# Patient Record
Sex: MALE | Race: WHITE | NOT HISPANIC OR LATINO | Employment: OTHER | URBAN - METROPOLITAN AREA
[De-identification: names, ages, dates, MRNs, and addresses within clinical notes are randomized per-mention and may not be internally consistent; named-entity substitution may affect disease eponyms.]

---

## 2017-09-13 ENCOUNTER — TELEPHONE (OUTPATIENT)
Dept: MEDICAL GROUP | Age: 67
End: 2017-09-13

## 2017-09-13 RX ORDER — CELECOXIB 200 MG/1
200 CAPSULE ORAL DAILY
COMMUNITY
End: 2018-01-04 | Stop reason: SDUPTHER

## 2017-09-13 RX ORDER — BUPROPION HYDROCHLORIDE 200 MG/1
200 TABLET, EXTENDED RELEASE ORAL DAILY
COMMUNITY
End: 2018-09-07

## 2017-09-13 RX ORDER — LORAZEPAM 1 MG/1
1 TABLET ORAL EVERY 4 HOURS PRN
COMMUNITY
End: 2017-09-21

## 2017-09-13 RX ORDER — ATORVASTATIN CALCIUM 20 MG/1
20 TABLET, FILM COATED ORAL DAILY
COMMUNITY
End: 2018-02-21 | Stop reason: SDUPTHER

## 2017-09-13 RX ORDER — LISINOPRIL 20 MG/1
20 TABLET ORAL EVERY EVENING
COMMUNITY
End: 2018-02-21 | Stop reason: SDUPTHER

## 2017-09-13 RX ORDER — OMEPRAZOLE 20 MG/1
20 CAPSULE, DELAYED RELEASE ORAL DAILY
COMMUNITY
End: 2018-09-17 | Stop reason: SDUPTHER

## 2017-09-13 RX ORDER — TADALAFIL 5 MG/1
5 TABLET ORAL PRN
COMMUNITY
End: 2017-12-29

## 2017-09-13 RX ORDER — ERGOCALCIFEROL 1.25 MG/1
50000 CAPSULE ORAL
COMMUNITY
End: 2019-03-01

## 2017-09-13 RX ORDER — CHOLECALCIFEROL (VITAMIN D3) 25 MCG
1 TABLET,CHEWABLE ORAL
Status: ON HOLD | COMMUNITY
End: 2021-07-06

## 2017-09-13 NOTE — TELEPHONE ENCOUNTER
NEW PATIENT VISIT PRE-VISIT PLANNING    1.  Epic[employer] Patient is checked in Patient Demographics? YES    2.  Immunizations were updated in Epic using WebIZ?: No WebIZ record       •  Web Iz Recommendations:     3.  Is this appointment scheduled as a Hospital Follow-Up? No    4.  Patient is due for the following Health Maintenance Topics:   Health Maintenance Due   Topic Date Due   • IMM DTaP/Tdap/Td Vaccine (1 - Tdap) 05/11/1970   • COLONOSCOPY  05/11/2001   • IMM ZOSTER VACCINE  05/11/2011   • IMM PNEUMOCOCCAL 65+ (ADULT) LOW/MEDIUM RISK SERIES (1 of 2 - PCV13) 05/11/2016   • IMM INFLUENZA (1) 09/01/2017     Records requested from [facility]      5.  Reviewed/Updated the following with patient:       •   Preferred Pharmacy? YES       •   Preferred Lab? YES       •   Medications? YES. Was Abstract Encounter opened and chart updated? YES       •   Social History? YES. Was Abstract Encounter opened and chart updated? YES       •   Family History? YES. Was Abstract Encounter opened and chart updated? YES    6.  Updated Care Team?       •   DME Company (gait device, O2, CPAP, etc.) N\A       •   Other Specialists (eye doctor, derm, GYN, cardiology, endo, etc): N\A    7.  Patient was informed to arrive 15 min prior to their scheduled appointment and bring in their medication bottles.

## 2017-09-21 ENCOUNTER — HOSPITAL ENCOUNTER (OUTPATIENT)
Dept: LAB | Facility: MEDICAL CENTER | Age: 67
End: 2017-09-21
Attending: FAMILY MEDICINE
Payer: COMMERCIAL

## 2017-09-21 ENCOUNTER — OFFICE VISIT (OUTPATIENT)
Dept: MEDICAL GROUP | Age: 67
End: 2017-09-21
Payer: COMMERCIAL

## 2017-09-21 VITALS
OXYGEN SATURATION: 97 % | BODY MASS INDEX: 27.47 KG/M2 | DIASTOLIC BLOOD PRESSURE: 70 MMHG | SYSTOLIC BLOOD PRESSURE: 124 MMHG | TEMPERATURE: 97.5 F | HEIGHT: 67 IN | WEIGHT: 175 LBS | HEART RATE: 76 BPM

## 2017-09-21 DIAGNOSIS — F51.01 PRIMARY INSOMNIA: ICD-10-CM

## 2017-09-21 DIAGNOSIS — Z00.00 PREVENTATIVE HEALTH CARE: ICD-10-CM

## 2017-09-21 DIAGNOSIS — K21.9 GASTROESOPHAGEAL REFLUX DISEASE WITHOUT ESOPHAGITIS: ICD-10-CM

## 2017-09-21 DIAGNOSIS — E87.6 HYPOKALEMIA: ICD-10-CM

## 2017-09-21 DIAGNOSIS — I10 ESSENTIAL HYPERTENSION: ICD-10-CM

## 2017-09-21 DIAGNOSIS — N40.0 BENIGN NODULAR PROSTATIC HYPERPLASIA WITHOUT LOWER URINARY TRACT SYMPTOMS: ICD-10-CM

## 2017-09-21 DIAGNOSIS — E55.9 VITAMIN D DEFICIENCY: ICD-10-CM

## 2017-09-21 DIAGNOSIS — C67.3 MALIGNANT NEOPLASM OF ANTERIOR WALL OF URINARY BLADDER (HCC): ICD-10-CM

## 2017-09-21 DIAGNOSIS — E78.00 PURE HYPERCHOLESTEROLEMIA: ICD-10-CM

## 2017-09-21 DIAGNOSIS — Z23 NEED FOR VACCINATION: ICD-10-CM

## 2017-09-21 DIAGNOSIS — F32.0 MILD SINGLE CURRENT EPISODE OF MAJOR DEPRESSIVE DISORDER (HCC): ICD-10-CM

## 2017-09-21 DIAGNOSIS — M72.2 PLANTAR FASCIITIS: ICD-10-CM

## 2017-09-21 PROBLEM — F32.9 MAJOR DEPRESSIVE DISORDER: Status: ACTIVE | Noted: 2017-09-21

## 2017-09-21 LAB
ALBUMIN SERPL BCP-MCNC: 4.4 G/DL (ref 3.2–4.9)
ALBUMIN/GLOB SERPL: 1.3 G/DL
ALP SERPL-CCNC: 56 U/L (ref 30–99)
ALT SERPL-CCNC: 29 U/L (ref 2–50)
ANION GAP SERPL CALC-SCNC: 8 MMOL/L (ref 0–11.9)
AST SERPL-CCNC: 28 U/L (ref 12–45)
BASOPHILS # BLD AUTO: 1 % (ref 0–1.8)
BASOPHILS # BLD: 0.05 K/UL (ref 0–0.12)
BILIRUB SERPL-MCNC: 0.6 MG/DL (ref 0.1–1.5)
BUN SERPL-MCNC: 20 MG/DL (ref 8–22)
CALCIUM SERPL-MCNC: 10 MG/DL (ref 8.5–10.5)
CHLORIDE SERPL-SCNC: 105 MMOL/L (ref 96–112)
CHOLEST SERPL-MCNC: 199 MG/DL (ref 100–199)
CO2 SERPL-SCNC: 27 MMOL/L (ref 20–33)
CREAT SERPL-MCNC: 1.06 MG/DL (ref 0.5–1.4)
EOSINOPHIL # BLD AUTO: 0.09 K/UL (ref 0–0.51)
EOSINOPHIL NFR BLD: 1.8 % (ref 0–6.9)
ERYTHROCYTE [DISTWIDTH] IN BLOOD BY AUTOMATED COUNT: 42.2 FL (ref 35.9–50)
GFR SERPL CREATININE-BSD FRML MDRD: >60 ML/MIN/1.73 M 2
GLOBULIN SER CALC-MCNC: 3.3 G/DL (ref 1.9–3.5)
GLUCOSE SERPL-MCNC: 117 MG/DL (ref 65–99)
HCT VFR BLD AUTO: 45.4 % (ref 42–52)
HDLC SERPL-MCNC: 75 MG/DL
HGB BLD-MCNC: 15.1 G/DL (ref 14–18)
IMM GRANULOCYTES # BLD AUTO: 0.01 K/UL (ref 0–0.11)
IMM GRANULOCYTES NFR BLD AUTO: 0.2 % (ref 0–0.9)
LDLC SERPL CALC-MCNC: 100 MG/DL
LYMPHOCYTES # BLD AUTO: 2 K/UL (ref 1–4.8)
LYMPHOCYTES NFR BLD: 39.7 % (ref 22–41)
MCH RBC QN AUTO: 31.7 PG (ref 27–33)
MCHC RBC AUTO-ENTMCNC: 33.3 G/DL (ref 33.7–35.3)
MCV RBC AUTO: 95.4 FL (ref 81.4–97.8)
MONOCYTES # BLD AUTO: 0.56 K/UL (ref 0–0.85)
MONOCYTES NFR BLD AUTO: 11.1 % (ref 0–13.4)
NEUTROPHILS # BLD AUTO: 2.33 K/UL (ref 1.82–7.42)
NEUTROPHILS NFR BLD: 46.2 % (ref 44–72)
NRBC # BLD AUTO: 0 K/UL
NRBC BLD AUTO-RTO: 0 /100 WBC
PLATELET # BLD AUTO: 157 K/UL (ref 164–446)
PMV BLD AUTO: 11.9 FL (ref 9–12.9)
POTASSIUM SERPL-SCNC: 4.2 MMOL/L (ref 3.6–5.5)
PROT SERPL-MCNC: 7.7 G/DL (ref 6–8.2)
PSA SERPL-MCNC: 1.49 NG/ML (ref 0–4)
RBC # BLD AUTO: 4.76 M/UL (ref 4.7–6.1)
SODIUM SERPL-SCNC: 140 MMOL/L (ref 135–145)
TRIGL SERPL-MCNC: 118 MG/DL (ref 0–149)
WBC # BLD AUTO: 5 K/UL (ref 4.8–10.8)

## 2017-09-21 PROCEDURE — 90715 TDAP VACCINE 7 YRS/> IM: CPT | Performed by: FAMILY MEDICINE

## 2017-09-21 PROCEDURE — 90471 IMMUNIZATION ADMIN: CPT | Performed by: FAMILY MEDICINE

## 2017-09-21 PROCEDURE — 36415 COLL VENOUS BLD VENIPUNCTURE: CPT

## 2017-09-21 PROCEDURE — 85025 COMPLETE CBC W/AUTO DIFF WBC: CPT

## 2017-09-21 PROCEDURE — 80053 COMPREHEN METABOLIC PANEL: CPT

## 2017-09-21 PROCEDURE — 84153 ASSAY OF PSA TOTAL: CPT

## 2017-09-21 PROCEDURE — 99204 OFFICE O/P NEW MOD 45 MIN: CPT | Mod: 25 | Performed by: FAMILY MEDICINE

## 2017-09-21 PROCEDURE — 90670 PCV13 VACCINE IM: CPT | Performed by: FAMILY MEDICINE

## 2017-09-21 PROCEDURE — 90662 IIV NO PRSV INCREASED AG IM: CPT | Performed by: FAMILY MEDICINE

## 2017-09-21 PROCEDURE — 82652 VIT D 1 25-DIHYDROXY: CPT

## 2017-09-21 PROCEDURE — 90472 IMMUNIZATION ADMIN EACH ADD: CPT | Performed by: FAMILY MEDICINE

## 2017-09-21 PROCEDURE — 80061 LIPID PANEL: CPT

## 2017-09-21 RX ORDER — LORAZEPAM 2 MG/1
TABLET ORAL
Qty: 60 TAB | Refills: 0 | Status: SHIPPED | OUTPATIENT
Start: 2017-09-21 | End: 2017-10-26 | Stop reason: SDUPTHER

## 2017-09-21 ASSESSMENT — PATIENT HEALTH QUESTIONNAIRE - PHQ9
SUM OF ALL RESPONSES TO PHQ QUESTIONS 1-9: 2
CLINICAL INTERPRETATION OF PHQ2 SCORE: 1
5. POOR APPETITE OR OVEREATING: 0 - NOT AT ALL

## 2017-09-21 NOTE — ASSESSMENT & PLAN NOTE
Lisinopril 20mg po q24h  He is on a statin, atorvastatin 20 mg,  Patient has been diagnosed with essential hypertension for years. Has been compliant with medications and tolerating them with no mention of any adverse events.  The patient is  on a Baby ASPIRIN and a  STATIN.  The patient has been tollerating the BP meds without any issues. No tunnel vision, no cough, no changes in vision, no lightheadedness, no fatigue, no syncopal or presyncopal episodes, no edema, no new rashes. Patient also  denied chest pain, headache, change in vision, dyspnea on exertion, shortness of breath, PND, back pain, numbness or tingling anywhere. He is tolerating his medication well, he denies any lightheadedness, no tunnel vision, no syncopal episodes, no fatigue, no leg weakness no falls.

## 2017-09-21 NOTE — ASSESSMENT & PLAN NOTE
Patient states that he was severely depressed when he was , since his divorce he only takes Wellbutrin episodically. He was taking Prozac and Wellbutrin was also followed by psychiatry. He states that he no longer needs to be followed by psychiatry. Feels much better now. Denies any suicidal or homicidal ideations.

## 2017-09-21 NOTE — ASSESSMENT & PLAN NOTE
Patient states that he's been diagnosed with plantar fasciitis bilateral feet. He has been seen by an orthopedic physician who has arranged for him to have custom fitted inserts. He tends to use inserts daily and has not been consistent with his home stretching exercises.

## 2017-09-21 NOTE — ASSESSMENT & PLAN NOTE
The patient is a 67-year-old male presents to clinic to establish care. He is a brand-new patient, recently moved from [facility]. He has significant past medical history of malignant cyst of the urinary bladder, depression, hypokalemia, history of gastric bypass, hypokalemia, hypertension, hyperlipidemia.   The patient denied any chest pain, no sob, no enrique, no  pnd, no orthopnea, no headache, no changes in vision, no numbness or tingling, no nausea, no diarrhea, no abdominal pain, no fevers, no chills, no bright red blood per rectum, no  difficulty urinating, no burning during micturition, no depressed mood, no other concerns.      Had bariatric surgery about 7 years ago---lost 70 pounds and kept it off    Brother with MI at age 45--he was a heavy smoker and morbidly obese    Father had lung cancer he was a heavy smoker    MGF with prostate cancer, age?    He believes he had a normal colonoscopy at age 61 in [place] he belives

## 2017-09-21 NOTE — ASSESSMENT & PLAN NOTE
The patient states that ever since his bariatric surgery he has been on potassium supplements. However his previous primary care physician wanted repeat the labs before continuing. He denies any palpitations no presyncopal episodes or chest pain.

## 2017-09-21 NOTE — PROGRESS NOTES
This medical record contains text that has been entered with the assistance of computer voice recognition and dictation software.  Therefore, it may contain unintended errors in text, spelling, punctuation, or grammar    Chief Complaint   Patient presents with   • Other     see reason for visit       Buster Medina is a 67 y.o. male here evaluation and management of: New patient, hypertension, hyperlipidemia, malignant cyst of the urinary bladder, history of gastric bypass, hypokalemia, plantar fasciitis, insomnia, depression      HPI:     Preventative health care  The patient is a 67-year-old male presents to clinic to establish care. He is a brand-new patient, recently moved from [facility]. He has significant past medical history of malignant cyst of the urinary bladder, depression, hypokalemia, history of gastric bypass, hypokalemia, hypertension, hyperlipidemia.   The patient denied any chest pain, no sob, no enrique, no  pnd, no orthopnea, no headache, no changes in vision, no numbness or tingling, no nausea, no diarrhea, no abdominal pain, no fevers, no chills, no bright red blood per rectum, no  difficulty urinating, no burning during micturition, no depressed mood, no other concerns.      Had bariatric surgery about 7 years ago---lost 70 pounds and kept it off    Brother with MI at age 45--he was a heavy smoker and morbidly obese    Father had lung cancer he was a heavy smoker    MGF with prostate cancer, age?    He believes he had a normal colonoscopy at age 61 in [place] he belives      Major depressive disorder (CMS-[facility])  Patient states that he was severely depressed when he was , since his divorce he only takes Wellbutrin episodically. He was taking Prozac and Wellbutrin was also followed by psychiatry. He states that he no longer needs to be followed by psychiatry. Feels much better now. Denies any suicidal or homicidal ideations.          Hypokalemia  The patient states that ever since  his bariatric surgery he has been on potassium supplements. However his previous primary care physician wanted repeat the labs before continuing. He denies any palpitations no presyncopal episodes or chest pain.    Plantar fasciitis  Patient states that he's been diagnosed with plantar fasciitis bilateral feet. He has been seen by an orthopedic physician who has arranged for him to have custom fitted inserts. He tends to use inserts daily and has not been consistent with his home stretching exercises.    Primary insomnia  Patient states that he will take Ativan 1-2 mg at night as needed for insomnia. Without it he cannot sleep. He has been on this regimen for 15 years.    Essential hypertension  Lisinopril 20mg po q24h  He is on a statin, atorvastatin 20 mg,  Patient has been diagnosed with essential hypertension for years. Has been compliant with medications and tolerating them with no mention of any adverse events.  The patient is  on a Baby ASPIRIN and a  STATIN.  The patient has been tollerating the BP meds without any issues. No tunnel vision, no cough, no changes in vision, no lightheadedness, no fatigue, no syncopal or presyncopal episodes, no edema, no new rashes. Patient also  denied chest pain, headache, change in vision, dyspnea on exertion, shortness of breath, PND, back pain, numbness or tingling anywhere. He is tolerating his medication well, he denies any lightheadedness, no tunnel vision, no syncopal episodes, no fatigue, no leg weakness no falls.      Current medicines (including changes today)  Current Outpatient Prescriptions   Medication Sig Dispense Refill   • lorazepam (ATIVAN) 2 MG tablet Take 1-2 tabs po qhs prn 60 Tab 0   • Potassium Chloride June CR (K-DUR PO) Take  by mouth.     • Adalimumab (HUMIRA PEN-CROHNS STARTER) 40 MG/0.8ML Pen-injector Kit Inject  as instructed 1 time daily as needed.     • atorvastatin (LIPITOR) 20 MG Tab Take 20 mg by mouth every evening.     • celecoxib  (CELEBREX) 200 MG Cap Take 200 mg by mouth 2 times a day.     • lisinopril (PRINIVIL) 20 MG Tab Take 20 mg by mouth every day.     • omeprazole (PRILOSEC) 20 MG delayed-release capsule Take 20 mg by mouth every day.     • tadalafil (CIALIS) 5 MG tablet Take 5 mg by mouth as needed for Erectile Dysfunction.     • buPROPion (WELLBUTRIN SR) 200 MG SR tablet Take 200 mg by mouth 2 times a day.     • Multiple Vitamins-Minerals (MULTI COMPLETE PO) Take  by mouth.     • vitamin D, Ergocalciferol, (DRISDOL) 52929 units Cap capsule Take  by mouth every 7 days.     • Multiple Vitamins-Minerals (MELODY-DAY 1000 PO) Take  by mouth.     • Cyanocobalamin (B-12) 2500 MCG Tab Take  by mouth.       No current facility-administered medications for this visit.      He  has a past medical history of Anxiety; Arthritis; Cancer (CMS-[facility]); Depression; Hyperlipidemia; Hypertension; and Sleep apnea.  He  has a past surgical history that includes bladder biopsy with cystoscopy; abdominal exploration; cholecystectomy; gastric bypass laparoscopic; eye surgery; open reduction; hernia repair; and athroplasty.  Social History   Substance Use Topics   • Smoking status: Never Smoker   • Smokeless tobacco: Never Used   • Alcohol use 9.0 oz/week     15 Glasses of wine per week     Social History     Social History Narrative   • No narrative on file     Family History   Problem Relation Age of Onset   • Cancer Mother    • Arthritis Mother    • Diabetes Mother    • Lung Disease Father    • Cancer Father    • Diabetes Father    • Hypertension Father    • Hyperlipidemia Father    • Hyperlipidemia Brother    • Arthritis Brother    • No Known Problems Maternal Grandmother    • Cancer Maternal Grandfather    • No Known Problems Paternal Grandmother    • No Known Problems Paternal Grandfather    • Heart Disease Brother    • Hypertension Brother      Family Status   Relation Status   • Mother    • Father    • Brother Alive   • Maternal  "Grandmother    • Maternal Grandfather    • Paternal Grandmother    • Paternal Grandfather    • Brother Alive         ROS    Please see hpi     All other systems reviewed and are negative     Objective:     Blood pressure 124/70, pulse 76, temperature 36.4 °C (97.5 °F), height 1.702 m (5' 7\"), weight 79.4 kg (175 lb), SpO2 97 %. Body mass index is 27.41 kg/m².  Physical Exam:    Constitutional: Alert, no distress.  Skin: Warm, dry, good turgor, no rashes in visible areas.  Eye: Equal, round and reactive, conjunctiva clear, lids normal.  ENMT: Lips without lesions, good dentition, oropharynx clear.  Neck: Trachea midline, no masses, no thyromegaly. No cervical or supraclavicular lymphadenopathy.  Respiratory: Unlabored respiratory effort, lungs clear to auscultation, no wheezes, no ronchi.  Cardiovascular: Normal S1, S2, no murmur, no edema.  Abdomen: Soft, non-tender, no masses, no hepatosplenomegaly.  Psych: Alert and oriented x3, normal affect and mood.          Assessment and Plan:   The following treatment plan was discussed      1. Need for vaccination  Given today    - INFLUENZA VACCINE, HIGH DOSE (65+ ONLY)  - Pneumococcal Conjugate Vaccine 13-Valent <6yo IM  - PNEUMOCOCCAL POLYSACCHARIDE VACCINE 23-VALENT =>3YO SQ/IM  - INFLUENZA VACCINE, HIGH DOSE (65+ ONLY)    2. Hypokalemia  Need repeat labs      3. Preventative health care  Care has been established  We need baseline labs to establish a clinical profile  We will then consider daily prophylactic aspirin   In order to weigh  the benefits vs risk of GI bleed    We reviewed USPSTF guidelines    The decision to initiate low-dose aspirin use for the primary prevention of CVD and CRC in adults aged 60 to 69 years who have a 10% or greater 10-year CVD risk should be an individual one  Up to date on colonoscopy   Requested Medical records to be sent to us  Denies intimate partner viloence        4. Malignant neoplasm of anterior " wall of urinary bladder (CMS-HCC)  He needs establish care in urology    - REFERRAL TO UROLOGY    5. Essential hypertension  I explained to the patient that the most common cause of death in [place] in both Male and Females was Acute MI and the most common risk factor for MI was hypertension.  The Patient was counseled on aggressive life style modifications such as running at least 20minutes per day 3 times per wk, and decreasing Sodium intake,      In addition to pharmacotherapy we discussed  lifestyle modification…#1. Maintain normal weight (BMI 18.5 to 24.kg/m2), #2 DASH diet, #3 Decrease Sodium intake to less than 100meq/day (2.4g Na or 6g NaCL), #4 increase physical activity, #5 Limit Etoh consumption to 2 drinks/day in men and 1 drink per day in women.     - COMP METABOLIC PANEL; Future  - CBC WITH DIFFERENTIAL; Future  - LIPID PROFILE; Future    6. Mild single current episode of major depressive disorder (CMS-HCC)  Patient has been stable with current management  We will make no changes for now      7. Primary insomnia    We talked about the addictive nature of this medication and all side effects including but not limited to respiratory depression and death. We also reviewed of the association of chronic benzodiazepine use and Alzheimer's disease  Would like to continue his medication    .  - lorazepam (ATIVAN) 2 MG tablet; Take 1-2 tabs po qhs prn  Dispense: 60 Tab; Refill: 0    8. Plantar fasciitis  We reviewed conservative management such as ice NSAIDs, taping (dye technique) wearing supportive shoes as well as shoe inserts. Also reviewed exercises such as toe curls, the towel drill as well as calf stretching. Handouts provided       9. Gastroesophageal reflux disease without esophagitis     Gerd precautions given (avoid the supine position after eating, avoid tight fitting clothing, head of bed elevation by raisin legs of bed,  avoid eating prior to sleeping, avoid reflux-inducing foods (foods high in  fat, chocolate, peppermint, and excessive alcohol, which can decrease LES pressure), promote salivation by chewing gum or lozenges,      10. Benign nodular prostatic hyperplasia without lower urinary tract symptoms    - PROSTATE SPECIFIC AG DIAGNOSTIC; Future    11. Vitamin D deficiency    - VITAMIN 1,25 DIHYDROXY; Future    12. Pure hypercholesterolemia  Need new labs        HEALTH MAINTENANCE: previous medical record    Instructed to Follow up in clinic or ER for worsening symptoms, difficulty breathing, lack of expected recovery, or should new symptoms or problems arise.    Followup: Return in about 4 weeks (around 10/19/2017) for punch biopsy.       Once again this medical record contains text that has been entered with the assistance of computer voice recognition and dictation software.  Therefore, it may contain unintended errors in text, spelling, punctuation, or grammar

## 2017-09-23 LAB — 1,25(OH)2D3 SERPL-MCNC: 85.5 PG/ML (ref 19.9–79.3)

## 2017-09-27 DIAGNOSIS — E83.52 HYPERCALCEMIA: ICD-10-CM

## 2017-09-27 RX ORDER — POTASSIUM CHLORIDE 750 MG/1
10 TABLET, FILM COATED, EXTENDED RELEASE ORAL DAILY
Qty: 90 TAB | Refills: 0 | Status: SHIPPED | OUTPATIENT
Start: 2017-09-27 | End: 2017-11-14 | Stop reason: SDUPTHER

## 2017-11-01 ENCOUNTER — HOSPITAL ENCOUNTER (OUTPATIENT)
Dept: LAB | Facility: MEDICAL CENTER | Age: 67
End: 2017-11-01
Attending: PHYSICIAN ASSISTANT
Payer: COMMERCIAL

## 2017-11-01 LAB
25(OH)D3 SERPL-MCNC: 99 NG/ML (ref 30–100)
CHOLEST SERPL-MCNC: 210 MG/DL (ref 100–199)
FOLATE SERPL-MCNC: >23.2 NG/ML
HDLC SERPL-MCNC: 79 MG/DL
IRON SATN MFR SERPL: 34 % (ref 15–55)
IRON SERPL-MCNC: 124 UG/DL (ref 50–180)
LDLC SERPL CALC-MCNC: 106 MG/DL
PREALB SERPL-MCNC: 32 MG/DL (ref 18–38)
TIBC SERPL-MCNC: 364 UG/DL (ref 250–450)
TRANSFERRIN SERPL-MCNC: 258 MG/DL (ref 200–370)
TRIGL SERPL-MCNC: 127 MG/DL (ref 0–149)
VIT B12 SERPL-MCNC: 617 PG/ML (ref 211–911)

## 2017-11-01 PROCEDURE — 84134 ASSAY OF PREALBUMIN: CPT

## 2017-11-01 PROCEDURE — 84466 ASSAY OF TRANSFERRIN: CPT

## 2017-11-01 PROCEDURE — 36415 COLL VENOUS BLD VENIPUNCTURE: CPT

## 2017-11-01 PROCEDURE — 82746 ASSAY OF FOLIC ACID SERUM: CPT

## 2017-11-01 PROCEDURE — 82306 VITAMIN D 25 HYDROXY: CPT

## 2017-11-01 PROCEDURE — 80061 LIPID PANEL: CPT

## 2017-11-01 PROCEDURE — 84425 ASSAY OF VITAMIN B-1: CPT

## 2017-11-01 PROCEDURE — 83540 ASSAY OF IRON: CPT

## 2017-11-01 PROCEDURE — 82607 VITAMIN B-12: CPT

## 2017-11-01 PROCEDURE — 83550 IRON BINDING TEST: CPT

## 2017-11-03 ENCOUNTER — OFFICE VISIT (OUTPATIENT)
Dept: MEDICAL GROUP | Age: 67
End: 2017-11-03
Payer: COMMERCIAL

## 2017-11-03 ENCOUNTER — HOSPITAL ENCOUNTER (OUTPATIENT)
Facility: MEDICAL CENTER | Age: 67
End: 2017-11-03
Attending: FAMILY MEDICINE
Payer: COMMERCIAL

## 2017-11-03 VITALS
WEIGHT: 176 LBS | TEMPERATURE: 97.5 F | BODY MASS INDEX: 27.62 KG/M2 | HEIGHT: 67 IN | DIASTOLIC BLOOD PRESSURE: 78 MMHG | OXYGEN SATURATION: 100 % | SYSTOLIC BLOOD PRESSURE: 130 MMHG | HEART RATE: 73 BPM

## 2017-11-03 DIAGNOSIS — Z00.00 PREVENTATIVE HEALTH CARE: ICD-10-CM

## 2017-11-03 DIAGNOSIS — D48.9 NEOPLASM OF UNCERTAIN BEHAVIOR: ICD-10-CM

## 2017-11-03 PROCEDURE — 11441 EXC FACE-MM B9+MARG 0.6-1 CM: CPT | Performed by: FAMILY MEDICINE

## 2017-11-03 PROCEDURE — 88304 TISSUE EXAM BY PATHOLOGIST: CPT

## 2017-11-03 PROCEDURE — 99999 PR NO CHARGE: CPT | Performed by: FAMILY MEDICINE

## 2017-11-03 NOTE — PROGRESS NOTES
This medical record contains text that has been entered with the assistance of computer voice recognition and dictation software.  Therefore, it may contain unintended errors in text, spelling, punctuation, or grammar    Chief Complaint   Patient presents with   • Other     see reason for visit       Buster Medina is a 67 y.o. male here evaluation and management of: excision      HPI:     Neoplasm of uncertain behavior  Patient states that he has a growing lesion near the corner of his left eye, he believes this is a same site where he had a previous skin cancer. He is not sure which type of skin cancer. However the lesion has been growing.    Preventative health care  The 10-year CVD risk score (Patricio, et al., 2008) is: 13.2%    Values used to calculate the score:      Age: 67 years      Sex: Male      Diabetic: No      Tobacco smoker: No      Systolic Blood Pressure: 130 mmHg      Is BP treated: No      HDL Cholesterol: 79 mg/dL      Total Cholesterol: 210 mg/dL    However this does not include positive family h/o of MI at age 45 (brother)      Current medicines (including changes today)  Current Outpatient Prescriptions   Medication Sig Dispense Refill   • lorazepam (ATIVAN) 2 MG tablet TAKE 1 TO 2 TABLETS BY MOUTH AT BEDTIME AS NEEDED 60 Tab 0   • potassium chloride ER (KLOR-CON 10) 10 MEQ tablet Take 1 Tab by mouth every day. 90 Tab 0   • Potassium Chloride June CR (K-DUR PO) Take  by mouth.     • Adalimumab (HUMIRA PEN-CROHNS STARTER) 40 MG/0.8ML Pen-injector Kit Inject  as instructed 1 time daily as needed.     • atorvastatin (LIPITOR) 20 MG Tab Take 20 mg by mouth every evening.     • celecoxib (CELEBREX) 200 MG Cap Take 200 mg by mouth 2 times a day.     • lisinopril (PRINIVIL) 20 MG Tab Take 20 mg by mouth every day.     • omeprazole (PRILOSEC) 20 MG delayed-release capsule Take 20 mg by mouth every day.     • tadalafil (CIALIS) 5 MG tablet Take 5 mg by mouth as needed for Erectile Dysfunction.     •  buPROPion (WELLBUTRIN SR) 200 MG SR tablet Take 200 mg by mouth 2 times a day.     • Multiple Vitamins-Minerals (MULTI COMPLETE PO) Take  by mouth.     • vitamin D, Ergocalciferol, (DRISDOL) 15922 units Cap capsule Take  by mouth every 7 days.     • Multiple Vitamins-Minerals (MELODY-DAY 1000 PO) Take  by mouth.     • Cyanocobalamin (B-12) 2500 MCG Tab Take  by mouth.       No current facility-administered medications for this visit.      He  has a past medical history of Anxiety; Arthritis; Cancer (CMS-HCC); Depression; Hyperlipidemia; Hypertension; and Sleep apnea.  He  has a past surgical history that includes bladder biopsy with cystoscopy; abdominal exploration; cholecystectomy; gastric bypass laparoscopic; eye surgery; open reduction; hernia repair; and athroplasty.  Social History   Substance Use Topics   • Smoking status: Never Smoker   • Smokeless tobacco: Never Used   • Alcohol use 9.0 oz/week     15 Glasses of wine per week     Social History     Social History Narrative   • No narrative on file     Family History   Problem Relation Age of Onset   • Cancer Mother    • Arthritis Mother    • Diabetes Mother    • Lung Disease Father    • Cancer Father    • Diabetes Father    • Hypertension Father    • Hyperlipidemia Father    • Hyperlipidemia Brother    • Arthritis Brother    • No Known Problems Maternal Grandmother    • Cancer Maternal Grandfather    • No Known Problems Paternal Grandmother    • No Known Problems Paternal Grandfather    • Heart Disease Brother    • Hypertension Brother      Family Status   Relation Status   • Mother    • Father    • Brother Alive   • Maternal Grandmother    • Maternal Grandfather    • Paternal Grandmother    • Paternal Grandfather    • Brother Alive         ROS    Please see hpi     All other systems reviewed and are negative     Objective:     Blood pressure 130/78, pulse 73, temperature 36.4 °C (97.5 °F), height 1.702 m (5'  "7.01\"), weight 79.8 kg (176 lb), SpO2 100 %. Body mass index is 27.56 kg/m².  Physical Exam:    Constitutional: Alert, no distress.  Skin: Warm, dry, good turgor, no rashes in visible areas.  Eye: Equal, round and reactive, conjunctiva clear, lids normal.  ENMT: Lips without lesions, good dentition, oropharynx clear.  Neck: Trachea midline, no masses, no thyromegaly. No cervical or supraclavicular lymphadenopathy.  Respiratory: Unlabored respiratory effort, lungs clear to auscultation, no wheezes, no ronchi.  Cardiovascular: Normal S1, S2, no murmur, no edema.  Abdomen: Soft, non-tender, no masses, no hepatosplenomegaly.  Psych: Alert and oriented x3, normal affect and mood.    Excision--- 8 mm, irregular, assymmetric, hyperpigmented raised silver plaque located near corner of left eye      After informed written consent was obtained, using Betadine for cleansing and 1% Lidocaine w- epinephrine for anesthetic, with sterile technique a 8 mm punch tool was used to obtain and excise  the lesion through dermis (full thickness) . Intent was to remove entire lesion with margins . Hemostasis was obtained by pressure and wound was  Sutured  With 4.0 Ethilon x2 . Antibiotic dressing is applied, and wound care instructions provided. Be alert for any signs of cutaneous infection. The specimen is labeled and sent to pathology for evaluation. The procedure was well tolerated without complications.      Assessment and Plan:   The following treatment plan was discussed      1. Neoplasm of uncertain behavior  Patient tollerrated procedure well  There were no adverse events  Patient was given post procedure precautions     2. Preventative medicine  According  to [place] 2008 Risk Score the patient has a 10 Global CVD risk score of 12 % = moderate  and the benefits of ASPIRN DO outweigh the risk of GI bleed.     Also given his positive family h/o of MI at age 45 (brother)  He will benefit from ASA 81mg, if his bariatric surgeon " allows him to        HEALTH MAINTENANCE:    Instructed to Follow up in clinic or ER for worsening symptoms, difficulty breathing, lack of expected recovery, or should new symptoms or problems arise.    Followup: Return in about 2 weeks (around 11/17/2017) for punch biopsy, Suture Removal.       Once again this medical record contains text that has been entered with the assistance of computer voice recognition and dictation software.  Therefore, it may contain unintended errors in text, spelling, punctuation, or grammar

## 2017-11-03 NOTE — ASSESSMENT & PLAN NOTE
The 10-year CVD risk score (Patricio et al., 2008) is: 13.2%    Values used to calculate the score:      Age: 67 years      Sex: Male      Diabetic: No      Tobacco smoker: No      Systolic Blood Pressure: 130 mmHg      Is BP treated: No      HDL Cholesterol: 79 mg/dL      Total Cholesterol: 210 mg/dL    However this does not include positive family h/o of MI at age 45 (brother)

## 2017-11-03 NOTE — ASSESSMENT & PLAN NOTE
Patient states that he has a growing lesion near the corner of his left eye, he believes this is a same site where he had a previous skin cancer. He is not sure which type of skin cancer. However the lesion has been growing.

## 2017-11-04 LAB — VIT B1 BLD-MCNC: 150 NMOL/L (ref 70–180)

## 2017-11-14 RX ORDER — POTASSIUM CHLORIDE 750 MG/1
10 TABLET, FILM COATED, EXTENDED RELEASE ORAL DAILY
Qty: 90 TAB | Refills: 0 | Status: SHIPPED | OUTPATIENT
Start: 2017-11-14 | End: 2018-03-02 | Stop reason: SDUPTHER

## 2017-11-21 ENCOUNTER — HOSPITAL ENCOUNTER (OUTPATIENT)
Facility: MEDICAL CENTER | Age: 67
End: 2017-11-21
Attending: FAMILY MEDICINE
Payer: COMMERCIAL

## 2017-11-21 ENCOUNTER — OFFICE VISIT (OUTPATIENT)
Dept: MEDICAL GROUP | Age: 67
End: 2017-11-21
Payer: COMMERCIAL

## 2017-11-21 VITALS
TEMPERATURE: 98.6 F | WEIGHT: 177.2 LBS | HEIGHT: 67 IN | SYSTOLIC BLOOD PRESSURE: 118 MMHG | OXYGEN SATURATION: 98 % | DIASTOLIC BLOOD PRESSURE: 76 MMHG | BODY MASS INDEX: 27.81 KG/M2 | HEART RATE: 81 BPM

## 2017-11-21 DIAGNOSIS — Z48.02 VISIT FOR SUTURE REMOVAL: ICD-10-CM

## 2017-11-21 DIAGNOSIS — D48.9 NEOPLASM OF UNCERTAIN BEHAVIOR: ICD-10-CM

## 2017-11-21 PROCEDURE — 11401 EXC TR-EXT B9+MARG 0.6-1 CM: CPT | Performed by: FAMILY MEDICINE

## 2017-11-21 PROCEDURE — 99212 OFFICE O/P EST SF 10 MIN: CPT | Mod: 25 | Performed by: FAMILY MEDICINE

## 2017-11-21 PROCEDURE — 88305 TISSUE EXAM BY PATHOLOGIST: CPT

## 2017-11-21 NOTE — PROGRESS NOTES
This medical record contains text that has been entered with the assistance of computer voice recognition and dictation software.  Therefore, it may contain unintended errors in text, spelling, punctuation, or grammar    No chief complaint on file.      Buster Medina is a 67 y.o. male here evaluation and management of: suture removal discuss pathology of excision, and excision      HPI:     Neoplasm of uncertain behavior  Patient is a 67-year-old male 10 with ample history of sun exposure. He has several irregular macules, there is one concerning on his left chest that made him go home, he is not sure how long it's been there. Causing concern he would like to have removed.    Visit for suture removal    The patient underwent excision and returns for suture removal.      Current medicines (including changes today)  Current Outpatient Prescriptions   Medication Sig Dispense Refill   • potassium chloride ER (KLOR-CON 10) 10 MEQ tablet Take 1 Tab by mouth every day. 90 Tab 0   • lorazepam (ATIVAN) 2 MG tablet TAKE 1 TO 2 TABLETS BY MOUTH AT BEDTIME AS NEEDED 60 Tab 0   • Adalimumab (HUMIRA PEN-CROHNS STARTER) 40 MG/0.8ML Pen-injector Kit Inject  as instructed 1 time daily as needed.     • atorvastatin (LIPITOR) 20 MG Tab Take 20 mg by mouth every evening.     • celecoxib (CELEBREX) 200 MG Cap Take 200 mg by mouth 2 times a day.     • lisinopril (PRINIVIL) 20 MG Tab Take 20 mg by mouth every day.     • omeprazole (PRILOSEC) 20 MG delayed-release capsule Take 20 mg by mouth every day.     • tadalafil (CIALIS) 5 MG tablet Take 5 mg by mouth as needed for Erectile Dysfunction.     • buPROPion (WELLBUTRIN SR) 200 MG SR tablet Take 200 mg by mouth 2 times a day.     • vitamin D, Ergocalciferol, (DRISDOL) 01104 units Cap capsule Take  by mouth every 7 days.     • Multiple Vitamins-Minerals (MELODY-DAY 1000 PO) Take  by mouth.     • Cyanocobalamin (B-12) 2500 MCG Tab Take  by mouth.     • Potassium Chloride June CR (K-DUR PO) Take  " by mouth.     • Multiple Vitamins-Minerals (MULTI COMPLETE PO) Take  by mouth.       No current facility-administered medications for this visit.      He  has a past medical history of Anxiety; Arthritis; Cancer (CMS-HCC); Depression; Hyperlipidemia; Hypertension; and Sleep apnea.  He  has a past surgical history that includes bladder biopsy with cystoscopy; abdominal exploration; cholecystectomy; gastric bypass laparoscopic; eye surgery; open reduction; hernia repair; and athroplasty.  Social History   Substance Use Topics   • Smoking status: Never Smoker   • Smokeless tobacco: Never Used   • Alcohol use 9.0 oz/week     15 Glasses of wine per week     Social History     Social History Narrative   • No narrative on file     Family History   Problem Relation Age of Onset   • Cancer Mother    • Arthritis Mother    • Diabetes Mother    • Lung Disease Father    • Cancer Father    • Diabetes Father    • Hypertension Father    • Hyperlipidemia Father    • Hyperlipidemia Brother    • Arthritis Brother    • No Known Problems Maternal Grandmother    • Cancer Maternal Grandfather    • No Known Problems Paternal Grandmother    • No Known Problems Paternal Grandfather    • Heart Disease Brother    • Hypertension Brother      Family Status   Relation Status   • Mother    • Father    • Brother Alive   • Maternal Grandmother    • Maternal Grandfather    • Paternal Grandmother    • Paternal Grandfather    • Brother Alive         ROS    Please see hpi     All other systems reviewed and are negative     Objective:     Blood pressure 118/76, pulse 81, temperature 37 °C (98.6 °F), height 1.702 m (5' 7\"), weight 80.4 kg (177 lb 3.2 oz), SpO2 98 %. Body mass index is 27.75 kg/m².  Physical Exam:    Constitutional: Alert, no distress.  Skin: Warm, dry, good turgor, no rashes in visible areas.  Eye: Equal, round and reactive, conjunctiva clear, lids normal.  ENMT: Lips without lesions, good " dentition, oropharynx clear.  Neck: Trachea midline, no masses, no thyromegaly. No cervical or supraclavicular lymphadenopathy.  Respiratory: Unlabored respiratory effort, lungs clear to auscultation, no wheezes, no ronchi.  Cardiovascular: Normal S1, S2, no murmur, no edema.  Abdomen: Soft, non-tender, no masses, no hepatosplenomegaly.  Psych: Alert and oriented x3, normal affect and mood.        Excision---6mm, irregular, assymmetric, hyperpigmented macule located on     Final Length of Excision-- 8mm        After informed written consent was obtained, using Betadine for cleansing and 1% Lidocaine w- epinephrine for anesthetic, with sterile technique a 6 mm punch tool was used to obtain and excise  the lesion through dermis (full thickness) . Intent was to remove entire lesion with margins . Hemostasis was obtained by pressure and wound was Sutured  With 4.0 Ethilon x2 Antibiotic dressing is applied, and wound care instructions provided. Be alert for any signs of cutaneous infection. The specimen is labeled and sent to pathology for evaluation. The procedure was well tolerated without complications.      Assessment and Plan:   The following treatment plan was discussed      1. Neoplasm of uncertain behavior    Patient tollerrated procedure well  There were no adverse events  Patient was given post procedure precautions     - PATHOLOGY SPECIMEN; Future  - PATHOLOGY SPECIMEN    2. Visit for suture removal      Sutures removed in normal clean fashion.  There were no adverse events.          HEALTH MAINTENANCE:    Instructed to Follow up in clinic or ER for worsening symptoms, difficulty breathing, lack of expected recovery, or should new symptoms or problems arise.    Followup: Return in about 2 weeks (around 12/5/2017) for Suture Removal.       Once again this medical record contains text that has been entered with the assistance of computer voice recognition and dictation software.  Therefore, it may contain  unintended errors in text, spelling, punctuation, or grammar

## 2017-11-21 NOTE — ASSESSMENT & PLAN NOTE
Patient is a 67-year-old male 10 with ample history of sun exposure. He has several irregular macules, there is one concerning on his left chest that made him go home, he is not sure how long it's been there. Causing concern he would like to have removed.

## 2017-11-25 DIAGNOSIS — F51.01 PRIMARY INSOMNIA: ICD-10-CM

## 2017-11-30 RX ORDER — LORAZEPAM 2 MG/1
TABLET ORAL
Qty: 60 TAB | Refills: 0 | Status: SHIPPED
Start: 2017-11-30 | End: 2017-12-25 | Stop reason: SDUPTHER

## 2017-12-05 ENCOUNTER — OFFICE VISIT (OUTPATIENT)
Dept: MEDICAL GROUP | Age: 67
End: 2017-12-05
Payer: COMMERCIAL

## 2017-12-05 VITALS
HEIGHT: 67 IN | SYSTOLIC BLOOD PRESSURE: 117 MMHG | OXYGEN SATURATION: 98 % | BODY MASS INDEX: 27.97 KG/M2 | HEART RATE: 75 BPM | DIASTOLIC BLOOD PRESSURE: 70 MMHG | TEMPERATURE: 96.4 F | WEIGHT: 178.2 LBS

## 2017-12-05 DIAGNOSIS — Z48.02 VISIT FOR SUTURE REMOVAL: Primary | ICD-10-CM

## 2017-12-05 DIAGNOSIS — Z51.89 VISIT FOR WOUND CHECK: ICD-10-CM

## 2017-12-05 PROCEDURE — 99212 OFFICE O/P EST SF 10 MIN: CPT | Performed by: FAMILY MEDICINE

## 2017-12-05 NOTE — PROGRESS NOTES
Pt presents for wound check and suture removal.   Date of procedure: 11/21/20117  Location: left anterior chest   Number of suture: 2  Pathology report: benign solar lentigo.   Pt denies fever, chills, spreading erythema, worsening pain, lesional bleeding or purulent discharge.     Exam:   1-cm incision on left anterior chest, mild surrounding erythema, no pain to palpation, negative lesional bleeding or discharge.     A/P:   Wound appears to heal well. Pathology report discussed with patient. 2 sutures removed successfully. Pt tolerates procedure well, no immediate complications noted. Wound care instructions provided. F/u PRN.     Nuvia Cohen M.D.

## 2017-12-28 ENCOUNTER — OFFICE VISIT (OUTPATIENT)
Dept: URGENT CARE | Facility: CLINIC | Age: 67
End: 2017-12-28
Payer: COMMERCIAL

## 2017-12-28 VITALS
RESPIRATION RATE: 16 BRPM | SYSTOLIC BLOOD PRESSURE: 128 MMHG | HEIGHT: 67 IN | OXYGEN SATURATION: 97 % | WEIGHT: 178.6 LBS | DIASTOLIC BLOOD PRESSURE: 76 MMHG | HEART RATE: 84 BPM | BODY MASS INDEX: 28.03 KG/M2 | TEMPERATURE: 97.7 F

## 2017-12-28 DIAGNOSIS — R21 RASH: ICD-10-CM

## 2017-12-28 DIAGNOSIS — M62.838 NECK MUSCLE SPASM: ICD-10-CM

## 2017-12-28 PROCEDURE — 99214 OFFICE O/P EST MOD 30 MIN: CPT | Performed by: NURSE PRACTITIONER

## 2017-12-28 ASSESSMENT — ENCOUNTER SYMPTOMS
SHORTNESS OF BREATH: 0
DIZZINESS: 0
CHILLS: 0
VOMITING: 0
NAUSEA: 0
SORE THROAT: 0
WEAKNESS: 0
EYE PAIN: 0
NUMBNESS: 0
FEVER: 0
MYALGIAS: 0
COUGH: 0

## 2017-12-29 ENCOUNTER — HOSPITAL ENCOUNTER (OUTPATIENT)
Dept: RADIOLOGY | Facility: MEDICAL CENTER | Age: 67
End: 2017-12-29
Attending: UROLOGY
Payer: COMMERCIAL

## 2017-12-29 DIAGNOSIS — Z01.810 PRE-OPERATIVE CARDIOVASCULAR EXAMINATION: ICD-10-CM

## 2017-12-29 DIAGNOSIS — Z01.812 PRE-OPERATIVE LABORATORY EXAMINATION: ICD-10-CM

## 2017-12-29 DIAGNOSIS — Z01.811 PRE-OPERATIVE RESPIRATORY EXAMINATION: ICD-10-CM

## 2017-12-29 LAB
ALBUMIN SERPL BCP-MCNC: 4.3 G/DL (ref 3.2–4.9)
ALBUMIN/GLOB SERPL: 1.4 G/DL
ALP SERPL-CCNC: 53 U/L (ref 30–99)
ALT SERPL-CCNC: 31 U/L (ref 2–50)
ANION GAP SERPL CALC-SCNC: 5 MMOL/L (ref 0–11.9)
APPEARANCE UR: CLEAR
APTT PPP: 24.2 SEC (ref 24.7–36)
AST SERPL-CCNC: 28 U/L (ref 12–45)
BACTERIA #/AREA URNS HPF: NEGATIVE /HPF
BASOPHILS # BLD AUTO: 0.5 % (ref 0–1.8)
BASOPHILS # BLD: 0.03 K/UL (ref 0–0.12)
BILIRUB SERPL-MCNC: 0.5 MG/DL (ref 0.1–1.5)
BILIRUB UR QL STRIP.AUTO: NEGATIVE
BUN SERPL-MCNC: 20 MG/DL (ref 8–22)
CALCIUM SERPL-MCNC: 10.1 MG/DL (ref 8.5–10.5)
CHLORIDE SERPL-SCNC: 107 MMOL/L (ref 96–112)
CO2 SERPL-SCNC: 27 MMOL/L (ref 20–33)
COLOR UR: ABNORMAL
CREAT SERPL-MCNC: 1.2 MG/DL (ref 0.5–1.4)
EKG IMPRESSION: NORMAL
EOSINOPHIL # BLD AUTO: 0.08 K/UL (ref 0–0.51)
EOSINOPHIL NFR BLD: 1.3 % (ref 0–6.9)
EPI CELLS #/AREA URNS HPF: NEGATIVE /HPF
ERYTHROCYTE [DISTWIDTH] IN BLOOD BY AUTOMATED COUNT: 42.6 FL (ref 35.9–50)
GFR SERPL CREATININE-BSD FRML MDRD: >60 ML/MIN/1.73 M 2
GLOBULIN SER CALC-MCNC: 3 G/DL (ref 1.9–3.5)
GLUCOSE SERPL-MCNC: 114 MG/DL (ref 65–99)
GLUCOSE UR STRIP.AUTO-MCNC: NEGATIVE MG/DL
HCT VFR BLD AUTO: 43.6 % (ref 42–52)
HGB BLD-MCNC: 14.8 G/DL (ref 14–18)
HYALINE CASTS #/AREA URNS LPF: ABNORMAL /LPF
IMM GRANULOCYTES # BLD AUTO: 0.02 K/UL (ref 0–0.11)
IMM GRANULOCYTES NFR BLD AUTO: 0.3 % (ref 0–0.9)
INR PPP: 1.06 (ref 0.87–1.13)
KETONES UR STRIP.AUTO-MCNC: NEGATIVE MG/DL
LEUKOCYTE ESTERASE UR QL STRIP.AUTO: ABNORMAL
LYMPHOCYTES # BLD AUTO: 1.42 K/UL (ref 1–4.8)
LYMPHOCYTES NFR BLD: 23.3 % (ref 22–41)
MCH RBC QN AUTO: 32.2 PG (ref 27–33)
MCHC RBC AUTO-ENTMCNC: 33.9 G/DL (ref 33.7–35.3)
MCV RBC AUTO: 94.8 FL (ref 81.4–97.8)
MICRO URNS: ABNORMAL
MONOCYTES # BLD AUTO: 0.93 K/UL (ref 0–0.85)
MONOCYTES NFR BLD AUTO: 15.2 % (ref 0–13.4)
NEUTROPHILS # BLD AUTO: 3.62 K/UL (ref 1.82–7.42)
NEUTROPHILS NFR BLD: 59.4 % (ref 44–72)
NITRITE UR QL STRIP.AUTO: NEGATIVE
NRBC # BLD AUTO: 0.02 K/UL
NRBC BLD-RTO: 0.3 /100 WBC
PH UR STRIP.AUTO: 5.5 [PH]
PLATELET # BLD AUTO: 142 K/UL (ref 164–446)
PMV BLD AUTO: 11.6 FL (ref 9–12.9)
POTASSIUM SERPL-SCNC: 4.5 MMOL/L (ref 3.6–5.5)
PROT SERPL-MCNC: 7.3 G/DL (ref 6–8.2)
PROT UR QL STRIP: NEGATIVE MG/DL
PROTHROMBIN TIME: 13.5 SEC (ref 12–14.6)
RBC # BLD AUTO: 4.6 M/UL (ref 4.7–6.1)
RBC # URNS HPF: ABNORMAL /HPF
RBC UR QL AUTO: NEGATIVE
SODIUM SERPL-SCNC: 139 MMOL/L (ref 135–145)
SP GR UR STRIP.AUTO: 1.02
UROBILINOGEN UR STRIP.AUTO-MCNC: 0.2 MG/DL
WBC # BLD AUTO: 6.1 K/UL (ref 4.8–10.8)
WBC #/AREA URNS HPF: ABNORMAL /HPF

## 2017-12-29 PROCEDURE — 71020 DX-CHEST-2 VIEWS: CPT

## 2017-12-29 PROCEDURE — 87186 SC STD MICRODIL/AGAR DIL: CPT

## 2017-12-29 PROCEDURE — 85730 THROMBOPLASTIN TIME PARTIAL: CPT

## 2017-12-29 PROCEDURE — 36415 COLL VENOUS BLD VENIPUNCTURE: CPT

## 2017-12-29 PROCEDURE — 87086 URINE CULTURE/COLONY COUNT: CPT

## 2017-12-29 PROCEDURE — 87077 CULTURE AEROBIC IDENTIFY: CPT

## 2017-12-29 PROCEDURE — 85025 COMPLETE CBC W/AUTO DIFF WBC: CPT

## 2017-12-29 PROCEDURE — 80053 COMPREHEN METABOLIC PANEL: CPT

## 2017-12-29 PROCEDURE — 93010 ELECTROCARDIOGRAM REPORT: CPT | Performed by: INTERNAL MEDICINE

## 2017-12-29 PROCEDURE — 85610 PROTHROMBIN TIME: CPT

## 2017-12-29 PROCEDURE — 93005 ELECTROCARDIOGRAM TRACING: CPT

## 2017-12-29 PROCEDURE — 81001 URINALYSIS AUTO W/SCOPE: CPT

## 2017-12-29 NOTE — PROGRESS NOTES
"  Subjective:     Buster Medina is a 67 y.o. male who presents for Rash (on r leg and chest, tingling on shoulder, nausea, just got back from [place] )  Patient in clinic today with multiple vague complaints. Patient was just recently important marital on vacation where he states they were exposed to mosquitoes. Patient states that one other person on the trip has been exact same symptoms that happened and at the same time. His friend also had a rash that looked like \"whip marks\" When he was there one week ago he had nausea and following that he had right shoulder tingling. He states the tingling feels like electrodes pulsating in his upper right neck to upper back. Patient does have a shoulder replacement. Yesterday patient noticed a rash on his chest that looks like marks. Patient denies any itching, swelling, fevers, joint pain. Patient states current symptoms have now completely resolved. Patient concerned that all of these vague complaints are a cascade of something that he contracted in [place].     Rash   This is a new problem. The current episode started yesterday. The problem is unchanged. The affected locations include the chest and left lower leg. He was exposed to nothing. Pertinent negatives include no cough, eye pain, fever, joint pain, shortness of breath, sore throat or vomiting. Past treatments include nothing. There is no history of eczema.   Shoulder Pain   This is a new problem. The current episode started in the past 7 days. The problem occurs intermittently. The problem has been unchanged. Associated symptoms include a rash. Pertinent negatives include no chest pain, chills, coughing, fever, myalgias, nausea, numbness, sore throat, vomiting or weakness. Nothing aggravates the symptoms. He has tried nothing for the symptoms. The treatment provided no relief.     Past Medical History:   Diagnosis Date   • Anxiety    • Arthritis    • Cancer (CMS-HCC)    • Depression    • Hyperlipidemia    • Hypertension " "   • Sleep apnea      Past Surgical History:   Procedure Laterality Date   • ABDOMINAL EXPLORATION     • ATHROPLASTY      right shoulder   • BLADDER BIOPSY WITH CYSTOSCOPY     • CHOLECYSTECTOMY     • EYE SURGERY      las   • GASTRIC BYPASS LAPAROSCOPIC     • HERNIA REPAIR     • OPEN REDUCTION       Social History     Social History   • Marital status: Single     Spouse name: N/A   • Number of children: N/A   • Years of education: N/A     Occupational History   • Not on file.     Social History Main Topics   • Smoking status: Never Smoker   • Smokeless tobacco: Never Used   • Alcohol use 9.0 oz/week     15 Glasses of wine per week   • Drug use:      Types: Marijuana      Comment: occ   • Sexual activity: Not Currently     Other Topics Concern   • Not on file     Social History Narrative   • No narrative on file      Family History   Problem Relation Age of Onset   • Cancer Mother    • Arthritis Mother    • Diabetes Mother    • Lung Disease Father    • Cancer Father    • Diabetes Father    • Hypertension Father    • Hyperlipidemia Father    • Hyperlipidemia Brother    • Arthritis Brother    • No Known Problems Maternal Grandmother    • Cancer Maternal Grandfather    • No Known Problems Paternal Grandmother    • No Known Problems Paternal Grandfather    • Heart Disease Brother    • Hypertension Brother     Review of Systems   Constitutional: Negative for chills and fever.   HENT: Negative for sore throat.    Eyes: Negative for pain.   Respiratory: Negative for cough and shortness of breath.    Cardiovascular: Negative for chest pain.   Gastrointestinal: Negative for nausea and vomiting.   Genitourinary: Negative for hematuria.   Musculoskeletal: Negative for joint pain and myalgias.   Skin: Positive for rash.   Neurological: Negative for dizziness, weakness and numbness.   No Known Allergies   Objective:   /76   Pulse 84   Temp 36.5 °C (97.7 °F)   Resp 16   Ht 1.702 m (5' 7\")   Wt 81 kg (178 lb 9.6 oz)   " SpO2 97%   BMI 27.97 kg/m²   Physical Exam   Constitutional: He is oriented to person, place, and time. He appears well-developed and well-nourished. No distress.   HENT:   Head: Normocephalic and atraumatic.   Eyes: Conjunctivae and EOM are normal. Pupils are equal, round, and reactive to light.   Cardiovascular: Normal rate and regular rhythm.    No murmur heard.  Pulmonary/Chest: Effort normal and breath sounds normal. No respiratory distress.   Abdominal: Soft. He exhibits no distension. There is no tenderness.   Musculoskeletal:        Right shoulder: He exhibits spasm. He exhibits normal range of motion, no tenderness, no swelling, no pain and normal strength.   Neurological: He is alert and oriented to person, place, and time. He has normal reflexes. No sensory deficit.   Skin: Skin is warm and dry.        Purple rash on chest and lower left shin. No erythema, no blister, no dryness   Psychiatric: He has a normal mood and affect.         Assessment/Plan:   Assessment    1. Neck muscle spasm  2. Rash  At this time I do not feel the patient is toxic, sick, the rash appears to be benign in nature. There is no erythema, no signs of infection, signs of cellulitis.  Neck pain was able to elicit spasm on palpation of the upper trapezius on the right side. Advised patient to gentle stretching, heat, activity as tolerated.     At this time patient denies any nausea and declined a prescription for Zofran.  Is my recommendation that we watch for worsening and/or resolving symptoms. Patient in full understanding of verbalizing understanding of the need to follow-up if indicated.    Patient given precautionary s/sx that mandate immediate follow up and evaluation in the ED. Advised of risks of not doing so.    DDX, Supportive care, and indications for immediate follow-up discussed with patient.    Instructed to return to clinic or nearest emergency department if we are not available for any change in condition, further  concerns, or worsening of symptoms.    The patient demonstrated a good understanding and agreed with the treatment plan.

## 2018-01-01 LAB
BACTERIA UR CULT: ABNORMAL
BACTERIA UR CULT: ABNORMAL
SIGNIFICANT IND 70042: ABNORMAL
SITE SITE: ABNORMAL
SOURCE SOURCE: ABNORMAL

## 2018-01-03 ENCOUNTER — APPOINTMENT (OUTPATIENT)
Dept: RADIOLOGY | Facility: MEDICAL CENTER | Age: 68
End: 2018-01-03
Attending: UROLOGY
Payer: COMMERCIAL

## 2018-01-03 ENCOUNTER — HOSPITAL ENCOUNTER (OUTPATIENT)
Facility: MEDICAL CENTER | Age: 68
End: 2018-01-03
Attending: UROLOGY | Admitting: UROLOGY
Payer: COMMERCIAL

## 2018-01-03 VITALS
TEMPERATURE: 97.2 F | OXYGEN SATURATION: 97 % | BODY MASS INDEX: 28.06 KG/M2 | HEIGHT: 67 IN | WEIGHT: 178.79 LBS | RESPIRATION RATE: 14 BRPM

## 2018-01-03 DIAGNOSIS — D48.9 NEOPLASM OF UNCERTAIN BEHAVIOR: ICD-10-CM

## 2018-01-03 DIAGNOSIS — C67.3 MALIGNANT NEOPLASM OF ANTERIOR WALL OF URINARY BLADDER (HCC): Primary | ICD-10-CM

## 2018-01-03 PROCEDURE — C1758 CATHETER, URETERAL: HCPCS | Performed by: UROLOGY

## 2018-01-03 PROCEDURE — 700102 HCHG RX REV CODE 250 W/ 637 OVERRIDE(OP)

## 2018-01-03 PROCEDURE — 700111 HCHG RX REV CODE 636 W/ 250 OVERRIDE (IP)

## 2018-01-03 PROCEDURE — 160046 HCHG PACU - 1ST 60 MINS PHASE II: Performed by: UROLOGY

## 2018-01-03 PROCEDURE — 160048 HCHG OR STATISTICAL LEVEL 1-5: Performed by: UROLOGY

## 2018-01-03 PROCEDURE — 160009 HCHG ANES TIME/MIN: Performed by: UROLOGY

## 2018-01-03 PROCEDURE — 502240 HCHG MISC OR SUPPLY RC 0272: Performed by: UROLOGY

## 2018-01-03 PROCEDURE — 160025 RECOVERY II MINUTES (STATS): Performed by: UROLOGY

## 2018-01-03 PROCEDURE — 500880 HCHG PACK, CYSTO W/SEP LEGGINGS: Performed by: UROLOGY

## 2018-01-03 PROCEDURE — 160028 HCHG SURGERY MINUTES - 1ST 30 MINS LEVEL 3: Performed by: UROLOGY

## 2018-01-03 PROCEDURE — 88112 CYTOPATH CELL ENHANCE TECH: CPT | Mod: 91

## 2018-01-03 PROCEDURE — 74420 UROGRAPHY RTRGR +-KUB: CPT

## 2018-01-03 PROCEDURE — 160036 HCHG PACU - EA ADDL 30 MINS PHASE I: Performed by: UROLOGY

## 2018-01-03 PROCEDURE — 501329 HCHG SET, CYSTO IRRIG Y TUR: Performed by: UROLOGY

## 2018-01-03 PROCEDURE — 160002 HCHG RECOVERY MINUTES (STAT): Performed by: UROLOGY

## 2018-01-03 PROCEDURE — 160039 HCHG SURGERY MINUTES - EA ADDL 1 MIN LEVEL 3: Performed by: UROLOGY

## 2018-01-03 PROCEDURE — A9270 NON-COVERED ITEM OR SERVICE: HCPCS

## 2018-01-03 PROCEDURE — 88305 TISSUE EXAM BY PATHOLOGIST: CPT

## 2018-01-03 PROCEDURE — 700101 HCHG RX REV CODE 250

## 2018-01-03 PROCEDURE — 160035 HCHG PACU - 1ST 60 MINS PHASE I: Performed by: UROLOGY

## 2018-01-03 RX ORDER — LIDOCAINE HYDROCHLORIDE 10 MG/ML
INJECTION, SOLUTION INFILTRATION; PERINEURAL
Status: COMPLETED
Start: 2018-01-03 | End: 2018-01-03

## 2018-01-03 RX ORDER — SODIUM CHLORIDE, SODIUM LACTATE, POTASSIUM CHLORIDE, CALCIUM CHLORIDE 600; 310; 30; 20 MG/100ML; MG/100ML; MG/100ML; MG/100ML
INJECTION, SOLUTION INTRAVENOUS CONTINUOUS
Status: DISCONTINUED | OUTPATIENT
Start: 2018-01-03 | End: 2018-01-03 | Stop reason: HOSPADM

## 2018-01-03 RX ORDER — HYDROMORPHONE HYDROCHLORIDE 2 MG/ML
INJECTION, SOLUTION INTRAMUSCULAR; INTRAVENOUS; SUBCUTANEOUS
Status: COMPLETED
Start: 2018-01-03 | End: 2018-01-03

## 2018-01-03 RX ORDER — OXYCODONE HCL 5 MG/5 ML
SOLUTION, ORAL ORAL
Status: COMPLETED
Start: 2018-01-03 | End: 2018-01-03

## 2018-01-03 RX ORDER — LIDOCAINE HYDROCHLORIDE 10 MG/ML
0.5 INJECTION, SOLUTION INFILTRATION; PERINEURAL
Status: DISCONTINUED | OUTPATIENT
Start: 2018-01-03 | End: 2018-01-03 | Stop reason: HOSPADM

## 2018-01-03 RX ORDER — LIDOCAINE AND PRILOCAINE 25; 25 MG/G; MG/G
1 CREAM TOPICAL
Status: DISCONTINUED | OUTPATIENT
Start: 2018-01-03 | End: 2018-01-03 | Stop reason: HOSPADM

## 2018-01-03 RX ORDER — TRAMADOL HYDROCHLORIDE 50 MG/1
50-100 TABLET ORAL EVERY 4 HOURS PRN
Qty: 20 TAB | Refills: 1 | Status: SHIPPED | OUTPATIENT
Start: 2018-01-03 | End: 2018-01-08

## 2018-01-03 RX ORDER — CIPROFLOXACIN 500 MG/1
500 TABLET, FILM COATED ORAL 2 TIMES DAILY
Qty: 10 TAB | Refills: 0 | Status: SHIPPED | OUTPATIENT
Start: 2018-01-03 | End: 2018-01-12

## 2018-01-03 RX ORDER — ACETAMINOPHEN 500 MG
1500 TABLET ORAL ONCE
Status: ON HOLD | COMMUNITY
End: 2018-01-03

## 2018-01-03 RX ADMIN — OXYCODONE HYDROCHLORIDE 10 MG: 5 SOLUTION ORAL at 08:35

## 2018-01-03 RX ADMIN — FENTANYL CITRATE 50 MCG: 50 INJECTION, SOLUTION INTRAMUSCULAR; INTRAVENOUS at 09:36

## 2018-01-03 RX ADMIN — HYDROMORPHONE HYDROCHLORIDE 0.2 MG: 2 INJECTION INTRAMUSCULAR; INTRAVENOUS; SUBCUTANEOUS at 11:11

## 2018-01-03 RX ADMIN — LIDOCAINE HYDROCHLORIDE 0.5 ML: 10 INJECTION, SOLUTION INFILTRATION; PERINEURAL at 06:15

## 2018-01-03 RX ADMIN — FENTANYL CITRATE 50 MCG: 50 INJECTION, SOLUTION INTRAMUSCULAR; INTRAVENOUS at 09:22

## 2018-01-03 RX ADMIN — FENTANYL CITRATE 50 MCG: 50 INJECTION, SOLUTION INTRAMUSCULAR; INTRAVENOUS at 09:13

## 2018-01-03 RX ADMIN — SODIUM CHLORIDE, SODIUM LACTATE, POTASSIUM CHLORIDE, CALCIUM CHLORIDE: 600; 310; 30; 20 INJECTION, SOLUTION INTRAVENOUS at 06:30

## 2018-01-03 RX ADMIN — FENTANYL CITRATE 50 MCG: 50 INJECTION, SOLUTION INTRAMUSCULAR; INTRAVENOUS at 09:42

## 2018-01-03 RX ADMIN — HYDROMORPHONE HYDROCHLORIDE 0.2 MG: 2 INJECTION INTRAMUSCULAR; INTRAVENOUS; SUBCUTANEOUS at 10:59

## 2018-01-03 ASSESSMENT — PAIN SCALES - GENERAL
PAINLEVEL_OUTOF10: 6
PAINLEVEL_OUTOF10: 6
PAINLEVEL_OUTOF10: 1
PAINLEVEL_OUTOF10: 4
PAINLEVEL_OUTOF10: 6
PAINLEVEL_OUTOF10: 4
PAINLEVEL_OUTOF10: 6
PAINLEVEL_OUTOF10: 1
PAINLEVEL_OUTOF10: 4
PAINLEVEL_OUTOF10: 5
PAINLEVEL_OUTOF10: 5
PAINLEVEL_OUTOF10: 0

## 2018-01-03 NOTE — OP REPORT
Preoperative diagnosis- History of high-grade bladder cancer.  Postoperative diagnosis- Same as preoperative diagnosis.  Surgeon-El Manuel M.D.  Anesthesiologist-Nick Mcadams  Anesthesia-type general anesthetic  Procedure performed-  1. Cystoscopy  2. Bilateral upper tract washings for cytology  3. Bilateral retrograde pyelograms  4. Bladder washing for cytology  5. Bladder biopsy  Estimated blood loss-less than 5 mL  Specimen-  1. Upper tract washing from the right kidney  2. Upper tract washings from the left kidney  3. Bladder washing  4. Bladder biopsy  Complications-none    Procedure in detail:  Buster is brought into the operating room. He is transferred from [facility] to the OR table. Using an excellent general anesthetic. He is then placed into the dorsal lithotomy position. He is prepped and draped in usual fashion. A timeout was then done. The timeout confirms patient identification, procedure to be performed, reviewed patient allergies and review of preoperative antibiotics. Once timeout was completed and agreed upon the case was started.    A 21 Bengali cystoscope is lubricated and white balance. It is then passed through the anterior urethra. Anterior urethra is without stricture or lesion. External sphincter appears competent. The prostatic urethra shows previous evidence of TURP. There is significant regrowth mostly on the left lateral side. Prostatic length is approximately 4.5-5 cm.    The bladder was then entered. It was carefully examined. There are no tumors stones nor diverticula. There is mild bladder trabeculation. There are several benign-appearing cyst.    An open end ureteral catheters and passed into the right ureteral orifice up to the level of the right kidney. Normal saline is irrigated in and out of this catheter to collect washings from the upper urinary tract on the right side. These are sent for cytology. I then injected 50% dilute Conray through the catheter producing a retrograde  pyelogram. The catheters withdrawn allowing the distal ureter to fill with contrast. Images were taken of all the drainage system of the right kidney. This was sent for reading from the radiologist.    An identical procedure was then carried out on the left side. Again retrograde pyelogram appears to be normal.    Bladder washings were then done with irrigating normal saline and out through the cystoscope. These are sent for cytology. The benign-appearing cysts were treated with Bovie electrocautery. Using a Wappler biopsy forceps a bladder biopsy was taken at the 6 clock position on the posterior bladder wall. Bleeding is controlled with electrocautery.    The bladder was then reexamined again no tumors are noted. The bladder is then drained. It is filled about one third of its volume with irrigation and the cystoscope was then removed.    The prep is cleaned off the patient. He is taken out of the lithotomy position. His and transferred back to the [facility] and to the recovery room in stable condition.

## 2018-01-03 NOTE — OR NURSING
Pt ambulated to bathroom; voided without difficulty. Pt verbalizes readiness for discharge. Instructions reviewed with pt. Waiting for pt's ride. No further needs.

## 2018-01-03 NOTE — OR SURGEON
Immediate Post OP Note    PreOp Diagnosis: History of high-grade bladder cancer.    PostOp Diagnosis: Same as preoperative diagnosis.    Procedure(s):  BLADDER BIOPSY WITH CYSTOSCOPY/WITH UPPER TRACT WASHING - Wound Class: Clean Contaminated  RETROGRADES - Wound Class: Clean Contaminated  PYELOGRAM - Wound Class: Clean Contaminated    Surgeon(s):  El Manuel M.D.    Anesthesiologist/Type of Anesthesia:  Anesthesiologist: Nick Mcadams M.D./General    Surgical Staff:  Circulator: Nettie Gregory R.N.  Scrub Person: Nirmal Jefferson    Specimens:  * No specimens in log *    Estimated Blood Loss: Less than 5 mL    Findings: No recurrent bladder tumors. Normal retrograde pyelograms.    Complications: None        1/3/2018 8:18 AM El Manuel

## 2018-01-03 NOTE — OR NURSING
Pt A&O. VSS on RA. Pt reports pain now tolerable and denies nausea. Dr. Manuel notified of pain level in PACU and tramadol Rx received.   Report called to PACU II and pt transferred.

## 2018-01-03 NOTE — DISCHARGE INSTRUCTIONS
ACTIVITY: Rest and take it easy for the first 24 hours.  A responsible adult is recommended to remain with you during that time.  It is normal to feel sleepy.  We encourage you to not do anything that requires balance, judgment or coordination.    MILD FLU-LIKE SYMPTOMS ARE NORMAL. YOU MAY EXPERIENCE GENERALIZED MUSCLE ACHES, THROAT IRRITATION, HEADACHE AND/OR SOME NAUSEA.    FOR 24 HOURS DO NOT:  Drive, operate machinery or run household appliances.  Drink beer or alcoholic beverages.   Make important decisions or sign legal documents.    SPECIAL INSTRUCTIONS:     1. Instructed patient to drink plenty of water until the urine is completely clear.  2. Report any fevers or chills to Dr. Manuel immediately.  3. Report any excess bleeding to Dr. Manuel immediately.    Cystoscopy, Care After  Refer to this sheet in the next few weeks. These instructions provide you with information on caring for yourself after your procedure. Your caregiver may also give you more specific instructions. Your treatment has been planned according to current medical practices, but problems sometimes occur. Call your caregiver if you have any problems or questions after your procedure.  HOME CARE INSTRUCTIONS   Things you can do to ease any discomfort after your procedure include:  · Drinking enough water and fluids to keep your urine clear or pale yellow.  · Taking a warm bath to relieve any burning feelings.  SEEK IMMEDIATE MEDICAL CARE IF:   · You have an increase in blood in your urine.  · You notice blood clots in your urine.  · You have difficulty passing urine.  · You have the chills.  · You have abdominal pain.  · You have a fever or persistent symptoms for more than 2-3 days.  · You have a fever and your symptoms suddenly get worse.  MAKE SURE YOU:   · Understand these instructions.  · Will watch your condition.  · Will get help right away if you are not doing well or get worse.     This information is not intended to replace advice  given to you by your health care provider. Make sure you discuss any questions you have with your health care provider.     DIET: To avoid nausea, slowly advance diet as tolerated, avoiding spicy or greasy foods for the first day.  Add more substantial food to your diet according to your physician's instructions.  Babies can be fed formula or breast milk as soon as they are hungry.  INCREASE FLUIDS AND FIBER TO AVOID CONSTIPATION.    SURGICAL DRESSING/BATHING: Ok to shower    FOLLOW-UP APPOINTMENT:  A follow-up appointment should be arranged with your doctor in 1-2 weeks; call to schedule.    You should CALL YOUR PHYSICIAN if you develop:  Fever greater than 101 degrees F.  Pain not relieved by medication, or persistent nausea or vomiting.  Excessive bleeding (blood soaking through dressing) or unexpected drainage from the wound.  Extreme redness or swelling around the incision site, drainage of pus or foul smelling drainage.  Inability to urinate or empty your bladder within 8 hours.  Problems with breathing or chest pain.    You should call 911 if you develop problems with breathing or chest pain.  If you are unable to contact your doctor or surgical center, you should go to the nearest emergency room or urgent care center.  Physician's telephone #: [phone]    If any questions arise, call your doctor.  If your doctor is not available, please feel free to call the Surgical Center at [phone].  The Center is open Monday through Friday from 7AM to 7PM.  You can also call the HEALTH HOTLINE open 24 hours/day, 7 days/week and speak to a nurse at [phone], or toll free at [phone].    A registered nurse may call you a few days after your surgery to see how you are doing after your procedure.    MEDICATIONS: Resume taking daily medication.  Take prescribed pain medication with food.  If no medication is prescribed, you may take non-aspirin pain medication if needed.  PAIN MEDICATION CAN BE VERY  CONSTIPATING.  Take a stool softener or laxative such as senokot, pericolace, or milk of magnesia if needed.    Prescription given for Ciprofloxacin and Tramadol.  Last pain medication given at *8:35am*.    If your physician has prescribed pain medication that includes Acetaminophen (Tylenol), do not take additional Acetaminophen (Tylenol) while taking the prescribed medication.    Depression / Suicide Risk    As you are discharged from this [facility] facility, it is important to learn how to keep safe from harming yourself.    Recognize the warning signs:  · Abrupt changes in personality, positive or negative- including increase in energy   · Giving away possessions  · Change in eating patterns- significant weight changes-  positive or negative  · Change in sleeping patterns- unable to sleep or sleeping all the time   · Unwillingness or inability to communicate  · Depression  · Unusual sadness, discouragement and loneliness  · Talk of wanting to die  · Neglect of personal appearance   · Rebelliousness- reckless behavior  · Withdrawal from people/activities they love  · Confusion- inability to concentrate     If you or a loved one observes any of these behaviors or has concerns about self-harm, here's what you can do:  · Talk about it- your feelings and reasons for harming yourself  · Remove any means that you might use to hurt yourself (examples: pills, rope, extension cords, firearm)  · Get professional help from the community (Mental Health, Substance Abuse, psychological counseling)  · Do not be alone:Call your Safe Contact- someone whom you trust who will be there for you.  · Call your local CRISIS HOTLINE 668-0364 or 898-608-4848  · Call your local Children's Mobile Crisis Response Team [place] (283) 686-7706 or www.[employer]  · Call the toll free National Suicide Prevention Hotlines   · National Suicide Prevention Lifeline 945-817-GSIB (3340)  · National Hope Line Network 800-SUICIDE  (890-6101)

## 2018-01-05 RX ORDER — CELECOXIB 200 MG/1
200 CAPSULE ORAL DAILY
Qty: 60 CAP | Refills: 0 | Status: SHIPPED | OUTPATIENT
Start: 2018-01-05 | End: 2018-01-17 | Stop reason: SDUPTHER

## 2018-01-12 ENCOUNTER — OFFICE VISIT (OUTPATIENT)
Dept: MEDICAL GROUP | Age: 68
End: 2018-01-12
Payer: MEDICARE

## 2018-01-12 VITALS
HEART RATE: 87 BPM | WEIGHT: 176.8 LBS | HEIGHT: 67 IN | BODY MASS INDEX: 27.75 KG/M2 | SYSTOLIC BLOOD PRESSURE: 134 MMHG | OXYGEN SATURATION: 96 % | DIASTOLIC BLOOD PRESSURE: 72 MMHG | TEMPERATURE: 99.2 F

## 2018-01-12 DIAGNOSIS — M54.12 CERVICAL RADICULOPATHY: Primary | ICD-10-CM

## 2018-01-12 PROBLEM — D48.9 NEOPLASM OF UNCERTAIN BEHAVIOR: Status: RESOLVED | Noted: 2017-11-03 | Resolved: 2018-01-12

## 2018-01-12 PROBLEM — Z00.00 PREVENTATIVE HEALTH CARE: Status: RESOLVED | Noted: 2017-09-21 | Resolved: 2018-01-12

## 2018-01-12 PROCEDURE — 99214 OFFICE O/P EST MOD 30 MIN: CPT | Performed by: FAMILY MEDICINE

## 2018-01-13 NOTE — PROGRESS NOTES
"Subjective:   CC: Abnormal sensation on right shoulder    HPI:     Buster Medina is a 67 y.o. male with history of diffuse osteoarthritis, currently taking Celebrex 200 mg per day chronically. Patient was vacationing in [place] 4 weeks ago. He develops acute right anterior and posterior shoulder numbness/tinging and mild neck pain. Symptoms are described as \"electrical waves\" that start from the neck and radiate to the right anterior and posterior shoulder. Pt experiences 2-3 of such \"electrical waves\" per hour. He denies shoulder pain, but endorses discomfort. He has hx of right shoulder surgery in the past. He denies hx of trauma or sport-related injuries to right shoulder and neck while vacationing in [place].     Denies fever, chills, skin rash, UE and LE weakness, bowel/bladder symptoms. Pt has hx of bladder cancer. He is working with urology for evaluation and treatment of this condition. He was admitted to the hospital on 1/3/2018 for cystoscopy, washing for cytology, retrograde pyelograms and biopsy.     Current medicines (including changes today)  Current Outpatient Prescriptions   Medication Sig Dispense Refill   • celecoxib (CELEBREX) 200 MG Cap Take 1 Cap by mouth every day. 60 Cap 0   • lorazepam (ATIVAN) 2 MG tablet Take 1-2 tabs po qhs prn 60 Tab 0   • potassium chloride ER (KLOR-CON 10) 10 MEQ tablet Take 1 Tab by mouth every day. 90 Tab 0   • Adalimumab (HUMIRA PEN-CROHNS STARTER) 40 MG/0.8ML Pen-injector Kit Inject 0.8 mL as instructed 1 time daily as needed.     • atorvastatin (LIPITOR) 20 MG Tab Take 20 mg by mouth every day.     • lisinopril (PRINIVIL) 20 MG Tab Take 20 mg by mouth every evening.     • omeprazole (PRILOSEC) 20 MG delayed-release capsule Take 20 mg by mouth every day.     • buPROPion (WELLBUTRIN SR) 200 MG SR tablet Take 200 mg by mouth every day.     • Multiple Vitamins-Minerals (MULTI COMPLETE PO) Take 1 Tab by mouth every day.     • vitamin D, Ergocalciferol, (DRISDOL) " "87119 units Cap capsule Take 50,000 Units by mouth every 7 days.     • Multiple Vitamins-Minerals (MELODY-DAY 1000 PO) Take 1 Tab by mouth every day.     • Cyanocobalamin (B-12) 2500 MCG Tab Take 1 Tab by mouth every day.       No current facility-administered medications for this visit.      He  has a past medical history of Anxiety; Arthritis; Cancer (CMS-[facility]) (2007); Depression; High cholesterol; Hyperlipidemia; Hypertension; and Sleep apnea.    I personally reviewed patient's problem list, allergies, medications, family hx, social hx with patient and update EPIC.     REVIEW OF SYSTEMS:  CONSTITUTIONAL:  Denies night sweats, fatigue, malaise, lethargy, fever or chills.  RESPIRATORY:  Denies cough, wheeze, hemoptysis, or shortness of breath.  CARDIOVASCULAR:  Denies chest pains, palpitations, pedal edema     Objective:     Blood pressure 134/72, pulse 87, temperature 37.3 °C (99.2 °F), height 1.692 m (5' 6.6\"), weight 80.2 kg (176 lb 12.8 oz), SpO2 96 %. Body mass index is 28.02 kg/m².    Physical Exam:  Constitutional: awake, alert, in no distress.  Skin: Warm, dry, good turgor, no rashes, bruises, ulcers in visible areas.  Eye: conjunctiva clear, lids neg for edema or lesions.  Neck: Trachea midline, no masses, no thyromegaly. No cervical or supraclavicular lymphadenopathy  Respiratory: Unlabored respiratory effort, lungs clear to auscultation, no wheezes, no rhonchi, no rales.  Cardiovascular: Normal S1, S2, no murmur, no pedal edema.  Psych: Oriented x3, affect and mood wnl, intact judgement and insight.   MSK: no visible deformities of the neck or shoulder. Long right anterior scar noted. Shoulder joint exam within normal limites. Intact sensory perception to light touch and pin prick at the anterior and posterior shoulder. UE strength 5/5, reflexes 2/4. Cervical spine and right paraspinal muscle are mildly tender to palpation. Neck ROM within normal limits.    Assessment and Plan:   The following treatment plan " was discussed    1. Cervical radiculopathy  Hx and exam concerning for acute cervical radiculopathy likely involving C5 to C7 on the right. Plan:   - continue Celebrex 200 mg BID  - REFERRAL TO PHYSICAL THERAPY Reason for Therapy: Eval/Treat/Report  - offered Gabapentin, but pt declines.   - activity modification   - f/u with PCP in     Nuvia Cohen M.D.      Followup: Return in about 3 months (around 4/12/2018) for As needed.    Please note that this dictation was created using voice recognition software. I have made every reasonable attempt to correct obvious errors, but I expect that there are errors of grammar and possibly content that I did not discover before finalizing the note.

## 2018-01-17 RX ORDER — CELECOXIB 200 MG/1
200 CAPSULE ORAL DAILY
Qty: 60 CAP | Refills: 0 | Status: SHIPPED | OUTPATIENT
Start: 2018-01-17 | End: 2018-03-10 | Stop reason: SDUPTHER

## 2018-01-17 NOTE — TELEPHONE ENCOUNTER
Was the patient seen in the last year in this department? Yes     Does patient have an active prescription for medications requested? Yes  Patient stated he needs this rx to go to [facility] instead of mail order, pharmacy updated.    Received Request Via: Patient

## 2018-01-27 DIAGNOSIS — F51.01 PRIMARY INSOMNIA: ICD-10-CM

## 2018-01-30 RX ORDER — LORAZEPAM 2 MG/1
TABLET ORAL
Qty: 60 TAB | Refills: 0 | Status: SHIPPED
Start: 2018-01-30 | End: 2018-02-02 | Stop reason: SDUPTHER

## 2018-01-31 ENCOUNTER — TELEPHONE (OUTPATIENT)
Dept: MEDICAL GROUP | Age: 68
End: 2018-01-31

## 2018-01-31 NOTE — TELEPHONE ENCOUNTER
1. Caller Name: Buster Medina                                         Call Back Number: [phone] (home)         Patient approves a detailed voicemail message: N\A    Pharmacy needs to know day supply for patient's lorazepam because it is not specified on rx directions. How many days should this medication last the patient?

## 2018-02-02 NOTE — TELEPHONE ENCOUNTER
Stu Bright M.D.  [address]   Caller: Unspecified (2 days ago, 11:24 AM)             45 days      Pharmacy notified

## 2018-02-21 ENCOUNTER — PATIENT MESSAGE (OUTPATIENT)
Dept: MEDICAL GROUP | Age: 68
End: 2018-02-21

## 2018-02-22 RX ORDER — ATORVASTATIN CALCIUM 20 MG/1
20 TABLET, FILM COATED ORAL DAILY
Qty: 90 TAB | Refills: 0 | Status: SHIPPED | OUTPATIENT
Start: 2018-02-22 | End: 2018-07-11 | Stop reason: SDUPTHER

## 2018-02-22 RX ORDER — LISINOPRIL 20 MG/1
20 TABLET ORAL EVERY EVENING
Qty: 90 TAB | Refills: 0 | Status: SHIPPED | OUTPATIENT
Start: 2018-02-22 | End: 2018-05-23 | Stop reason: SDUPTHER

## 2018-02-27 DIAGNOSIS — L40.50 PSORIATIC ARTHRITIS (HCC): ICD-10-CM

## 2018-03-05 ENCOUNTER — OFFICE VISIT (OUTPATIENT)
Dept: BEHAVIORAL HEALTH | Facility: PHYSICIAN GROUP | Age: 68
End: 2018-03-05
Payer: MEDICARE

## 2018-03-05 DIAGNOSIS — F34.1 DYSTHYMIC DISORDER: ICD-10-CM

## 2018-03-05 DIAGNOSIS — F52.21 ERECTILE DYSFUNCTION OF NONORGANIC ORIGIN: ICD-10-CM

## 2018-03-05 PROCEDURE — 90791 PSYCH DIAGNOSTIC EVALUATION: CPT | Performed by: PSYCHOLOGIST

## 2018-03-05 NOTE — BH THERAPY
[facility]  INITIAL ASSESSMENT    Name: Buster Medina  MRN: [id number]  : 1950  Age: 67 y.o.  Date of assessment: 3/5/2018  PCP: Stu Bright M.D.  Persons in attendance: Patient  Total session time: 55 minutes      CHIEF COMPLAINT AND HISTORY OF PRESENTING PROBLEM:  (as stated by Patient):  Buster Medina is a 67 y.o., White male referred for assessment by No ref. provider found.  Primary presenting issue includes   Chief Complaint   Patient presents with   • Depressed   • Erectile Dysfunction   . The patient ID/Chief Complaint:  The patient is a 67 year old male, , .  The patient self-referred and voluntarily presented for an individual intake to address concerns related to relationship problems and erectile dysfunction. Reviewed limits of confidentiality and [facility] policies; patient expressed understanding and agreed to voluntarily proceed with the evaluation and treatment.     Review of Symptoms:  Depression and other mood disorders Past and Present more than 10 months mild symptoms  Sleep disorder Past and Present more than 12 months   Increase in sleep No  Decrease in sleep Less than 6 hours  Interest (anhedonia) Yes  Guilt feeling No  Worthless No   Hopelessness No  Regret No  Energy deficit No  Concentration deficit No  Appetite deficit No  Psychomotor   Retardation No   Agitation No  Suicidality No    Distractibility No  Indiscretions No  Grandiosity No  Flight of ideas No  Activity level Increase No  Sleep deficit (decreased need for 3 days) No  Talkativeness No    Anxiety Symptoms   Panic disorder Symptoms No   Agoraphobia Symptoms No  Specific Phobia Symptoms No  Generalized Anxiety Symptoms No  Substance abuse Denies but reports using Cannabis  Obsessive Compulsive Symptoms No  Body Dysmorphic Symptoms No  Somatic Symptoms No  Illness Anxiety No  Neurocognitive Disorder  Disturbance in attention No  Disturbance  developed Not Reported/ Observed  Additional Disturbance None    Psychotic disorders Not Reported/ Observed   Delusions No  Hallucination No  Disorganized Speech No  Grossly Disorganized Behavior No  Negative Symptoms No    Relevant History:    The patient reports a long chronic history of dysthymia with symptoms that include insomnia, low self-esteem, poor concentration, and at times feelings of hopelessness. Patient appears to have a number of significant psychological stressors including the care of his 22-year-old adult son has had significant difficulties with self-regulation.     The patient reports dysthymia symptoms has occurred over the past 20 to 25 years there does appear to be time in which he may have met the diagnostic criteria for major depressive illness and this has occurred two or three different times. Patient is currently prescribed Wellbutrin for his depressive symptoms but has a long history of being prescribed antidepressant medications that go back 20 to 25 years. The patient denies past psychiatric hospitalization. Patient reports current psychotropic medication prescribed by Psychiatrist for management of mood and sleep. The patient reports past history of suicidal ideation without specific plan or intent.       Patient was born and raised in [place] by an intact family. The patient has been  twice the first marriage in his 30s last approximately three years and his last marriage ended five years ago after 25 years of marriage. Patient has two adult children a daughter who is a PhD student at the university [employer] and a son who is living [place]. Over the course of the patient dating history indicates that he’s dated over 200 different women he primarily meets them through dating services. He currently working with urologist to address erectile disorder symptoms include difficulty in obtaining an erection and marked decrease in erectile rigidity. Even with erectile medication he  reported 50/50 chance with medication. His history does include some evidence for performance anxiety.       Formulation:      DSM-5 Diagnoses:  F34.1 Persistent Depressive Disorder, Moderate, early onset   F52.21 Erectile Disorder acquired, generalized, moderate    PLAN/DISPOSITION:    1) The patient will return to the clinic 3-4 weeks.  2) Referrals/Consults:  N/A  3) Barriers to Learning:  No  4) Readiness to Learn:  Yes  5) Cultural Concerns:  No  6) Patient voiced understanding of, and agreement with, plan and goals as annotated above.  7) Declare these services are medically necessary and appropriate to the patient’s diagnosis and needs  8) The point of contact at the [facility] regarding this evaluation is Dr. San, Psychologist.    FAMILY/SOCIAL HISTORY  Current living situation/household members: single   Relevant family history/structure/dynamics: adult children with a good relationship with the ex-wife  Current family/social stressors: 21 yo son     Family History   Problem Relation Age of Onset   • Cancer Mother    • Arthritis Mother    • Diabetes Mother    • Lung Disease Father    • Cancer Father    • Diabetes Father    • Hypertension Father    • Hyperlipidemia Father    • Hyperlipidemia Brother    • Arthritis Brother    • No Known Problems Maternal Grandmother    • Cancer Maternal Grandfather    • No Known Problems Paternal Grandmother    • No Known Problems Paternal Grandfather    • Heart Disease Brother    • Hypertension Brother         BEHAVIORAL HEALTH TREATMENT HISTORY    The patient was been seen for family therapy with son and ex-wife. In addition reported ongoing relationship with psychiatrist and some brief individual psychotherapy.    Primary care behavioral health screenings: Patient Health Questionaire  If depressive symptoms identified deferred to follow up visit unless specifically addressed in assesment and plan.    Interpretation of PHQ-9 Total Score 4  Score Severity   1-4  No Depression        Past medical/surgical history:   Past Medical History:   Diagnosis Date   • Anxiety    • Arthritis     osteo   • Cancer (CMS-HCC) 2007    bladder   • Depression     depression   • High cholesterol    • Hyperlipidemia    • Hypertension     pt states  well controlled on meds   • Sleep apnea     uses cpap      Past Surgical History:   Procedure Laterality Date   • BLADDER BIOPSY WITH CYSTOSCOPY  1/3/2018    Procedure: BLADDER BIOPSY WITH CYSTOSCOPY/WITH UPPER TRACT WASHING;  Surgeon: El Manuel M.D.;  Location: SURGERY [facility];  Service: Urology   • RETROGRADES Bilateral 1/3/2018    Procedure: RETROGRADES;  Surgeon: El Manuel M.D.;  Location: SURGERY [facility];  Service: Urology   • PYELOGRAM Bilateral 1/3/2018    Procedure: PYELOGRAM;  Surgeon: El Manuel M.D.;  Location: SURGERY [facility];  Service: Urology   • OTHER ORTHOPEDIC SURGERY  2015    shoulder replacement   • ABDOMINAL EXPLORATION     • ATHROPLASTY      right shoulder   • BLADDER BIOPSY WITH CYSTOSCOPY     • CHOLECYSTECTOMY     • EYE SURGERY      las   • GASTRIC BYPASS LAPAROSCOPIC     • HERNIA REPAIR     • OPEN REDUCTION          Medication Allergies:  Patient has no known allergies.     Does patient/parent report nutritional concerns? No  Does patient/parent report change in appetite or weight loss/gain? No  Does patient/parent report history of eating disorder symptoms? No  Does patient/parent report dental problem? No  Does patient/parent report physical pain? Yes   Indicate if pain is acute or chronic, and location: knee   Pain scale rating: 3       Does patient/parent report functional impact of medical, developmental, or pain issues?   no    EDUCATIONAL/LEARNING HISTORY  Master degree in organization development     EMPLOYMENT/RESOURCES  Is the patient currently employed? No  Does the patient/parent report adequate financial resources? Yes  Patient is retired     HISTORY:  Does patient  report current or past enlistment? No       SPIRITUAL/CULTURAL/IDENTITY:    Does the patient identify any spiritual/cultural/identity factors as relevant to the presenting issue? No    LEGAL HISTORY  Has the patient ever been involved with juvenile, adult, or family legal systems? No   [If yes, trigger section below:]    ABUSE/NEGLECT/TRAUMA SCREENING  Does patient report feeling “unsafe” in his/her home, or afraid of anyone? No  Does patient report any history of physical, sexual, or emotional abuse? No  Is there evidence of neglect by self? No    SAFETY ASSESSMENT   Risk Level: Not Currently at Clinically Significant Risk  Hospitalization is not deemed necessary at this time as the patient does not present a clear or imminent danger to self or others. No indication for pursuing higher level of care. Outpatient management is currently most appropriate and least restrictive level of care.      SUBSTANCE USE/ADDICTION HISTORY  [] Not applicable - patient 10 years of age or younger    Is there a family history of substance use/addiction? Yes  Does patient acknowledge or parent/significant other report use of/dependence on substances? No  Last time patient used alcohol: last night    Last time patient used marijuana: last night for sleep    Any other street drugs ever tried even once? Yes  Any use of prescription medications/pills without a prescription, or for reasons others than originally prescribed?  No  Any other addictive behavior reported (gambling, shopping, sex)? No     Drug History:  Amphetamine:  Amphetamine frequency: Never used      Cannibis:  Cannabis frequency: 3 or more times/week      Cocaine:  Cocaine frequency:   Comments: in the past      Ecstasy:  Ecstasy frequency: Never used      Hallucinogen:  Hallucinogen frequency:   Comments: in the past LSD and PCP [facility]      Inhalant:       Opiate:  Cannabis frequency: 3 or more times/week      Other:      Sedative:       [x] Patient denies use of  any substance/addictive behaviors    STRENGTHS/ASSETS  Strengths Identified by interviewer: Evidence of good judgement  Strengths Identified by patient: Problem solving skills    MENTAL STATUS/OBSERVATIONS  Patient did not present in acute distress. Patient was appropriately groomed. Patient was alert and oriented x4. Eye contact was appropriate. No abnormalities in attention or concentration were noted. No abnormalities of movement present; psychomotor activity was normal. Speech was fluent and regular in rhythm, rate, volume, and tone. Thought processes were linear, logical, and goal-directed. There was no evidence of thought disorder. No auditory or visual hallucinations and denies hypnagogic (i.e., the drowsy state preceding sleep) and hypnopompic (i.e., the semiconscious state preceding awakening).Long and short term memory appeared to be intact. Insight, judgment, and impulse control were deemed to be within normal limits.  Reported mood was “depressed.” Affect was full-ranging and appropriate to thought content and conversation.  Patient denied current suicidal and homicidal ideation in plan, intent, and preparatory behavior.    RESULTS OF SCREENING MEASURES:  Psychometric Test Results:     BHM-20  Global Mental Health Within Normal Limits  Well Being Scale  Mild Distress  Symptoms Scale   Within Normal Limits  Anxiety Subscale  Within Normal Limits  Depression Subscale  Within Normal Limits  Alcohol/Drug Subscale Mild Distress  Bipolar Subscale  Within Normal Limits  Eating Disorder Subscale Within Normal Limits  Harm to other Subscale Within Normal Limits  Suicide Monitoring Scale No indication of Risk  Life Functioning Scale Within Normal Limits    CLINICAL FORMULATION:       DIAGNOSTIC IMPRESSION(S):  1. Dysthymic disorder    2. Erectile dysfunction of nonorganic origin          IDENTIFIED NEEDS/PLAN:  [If any of these marked, trigger DISPOSITION list]  Other: mood and sexual perforamce       Does patient  express agreement with the above plan? Yes     Referral appointment(s) scheduled? Yes       Geraldo San III, Ed.D.

## 2018-03-08 DIAGNOSIS — F51.01 PRIMARY INSOMNIA: ICD-10-CM

## 2018-03-08 RX ORDER — MIRTAZAPINE 15 MG/1
15 TABLET, FILM COATED ORAL
Qty: 60 TAB | Refills: 0 | Status: SHIPPED | OUTPATIENT
Start: 2018-03-08 | End: 2018-04-25 | Stop reason: SDUPTHER

## 2018-03-12 NOTE — TELEPHONE ENCOUNTER
From: Buster Medina  Sent: 3/10/2018 5:07 PM PST  Subject: Medication Renewal Request    Buster Medina would like a refill of the following medications:     celecoxib (CELEBREX) 200 MG Cap [Stu Bright M.D.]   Patient Comment: please prescribe 90 day supply with refills    Preferred pharmacy: [id number] - [place], [place] - [address]

## 2018-03-13 DIAGNOSIS — M05.711 RHEUMATOID ARTHRITIS INVOLVING RIGHT SHOULDER WITH POSITIVE RHEUMATOID FACTOR (HCC): ICD-10-CM

## 2018-03-13 RX ORDER — CELECOXIB 200 MG/1
200 CAPSULE ORAL DAILY
Qty: 60 CAP | Refills: 0 | Status: SHIPPED | OUTPATIENT
Start: 2018-03-13 | End: 2018-04-18 | Stop reason: SDUPTHER

## 2018-03-20 ENCOUNTER — HOSPITAL ENCOUNTER (OUTPATIENT)
Dept: RADIOLOGY | Facility: MEDICAL CENTER | Age: 68
End: 2018-03-20
Attending: FAMILY MEDICINE
Payer: MEDICARE

## 2018-03-20 DIAGNOSIS — M05.711 RHEUMATOID ARTHRITIS INVOLVING RIGHT SHOULDER WITH POSITIVE RHEUMATOID FACTOR (HCC): ICD-10-CM

## 2018-03-20 PROCEDURE — 73630 X-RAY EXAM OF FOOT: CPT | Mod: LT

## 2018-03-20 PROCEDURE — 73130 X-RAY EXAM OF HAND: CPT | Mod: LT

## 2018-03-20 PROCEDURE — 73130 X-RAY EXAM OF HAND: CPT | Mod: RT

## 2018-03-21 ENCOUNTER — HOSPITAL ENCOUNTER (OUTPATIENT)
Dept: LAB | Facility: MEDICAL CENTER | Age: 68
End: 2018-03-21
Attending: FAMILY MEDICINE
Payer: MEDICARE

## 2018-03-21 DIAGNOSIS — M05.711 RHEUMATOID ARTHRITIS INVOLVING RIGHT SHOULDER WITH POSITIVE RHEUMATOID FACTOR (HCC): ICD-10-CM

## 2018-03-21 LAB — RHEUMATOID FACT SER IA-ACNC: <10 IU/ML (ref 0–14)

## 2018-03-21 PROCEDURE — 86431 RHEUMATOID FACTOR QUANT: CPT

## 2018-03-21 PROCEDURE — 86235 NUCLEAR ANTIGEN ANTIBODY: CPT | Mod: 91

## 2018-03-21 PROCEDURE — 86200 CCP ANTIBODY: CPT

## 2018-03-21 PROCEDURE — 86225 DNA ANTIBODY NATIVE: CPT

## 2018-03-21 PROCEDURE — 36415 COLL VENOUS BLD VENIPUNCTURE: CPT

## 2018-03-21 PROCEDURE — 86038 ANTINUCLEAR ANTIBODIES: CPT

## 2018-03-23 LAB
CCP IGG SERPL-ACNC: 4 UNITS (ref 0–19)
NUCLEAR IGG SER QL IA: NORMAL

## 2018-04-02 ENCOUNTER — PATIENT MESSAGE (OUTPATIENT)
Dept: MEDICAL GROUP | Age: 68
End: 2018-04-02

## 2018-04-03 ENCOUNTER — OFFICE VISIT (OUTPATIENT)
Dept: BEHAVIORAL HEALTH | Facility: PHYSICIAN GROUP | Age: 68
End: 2018-04-03
Payer: MEDICARE

## 2018-04-03 DIAGNOSIS — F52.21 ERECTILE DYSFUNCTION OF NONORGANIC ORIGIN: ICD-10-CM

## 2018-04-03 DIAGNOSIS — F34.1 DYSTHYMIC DISORDER: Chronic | ICD-10-CM

## 2018-04-03 PROCEDURE — 90834 PSYTX W PT 45 MINUTES: CPT | Performed by: PSYCHOLOGIST

## 2018-04-03 NOTE — BH THERAPY
[facility] Progress Note    Patient Name: Buster Medina  Patient MRN: [id number]  Today's Date: 4/3/2018     Type of session:Individual psychotherapy  Length of session: 50 minutes  Persons in attendance:Patient    Subjective/New Info:The patient ID/Chief Complaint:  The patient is a 67 year old male, , .  The patient self-referred and voluntarily presented for an individual intake to address concerns related to relationship problems, sleep problems, and erectile dysfunction. Reviewed limits of confidentiality and [facility] policies; patient expressed understanding and agreed to voluntarily proceed with the evaluation and treatment.    Interval History:   Session Focus: The patient reports continued difficulties with insomnia occurring to three times per month reinforce the importance of good sleep hygiene. Patient was also able to identify difficulties that he’s experiencing and choosing between two different types of  romantic relationships.  He was able to a identify  how feeling conflicted about both relationships may be negatively impacting his sleep. Patient is interested in seeing the psychiatric nurse practitioner in our clinic to address depressive symptoms and sleep concerns.      Therapeutic Intervention: Cognitive Behavioral Therapy      Planned Intervention: To continue to reinforce appropriate sleep hygiene and challenging of automatic thoughts about what is essential in relationships based on old schemas.        Objective/Observations:Patient did not present in acute distress. Patient was appropriately groomed. Patient was alert and oriented x4. Eye contact was appropriate. No abnormalities in attention or concentration were noted. No abnormalities of movement present; psychomotor activity was normal. Speech was fluent and regular in rhythm, rate, volume, and tone. Thought processes were linear, logical, and goal-directed. There was no  evidence of thought disorder. No auditory or visual hallucinations. Long and short term memory appeared to be intact. Insight, judgment, and impulse control were deemed to be within normal limits.  Reported mood was “concerned.” Affect was full-ranging and appropriate to thought content and conversation.  Patient denied past and current suicidal and homicidal ideation in plan, intent, and preparatory behavior.      Diagnoses:   1. Dysthymic disorder    2. Erectile dysfunction of nonorganic origin    RISK ASSESSMENT:   The patient denied any suicide-related ideation and/or behaviors and intent/plan, denied thoughts about death and dying both in session and during the period since last appointment, or past 2 weeks.      Risk Level: Not Currently at Clinically Significant Risk  Hospitalization is not deemed necessary at this time as the patient does not present a clear or imminent danger to self or others. No indication for pursuing higher level of care. Outpatient management is currently most appropriate and least restrictive level of care.      Current risk:   SUICIDE: Low   Homicide: Not applicable   Self-harm: Not applicable   Relapse: Not applicable   Other:    Safety Plan reviewed? No   If evidence of imminent risk is present, intervention/plan:         Treatment Goal(s)/Objective(s) addressed:      Progress toward Treatment Goals: Mild improvement  Goal: Improve overall mood      Get 7-8 hours of restful sleep every night using sleep hygiene handout   Develop strategies for thought distraction when ruminating on the past    Plan:    1) Referrals/Consults:  Psychiatry for medical management of depressive disorder and chronic sleep problems   2) Next appointment scheduled:  4/10/2018  3) Barriers to Learning:  No  4) Readiness to Learn:  Yes  5) Cultural Concerns:  No  6) Patient voiced understanding of, and agreement with, plan and goals as annotated above.  7) Declare these services are medically necessary and  appropriate to the patient’s diagnosis and needs  8) The point of contact at the [facility] Clinic regarding this evaluation is Dr. San, Psychologist.    Geraldo San III, Ed.D.  4/3/2018

## 2018-04-04 RX ORDER — POTASSIUM CHLORIDE 750 MG/1
10 TABLET, FILM COATED, EXTENDED RELEASE ORAL DAILY
Qty: 30 TAB | Refills: 0 | Status: SHIPPED | OUTPATIENT
Start: 2018-04-04 | End: 2018-04-18 | Stop reason: SDUPTHER

## 2018-04-10 ENCOUNTER — OFFICE VISIT (OUTPATIENT)
Dept: BEHAVIORAL HEALTH | Facility: PHYSICIAN GROUP | Age: 68
End: 2018-04-10
Payer: MEDICARE

## 2018-04-10 DIAGNOSIS — F52.21 ERECTILE DYSFUNCTION OF NONORGANIC ORIGIN: ICD-10-CM

## 2018-04-10 DIAGNOSIS — F34.1 DYSTHYMIC DISORDER: Chronic | ICD-10-CM

## 2018-04-10 PROCEDURE — 90834 PSYTX W PT 45 MINUTES: CPT | Performed by: PSYCHOLOGIST

## 2018-04-10 NOTE — BH THERAPY
[facility] Progress Note    Patient Name: Buster Medina  Patient MRN: [id number]  Today's Date: 4/10/2018     Type of session:Individual psychotherapy  Length of session: 50 minutes  Persons in attendance:Patient    Subjective/New Info:The patient ID/Chief Complaint:  The patient is a 67 year old male, , .  The patient self-referred and voluntarily presented for an individual intake to address concerns related to relationship problems, sleep problems, and erectile dysfunction. Reviewed limits of confidentiality and [facility] policies; patient expressed understanding and agreed to voluntarily proceed with the evaluation and treatment.    Interval History:   Session Focus: The patient reported that he ended his relationship that was short term with the hopes of facilitating a long-term relationship. He reports increasing stress with his son and his recent unemployment. Use problem-solving to assist the patient identifying other ways of helping his adult son be responsible for his own behavior.      Therapeutic Intervention: Cognitive Behavioral Therapy      Planned Intervention: Continue to focus on identifying automatic thoughts associated with behaviors and emotions.       Objective/Observations:Patient did not present in acute distress. Patient was appropriately groomed. Patient was alert and oriented x4. Eye contact was appropriate. No abnormalities in attention or concentration were noted. No abnormalities of movement present; psychomotor activity was normal. Speech was fluent and regular in rhythm, rate, volume, and tone. Thought processes were linear, logical, and goal-directed. There was no evidence of thought disorder. No auditory or visual hallucinations. Long and short term memory appeared to be intact. Insight, judgment, and impulse control were deemed to be within normal limits.  Reported mood was “angry.” Affect was full-ranging and  appropriate to thought content and conversation.  Patient denied past and current suicidal and homicidal ideation in plan, intent, and preparatory behavior.      Diagnoses:   No diagnosis found.RISK ASSESSMENT:   The patient denied any suicide-related ideation and/or behaviors and intent/plan, denied thoughts about death and dying both in session and during the period since last appointment, or past 2 weeks.      Risk Level: Not Currently at Clinically Significant Risk  Hospitalization is not deemed necessary at this time as the patient does not present a clear or imminent danger to self or others. No indication for pursuing higher level of care. Outpatient management is currently most appropriate and least restrictive level of care.      Current risk:   SUICIDE: Low   Homicide: Not applicable   Self-harm: Not applicable   Relapse: Not applicable   Other:    Safety Plan reviewed? No   If evidence of imminent risk is present, intervention/plan:         Treatment Goal(s)/Objective(s) addressed:      Progress toward Treatment Goals: Mild improvement  Goal: Improve overall mood      Get 7-8 hours of restful sleep every night using sleep hygiene handout   Develop strategies for thought distraction when ruminating on the past    Plan:    1) Referrals/Consults:  Psychiatry for medical management of depressive disorder and chronic sleep problems   2) Follow-up 1 week  3) Barriers to Learning:  No  4) Readiness to Learn:  Yes  5) Cultural Concerns:  No  6) Patient voiced understanding of, and agreement with, plan and goals as annotated above.  7) Declare these services are medically necessary and appropriate to the patient’s diagnosis and needs  8) [facility] regarding this evaluation is Dr. San, Psychologist.    Geraldo San III, Dario.  4/10/2018

## 2018-04-18 ENCOUNTER — OFFICE VISIT (OUTPATIENT)
Dept: BEHAVIORAL HEALTH | Facility: PHYSICIAN GROUP | Age: 68
End: 2018-04-18
Payer: MEDICARE

## 2018-04-18 ENCOUNTER — PATIENT MESSAGE (OUTPATIENT)
Dept: MEDICAL GROUP | Age: 68
End: 2018-04-18

## 2018-04-18 DIAGNOSIS — F32.0 MILD SINGLE CURRENT EPISODE OF MAJOR DEPRESSIVE DISORDER (HCC): ICD-10-CM

## 2018-04-18 DIAGNOSIS — F34.1 DYSTHYMIC DISORDER: Chronic | ICD-10-CM

## 2018-04-18 DIAGNOSIS — F52.21 ERECTILE DYSFUNCTION OF NONORGANIC ORIGIN: ICD-10-CM

## 2018-04-18 DIAGNOSIS — R52 PAIN: ICD-10-CM

## 2018-04-18 DIAGNOSIS — E87.6 HYPOKALEMIA: ICD-10-CM

## 2018-04-18 PROCEDURE — 90834 PSYTX W PT 45 MINUTES: CPT | Performed by: PSYCHOLOGIST

## 2018-04-18 RX ORDER — CELECOXIB 200 MG/1
200 CAPSULE ORAL DAILY
Qty: 90 CAP | Refills: 0 | Status: SHIPPED
Start: 2018-04-18 | End: 2018-04-23 | Stop reason: SDUPTHER

## 2018-04-18 RX ORDER — POTASSIUM CHLORIDE 750 MG/1
10 TABLET, FILM COATED, EXTENDED RELEASE ORAL DAILY
Qty: 90 TAB | Refills: 0 | Status: SHIPPED | OUTPATIENT
Start: 2018-04-18 | End: 2018-04-23 | Stop reason: SDUPTHER

## 2018-04-18 NOTE — BH THERAPY
[facility] Progress Note    Patient Name: Buster Median  Patient MRN: [id number]  Today's Date: 4/18/2018     Type of session:Individual psychotherapy  Length of session: 50 minutes  Persons in attendance:Patient    Subjective/New Info:The patient ID/Chief Complaint:  The patient is a 67 year old male, , .  The patient self-referred and voluntarily presented for an individual intake to address concerns related to relationship problems, sleep problems, and erectile dysfunction. Reviewed limits of confidentiality and [facility] policies; patient expressed understanding and agreed to voluntarily proceed with the evaluation and treatment.    Interval History:   Session Focus: Reviewed with the patient and his interactions with his adult son reports some mild improvement. The patient is moving forward doing a long weekend with potential long-term relationship. He reported some anticipatory anxiety specifically focusing on his sexual performance; he was educated about the benefits of not focusing on sex. The patient is moving forward doing the long weekend potential long-term relationship. He reported some anticipatory anxiety specifically focusing on his sexual performance is educated about the benefits of not focusing on sex and focusing more on his desires for establishing an intimate relationship.     Therapeutic Intervention: Cognitive Behavioral Therapy    Planned Intervention: Utilizing problem-solving assisted patient in identifying ways in that she can enhance the quality of his interactions as it relates to establishing satisfactory personal/intimate relationship during upcoming trip. Patient was also encouraged to continue focusing on trying to find opportunities to interact with others to facilitate greater behavioral activation        Objective/Observations:Patient did not present in acute distress. Patient was appropriately groomed. Patient was  alert and oriented x4. Eye contact was appropriate. No abnormalities in attention or concentration were noted. No abnormalities of movement present; psychomotor activity was normal. Speech was fluent and regular in rhythm, rate, volume, and tone. Thought processes were linear, logical, and goal-directed. There was no evidence of thought disorder. No auditory or visual hallucinations. Long and short term memory appeared to be intact. Insight, judgment, and impulse control were deemed to be within normal limits.  Reported mood was “angry.” Affect was full-ranging and appropriate to thought content and conversation.  Patient denied past and current suicidal and homicidal ideation in plan, intent, and preparatory behavior.    Psychometric Test Results:     BHM-20  Global Mental Health Within Normal Limits  Well Being Scale  Within Normal Limits  Symptoms Scale   Within Normal Limits  Anxiety Subscale  Within Normal Limits  Depression Subscale  Within Normal Limits  Alcohol/Drug Subscale Within Normal Limits  Bipolar Subscale  Within Normal Limits  Eating Disorder Subscale Within Normal Limits  Harm to other Subscale Within Normal Limits  Suicide Monitoring Scale No Indication of Risk  Life Functioning Scale   Within Normal Limits    Diagnoses:   1. Dysthymic disorder    2. Mild single current episode of major depressive disorder (CMS-HCC)    3. Erectile dysfunction of nonorganic origin    RISK ASSESSMENT:   The patient denied any suicide-related ideation and/or behaviors and intent/plan, denied thoughts about death and dying both in session and during the period since last appointment, or past 2 weeks.    Risk Level: Not Currently at Clinically Significant Risk  Hospitalization is not deemed necessary at this time as the patient does not present a clear or imminent danger to self or others. No indication for pursuing higher level of care. Outpatient management is currently most appropriate and least restrictive level of  care.      Current risk:   SUICIDE: Low   Homicide: Not applicable   Self-harm: Not applicable   Relapse: Not applicable   Other:    Safety Plan reviewed? No   If evidence of imminent risk is present, intervention/plan:         Treatment Goal(s)/Objective(s) addressed:      Progress toward Treatment Goals: Mild improvement  Goal: Improve overall mood      Get 7-8 hours of restful sleep every night using sleep hygiene handout   Develop strategies for thought distraction when ruminating on the past    Plan:    1) Follow-up 1 week  2) Barriers to Learning:  No  3) Readiness to Learn:  Yes  4) Cultural Concerns:  No  5) Patient voiced understanding of, and agreement with, plan and goals as annotated above.  6) Declare these services are medically necessary and appropriate to the patient’s diagnosis and needs  7) The point of contact [facility] the [facility] regarding this evaluation is Dr. San, Psychologist.    Geraldo San III, Ed.D.  4/18/2018

## 2018-04-23 NOTE — TELEPHONE ENCOUNTER
Was the patient seen in the last year in this department? Yes     Does patient have an active prescription for medications requested? Yes     Received Request Via: Patient     Pt updated pharmacy preference- Rx should be sent to [facility] mail order.

## 2018-04-24 ENCOUNTER — OFFICE VISIT (OUTPATIENT)
Dept: BEHAVIORAL HEALTH | Facility: PHYSICIAN GROUP | Age: 68
End: 2018-04-24
Payer: MEDICARE

## 2018-04-24 DIAGNOSIS — F34.1 DYSTHYMIC DISORDER: Chronic | ICD-10-CM

## 2018-04-24 DIAGNOSIS — F52.21 ERECTILE DYSFUNCTION OF NONORGANIC ORIGIN: ICD-10-CM

## 2018-04-24 PROCEDURE — 90834 PSYTX W PT 45 MINUTES: CPT | Performed by: PSYCHOLOGIST

## 2018-04-24 RX ORDER — POTASSIUM CHLORIDE 750 MG/1
10 TABLET, FILM COATED, EXTENDED RELEASE ORAL DAILY
Qty: 90 TAB | Refills: 0 | Status: SHIPPED | OUTPATIENT
Start: 2018-04-24 | End: 2018-09-07

## 2018-04-24 RX ORDER — CELECOXIB 200 MG/1
200 CAPSULE ORAL DAILY
Qty: 90 CAP | Refills: 0 | Status: SHIPPED | OUTPATIENT
Start: 2018-04-24 | End: 2018-07-11 | Stop reason: SDUPTHER

## 2018-04-24 NOTE — BH THERAPY
[facility]  Therapy Progress Note    Patient Name: Buster Medina  Patient MRN: [id number]  Today's Date: 4/24/2018     Type of session:Individual psychotherapy  Length of session: 50 minutes  Persons in attendance:Patient    Subjective/New Info:The patient ID/Chief Complaint:  The patient is a 67 year old male, , .  The patient self-referred and voluntarily presented for an individual intake to address concerns related to relationship problems, sleep problems, and erectile dysfunction. Reviewed limits of confidentiality and [facility] policies; patient expressed understanding and agreed to voluntarily proceed with the evaluation and treatment.    Interval History:   Session Focus: Patient reported that he is moving forward with the long weekend reported some anxieties that have generated automatic about previous relationships.  Encourage the patient to examine of assumptions in his current relationship.    Therapeutic Intervention: Cognitive Behavioral Therapy    Planned Intervention:   Continue to explore dysfunctional schemas from previous relationships and how those schemas could negatively impact his current relationship.      Objective/Observations:Patient did not present in acute distress. Patient was appropriately groomed. Patient was alert and oriented x4. Eye contact was appropriate. No abnormalities in attention or concentration were noted. No abnormalities of movement present; psychomotor activity was normal. Speech was fluent and regular in rhythm, rate, volume, and tone. Thought processes were linear, logical, and goal-directed. There was no evidence of thought disorder. No auditory or visual hallucinations. Long and short term memory appeared to be intact. Insight, judgment, and impulse control were deemed to be within normal limits.  Reported mood was “angry.” Affect was full-ranging and appropriate to thought content and conversation.  Patient  denied past and current suicidal and homicidal ideation in plan, intent, and preparatory behavior.    Psychometric Test Results:     BHM-20  Global Mental Health Within Normal Limits  Well Being Scale  Within Normal Limits  Symptoms Scale   Within Normal Limits  Anxiety Subscale  Within Normal Limits  Depression Subscale  Mild Distress  Alcohol/Drug Subscale Within Normal Limits  Bipolar Subscale  Within Normal Limits  Eating Disorder Subscale Within Normal Limits  Harm to other Subscale Within Normal Limits  Suicide Monitoring Scale No Indication of Risk  Life Functioning Scale   Within Normal Limits    Diagnoses:   1. Dysthymic disorder    2. Erectile dysfunction of nonorganic origin    RISK ASSESSMENT:   The patient denied any suicide-related ideation and/or behaviors and intent/plan, denied thoughts about death and dying both in session and during the period since last appointment, or past 2 weeks.    Risk Level: Not Currently at Clinically Significant Risk  Hospitalization is not deemed necessary at this time as the patient does not present a clear or imminent danger to self or others. No indication for pursuing higher level of care. Outpatient management is currently most appropriate and least restrictive level of care.      Current risk:   SUICIDE: Low   Homicide: Not applicable   Self-harm: Not applicable   Relapse: Not applicable   Other:    Safety Plan reviewed? No   If evidence of imminent risk is present, intervention/plan:         Treatment Goal(s)/Objective(s) addressed:      Progress toward Treatment Goals: Moderate improvement  Goal: Improve overall mood      Get 7-8 hours of restful sleep every night using sleep hygiene handout   Develop strategies for thought distraction when ruminating on the past    Plan:    1) Follow-up 1 week  2) Barriers to Learning:  No  3) Readiness to Learn:  Yes  4) Cultural Concerns:  No  5) Patient voiced understanding of, and agreement with, plan and goals as annotated  above.  6) Declare these services are medically necessary and appropriate to the patient’s diagnosis and needs  7) The point of contact [facility] the [facility] regarding this evaluation is Dr. San, Psychologist.    Geraldo San III, Ed.D.  4/24/2018

## 2018-04-27 ENCOUNTER — PATIENT MESSAGE (OUTPATIENT)
Dept: MEDICAL GROUP | Age: 68
End: 2018-04-27

## 2018-04-27 RX ORDER — MIRTAZAPINE 15 MG/1
15 TABLET, FILM COATED ORAL
Qty: 60 TAB | Refills: 0 | Status: CANCELLED | OUTPATIENT
Start: 2018-04-27

## 2018-04-27 RX ORDER — MIRTAZAPINE 15 MG/1
15 TABLET, FILM COATED ORAL
Qty: 90 TAB | Refills: 0 | Status: SHIPPED | OUTPATIENT
Start: 2018-04-27 | End: 2018-07-11 | Stop reason: SDUPTHER

## 2018-04-30 RX ORDER — MIRTAZAPINE 15 MG/1
15 TABLET, FILM COATED ORAL
Qty: 90 TAB | Refills: 0 | Status: CANCELLED | OUTPATIENT
Start: 2018-04-30

## 2018-05-01 ENCOUNTER — OFFICE VISIT (OUTPATIENT)
Dept: BEHAVIORAL HEALTH | Facility: PHYSICIAN GROUP | Age: 68
End: 2018-05-01
Payer: MEDICARE

## 2018-05-01 DIAGNOSIS — F32.0 CURRENT MILD EPISODE OF MAJOR DEPRESSIVE DISORDER WITHOUT PRIOR EPISODE (HCC): ICD-10-CM

## 2018-05-01 DIAGNOSIS — F52.21 ERECTILE DYSFUNCTION OF NONORGANIC ORIGIN: ICD-10-CM

## 2018-05-01 DIAGNOSIS — F34.1 DYSTHYMIC DISORDER: Chronic | ICD-10-CM

## 2018-05-01 PROCEDURE — 90834 PSYTX W PT 45 MINUTES: CPT | Performed by: PSYCHOLOGIST

## 2018-05-02 NOTE — BH THERAPY
" [facility]  Therapy Progress Note    Patient Name: Buster Medina  Patient MRN: [id number]  Today's Date: 5/1/2018     Type of session:Individual psychotherapy  Length of session: 50 minutes  Persons in attendance:Patient    Subjective/New Info:The patient ID/Chief Complaint:  The patient is a 67 year old male, , .  The patient self-referred and voluntarily presented for an individual intake to address concerns related to relationship problems, sleep problems, and erectile dysfunction. Reviewed limits of confidentiality and [facility] policies; patient expressed understanding and agreed to voluntarily proceed with the evaluation and treatment.    Interval History:   Session Focus: Patient reports that he recently been feeling \"blue\" reports some increased uncertainty in his dating relationship but with no specific concerns. Assisted the patient in identifying  automatic thoughts in scheme is associated with pessimistic negative outcomes    Therapeutic Intervention: Cognitive Behavioral Therapy    Planned Intervention: Provided the patient information about urge management and how his attempts to alter the urge maybe negatively impacting his relationship which may result in a sense of him being in control he was able to identify that this is a historical patterns that he would like to change.      Objective/Observations:Patient did not present in acute distress. Patient was appropriately groomed. Patient was alert and oriented x4. Eye contact was appropriate. No abnormalities in attention or concentration were noted. No abnormalities of movement present; psychomotor activity was normal. Speech was fluent and regular in rhythm, rate, volume, and tone. Thought processes were linear, logical, and goal-directed. There was no evidence of thought disorder. No auditory or visual hallucinations. Long and short term memory appeared to be intact. Insight, judgment, and " impulse control were deemed to be within normal limits.  Reported mood was “angry.” Affect was full-ranging and appropriate to thought content and conversation.  Patient denied past and current suicidal and homicidal ideation in plan, intent, and preparatory behavior.    Psychometric Test Results:     BHM-20  Global Mental Health Within Normal Limits  Well Being Scale  Within Normal Limits  Symptoms Scale   Within Normal Limits  Anxiety Subscale  Within Normal Limits  Depression Subscale  Mild Distress  Alcohol/Drug Subscale Within Normal Limits  Bipolar Subscale  Within Normal Limits  Eating Disorder Subscale Within Normal Limits  Harm to other Subscale Within Normal Limits  Suicide Monitoring Scale No Indication of Risk  Life Functioning Scale   Within Normal Limits    Diagnoses:   1. Dysthymic disorder    2. Current mild episode of major depressive disorder without prior episode (HCC)    3. Erectile dysfunction of nonorganic origin    RISK ASSESSMENT:   The patient denied any suicide-related ideation and/or behaviors and intent/plan, denied thoughts about death and dying both in session and during the period since last appointment, or past 2 weeks.    Risk Level: Not Currently at Clinically Significant Risk  Hospitalization is not deemed necessary at this time as the patient does not present a clear or imminent danger to self or others. No indication for pursuing higher level of care. Outpatient management is currently most appropriate and least restrictive level of care.      Current risk:   SUICIDE: Low   Homicide: Not applicable   Self-harm: Not applicable   Relapse: Not applicable   Other:    Safety Plan reviewed? No   If evidence of imminent risk is present, intervention/plan:         Treatment Goal(s)/Objective(s) addressed:      Progress toward Treatment Goals: Moderate improvement  Goal: Improve overall mood      Get 7-8 hours of restful sleep every night using sleep hygiene handout   Develop strategies  for thought distraction when ruminating on the past    Plan:    1) Follow-up 1 week  2) Barriers to Learning:  No  3) Readiness to Learn:  Yes  4) Cultural Concerns:  No  5) Patient voiced understanding of, and agreement with, plan and goals as annotated above.  6) Declare these services are medically necessary and appropriate to the patient’s diagnosis and needs  7) The point of contact at the [facility] regarding this evaluation is Dr. San, Psychologist.    Geraldo San III, Ed.D.  5/1/2018

## 2018-05-09 ENCOUNTER — OFFICE VISIT (OUTPATIENT)
Dept: BEHAVIORAL HEALTH | Facility: PHYSICIAN GROUP | Age: 68
End: 2018-05-09
Payer: MEDICARE

## 2018-05-09 DIAGNOSIS — F34.1 DYSTHYMIC DISORDER: Chronic | ICD-10-CM

## 2018-05-09 DIAGNOSIS — F52.21 ERECTILE DYSFUNCTION OF NONORGANIC ORIGIN: ICD-10-CM

## 2018-05-09 PROCEDURE — 90834 PSYTX W PT 45 MINUTES: CPT | Performed by: PSYCHOLOGIST

## 2018-05-09 NOTE — BH THERAPY
[facility] Progress Note    Patient Name: Buster Medina  Patient MRN: [id number]  Today's Date: 5/9/2018     Type of session:Individual psychotherapy  Length of session: 50 minutes  Persons in attendance:Patient    Subjective/New Info:The patient ID/Chief Complaint:  The patient is a 67 year old male, , .  The patient self-referred and voluntarily presented for an individual intake to address concerns related to relationship problems, sleep problems, and erectile dysfunction. Reviewed limits of confidentiality and [facility] policies; patient expressed understanding and agreed to voluntarily proceed with the evaluation and treatment.    Interval History:   Session Focus: Patient reports he is preparing for his long weekend with his girlfriend indicated that had a number of discussions setting expectations. Patient is increasingly concerned about his son. Discussed with the patient how his attempt to set appropriate boundaries and the positive impact.     Therapeutic Intervention: Cognitive Behavioral Therapy    Planned Intervention: Continue to encourage the patient to use pros and cons in his interactions with his son and his ex-wife.       Objective/Observations:Patient did not present in acute distress. Patient was appropriately groomed. Patient was alert and oriented x4. Eye contact was appropriate. No abnormalities in attention or concentration were noted. No abnormalities of movement present; psychomotor activity was normal. Speech was fluent and regular in rhythm, rate, volume, and tone. Thought processes were linear, logical, and goal-directed. There was no evidence of thought disorder. No auditory or visual hallucinations. Long and short term memory appeared to be intact. Insight, judgment, and impulse control were deemed to be within normal limits.  Reported mood was “angry.” Affect was full-ranging and appropriate to thought content and  conversation.  Patient denied past and current suicidal and homicidal ideation in plan, intent, and preparatory behavior.    Psychometric Test Results:     BHM-20  Global Mental Health Within Normal Limits  Well Being Scale  Within Normal Limits  Symptoms Scale   Within Normal Limits  Anxiety Subscale  Within Normal Limits  Depression Subscale  Mild Distress  Alcohol/Drug Subscale Within Normal Limits  Bipolar Subscale  Within Normal Limits  Eating Disorder Subscale Within Normal Limits  Harm to other Subscale Within Normal Limits  Suicide Monitoring Scale No Indication of Risk  Life Functioning Scale   Within Normal Limits    Diagnoses:   1. Dysthymic disorder    2. Erectile dysfunction of nonorganic origin    RISK ASSESSMENT:   The patient denied any suicide-related ideation and/or behaviors and intent/plan, denied thoughts about death and dying both in session and during the period since last appointment, or past 2 weeks.    Risk Level: Not Currently at Clinically Significant Risk  Hospitalization is not deemed necessary at this time as the patient does not present a clear or imminent danger to self or others. No indication for pursuing higher level of care. Outpatient management is currently most appropriate and least restrictive level of care.      Current risk:   SUICIDE: Low   Homicide: Not applicable   Self-harm: Not applicable   Relapse: Not applicable   Other:    Safety Plan reviewed? No   If evidence of imminent risk is present, intervention/plan:         Treatment Goal(s)/Objective(s) addressed:      Progress toward Treatment Goals: Moderate improvement  Goal: Improve overall mood      Get 7-8 hours of restful sleep every night using sleep hygiene handout   Develop strategies for thought distraction when ruminating on the past    Plan:    1) Follow-up 1 week  2) Barriers to Learning:  No  3) Readiness to Learn:  Yes  4) Cultural Concerns:  No  5) Patient voiced understanding of, and agreement with, plan  and goals as annotated above.  6) Declare these services are medically necessary and appropriate to the patient’s diagnosis and needs  7) The point of contact at the [facility] regarding this evaluation is Dr. San, Psychologist.    Geraldo San III, Ed.D.  5/9/2018

## 2018-05-23 ENCOUNTER — OFFICE VISIT (OUTPATIENT)
Dept: BEHAVIORAL HEALTH | Facility: PHYSICIAN GROUP | Age: 68
End: 2018-05-23
Payer: MEDICARE

## 2018-05-23 DIAGNOSIS — F32.0 CURRENT MILD EPISODE OF MAJOR DEPRESSIVE DISORDER WITHOUT PRIOR EPISODE (HCC): ICD-10-CM

## 2018-05-23 DIAGNOSIS — F51.01 PRIMARY INSOMNIA: ICD-10-CM

## 2018-05-23 DIAGNOSIS — F34.1 DYSTHYMIC DISORDER: Chronic | ICD-10-CM

## 2018-05-23 DIAGNOSIS — F52.21 ERECTILE DYSFUNCTION OF NONORGANIC ORIGIN: ICD-10-CM

## 2018-05-23 PROCEDURE — 90834 PSYTX W PT 45 MINUTES: CPT | Performed by: PSYCHOLOGIST

## 2018-05-24 NOTE — BH THERAPY
[facility] Therapy Progress Note    Patient Name: Buster Medina  Patient MRN: [id number]  Today's Date: 5/23/2018     Type of session:Individual psychotherapy  Length of session: 50 minutes  Persons in attendance:Patient    Subjective/New Info:The patient ID/Chief Complaint:  The patient is a 67 year old male, , .  The patient self-referred and voluntarily presented for an individual intake to address concerns related to relationship problems, sleep problems, and erectile dysfunction. Reviewed limits of confidentiality and [facility] policies; patient expressed understanding and agreed to voluntarily proceed with the evaluation and treatment.    Interval History: Patient is experiencing increased anxiety about decisions related to his adult son. He is maintaining the boundaries that he sat and working affectively with his son‘s . Patient is concerned that ultimately if his son ends up homeless and he has failed as a father.     Therapeutic Intervention: Cognitive Behavioral Therapy    Planned Intervention: Continue to use challenging techniques related to the patient automatic thoughts and addressing dysfunctional schemas about not being a good parent.      Objective/Observations:Patient did not present in acute distress. Patient was appropriately groomed. Patient was alert and oriented x4. Eye contact was appropriate. No abnormalities in attention or concentration were noted. No abnormalities of movement present; psychomotor activity was normal. Speech was fluent and regular in rhythm, rate, volume, and tone. Thought processes were linear, logical, and goal-directed. There was no evidence of thought disorder. No auditory or visual hallucinations. Long and short term memory appeared to be intact. Insight, judgment, and impulse control were deemed to be within normal limits.  Reported mood was “angry.” Affect was full-ranging and appropriate to thought  content and conversation.  Patient denied past and current suicidal and homicidal ideation in plan, intent, and preparatory behavior.    Psychometric Test Results:     BHM-20  Global Mental Health Within Normal Limits  Well Being Scale  Within Normal Limits  Symptoms Scale   Within Normal Limits  Anxiety Subscale  Within Normal Limits  Depression Subscale  Mild Distress  Alcohol/Drug Subscale Within Normal Limits  Bipolar Subscale  Within Normal Limits  Eating Disorder Subscale Within Normal Limits  Harm to other Subscale Within Normal Limits  Suicide Monitoring Scale No Indication of Risk  Life Functioning Scale   Within Normal Limits    Diagnoses:   1. Dysthymic disorder    2. Current mild episode of major depressive disorder without prior episode (HCC)    3. Erectile dysfunction of nonorganic origin    RISK ASSESSMENT:   The patient denied any suicide-related ideation and/or behaviors and intent/plan, denied thoughts about death and dying both in session and during the period since last appointment, or past 2 weeks.    Risk Level: Not Currently at Clinically Significant Risk  Hospitalization is not deemed necessary at this time as the patient does not present a clear or imminent danger to self or others. No indication for pursuing higher level of care. Outpatient management is currently most appropriate and least restrictive level of care.      Current risk:   SUICIDE: Low   Homicide: Not applicable   Self-harm: Not applicable   Relapse: Not applicable   Other:    Safety Plan reviewed? No   If evidence of imminent risk is present, intervention/plan:         Treatment Goal(s)/Objective(s) addressed:      Progress toward Treatment Goals: Mild improvement  Goal: Improve overall mood      Get 7-8 hours of restful sleep every night using sleep hygiene handout   Develop strategies for thought distraction when ruminating on the past    Plan:    1) Follow-up 1 week  2) Barriers to Learning:  No  3) Readiness to Learn:   Yes  4) Cultural Concerns:  No  5) Patient voiced understanding of, and agreement with, plan and goals as annotated above.  6) Declare these services are medically necessary and appropriate to the patient’s diagnosis and needs  7) [facility] regarding this evaluation is Dr. San, Psychologist.    Geraldo San III, Ed.D.  5/23/2018

## 2018-05-25 RX ORDER — LISINOPRIL 20 MG/1
20 TABLET ORAL EVERY EVENING
Qty: 90 TAB | Refills: 0 | Status: SHIPPED | OUTPATIENT
Start: 2018-05-25 | End: 2018-08-20 | Stop reason: SDUPTHER

## 2018-05-25 RX ORDER — LORAZEPAM 2 MG/1
2 TABLET ORAL
Qty: 90 TAB | Refills: 0 | Status: SHIPPED
Start: 2018-05-25 | End: 2018-08-13 | Stop reason: SDUPTHER

## 2018-05-30 ENCOUNTER — OFFICE VISIT (OUTPATIENT)
Dept: BEHAVIORAL HEALTH | Facility: PHYSICIAN GROUP | Age: 68
End: 2018-05-30
Payer: MEDICARE

## 2018-05-30 DIAGNOSIS — F34.1 DYSTHYMIC DISORDER: Chronic | ICD-10-CM

## 2018-05-30 DIAGNOSIS — F52.21 ERECTILE DYSFUNCTION OF NONORGANIC ORIGIN: ICD-10-CM

## 2018-05-30 PROCEDURE — 90834 PSYTX W PT 45 MINUTES: CPT | Performed by: PSYCHOLOGIST

## 2018-05-30 NOTE — BH THERAPY
[facility] Therapy Progress Note    Patient Name: Buster Medina  Patient MRN: [id number]  Today's Date: 5/230/2018     Type of session:Individual psychotherapy  Length of session: 50 minutes  Persons in attendance:Patient    Subjective/New Info:The patient ID/Chief Complaint:  The patient is a 67 year old male, , .  The patient self-referred and voluntarily presented for an individual intake to address concerns related to relationship problems, sleep problems, and erectile dysfunction. Reviewed limits of confidentiality and [facility] policies; patient expressed understanding and agreed to voluntarily proceed with the evaluation and treatment.    Interval History: The patient continues to be concerned about his sexual Performance do use the backchain analysis  to identify concerns. Patient recently has consulted with the neurologist is going to be started on injectable medication. Made recommendations for behavioral techniques related to masturbation and penetration.  Patient believes that his sexual performance is a psychological factor which may have been as a result of his previous relationship with his ex-wife.       Therapeutic Intervention: Cognitive Behavioral Therapy    Planned Intervention:  To explore dysfunctional emails specifically and interpersonal relationships in intimate relationships.      Objective/Observations:Patient did not present in acute distress. Patient was appropriately groomed. Patient was alert and oriented x4. Eye contact was appropriate. No abnormalities in attention or concentration were noted. No abnormalities of movement present; psychomotor activity was normal. Speech was fluent and regular in rhythm, rate, volume, and tone. Thought processes were linear, logical, and goal-directed. There was no evidence of thought disorder. No auditory or visual hallucinations. Long and short term memory appeared to be intact. Insight,  judgment, and impulse control were deemed to be within normal limits.  Reported mood was “concerned.” Affect was full-ranging and appropriate to thought content and conversation.  Patient denied past and current suicidal and homicidal ideation in plan, intent, and preparatory behavior.    Psychometric Test Results:     BHM-20  Global Mental Health Within Normal Limits  Well Being Scale  Within Normal Limits  Symptoms Scale   Within Normal Limits  Anxiety Subscale  Within Normal Limits  Depression Subscale  Within Normal Limits  Alcohol/Drug Subscale Within Normal Limits  Bipolar Subscale  Within Normal Limits  Eating Disorder Subscale Within Normal Limits  Harm to other Subscale Within Normal Limits  Suicide Monitoring Scale No Indication of Risk  Life Functioning Scale   Within Normal Limits    Diagnoses:   1. Dysthymic disorder    2. Erectile dysfunction of nonorganic origin    RISK ASSESSMENT:   The patient denied any suicide-related ideation and/or behaviors and intent/plan, denied thoughts about death and dying both in session and during the period since last appointment, or past 2 weeks.    Risk Level: Not Currently at Clinically Significant Risk  Hospitalization is not deemed necessary at this time as the patient does not present a clear or imminent danger to self or others. No indication for pursuing higher level of care. Outpatient management is currently most appropriate and least restrictive level of care.      Current risk:   SUICIDE: Low   Homicide: Not applicable   Self-harm: Not applicable   Relapse: Not applicable   Other:    Safety Plan reviewed? No   If evidence of imminent risk is present, intervention/plan:         Treatment Goal(s)/Objective(s) addressed:      Progress toward Treatment Goals: Mild improvement  Goal: Improve overall mood      Get 7-8 hours of restful sleep every night using sleep hygiene handout   Develop strategies for thought distraction when ruminating on the  past    Plan:    1) Follow-up 1 week  2) Barriers to Learning:  No  3) Readiness to Learn:  Yes  4) Cultural Concerns:  No  5) Patient voiced understanding of, and agreement with, plan and goals as annotated above.  6) Declare these services are medically necessary and appropriate to the patient’s diagnosis and needs  7) The point of contact [facility] the [facility] regarding this evaluation is Dr. San, Psychologist.    Geraldo San III, Ed.D.  5/30/2018

## 2018-06-06 ENCOUNTER — OFFICE VISIT (OUTPATIENT)
Dept: BEHAVIORAL HEALTH | Facility: PHYSICIAN GROUP | Age: 68
End: 2018-06-06
Payer: MEDICARE

## 2018-06-06 DIAGNOSIS — N40.0 BENIGN PROSTATIC HYPERPLASIA WITHOUT LOWER URINARY TRACT SYMPTOMS: ICD-10-CM

## 2018-06-06 DIAGNOSIS — F52.21 ERECTILE DYSFUNCTION OF NONORGANIC ORIGIN: ICD-10-CM

## 2018-06-06 DIAGNOSIS — F34.1 DYSTHYMIC DISORDER: Chronic | ICD-10-CM

## 2018-06-06 DIAGNOSIS — I10 ESSENTIAL HYPERTENSION: ICD-10-CM

## 2018-06-06 PROCEDURE — 90834 PSYTX W PT 45 MINUTES: CPT | Performed by: PSYCHOLOGIST

## 2018-06-06 NOTE — BH THERAPY
[facility]  Therapy Progress Note    Patient Name: Buster Medina  Patient MRN: [id number]  Today's Date: 6/6/2018     Type of session:Individual psychotherapy  Length of session: 50 minutes  Persons in attendance:Patient    Subjective/New Info:The patient ID/Chief Complaint:  The patient is a 67 year old male, , .  The patient self-referred and voluntarily presented for an individual intake to address concerns related to relationship problems, sleep problems, and erectile dysfunction. Reviewed limits of confidentiality and [facility] policies; patient expressed understanding and agreed to voluntarily proceed with the evaluation and treatment.    Interval History: Patient reports that his self injection to maintain an erection was unsuccessful during the recent sexual encounter with his girlfriend. The patient is recognizing that his sexual performance is more than likely a psychological difficulty do you somewhat reluctant to talk about sexual behavior but recognizes he needs to understand the sexual behavior and dysfunction. The patient was redirected to examine what are the qualities in relationships he’s looking for  i’m trying to stay in the moment versus looking at the past and the future.     Therapeutic Intervention: Cognitive Behavioral Therapy    Planned Intervention:  Continue to explore how the patients performance anxiety and negative self talk is it impact of the sexual for formants and encourage the patient to remain in the present.      Objective/Observations:Patient did not present in acute distress. Patient was appropriately groomed. Patient was alert and oriented x4. Eye contact was appropriate. No abnormalities in attention or concentration were noted. No abnormalities of movement present; psychomotor activity was normal. Speech was fluent and regular in rhythm, rate, volume, and tone. Thought processes were linear, logical, and goal-directed.  There was no evidence of thought disorder. No auditory or visual hallucinations. Long and short term memory appeared to be intact. Insight, judgment, and impulse control were deemed to be within normal limits.  Reported mood was “concerned.” Affect was full-ranging and appropriate to thought content and conversation.  Patient denied past and current suicidal and homicidal ideation in plan, intent, and preparatory behavior.    Psychometric Test Results:     BHM-20  Global Mental Health Within Normal Limits  Well Being Scale  Within Normal Limits  Symptoms Scale   Within Normal Limits  Anxiety Subscale  Within Normal Limits  Depression Subscale  Within Normal Limits  Alcohol/Drug Subscale Within Normal Limits  Bipolar Subscale  Within Normal Limits  Eating Disorder Subscale Within Normal Limits  Harm to other Subscale Within Normal Limits  Suicide Monitoring Scale No Indication of Risk  Life Functioning Scale   Within Normal Limits    Diagnoses:   1. Dysthymic disorder    2. Erectile dysfunction of nonorganic origin    RISK ASSESSMENT:   The patient denied any suicide-related ideation and/or behaviors and intent/plan, denied thoughts about death and dying both in session and during the period since last appointment, or past 2 weeks.    Risk Level: Not Currently at Clinically Significant Risk  Hospitalization is not deemed necessary at this time as the patient does not present a clear or imminent danger to self or others. No indication for pursuing higher level of care. Outpatient management is currently most appropriate and least restrictive level of care.      Current risk:   SUICIDE: Low   Homicide: Not applicable   Self-harm: Not applicable   Relapse: Not applicable   Other:    Safety Plan reviewed? No   If evidence of imminent risk is present, intervention/plan:         Treatment Goal(s)/Objective(s) addressed:      Progress toward Treatment Goals: Moderate improvement  Goal: Improve overall mood      Get 7-8  hours of restful sleep every night using sleep hygiene handout   Develop strategies for thought distraction when ruminating on the past    Plan:    1) Follow-up 1 week  2) Barriers to Learning:  No  3) Readiness to Learn:  Yes  4) Cultural Concerns:  No  5) Patient voiced understanding of, and agreement with, plan and goals as annotated above.  6) Declare these services are medically necessary and appropriate to the patient’s diagnosis and needs  7) The [facility] of contact [facility] regarding this evaluation is Dr. San, Psychologist.    Geraldo San III, Ed.D.  6/6/2018

## 2018-06-08 ENCOUNTER — HOSPITAL ENCOUNTER (OUTPATIENT)
Dept: LAB | Facility: MEDICAL CENTER | Age: 68
End: 2018-06-08
Attending: FAMILY MEDICINE
Payer: MEDICARE

## 2018-06-08 DIAGNOSIS — N40.0 BENIGN PROSTATIC HYPERPLASIA WITHOUT LOWER URINARY TRACT SYMPTOMS: ICD-10-CM

## 2018-06-08 DIAGNOSIS — E83.52 HYPERCALCEMIA: ICD-10-CM

## 2018-06-08 DIAGNOSIS — I10 ESSENTIAL HYPERTENSION: ICD-10-CM

## 2018-06-08 LAB
ALBUMIN SERPL BCP-MCNC: 4.2 G/DL (ref 3.2–4.9)
ALBUMIN/GLOB SERPL: 1.4 G/DL
ALP SERPL-CCNC: 56 U/L (ref 30–99)
ALT SERPL-CCNC: 40 U/L (ref 2–50)
ANION GAP SERPL CALC-SCNC: 7 MMOL/L (ref 0–11.9)
AST SERPL-CCNC: 31 U/L (ref 12–45)
BASOPHILS # BLD AUTO: 0.8 % (ref 0–1.8)
BASOPHILS # BLD: 0.05 K/UL (ref 0–0.12)
BILIRUB SERPL-MCNC: 0.7 MG/DL (ref 0.1–1.5)
BUN SERPL-MCNC: 17 MG/DL (ref 8–22)
CALCIUM SERPL-MCNC: 9.7 MG/DL (ref 8.5–10.5)
CHLORIDE SERPL-SCNC: 106 MMOL/L (ref 96–112)
CHOLEST SERPL-MCNC: 211 MG/DL (ref 100–199)
CO2 SERPL-SCNC: 27 MMOL/L (ref 20–33)
CREAT SERPL-MCNC: 1 MG/DL (ref 0.5–1.4)
EOSINOPHIL # BLD AUTO: 0.09 K/UL (ref 0–0.51)
EOSINOPHIL NFR BLD: 1.5 % (ref 0–6.9)
ERYTHROCYTE [DISTWIDTH] IN BLOOD BY AUTOMATED COUNT: 42.6 FL (ref 35.9–50)
GLOBULIN SER CALC-MCNC: 2.9 G/DL (ref 1.9–3.5)
GLUCOSE SERPL-MCNC: 95 MG/DL (ref 65–99)
HCT VFR BLD AUTO: 43 % (ref 42–52)
HDLC SERPL-MCNC: 73 MG/DL
HGB BLD-MCNC: 14.7 G/DL (ref 14–18)
IMM GRANULOCYTES # BLD AUTO: 0.02 K/UL (ref 0–0.11)
IMM GRANULOCYTES NFR BLD AUTO: 0.3 % (ref 0–0.9)
LDLC SERPL CALC-MCNC: 113 MG/DL
LYMPHOCYTES # BLD AUTO: 2.42 K/UL (ref 1–4.8)
LYMPHOCYTES NFR BLD: 40.4 % (ref 22–41)
MCH RBC QN AUTO: 32.7 PG (ref 27–33)
MCHC RBC AUTO-ENTMCNC: 34.2 G/DL (ref 33.7–35.3)
MCV RBC AUTO: 95.6 FL (ref 81.4–97.8)
MONOCYTES # BLD AUTO: 0.64 K/UL (ref 0–0.85)
MONOCYTES NFR BLD AUTO: 10.7 % (ref 0–13.4)
NEUTROPHILS # BLD AUTO: 2.77 K/UL (ref 1.82–7.42)
NEUTROPHILS NFR BLD: 46.3 % (ref 44–72)
NRBC # BLD AUTO: 0 K/UL
NRBC BLD-RTO: 0 /100 WBC
PLATELET # BLD AUTO: 142 K/UL (ref 164–446)
PMV BLD AUTO: 12.5 FL (ref 9–12.9)
POTASSIUM SERPL-SCNC: 4.1 MMOL/L (ref 3.6–5.5)
PROT SERPL-MCNC: 7.1 G/DL (ref 6–8.2)
PSA SERPL-MCNC: 1.82 NG/ML (ref 0–4)
RBC # BLD AUTO: 4.5 M/UL (ref 4.7–6.1)
SODIUM SERPL-SCNC: 140 MMOL/L (ref 135–145)
TRIGL SERPL-MCNC: 125 MG/DL (ref 0–149)
WBC # BLD AUTO: 6 K/UL (ref 4.8–10.8)

## 2018-06-08 PROCEDURE — 36415 COLL VENOUS BLD VENIPUNCTURE: CPT

## 2018-06-08 PROCEDURE — 80053 COMPREHEN METABOLIC PANEL: CPT

## 2018-06-08 PROCEDURE — 82652 VIT D 1 25-DIHYDROXY: CPT

## 2018-06-08 PROCEDURE — 84153 ASSAY OF PSA TOTAL: CPT

## 2018-06-08 PROCEDURE — 85025 COMPLETE CBC W/AUTO DIFF WBC: CPT

## 2018-06-08 PROCEDURE — 80061 LIPID PANEL: CPT

## 2018-06-11 LAB — 1,25(OH)2D3 SERPL-MCNC: 78.9 PG/ML (ref 19.9–79.3)

## 2018-06-18 ENCOUNTER — OFFICE VISIT (OUTPATIENT)
Dept: BEHAVIORAL HEALTH | Facility: PHYSICIAN GROUP | Age: 68
End: 2018-06-18
Payer: MEDICARE

## 2018-06-18 DIAGNOSIS — F52.21 ERECTILE DYSFUNCTION OF NONORGANIC ORIGIN: ICD-10-CM

## 2018-06-18 DIAGNOSIS — F34.1 DYSTHYMIC DISORDER: Chronic | ICD-10-CM

## 2018-06-18 PROCEDURE — 90834 PSYTX W PT 45 MINUTES: CPT | Performed by: PSYCHOLOGIST

## 2018-06-18 NOTE — BH THERAPY
[facility] Therapy Progress Note    Patient Name: Buster Medina  Patient MRN: [id number]  Today's Date: 6/18/2018     Type of session:Individual psychotherapy  Length of session: 50 minutes  Persons in attendance:Patient    Subjective/New Info:The patient ID/Chief Complaint:  The patient is a 67 year old male, , .  The patient self-referred and voluntarily presented for an individual intake to address concerns related to relationship problems, sleep problems, and erectile dysfunction. Reviewed limits of confidentiality and [facility] policies; patient expressed understanding and agreed to voluntarily proceed with the evaluation and treatment.    Interval History: Patient reports some increased anxiety associated with his new relationship being that this relationship is very different in past relationships. Talk with the patient about some narcissistic characteristics that has historical negatively impacted relationships.     Therapeutic Intervention: Cognitive Behavioral Therapy    Planned Intervention:  Continue cognitive behavioral therapy exam in dysfunctional schemas especially in relationships and how personality characteristics may negatively impact his relationship specifically surrounding the need for drama      Objective/Observations:Patient did not present in acute distress. Patient was appropriately groomed. Patient was alert and oriented x4. Eye contact was appropriate. No abnormalities in attention or concentration were noted. No abnormalities of movement present; psychomotor activity was normal. Speech was fluent and regular in rhythm, rate, volume, and tone. Thought processes were linear, logical, and goal-directed. There was no evidence of thought disorder. No auditory or visual hallucinations. Long and short term memory appeared to be intact. Insight, judgment, and impulse control were deemed to be within normal limits.  Reported mood was  “happy.” Affect was full-ranging and appropriate to thought content and conversation.  Patient denied past and current suicidal and homicidal ideation in plan, intent, and preparatory behavior.    Psychometric Test Results:     BHM-20  Global Mental Health Within Normal Limits  Well Being Scale  Within Normal Limits  Symptoms Scale   Within Normal Limits  Anxiety Subscale  Within Normal Limits  Depression Subscale  Within Normal Limits  Alcohol/Drug Subscale Within Normal Limits  Bipolar Subscale  Within Normal Limits  Eating Disorder Subscale Within Normal Limits  Harm to other Subscale Within Normal Limits  Suicide Monitoring Scale No Indication of Risk  Life Functioning Scale   Within Normal Limits    Diagnoses:   1. Dysthymic disorder    2. Erectile dysfunction of nonorganic origin    RISK ASSESSMENT:   The patient denied any suicide-related ideation and/or behaviors and intent/plan, denied thoughts about death and dying both in session and during the period since last appointment, or past 2 weeks.    Risk Level: Not Currently at Clinically Significant Risk  Hospitalization is not deemed necessary at this time as the patient does not present a clear or imminent danger to self or others. No indication for pursuing higher level of care. Outpatient management is currently most appropriate and least restrictive level of care.      Current risk:   SUICIDE: Low   Homicide: Not applicable   Self-harm: Not applicable   Relapse: Not applicable   Other:    Safety Plan reviewed? No   If evidence of imminent risk is present, intervention/plan:         Treatment Goal(s)/Objective(s) addressed:      Progress toward Treatment Goals: Moderate improvement  Goal: Improve overall mood      Get 7-8 hours of restful sleep every night using sleep hygiene handout   Develop strategies for thought distraction when ruminating on the past    Plan:    1) Follow-up 2 weeks  2) Barriers to Learning:  No  3) Readiness to Learn:   Yes  4) Cultural Concerns:  No  5) Patient voiced understanding of, and agreement with, plan and goals as annotated above.  6) Declare these services are medically necessary and appropriate to the patient’s diagnosis and needs  7) The point of contact [facility] regarding this evaluation is Dr. San, Psychologist.    Geraldo San III, Ed.D.  6/18/2018

## 2018-07-09 ENCOUNTER — OFFICE VISIT (OUTPATIENT)
Dept: BEHAVIORAL HEALTH | Facility: PHYSICIAN GROUP | Age: 68
End: 2018-07-09
Payer: MEDICARE

## 2018-07-09 DIAGNOSIS — F52.21 ERECTILE DYSFUNCTION OF NONORGANIC ORIGIN: ICD-10-CM

## 2018-07-09 DIAGNOSIS — F34.1 DYSTHYMIC DISORDER: Chronic | ICD-10-CM

## 2018-07-09 PROCEDURE — 90834 PSYTX W PT 45 MINUTES: CPT | Performed by: PSYCHOLOGIST

## 2018-07-09 NOTE — BH THERAPY
[facility] Therapy Progress Note    Patient Name: Buster Medina  Patient MRN: [id number]  Today's Date: 7/9/2018     Type of session:Individual psychotherapy  Length of session: 50 minutes  Persons in attendance:Patient    Subjective/New Info:The patient ID/Chief Complaint:  The patient is a 67 year old male, , .  The patient self-referred and voluntarily presented for an individual intake to address concerns related to relationship problems, sleep problems, and erectile dysfunction. Reviewed limits of confidentiality and [facility] policies; patient expressed understanding and agreed to voluntarily proceed with the evaluation and treatment.    Interval History:   Patient continues to recognize historical  drama has played a big part in his relationships with others his current intimate relationship has little drama which he is finding challenging at times. He does recognize his desires to have an intimate relationship that is stable is more beneficial than the drama. Discussed with the patient ways in which you could obtain from another ways.  Patient reported that he met with his urologist who want to try a different injectable medication at the present time the patient wants to delay to he moves into his new home.     Therapeutic Intervention: Cognitive Behavioral Therapy    Planned Intervention:  Continue to focus on relationship issues and the patients schemas that interfere with his desires for stable interpersonal relationships with others.       Objective/Observations:Patient did not present in acute distress. Patient was appropriately groomed. Patient was alert and oriented x4. Eye contact was appropriate. No abnormalities in attention or concentration were noted. No abnormalities of movement present; psychomotor activity was normal. Speech was fluent and regular in rhythm, rate, volume, and tone. Thought processes were linear, logical, and  goal-directed. There was no evidence of thought disorder. No auditory or visual hallucinations. Long and short term memory appeared to be intact. Insight, judgment, and impulse control were deemed to be within normal limits.  Reported mood was “happy.” Affect was full-ranging and appropriate to thought content and conversation.  Patient denied past and current suicidal and homicidal ideation in plan, intent, and preparatory behavior.    Psychometric Test Results:       BHM-20  Global Mental Health  Within Normal Limits  Well Being Scale  Within Normal Limits  Symptoms Scale  Within Normal Limits  Anxiety Subscale  Within Normal Limits  Depression Subscale  Within Normal Limits  Alcohol/Drug Subscale Within Normal Limits  Bipolar Subscale  Within Normal Limits  Eating Disorder Subscale Within Normal Limits  Harm to other Subscale Within Normal Limits  Suicide Monitoring Scale No Indication of Risk  Life Functioning Scale   Within Normal Limits    Diagnoses:   1. Dysthymic disorder    2. Erectile dysfunction of nonorganic origin    RISK ASSESSMENT:   The patient denied any suicide-related ideation and/or behaviors and intent/plan, denied thoughts about death and dying both in session and during the period since last appointment, or past 2 weeks.    Risk Level: Not Currently at Clinically Significant Risk  Hospitalization is not deemed necessary at this time as the patient does not present a clear or imminent danger to self or others. No indication for pursuing higher level of care. Outpatient management is currently most appropriate and least restrictive level of care.      Current risk:   SUICIDE: Low   Homicide: Not applicable   Self-harm: Not applicable   Relapse: Not applicable   Other:    Safety Plan reviewed? No   If evidence of imminent risk is present, intervention/plan:         Treatment Goal(s)/Objective(s) addressed:      Progress toward Treatment Goals: Moderate improvement  Goal: Improve overall mood       Get 7-8 hours of restful sleep every night using sleep hygiene handout   Develop strategies for thought distraction when ruminating on the past    Plan:    1) Follow-up 2 weeks  2) Barriers to Learning:  No  3) Readiness to Learn:  Yes  4) Cultural Concerns:  No  5) Patient voiced understanding of, and agreement with, plan and goals as annotated above.  6) Declare these services are medically necessary and appropriate to the patient’s diagnosis and needs  7) The [facility] of contact [facility] the [facility] regarding this evaluation is Dr. San, Psychologist.    Geraldo San III, Ed.D.  7/9/2018

## 2018-07-11 DIAGNOSIS — R52 PAIN: ICD-10-CM

## 2018-07-12 RX ORDER — ATORVASTATIN CALCIUM 20 MG/1
20 TABLET, FILM COATED ORAL DAILY
Qty: 90 TAB | Refills: 0 | Status: SHIPPED | OUTPATIENT
Start: 2018-07-12 | End: 2018-07-22 | Stop reason: SDUPTHER

## 2018-07-12 RX ORDER — MIRTAZAPINE 15 MG/1
15 TABLET, FILM COATED ORAL
Qty: 90 TAB | Refills: 0 | Status: SHIPPED | OUTPATIENT
Start: 2018-07-12 | End: 2018-07-17

## 2018-07-12 RX ORDER — CELECOXIB 200 MG/1
200 CAPSULE ORAL DAILY
Qty: 90 CAP | Refills: 0 | Status: SHIPPED | OUTPATIENT
Start: 2018-07-12 | End: 2019-03-01

## 2018-07-13 ENCOUNTER — OFFICE VISIT (OUTPATIENT)
Dept: BEHAVIORAL HEALTH | Facility: PHYSICIAN GROUP | Age: 68
End: 2018-07-13
Payer: MEDICARE

## 2018-07-13 VITALS
WEIGHT: 190 LBS | OXYGEN SATURATION: 95 % | DIASTOLIC BLOOD PRESSURE: 72 MMHG | SYSTOLIC BLOOD PRESSURE: 138 MMHG | BODY MASS INDEX: 29.82 KG/M2 | HEART RATE: 85 BPM | HEIGHT: 67 IN

## 2018-07-13 DIAGNOSIS — F32.A DEPRESSION, UNSPECIFIED DEPRESSION TYPE: ICD-10-CM

## 2018-07-13 DIAGNOSIS — G47.00 INSOMNIA, UNSPECIFIED TYPE: ICD-10-CM

## 2018-07-13 PROCEDURE — 90792 PSYCH DIAG EVAL W/MED SRVCS: CPT | Performed by: NURSE PRACTITIONER

## 2018-07-13 RX ORDER — OXYCODONE HYDROCHLORIDE 5 MG/1
TABLET ORAL
Refills: 0 | COMMUNITY
Start: 2018-06-18 | End: 2018-09-07

## 2018-07-13 RX ORDER — POTASSIUM CHLORIDE 750 MG/1
10 TABLET, EXTENDED RELEASE ORAL
Refills: 0 | COMMUNITY
Start: 2018-06-25 | End: 2019-03-19 | Stop reason: SDUPTHER

## 2018-07-13 ASSESSMENT — PATIENT HEALTH QUESTIONNAIRE - PHQ9
8. MOVING OR SPEAKING SO SLOWLY THAT OTHER PEOPLE COULD HAVE NOTICED. OR THE OPPOSITE, BEING SO FIGETY OR RESTLESS THAT YOU HAVE BEEN MOVING AROUND A LOT MORE THAN USUAL: 0
6. FEELING BAD ABOUT YOURSELF - OR THAT YOU ARE A FAILURE OR HAVE LET YOURSELF OR YOUR FAMILY DOWN: 0
3. TROUBLE FALLING OR STAYING ASLEEP OR SLEEPING TOO MUCH: 1
SUM OF ALL RESPONSES TO PHQ9 QUESTIONS 1 AND 2: 0
5. POOR APPETITE OR OVEREATING: 2
9. THOUGHTS THAT YOU WOULD BE BETTER OFF DEAD, OR OF HURTING YOURSELF: 0
SUM OF ALL RESPONSES TO PHQ QUESTIONS 1-9: 4
2. FEELING DOWN, DEPRESSED, IRRITABLE, OR HOPELESS: 0
1. LITTLE INTEREST OR PLEASURE IN DOING THINGS: 0
4. FEELING TIRED OR HAVING LITTLE ENERGY: 1
7. TROUBLE CONCENTRATING ON THINGS, SUCH AS READING THE NEWSPAPER OR WATCHING TELEVISION: 0

## 2018-07-13 NOTE — PROGRESS NOTES
BEHAVIORAL HEALTH  INITIAL PSYCHIATRIC EVALUATION    Name: Buster Medina  MRN: [id number]  : 1950  Age: 68 y.o.  Date of assessment: 2018  PCP: Stu Bright M.D.  Persons in attendance:Patient  Total face-to-face time: 45 minutes    CHIEF COMPLAINT AND HISTORY OF PRESENTING PROBLEM:   (As stated by Patient)  Buster Medina is a 68 y.o., White male referred for assessment by No ref. provider found. Primary presenting issue includes   Chief Complaint   Patient presents with   • Anxiety       HISTORY OF PRESENT ILLNESS:    Patient complains of longstanding problems with sleep, falling asleep and staying asleep historically, right now is having more problems staying asleep. He currently is taking mirtazapine 15 mg hs, lorazepam 2mg hs and then throughout the night will take an additional Xanax 0.25 mg 2-3 times for times he wakes at night and can't fall back to sleep easily. He is unable to recall past medications he has tried to address his sleep, although he recalls that trazodone did not work (dose unknown). He has struggled in the past with depression that comes and goes, describes that he would feel down for a period of hours in the past, would isolate himself for awhile, then the feeling would pass. He has seen psychiatry in [place] for many years and is establishing treatment now that he lives in [place]. Today he is reporting he is not feeling down or sad, says things overall in his life are going fairly well although he does have some stress ongoing with his adult son's behavior/psych issues. Patient was unaware he is taking an antidepressant, says he was unaware he was still taking an antidepressant. He has in the past taken Wellbutrin for his depression, still has some on hand and he does periodically take it for a period of days when he feels he needs it. Denies suicide attempts or past psych hospitalizations. He endorses passive suicidal ideation at times in the past, no  thoughts of self harm or suicide now. Enjoys his activities, motivation is good right now, appetite is unaffected currently as well. He has no symptoms consistent with bipolar hypomania or mayco, no psychosis. Some anxiety, does not offer specific symptoms he has experienced and does not have history consistent with panic attacks. Denies past treatment for drug and alcohol use, is drinking wine on a daily basis and estimates 3 glasses daily. Uses THC on occasion, not daily use.     CURRENT PSYCHIATRIC MEDS:  Mirtazapine 15 mg hs  Lorazepam 2 mg hs  Xanax-old rx for 0.25 mg, often takes throughout the night to supplement his meds to stay asleep.     PAST PSYCHIATRIC MEDICATIONS TRIED:   Xanax  Trazodone-did not work  Believes he has tried taking Celexa, Prozac, effect unknown.  Cymbalta, Effexor-says they did not work  FAMILY/SOCIAL HISTORY:  Does patient/parent report a family history of behavioral health issues, diagnoses, or treatment? son has psychiatric problems  Family History   Problem Relation Age of Onset   • Cancer Mother    • Arthritis Mother    • Diabetes Mother    • Anxiety disorder Mother    • Lung Disease Father    • Cancer Father    • Diabetes Father    • Hypertension Father    • Hyperlipidemia Father    • Hyperlipidemia Brother    • Arthritis Brother    • No Known Problems Maternal Grandmother    • Cancer Maternal Grandfather    • No Known Problems Paternal Grandmother    • No Known Problems Paternal Grandfather    • Heart Disease Brother    • Hypertension Brother    • Depression Other    • Suicide Attempts Neg Hx    • Bipolar disorder Neg Hx    • Alcohol abuse Neg Hx    • Drug abuse Neg Hx      Marital status: , currently seeing a woman  Relevant family history/structure/dynamics: moved to [place] when he was a young adult, left his family who lived out [facility]. Mom was overbearing and particular. 2 adult children, daughter in [place] and son lives in [place] with his ex wife.    Quality/quantity of current family and/or social support: good  Social History     Social History   • Marital status: Single     Spouse name: N/A   • Number of children: N/A   • Years of education: N/A     Occupational History   • Not on file.     Social History Main Topics   • Smoking status: Never Smoker   • Smokeless tobacco: Never Used   • Alcohol use 9.0 oz/week     15 Glasses of wine per week      Comment: 2 per day   • Drug use: Yes     Types: Marijuana      Comment: marijuana occ   • Sexual activity: Not Currently     Other Topics Concern   • Not on file     Social History Narrative   • No narrative on file         EMPLOYMENT/RESOURCES  Is the patient currently employed? retired  History of employment problems?no  Does the patient/parent report adequate financial resources? Yes  Does patient identify impact of presenting issue on work functioning?n/a   Work or income-related stressors:  no     HISTORY:  Does patient report current or past enlistment? no   If yes, describe experiences of duty: n/a    SPIRITUAL/CULTURAL/IDENTITY:  What are the patient’s/family’s spiritual beliefs or practices? Catholic  What is the patient’s cultural or ethnic background/identity? Zoroastrian  How does the patient identify their gender? male  Does the patient identify any spiritual/cultural/identity factors as relevant to the presenting issue? none      PSYCHIATRIC TREATMENT HISTORY:  Does patient/parent report a history of outpatient psychiatric treatment for patient? Saw Dr. Garrick Del Valle in [place]       Does patient/parent report a history of psychiatric hospitalizations for patient? denies      Does patient/parent report a history of psychotherapy or other behavioral health treatment for patient?   yes        REVIEW OF SYSTEMS:      General appearance: casually dressed male, appears stated age of 68. No gross deformities noted.   Constitutional No fever/chills   Eyes Needs readers, no glaucoma   Ears/Nose/Mouth/Throat  Hearing intact, denies dental problems, no dentures   Cardiovascular History of hypertension, no arrhythmia or problems    Respiratory Sleep apnea, uses cpap 4-5 days/week.    Gastrointestinal No diarrhea or constipation. Had gastric bypass years ago   Genitourinary No difficult urinating or known kidney disease. TURP 2 years ago   Muscular/skeletal Arthritis, shoulders, hands, fingers. Takes leg cramps to relieve leg cramps   Integumentary Psoriasis, is taking Humira   Neurological No seizures or head injuries   Endocrine No diabetes or known thyroid disease   Hematologic/Lymphatic         Past Medical History:   Diagnosis Date   • Anxiety    • Arthritis     osteo   • Cancer (HCC) 2007    bladder   • Depression     depression   • High cholesterol    • Hyperlipidemia    • Hypertension     pt states  well controlled on meds   • Sleep apnea     uses cpap       LABS REVIEWED:     Medication Allergies  Patient has no known allergies.    Medications (non psychiatric)  Current Outpatient Prescriptions   Medication Sig Dispense Refill   • oxyCODONE immediate-release (ROXICODONE) 5 MG Tab TAKE 1 TAB BY MOUTH EVERY 4-6 HOURS AS NEEDED FOR PAIN  0   • potassium chloride SA (K-DUR) 10 MEQ Tab CR Take 10 mEq by mouth every day. TAKE 1 TAB BY MOUTH EVERY DAY.  0   • atorvastatin (LIPITOR) 20 MG Tab Take 1 Tab by mouth every day. 90 Tab 0   • celecoxib (CELEBREX) 200 MG Cap Take 1 Cap by mouth every day. 90 Cap 0   • mirtazapine (REMERON) 15 MG Tab Take 1 Tab by mouth every bedtime. 90 Tab 0   • lisinopril (PRINIVIL) 20 MG Tab TAKE 1 TAB BY MOUTH EVERY EVENING. 90 Tab 0   • lorazepam (ATIVAN) 2 MG tablet Take 1 Tab by mouth every bedtime for 90 days. 90 Tab 0   • potassium chloride ER (KLOR-CON 10) 10 MEQ tablet Take 1 Tab by mouth every day. 90 Tab 0   • Adalimumab (HUMIRA PEN-CROHNS STARTER) 40 MG/0.8ML Pen-injector Kit Inject 0.8 mL as instructed 1 time daily as needed.     • omeprazole (PRILOSEC) 20 MG delayed-release capsule  Take 20 mg by mouth every day.     • buPROPion (WELLBUTRIN SR) 200 MG SR tablet Take 200 mg by mouth every day.     • Multiple Vitamins-Minerals (MULTI COMPLETE PO) Take 1 Tab by mouth every day.     • vitamin D, Ergocalciferol, (DRISDOL) 04863 units Cap capsule Take 50,000 Units by mouth every 7 days.     • Multiple Vitamins-Minerals (MELODY-DAY 1000 PO) Take 1 Tab by mouth every day.     • Cyanocobalamin (B-12) 2500 MCG Tab Take 1 Tab by mouth every day.       No current facility-administered medications for this visit.        DEVELOPMENTAL HISTORY:  Delivery:Full term, breech presentation vaginal  Birth weight: unknown  Prenatal complications:  none   complications:  none   complications:  none  In utero substance exposure:  Mom likely drank and smoke cigarettes    Reached developmental milestones within normal limits?   Gross motor:  yes  Fine motor:  yes   Speech:  yes   Social interaction:  yes    EDUCATIONAL:  Highest level of education completed? Master's degree, attended [employer]  Typical grades received: reports he tested well in school  History of problems at school as child/adolescent: no  Is patient currently enrolled in a school/educational program? no      Psychiatric Review of Systems:   Mood: Other: depression comes and goes  Anxiety: Situational anxiety  Sleep: Other: see HPI  Psychomotor/Energy: Other: within normal limits  Eating/Appetite: Other: within normal limits  Cognitive: Other: within normal limits  Behavior: Other: denies risky behaviors related to gambling, sex   Psychosis: Other: none  Social: Other: gets along well with others  Sensory: Other: within normal limits         LEGAL HISTORY:  Has the patient ever been involved with juvenile, adult, or family legal systems? no      ABUSE/NEGLECT SCREENING:  Does patient report feeling “unsafe” in his/her home, or afraid of anyone? denies  Does patient report any history of physical, sexual, or emotional abuse? no      SAFETY  "ASSESSMENT - SELF:  Does patient acknowledge current or past symptoms of dangerousness to self? Past suicidal ideation, no current ideation or past attempts on his life. No self harming behaviors     History of suicide by family member: no  History of suicide by friend/significant other: no    Recent change in amount/specificity/intensity of suicidal thoughts or self-harm behavior?no  Current access to firearms, medications, or other identified means of suicide/self-harm? no  If yes, willing to restrict access to means of suicide/self-harm? n/a  Protective factors present: Reasons for living identified by patient: family    Current Suicide Risk: Low  Safety Plan completed, documented in chart, and copy given to patient: No     Crisis Safety Plan completed and copy given to patient: No     SAFETY ASSESSMENT - OTHERS:  Does patient acknowledge current or past symptoms of aggressive behavior or risk to others? none    Current Homicide Risk: no  Crisis safety Plan completed, documented in chart, and copy given to patient? no    Based on information provided during the current assessment, is a mandated \"duty to warn\" being exercised? no    SUBSTANCE USE/ADDICTION HISTORY:  [] Not applicable - patient 10 years of age or younger    Is there a family history of substance use/addiction? denies  Does patient acknowledge or parent/significant other report use of/dependence on substances? Drinking 3 glasses of wine daily  Last time patient used alcohol: yesterday  Within the past week? yes  Last time patient used marijuana: unknown  Within the past month? yes  Any other street drugs ever tried even once? no  Any use of prescription medications/pills without a prescription, or for reasons others than originally prescribed?  Currently using Xanax not as it was prescribed  Any other addictive behavior reported (gambling, shopping, sex)? no    Drug History:  Amphetamine:  Amphetamine frequency: Never used      Cannibis:  Cannabis " "frequency: 3 or more times/week      Cocaine:  Cocaine frequency:   Comments: in the past      Ecstasy:  Ecstasy frequency: Never used      Hallucinogen:  Hallucinogen frequency:   Comments: in the past LSD and PCP [facility]      Inhalant:       Opiate:  Cannabis frequency: 3 or more times/week      Other:      Sedative:         What consequences does the patient associate with any of the above substance use and or addictive behaviors?   None    Patient’s motivation/readiness for change: limited, says he likes wine    [] Patient denies use of any substance/addictive behaviors    STRENGTHS/ASSETS:  Strengths Identified by interviewer: Evidence of good judgement, Self-awareness and Sense of humor  Strengths identified by patient: social, able to talk to anyone no matter who they are    MENTAL STATUS EXAM/OBSERVATIONS  /72   Pulse 85   Ht 1.692 m (5' 6.61\")   Wt 86.2 kg (190 lb)   SpO2 95%   BMI 30.10 kg/m²   Participation: Active verbal participation  Grooming:  Casual and Neat  Orientation: Fully Oriented   Eye contact:  Good  Behavior: Calm   Mood:  Euthymic  Affect: Full range and Congruent with content  Thought process: Logical and Goal-directed  Thought content: Within normal limits  Speech: Rate within normal limits and Volume within normal limits  Perception: Within normal limits  Memory:  No gross evidence of memory deficits  Insight:  Adequate  Judgment: Good  Other:   Family/couple interaction observations: n/a    RESULTS OF SCREENING MEASURES:   Depression Screen (PHQ-2/PHQ-9) 9/21/2017 7/13/2018   PHQ-2 Total Score - 0   PHQ-2 Total Score 1 -   PHQ-9 Total Score - 4   PHQ-9 Total Score 2 -       Interpretation of PHQ-9 Total Score   Score Severity   1-4 No Depression   5-9 Mild Depression   10-14 Moderate Depression   15-19 Moderately Severe Depression   20-27 Severe Depression        CLINICAL FORMULATION: This patient does not present as clinically depressed today, somewhat poor historian " to his past symptoms of depression and past treatments for depression and cannot diagnose with MDD today based on what he has provided tome. Long term insomnia problems, is dependent on sedatives to help with sleep and provider has concerns of dependence and self medicating, drinking alcohol daily and does not always wear his CPAP.   Rule outs: sedative/hypnotic use disorder, alcohol use disorder, dysthymia.       DIAGNOSTIC IMPRESSION(S):  1. Insomnia, unspecified type    2. Depression, unspecified depression type         IDENTIFIED NEEDS/PLAN:   -reviewed with patient he is not presenting as depressed today, only with problems with sleep  -reviewed he needs to be using CPAP night for his sleep apnea, is not safe to be taking sedatives, drinking and not using CPAP or he is at risk of respiratory depression  -educated patient drinking 3 glasses of wine daily is not part of good sleep hygiene, needs to stop drinking alcohol as it can interfere with sleep and is not safe with his medications. Patient verbalizes he has heard this before from his previous psychiatrist.   -instructed to stop taking Xanax, no provider is going to prescribe him Xanax on top of 2 mg lorazepam at night for breakthrough waking. Reviewed need to get out of bed, relax, return to bed when sleepy again and basic sleep hygiene techniques.  -trial of reducing mirtazapine to 7.5 mg hs, may be more sedating for him at lower dose. Remind patient he is taking an antidepressant, needs to monitor for worsening mood or problems with the dose decrease.  -patient reminded that lorazepam and Xanax are controlled substances with potential of dependence/tolerance/abuse, and have been shown to contribute to memory problems in patients. Patient has supply on hand, no rx given today. Will need to be agreeable to stopping THC if he wants to get rx's from this department as we have controlled substance agreements.   -reviewed dangers of taking benzodiazepines and  opiates together, at risk of overdose or death.     Current Outpatient Prescriptions:   •  oxyCODONE immediate-release (ROXICODONE) 5 MG Tab, TAKE 1 TAB BY MOUTH EVERY 4-6 HOURS AS NEEDED FOR PAIN, Disp: , Rfl: 0  •  potassium chloride SA (K-DUR) 10 MEQ Tab CR, Take 10 mEq by mouth every day. TAKE 1 TAB BY MOUTH EVERY DAY., Disp: , Rfl: 0  •  atorvastatin (LIPITOR) 20 MG Tab, Take 1 Tab by mouth every day., Disp: 90 Tab, Rfl: 0  •  celecoxib (CELEBREX) 200 MG Cap, Take 1 Cap by mouth every day., Disp: 90 Cap, Rfl: 0  •  mirtazapine (REMERON) 15 MG Tab, Take 1 Tab by mouth every bedtime., Disp: 90 Tab, Rfl: 0  •  lisinopril (PRINIVIL) 20 MG Tab, TAKE 1 TAB BY MOUTH EVERY EVENING., Disp: 90 Tab, Rfl: 0  •  lorazepam (ATIVAN) 2 MG tablet, Take 1 Tab by mouth every bedtime for 90 days., Disp: 90 Tab, Rfl: 0  •  potassium chloride ER (KLOR-CON 10) 10 MEQ tablet, Take 1 Tab by mouth every day., Disp: 90 Tab, Rfl: 0  •  Adalimumab (HUMIRA PEN-CROHNS STARTER) 40 MG/0.8ML Pen-injector Kit, Inject 0.8 mL as instructed 1 time daily as needed., Disp: , Rfl:   •  omeprazole (PRILOSEC) 20 MG delayed-release capsule, Take 20 mg by mouth every day., Disp: , Rfl:   •  buPROPion (WELLBUTRIN SR) 200 MG SR tablet, Take 200 mg by mouth every day., Disp: , Rfl:   •  Multiple Vitamins-Minerals (MULTI COMPLETE PO), Take 1 Tab by mouth every day., Disp: , Rfl:   •  vitamin D, Ergocalciferol, (DRISDOL) 89206 units Cap capsule, Take 50,000 Units by mouth every 7 days., Disp: , Rfl:   •  Multiple Vitamins-Minerals (MELODY-DAY 1000 PO), Take 1 Tab by mouth every day., Disp: , Rfl:   •  Cyanocobalamin (B-12) 2500 MCG Tab, Take 1 Tab by mouth every day., Disp: , Rfl:       Referral appointment(s) scheduled? no    Will recheck in one month    DANIEL Gunderson.

## 2018-07-17 ENCOUNTER — DOCUMENTATION (OUTPATIENT)
Dept: BEHAVIORAL HEALTH | Facility: PHYSICIAN GROUP | Age: 68
End: 2018-07-17

## 2018-07-17 RX ORDER — MIRTAZAPINE 15 MG/1
7.5 TABLET, FILM COATED ORAL
Qty: 90 TAB | Refills: 0
Start: 2018-07-17 | End: 2019-01-09 | Stop reason: SDUPTHER

## 2018-07-24 RX ORDER — ATORVASTATIN CALCIUM 20 MG/1
20 TABLET, FILM COATED ORAL DAILY
Qty: 90 TAB | Refills: 0 | Status: SHIPPED | OUTPATIENT
Start: 2018-07-24 | End: 2018-09-21

## 2018-08-13 ENCOUNTER — APPOINTMENT (OUTPATIENT)
Dept: BEHAVIORAL HEALTH | Facility: PHYSICIAN GROUP | Age: 68
End: 2018-08-13
Payer: MEDICARE

## 2018-08-13 ENCOUNTER — PATIENT MESSAGE (OUTPATIENT)
Dept: BEHAVIORAL HEALTH | Facility: PHYSICIAN GROUP | Age: 68
End: 2018-08-13

## 2018-08-13 RX ORDER — MIRTAZAPINE 15 MG/1
7.5-15 TABLET, FILM COATED ORAL
Qty: 90 TAB | Refills: 0 | Status: SHIPPED | OUTPATIENT
Start: 2018-08-13 | End: 2018-09-07

## 2018-08-13 NOTE — TELEPHONE ENCOUNTER
Was the patient seen in the last year in this department? Yes    Does patient have an active prescription for medications requested? Yes    Received Request Via: Patient       FYI: [facility] pharmacy

## 2018-08-16 ENCOUNTER — HOSPITAL ENCOUNTER (OUTPATIENT)
Dept: LAB | Facility: MEDICAL CENTER | Age: 68
End: 2018-08-16
Attending: INTERNAL MEDICINE
Payer: MEDICARE

## 2018-08-16 ENCOUNTER — OFFICE VISIT (OUTPATIENT)
Dept: RHEUMATOLOGY | Facility: PHYSICIAN GROUP | Age: 68
End: 2018-08-16
Payer: MEDICARE

## 2018-08-16 VITALS
TEMPERATURE: 97.3 F | HEART RATE: 62 BPM | WEIGHT: 178 LBS | HEIGHT: 66 IN | SYSTOLIC BLOOD PRESSURE: 130 MMHG | BODY MASS INDEX: 28.61 KG/M2 | RESPIRATION RATE: 12 BRPM | OXYGEN SATURATION: 97 % | DIASTOLIC BLOOD PRESSURE: 68 MMHG

## 2018-08-16 DIAGNOSIS — I10 ESSENTIAL HYPERTENSION: ICD-10-CM

## 2018-08-16 DIAGNOSIS — Z79.1 NSAID LONG-TERM USE: ICD-10-CM

## 2018-08-16 DIAGNOSIS — Z79.620 ADALIMUMAB (HUMIRA) LONG-TERM USE: ICD-10-CM

## 2018-08-16 DIAGNOSIS — L40.50 PSORIATIC ARTHRITIS (HCC): ICD-10-CM

## 2018-08-16 DIAGNOSIS — Z85.51 PERSONAL HISTORY OF BLADDER CANCER: ICD-10-CM

## 2018-08-16 DIAGNOSIS — M62.838 MUSCLE SPASMS OF BOTH LOWER EXTREMITIES: ICD-10-CM

## 2018-08-16 DIAGNOSIS — Z98.84 H/O GASTRIC BYPASS: ICD-10-CM

## 2018-08-16 LAB
ALBUMIN SERPL BCP-MCNC: 4.2 G/DL (ref 3.2–4.9)
ALBUMIN/GLOB SERPL: 1.4 G/DL
ALP SERPL-CCNC: 63 U/L (ref 30–99)
ALT SERPL-CCNC: 35 U/L (ref 2–50)
ANION GAP SERPL CALC-SCNC: 9 MMOL/L (ref 0–11.9)
AST SERPL-CCNC: 39 U/L (ref 12–45)
BASOPHILS # BLD AUTO: 0.8 % (ref 0–1.8)
BASOPHILS # BLD: 0.04 K/UL (ref 0–0.12)
BILIRUB SERPL-MCNC: 0.4 MG/DL (ref 0.1–1.5)
BUN SERPL-MCNC: 24 MG/DL (ref 8–22)
CALCIUM SERPL-MCNC: 9.5 MG/DL (ref 8.5–10.5)
CHLORIDE SERPL-SCNC: 110 MMOL/L (ref 96–112)
CO2 SERPL-SCNC: 22 MMOL/L (ref 20–33)
CREAT SERPL-MCNC: 1.28 MG/DL (ref 0.5–1.4)
EOSINOPHIL # BLD AUTO: 0.07 K/UL (ref 0–0.51)
EOSINOPHIL NFR BLD: 1.5 % (ref 0–6.9)
ERYTHROCYTE [DISTWIDTH] IN BLOOD BY AUTOMATED COUNT: 42.5 FL (ref 35.9–50)
ERYTHROCYTE [SEDIMENTATION RATE] IN BLOOD BY WESTERGREN METHOD: 9 MM/HOUR (ref 0–20)
GLOBULIN SER CALC-MCNC: 3 G/DL (ref 1.9–3.5)
GLUCOSE SERPL-MCNC: 168 MG/DL (ref 65–99)
HBV CORE IGM SER QL: NEGATIVE
HBV SURFACE AG SER QL: NEGATIVE
HCT VFR BLD AUTO: 42.3 % (ref 42–52)
HCV AB SER QL: NEGATIVE
HGB BLD-MCNC: 14.5 G/DL (ref 14–18)
IMM GRANULOCYTES # BLD AUTO: 0.01 K/UL (ref 0–0.11)
IMM GRANULOCYTES NFR BLD AUTO: 0.2 % (ref 0–0.9)
LYMPHOCYTES # BLD AUTO: 1.27 K/UL (ref 1–4.8)
LYMPHOCYTES NFR BLD: 26.7 % (ref 22–41)
MCH RBC QN AUTO: 32.6 PG (ref 27–33)
MCHC RBC AUTO-ENTMCNC: 34.3 G/DL (ref 33.7–35.3)
MCV RBC AUTO: 95.1 FL (ref 81.4–97.8)
MONOCYTES # BLD AUTO: 0.33 K/UL (ref 0–0.85)
MONOCYTES NFR BLD AUTO: 6.9 % (ref 0–13.4)
NEUTROPHILS # BLD AUTO: 3.03 K/UL (ref 1.82–7.42)
NEUTROPHILS NFR BLD: 63.9 % (ref 44–72)
NRBC # BLD AUTO: 0 K/UL
NRBC BLD-RTO: 0 /100 WBC
PLATELET # BLD AUTO: 149 K/UL (ref 164–446)
PMV BLD AUTO: 12.5 FL (ref 9–12.9)
POTASSIUM SERPL-SCNC: 4.6 MMOL/L (ref 3.6–5.5)
PROT SERPL-MCNC: 7.2 G/DL (ref 6–8.2)
RBC # BLD AUTO: 4.45 M/UL (ref 4.7–6.1)
SODIUM SERPL-SCNC: 141 MMOL/L (ref 135–145)
URATE SERPL-MCNC: 8.5 MG/DL (ref 2.5–8.3)
WBC # BLD AUTO: 4.8 K/UL (ref 4.8–10.8)

## 2018-08-16 PROCEDURE — 86705 HEP B CORE ANTIBODY IGM: CPT

## 2018-08-16 PROCEDURE — 85652 RBC SED RATE AUTOMATED: CPT

## 2018-08-16 PROCEDURE — 36415 COLL VENOUS BLD VENIPUNCTURE: CPT

## 2018-08-16 PROCEDURE — 86480 TB TEST CELL IMMUN MEASURE: CPT

## 2018-08-16 PROCEDURE — 84550 ASSAY OF BLOOD/URIC ACID: CPT

## 2018-08-16 PROCEDURE — 86803 HEPATITIS C AB TEST: CPT

## 2018-08-16 PROCEDURE — 87340 HEPATITIS B SURFACE AG IA: CPT

## 2018-08-16 PROCEDURE — 80053 COMPREHEN METABOLIC PANEL: CPT

## 2018-08-16 PROCEDURE — 85025 COMPLETE CBC W/AUTO DIFF WBC: CPT

## 2018-08-16 PROCEDURE — 99204 OFFICE O/P NEW MOD 45 MIN: CPT | Performed by: INTERNAL MEDICINE

## 2018-08-16 RX ORDER — DIPHENOXYLATE HYDROCHLORIDE AND ATROPINE SULFATE 2.5; .025 MG/1; MG/1
1 TABLET ORAL 4 TIMES DAILY PRN
COMMUNITY
End: 2018-09-21

## 2018-08-16 RX ORDER — TIZANIDINE HYDROCHLORIDE 2 MG/1
CAPSULE, GELATIN COATED ORAL
Qty: 180 CAP | Refills: 0 | Status: SHIPPED | OUTPATIENT
Start: 2018-08-16 | End: 2018-09-07

## 2018-08-16 RX ORDER — ACYCLOVIR 200 MG/1
800 CAPSULE ORAL
COMMUNITY
End: 2019-03-01

## 2018-08-16 RX ORDER — FLUTICASONE PROPIONATE 50 MCG
1 SPRAY, SUSPENSION (ML) NASAL DAILY
COMMUNITY
End: 2018-09-21

## 2018-08-16 RX ORDER — TADALAFIL 5 MG/1
5 TABLET ORAL PRN
COMMUNITY
End: 2019-03-01

## 2018-08-16 NOTE — ASSESSMENT & PLAN NOTE
Patient referred for psa, patient previously dx about 2008 previously followed by Rheumatology in [place], currently on Humira 40 mg q2-3 weeks, patient on Humira for the last 10 years,  Patient denies any side effects from the medication, denies any unexplained weight loss, denies any fevers of unknown etiology, denies any GI upset, denies any rashes, denies any new joint swelling, denies recurrent infections.     Co morbidities include HTN, high cholesterol, hx left ahilles tendon rupture and repair, hx left knee arthroscopic surgery for meniscal tear, s/p left rotator cuff tear, s/p bladder cancer about 2012 no recurrence s/p surgical intervention only, hx of gastric bypass 2005 [place] [facility] [place], pt does f/u with Dr Gamez q year    Right TSA    S/p topical treatments    CCP neg 3/2018  RF neg 3/2018  SEAN neg 3/2018

## 2018-08-16 NOTE — PROGRESS NOTES
Chief Complaint-psoriatic arthritis    Chief Complaint   Patient presents with   • New Patient     uBster Medina is a 68 y.o. male here as a new Rheumatology Consult referred by Stu Bright M.D.  HPI:     Patient referred for psoriatic arthritis, patient previously dx about 2008 previously followed by Rheumatology in [place], currently on Humira 40 mg q2-3 weeks, patient on Humira for the last since about 2008,  Patient denies any side effects from the medication, denies any unexplained weight loss, denies any fevers of unknown etiology, denies any GI upset, denies any rashes, denies any new joint swelling, denies recurrent infections, denies any injection site reactions    Co morbidities include HTN, high cholesterol, hx left ahilles tendon rupture and repair, hx left knee arthroscopic surgery for meniscal tear, s/p left rotator cuff tear, s/p bladder cancer about 2012 no recurrence s/p surgical intervention only, hx of gastric bypass 2005 [facility], pt does f/u with Dr Gamez q year    Right TSA    S/p topical treatments    CCP neg 3/2018  RF neg 3/2018  SEAN neg 3/2018  Hand x-rays 3/2018-indicates possible erosion of the fourth metacarpal on the left hand, 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     • tizanidine (ZANAFLEX) 2 MG capsule 1-2 tabs po qhs for muscle spasms 180 Cap 0   • lorazepam (ATIVAN) 2 MG tablet Take 1 Tab by mouth every bedtime for 90 days. 90 Tab 0   • mirtazapine (REMERON) 15 MG Tab Take 0.5-1 Tabs by mouth every bedtime. 90 Tab 0   • atorvastatin (LIPITOR) 20 MG Tab Take 1 Tab by mouth every day. 90 Tab 0   • potassium chloride SA (K-DUR) 10 MEQ Tab CR TAKE 1 TAB BY MOUTH EVERY DAY. 90 Tab 0   • mirtazapine (REMERON) 15 MG Tab Take 0.5 Tabs by mouth  every bedtime. 90 Tab 0   • potassium chloride SA (K-DUR) 10 MEQ Tab CR Take 10 mEq by mouth every day. TAKE 1 TAB BY MOUTH EVERY DAY.  0   • celecoxib (CELEBREX) 200 MG Cap Take 1 Cap by mouth every day. 90 Cap 0   • lisinopril (PRINIVIL) 20 MG Tab TAKE 1 TAB BY MOUTH EVERY EVENING. 90 Tab 0   • potassium chloride ER (KLOR-CON 10) 10 MEQ tablet Take 1 Tab by mouth every day. 90 Tab 0   • Adalimumab (HUMIRA PEN-CROHNS STARTER) 40 MG/0.8ML Pen-injector Kit Inject 0.8 mL as instructed 1 time daily as needed.     • omeprazole (PRILOSEC) 20 MG delayed-release capsule Take 20 mg by mouth every day.     • Multiple Vitamins-Minerals (MULTI COMPLETE PO) Take 1 Tab by mouth every day.     • vitamin D, Ergocalciferol, (DRISDOL) 65002 units Cap capsule Take 50,000 Units by mouth every 7 days.     • Multiple Vitamins-Minerals (MELODY-DAY 1000 PO) Take 1 Tab by mouth every day.     • Cyanocobalamin (B-12) 2500 MCG Tab Take 1 Tab by mouth every day.     • diphenoxylate-atropine (LOMOTIL) 2.5-0.025 MG Tab Take 1 Tab by mouth 4 times a day as needed for Diarrhea.     • fluticasone (FLONASE) 50 MCG/ACT nasal spray Spray 1 Spray in nose every day.     • acyclovir (ZOVIRAX) 200 MG Cap Take 800 mg by mouth 5 Times a Day.     • oxyCODONE immediate-release (ROXICODONE) 5 MG Tab TAKE 1 TAB BY MOUTH EVERY 4-6 HOURS AS NEEDED FOR PAIN  0   • buPROPion (WELLBUTRIN SR) 200 MG SR tablet Take 200 mg by mouth every day.       No current facility-administered medications for this visit.      He  has a past medical history of Anxiety; Arthritis; Cancer (HCC) (2007); Depression; High cholesterol; Hyperlipidemia; Hypertension; and Sleep apnea.  He  has a past surgical history that includes bladder biopsy with cystoscopy; abdominal exploration; cholecystectomy; gastric bypass laparoscopic; eye surgery; hernia repair; athroplasty; open reduction; other orthopedic surgery (2015); bladder biopsy with cystoscopy (1/3/2018); retrogrades (Bilateral,  "1/3/2018); and pyelogram (Bilateral, 1/3/2018).  Family History   Problem Relation Age of Onset   • Cancer Mother    • Arthritis Mother    • Diabetes Mother    • Anxiety disorder Mother    • Lung Disease Father    • Cancer Father    • Diabetes Father    • Hypertension Father    • Hyperlipidemia Father    • Hyperlipidemia Brother    • Arthritis Brother    • No Known Problems Maternal Grandmother    • Cancer Maternal Grandfather    • No Known Problems Paternal Grandmother    • No Known Problems Paternal Grandfather    • Heart Disease Brother    • Hypertension Brother    • Depression Other    • Suicide Attempts Neg Hx    • Bipolar disorder Neg Hx    • Alcohol abuse Neg Hx    • Drug abuse Neg Hx      Family Status   Relation Status   • Mo    • Fa    • Bro Alive   • MGMo    • MGFa    • PGMo    • PGFa    • Bro Alive   • OTHER (Not Specified)   • Neg Hx (Not Specified)     Social History   Substance Use Topics   • Smoking status: Never Smoker   • Smokeless tobacco: Never Used   • Alcohol use 9.0 oz/week     15 Glasses of wine per week      Comment: 2 per day     Social History     Social History Narrative   • No narrative on file       ROS   Other than what is mentioned in HPI or physical exam, there is no history of headaches, double vision or blurred vision. No temporal tenderness or jaw claudication. No history of cataracts or glaucoma. No trouble swallowing difficulties or sore throats.  No chest complaints including chest pain, cough or sputum production. No GI complaints including nausea, vomiting, change in bowel habits, or past peptic ulcer disease. No history of blood in the stools. No urinary complaints including dysuria or frequency. No history of alopecia, photosensitivity, oral ulcerations, Raynaud's phenomena.       Objective:     Blood pressure 130/68, pulse 62, temperature 36.3 °C (97.3 °F), resp. rate 12, height 1.676 m (5' 6\"), weight 80.7 kg (178 lb), SpO2 " 97 %. Body mass index is 28.73 kg/m².  Physical Exam:    Constitutional: Alert and oriented X3, no distress.Skin: Warm, dry, good turgor, some mild psoriatic plaque on the extensor surface of the left knee.Eye: Equal, round and reactive, conjunctiva clear, lids normal EOM intactENMT: Lips without lesions, good dentition, no oropharyngeal ulcers, moist buccal mucosa, pinna without deformityNeck: Trachea midline, no masses, no thyromegaly.Lymph:  No cervical lymphadenopathy, no axillary lymphadenopathy, no supraclavicular lymphadenopathyRespiratory: Unlabored respiratory effort, lungs clear to auscultation, no wheezes, no ronchi.Cardiovascular: Normal S1, S2, no murmur, no edema.Abdomen: Soft, non-tender, no masses, no hepatosplenomegaly.Psych: Alert and oriented x3, normal affect and mood.Neuro Cranial nerves 2-12 are grossly intact, no loss of sensation LEExt:no joint laxity noted in bilateral arms, no joint laxity noted in bilateral legs, Heberden's nodes on most DIP joints of both hands, no dactylitis, no sausage digits, no swan-neck or boutonniere deformities, on the toes no evidence of splay toes or crossover toes, beginning a mild bunion on right great toe MTP joint, shoulders full range of motion without restriction, full internal and external rotation as well as abduction, elbows without flexion contractures, no nodules no tophi, mild thickening of the right Achilles tendon status post surgical intervention for tear, gait without antalgia and without foot drop, occiput to wall 0 cm Schoebers 10-14 cm.    I have personally and independently reviewed hand x-rays done March 20, 2018 and feet x-rays done March 20, 2018 with Buster Medina today at appointment      Lab Results   Component Value Date/Time    SODIUM 140 06/08/2018 09:04 AM    POTASSIUM 4.1 06/08/2018 09:04 AM    CHLORIDE 106 06/08/2018 09:04 AM    CO2 27 06/08/2018 09:04 AM    GLUCOSE 95 06/08/2018 09:04 AM    BUN 17 06/08/2018 09:04 AM     CREATININE 1.00 06/08/2018 09:04 AM      Lab Results   Component Value Date/Time    WBC 6.0 06/08/2018 09:04 AM    RBC 4.50 (L) 06/08/2018 09:04 AM    HEMOGLOBIN 14.7 06/08/2018 09:04 AM    HEMATOCRIT 43.0 06/08/2018 09:04 AM    MCV 95.6 06/08/2018 09:04 AM    MCH 32.7 06/08/2018 09:04 AM    MCHC 34.2 06/08/2018 09:04 AM    MPV 12.5 06/08/2018 09:04 AM    NEUTSPOLYS 46.30 06/08/2018 09:04 AM    LYMPHOCYTES 40.40 06/08/2018 09:04 AM    MONOCYTES 10.70 06/08/2018 09:04 AM    EOSINOPHILS 1.50 06/08/2018 09:04 AM    BASOPHILS 0.80 06/08/2018 09:04 AM      Lab Results   Component Value Date/Time    CALCIUM 9.7 06/08/2018 09:04 AM    ASTSGOT 31 06/08/2018 09:04 AM    ALTSGPT 40 06/08/2018 09:04 AM    ALKPHOSPHAT 56 06/08/2018 09:04 AM    TBILIRUBIN 0.7 06/08/2018 09:04 AM    ALBUMIN 4.2 06/08/2018 09:04 AM    TOTPROTEIN 7.1 06/08/2018 09:04 AM     Lab Results   Component Value Date/Time    RHEUMFACTN <10 03/21/2018 02:19 PM    CCPANTIBODY 4 03/21/2018 02:20 PM    ANTINUCAB None Detected 03/21/2018 02:19 PM     Lab Results   Component Value Date/Time    COLORURINE DK Yellow 12/29/2017 11:45 AM    SPECGRAVITY 1.021 12/29/2017 11:45 AM    PHURINE 5.5 12/29/2017 11:45 AM    GLUCOSEUR Negative 12/29/2017 11:45 AM    KETONES Negative 12/29/2017 11:45 AM    PROTEINURIN Negative 12/29/2017 11:45 AM     Lab Results   Component Value Date/Time    25HYDROXY 99 11/01/2017 08:13 AM     Results for orders placed during the hospital encounter of 03/20/18   DX-FOOT-COMPLETE 3+ LEFT    Impression 1.  There is no radiographic evidence of inflammatory arthropathy.  2.  There is mild degenerative change at the 1st tarsometatarsal joint and tibiotalar joint.  3.  Question of subchondral cyst versus early erosive change aat the base of the 1st proximal phalanx.     Results for orders placed during the hospital encounter of 03/20/18   DX-HAND 3+ LEFT    Impression 1.  There is a questionable erosion versus subchondral cyst on the ulnar aspect  of the base of the left 4th metacarpal.  2.  There is postoperative change and degenerative change involving the 1st and 2nd carpometacarpal junctions. There is a question of previous trapezium resection.      Assessment and Plan:  1. Psoriatic arthritis (HCC)  Clinically doing quite well on Humira at 40 mg every 2-3 weeks, we will continue with such  - HEP B CORE AB IGM  - HEP B SURFACE ANTIGEN; Future  - HEP C VIRUS ANTIBODY; Future  - URIC ACID; Future  - WESTERGREN SED RATE; Future  - TB TEST CELL IMM MEASURE AG (QUANTIFERON); Future  - tizanidine (ZANAFLEX) 2 MG capsule; 1-2 tabs po qhs for muscle spasms  Dispense: 180 Cap; Refill: 0  - CBC WITH DIFFERENTIAL; Future  - COMP METABOLIC PANEL; Future  - WESTERGREN SED RATE; Future    2. Adalimumab (Humira) long-term use  We will need to check hepatitis B, hepatitis C and QuantiFERON gold  Last monitoring labs done June 2018 next monitoring labs due December 2018, we have ordered those for patient  - HEP B CORE AB IGM  - HEP B SURFACE ANTIGEN; Future  - HEP C VIRUS ANTIBODY; Future  - URIC ACID; Future  - WESTERGREN SED RATE; Future  - TB TEST CELL IMM MEASURE AG (QUANTIFERON); Future  - tizanidine (ZANAFLEX) 2 MG capsule; 1-2 tabs po qhs for muscle spasms  Dispense: 180 Cap; Refill: 0  - CBC WITH DIFFERENTIAL; Future  - COMP METABOLIC PANEL; Future  - WESTERGREN SED RATE; Future    3. NSAID long-term use  Currently on Celebrex, will need labs every 6 months to monitor next labs due December 2018  - CBC WITH DIFFERENTIAL; Future  - COMP METABOLIC PANEL; Future  - WESTERGREN SED RATE; Future    4. Muscle spasms of both lower extremities  Possibly secondary to low back issues, do trial tizanidine 2-4 mg p.o. nightly as needed  - HEP B CORE AB IGM  - HEP B SURFACE ANTIGEN; Future  - HEP C VIRUS ANTIBODY; Future  - URIC ACID; Future  - WESTERGREN SED RATE; Future  - TB TEST CELL IMM MEASURE AG (QUANTIFERON); Future  - tizanidine (ZANAFLEX) 2 MG capsule; 1-2 tabs po qhs  for muscle spasms  Dispense: 180 Cap; Refill: 0  - CBC WITH DIFFERENTIAL; Future  - COMP METABOLIC PANEL; Future  - WESTERGREN SED RATE; Future    5. Essential hypertension  May impact the type of medications we can use for this patient's arthritis. We will have to keep this under advisement.  - HEP B CORE AB IGM  - HEP B SURFACE ANTIGEN; Future  - HEP C VIRUS ANTIBODY; Future  - URIC ACID; Future  - WESTERGREN SED RATE; Future  - TB TEST CELL IMM MEASURE AG (QUANTIFERON); Future  - tizanidine (ZANAFLEX) 2 MG capsule; 1-2 tabs po qhs for muscle spasms  Dispense: 180 Cap; Refill: 0  - CBC WITH DIFFERENTIAL; Future  - COMP METABOLIC PANEL; Future  - WESTERGREN SED RATE; Future    6. Hx of gastric bypass  May impact the type of medications we can use for this patient's arthritis. We will have to keep this under advisement.  Patient may have issues of malabsorption follows with Dr. cline q. Year    7. Hx of bladder cancer  This was in the year 2012, over 5 years ago okay to continue Humira, patient is continued monitored by his urologist    Records requested.  Followup: Return in about 6 months (around 2/16/2019). or sooner prn  Patient was seen 75 minutes face-to-face (excluding time for procedures)  of which more than 50% the time was spent counseling the patient regarding  rheumatological conditions and care. Therapy was discussed in detail.  Thank you for this referral.    Please note that this dictation was created using voice recognition software. I have made every reasonable attempt to correct obvious errors, but I expect that there are errors of grammar and possibly content that I did not discover before finalizing the note.

## 2018-08-17 ENCOUNTER — OFFICE VISIT (OUTPATIENT)
Dept: BEHAVIORAL HEALTH | Facility: PHYSICIAN GROUP | Age: 68
End: 2018-08-17
Payer: MEDICARE

## 2018-08-17 DIAGNOSIS — F52.21 ERECTILE DYSFUNCTION OF NONORGANIC ORIGIN: ICD-10-CM

## 2018-08-17 DIAGNOSIS — F34.1 DYSTHYMIC DISORDER: Chronic | ICD-10-CM

## 2018-08-17 PROCEDURE — 90834 PSYTX W PT 45 MINUTES: CPT | Performed by: PSYCHOLOGIST

## 2018-08-18 LAB
M TB TUBERC IFN-G BLD QL: NEGATIVE
M TB TUBERC IFN-G/MITOGEN IGNF BLD: 0.01
M TB TUBERC IGNF/MITOGEN IGNF CONTROL: 38.85 [IU]/ML
MITOGEN IGNF BCKGRD COR BLD-ACNC: 0.03 [IU]/ML

## 2018-08-20 NOTE — BH THERAPY
[facility] Progress Note    Patient Name: Buster Medina  Patient MRN: [id number]  Today's Date: 7/9/2018     Type of session:Individual psychotherapy  Length of session: 50 minutes  Persons in attendance:Patient    Subjective/New Info:The patient ID/Chief Complaint:  The patient is a 67 year old male, , .  The patient self-referred and voluntarily presented for an individual intake to address concerns related to relationship problems, sleep problems, and erectile dysfunction. Reviewed limits of confidentiality and [facility] policies; patient expressed understanding and agreed to voluntarily proceed with the evaluation and treatment.    Interval History: The patient's estimated global assessment of functioning indicates a mild level of difficulty with some problems in relationships, work, or school functioning. The patient is nonetheless functioning well and has some significant relationships. Patient was upset about an interaction with his son about taking money from him. He reports this relationship is contained to develop but they’re starting to be some difficulties surrounding him feeling like he doesn’t have his own personal space. He indicates that he has not discuss these concerns with his girlfriend and was encouraged to do so. The main therapeutic interventions consisted of cognitive-behavioral techniques; an emphasis on self-monitoring of thoughts, emotions and behavior and Socratic questioning.    Therapeutic Intervention: Cognitive Behavioral Therapy    Planned Intervention:  Continue to focus on relationship issues and the patients schemas that interfere with his desires for stable interpersonal relationships with others.       Objective/Observations:Patient did not present in acute distress. Patient was appropriately groomed. Patient was alert and oriented x4. Eye contact was appropriate. No abnormalities in attention or concentration were  noted. No abnormalities of movement present; psychomotor activity was normal. Speech was fluent and regular in rhythm, rate, volume, and tone. Thought processes were linear, logical, and goal-directed. There was no evidence of thought disorder. No auditory or visual hallucinations. Long and short term memory appeared to be intact. Insight, judgment, and impulse control were deemed to be within normal limits.  Reported mood was “neutral.” Affect was full-ranging and appropriate to thought content and conversation.  Patient denied past and current suicidal and homicidal ideation in plan, intent, and preparatory behavior.    Psychometric Test Results:       BHM-20  Global Mental Health  Within Normal Limits  Well Being Scale  Within Normal Limits  Symptoms Scale  Within Normal Limits  Anxiety Subscale  Within Normal Limits  Depression Subscale  Within Normal Limits  Alcohol/Drug Subscale Within Normal Limits  Bipolar Subscale  Within Normal Limits  Eating Disorder Subscale Within Normal Limits  Harm to other Subscale Within Normal Limits  Suicide Monitoring Scale No Indication of Risk  Life Functioning Scale   Within Normal Limits    Diagnoses:   1. Dysthymic disorder    2. Erectile dysfunction of nonorganic origin    RISK ASSESSMENT:   The patient denied any suicide-related ideation and/or behaviors and intent/plan, denied thoughts about death and dying both in session and during the period since last appointment, or past 2 weeks.    Risk Level: Not Currently at Clinically Significant Risk  Hospitalization is not deemed necessary at this time as the patient does not present a clear or imminent danger to self or others. No indication for pursuing higher level of care. Outpatient management is currently most appropriate and least restrictive level of care.      Current risk:   SUICIDE: Low   Homicide: Not applicable   Self-harm: Not applicable   Relapse: Not applicable   Other:    Safety Plan reviewed? No   If evidence  of imminent risk is present, intervention/plan:         Treatment Goal(s)/Objective(s) addressed:      Progress toward Treatment Goals: Moderate improvement  Goal: Improve overall mood      Get 7-8 hours of restful sleep every night using sleep hygiene handout   Develop strategies for thought distraction when ruminating on the past    Plan:    1) Follow-up 2 weeks  2) Barriers to Learning:  No  3) Readiness to Learn:  Yes  4) Cultural Concerns:  No  5) Patient voiced understanding of, and agreement with, plan and goals as annotated above.  6) Declare these services are medically necessary and appropriate to the patient’s diagnosis and needs  7) The point of contact at the [facility] regarding this evaluation is Dr. Sna, Psychologist.    Geraldo San III, EdRobertaD.  7/9/2018

## 2018-08-21 RX ORDER — LISINOPRIL 20 MG/1
TABLET ORAL
Qty: 90 TAB | Refills: 0 | Status: SHIPPED | OUTPATIENT
Start: 2018-08-21 | End: 2018-11-08 | Stop reason: SDUPTHER

## 2018-08-22 ENCOUNTER — OFFICE VISIT (OUTPATIENT)
Dept: BEHAVIORAL HEALTH | Facility: PHYSICIAN GROUP | Age: 68
End: 2018-08-22
Payer: MEDICARE

## 2018-08-22 DIAGNOSIS — F34.1 DYSTHYMIC DISORDER: Chronic | ICD-10-CM

## 2018-08-22 DIAGNOSIS — F52.21 ERECTILE DYSFUNCTION OF NONORGANIC ORIGIN: ICD-10-CM

## 2018-08-22 PROCEDURE — 90834 PSYTX W PT 45 MINUTES: CPT | Performed by: PSYCHOLOGIST

## 2018-08-22 NOTE — BH THERAPY
[facility] Progress Note    Patient Name: Buster Medina  Patient MRN: [id number]  Today's Date: 8/22/2018     Type of session:Individual psychotherapy  Length of session: 50 minutes  Persons in attendance:Patient    Subjective/New Info:The patient ID/Chief Complaint:  The patient is a 68 year old male, , .  The patient self-referred and voluntarily presented for an individual intake to address concerns related to relationship problems, sleep problems, and erectile dysfunction. Reviewed limits of confidentiality and [facility] policies; patient expressed understanding and agreed to voluntarily proceed with the evaluation and treatment.    Interval History: The patient's estimated global assessment of functioning indicates a mild level of difficulty with some problems in relationships, work, or school functioning. The patient is nonetheless functioning well and has some significant relationships. The patient reports increased number of stressors associated with the move resulting in greater irritability. Encourage the patient to examine the situation in order to develop alternative thoughts associated with the irritability.   The main therapeutic interventions consisted of cognitive-behavioral techniques; an emphasis on self-monitoring of thoughts, emotions and behavior and Socratic questioning.    Therapeutic Intervention: Cognitive Behavioral Therapy    Planned Intervention:  Continue to focus on relationship issues and the patients schemas that interfere with his desires for stable interpersonal relationships with others.       Objective/Observations:Patient did not present in acute distress. Patient was appropriately groomed. Patient was alert and oriented x4. Eye contact was appropriate. No abnormalities in attention or concentration were noted. No abnormalities of movement present; psychomotor activity was normal. Speech was fluent and regular in  rhythm, rate, volume, and tone. Thought processes were linear, logical, and goal-directed. There was no evidence of thought disorder. No auditory or visual hallucinations. Long and short term memory appeared to be intact. Insight, judgment, and impulse control were deemed to be within normal limits.  Reported mood was “neutral.” Affect was full-ranging and appropriate to thought content and conversation.  Patient denied past and current suicidal and homicidal ideation in plan, intent, and preparatory behavior.    Psychometric Test Results:       BHM-20  Global Mental Health  Within Normal Limits  Well Being Scale  Within Normal Limits  Symptoms Scale  Within Normal Limits  Anxiety Subscale  Within Normal Limits  Depression Subscale  Within Normal Limits  Alcohol/Drug Subscale Within Normal Limits  Bipolar Subscale  Within Normal Limits  Eating Disorder Subscale Within Normal Limits  Harm to other Subscale Within Normal Limits  Suicide Monitoring Scale No Indication of Risk  Life Functioning Scale   Within Normal Limits    Diagnoses:   1. Dysthymic disorder    2. Erectile dysfunction of nonorganic origin    RISK ASSESSMENT:   The patient denied any suicide-related ideation and/or behaviors and intent/plan, denied thoughts about death and dying both in session and during the period since last appointment, or past 2 weeks.    Risk Level: Not Currently at Clinically Significant Risk  Hospitalization is not deemed necessary at this time as the patient does not present a clear or imminent danger to self or others. No indication for pursuing higher level of care. Outpatient management is currently most appropriate and least restrictive level of care.      Current risk:   SUICIDE: Low   Homicide: Not applicable   Self-harm: Not applicable   Relapse: Not applicable   Other:    Safety Plan reviewed? No   If evidence of imminent risk is present, intervention/plan:         Treatment Goal(s)/Objective(s) addressed:      Progress  toward Treatment Goals: Moderate improvement  Goal: Improve overall mood      Get 7-8 hours of restful sleep every night using sleep hygiene handout   Develop strategies for thought distraction when ruminating on the past    Plan:    1) Follow-up 2 weeks  2) Barriers to Learning:  No  3) Readiness to Learn:  Yes  4) Cultural Concerns:  No  5) Patient voiced understanding of, and agreement with, plan and goals as annotated above.  6) Declare these services are medically necessary and appropriate to the patient’s diagnosis and needs  7) The point of contact at the [facility] regarding this evaluation is Dr. San, Psychologist.    Geraldo San III, Ed.D.  8/22/2018

## 2018-08-30 DIAGNOSIS — Z82.49 FAMILY HISTORY OF EARLY CAD: ICD-10-CM

## 2018-09-07 ENCOUNTER — OFFICE VISIT (OUTPATIENT)
Dept: BEHAVIORAL HEALTH | Facility: PHYSICIAN GROUP | Age: 68
End: 2018-09-07
Payer: MEDICARE

## 2018-09-07 DIAGNOSIS — F51.01 PRIMARY INSOMNIA: ICD-10-CM

## 2018-09-07 DIAGNOSIS — F34.1 DYSTHYMIC DISORDER: Chronic | ICD-10-CM

## 2018-09-07 PROCEDURE — 99213 OFFICE O/P EST LOW 20 MIN: CPT | Performed by: NURSE PRACTITIONER

## 2018-09-07 NOTE — PROGRESS NOTES
[facility]  PSYCHIATRIC FOLLOW-UP NOTE    Name: Buster Medina  MRN: [id number]  : 1950  Age: 68 y.o.  Date of assessment: 2018  PCP: Stu Bright M.D.  Persons in attendance: Patient  Total face-to-face time: 10 minutes    REASON FOR VISIT/CHIEF COMPLAINT (as stated by Patient):  Buster Medina is a 68 y.o., White male, attending follow-up appointment for recheck of problems with sleep and mood.      HISTORY OF PRESENT ILLNESS:    Patient denies feeling down or depressed, did notice some improvement in sleep with the dose decrease of mirtazapine. He is continuing to take lorazepam 2 mg hs, does have an occasional night of not sleeping well. He has not stopped drinking as was previously discussed, likes wine and does not commit to reducing his alcohol despite being told by more than one provider it is not only not safe with his medication, but interferes with sleep. Denies thoughts of suicide.     PSYCHOSOCIAL CHANGES SINCE PREVIOUS CONTACT:  Drinking 2 glasses of wine in the evenings, uses THC occasionally, less than weekly.       MEDICATION SIDE EFFECTS: none    REVIEW OF SYSTEMS:   General appearance: casually dressed male, good grooming/hygiene.      Constitutional negative - no fever/chills   Eyes not tested   Ears/Nose/Mouth/Throat not tested   Cardiovascular not tested   Respiratory Reports using his CPAP 80% of the time   Gastrointestinal negative   Genitourinary not tested   Muscular not tested   Integumentary not tested   Neurological negative   Endocrine not tested   Hematologic/Lymphatic not tested       PSYCHIATRIC EXAMINATION/MENTAL STATUS  There were no vitals taken for this visit.  Participation: Active verbal participation and Attentive  Grooming:Casual and Neat  Orientation: Fully Oriented  Eye contact: Good  Behavior:Calm   Mood: Euthymic  Affect: Congruent with content  Thought process: Logical and Goal-directed  Thought content:  Within normal  limits  Speech: Rate within normal limits and Volume within normal limits  Perception:  Within normal limits  Memory:  No gross evidence of memory deficits  Insight: Adequate  Judgment: Adequate  Family/couple interaction observations:   Other:    Current risk:    Suicide: Low   Homicide: Not applicable   Self-harm: Not applicable  Relapse: Not applicable  Other:   Crisis Safety Plan reviewed?No  If evidence of imminent risk is present, intervention/plan:    Medical Records/Labs/Diagnostic Tests Reviewed: none    Medical Records/Labs/Diagnostic Tests Ordered: none    DIAGNOSTIC IMPRESSION(S):  1. Dysthymic disorder    2. Primary insomnia           ASSESSMENT AND PLAN:  Mood is euthymic, continues to depend on sedatives for sleep and has not cut back on drinking as advised. He will continue seeing PCP for medication, have discussed controlled substance agreement and using lorazepam with THC and drinking is not advised and he has not been given an rx from this provider. Recommend he continue with mirtazapine 7.5 mg hs.       Current Outpatient Prescriptions:   •  lisinopril (PRINIVIL) 20 MG Tab, TAKE 1 TABLET BY MOUTH EVERY DAY IN THE EVENING, Disp: 90 Tab, Rfl: 0  •  tadalafil (CIALIS) 5 MG tablet, Take 5 mg by mouth as needed for Erectile Dysfunction., Disp: , Rfl:   •  atorvastatin (LIPITOR) 20 MG Tab, Take 1 Tab by mouth every day., Disp: 90 Tab, Rfl: 0  •  mirtazapine (REMERON) 15 MG Tab, Take 0.5 Tabs by mouth every bedtime., Disp: 90 Tab, Rfl: 0  •  potassium chloride SA (K-DUR) 10 MEQ Tab CR, Take 10 mEq by mouth every day. TAKE 1 TAB BY MOUTH EVERY DAY., Disp: , Rfl: 0  •  celecoxib (CELEBREX) 200 MG Cap, Take 1 Cap by mouth every day., Disp: 90 Cap, Rfl: 0  •  Adalimumab (HUMIRA PEN-CROHNS STARTER) 40 MG/0.8ML Pen-injector Kit, Inject 0.8 mL as instructed 1 time daily as needed., Disp: , Rfl:   •  omeprazole (PRILOSEC) 20 MG delayed-release capsule, Take 20 mg by mouth every day., Disp: , Rfl:   •  Multiple  Vitamins-Minerals (MULTI COMPLETE PO), Take 1 Tab by mouth every day., Disp: , Rfl:   •  Cyanocobalamin (B-12) 2500 MCG Tab, Take 1 Tab by mouth every day., Disp: , Rfl:   •  diphenoxylate-atropine (LOMOTIL) 2.5-0.025 MG Tab, Take 1 Tab by mouth 4 times a day as needed for Diarrhea., Disp: , Rfl:   •  fluticasone (FLONASE) 50 MCG/ACT nasal spray, Spray 1 Spray in nose every day., Disp: , Rfl:   •  acyclovir (ZOVIRAX) 200 MG Cap, Take 800 mg by mouth 5 Times a Day., Disp: , Rfl:   •  lorazepam (ATIVAN) 2 MG tablet, Take 1 Tab by mouth every bedtime for 90 days., Disp: 90 Tab, Rfl: 0  •  vitamin D, Ergocalciferol, (DRISDOL) 06916 units Cap capsule, Take 50,000 Units by mouth every 7 days., Disp: , Rfl:   •  Multiple Vitamins-Minerals (MELODY-DAY 1000 PO), Take 1 Tab by mouth every day., Disp: , Rfl:     Recheck as needed    JOSEPH Gunderson

## 2018-09-12 ENCOUNTER — TELEPHONE (OUTPATIENT)
Dept: MEDICAL GROUP | Age: 68
End: 2018-09-12

## 2018-09-12 DIAGNOSIS — Z23 NEED FOR VACCINATION: ICD-10-CM

## 2018-09-12 NOTE — TELEPHONE ENCOUNTER
Patient is on the MA Schedule tomorrow for Flu HD vaccine/injection.    SPECIFIC Action To Be Taken: Orders pending, please sign.

## 2018-09-14 ENCOUNTER — APPOINTMENT (OUTPATIENT)
Dept: MEDICAL GROUP | Age: 68
End: 2018-09-14
Payer: MEDICARE

## 2018-09-18 ENCOUNTER — NON-PROVIDER VISIT (OUTPATIENT)
Dept: MEDICAL GROUP | Age: 68
End: 2018-09-18
Payer: MEDICARE

## 2018-09-18 ENCOUNTER — OFFICE VISIT (OUTPATIENT)
Dept: BEHAVIORAL HEALTH | Facility: PHYSICIAN GROUP | Age: 68
End: 2018-09-18
Payer: MEDICARE

## 2018-09-18 DIAGNOSIS — F32.0 CURRENT MILD EPISODE OF MAJOR DEPRESSIVE DISORDER WITHOUT PRIOR EPISODE (HCC): ICD-10-CM

## 2018-09-18 DIAGNOSIS — F52.21 ERECTILE DYSFUNCTION OF NONORGANIC ORIGIN: ICD-10-CM

## 2018-09-18 DIAGNOSIS — Z23 NEED FOR VACCINATION: ICD-10-CM

## 2018-09-18 DIAGNOSIS — F34.1 DYSTHYMIC DISORDER: Chronic | ICD-10-CM

## 2018-09-18 PROCEDURE — 90834 PSYTX W PT 45 MINUTES: CPT | Performed by: PSYCHOLOGIST

## 2018-09-18 RX ORDER — OMEPRAZOLE 20 MG/1
20 CAPSULE, DELAYED RELEASE ORAL DAILY
Qty: 90 CAP | Refills: 4 | Status: SHIPPED | OUTPATIENT
Start: 2018-09-18 | End: 2019-03-01

## 2018-09-18 NOTE — PROGRESS NOTES
"Buster Medina is a 68 y.o. male here for a non-provider visit for:   FLU    Reason for immunization: Annual Flu Vaccine  Immunization records indicate need for vaccine:Yes confirmed with Epic  Minimum interval has been met for this vaccine: Yes  ABN completed: Not Indicated    Order and dose verified by: LENARD SNYDER Dated  2015 was given to patient: Yes  All IAC Questionnaire questions were answered \"No.\"    Patient tolerated injection and no adverse effects were observed or reported: Yes    Pt scheduled for next dose in series: Not Indicated    "

## 2018-09-18 NOTE — BH THERAPY
[facility] Therapy Progress Note    Patient Name: Buster Medina  Patient MRN: [id number]  Today's Date: 9/18/2018     Type of session:Individual psychotherapy  Length of session: 50 minutes  Persons in attendance:Patient    Subjective/New Info:The patient ID/Chief Complaint:  The patient is a 68 year old male, , .  The patient self-referred and voluntarily presented for an individual intake to address concerns related to relationship problems, sleep problems, and erectile dysfunction. Reviewed limits of confidentiality and [facility] policies; patient expressed understanding and agreed to voluntarily proceed with the evaluation and treatment.    Interval History: The patient's estimated global assessment of functioning indicates a mild level of difficulty with some problems in relationships, work, or school functioning. The patient is nonetheless functioning well and has some significant relationships. Patient reports continued improvement in the functioning of his son and his ability to identify and reinforce appropriate prosocial behaviors with better results. Patient also indicated that his relationship is going well but feels that the “spark” may be missing discussed with the patient the role of a “spark” and how one of the difficulties had in the past related to “bishop” is the drama associated with the physical “spark.” Discussed with the patient ways to identify pre-spark behavior and to discuss these issues with his partner. The main therapeutic interventions consisted of cognitive-behavioral techniques; an emphasis on self-monitoring of thoughts, emotions and behavior and Socratic questioning.    Therapeutic Intervention: Cognitive Behavioral Therapy    Planned Intervention:  Continue to focus on relationship issues and the patients schemas that interfere with his desires for stable interpersonal relationships with others.        Objective/Observations:    Patient did not present in acute distress. Patient was appropriately groomed. Patient was alert and oriented x4. Eye contact was appropriate. No abnormalities in attention or concentration were noted. No abnormalities of movement present; psychomotor activity was normal. Speech was fluent and regular in rhythm, rate, volume, and tone. Thought processes Linear, Logical and Goal Directed. There was no evidence of thought disorder. No auditory or visual hallucinations. Long and short term memory appeared to be intact. Insight, judgment, and impulse control were deemed to be good.  Reported mood was “happy.” Affect was full-ranging and appropriate to thought content and conversation.  Patient denied past and current suicidal and homicidal ideation in plan, intent, and preparatory behavior.    Diagnoses:   1. Dysthymic disorder    2. Current mild episode of major depressive disorder without prior episode (HCC)    3. Erectile dysfunction of nonorganic origin    RISK ASSESSMENT:   The patient denied any suicide-related ideation and/or behaviors and intent/plan, denied thoughts about death and dying both in session and during the period since last appointment, or past 2 weeks.    Risk Level: Not Currently at Clinically Significant Risk  Hospitalization is not deemed necessary at this time as the patient does not present a clear or imminent danger to self or others. No indication for pursuing higher level of care. Outpatient management is currently most appropriate and least restrictive level of care.      Current risk:   SUICIDE: Low   Homicide: Not applicable   Self-harm: Not applicable   Relapse: Not applicable   Other:    Safety Plan reviewed? No   If evidence of imminent risk is present, intervention/plan:         Treatment Goal(s)/Objective(s) addressed:    Goal: Improve overall mood      Get 7-8 hours of restful sleep every night using sleep hygiene handout   Develop strategies for  thought distraction when ruminating on the past        Progress toward Treatment Goals: Moderate improvement    Plan:    1) Follow-up 2-3 weeks  2) Barriers to Learning:  No  3) Readiness to Learn:  Yes  4) Cultural Concerns:  No  5) Patient voiced understanding of, and agreement with, plan and goals as annotated above.  6) Declare these services are medically necessary and appropriate to the patient’s diagnosis and needs  7) The point of contact at the [facility] regarding this evaluation is Dr. aSn, Psychologist.    Geraldo San III, Ed.D.  9/18/2018

## 2018-09-21 ENCOUNTER — OFFICE VISIT (OUTPATIENT)
Dept: CARDIOLOGY | Facility: MEDICAL CENTER | Age: 68
End: 2018-09-21
Payer: MEDICARE

## 2018-09-21 VITALS
DIASTOLIC BLOOD PRESSURE: 82 MMHG | WEIGHT: 183 LBS | SYSTOLIC BLOOD PRESSURE: 132 MMHG | HEIGHT: 67 IN | HEART RATE: 76 BPM | OXYGEN SATURATION: 96 % | BODY MASS INDEX: 28.72 KG/M2

## 2018-09-21 DIAGNOSIS — F32.0 CURRENT MILD EPISODE OF MAJOR DEPRESSIVE DISORDER WITHOUT PRIOR EPISODE (HCC): ICD-10-CM

## 2018-09-21 DIAGNOSIS — E87.6 HYPOKALEMIA: ICD-10-CM

## 2018-09-21 DIAGNOSIS — C67.3 MALIGNANT NEOPLASM OF ANTERIOR WALL OF URINARY BLADDER (HCC): ICD-10-CM

## 2018-09-21 DIAGNOSIS — I35.8 AORTIC VALVE SCLEROSIS: ICD-10-CM

## 2018-09-21 DIAGNOSIS — Z82.49 FAMILY HISTORY OF CORONARY ARTERY DISEASE IN BROTHER: ICD-10-CM

## 2018-09-21 DIAGNOSIS — E78.49 FAMILIAL HYPERLIPIDEMIA: ICD-10-CM

## 2018-09-21 DIAGNOSIS — I10 ESSENTIAL HYPERTENSION: ICD-10-CM

## 2018-09-21 DIAGNOSIS — R09.89 BRUIT OF RIGHT CAROTID ARTERY: ICD-10-CM

## 2018-09-21 DIAGNOSIS — L40.50 PSORIATIC ARTHRITIS (HCC): ICD-10-CM

## 2018-09-21 DIAGNOSIS — F52.21 ERECTILE DYSFUNCTION OF NONORGANIC ORIGIN: ICD-10-CM

## 2018-09-21 PROBLEM — E78.5 DYSLIPIDEMIA: Status: ACTIVE | Noted: 2018-09-21

## 2018-09-21 PROCEDURE — 99204 OFFICE O/P NEW MOD 45 MIN: CPT | Performed by: INTERNAL MEDICINE

## 2018-09-21 RX ORDER — ATORVASTATIN CALCIUM 40 MG/1
40 TABLET, FILM COATED ORAL DAILY
Qty: 90 TAB | Refills: 3 | Status: SHIPPED | OUTPATIENT
Start: 2018-09-21 | End: 2019-01-07 | Stop reason: SDUPTHER

## 2018-09-21 ASSESSMENT — ENCOUNTER SYMPTOMS
BACK PAIN: 1
DEPRESSION: 1

## 2018-09-21 NOTE — PROGRESS NOTES
Subjective:   Chief Complaint:   Chief Complaint   Patient presents with   • Hypertension       Buster Medina is a 68 y.o. male who is referred by Stu Bright for risk stratification of CAD.  He has hypertension, hyperlipidemia and psoriatic arthritis for which he takes Biologics.  Family history includes his brother having a heart attack at the age of 49. He has been on cholesterol medication and meds for HTN since his 30s, partially familial.  Had gastric bypass and HTN improved.  His LDL is 113 on lipitor 20 mg.      He is not limited by chest pain, pressure or tightness. No significant dyspnea on exertion, orthopnea or lower extremity swelling. No significant palpitations, dizziness, or presyncope/syncope. No symptoms of leg claudication. Walks his dog for about 4 miles, slowly, takes 1 hour.    Brother had MI at 49.      New murmur on exam today.    DATA REVIEWED by me:  ECG 12-29-17  Sinus, 70, early R wave progression, normal variant    Most recent labs:     August 16, 2018 hemoglobin 14.5, platelets 149, sodium 141, potassium 4.6, BUN 24, creatinine 1.28 with a GFR 56, LFTs normal    June 8, 2018 total cholesterol 211, triglycerides 125, HDL 73,     Past Medical History:   Diagnosis Date   • Anxiety    • Arthritis     osteo   • Cancer (HCC) 2007    bladder   • Depression     depression   • High cholesterol    • Hyperlipidemia    • Hypertension     pt states  well controlled on meds   • Sleep apnea     uses cpap     Past Surgical History:   Procedure Laterality Date   • BLADDER BIOPSY WITH CYSTOSCOPY  1/3/2018    Procedure: BLADDER BIOPSY WITH CYSTOSCOPY/WITH UPPER TRACT WASHING;  Surgeon: El Manuel M.D.;  Location: [facility];  Service: Urology   • RETROGRADES Bilateral 1/3/2018    Procedure: RETROGRADES;  Surgeon: El Manuel M.D.;  Location: [facility];  Service: Urology   • PYELOGRAM Bilateral 1/3/2018    Procedure: PYELOGRAM;  Surgeon: El Manuel  M.D.;  Location: SUR[facility]ERY [facility];  Service: Urology   • OTHER ORTHOPEDIC SURGERY  2015    shoulder replacement   • ABDOMINAL EXPLORATION     • ATHROPLASTY      right shoulder   • BLADDER BIOPSY WITH CYSTOSCOPY     • CHOLECYSTECTOMY     • EYE SURGERY      las   • GASTRIC BYPASS LAPAROSCOPIC     • HERNIA REPAIR     • OPEN REDUCTION       Family History   Problem Relation Age of Onset   • Cancer Mother 80        lung cancer   • Arthritis Mother    • Diabetes Mother    • Anxiety disorder Mother    • Lung Disease Father 65   • Cancer Father    • Diabetes Father    • Hypertension Father    • Hyperlipidemia Father    • Hyperlipidemia Brother    • Arthritis Brother    • Other Brother         Thyroid tumor   • No Known Problems Maternal Grandmother    • Cancer Maternal Grandfather    • No Known Problems Paternal Grandmother    • No Known Problems Paternal Grandfather    • Heart Disease Brother 49        smoker, overweight   • Hypertension Brother    • Depression Other    • Suicide Attempts Neg Hx    • Bipolar disorder Neg Hx    • Alcohol abuse Neg Hx    • Drug abuse Neg Hx      Social History     Social History   • Marital status: Single     Spouse name: N/A   • Number of children: N/A   • Years of education: N/A     Occupational History   • Not on file.     Social History Main Topics   • Smoking status: Never Smoker   • Smokeless tobacco: Never Used   • Alcohol use 9.0 oz/week     15 Glasses of wine per week      Comment: 2 per day   • Drug use: Yes     Types: Marijuana      Comment: marijuana occ   • Sexual activity: Not Currently     Other Topics Concern   • Not on file     Social History Narrative   • No narrative on file     No Known Allergies    Current Outpatient Prescriptions   Medication Sig Dispense Refill   • atorvastatin (LIPITOR) 40 MG Tab Take 1 Tab by mouth every day. 90 Tab 3   • aspirin EC (ECOTRIN) 81 MG Tablet Delayed Response Take 1 Tab by mouth every day. 30 Tab 0   • omeprazole (PRILOSEC) 20 MG  "delayed-release capsule Take 1 Cap by mouth every day. 90 Cap 4   • lisinopril (PRINIVIL) 20 MG Tab TAKE 1 TABLET BY MOUTH EVERY DAY IN THE EVENING 90 Tab 0   • tadalafil (CIALIS) 5 MG tablet Take 5 mg by mouth as needed for Erectile Dysfunction.     • acyclovir (ZOVIRAX) 200 MG Cap Take 800 mg by mouth 5 Times a Day.     • lorazepam (ATIVAN) 2 MG tablet Take 1 Tab by mouth every bedtime for 90 days. 90 Tab 0   • mirtazapine (REMERON) 15 MG Tab Take 0.5 Tabs by mouth every bedtime. 90 Tab 0   • potassium chloride SA (K-DUR) 10 MEQ Tab CR Take 10 mEq by mouth every day. TAKE 1 TAB BY MOUTH EVERY DAY.  0   • celecoxib (CELEBREX) 200 MG Cap Take 1 Cap by mouth every day. 90 Cap 0   • Adalimumab (HUMIRA PEN-CROHNS STARTER) 40 MG/0.8ML Pen-injector Kit Inject 0.8 mL as instructed 1 time daily as needed.     • Multiple Vitamins-Minerals (MULTI COMPLETE PO) Take 1 Tab by mouth every day.     • vitamin D, Ergocalciferol, (DRISDOL) 89522 units Cap capsule Take 50,000 Units by mouth every 7 days.     • Multiple Vitamins-Minerals (MELODY-DAY 1000 PO) Take 1 Tab by mouth every day.     • Cyanocobalamin (B-12) 2500 MCG Tab Take 1 Tab by mouth every day.       No current facility-administered medications for this visit.        Review of Systems   Musculoskeletal: Positive for back pain and joint pain.   Psychiatric/Behavioral: Positive for depression.     All others systems reviewed and negative.     Objective:     Blood pressure 132/82, pulse 76, height 1.702 m (5' 7\"), weight 83 kg (183 lb), SpO2 96 %. Body mass index is 28.66 kg/m².    Physical Exam   General: No acute distress. Well nourished.  HEENT: EOM grossly intact, no scleral icterus, no pharyngeal erythema.   Neck:  No JVD, no bruits, trachea midline  CVS: RRR. Normal S1, S2. No M/R/G. No LE edema.  2+ radial pulses, 2+ DP pulses  Resp: CTAB. No wheezing or crackles/rhonchi. Normal respiratory effort.  Abdomen: Soft, NT, no bonita hepatomegaly.  MSK/Ext: No clubbing or " cyanosis.  Skin: Warm and dry, no rashes.  Neurological: CN III-XII grossly intact. No focal deficits.   Psych: A&O x 3, appropriate affect, good judgement      Assessment:     1. Essential hypertension  EKG    EC-ECHOCARDIOGRAM COMPLETE W/O CONT    ZZZ ABDOMINAL AORTA ULTRASOUND    US-AORTA/ILIACS DUPLEX COMPLETE   2. Current mild episode of major depressive disorder without prior episode (HCC)     3. Malignant neoplasm of anterior wall of urinary bladder (HCC)     4. Hypokalemia     5. Erectile dysfunction of nonorganic origin     6. Psoriatic arthritis (HCC)  ZZZ ABDOMINAL AORTA ULTRASOUND    Biologic therapy   7. Family history of coronary artery disease in brother     8. Familial hyperlipidemia  ZZZ ABDOMINAL AORTA ULTRASOUND    US-AORTA/ILIACS DUPLEX COMPLETE   9. Aortic valve sclerosis  EC-ECHOCARDIOGRAM COMPLETE W/O CONT    US-AORTA/ILIACS DUPLEX COMPLETE   10. Bruit of right carotid artery  US-CAROTID DOPPLER BILAT       Medical Decision Making:  Today's Assessment / Status / Plan:     -10 year risk of MI/CVA is over 15%  -Increase atorvastatin to 40 mg  -ASA 81 mg  -Possible bruit, carotid US  -HTN,Familial HLP, pot smoker -> AAA US  -AV murmur- Echo      Return in about 2 months (around 11/21/2018) for APN 2 months, 6 months MD.    It is my pleasure to participate in the care of Mr. Medina.  Please do not hesitate to contact me with questions or concerns.    Fatou Hamilton MD, [facility]  Cardiologist [facility]    Please note that this dictation was created using voice recognition software. I have made every reasonable attempt to correct obvious errors, but it is possible there are errors of grammar and possibly content that I did not discover before finalizing the note.

## 2018-09-21 NOTE — LETTER
[facility]   [address] [place] [address]  [place] [address]  Phone: [phone]  Fax: [phone]              Buster Medina  1950    Encounter Date: 9/21/2018    Fatou Hamilton M.D.          PROGRESS NOTE:  Subjective:   Chief Complaint:   Chief Complaint   Patient presents with   • Hypertension       Buster Medina is a 68 y.o. male who is referred by Stu Bright for risk stratification of CAD.  He has hypertension, hyperlipidemia and psoriatic arthritis for which he takes Biologics.  Family history includes his brother having a heart attack at the age of 49. He has been on cholesterol medication and meds for HTN since his 30s, partially familial.  Had gastric bypass and HTN improved.  His LDL is 113 on lipitor 20 mg.      He is not limited by chest pain, pressure or tightness. No significant dyspnea on exertion, orthopnea or lower extremity swelling. No significant palpitations, dizziness, or presyncope/syncope. No symptoms of leg claudication. Walks his dog for about 4 miles, slowly, takes 1 hour.    Brother had MI at 49.      New murmur on exam today.    DATA REVIEWED by me:  ECG 12-29-17  Sinus, 70, early R wave progression, normal variant    Most recent labs:     August 16, 2018 hemoglobin 14.5, platelets 149, sodium 141, potassium 4.6, BUN 24, creatinine 1.28 with a GFR 56, LFTs normal    June 8, 2018 total cholesterol 211, triglycerides 125, HDL 73,     Past Medical History:   Diagnosis Date   • Anxiety    • Arthritis     osteo   • Cancer (HCC) 2007    bladder   • Depression     depression   • High cholesterol    • Hyperlipidemia    • Hypertension     pt states  well controlled on meds   • Sleep apnea     uses cpap     Past Surgical History:   Procedure Laterality Date   • BLADDER BIOPSY WITH CYSTOSCOPY  1/3/2018    Procedure: BLADDER BIOPSY WITH CYSTOSCOPY/WITH UPPER TRACT WASHING;  Surgeon: El Manuel M.D.;  Location: SURGERY  [facility];  Service: Urology   • RETROGRADES Bilateral 1/3/2018    Procedure: RETROGRADES;  Surgeon: El Manuel M.D.;  Location: SURGERY [facility];  Service: Urology   • PYELOGRAM Bilateral 1/3/2018    Procedure: PYELOGRAM;  Surgeon: El Manuel M.D.;  Location: SURGERY [facility];  Service: Urology   • OTHER ORTHOPEDIC SURGERY  2015    shoulder replacement   • ABDOMINAL EXPLORATION     • ATHROPLASTY      right shoulder   • BLADDER BIOPSY WITH CYSTOSCOPY     • CHOLECYSTECTOMY     • EYE SURGERY      las   • GASTRIC BYPASS LAPAROSCOPIC     • HERNIA REPAIR     • OPEN REDUCTION       Family History   Problem Relation Age of Onset   • Cancer Mother 80        lung cancer   • Arthritis Mother    • Diabetes Mother    • Anxiety disorder Mother    • Lung Disease Father 65   • Cancer Father    • Diabetes Father    • Hypertension Father    • Hyperlipidemia Father    • Hyperlipidemia Brother    • Arthritis Brother    • Other Brother         Thyroid tumor   • No Known Problems Maternal Grandmother    • Cancer Maternal Grandfather    • No Known Problems Paternal Grandmother    • No Known Problems Paternal Grandfather    • Heart Disease Brother 49        smoker, overweight   • Hypertension Brother    • Depression Other    • Suicide Attempts Neg Hx    • Bipolar disorder Neg Hx    • Alcohol abuse Neg Hx    • Drug abuse Neg Hx      Social History     Social History   • Marital status: Single     Spouse name: N/A   • Number of children: N/A   • Years of education: N/A     Occupational History   • Not on file.     Social History Main Topics   • Smoking status: Never Smoker   • Smokeless tobacco: Never Used   • Alcohol use 9.0 oz/week     15 Glasses of wine per week      Comment: 2 per day   • Drug use: Yes     Types: Marijuana      Comment: marijuana occ   • Sexual activity: Not Currently     Other Topics Concern   • Not on file     Social History Narrative   • No narrative on file     No Known  "Allergies    Current Outpatient Prescriptions   Medication Sig Dispense Refill   • atorvastatin (LIPITOR) 40 MG Tab Take 1 Tab by mouth every day. 90 Tab 3   • aspirin EC (ECOTRIN) 81 MG Tablet Delayed Response Take 1 Tab by mouth every day. 30 Tab 0   • omeprazole (PRILOSEC) 20 MG delayed-release capsule Take 1 Cap by mouth every day. 90 Cap 4   • lisinopril (PRINIVIL) 20 MG Tab TAKE 1 TABLET BY MOUTH EVERY DAY IN THE EVENING 90 Tab 0   • tadalafil (CIALIS) 5 MG tablet Take 5 mg by mouth as needed for Erectile Dysfunction.     • acyclovir (ZOVIRAX) 200 MG Cap Take 800 mg by mouth 5 Times a Day.     • lorazepam (ATIVAN) 2 MG tablet Take 1 Tab by mouth every bedtime for 90 days. 90 Tab 0   • mirtazapine (REMERON) 15 MG Tab Take 0.5 Tabs by mouth every bedtime. 90 Tab 0   • potassium chloride SA (K-DUR) 10 MEQ Tab CR Take 10 mEq by mouth every day. TAKE 1 TAB BY MOUTH EVERY DAY.  0   • celecoxib (CELEBREX) 200 MG Cap Take 1 Cap by mouth every day. 90 Cap 0   • Adalimumab (HUMIRA PEN-CROHNS STARTER) 40 MG/0.8ML Pen-injector Kit Inject 0.8 mL as instructed 1 time daily as needed.     • Multiple Vitamins-Minerals (MULTI COMPLETE PO) Take 1 Tab by mouth every day.     • vitamin D, Ergocalciferol, (DRISDOL) 94553 units Cap capsule Take 50,000 Units by mouth every 7 days.     • Multiple Vitamins-Minerals (MELODY-DAY 1000 PO) Take 1 Tab by mouth every day.     • Cyanocobalamin (B-12) 2500 MCG Tab Take 1 Tab by mouth every day.       No current facility-administered medications for this visit.        Review of Systems   Musculoskeletal: Positive for back pain and joint pain.   Psychiatric/Behavioral: Positive for depression.     All others systems reviewed and negative.     Objective:     Blood pressure 132/82, pulse 76, height 1.702 m (5' 7\"), weight 83 kg (183 lb), SpO2 96 %. Body mass index is 28.66 kg/m².    Physical Exam   General: No acute distress. Well nourished.  HEENT: EOM grossly intact, no scleral icterus, no " pharyngeal erythema.   Neck:  No JVD, no bruits, trachea midline  CVS: RRR. Normal S1, S2. No M/R/G. No LE edema.  2+ radial pulses, 2+ DP pulses  Resp: CTAB. No wheezing or crackles/rhonchi. Normal respiratory effort.  Abdomen: Soft, NT, no bonita hepatomegaly.  MSK/Ext: No clubbing or cyanosis.  Skin: Warm and dry, no rashes.  Neurological: CN III-XII grossly intact. No focal deficits.   Psych: A&O x 3, appropriate affect, good judgement      Assessment:     1. Essential hypertension  EKG    EC-ECHOCARDIOGRAM COMPLETE W/O CONT    ZZZ ABDOMINAL AORTA ULTRASOUND    US-AORTA/ILIACS DUPLEX COMPLETE   2. Current mild episode of major depressive disorder without prior episode (HCC)     3. Malignant neoplasm of anterior wall of urinary bladder (HCC)     4. Hypokalemia     5. Erectile dysfunction of nonorganic origin     6. Psoriatic arthritis (HCC)  ZZZ ABDOMINAL AORTA ULTRASOUND    Biologic therapy   7. Family history of coronary artery disease in brother     8. Familial hyperlipidemia  ZZZ ABDOMINAL AORTA ULTRASOUND    US-AORTA/ILIACS DUPLEX COMPLETE   9. Aortic valve sclerosis  EC-ECHOCARDIOGRAM COMPLETE W/O CONT    US-AORTA/ILIACS DUPLEX COMPLETE   10. Bruit of right carotid artery  US-CAROTID DOPPLER BILAT       Medical Decision Making:  Today's Assessment / Status / Plan:     -10 year risk of MI/CVA is over 15%  -Increase atorvastatin to 40 mg  -ASA 81 mg  -Possible bruit, carotid US  -HTN,Familial HLP, pot smoker -> AAA US  -AV murmur- Echo      Return in about 2 months (around 11/21/2018) for APN 2 months, 6 months MD.    It is my pleasure to participate in the care of Mr. Medina.  Please do not hesitate to contact me with questions or concerns.    Fatou Hamilton MD, [facility]  Cardiologist [facility]    Please note that this dictation was created using voice recognition software. I have made every reasonable attempt to correct obvious errors, but it is possible there are errors of  grammar and possibly content that I did not discover before finalizing the note.      Stu Bright M.D.  [address] [place] [address] [place] [address]  VIA In Basket

## 2018-10-08 ENCOUNTER — HOSPITAL ENCOUNTER (OUTPATIENT)
Dept: RADIOLOGY | Facility: MEDICAL CENTER | Age: 68
End: 2018-10-08
Attending: INTERNAL MEDICINE
Payer: MEDICARE

## 2018-10-08 ENCOUNTER — HOSPITAL ENCOUNTER (OUTPATIENT)
Dept: CARDIOLOGY | Facility: MEDICAL CENTER | Age: 68
End: 2018-10-08
Attending: INTERNAL MEDICINE
Payer: MEDICARE

## 2018-10-08 ENCOUNTER — OFFICE VISIT (OUTPATIENT)
Dept: BEHAVIORAL HEALTH | Facility: CLINIC | Age: 68
End: 2018-10-08
Payer: MEDICARE

## 2018-10-08 DIAGNOSIS — R09.89 BRUIT OF RIGHT CAROTID ARTERY: ICD-10-CM

## 2018-10-08 DIAGNOSIS — F52.21 ERECTILE DYSFUNCTION OF NONORGANIC ORIGIN: ICD-10-CM

## 2018-10-08 DIAGNOSIS — I10 ESSENTIAL HYPERTENSION: ICD-10-CM

## 2018-10-08 DIAGNOSIS — F34.1 DYSTHYMIC DISORDER: Chronic | ICD-10-CM

## 2018-10-08 DIAGNOSIS — I35.8 AORTIC VALVE SCLEROSIS: ICD-10-CM

## 2018-10-08 LAB
LV EJECT FRACT  99904: 70
LV EJECT FRACT MOD 2C 99903: 74.88
LV EJECT FRACT MOD 4C 99902: 60.77
LV EJECT FRACT MOD BP 99901: 70

## 2018-10-08 PROCEDURE — 93306 TTE W/DOPPLER COMPLETE: CPT | Mod: 26 | Performed by: INTERNAL MEDICINE

## 2018-10-08 PROCEDURE — 93880 EXTRACRANIAL BILAT STUDY: CPT

## 2018-10-08 PROCEDURE — 93306 TTE W/DOPPLER COMPLETE: CPT

## 2018-10-08 PROCEDURE — 90834 PSYTX W PT 45 MINUTES: CPT | Performed by: PSYCHOLOGIST

## 2018-10-08 PROCEDURE — 93880 EXTRACRANIAL BILAT STUDY: CPT | Mod: 26 | Performed by: INTERNAL MEDICINE

## 2018-10-08 NOTE — BH THERAPY
[facility] Progress Note    Patient Name: Buster Medina  Patient MRN: [id number]  Today's Date: 10/8/2018     Type of session:Individual psychotherapy  Length of session: 45 minutes  Persons in attendance:Patient    Subjective/New Info:The patient ID/Chief Complaint:  The patient is a 68 year old male, , .  The patient self-referred and voluntarily presented for an individual intake to address concerns related to relationship problems, sleep problems, and erectile dysfunction. Reviewed limits of confidentiality and [facility] policies; patient expressed understanding and agreed to voluntarily proceed with the evaluation and treatment.    Interval History: The patient's estimated global assessment of functioning indicates a mild level of difficulty with some problems in relationships. The patient is nonetheless functioning well and has some significant relationships. Patient had difficulty finding the new office. Patient reports he continues to struggle dealing with his son who lives in [facility]. Patient is also concerned about a friend and his dysfunctional relationship. Talk with the patient about the difference between sympathy and empathy and hell a sympathetic response can sometimes be counterproductive because it puts in our own feelings into the situation. Use problem-solving to assist the patient recognizing alternative ways to be supportive of his friends. Patient is excited about going to [place] For his daughter’s dissertation defense   The main therapeutic interventions consisted of cognitive-behavioral techniques; an emphasis on self-monitoring of thoughts, emotions and behavior and Socratic questioning.    Therapeutic Intervention: Cognitive Behavioral Therapy    Planned Intervention:  Continue to focus on relationship issues and the patients schemas that interfere with his desires for stable interpersonal relationships with others.        Objective/Observations:    Patient did not present in acute distress. Patient was appropriately groomed. Patient was alert and oriented x4. Eye contact was appropriate. No abnormalities in attention or concentration were noted. No abnormalities of movement present; psychomotor activity was normal. Speech was fluent and regular in rhythm, rate, volume, and tone. Thought processes Linear, Logical and Goal Directed. There was no evidence of thought disorder. No auditory or visual hallucinations. Long and short term memory appeared to be intact. Insight, judgment, and impulse control were deemed to be good.  Reported mood was “happy.” Affect was full-ranging and appropriate to thought content and conversation.  Patient denied past and current suicidal and homicidal ideation in plan, intent, and preparatory behavior.    Diagnoses:   1. Dysthymic disorder    2. Erectile dysfunction of nonorganic origin    RISK ASSESSMENT:   The patient denied any suicide-related ideation and/or behaviors and intent/plan, denied thoughts about death and dying both in session and during the period since last appointment, or past 2 weeks.    Risk Level: Not Currently at Clinically Significant Risk  Hospitalization is not deemed necessary at this time as the patient does not present a clear or imminent danger to self or others. No indication for pursuing higher level of care. Outpatient management is currently most appropriate and least restrictive level of care.      Current risk:   SUICIDE: Low   Homicide: Not applicable   Self-harm: Not applicable   Relapse: Not applicable   Other:    Safety Plan reviewed? No   If evidence of imminent risk is present, intervention/plan:         Treatment Goal(s)/Objective(s) addressed:    Goal: Improve overall mood      Get 7-8 hours of restful sleep every night using sleep hygiene handout   Develop strategies for thought distraction when ruminating on the past        Progress toward Treatment Goals:  Moderate improvement    Plan:    1) Follow-up 2-3 weeks  2) Barriers to Learning:  No  3) Readiness to Learn:  Yes  4) Cultural Concerns:  No  5) Patient voiced understanding of, and agreement with, plan and goals as annotated above.  6) Declare these services are medically necessary and appropriate to the patient’s diagnosis and needs  7) The point of contact at the [facility] regarding this evaluation is Dr. San, Psychologist.    Geraldo San III, Ed.D.  10/8/2018

## 2018-10-10 ENCOUNTER — PATIENT MESSAGE (OUTPATIENT)
Dept: CARDIOLOGY | Facility: MEDICAL CENTER | Age: 68
End: 2018-10-10

## 2018-10-16 DIAGNOSIS — G47.33 OSA (OBSTRUCTIVE SLEEP APNEA): ICD-10-CM

## 2018-10-16 RX ORDER — ATORVASTATIN CALCIUM 20 MG/1
TABLET, FILM COATED ORAL
Qty: 90 TAB | Refills: 0 | Status: SHIPPED | OUTPATIENT
Start: 2018-10-16 | End: 2019-01-07

## 2018-10-16 NOTE — PATIENT COMMUNICATION
Referral to sleep study placed. Pt notified.     Fatou Hamilton M.D.  Valeri Cagle, RISSAC.   Phone Number: [phone]      Sleep medicine, but I thought they were the same. :- )

## 2018-10-17 ENCOUNTER — HOSPITAL ENCOUNTER (OUTPATIENT)
Dept: RADIOLOGY | Facility: MEDICAL CENTER | Age: 68
End: 2018-10-17
Attending: INTERNAL MEDICINE
Payer: MEDICARE

## 2018-10-17 PROCEDURE — 93978 VASCULAR STUDY: CPT | Mod: 26 | Performed by: INTERNAL MEDICINE

## 2018-10-17 PROCEDURE — 93978 VASCULAR STUDY: CPT

## 2018-10-31 RX ORDER — MIRTAZAPINE 15 MG/1
TABLET, FILM COATED ORAL
Qty: 90 TAB | Refills: 0 | Status: SHIPPED | OUTPATIENT
Start: 2018-10-31 | End: 2019-03-01

## 2018-11-05 ENCOUNTER — OFFICE VISIT (OUTPATIENT)
Dept: BEHAVIORAL HEALTH | Facility: CLINIC | Age: 68
End: 2018-11-05
Payer: MEDICARE

## 2018-11-05 DIAGNOSIS — F52.21 ERECTILE DYSFUNCTION OF NONORGANIC ORIGIN: ICD-10-CM

## 2018-11-05 DIAGNOSIS — F34.1 DYSTHYMIC DISORDER: Chronic | ICD-10-CM

## 2018-11-05 PROCEDURE — 90834 PSYTX W PT 45 MINUTES: CPT | Performed by: PSYCHOLOGIST

## 2018-11-06 ENCOUNTER — HOSPITAL ENCOUNTER (OUTPATIENT)
Dept: LAB | Facility: MEDICAL CENTER | Age: 68
End: 2018-11-06
Attending: ORTHOPAEDIC SURGERY
Payer: MEDICARE

## 2018-11-06 LAB
BASOPHILS # BLD AUTO: 0.6 % (ref 0–1.8)
BASOPHILS # BLD: 0.03 K/UL (ref 0–0.12)
CRP SERPL HS-MCNC: 0.04 MG/DL (ref 0–0.75)
EOSINOPHIL # BLD AUTO: 0.14 K/UL (ref 0–0.51)
EOSINOPHIL NFR BLD: 2.7 % (ref 0–6.9)
ERYTHROCYTE [DISTWIDTH] IN BLOOD BY AUTOMATED COUNT: 44.1 FL (ref 35.9–50)
ERYTHROCYTE [SEDIMENTATION RATE] IN BLOOD BY WESTERGREN METHOD: 10 MM/HOUR (ref 0–20)
HCT VFR BLD AUTO: 42.5 % (ref 42–52)
HGB BLD-MCNC: 14.4 G/DL (ref 14–18)
IMM GRANULOCYTES # BLD AUTO: 0.02 K/UL (ref 0–0.11)
IMM GRANULOCYTES NFR BLD AUTO: 0.4 % (ref 0–0.9)
LYMPHOCYTES # BLD AUTO: 1.59 K/UL (ref 1–4.8)
LYMPHOCYTES NFR BLD: 30.9 % (ref 22–41)
MCH RBC QN AUTO: 32.8 PG (ref 27–33)
MCHC RBC AUTO-ENTMCNC: 33.9 G/DL (ref 33.7–35.3)
MCV RBC AUTO: 96.8 FL (ref 81.4–97.8)
MONOCYTES # BLD AUTO: 0.72 K/UL (ref 0–0.85)
MONOCYTES NFR BLD AUTO: 14 % (ref 0–13.4)
NEUTROPHILS # BLD AUTO: 2.65 K/UL (ref 1.82–7.42)
NEUTROPHILS NFR BLD: 51.4 % (ref 44–72)
NRBC # BLD AUTO: 0 K/UL
NRBC BLD-RTO: 0 /100 WBC
PLATELET # BLD AUTO: 136 K/UL (ref 164–446)
PMV BLD AUTO: 11.6 FL (ref 9–12.9)
RBC # BLD AUTO: 4.39 M/UL (ref 4.7–6.1)
WBC # BLD AUTO: 5.2 K/UL (ref 4.8–10.8)

## 2018-11-06 PROCEDURE — 85652 RBC SED RATE AUTOMATED: CPT

## 2018-11-06 PROCEDURE — 85025 COMPLETE CBC W/AUTO DIFF WBC: CPT

## 2018-11-06 PROCEDURE — 86140 C-REACTIVE PROTEIN: CPT

## 2018-11-06 PROCEDURE — 36415 COLL VENOUS BLD VENIPUNCTURE: CPT

## 2018-11-06 NOTE — BH THERAPY
[facility] Progress Note    Patient Name: Buster Medina  Patient MRN: [id number]  Today's Date: 11/5/2018     Type of session:Individual psychotherapy  Length of session: 50 minutes  Persons in attendance:Patient    Subjective/New Info:The patient ID/Chief Complaint:  The patient is a 68 year old male, , .  The patient self-referred and voluntarily presented for an individual intake to address concerns related to relationship problems, sleep problems, and erectile dysfunction. Reviewed limits of confidentiality and [facility] policies; patient expressed understanding and agreed to voluntarily proceed with the evaluation and treatment.    Interval History: The patient's estimated global assessment of functioning indicates a mild level of difficulty with some problems in relationships. The patient is nonetheless functioning well and has some significant relationships.   The patient continues to struggle with his relationship with his son trying to meet his developmental needs versus setting appropriate boundaries discussed with the patient motivation strategies for future success.  Current relationship continues to go well patient is concerned about plateaus in the development of the relationship but is beginning to appreciate that these plateaus may be a healthy indicator of the quality of the relationship.The main therapeutic interventions consisted of cognitive-behavioral techniques; an emphasis on self-monitoring of thoughts, emotions and behavior and Socratic questioning.    Therapeutic Intervention: Cognitive Behavioral Therapy    Planned Intervention:  Continue to focus on relationship issues and the patients schemas that interfere with his desires for stable interpersonal relationships with others.       Objective/Observations:    Patient did not present in acute distress. Patient was appropriately groomed. Patient was alert and oriented x4. Eye  contact was appropriate. No abnormalities in attention or concentration were noted. No abnormalities of movement present; psychomotor activity was normal. Speech was fluent and regular in rhythm, rate, volume, and tone. Thought processes Linear, Logical and Goal Directed. There was no evidence of thought disorder. No auditory or visual hallucinations. Long and short term memory appeared to be intact. Insight, judgment, and impulse control were deemed to be good.  Reported mood was “happy.” Affect was full-ranging and appropriate to thought content and conversation.  Patient denied past and current suicidal and homicidal ideation in plan, intent, and preparatory behavior.    Psychometric Test Results:       BHM-20  Global Mental Health  Within Normal Limits  Well Being Scale  Within Normal Limits  Symptoms Scale  Within Normal Limits  Anxiety Subscale  Within Normal Limits  Depression Subscale  Within Normal Limits  Alcohol/Drug Subscale Within Normal Limits  Bipolar Subscale  Within Normal Limits  Eating Disorder Subscale Within Normal Limits  Harm to other Subscale Within Normal Limits  Suicide Monitoring Scale No Indication of Risk  Life Functioning Scale   Mild Distress      Diagnoses:   1. Dysthymic disorder    2. Erectile dysfunction of nonorganic origin    RISK ASSESSMENT:   The patient denied any suicide-related ideation and/or behaviors and intent/plan, denied thoughts about death and dying both in session and during the period since last appointment, or past 2 weeks.    Risk Level: Not Currently at Clinically Significant Risk  Hospitalization is not deemed necessary at this time as the patient does not present a clear or imminent danger to self or others. No indication for pursuing higher level of care. Outpatient management is currently most appropriate and least restrictive level of care.      Current risk:   SUICIDE: Low   Homicide: Not applicable   Self-harm: Not applicable   Relapse: Not  applicable   Other:    Safety Plan reviewed? No   If evidence of imminent risk is present, intervention/plan:         Treatment Goal(s)/Objective(s) addressed:    Goal: Improve overall mood      Get 7-8 hours of restful sleep every night using sleep hygiene handout   Develop strategies for thought distraction when ruminating on the past        Progress toward Treatment Goals: Moderate improvement    Plan:    1) Follow-up 3-4 weeks  2) Barriers to Learning:  No  3) Readiness to Learn:  Yes  4) Cultural Concerns:  No  5) Patient voiced understanding of, and agreement with, plan and goals as annotated above.  6) Declare these services are medically necessary and appropriate to the patient’s diagnosis and needs  7) The point of contact at the [facility] regarding this evaluation is Dr. San, Psychologist.    Geraldo San III, EdRobertaD.  11/5/2018

## 2018-11-08 DIAGNOSIS — F51.01 PRIMARY INSOMNIA: ICD-10-CM

## 2018-11-08 RX ORDER — LISINOPRIL 20 MG/1
TABLET ORAL
Qty: 90 TAB | Refills: 0 | Status: SHIPPED | OUTPATIENT
Start: 2018-11-08 | End: 2019-02-04 | Stop reason: SDUPTHER

## 2018-11-08 RX ORDER — LORAZEPAM 2 MG/1
2 TABLET ORAL
Qty: 90 TAB | Refills: 0 | Status: SHIPPED
Start: 2018-11-08 | End: 2018-11-27 | Stop reason: SDUPTHER

## 2018-11-08 NOTE — TELEPHONE ENCOUNTER
From: Buster Medina  Sent: 11/8/2018 8:17 AM PST  Subject: Medication Renewal Request    Buster Medina would like a refill of the following medications:     lorazepam (ATIVAN) 2 MG tablet [Stu Bright M.D.]    Preferred pharmacy: [facility] - [place], [place] - [address] AT [address] TO [facility]

## 2018-11-25 DIAGNOSIS — R52 PAIN: ICD-10-CM

## 2018-11-27 ENCOUNTER — HOSPITAL ENCOUNTER (OUTPATIENT)
Dept: HOSPITAL 8 - RAD | Age: 68
Discharge: HOME | End: 2018-11-27
Attending: ORTHOPAEDIC SURGERY
Payer: MEDICARE

## 2018-11-27 ENCOUNTER — OFFICE VISIT (OUTPATIENT)
Dept: CARDIOLOGY | Facility: MEDICAL CENTER | Age: 68
End: 2018-11-27
Payer: MEDICARE

## 2018-11-27 VITALS
OXYGEN SATURATION: 95 % | HEART RATE: 82 BPM | HEIGHT: 67 IN | SYSTOLIC BLOOD PRESSURE: 128 MMHG | WEIGHT: 189 LBS | DIASTOLIC BLOOD PRESSURE: 82 MMHG | BODY MASS INDEX: 29.66 KG/M2

## 2018-11-27 DIAGNOSIS — Z47.1: Primary | ICD-10-CM

## 2018-11-27 DIAGNOSIS — Z82.49 FAMILY HISTORY OF CORONARY ARTERY DISEASE IN BROTHER: ICD-10-CM

## 2018-11-27 DIAGNOSIS — E78.5 DYSLIPIDEMIA: ICD-10-CM

## 2018-11-27 DIAGNOSIS — M25.511: ICD-10-CM

## 2018-11-27 DIAGNOSIS — I10 ESSENTIAL HYPERTENSION: ICD-10-CM

## 2018-11-27 DIAGNOSIS — Z96.611: ICD-10-CM

## 2018-11-27 PROCEDURE — 99213 OFFICE O/P EST LOW 20 MIN: CPT | Performed by: NURSE PRACTITIONER

## 2018-11-27 RX ORDER — CELECOXIB 200 MG/1
200 CAPSULE ORAL DAILY
Qty: 90 CAP | Refills: 0 | Status: SHIPPED | OUTPATIENT
Start: 2018-11-27 | End: 2019-02-25 | Stop reason: SDUPTHER

## 2018-11-27 ASSESSMENT — ENCOUNTER SYMPTOMS
DIZZINESS: 0
VOMITING: 0
SHORTNESS OF BREATH: 0
NAUSEA: 0
ORTHOPNEA: 0
PALPITATIONS: 0
WHEEZING: 0
CHILLS: 0
PND: 0
SPUTUM PRODUCTION: 0
HEMOPTYSIS: 0
COUGH: 0
CLAUDICATION: 0
HEADACHES: 0
FEVER: 0

## 2018-11-27 NOTE — PROGRESS NOTES
Chief Complaint   Patient presents with   • Hypertension       Subjective:   Buster Medina is a 68 y.o. male who presents today for numerous test results.  The patient was last seen by Dr. Ortiz 9/21/2018 for risk stratification of CAD.  He has hypertension, hyperlipidemia, psoriatic arthritis for which she takes Biologics, and familial history with brother having myocardial infarction at age 49.    He is compliant with ASA 81 mg daily, atorvastatin 40 mg daily, and lisinopril 20 mg daily.  He has no problems tolerating these medications or significant side effects.    Ultrasound of carotid artery shows no significant CAD.  Echocardiogram from 10/8/2018 the same day, shows EF 70%, mild concentric LVH, mildly dilated LA, moderate MR.  He knows we will continue to monitor his MR but at this time there is nothing to do other than treat his hypertension.    He does not take his blood pressure on a regular basis.  He is going to start.    He denies chest pain, shortness of breath, wheezing, palpitations, syncope/presyncope, and orthopnea.    Past Medical History:   Diagnosis Date   • Anxiety    • Arthritis     osteo   • Cancer (HCC) 2007    bladder   • Depression     depression   • High cholesterol    • Hyperlipidemia    • Hypertension     pt states  well controlled on meds   • Sleep apnea     uses cpap     Past Surgical History:   Procedure Laterality Date   • BLADDER BIOPSY WITH CYSTOSCOPY  1/3/2018    Procedure: BLADDER BIOPSY WITH CYSTOSCOPY/WITH UPPER TRACT WASHING;  Surgeon: El Manuel M.D.;  Location: [facility];  Service: Urology   • RETROGRADES Bilateral 1/3/2018    Procedure: RETROGRADES;  Surgeon: El Manuel M.D.;  Location: [facility];  Service: Urology   • PYELOGRAM Bilateral 1/3/2018    Procedure: PYELOGRAM;  Surgeon: El Manuel M.D.;  Location: [facility];  Service: Urology   • OTHER ORTHOPEDIC SURGERY  2015    shoulder replacement   • ABDOMINAL  EXPLORATION     • ATHROPLASTY      right shoulder   • BLADDER BIOPSY WITH CYSTOSCOPY     • CHOLECYSTECTOMY     • EYE SURGERY      las   • GASTRIC BYPASS LAPAROSCOPIC     • HERNIA REPAIR     • OPEN REDUCTION       Family History   Problem Relation Age of Onset   • Cancer Mother 80        lung cancer   • Arthritis Mother    • Diabetes Mother    • Anxiety disorder Mother    • Lung Disease Father 65   • Cancer Father    • Diabetes Father    • Hypertension Father    • Hyperlipidemia Father    • Hyperlipidemia Brother    • Arthritis Brother    • Other Brother         Thyroid tumor   • No Known Problems Maternal Grandmother    • Cancer Maternal Grandfather    • No Known Problems Paternal Grandmother    • No Known Problems Paternal Grandfather    • Heart Disease Brother 49        smoker, overweight   • Hypertension Brother    • Depression Other    • Suicide Attempts Neg Hx    • Bipolar disorder Neg Hx    • Alcohol abuse Neg Hx    • Drug abuse Neg Hx      Social History     Social History   • Marital status: Single     Spouse name: N/A   • Number of children: N/A   • Years of education: N/A     Occupational History   • Not on file.     Social History Main Topics   • Smoking status: Never Smoker   • Smokeless tobacco: Never Used   • Alcohol use 9.0 oz/week     15 Glasses of wine per week      Comment: 2 per day   • Drug use: Yes     Types: Marijuana      Comment: marijuana occ   • Sexual activity: Not Currently     Other Topics Concern   • Not on file     Social History Narrative   • No narrative on file     No Known Allergies  Outpatient Encounter Prescriptions as of 11/27/2018   Medication Sig Dispense Refill   • lisinopril (PRINIVIL) 20 MG Tab TAKE 1 TABLET BY MOUTH EVERY DAY IN THE EVENING 90 Tab 0   • lorazepam (ATIVAN) 2 MG tablet Take 1 Tab by mouth every bedtime for 90 days. 90 Tab 0   • atorvastatin (LIPITOR) 40 MG Tab Take 1 Tab by mouth every day. 90 Tab 3   • aspirin EC (ECOTRIN) 81 MG Tablet Delayed Response Take  "1 Tab by mouth every day. 30 Tab 0   • omeprazole (PRILOSEC) 20 MG delayed-release capsule Take 1 Cap by mouth every day. 90 Cap 4   • acyclovir (ZOVIRAX) 200 MG Cap Take 800 mg by mouth 5 Times a Day.     • mirtazapine (REMERON) 15 MG Tab Take 0.5 Tabs by mouth every bedtime. 90 Tab 0   • potassium chloride SA (K-DUR) 10 MEQ Tab CR Take 10 mEq by mouth every day. TAKE 1 TAB BY MOUTH EVERY DAY.  0   • Adalimumab (HUMIRA PEN-CROHNS STARTER) 40 MG/0.8ML Pen-injector Kit Inject 0.8 mL as instructed 1 time daily as needed.     • Multiple Vitamins-Minerals (MULTI COMPLETE PO) Take 1 Tab by mouth every day.     • vitamin D, Ergocalciferol, (DRISDOL) 62380 units Cap capsule Take 50,000 Units by mouth every 7 days.     • Multiple Vitamins-Minerals (MELODY-DAY 1000 PO) Take 1 Tab by mouth every day.     • Cyanocobalamin (B-12) 2500 MCG Tab Take 1 Tab by mouth every day.     • celecoxib (CELEBREX) 200 MG Cap TAKE 1 CAP BY MOUTH EVERY DAY. 90 Cap 0   • mirtazapine (REMERON) 15 MG Tab TAKE 1/2 TO 1 TABLET AT    BEDTIME 90 Tab 0   • atorvastatin (LIPITOR) 20 MG Tab TAKE 1 TABLET DAILY 90 Tab 0   • tadalafil (CIALIS) 5 MG tablet Take 5 mg by mouth as needed for Erectile Dysfunction.     • celecoxib (CELEBREX) 200 MG Cap Take 1 Cap by mouth every day. 90 Cap 0     No facility-administered encounter medications on file as of 11/27/2018.      Review of Systems   Constitutional: Negative for chills and fever.   Respiratory: Negative for cough, hemoptysis, sputum production, shortness of breath and wheezing.    Cardiovascular: Negative for chest pain, palpitations, orthopnea, claudication, leg swelling and PND.   Gastrointestinal: Negative for nausea and vomiting.   Neurological: Negative for dizziness and headaches.   All other systems reviewed and are negative.       Objective:   /82 (BP Location: Right arm, Patient Position: Sitting, BP Cuff Size: Adult)   Pulse 82   Ht 1.702 m (5' 7\")   Wt 85.7 kg (189 lb)   SpO2 95%   BMI " 29.60 kg/m²     Physical Exam   Constitutional: He is oriented to person, place, and time. He appears well-developed and well-nourished.   HENT:   Head: Normocephalic and atraumatic.   Eyes: EOM are normal.   Neck: Neck supple.   Cardiovascular: Normal rate and regular rhythm.    Murmur heard.  Pulmonary/Chest: Effort normal and breath sounds normal.   Abdominal: Soft.   Musculoskeletal: He exhibits no edema.   Neurological: He is alert and oriented to person, place, and time.   Skin: Skin is warm and dry.   Psychiatric: He has a normal mood and affect. His behavior is normal. Judgment and thought content normal.   Nursing note and vitals reviewed.    ECHOCARDIOGRAM 10/8/18  CONCLUSIONS  No prior study is available for comparison.   Left ventricular ejection fraction is visually estimated to be 70%.  Mild concentric left ventricular hypertrophy.  Mildly dilated left atrium.  Moderate mitral regurgitation, 2 jets.  Unable to estimate pulmonary artery pressure due to an inadequate   tricuspid regurgitant jet.    US CAROTIDS 10/8/18  Vascular Laboratory   CONCLUSIONS   Very mild plaque no significant stenosis.    US AORTA/ILIACES 9/21/18  Vascular Laboratory  CONCLUSIONS  No evidence of abdominal aortic aneurysm.   Normal Doppler flow velocities noted through the aorta and common iliac arteries.     Assessment:     1. Essential hypertension     2. Dyslipidemia     3. Family history of coronary artery disease in brother         Medical Decision Making:  Today's Assessment / Status / Plan:   1. HTN  -Continue lisinopril 20 mg daily  -Check blood pressure approximately 2 times per week and keep a log and bring to next office visit    2.  Dyslipidemia  -Continue atorvastatin 40 mg every evening    3.  Family history of CAD  -Continue atorvastatin and ASA    Collaborating MD is Dr. Segura.  Follow-up visit in 4 months with ADRIANNA, already scheduled.

## 2018-11-29 ENCOUNTER — HOSPITAL ENCOUNTER (OUTPATIENT)
Dept: LAB | Facility: MEDICAL CENTER | Age: 68
End: 2018-11-29
Attending: INTERNAL MEDICINE
Payer: MEDICARE

## 2018-11-29 ENCOUNTER — TELEPHONE (OUTPATIENT)
Dept: RHEUMATOLOGY | Facility: MEDICAL CENTER | Age: 68
End: 2018-11-29

## 2018-11-29 DIAGNOSIS — M06.9 RHEUMATOID ARTHRITIS INVOLVING MULTIPLE SITES, UNSPECIFIED RHEUMATOID FACTOR PRESENCE: ICD-10-CM

## 2018-11-29 LAB
ALBUMIN SERPL BCP-MCNC: 4 G/DL (ref 3.2–4.9)
ALBUMIN/GLOB SERPL: 1.4 G/DL
ALP SERPL-CCNC: 62 U/L (ref 30–99)
ALT SERPL-CCNC: 63 U/L (ref 2–50)
ANION GAP SERPL CALC-SCNC: 8 MMOL/L (ref 0–11.9)
AST SERPL-CCNC: 37 U/L (ref 12–45)
BASOPHILS # BLD AUTO: 0.7 % (ref 0–1.8)
BASOPHILS # BLD: 0.04 K/UL (ref 0–0.12)
BILIRUB SERPL-MCNC: 0.5 MG/DL (ref 0.1–1.5)
BUN SERPL-MCNC: 18 MG/DL (ref 8–22)
CALCIUM SERPL-MCNC: 9.3 MG/DL (ref 8.5–10.5)
CHLORIDE SERPL-SCNC: 108 MMOL/L (ref 96–112)
CO2 SERPL-SCNC: 27 MMOL/L (ref 20–33)
CREAT SERPL-MCNC: 1.15 MG/DL (ref 0.5–1.4)
EOSINOPHIL # BLD AUTO: 0.19 K/UL (ref 0–0.51)
EOSINOPHIL NFR BLD: 3.5 % (ref 0–6.9)
ERYTHROCYTE [DISTWIDTH] IN BLOOD BY AUTOMATED COUNT: 44.5 FL (ref 35.9–50)
ERYTHROCYTE [SEDIMENTATION RATE] IN BLOOD BY WESTERGREN METHOD: 14 MM/HOUR (ref 0–20)
FASTING STATUS PATIENT QL REPORTED: NORMAL
GLOBULIN SER CALC-MCNC: 2.9 G/DL (ref 1.9–3.5)
GLUCOSE SERPL-MCNC: 99 MG/DL (ref 65–99)
HCT VFR BLD AUTO: 42.3 % (ref 42–52)
HGB BLD-MCNC: 14.1 G/DL (ref 14–18)
IMM GRANULOCYTES # BLD AUTO: 0.04 K/UL (ref 0–0.11)
IMM GRANULOCYTES NFR BLD AUTO: 0.7 % (ref 0–0.9)
LYMPHOCYTES # BLD AUTO: 1.97 K/UL (ref 1–4.8)
LYMPHOCYTES NFR BLD: 36.8 % (ref 22–41)
MCH RBC QN AUTO: 32.6 PG (ref 27–33)
MCHC RBC AUTO-ENTMCNC: 33.3 G/DL (ref 33.7–35.3)
MCV RBC AUTO: 97.9 FL (ref 81.4–97.8)
MONOCYTES # BLD AUTO: 0.64 K/UL (ref 0–0.85)
MONOCYTES NFR BLD AUTO: 11.9 % (ref 0–13.4)
NEUTROPHILS # BLD AUTO: 2.48 K/UL (ref 1.82–7.42)
NEUTROPHILS NFR BLD: 46.4 % (ref 44–72)
NRBC # BLD AUTO: 0 K/UL
NRBC BLD-RTO: 0 /100 WBC
PLATELET # BLD AUTO: 134 K/UL (ref 164–446)
PMV BLD AUTO: 11.5 FL (ref 9–12.9)
POTASSIUM SERPL-SCNC: 4.3 MMOL/L (ref 3.6–5.5)
PROT SERPL-MCNC: 6.9 G/DL (ref 6–8.2)
RBC # BLD AUTO: 4.32 M/UL (ref 4.7–6.1)
SODIUM SERPL-SCNC: 143 MMOL/L (ref 135–145)
WBC # BLD AUTO: 5.4 K/UL (ref 4.8–10.8)

## 2018-11-29 PROCEDURE — 80053 COMPREHEN METABOLIC PANEL: CPT

## 2018-11-29 PROCEDURE — 85652 RBC SED RATE AUTOMATED: CPT

## 2018-11-29 PROCEDURE — 36415 COLL VENOUS BLD VENIPUNCTURE: CPT

## 2018-11-29 PROCEDURE — 85025 COMPLETE CBC W/AUTO DIFF WBC: CPT

## 2018-11-29 NOTE — TELEPHONE ENCOUNTER
Pts standing orders are .  Please order cbc, cmp and ESR.  The lab already trevin them this am...    Thank you

## 2018-12-03 ENCOUNTER — HOSPITAL ENCOUNTER (OUTPATIENT)
Dept: HOSPITAL 8 - RAD | Age: 68
Discharge: HOME | End: 2018-12-03
Attending: ORTHOPAEDIC SURGERY
Payer: MEDICARE

## 2018-12-03 ENCOUNTER — TELEPHONE (OUTPATIENT)
Dept: RHEUMATOLOGY | Facility: MEDICAL CENTER | Age: 68
End: 2018-12-03

## 2018-12-03 DIAGNOSIS — Z96.611: ICD-10-CM

## 2018-12-03 DIAGNOSIS — M25.511: Primary | ICD-10-CM

## 2018-12-03 DIAGNOSIS — R79.89 LFTS ABNORMAL: ICD-10-CM

## 2018-12-03 LAB — SYN CELLS COUNTED: 8

## 2018-12-03 PROCEDURE — 77002 NEEDLE LOCALIZATION BY XRAY: CPT

## 2018-12-03 PROCEDURE — 87075 CULTR BACTERIA EXCEPT BLOOD: CPT

## 2018-12-03 PROCEDURE — 20610 DRAIN/INJ JOINT/BURSA W/O US: CPT

## 2018-12-03 PROCEDURE — 89050 BODY FLUID CELL COUNT: CPT

## 2018-12-03 PROCEDURE — 87205 SMEAR GRAM STAIN: CPT

## 2018-12-03 PROCEDURE — 87102 FUNGUS ISOLATION CULTURE: CPT

## 2018-12-03 PROCEDURE — 85810 BLOOD VISCOSITY EXAMINATION: CPT

## 2018-12-03 PROCEDURE — 89060 EXAM SYNOVIAL FLUID CRYSTALS: CPT

## 2018-12-03 PROCEDURE — 87070 CULTURE OTHR SPECIMN AEROBIC: CPT

## 2018-12-04 NOTE — TELEPHONE ENCOUNTER
Please notify patient that we received the results of his blood tests and his liver test is elevated    Please do not drink any alcohol or take any Tylenol within 3 days of doing the blood tests as this can cause a false elevation of the liver test    I have reordered the liver tests, please do those before the next visit and again no alcohol including beer and no Tylenol within 3 days of doing the blood test.

## 2018-12-06 ENCOUNTER — SLEEP CENTER VISIT (OUTPATIENT)
Dept: SLEEP MEDICINE | Facility: MEDICAL CENTER | Age: 68
End: 2018-12-06
Payer: MEDICARE

## 2018-12-06 VITALS
WEIGHT: 189 LBS | RESPIRATION RATE: 15 BRPM | OXYGEN SATURATION: 96 % | DIASTOLIC BLOOD PRESSURE: 80 MMHG | HEART RATE: 79 BPM | BODY MASS INDEX: 29.66 KG/M2 | HEIGHT: 67 IN | SYSTOLIC BLOOD PRESSURE: 124 MMHG

## 2018-12-06 DIAGNOSIS — F51.04 CHRONIC INSOMNIA: ICD-10-CM

## 2018-12-06 DIAGNOSIS — G47.33 OSA (OBSTRUCTIVE SLEEP APNEA): ICD-10-CM

## 2018-12-06 PROCEDURE — 99203 OFFICE O/P NEW LOW 30 MIN: CPT | Performed by: FAMILY MEDICINE

## 2018-12-06 NOTE — PROGRESS NOTES
"     [facility]  Consult Note     Date: 12/9/2018 / Time: 1:06 PM    Patient ID:   Name:             Buster Medina   YOB: 1950  Age:                 68 y.o.  male   MRN:               [id number]      Thank you for requesting a sleep medicine consultation on Buster Medina at the sleep center. He presents today with the chief complaints of ARNULFO and to establish as a new pt. Pt was diagnosed with ARNULFO about 10 years and has been on CPAPsince then.The initial presenting symptoms were snoring and excessive daytime sleepiness (EDS). He is referred by Dr. Hamilton for evaluation and treatment of sleep disorder breathing.     HISTORY OF PRESENT ILLNESS:       At night,  Buster Medina goes to bed around 10 pm on weekdays and on the weekends. He gets out of bed at 7:30-8:30 am on weekdays and at on the weekends.He averages about 7.5-8 hrs of sleep on a good night. Pt has bad nights rare. He falls asleep within 30 minutes. He takes 2 mg of lorazepam and remeron 15 mg ever single night. He has anxiety disorder and currently not on any other medication.He awakens multiple times during the night due to no obvious reas. It takes him few min to hours to fall back asleep when he doesn't take his sleep aides.During that time He lies in bed and sometimes gets out of the bed and watches TV.      He is not aware of snoring/witnessed apneas/gasping or choking in sleep since he has been on CPAP.  He  denies any symptoms of restless legs syndrome such as an \"urge to move\"  He  legs in the evening or bedtime. He  denies any symptoms of narcolepsy such as sleep paralysis or cataplexy, or any symptoms to suggest parasomnias such as sleep walking or acting out of dreams. He  has not used any medications for the sleep problem.    Overall,  He does finds his sleep refreshing. When He  wakes up in the morning, He does not feel tired. In terms of  excessive daytime sleepiness,  He complains of sleepiness while  " watching TV and occasionally while driving. [place] sleepiness scale score is normal at  4/21.He does not take regular naps.       REVIEW OF SYSTEMS:       Constitutional: Denies fevers, Denies weight changes  Eyes: Denies changes in vision, no eye pain  Ears/Nose/Throat/Mouth: Denies nasal congestion or sore throat   Cardiovascular: Denies chest pain or palpitations   Respiratory: Denies shortness of breath , Denies cough  Gastrointestinal/Hepatic: Denies abdominal pain, nausea, vomiting, diarrhea, constipation or GI bleeding   Genitourinary: Denies bladder dysfunction, dysuria or frequency  Musculoskeletal/Rheum: Denies  joint pain and swelling   Skin/Breast: Denies rash  Neurological: Denies headache, confusion, memory loss or focal weakness/parasthesias  Psychiatric: denies mood disorder     Comprehensive review of systems form is reviewed with the patient and is attached in the EMR.     PMH:  has a past medical history of Anxiety; Arthritis; Cancer (HCC) (2007); Chickenpox; Depression; High cholesterol; Hyperlipidemia; Hypertension; Kidney stone; Mumps; Obesity; Pneumothorax; Restless leg syndrome; Sleep apnea; and Tonsillitis.  MEDS:   Current Outpatient Prescriptions:   •  lorazepam (ATIVAN) 2 MG tablet, Take 1 Tab by mouth every bedtime for 90 days., Disp: 15 Tab, Rfl: 0  •  celecoxib (CELEBREX) 200 MG Cap, TAKE 1 CAP BY MOUTH EVERY DAY., Disp: 90 Cap, Rfl: 0  •  lisinopril (PRINIVIL) 20 MG Tab, TAKE 1 TABLET BY MOUTH EVERY DAY IN THE EVENING, Disp: 90 Tab, Rfl: 0  •  mirtazapine (REMERON) 15 MG Tab, TAKE 1/2 TO 1 TABLET AT    BEDTIME, Disp: 90 Tab, Rfl: 0  •  atorvastatin (LIPITOR) 20 MG Tab, TAKE 1 TABLET DAILY, Disp: 90 Tab, Rfl: 0  •  atorvastatin (LIPITOR) 40 MG Tab, Take 1 Tab by mouth every day., Disp: 90 Tab, Rfl: 3  •  aspirin EC (ECOTRIN) 81 MG Tablet Delayed Response, Take 1 Tab by mouth every day., Disp: 30 Tab, Rfl: 0  •  omeprazole (PRILOSEC) 20 MG delayed-release capsule, Take 1 Cap by mouth  every day., Disp: 90 Cap, Rfl: 4  •  tadalafil (CIALIS) 5 MG tablet, Take 5 mg by mouth as needed for Erectile Dysfunction., Disp: , Rfl:   •  acyclovir (ZOVIRAX) 200 MG Cap, Take 800 mg by mouth 5 Times a Day., Disp: , Rfl:   •  mirtazapine (REMERON) 15 MG Tab, Take 0.5 Tabs by mouth every bedtime., Disp: 90 Tab, Rfl: 0  •  potassium chloride SA (K-DUR) 10 MEQ Tab CR, Take 10 mEq by mouth every day. TAKE 1 TAB BY MOUTH EVERY DAY., Disp: , Rfl: 0  •  celecoxib (CELEBREX) 200 MG Cap, Take 1 Cap by mouth every day., Disp: 90 Cap, Rfl: 0  •  Adalimumab (HUMIRA PEN-CROHNS STARTER) 40 MG/0.8ML Pen-injector Kit, Inject 0.8 mL as instructed 1 time daily as needed., Disp: , Rfl:   •  Multiple Vitamins-Minerals (MULTI COMPLETE PO), Take 1 Tab by mouth every day., Disp: , Rfl:   •  vitamin D, Ergocalciferol, (DRISDOL) 73262 units Cap capsule, Take 50,000 Units by mouth every 7 days., Disp: , Rfl:   •  Multiple Vitamins-Minerals (MELODY-DAY 1000 PO), Take 1 Tab by mouth every day., Disp: , Rfl:   •  Cyanocobalamin (B-12) 2500 MCG Tab, Take 1 Tab by mouth every day., Disp: , Rfl:   ALLERGIES: No Known Allergies  SURGHX:   Past Surgical History:   Procedure Laterality Date   • BLADDER BIOPSY WITH CYSTOSCOPY  1/3/2018    Procedure: BLADDER BIOPSY WITH CYSTOSCOPY/WITH UPPER TRACT WASHING;  Surgeon: El Manuel M.D.;  Location: [facility];  Service: Urology   • RETROGRADES Bilateral 1/3/2018    Procedure: RETROGRADES;  Surgeon: El Manuel M.D.;  Location: [facility];  Service: Urology   • PYELOGRAM Bilateral 1/3/2018    Procedure: PYELOGRAM;  Surgeon: El Manuel M.D.;  Location: [facility];  Service: Urology   • OTHER ORTHOPEDIC SURGERY  2015    shoulder replacement   • ABDOMINAL EXPLORATION     • ATHROPLASTY      right shoulder   • BLADDER BIOPSY WITH CYSTOSCOPY     • CHOLECYSTECTOMY     • EYE SURGERY      las   • GASTRIC BYPASS LAPAROSCOPIC     • HERNIA REPAIR     • OPEN REDUCTION    "    SOCHX:  reports that he has never smoked. He has never used smokeless tobacco. He reports that he drinks about 9.0 oz of alcohol per week . He reports that he uses drugs, including Marijuana..   FH:   Family History   Problem Relation Age of Onset   • Cancer Mother 80        lung cancer   • Arthritis Mother    • Diabetes Mother    • Anxiety disorder Mother    • Lung Disease Father 65   • Cancer Father    • Diabetes Father    • Hypertension Father    • Hyperlipidemia Father    • Hyperlipidemia Brother    • Arthritis Brother    • Other Brother         Thyroid tumor   • Sleep Apnea Brother    • No Known Problems Maternal Grandmother    • Cancer Maternal Grandfather    • No Known Problems Paternal Grandmother    • No Known Problems Paternal Grandfather    • Heart Disease Brother 49        smoker, overweight   • Hypertension Brother    • Depression Other    • Suicide Attempts Neg Hx    • Bipolar disorder Neg Hx    • Alcohol abuse Neg Hx    • Drug abuse Neg Hx            Physical Exam:  Vitals/ General Appearance:   Weight/BMI: Body mass index is 29.6 kg/m².  Blood pressure 124/80, pulse 79, resp. rate 15, height 1.702 m (5' 7\"), weight 85.7 kg (189 lb), SpO2 96 %.  Vitals:    12/06/18 1309   BP: 124/80   BP Location: Right arm   Patient Position: Sitting   BP Cuff Size: Adult   Pulse: 79   Resp: 15   SpO2: 96%   Weight: 85.7 kg (189 lb)   Height: 1.702 m (5' 7\")       Pt. is alert and oriented to time, place and person. Cooperative and in no apparent distress.       1. Head: Atraumatic, normocephalic.   2. Ears: Normal tympanic membrane and no discharge  3. Nose: No inferior turbinate hypertophy  4. Throat: Oropharynx appears crowded in that the palate is overhanging, has intact dentition and oral hygiene is fair.  5. Neck: Supple. No thyromegaly  6. Chest: Trachea central  7. Lungs auscultation: B/L good air entry, vesicular breath sounds, no wheezing/ronchi sounds  8. Heart auscultation: 1st and 2nd heart sounds " normal, regular rhythm. No murmur.  9.Extremities:  no clubbing, no pedal edema.   Peripheral pulses felt.  10. Skin: No rash  11. NEUROLOGICAL EXAMINATION: On neurological exam, the patient was alert and oriented x3. speech was clear and fluent without dysarthria.    INVESTIGATIONS:       ASSESSMENT AND PLAN     1. He  has symptoms of Obstructive Sleep Apnea (ARNULFO). He  has excessive daytime sleepiness that interferes with activites of daily living. The pathophysiology of ARNULFO and the increased risk of cardiovascular morbidity from untreated ARNULFO is discussed in detail with the patient     We have discussed diagnostic options including in-laboratory, attended polysomnography and home sleep testing. We have also discussed treatment options including airway pressurization, reconstructive otolaryngologic surgery, dental appliances and weight management.       Subsequently,treatment options will be discussed based on the diagnostic study. Meanwhile, He is urged to avoid supine sleep, weight gain and alcoholic beverages since all of these can worsen ARNULFO. He is cautioned against drowsy driving. If He feels sleepy while driving, He must pull over for a break/nap, rather than persist on the road, in the interest of He own safety and that of others on the road.    Plan  -  Changed CPAP pressure to ACPAP 9-15 cm due to elevated residual AHI on the current pressures  - due to elevated residual AHI and mostly central apneas, split night study is ordered today     3. Regarding treatment of other past medical problems and general health maintenance,  He is urged to follow up with PCP.

## 2018-12-12 ENCOUNTER — OFFICE VISIT (OUTPATIENT)
Dept: BEHAVIORAL HEALTH | Facility: CLINIC | Age: 68
End: 2018-12-12
Payer: MEDICARE

## 2018-12-12 DIAGNOSIS — F52.21 ERECTILE DYSFUNCTION OF NONORGANIC ORIGIN: ICD-10-CM

## 2018-12-12 DIAGNOSIS — F34.1 DYSTHYMIC DISORDER: Chronic | ICD-10-CM

## 2018-12-12 PROCEDURE — 90834 PSYTX W PT 45 MINUTES: CPT | Performed by: PSYCHOLOGIST

## 2018-12-13 ENCOUNTER — HOSPITAL ENCOUNTER (OUTPATIENT)
Dept: LAB | Facility: MEDICAL CENTER | Age: 68
End: 2018-12-13
Attending: INTERNAL MEDICINE
Payer: MEDICARE

## 2018-12-13 DIAGNOSIS — R79.89 LFTS ABNORMAL: ICD-10-CM

## 2018-12-13 LAB
ALT SERPL-CCNC: 44 U/L (ref 2–50)
AST SERPL-CCNC: 28 U/L (ref 12–45)

## 2018-12-13 PROCEDURE — 84460 ALANINE AMINO (ALT) (SGPT): CPT

## 2018-12-13 PROCEDURE — 36415 COLL VENOUS BLD VENIPUNCTURE: CPT

## 2018-12-13 PROCEDURE — 84450 TRANSFERASE (AST) (SGOT): CPT

## 2018-12-13 NOTE — BH THERAPY
[facility]  Therapy Progress Note    Patient Name: Buster Medina  Patient MRN: [id number]  Today's Date: 12/12/2018     Type of session:Individual psychotherapy  Length of session: 50 minutes  Persons in attendance:Patient    Subjective/New Info:The patient ID/Chief Complaint:  The patient is a 68 year old male, , .  The patient self-referred and voluntarily presented for an individual intake to address concerns related to relationship problems, sleep problems, and erectile dysfunction. Reviewed limits of confidentiality and [facility] policies; patient expressed understanding and agreed to voluntarily proceed with the evaluation and treatment.    Interval History: The patient's estimated global assessment of functioning indicates a mild level of difficulty with some problems in relationships. The patient is nonetheless functioning well and has some significant relationships.  The patient reports he continues to have struggles with his adult son in [facility] specifically related to spending.  Patient reports that his relationship is going well however he continues to report concerns about there not being enough passion in the relationship but recently his girlfriend has asked how can she improve the sexual relationship.  The patient also continues to be able to recognize that may be he is overvalued passion and relationships.  The main therapeutic interventions consisted of cognitive-behavioral techniques; an emphasis on self-monitoring of thoughts, emotions and behavior and Socratic questioning.    Therapeutic Intervention: Cognitive Behavioral Therapy    Planned Intervention:  Continue to focus on relationship issues and the patients schemas that interfere with his desires for stable interpersonal relationships with others.       Objective/Observations:    Patient did not present in acute distress. Patient was appropriately groomed. Patient was alert and  oriented x4. Eye contact was appropriate. No abnormalities in attention or concentration were noted. No abnormalities of movement present; psychomotor activity was normal. Speech was fluent and regular in rhythm, rate, volume, and tone. Thought processes Linear, Logical and Goal Directed. There was no evidence of thought disorder. No auditory or visual hallucinations. Long and short term memory appeared to be intact. Insight, judgment, and impulse control were deemed to be good.  Reported mood was “happy.” Affect was full-ranging and appropriate to thought content and conversation.  Patient denied past and current suicidal and homicidal ideation in plan, intent, and preparatory behavior.        Diagnoses:   1. Dysthymic disorder    2. Erectile dysfunction of nonorganic origin    RISK ASSESSMENT:   The patient denied any suicide-related ideation and/or behaviors and intent/plan, denied thoughts about death and dying both in session and during the period since last appointment, or past 2 weeks.    Risk Level: Not Currently at Clinically Significant Risk  Hospitalization is not deemed necessary at this time as the patient does not present a clear or imminent danger to self or others. No indication for pursuing higher level of care. Outpatient management is currently most appropriate and least restrictive level of care.      Current risk:   SUICIDE: Low   Homicide: Not applicable   Self-harm: Not applicable   Relapse: Not applicable   Other:    Safety Plan reviewed? No   If evidence of imminent risk is present, intervention/plan:         Treatment Goal(s)/Objective(s) addressed:    Goal: Improve overall mood      Get 7-8 hours of restful sleep every night using sleep hygiene handout   Develop strategies for thought distraction when ruminating on the past        Progress toward Treatment Goals: Moderate improvement    Plan:    1) Follow-up 3-4 weeks  2) Barriers to Learning:  No  3) Readiness to Learn:  Yes  4) Cultural  Concerns:  No  5) Patient voiced understanding of, and agreement with, plan and goals as annotated above.  6) Declare these services are medically necessary and appropriate to the patient’s diagnosis and needs  7) The [facility] of contact [facility] the [facility] regarding this evaluation is Dr. San, Psychologist.    Geraldo San III, Ed.D.  12/12/2018

## 2018-12-18 ENCOUNTER — SLEEP STUDY (OUTPATIENT)
Dept: SLEEP MEDICINE | Facility: MEDICAL CENTER | Age: 68
End: 2018-12-18
Attending: FAMILY MEDICINE
Payer: MEDICARE

## 2018-12-18 DIAGNOSIS — G47.33 OSA (OBSTRUCTIVE SLEEP APNEA): ICD-10-CM

## 2018-12-18 PROCEDURE — 95811 POLYSOM 6/>YRS CPAP 4/> PARM: CPT | Performed by: FAMILY MEDICINE

## 2018-12-19 NOTE — PROCEDURES
Technical summary: The patient underwent a split-night polysomnogram. This was a 16 channel montage study to include a 6 channel EEG, a 2 channel EOG, and chin EMG, left and right leg EMG, a snore channel, a nasal pressure transducer, and a nasal oral airflow semester and a CFLOW pressure transducer.   Respiratory effort was assessed with the use of a thoracic and abdominal monitor and overnight oximetry was obtained. Audio and video recordings were reviewed. This was a fully attended study and sleep stage scoring was performed. The test was technically adequate.    Scoring Criteria: A modification of the the AASM Manual for the Scoring of Sleep and Associated Events, 2012, was used.   Obstructive apnea was scored by cessation of airflow for at least 10 seconds with continuing respiratory effort.  Central apnea was scored by cessation of airflow for at least 10 seconds with no effort.  Hypopnea was scored by a 30% or more reduction in airflow for at least 10 seconds accompanied by an arterial oxygen desaturation of 3% or more.  (For Medicare patients, hypopneas were scored by a 30% or more reduction in airflow for at least 10 seconds accompanied by an arterial oxygen saturation of 4% or more, as required by their insurance, CMS.      General sleep summary:      Diagnostic:  During the overnight study, the sleep latency was 13 min which is normal. The REM latency was 131 which is increased. The total sleep time was 137 min and sleep efficiency was 83 % which is normal.  Sleep stage proportions showed no N3 and decreased REM.  In regards to sleep quality there was a sever degree of sleep fragmentation as shown by the arousal index of 80 an hour which is increased. The arousals were due to spontaneous arousal, respiratory events and PLMS.     Treatment: During the treatment portion of the study, REM was observed.The total sleep time was 307 min and sleep efficiency was 90 %.  Sleep stage proportions showed no N3 and  decreased REM.  In regards to sleep quality there was a moderate degree of sleep fragmentation as shown by the arousal index of 20/hr. The arousals were due to spontaneous arousal and respiratory events.       Respiratory summary:   Diagnostic: During the diagnostic portion of the the study, the patient demonstrated sever obstructive sleep apnea as shown by the apnea hypopnea index of 38.5/hr. There are a total of 24 apneas and 64 hypopneas. In the supine position the respiratory disturbance index was 0 an hour and in the non-supine position the respiratory disturbance index was 46.4 per hour. The O2 halle was 87% and the average SpO2 was 93 %. He spent 0.5 min of sleep time below 89% O2 saturation. There was snoring. The patient demonstrated a NSR with an average heartbeat of 55 bpm.     CPAP Titration:  Due to the significant number of obstructive respiratory events observed during the diagnostic portion of the study a CPAP titration trial was performed during the second half of the night. The CPAP pressure was initiated at 7 cm of water and the pressure was increased in an attempt to eliminate all sleep disordered breathing and snoring. CPAP was increased to 11  Cm before switching to BiPAP. The BiPAP was titrated between 13/9 cm to 16/12 cm. 73 % of the apneas were central apneas therefore AS was tried as well at EPAP 4/15 cm PS 2/20 cm. At this final pressure the patient was observed in the supine position and in the REM sleep stage.The apnea hypopnea index improved to 19.25/hr with improved O2 halle of  88%.He spent 0.2 min of sleep time below 89% O2 saturation Snoring was resolved.  The patient continued to demonstrate *sinus bradycardia  and the average HR was 49. The patient utilized P10 mask with heated humidification. The CPAP was well-tolerated and there was minimal air leaks. No supplemental oxygen was required.    Periodic limb movement summary:   Diagnostic:The patient demonstrated an severe degree of  periodic limb movements as shown by the PLM index of 52 per hour and moderate PLM arousal index of 27/hr.    Treatment:The patient demonstrated an moderate degree of periodic limb movements as shown by the PLM index of 36 per hour and mild PLM arousal index of 6/hr.  Impression:  1.  Sever obstructive sleep apnea with AHI of 38.5/hr and O2 halle 87 %. Due to severity of the disease he met the split study protocol. The titration started with CPAP 7 cm and the best tolerated was ASV EPAP 4/15 cm PS 2/20 cm cm. The AHI improved to 19/hr with improved O2 halle of 88% and average O2 saturation of 94 %.     2.  Complex sleep apnea   3.  PLMS   4.  Sinus bradycardia     Recommendations:  No definitive pressure can be extrapolated from the titration, I recommend dedicated ASv titration starting at ASV EPAP 5/15 cm PS 2/20 cm In some cases alternative treatment options may prove effective in resolving sleep apnea and these options include upper airway surgery, the use of a dental orthotic or weight loss and positional therapy. Clinical correlation is required. In general patients with sleep apnea are advised to avoid alcohol and sedatives and to not operate a motor vehicle while drowsy and are at a greater risk for cardiovascular disease.

## 2018-12-21 ENCOUNTER — PATIENT MESSAGE (OUTPATIENT)
Dept: SLEEP MEDICINE | Facility: MEDICAL CENTER | Age: 68
End: 2018-12-21

## 2018-12-21 DIAGNOSIS — G47.31 CENTRAL SLEEP APNEA: ICD-10-CM

## 2018-12-22 NOTE — TELEPHONE ENCOUNTER
----- Message from [facility] sent at 12/21/2018  2:23 PM PST -----  Regarding: FW: Test Result Question  Contact: [phone]      ----- Message -----  From: Buster Medina  Sent: 12/21/2018   2:16 PM  To: [facility]  Message Pool  Subject: Test Result Question                             Thank you very much for the rapid interpretation of my sleep study.    From what I can understand my symptoms are rather severe and will require moving to a ASV EPAP.  I will need a machine that is both easy to travel with as well as maximizes comfort. I wish to optimize these features and will cover any costs my insurances do not reimburse.    I do not see you again until early January.  In the interim would it be possible for your office to begin the approval and ordering process so that I can begin the improved therapy as soon as possible?    Best regards and happy holidays    Aaron

## 2018-12-24 NOTE — PATIENT COMMUNICATION
Cyndy Mancuso M.D.   You; Faustina Chandler 1 minute ago (1:23 PM)      Please notify pt that the ASV titration has been ordered. Also please schedule the appoint for the titration. Thank you  (Routing comment)

## 2018-12-27 ENCOUNTER — SLEEP STUDY (OUTPATIENT)
Dept: SLEEP MEDICINE | Facility: MEDICAL CENTER | Age: 68
End: 2018-12-27
Attending: FAMILY MEDICINE
Payer: MEDICARE

## 2018-12-27 DIAGNOSIS — G47.31 CENTRAL SLEEP APNEA: ICD-10-CM

## 2018-12-27 PROCEDURE — 95811 POLYSOM 6/>YRS CPAP 4/> PARM: CPT | Performed by: FAMILY MEDICINE

## 2018-12-29 NOTE — PROCEDURES
Technical summary: The patient underwent a CPAP titration.  This was a 16 channel montage study to include a 6 channel EEG, a 2 channel EOG, and chin EMG, left and right leg EMG, a snore channel, and a CFLOW pressure transducer.   Respiratory effort was assessed with the use of a thoracic and abdominal monitor and overnight oximetry was obtained. Audio and video recordings were reviewed. This was a fully attended study and sleep stage scoring was performed. The test was technically adequate.    General sleep summary:  During the overnight study, the sleep latency was 61 min which is prolonged. The REM latency was 46 min which is decreased. The total sleep time was 394 min and sleep efficiency was 79 % which is decreased.  Sleep stage proportions showed no N3 and decreased REM.  In regards to sleep quality there was a moderate degree of sleep fragmentation as shown by the arousal index of 19 an hour. The arousals were due to spontaneous arousal.    CPAP Titration:  The PAP titration was initiated with ASV EPAP 15/4 cm PS 20/2 cm of water and the pressure which was slowly titrated up in an attempt to eliminate sleep disordered breathing and snoring. The final pressure tested during the study was ASV EPAP 15/6 cm PS 15/2 cm water. The best tolerated pressure was ASV EPAP 15/5 cm PS 15/2 cm  and at this pressure the patient was observed in the non supine REM sleep stage. The apnea hypopnea index improved to 3.8 per hour and O2 halle 90%. The average O2 stauration was 94%. He spent 0 % of sleep time below 89% O2 saturation. Snoring was resolved. There were no significant periodic limb movements.  The patient demonstrated NSR with PVC and an average heart rate of 50 beats per minute.  There was no ventricular ectopy or tachyarrhythmias. The patient utilized small P10 mask with heated humidification. The CPAP was well-tolerated and there were minimal air leaks. No supplemental oxygen was required.    Impression:  1.   CSA  2sucessful ASV titration    Recommendations:  I recommend  ASV EPAP 15/5 cm PS 15/2 cm small P10 mask. Recommended 30 day compliance download to assess the efficacy of the recommended pressure and compliance for further outpatient monitoring and management of CPAP therapy. In some cases alternative treatment options may prove effective in resolving sleep apnea and these options include upper airway surgery, the use of a dental orthotic or weight loss and positional therapy. Clinical correlation is required. In general patients with sleep apnea are advised to avoid alcohol and sedatives and to not operate a motor vehicle while drowsy and are at a greater risk for cardiovascular disease.

## 2019-01-03 ENCOUNTER — APPOINTMENT (OUTPATIENT)
Dept: BEHAVIORAL HEALTH | Facility: CLINIC | Age: 69
End: 2019-01-03
Payer: MEDICARE

## 2019-01-04 ENCOUNTER — SLEEP CENTER VISIT (OUTPATIENT)
Dept: SLEEP MEDICINE | Facility: MEDICAL CENTER | Age: 69
End: 2019-01-04
Payer: MEDICARE

## 2019-01-04 VITALS
WEIGHT: 188 LBS | DIASTOLIC BLOOD PRESSURE: 78 MMHG | BODY MASS INDEX: 29.51 KG/M2 | HEIGHT: 67 IN | SYSTOLIC BLOOD PRESSURE: 128 MMHG | RESPIRATION RATE: 15 BRPM | HEART RATE: 77 BPM | OXYGEN SATURATION: 95 %

## 2019-01-04 DIAGNOSIS — G47.31 COMPLEX SLEEP APNEA SYNDROME: ICD-10-CM

## 2019-01-04 PROBLEM — G47.39 COMPLEX SLEEP APNEA SYNDROME: Status: ACTIVE | Noted: 2018-12-06

## 2019-01-04 PROCEDURE — 99213 OFFICE O/P EST LOW 20 MIN: CPT | Performed by: FAMILY MEDICINE

## 2019-01-04 NOTE — PROGRESS NOTES
[facility] Follow Up Note     Date: 1/4/2019 / Time: 12:08 PM    Patient ID:   Name:             Buster Medina   YOB: 1950  Age:                 68 y.o.  male   MRN:               [id number]      Thank you for requesting a sleep medicine consultation on Buster Medina at the sleep center. He presents today with the chief complaints of ARNULFO and SS follow up.     HISTORY OF PRESENT ILLNESS:       Pt is currently not on PAP therapy. He goes to sleep around 9:30 pm and wakes up around 7:30 am. He is getting about 8-9 hrs of sleep on a good night and about 3-4 hr of sleep on a bad night. The bad nights are about 3-4 per week. Bad nights are due to sleep maintenance issue. He is currently on lorezepam as a sleep aide however he is self weaning off of it. The symptoms of excessive daytime, snoring and gasping has continued.     He had split night study which showed Sever obstructive sleep apnea with AHI of 38.5/hr and O2 halle 87 %. Due to severity of the disease he met the split study protocol. The titration started with CPAP 7 cm and the best tolerated was ASV EPAP 4/15 cm PS 2/20 cm cm. The AHI improved to 19/hr with improved O2 halle of 88% and average O2 saturation of 94 %.     Subsequently he had dedicated ASV titration and the best tolerated pressure was ASV EPAP 5/15 cm PS 2/20 cm, the AHI improved to 3.8/hr and O2 halle 90 %. He was observed in REM sleep on this pressure.     SLEEP HISTORY   Sever obstructive sleep apnea with AHI of 38.5/hr and O2 halle 87 %. Due to severity of the disease he met the split study protocol. The titration started with CPAP 7 cm and the best tolerated was ASV EPAP 4/15 cm PS 2/20 cm cm. The AHI improved to 19/hr with improved O2 halle of 88% and average O2 saturation of 94 %.          REVIEW OF SYSTEMS:       Constitutional: Denies fevers, Denies weight changes  Eyes: Denies changes in vision, no eye pain  Ears/Nose/Throat/Mouth: Denies nasal  congestion or sore throat   Cardiovascular: Denies chest pain or palpitations   Respiratory: Denies shortness of breath , Denies cough  Gastrointestinal/Hepatic: Denies abdominal pain, nausea, vomiting, diarrhea, constipation or GI bleeding   Genitourinary: Denies bladder dysfunction, dysuria or frequency  Musculoskeletal/Rheum: Denies  joint pain and swelling   Skin/Breast: Denies rash,   Neurological: Denies headache, confusion, memory loss or focal weakness/parasthesias  Psychiatric: denies mood disorder     Comprehensive review of systems form is reviewed with the patient and is attached in the EMR.     PMH:  has a past medical history of Anxiety; Arthritis; Cancer (HCC) (2007); Chickenpox; Depression; High cholesterol; Hyperlipidemia; Hypertension; Kidney stone; Mumps; Obesity; Pneumothorax; Restless leg syndrome; Sleep apnea; and Tonsillitis.  MEDS:   Current Outpatient Prescriptions:   •  potassium chloride SA (K-DUR) 10 MEQ Tab CR, TAKE 1 TABLET BY MOUTH EVERY DAY, Disp: 90 Tab, Rfl: 0  •  lorazepam (ATIVAN) 2 MG tablet, Take 1 Tab by mouth every bedtime for 90 days., Disp: 15 Tab, Rfl: 0  •  celecoxib (CELEBREX) 200 MG Cap, TAKE 1 CAP BY MOUTH EVERY DAY., Disp: 90 Cap, Rfl: 0  •  lisinopril (PRINIVIL) 20 MG Tab, TAKE 1 TABLET BY MOUTH EVERY DAY IN THE EVENING, Disp: 90 Tab, Rfl: 0  •  mirtazapine (REMERON) 15 MG Tab, TAKE 1/2 TO 1 TABLET AT    BEDTIME, Disp: 90 Tab, Rfl: 0  •  atorvastatin (LIPITOR) 20 MG Tab, TAKE 1 TABLET DAILY, Disp: 90 Tab, Rfl: 0  •  atorvastatin (LIPITOR) 40 MG Tab, Take 1 Tab by mouth every day., Disp: 90 Tab, Rfl: 3  •  aspirin EC (ECOTRIN) 81 MG Tablet Delayed Response, Take 1 Tab by mouth every day., Disp: 30 Tab, Rfl: 0  •  omeprazole (PRILOSEC) 20 MG delayed-release capsule, Take 1 Cap by mouth every day., Disp: 90 Cap, Rfl: 4  •  acyclovir (ZOVIRAX) 200 MG Cap, Take 800 mg by mouth 5 Times a Day., Disp: , Rfl:   •  potassium chloride SA (K-DUR) 10 MEQ Tab CR, Take 10 mEq by  mouth every day. TAKE 1 TAB BY MOUTH EVERY DAY., Disp: , Rfl: 0  •  celecoxib (CELEBREX) 200 MG Cap, Take 1 Cap by mouth every day., Disp: 90 Cap, Rfl: 0  •  Adalimumab (HUMIRA PEN-CROHNS STARTER) 40 MG/0.8ML Pen-injector Kit, Inject 0.8 mL as instructed 1 time daily as needed., Disp: , Rfl:   •  Multiple Vitamins-Minerals (MULTI COMPLETE PO), Take 1 Tab by mouth every day., Disp: , Rfl:   •  vitamin D, Ergocalciferol, (DRISDOL) 49067 units Cap capsule, Take 50,000 Units by mouth every 7 days., Disp: , Rfl:   •  Multiple Vitamins-Minerals (MELODY-DAY 1000 PO), Take 1 Tab by mouth every day., Disp: , Rfl:   •  Cyanocobalamin (B-12) 2500 MCG Tab, Take 1 Tab by mouth every day., Disp: , Rfl:   •  tadalafil (CIALIS) 5 MG tablet, Take 5 mg by mouth as needed for Erectile Dysfunction., Disp: , Rfl:   •  mirtazapine (REMERON) 15 MG Tab, Take 0.5 Tabs by mouth every bedtime. (Patient not taking: Reported on 1/4/2019), Disp: 90 Tab, Rfl: 0  ALLERGIES: No Known Allergies  SURGHX:   Past Surgical History:   Procedure Laterality Date   • BLADDER BIOPSY WITH CYSTOSCOPY  1/3/2018    Procedure: BLADDER BIOPSY WITH CYSTOSCOPY/WITH UPPER TRACT WASHING;  Surgeon: El Manuel M.D.;  Location: SURGERY [facility];  Service: Urology   • RETROGRADES Bilateral 1/3/2018    Procedure: RETROGRADES;  Surgeon: El Manuel M.D.;  Location: [facility];  Service: Urology   • PYELOGRAM Bilateral 1/3/2018    Procedure: PYELOGRAM;  Surgeon: El Manuel M.D.;  Location: SURGERY [facility];  Service: Urology   • OTHER ORTHOPEDIC SURGERY  2015    shoulder replacement   • ABDOMINAL EXPLORATION     • ATHROPLASTY      right shoulder   • BLADDER BIOPSY WITH CYSTOSCOPY     • CHOLECYSTECTOMY     • EYE SURGERY      las   • GASTRIC BYPASS LAPAROSCOPIC     • HERNIA REPAIR     • OPEN REDUCTION       SOCHX:  reports that he has never smoked. He has never used smokeless tobacco. He reports that he drinks about 9.0 oz of alcohol per week  ". He reports that he uses drugs, including Marijuana..  FH:   Family History   Problem Relation Age of Onset   • Cancer Mother 80        lung cancer   • Arthritis Mother    • Diabetes Mother    • Anxiety disorder Mother    • Lung Disease Father 65   • Cancer Father    • Diabetes Father    • Hypertension Father    • Hyperlipidemia Father    • Hyperlipidemia Brother    • Arthritis Brother    • Other Brother         Thyroid tumor   • Sleep Apnea Brother    • No Known Problems Maternal Grandmother    • Cancer Maternal Grandfather    • No Known Problems Paternal Grandmother    • No Known Problems Paternal Grandfather    • Heart Disease Brother 49        smoker, overweight   • Hypertension Brother    • Depression Other    • Suicide Attempts Neg Hx    • Bipolar disorder Neg Hx    • Alcohol abuse Neg Hx    • Drug abuse Neg Hx          Physical Exam:  Vitals/ General Appearance:   Weight/BMI: Body mass index is 29.44 kg/m².  Blood pressure 128/78, pulse 77, resp. rate 15, height 1.702 m (5' 7\"), weight 85.3 kg (188 lb), SpO2 95 %.  Vitals:    01/04/19 1201   BP: 128/78   BP Location: Left arm   Patient Position: Sitting   BP Cuff Size: Adult   Pulse: 77   Resp: 15   SpO2: 95%   Weight: 85.3 kg (188 lb)   Height: 1.702 m (5' 7\")       Pt. is alert and oriented to time, place and person. Cooperative and in no apparent distress.       1. Head: Atraumatic, normocephalic.   2. Ears: Normal tympanic membrane and no discharge  3. Nose: No inferior turbinate hypertophy, no septal deviation, no polyp.   4. Throat: Oropharynx appears crowded in that the palate is overhanging (5. Neck: Supple. No thyromegaly  6. Chest: Trachea central, no spine deformity   7. Lungs auscultation: B/L good air entry, vesicular breath sounds, no adventitious sounds  8. Heart auscultation: 1st and 2nd heart sounds normal, regular rhythm. No appreciable murmur.  9.  Extremities: no clubbing, no pedal edema.  10. Skin: No rash  11. NEUROLOGICAL EXAMINATION: " On neurological exam, the patient was alert and oriented x3. speech was clear and fluent without dysarthria.      INVESTIGATIONS:           ASSESSMENT AND PLAN     1.complex Sleep Apnea (ARNULFO).The pathophysiology of sleep anea and the increased risk of cardiovascular morbidity from untreated sleep apnea is discussed in detail with the patient.     He is urged to avoid supine sleep, weight gain and alcoholic beverages since all of these can worsen sleep apnea. He is cautioned against drowsy driving. If He feels sleepy while driving, He must pull over for a break/nap, rather than persist on the road, in the interest of He own safety and that of others on the road.   Plan     - Auto CPAP vs overnight CPAP titration vs dental appliance and surgery was discussed . After informed discussion ASv titration starting at ASV EPAP 5/15 cm PS 2/20 cm   - he is weaning off of lorazepam and once apneas are well controlled and insomnia persist, will consider switching  lorazepam to ambien or other sleep aide.    - F/u in 6 weeks to assess the efficiacy of recommended pressure    - SS was reviewed and discussed with the pt   - compliance was reinforced     2. . Regarding treatment of other past medical problems and general health maintenance,  He is urged to follow up with PCP.

## 2019-01-07 DIAGNOSIS — E78.5 HYPERLIPIDEMIA, UNSPECIFIED HYPERLIPIDEMIA TYPE: ICD-10-CM

## 2019-01-07 RX ORDER — ATORVASTATIN CALCIUM 40 MG/1
40 TABLET, FILM COATED ORAL DAILY
Qty: 90 TAB | Refills: 3 | Status: SHIPPED | OUTPATIENT
Start: 2019-01-07 | End: 2019-03-01

## 2019-01-09 ENCOUNTER — HOSPITAL ENCOUNTER (OUTPATIENT)
Dept: HOSPITAL 8 - RAD | Age: 69
Discharge: HOME | End: 2019-01-09
Attending: NURSE PRACTITIONER
Payer: MEDICARE

## 2019-01-09 DIAGNOSIS — M19.072: Primary | ICD-10-CM

## 2019-01-09 PROCEDURE — 77002 NEEDLE LOCALIZATION BY XRAY: CPT

## 2019-01-09 PROCEDURE — 20605 DRAIN/INJ JOINT/BURSA W/O US: CPT

## 2019-01-11 ENCOUNTER — APPOINTMENT (OUTPATIENT)
Dept: MEDICAL GROUP | Age: 69
End: 2019-01-11
Payer: MEDICARE

## 2019-01-11 RX ORDER — MIRTAZAPINE 15 MG/1
7.5 TABLET, FILM COATED ORAL
Qty: 90 TAB | Refills: 0 | Status: SHIPPED | OUTPATIENT
Start: 2019-01-11 | End: 2019-03-01

## 2019-01-14 ENCOUNTER — APPOINTMENT (OUTPATIENT)
Dept: BEHAVIORAL HEALTH | Facility: CLINIC | Age: 69
End: 2019-01-14
Payer: MEDICARE

## 2019-01-15 RX ORDER — VALACYCLOVIR HYDROCHLORIDE 1 G/1
TABLET, FILM COATED ORAL
Qty: 24 TAB | Refills: 2 | Status: SHIPPED | OUTPATIENT
Start: 2019-01-15 | End: 2019-03-01

## 2019-01-22 ENCOUNTER — OFFICE VISIT (OUTPATIENT)
Dept: BEHAVIORAL HEALTH | Facility: CLINIC | Age: 69
End: 2019-01-22
Payer: MEDICARE

## 2019-01-22 DIAGNOSIS — F52.21 ERECTILE DYSFUNCTION OF NONORGANIC ORIGIN: ICD-10-CM

## 2019-01-22 DIAGNOSIS — F34.1 DYSTHYMIC DISORDER: Chronic | ICD-10-CM

## 2019-01-22 PROCEDURE — 90834 PSYTX W PT 45 MINUTES: CPT | Performed by: PSYCHOLOGIST

## 2019-01-22 NOTE — BH THERAPY
[facility]  Therapy Progress Note    Patient Name: Buster Medina  Patient MRN: [id number]  Today's Date: 1/22/2019     Type of session:Individual psychotherapy  Length of session: 50 minutes  Persons in attendance:Patient    Subjective/New Info:The patient ID/Chief Complaint:  The patient is a 68 year old male, , .  The patient self-referred and voluntarily presented for an individual intake to address concerns related to relationship problems, sleep problems, and erectile dysfunction. Reviewed limits of confidentiality and [facility] policies; patient expressed understanding and agreed to voluntarily proceed with the evaluation and treatment.    Interval History: The patient's estimated global assessment of functioning indicates a mild level of difficulty with some problems in relationships. The patient is nonetheless functioning well and has some significant relationships.  Patient continues to have ongoing difficulties with his adult son in [facility] including increased level of manipulation surrounding money.  Patient reports that his relationship with his girlfriend is going well and that he is happy the fact that she started to ask about ways to increase the level of passion within the relationship.  Assisted the patient using problem solving to identify alternatives in his interactions with his adult son.  At this point time the patient recognizes there is limited other activities he could do to support his adult son's behavior and that the level of manipulation related to finances suggest that his level performance may be significantly better.    Therapeutic Intervention: Cognitive Behavioral Therapy    Planned Intervention:  Continue to focus on relationship issues and the patients schemas that interfere with his desires for stable interpersonal relationships with others.       Objective/Observations:    Patient did not present in acute distress.  Patient was appropriately groomed. Patient was alert and oriented x4. Eye contact was appropriate. No abnormalities in attention or concentration were noted. No abnormalities of movement present; psychomotor activity was normal. Speech was fluent and regular in rhythm, rate, volume, and tone. Thought processes Linear, Logical and Goal Directed. There was no evidence of thought disorder. No auditory or visual hallucinations. Long and short term memory appeared to be intact. Insight, judgment, and impulse control were deemed to be good.  Reported mood was “euthymic.” Affect was full-ranging and appropriate to thought content and conversation.  Patient denied past and current suicidal and homicidal ideation in plan, intent, and preparatory behavior.        Diagnoses:   1. Dysthymic disorder    2. Erectile dysfunction of nonorganic origin    RISK ASSESSMENT:   The patient denied any suicide-related ideation and/or behaviors and intent/plan, denied thoughts about death and dying both in session and during the period since last appointment, or past 2 weeks.    Risk Level: Not Currently at Clinically Significant Risk  Hospitalization is not deemed necessary at this time as the patient does not present a clear or imminent danger to self or others. No indication for pursuing higher level of care. Outpatient management is currently most appropriate and least restrictive level of care.      Current risk:   SUICIDE: Low   Homicide: Not applicable   Self-harm: Not applicable   Relapse: Not applicable   Other:    Safety Plan reviewed? No   If evidence of imminent risk is present, intervention/plan:         Treatment Goal(s)/Objective(s) addressed:    Goal: Improve overall mood      Get 7-8 hours of restful sleep every night using sleep hygiene handout   Develop strategies for thought distraction when ruminating on the past        Progress toward Treatment Goals: Moderate improvement    Plan:    1) Follow-up 3-4 weeks  2) Barriers  to Learning:  No  3) Readiness to Learn:  Yes  4) Cultural Concerns:  No  5) Patient voiced understanding of, and agreement with, plan and goals as annotated above.  6) Declare these services are medically necessary and appropriate to the patient’s diagnosis and needs  7) The point of contact at the [facility] regarding this evaluation is Dr. San, Psychologist.    Geraldo San III, Ed.D.  1/22/2019

## 2019-02-07 ENCOUNTER — HOSPITAL ENCOUNTER (OUTPATIENT)
Facility: MEDICAL CENTER | Age: 69
End: 2019-02-07
Attending: UROLOGY | Admitting: UROLOGY
Payer: MEDICARE

## 2019-02-25 DIAGNOSIS — L40.50 PSORIATIC ARTHRITIS (HCC): ICD-10-CM

## 2019-02-25 DIAGNOSIS — R52 PAIN: ICD-10-CM

## 2019-02-26 RX ORDER — CELECOXIB 200 MG/1
200 CAPSULE ORAL DAILY
Qty: 90 CAP | Refills: 0 | Status: SHIPPED | OUTPATIENT
Start: 2019-02-26 | End: 2019-03-01

## 2019-02-28 ENCOUNTER — HOSPITAL ENCOUNTER (OUTPATIENT)
Dept: LAB | Facility: MEDICAL CENTER | Age: 69
End: 2019-02-28
Attending: UROLOGY
Payer: MEDICARE

## 2019-02-28 LAB
ANION GAP SERPL CALC-SCNC: 9 MMOL/L (ref 0–11.9)
BASOPHILS # BLD AUTO: 0.4 % (ref 0–1.8)
BASOPHILS # BLD: 0.03 K/UL (ref 0–0.12)
BUN SERPL-MCNC: 14 MG/DL (ref 8–22)
CALCIUM SERPL-MCNC: 9.8 MG/DL (ref 8.5–10.5)
CHLORIDE SERPL-SCNC: 107 MMOL/L (ref 96–112)
CO2 SERPL-SCNC: 27 MMOL/L (ref 20–33)
CREAT SERPL-MCNC: 0.95 MG/DL (ref 0.5–1.4)
EOSINOPHIL # BLD AUTO: 0.08 K/UL (ref 0–0.51)
EOSINOPHIL NFR BLD: 1 % (ref 0–6.9)
ERYTHROCYTE [DISTWIDTH] IN BLOOD BY AUTOMATED COUNT: 45.5 FL (ref 35.9–50)
GLUCOSE SERPL-MCNC: 79 MG/DL (ref 65–99)
HCT VFR BLD AUTO: 43.1 % (ref 42–52)
HGB BLD-MCNC: 14.2 G/DL (ref 14–18)
IMM GRANULOCYTES # BLD AUTO: 0.07 K/UL (ref 0–0.11)
IMM GRANULOCYTES NFR BLD AUTO: 0.9 % (ref 0–0.9)
LYMPHOCYTES # BLD AUTO: 2.11 K/UL (ref 1–4.8)
LYMPHOCYTES NFR BLD: 26.6 % (ref 22–41)
MCH RBC QN AUTO: 32.8 PG (ref 27–33)
MCHC RBC AUTO-ENTMCNC: 32.9 G/DL (ref 33.7–35.3)
MCV RBC AUTO: 99.5 FL (ref 81.4–97.8)
MONOCYTES # BLD AUTO: 0.94 K/UL (ref 0–0.85)
MONOCYTES NFR BLD AUTO: 11.9 % (ref 0–13.4)
NEUTROPHILS # BLD AUTO: 4.69 K/UL (ref 1.82–7.42)
NEUTROPHILS NFR BLD: 59.2 % (ref 44–72)
NRBC # BLD AUTO: 0 K/UL
NRBC BLD-RTO: 0 /100 WBC
PLATELET # BLD AUTO: 165 K/UL (ref 164–446)
PMV BLD AUTO: 12 FL (ref 9–12.9)
POTASSIUM SERPL-SCNC: 4 MMOL/L (ref 3.6–5.5)
PSA SERPL-MCNC: 1.21 NG/ML (ref 0–4)
RBC # BLD AUTO: 4.33 M/UL (ref 4.7–6.1)
SODIUM SERPL-SCNC: 143 MMOL/L (ref 135–145)
WBC # BLD AUTO: 7.9 K/UL (ref 4.8–10.8)

## 2019-02-28 PROCEDURE — 87086 URINE CULTURE/COLONY COUNT: CPT | Mod: GA

## 2019-02-28 PROCEDURE — 87186 SC STD MICRODIL/AGAR DIL: CPT

## 2019-02-28 PROCEDURE — 80048 BASIC METABOLIC PNL TOTAL CA: CPT

## 2019-02-28 PROCEDURE — 36415 COLL VENOUS BLD VENIPUNCTURE: CPT

## 2019-02-28 PROCEDURE — 85025 COMPLETE CBC W/AUTO DIFF WBC: CPT

## 2019-02-28 PROCEDURE — 81001 URINALYSIS AUTO W/SCOPE: CPT

## 2019-02-28 PROCEDURE — 87077 CULTURE AEROBIC IDENTIFY: CPT

## 2019-02-28 PROCEDURE — 84153 ASSAY OF PSA TOTAL: CPT

## 2019-03-01 ENCOUNTER — OFFICE VISIT (OUTPATIENT)
Dept: BEHAVIORAL HEALTH | Facility: CLINIC | Age: 69
End: 2019-03-01
Payer: MEDICARE

## 2019-03-01 VITALS
HEIGHT: 67 IN | HEART RATE: 76 BPM | RESPIRATION RATE: 16 BRPM | DIASTOLIC BLOOD PRESSURE: 76 MMHG | BODY MASS INDEX: 29.35 KG/M2 | WEIGHT: 187 LBS | SYSTOLIC BLOOD PRESSURE: 126 MMHG

## 2019-03-01 VITALS — WEIGHT: 188.27 LBS | BODY MASS INDEX: 29.55 KG/M2 | HEIGHT: 67 IN

## 2019-03-01 DIAGNOSIS — Z01.810 PRE-OPERATIVE CARDIOVASCULAR EXAMINATION: ICD-10-CM

## 2019-03-01 DIAGNOSIS — Z01.812 PRE-OPERATIVE LABORATORY EXAMINATION: ICD-10-CM

## 2019-03-01 DIAGNOSIS — F33.42 RECURRENT MAJOR DEPRESSIVE DISORDER, IN FULL REMISSION (HCC): ICD-10-CM

## 2019-03-01 LAB
APPEARANCE UR: CLEAR
BACTERIA #/AREA URNS HPF: ABNORMAL /HPF
BILIRUB UR QL STRIP.AUTO: NEGATIVE
COLOR UR: YELLOW
EKG IMPRESSION: NORMAL
EPI CELLS #/AREA URNS HPF: NEGATIVE /HPF
GLUCOSE UR STRIP.AUTO-MCNC: NEGATIVE MG/DL
HYALINE CASTS #/AREA URNS LPF: ABNORMAL /LPF
KETONES UR STRIP.AUTO-MCNC: ABNORMAL MG/DL
LEUKOCYTE ESTERASE UR QL STRIP.AUTO: ABNORMAL
MICRO URNS: ABNORMAL
NITRITE UR QL STRIP.AUTO: NEGATIVE
PH UR STRIP.AUTO: 5.5 [PH]
PROT UR QL STRIP: NEGATIVE MG/DL
RBC # URNS HPF: ABNORMAL /HPF
RBC UR QL AUTO: NEGATIVE
SP GR UR STRIP.AUTO: 1.02
UROBILINOGEN UR STRIP.AUTO-MCNC: 0.2 MG/DL
WBC #/AREA URNS HPF: ABNORMAL /HPF

## 2019-03-01 PROCEDURE — 99213 OFFICE O/P EST LOW 20 MIN: CPT | Performed by: PSYCHIATRY & NEUROLOGY

## 2019-03-01 PROCEDURE — 93005 ELECTROCARDIOGRAM TRACING: CPT

## 2019-03-01 PROCEDURE — 90833 PSYTX W PT W E/M 30 MIN: CPT | Performed by: PSYCHIATRY & NEUROLOGY

## 2019-03-01 PROCEDURE — 93010 ELECTROCARDIOGRAM REPORT: CPT | Performed by: INTERNAL MEDICINE

## 2019-03-01 RX ORDER — ATORVASTATIN CALCIUM 40 MG/1
40 TABLET, FILM COATED ORAL NIGHTLY
COMMUNITY
End: 2019-03-05 | Stop reason: HOSPADM

## 2019-03-01 RX ORDER — CELECOXIB 200 MG/1
200 CAPSULE ORAL EVERY MORNING
COMMUNITY
End: 2019-03-05 | Stop reason: HOSPADM

## 2019-03-01 RX ORDER — OMEPRAZOLE 20 MG/1
20 CAPSULE, DELAYED RELEASE ORAL
COMMUNITY
End: 2019-03-05 | Stop reason: HOSPADM

## 2019-03-01 ASSESSMENT — PATIENT HEALTH QUESTIONNAIRE - PHQ9
5. POOR APPETITE OR OVEREATING: 1 - SEVERAL DAYS
CLINICAL INTERPRETATION OF PHQ2 SCORE: 2
SUM OF ALL RESPONSES TO PHQ QUESTIONS 1-9: 7

## 2019-03-01 NOTE — BH THERAPY
[facility]  PSYCHIATRIC FOLLOW-UP NOTE    Name: Buster Medina  MRN: [id number]  : 1950  Age: 68 y.o.  Date of assessment: 3/1/2019  PCP: Stu Bright M.D.  Persons in attendance: Patient  Total face-to-face time: 30 minutes    REASON FOR VISIT/CHIEF COMPLAINT (as stated by Patient):  Buster Medina is a 68 y.o., White male, attending follow-up appointment for   Chief Complaint   Patient presents with   • Medication Management     The patient is seen today for follow up on his depression and anxiety. The patient stopped lorazepam few months ago.   .      HISTORY OF PRESENT ILLNESS:    This is a 69 yo male, retired,  lives with his girlfriend, seen today for follow up after five months. The patient stopped lorazepam and mirtazapine. The patient had sleep study and he is on C -Pap and it is helping him. The patients symptoms of depression improved. The patient stopped lorazepam. The patient talked about his anxiety and depression started in  and he was going through a divorce. The patient was treated with prozac and then changed to zoloft. The patient  again in  and he raised two children and his son suffers from ADHD.The patient saw a psychiatrist in  and he was started lorazepam for anxiety and insomnia. The patient  his second wife and he stopped antidepressant in . The patient moved to [place] and he saw Alejandrina and continued lorazepam but stopped it after sleep study. The patient stopped remeron too.     PSYCHOSOCIAL CHANGES SINCE PREVIOUS CONTACT:  The patient has bene sleeping well and he denied depression.    RESPONSE TO TREATMENT:  The patient stopped lorazepam and remeron.    MEDICATION SIDE EFFECTS:  Denied.    REVIEW OF SYSTEMS:        Constitutional positive - psoriatic arthritis.   Eyes negative   Ears/Nose/Mouth/Throat negative   Cardiovascular positive - bradycardia.hypertension   Respiratory positive - obstructive sleep  "apnea.   Gastrointestinal negative   Genitourinary positive - bladder cancer treated.   Muscular negative   Integumentary negative   Neurological positive - periodic limb movement sleep disorder   Endocrine positive - hyperlipidemia   Hematologic/Lymphatic negative       PSYCHIATRIC EXAMINATION/MENTAL STATUS  /76   Pulse 76   Resp 16   Ht 1.702 m (5' 7.01\")   Wt 84.8 kg (187 lb)   BMI 29.28 kg/m²   Participation: Active verbal participation, Attentive and Engaged  Grooming:Neat  Orientation: Alert and Fully Oriented  Eye contact: Good  Behavior:Calm   Mood: Euthymic  Affect: Flexible and Full range  Thought process: Logical and Goal-directed  Thought content:  Within normal limits  Speech: Rate within normal limits and Volume within normal limits  Perception:  Within normal limits  Memory:  No gross evidence of memory deficits  Insight: Good  Judgment: Good  Family/couple interaction observations:   Other:    Current risk:    Suicide: Low   Homicide: Low   Self-harm: Low  Relapse: Low  Other:   Crisis Safety Plan reviewed?Yes  If evidence of imminent risk is present, intervention/plan:    Medical Records/Labs/Diagnostic Tests Reviewed: yes.    Medical Records/Labs/Diagnostic Tests Ordered: none.    DIAGNOSTIC IMPRESSION(S):  1. Recurrent major depressive disorder, in full remission (HCC)           ASSESSMENT AND PLAN:  1. Major depression in full remission.  Stopped lorazepam and remeron.    2. We talked about termination of treatment.  Agreed to call if symptoms come back.     17 minutes were spent in psychotherapy.  (If greater than 16 minutes, add [id number] code)   Topics addressed in psychotherapy include:  Psycho education and cognitive clarifications.  We discussed his emotional regulation and distress tolerance.  Encouraged him to practice meditation.  Dhruv Cool M.D.                   "

## 2019-03-04 ENCOUNTER — HOSPITAL ENCOUNTER (OUTPATIENT)
Dept: RADIOLOGY | Facility: MEDICAL CENTER | Age: 69
End: 2019-03-04
Attending: UROLOGY
Payer: MEDICARE

## 2019-03-04 DIAGNOSIS — C67.9 MALIGNANT NEOPLASM OF URINARY BLADDER, UNSPECIFIED SITE (HCC): ICD-10-CM

## 2019-03-04 PROCEDURE — 76700 US EXAM ABDOM COMPLETE: CPT

## 2019-03-05 ENCOUNTER — PATIENT MESSAGE (OUTPATIENT)
Dept: SLEEP MEDICINE | Facility: MEDICAL CENTER | Age: 69
End: 2019-03-05

## 2019-03-06 ENCOUNTER — SLEEP CENTER VISIT (OUTPATIENT)
Dept: SLEEP MEDICINE | Facility: MEDICAL CENTER | Age: 69
End: 2019-03-06
Payer: MEDICARE

## 2019-03-06 VITALS
SYSTOLIC BLOOD PRESSURE: 126 MMHG | BODY MASS INDEX: 29.51 KG/M2 | TEMPERATURE: 97 F | OXYGEN SATURATION: 94 % | HEIGHT: 67 IN | HEART RATE: 74 BPM | WEIGHT: 188 LBS | DIASTOLIC BLOOD PRESSURE: 72 MMHG | RESPIRATION RATE: 16 BRPM

## 2019-03-06 DIAGNOSIS — G47.31 CENTRAL SLEEP APNEA: ICD-10-CM

## 2019-03-06 PROCEDURE — 99214 OFFICE O/P EST MOD 30 MIN: CPT | Performed by: NURSE PRACTITIONER

## 2019-03-06 RX ORDER — CELECOXIB 200 MG/1
200 CAPSULE ORAL DAILY
Qty: 90 CAP | Refills: 0 | Status: SHIPPED | OUTPATIENT
Start: 2019-03-06 | End: 2019-07-28 | Stop reason: SDUPTHER

## 2019-03-06 RX ORDER — OMEPRAZOLE 20 MG/1
20 CAPSULE, DELAYED RELEASE ORAL
COMMUNITY
Start: 2017-04-24 | End: 2019-09-05 | Stop reason: SDUPTHER

## 2019-03-06 RX ORDER — CELECOXIB 200 MG/1
200 CAPSULE ORAL
COMMUNITY
Start: 2017-06-28 | End: 2019-03-06 | Stop reason: SDUPTHER

## 2019-03-06 RX ORDER — ACYCLOVIR 400 MG/1
400 TABLET ORAL
COMMUNITY
Start: 2016-01-19 | End: 2019-03-21

## 2019-03-06 RX ORDER — ATORVASTATIN CALCIUM 20 MG/1
40 TABLET, FILM COATED ORAL
COMMUNITY
End: 2019-09-04 | Stop reason: SDUPTHER

## 2019-03-06 RX ORDER — FERROUS SULFATE 325(65) MG
TABLET ORAL
COMMUNITY
End: 2021-06-24

## 2019-03-06 RX ORDER — FLUTICASONE PROPIONATE 44 UG/1
2 AEROSOL, METERED RESPIRATORY (INHALATION) PRN
Status: ON HOLD | COMMUNITY
Start: 2016-12-05 | End: 2021-07-06

## 2019-03-06 RX ORDER — NITROFURANTOIN 25; 75 MG/1; MG/1
CAPSULE ORAL
Refills: 0 | COMMUNITY
Start: 2019-01-07 | End: 2019-03-21

## 2019-03-06 RX ORDER — ACYCLOVIR 50 MG/G
OINTMENT TOPICAL
COMMUNITY
Start: 2015-01-05 | End: 2019-03-21

## 2019-03-06 NOTE — PATIENT INSTRUCTIONS
1) Continue at [facility]  2) Clean mask and supplies weekly and change them as insurance allows  4) Vaccines: Up to date with   5) Return in about 3 months (around 6/6/2019), or or Dr. Bautista, for follow up with YOGI Nichols, Compliance, review of symptoms, if not sooner.

## 2019-03-06 NOTE — PROGRESS NOTES
CC:  Here for f/u sleep issues as listed below    HPI:   Butser presents today for follow up obstructive sleep apnea.  Original PSG from 10 years ago and was placed on CPAP therapy.  Past medical history of chronic insomnia hypertension, depression.    Apparently his AHI scores were elevated 40-60 per patient. Completed PSG indicated an AHI of 38.5 and low oxygenation of 87%.  Currently he is being treated with ASV @ EPAP 5-15, PS 2-15, max pressure [id number].  Compliance download from the dates 2/4/2019 - 3/5/2019 indicates he is wearing the device 76.7% for an avg of 7 hours and 5 minutes per night with a reduced AHI of 6.8. The last 2 weeks of sleep have been abnormal for him given he went on a trip. He is still trying to acclimate being home. He goes to bed early and wakes up early. He was able to wean himself from the Lorazepam without difficulty.  He does tolerate pressure and mask well.  He wakes up refreshed and is less tired throughout the day. They deny morning H/A. He is not sure if he sleeps better overall. He will continue to clean supplies weekly and change them as insurance allows.       Patient Active Problem List    Diagnosis Date Noted   • Complex sleep apnea syndrome 12/06/2018   • Dyslipidemia 09/21/2018   • Family history of coronary artery disease in brother 09/21/2018   • Familial hyperlipidemia 09/21/2018   • Psoriatic arthritis (HCC) 08/16/2018   • Dysthymic disorder 03/05/2018   • Erectile dysfunction of nonorganic origin 03/05/2018   • Hypokalemia 09/21/2017   • Malignant neoplasm of anterior wall of urinary bladder (HCC) 09/21/2017   • Essential hypertension 09/21/2017   • Need for vaccination 09/21/2017   • Major depressive disorder 09/21/2017   • Chronic insomnia 09/21/2017   • Plantar fasciitis 09/21/2017   • Gastroesophageal reflux disease without esophagitis 09/21/2017   • Benign nodular prostatic hyperplasia without lower urinary tract symptoms 09/21/2017   • Vitamin D deficiency  09/21/2017       Past Medical History:   Diagnosis Date   • Anxiety    • Arthritis     osteo   • Cancer (HCC) 2007    bladder   • Chickenpox     as a child   • Depression     depression   • High cholesterol    • Hyperlipidemia    • Hypertension     pt states  well controlled on meds   • Kidney stone    • Mumps     as a child    • Obesity    • Pneumothorax    • Restless leg syndrome    • Sleep apnea     uses cpap   • Tonsillitis        Past Surgical History:   Procedure Laterality Date   • BLADDER BIOPSY WITH CYSTOSCOPY  1/3/2018    Procedure: BLADDER BIOPSY WITH CYSTOSCOPY/WITH UPPER TRACT WASHING;  Surgeon: El Manuel M.D.;  Location: SURGERY [facility];  Service: Urology   • RETROGRADES Bilateral 1/3/2018    Procedure: RETROGRADES;  Surgeon: El Manuel M.D.;  Location: SURGERY [facility];  Service: Urology   • PYELOGRAM Bilateral 1/3/2018    Procedure: PYELOGRAM;  Surgeon: El Manuel M.D.;  Location: SURGERY [facility];  Service: Urology   • OTHER ORTHOPEDIC SURGERY  2015    shoulder replacement   • ABDOMINAL EXPLORATION     • ATHROPLASTY      right shoulder   • BLADDER BIOPSY WITH CYSTOSCOPY     • EYE SURGERY      lasik    • GASTRIC BYPASS LAPAROSCOPIC     • HERNIA REPAIR     • OPEN REDUCTION     • OTHER Left     thumb joint   • THORACOTOMY     • TURP-VAPOR         Family History   Problem Relation Age of Onset   • Cancer Mother 80        lung cancer   • Arthritis Mother    • Diabetes Mother    • Anxiety disorder Mother    • Lung Disease Father 65   • Cancer Father    • Diabetes Father    • Hypertension Father    • Hyperlipidemia Father    • Hyperlipidemia Brother    • Arthritis Brother    • Other Brother         Thyroid tumor   • Sleep Apnea Brother    • No Known Problems Maternal Grandmother    • Cancer Maternal Grandfather    • No Known Problems Paternal Grandmother    • No Known Problems Paternal Grandfather    • Heart Disease Brother 49        smoker, overweight   • Hypertension  Brother    • Depression Other    • Suicide Attempts Neg Hx    • Bipolar disorder Neg Hx    • Alcohol abuse Neg Hx    • Drug abuse Neg Hx        Social History     Social History   • Marital status: Single     Spouse name: N/A   • Number of children: N/A   • Years of education: N/A     Occupational History   • Not on file.     Social History Main Topics   • Smoking status: Never Smoker   • Smokeless tobacco: Never Used   • Alcohol use 8.4 oz/week     14 Glasses of wine per week      Comment: 2 per day   • Drug use: No      Comment:     • Sexual activity: Not Currently     Other Topics Concern   • Not on file     Social History Narrative   • No narrative on file       Current Outpatient Prescriptions   Medication Sig Dispense Refill   • celecoxib (CELEBREX) 200 MG Cap Take 1 Cap by mouth every day. 90 Cap 0   • nitrofurantoin monohydr macro (MACROBID) 100 MG Cap TAKE 1 CAP BY MOUTH TWICE DAILY  0   • atorvastatin (LIPITOR) 20 MG Tab Take 40 mg by mouth.     • omeprazole (PRILOSEC) 20 MG delayed-release capsule Take 20 mg by mouth.     • Adalimumab (HUMIRA PEN) 40 MG/0.8ML Pen-injector Kit INJECT ONE PEN (40 MG) SUBCUTANEOUSLY EVERY OTHER WEEK.     • fluticasone (FLOVENT HFA) 44 MCG/ACT Aerosol 2 Puffs by Nebulization route.     • ferrous sulfate 325 (65 Fe) MG tablet Take  by mouth.     • acyclovir (ZOVIRAX) 400 MG tablet Take 400 mg by mouth.     • acyclovir (ZOVIRAX) 5 % Ointment Apply  to affected area(s).     • lisinopril (PRINIVIL) 20 MG Tab TAKE 1 TABLET BY MOUTH EVERY DAY IN THE EVENING 90 Tab 2   • aspirin EC (ECOTRIN) 81 MG Tablet Delayed Response Take 81 mg by mouth every bedtime. 30 Tab 0   • potassium chloride SA (K-DUR) 10 MEQ Tab CR Take 10 mEq by mouth every bedtime. TAKE 1 TAB BY MOUTH EVERY DAY.  0   • Multiple Vitamins-Minerals (MULTI COMPLETE PO) Take 1 Tab by mouth 2 Times a Day.     • Multiple Vitamins-Minerals (MELODY-DAY 1000 PO) Take 1 Tab by mouth 2 Times a Day.     • Cyanocobalamin (B-12) 2500  "MCG Tab Take 1 Tab by mouth every morning.       No current facility-administered medications for this visit.           Allergies: Patient has no known allergies.      ROS   Gen: Denies fever, chills, unintentional weight loss, fatigue  Resp:Denies Dyspnea  CV: Denies chest pain, chest tightness  Sleep:Denies morning headache, insomnia,  snoring, gasping for air, apnea  Neuro: Denies frequent headaches, weakness, dizziness  See HPI.  All other systems reviewed and negative        Vital signs for this encounter:  Vitals:    03/06/19 1500 03/06/19 1521   Height:  1.702 m (5' 7\")   Weight:  85.3 kg (188 lb)   Weight % change since last entry.:  0 %   BP:  126/72   Pulse:  74   BMI (Calculated):  29.44   Resp:  16   Temp:  36.1 °C (97 °F)   TempSrc:  Temporal   Neck circumference: 94                    Physical Exam:   Gen:         Alert and oriented, No apparent distress.   Neck:        No Lymphadenopathy.  Lungs:     Clear to auscultation bilaterally.    CV:          Regular rate and rhythm. No murmurs, rubs or gallops.   Abd:         Soft non tender, non distended.            Ext:          No clubbing, cyanosis, edema.    Assessment   1. Central sleep apnea  DME Mask and Supplies       Patient is clinically stable and will proceed with following plan.     PLAN:   Patient Instructions   1) Continue ASV at EPAP 5-15, PS 2-15, max pressure [id number]  2) Clean mask and supplies weekly and change them as insurance allows  4) Vaccines: Up to date with Prevnar 13, Pneumovax 23  5) Return in about 3 months (around 6/6/2019), or or Dr. Bautista, for follow up with YOGI Nichols, Compliance, review of symptoms, if not sooner.        "

## 2019-03-10 ENCOUNTER — HOSPITAL ENCOUNTER (OUTPATIENT)
Dept: RADIOLOGY | Facility: MEDICAL CENTER | Age: 69
End: 2019-03-10
Attending: UROLOGY
Payer: MEDICARE

## 2019-03-10 DIAGNOSIS — D49.512 NEOPLASM OF LEFT KIDNEY: ICD-10-CM

## 2019-03-10 PROCEDURE — 700117 HCHG RX CONTRAST REV CODE 255: Performed by: UROLOGY

## 2019-03-10 PROCEDURE — A9585 GADOBUTROL INJECTION: HCPCS | Performed by: UROLOGY

## 2019-03-10 PROCEDURE — 74183 MRI ABD W/O CNTR FLWD CNTR: CPT

## 2019-03-10 RX ORDER — GADOBUTROL 604.72 MG/ML
8 INJECTION INTRAVENOUS ONCE
Status: COMPLETED | OUTPATIENT
Start: 2019-03-10 | End: 2019-03-10

## 2019-03-10 RX ADMIN — GADOBUTROL 8 ML: 604.72 INJECTION INTRAVENOUS at 18:18

## 2019-03-19 DIAGNOSIS — E87.6 HYPOKALEMIA: ICD-10-CM

## 2019-03-21 ENCOUNTER — OFFICE VISIT (OUTPATIENT)
Dept: CARDIOLOGY | Facility: MEDICAL CENTER | Age: 69
End: 2019-03-21
Payer: MEDICARE

## 2019-03-21 VITALS
HEIGHT: 67 IN | HEART RATE: 76 BPM | SYSTOLIC BLOOD PRESSURE: 122 MMHG | WEIGHT: 190 LBS | BODY MASS INDEX: 29.82 KG/M2 | DIASTOLIC BLOOD PRESSURE: 70 MMHG

## 2019-03-21 DIAGNOSIS — I35.8 AORTIC VALVE SCLEROSIS: ICD-10-CM

## 2019-03-21 DIAGNOSIS — C67.3 MALIGNANT NEOPLASM OF ANTERIOR WALL OF URINARY BLADDER (HCC): ICD-10-CM

## 2019-03-21 DIAGNOSIS — E78.5 DYSLIPIDEMIA: ICD-10-CM

## 2019-03-21 DIAGNOSIS — E78.49 FAMILIAL HYPERLIPIDEMIA: ICD-10-CM

## 2019-03-21 DIAGNOSIS — F52.21 ERECTILE DYSFUNCTION OF NONORGANIC ORIGIN: ICD-10-CM

## 2019-03-21 DIAGNOSIS — G47.33 OSA (OBSTRUCTIVE SLEEP APNEA): ICD-10-CM

## 2019-03-21 DIAGNOSIS — F32.0 CURRENT MILD EPISODE OF MAJOR DEPRESSIVE DISORDER WITHOUT PRIOR EPISODE (HCC): ICD-10-CM

## 2019-03-21 DIAGNOSIS — I34.0 NONRHEUMATIC MITRAL VALVE REGURGITATION: ICD-10-CM

## 2019-03-21 DIAGNOSIS — R09.89 BRUIT OF RIGHT CAROTID ARTERY: ICD-10-CM

## 2019-03-21 DIAGNOSIS — I51.7 LVH (LEFT VENTRICULAR HYPERTROPHY): ICD-10-CM

## 2019-03-21 DIAGNOSIS — I10 ESSENTIAL HYPERTENSION: ICD-10-CM

## 2019-03-21 DIAGNOSIS — E87.6 HYPOKALEMIA: ICD-10-CM

## 2019-03-21 DIAGNOSIS — Z82.49 FAMILY HISTORY OF CORONARY ARTERY DISEASE IN BROTHER: ICD-10-CM

## 2019-03-21 DIAGNOSIS — L40.50 PSORIATIC ARTHRITIS (HCC): ICD-10-CM

## 2019-03-21 DIAGNOSIS — I65.23 CAROTID ARTERY PLAQUE, BILATERAL: ICD-10-CM

## 2019-03-21 PROCEDURE — 99214 OFFICE O/P EST MOD 30 MIN: CPT | Performed by: INTERNAL MEDICINE

## 2019-03-21 RX ORDER — POTASSIUM CHLORIDE 750 MG/1
10 TABLET, EXTENDED RELEASE ORAL
Qty: 90 TAB | Refills: 0 | Status: SHIPPED | OUTPATIENT
Start: 2019-03-21 | End: 2019-03-25 | Stop reason: SDUPTHER

## 2019-03-21 ASSESSMENT — ENCOUNTER SYMPTOMS
BACK PAIN: 1
DEPRESSION: 1

## 2019-03-21 NOTE — LETTER
[facility]   [address], [address]  [place] [address]  Phone: [phone]  Fax: [phone]              Buster Medina  1950    Encounter Date: 3/21/2019    Fatou Hamilton M.D.          PROGRESS NOTE:  Subjective:   Chief Complaint:   Chief Complaint   Patient presents with   • HTN (Controlled)       Buster Medina is a 68 y.o. male who returns today for further management of risk factors for CAD.      He has hypertension, hyperlipidemia and psoriatic arthritis for which he takes Biologics.    Family history includes his brother having a heart attack at the age of 49.   He has been on cholesterol medication and meds for HTN since his 30s, partially familial.    His LDL was 113 on lipitor 20 mg so we increased to 40 mg.  On primary prevention aspirin.    Had gastric bypass and HTN improved, is gaining some weight back.    Echo showed mild concentric LVH, mildly dilated left atrium and moderate MR, no RVSP  Had a carotid bruit but only minimal plaque and no stenosis on ultrasound.  ABIs were normal.  Blood pressure controlled today.  On lisinopril only at this point.  ARNULFO, on CPAP.    He is not limited by chest pain, pressure or tightness. No significant dyspnea on exertion, orthopnea or lower extremity swelling. No significant palpitations, dizziness, or presyncope/syncope. No symptoms of leg claudication. Walks his dog for about 4 miles, slowly, takes 1 hour.    Brother had MI at 49.      DATA REVIEWED by me:  EKG 3/1/2019  Sinus bradycardia, rate 59, early R wave progression,    ECG 12-29-17  Sinus, 70, early R wave progression, normal variant    Echo 10/8/2018  EF 70%, mild concentric LVH, mildly dilated left atrium, moderate MR, no RVSP    MRA of the abdomen 3/10/2019  No aneurysm    Abdominal ultrasound 3/4/2018  No aneurysm    Carotid ultrasound 10/8/2018  Mild plaque, no stenosis bilaterally    Lower extremity REVA 9/21/2018  Left 1.19,  right 1.18    Most recent labs:     2/28/2019 hemoglobin 14.2, platelets 165, sodium 143, creatinine 0.95, LFTs normal    August 16, 2018 hemoglobin 14.5, platelets 149, sodium 141, potassium 4.6, BUN 24, creatinine 1.28 with a GFR 56, LFTs normal    June 8, 2018 total cholesterol 211, triglycerides 125, HDL 73,     Past Medical History:   Diagnosis Date   • Anxiety    • Arthritis     osteo   • Cancer (HCC) 2007    bladder   • Chickenpox     as a child   • Depression     depression   • High cholesterol    • Hyperlipidemia    • Hypertension     pt states  well controlled on meds   • Kidney stone    • Mumps     as a child    • Obesity    • Pneumothorax    • Restless leg syndrome    • Sleep apnea     uses cpap   • Tonsillitis      Past Surgical History:   Procedure Laterality Date   • BLADDER BIOPSY WITH CYSTOSCOPY  1/3/2018    Procedure: BLADDER BIOPSY WITH CYSTOSCOPY/WITH UPPER TRACT WASHING;  Surgeon: El Manuel M.D.;  Location: SURGERY [facility];  Service: Urology   • RETROGRADES Bilateral 1/3/2018    Procedure: RETROGRADES;  Surgeon: El Manuel M.D.;  Location: SURGERY [facility];  Service: Urology   • PYELOGRAM Bilateral 1/3/2018    Procedure: PYELOGRAM;  Surgeon: El Manuel M.D.;  Location: SURGERY [facility];  Service: Urology   • OTHER ORTHOPEDIC SURGERY  2015    shoulder replacement   • ABDOMINAL EXPLORATION     • ATHROPLASTY      right shoulder   • BLADDER BIOPSY WITH CYSTOSCOPY     • EYE SURGERY      lasik    • GASTRIC BYPASS LAPAROSCOPIC     • HERNIA REPAIR     • OPEN REDUCTION     • OTHER Left     thumb joint   • THORACOTOMY     • TURP-VAPOR       Family History   Problem Relation Age of Onset   • Cancer Mother 80        lung cancer   • Arthritis Mother    • Diabetes Mother    • Anxiety disorder Mother    • Lung Disease Father 65   • Cancer Father    • Diabetes Father    • Hypertension Father    • Hyperlipidemia Father    • Hyperlipidemia Brother    • Arthritis Brother      • Other Brother         Thyroid tumor   • Sleep Apnea Brother    • No Known Problems Maternal Grandmother    • Cancer Maternal Grandfather    • No Known Problems Paternal Grandmother    • No Known Problems Paternal Grandfather    • Heart Disease Brother 49        smoker, overweight   • Hypertension Brother    • Depression Other    • Suicide Attempts Neg Hx    • Bipolar disorder Neg Hx    • Alcohol abuse Neg Hx    • Drug abuse Neg Hx      Social History     Social History   • Marital status: Single     Spouse name: N/A   • Number of children: N/A   • Years of education: N/A     Occupational History   • Not on file.     Social History Main Topics   • Smoking status: Never Smoker   • Smokeless tobacco: Never Used   • Alcohol use 8.4 oz/week     14 Glasses of wine per week      Comment: 2 per day   • Drug use: No      Comment:     • Sexual activity: Not Currently     Other Topics Concern   • Not on file     Social History Narrative   • No narrative on file     No Known Allergies    Current Outpatient Prescriptions   Medication Sig Dispense Refill   • potassium chloride SA (K-DUR) 10 MEQ Tab CR Take 1 Tab by mouth every bedtime. 90 Tab 0   • celecoxib (CELEBREX) 200 MG Cap Take 1 Cap by mouth every day. 90 Cap 0   • atorvastatin (LIPITOR) 20 MG Tab Take 40 mg by mouth.     • omeprazole (PRILOSEC) 20 MG delayed-release capsule Take 20 mg by mouth.     • Adalimumab (HUMIRA PEN) 40 MG/0.8ML Pen-injector Kit INJECT ONE PEN (40 MG) SUBCUTANEOUSLY EVERY OTHER WEEK.     • fluticasone (FLOVENT HFA) 44 MCG/ACT Aerosol 2 Puffs by Nebulization route.     • ferrous sulfate 325 (65 Fe) MG tablet Take  by mouth.     • lisinopril (PRINIVIL) 20 MG Tab TAKE 1 TABLET BY MOUTH EVERY DAY IN THE EVENING 90 Tab 2   • aspirin EC (ECOTRIN) 81 MG Tablet Delayed Response Take 81 mg by mouth every bedtime. 30 Tab 0   • Multiple Vitamins-Minerals (MULTI COMPLETE PO) Take 1 Tab by mouth 2 Times a Day.     • Multiple Vitamins-Minerals (MELODY-DAY 1000  "PO) Take 1 Tab by mouth 2 Times a Day.     • Cyanocobalamin (B-12) 2500 MCG Tab Take 1 Tab by mouth every morning.       No current facility-administered medications for this visit.        Review of Systems   Musculoskeletal: Positive for back pain and joint pain.   Psychiatric/Behavioral: Positive for depression.     All others systems reviewed and negative.     Objective:     Blood pressure 122/70, pulse 76, height 1.702 m (5' 7\"), weight 86.2 kg (190 lb). Body mass index is 29.76 kg/m².    Physical Exam   General: No acute distress. Well nourished.  HEENT: EOM grossly intact, no scleral icterus, no pharyngeal erythema.   Neck:  No JVD, no bruits, trachea midline  CVS: RRR. Normal S1, S2. 1/6 USMAN  No LE edema.  2+ radial pulses, 2+ DP pulses  Resp: CTAB. No wheezing or crackles/rhonchi. Normal respiratory effort.  Abdomen: Soft, NT, no bonita hepatomegaly.  MSK/Ext: No clubbing or cyanosis.  Skin: Warm and dry, no rashes.  Neurological: CN III-XII grossly intact. No focal deficits.   Psych: A&O x 3, appropriate affect, good judgement  Physical exam performed today and unchanged compared to 9-21-18.      Assessment:     1. LVH (left ventricular hypertrophy)     2. Essential hypertension  Comp Metabolic Panel    Lipid Profile   3. Dyslipidemia  Comp Metabolic Panel    Lipid Profile   4. Family history of coronary artery disease in brother     5. ARNULFO (obstructive sleep apnea)     6. Aortic valve sclerosis     7. Malignant neoplasm of anterior wall of urinary bladder (HCC)     8. Hypokalemia     9. Erectile dysfunction of nonorganic origin     10. Psoriatic arthritis (HCC)     11. Current mild episode of major depressive disorder without prior episode (HCC)     12. Familial hyperlipidemia     13. Bruit of right carotid artery     14. Carotid artery plaque, bilateral     15. Nonrheumatic mitral valve regurgitation         Medical Decision Making:  Today's Assessment / Status / Plan:     -Monitor MR, repeat echo in "   -10 year risk of MI/CVA is over 15%, on ASA and statin, also has the FH and other risk factors  -Cont to monitor for progression of symptoms related to LVH, MR, Diast Dys or CAD  -Low threshold to get a stress test if he develops symptoms, treadmill stress echo  -RTC 1 year, CMP and lipids at his convenience before he returns      Return in about 1 year (around 3/21/2020).    It is my pleasure to participate in the care of Mr. Medina.  Please do not hesitate to contact me with questions or concerns.    Fatou Hamilton MD, [facility]  Cardiologist [facility]    Please note that this dictation was created using voice recognition software. I have made every reasonable attempt to correct obvious errors, but it is possible there are errors of grammar and possibly content that I did not discover before finalizing the note.      Stu Bright M.D.  [address] [place] [address] [place] [address]  VIA In Basket

## 2019-03-25 DIAGNOSIS — E87.6 HYPOKALEMIA: ICD-10-CM

## 2019-03-25 NOTE — TELEPHONE ENCOUNTER
Was the patient seen in the last year in this department? Yes   Does patient have an active prescription for medications requested? No     Received Request Via: Pharmacy

## 2019-03-26 RX ORDER — POTASSIUM CHLORIDE 750 MG/1
TABLET, EXTENDED RELEASE ORAL
Qty: 90 TAB | Refills: 0 | Status: SHIPPED | OUTPATIENT
Start: 2019-03-26 | End: 2019-09-05 | Stop reason: SDUPTHER

## 2019-04-09 ENCOUNTER — HOSPITAL ENCOUNTER (OUTPATIENT)
Dept: LAB | Facility: MEDICAL CENTER | Age: 69
End: 2019-04-09
Attending: FAMILY MEDICINE
Payer: MEDICARE

## 2019-04-09 ENCOUNTER — OFFICE VISIT (OUTPATIENT)
Dept: MEDICAL GROUP | Age: 69
End: 2019-04-09
Payer: MEDICARE

## 2019-04-09 VITALS
OXYGEN SATURATION: 97 % | BODY MASS INDEX: 29.29 KG/M2 | DIASTOLIC BLOOD PRESSURE: 84 MMHG | WEIGHT: 186.6 LBS | TEMPERATURE: 96.5 F | SYSTOLIC BLOOD PRESSURE: 120 MMHG | HEART RATE: 75 BPM | HEIGHT: 67 IN

## 2019-04-09 DIAGNOSIS — T14.8XXA BRUISING: ICD-10-CM

## 2019-04-09 DIAGNOSIS — H93.13 TINNITUS OF BOTH EARS: ICD-10-CM

## 2019-04-09 PROCEDURE — 99214 OFFICE O/P EST MOD 30 MIN: CPT | Performed by: FAMILY MEDICINE

## 2019-04-09 RX ORDER — MIRTAZAPINE 15 MG/1
7.5 TABLET, FILM COATED ORAL
COMMUNITY
Start: 2019-04-01 | End: 2020-07-16

## 2019-04-09 NOTE — PROGRESS NOTES
This medical record contains text that has been entered with the assistance of computer voice recognition and dictation software.  Therefore, it may contain unintended errors in text, spelling, punctuation, or grammar    Chief Complaint   Patient presents with   • Ringing in Ear     x2 months          Buster Medina is a 68 y.o. male here evaluation and management of: ringing in ear      HPI:     Tinnitus of both ears  NEW PROBLEM    The patient states that he is beginning to hear ringing in bilateral ears.  The ringing began several months ago.  He states that his girlfriend told him that he turns the TV on very long volumes.  He also finds himself talking very loud.  Denies any changes in vision, no off-balance issues, no headaches.  He denies any head trauma, no shortness of breath no weakness in any extremity.  No numbness or tingling anywhere.  He denies any chest pain no dyspnea on exertion, no PND no insomnia or depression    Bruising  Patient states that he is noticed that his bruises take longer to heal.  He is on a baby aspirin.  Denies any bleeding in the gums no blood in stool no dark tarry stool.  No vomiting of blood.    Current medicines (including changes today)  Current Outpatient Prescriptions   Medication Sig Dispense Refill   • mirtazapine (REMERON) 15 MG Tab      • KLOR-CON M10 10 MEQ Tab CR TAKE 1 TABLET AT BEDTIME 90 Tab 0   • celecoxib (CELEBREX) 200 MG Cap Take 1 Cap by mouth every day. 90 Cap 0   • atorvastatin (LIPITOR) 20 MG Tab Take 40 mg by mouth.     • omeprazole (PRILOSEC) 20 MG delayed-release capsule Take 20 mg by mouth.     • Adalimumab (HUMIRA PEN) 40 MG/0.8ML Pen-injector Kit INJECT ONE PEN (40 MG) SUBCUTANEOUSLY EVERY OTHER WEEK.     • fluticasone (FLOVENT HFA) 44 MCG/ACT Aerosol 2 Puffs by Nebulization route.     • ferrous sulfate 325 (65 Fe) MG tablet Take  by mouth.     • lisinopril (PRINIVIL) 20 MG Tab TAKE 1 TABLET BY MOUTH EVERY DAY IN THE EVENING 90 Tab 2   • aspirin  EC (ECOTRIN) 81 MG Tablet Delayed Response Take 81 mg by mouth every bedtime. 30 Tab 0   • Multiple Vitamins-Minerals (MULTI COMPLETE PO) Take 1 Tab by mouth 2 Times a Day.     • Multiple Vitamins-Minerals (MELODY-DAY 1000 PO) Take 1 Tab by mouth 2 Times a Day.     • Cyanocobalamin (B-12) 2500 MCG Tab Take 1 Tab by mouth every morning.       No current facility-administered medications for this visit.      He  has a past medical history of Anxiety; Arthritis; Cancer ([facility]) (2007); Chickenpox; Depression; High cholesterol; Hyperlipidemia; Hypertension; Kidney stone; Mumps; Obesity; Pneumothorax; Restless leg syndrome; Sleep apnea; and Tonsillitis.  He  has a past surgical history that includes bladder biopsy with cystoscopy; abdominal exploration; gastric bypass laparoscopic; eye surgery; hernia repair; athroplasty; open reduction; other orthopedic surgery (2015); bladder biopsy with cystoscopy (1/3/2018); retrogrades (Bilateral, 1/3/2018); pyelogram (Bilateral, 1/3/2018); thoracotomy; other (Left); and turp-vapor.  Social History   Substance Use Topics   • Smoking status: Never Smoker   • Smokeless tobacco: Never Used   • Alcohol use 8.4 oz/week     14 Glasses of wine per week      Comment: 2 per day     Social History     Social History Narrative   • No narrative on file     Family History   Problem Relation Age of Onset   • Cancer Mother 80        lung cancer   • Arthritis Mother    • Diabetes Mother    • Anxiety disorder Mother    • Lung Disease Father 65   • Cancer Father    • Diabetes Father    • Hypertension Father    • Hyperlipidemia Father    • Hyperlipidemia Brother    • Arthritis Brother    • Other Brother         Thyroid tumor   • Sleep Apnea Brother    • No Known Problems Maternal Grandmother    • Cancer Maternal Grandfather    • No Known Problems Paternal Grandmother    • No Known Problems Paternal Grandfather    • Heart Disease Brother 49        smoker, overweight   • Hypertension Brother    • Depression  "Other    • Suicide Attempts Neg Hx    • Bipolar disorder Neg Hx    • Alcohol abuse Neg Hx    • Drug abuse Neg Hx      Family Status   Relation Status   • Mo    • Fa    • Bro Alive   • MGMo    • MGFa    • PGMo    • PGFa    • Bro Alive   • OTHER (Not Specified)   • Neg Hx (Not Specified)         ROS    Please see hpi     All other systems reviewed and are negative     Objective:     /84 (BP Location: Left arm, Patient Position: Sitting, BP Cuff Size: Adult)   Pulse 75   Temp 35.8 °C (96.5 °F) (Temporal)   Ht 1.702 m (5' 7\")   Wt 84.6 kg (186 lb 9.6 oz)   SpO2 97%  Body mass index is 29.23 kg/m².  Physical Exam:    Constitutional: Alert, no distress.  Skin: Warm, dry, good turgor, no rashes in visible areas  Eye: Equal, round and reactive, conjunctiva clear, lids normal.  ENMT: Lips without lesions, good dentition, oropharynx clear.  Neck: Trachea midline, no masses, no thyromegaly. No cervical or supraclavicular lymphadenopathy.  Respiratory: Unlabored respiratory effort, lungs clear to auscultation, no wheezes, no ronchi.  Cardiovascular: Normal S1, S2, no murmur, no edema  Abdomen: Soft, non-tender, no masses, no hepatosplenomegaly.  Psych: Alert and oriented x3, normal affect and mood.          Assessment and Plan:   The following treatment plan was discussed      1. Tinnitus of both ears    We discussed the most common cause of tinnitus with hearing loss  We will refer to audiology    - REFERRAL TO AUDIOLOGY    2. Bruising    - PT AND PTT  - PLATELET FUNCTION; Future        Instructed to Follow up in clinic or ER for worsening symptoms, difficulty breathing, lack of expected recovery, or should new symptoms or problems arise.    Followup: Return in about 3 months (around 2019) for Reevaluation, labs.       Once again this medical record contains text that has been entered with the assistance of computer voice recognition and dictation software.  " Therefore, it may contain unintended errors in text, spelling, punctuation, or grammar

## 2019-04-09 NOTE — ASSESSMENT & PLAN NOTE
NEW PROBLEM    The patient states that he is beginning to hear ringing in bilateral ears.  The ringing began several months ago.  He states that his girlfriend told him that he turns the TV on very long volumes.  He also finds himself talking very loud.  Denies any changes in vision, no off-balance issues, no headaches.  He denies any head trauma, no shortness of breath no weakness in any extremity.  No numbness or tingling anywhere.  He denies any chest pain no dyspnea on exertion, no PND no insomnia or depression

## 2019-04-09 NOTE — ASSESSMENT & PLAN NOTE
Patient states that he is noticed that his bruises take longer to heal.  He is on a baby aspirin.  Denies any bleeding in the gums no blood in stool no dark tarry stool.  No vomiting of blood.

## 2019-04-10 ENCOUNTER — OFFICE VISIT (OUTPATIENT)
Dept: BEHAVIORAL HEALTH | Facility: CLINIC | Age: 69
End: 2019-04-10
Payer: MEDICARE

## 2019-04-10 ENCOUNTER — HOSPITAL ENCOUNTER (OUTPATIENT)
Dept: LAB | Facility: MEDICAL CENTER | Age: 69
End: 2019-04-10
Attending: FAMILY MEDICINE
Payer: MEDICARE

## 2019-04-10 DIAGNOSIS — F32.0 CURRENT MILD EPISODE OF MAJOR DEPRESSIVE DISORDER WITHOUT PRIOR EPISODE (HCC): ICD-10-CM

## 2019-04-10 DIAGNOSIS — F34.1 DYSTHYMIC DISORDER: Chronic | ICD-10-CM

## 2019-04-10 DIAGNOSIS — I10 ESSENTIAL HYPERTENSION: ICD-10-CM

## 2019-04-10 DIAGNOSIS — E78.5 DYSLIPIDEMIA: ICD-10-CM

## 2019-04-10 DIAGNOSIS — T14.8XXA BRUISING: ICD-10-CM

## 2019-04-10 LAB
ALBUMIN SERPL BCP-MCNC: 4.1 G/DL (ref 3.2–4.9)
ALBUMIN/GLOB SERPL: 1.4 G/DL
ALP SERPL-CCNC: 59 U/L (ref 30–99)
ALT SERPL-CCNC: 41 U/L (ref 2–50)
ANION GAP SERPL CALC-SCNC: 9 MMOL/L (ref 0–11.9)
APTT PPP: 24.7 SEC (ref 24.7–36)
AST SERPL-CCNC: 29 U/L (ref 12–45)
BILIRUB SERPL-MCNC: 0.7 MG/DL (ref 0.1–1.5)
BUN SERPL-MCNC: 21 MG/DL (ref 8–22)
CALCIUM SERPL-MCNC: 9.3 MG/DL (ref 8.5–10.5)
CHLORIDE SERPL-SCNC: 105 MMOL/L (ref 96–112)
CHOLEST SERPL-MCNC: 236 MG/DL (ref 100–199)
CLOSURE TME COLL+ADP BLD: 101 SEC (ref 62–104)
CO2 SERPL-SCNC: 24 MMOL/L (ref 20–33)
CREAT SERPL-MCNC: 1.04 MG/DL (ref 0.5–1.4)
GLOBULIN SER CALC-MCNC: 3 G/DL (ref 1.9–3.5)
GLUCOSE SERPL-MCNC: 101 MG/DL (ref 65–99)
HDLC SERPL-MCNC: 75 MG/DL
INR PPP: 1.01 (ref 0.87–1.13)
LDLC SERPL CALC-MCNC: 126 MG/DL
MEDICATIONS NOTED 1688: ABNORMAL
PLT FUNCTION COL/EPI  1661: >300 SEC (ref 83–170)
POTASSIUM SERPL-SCNC: 4.2 MMOL/L (ref 3.6–5.5)
PROT SERPL-MCNC: 7.1 G/DL (ref 6–8.2)
PROTHROMBIN TIME: 13.4 SEC (ref 12–14.6)
SODIUM SERPL-SCNC: 138 MMOL/L (ref 135–145)
TRIGL SERPL-MCNC: 173 MG/DL (ref 0–149)

## 2019-04-10 PROCEDURE — 80061 LIPID PANEL: CPT

## 2019-04-10 PROCEDURE — 90834 PSYTX W PT 45 MINUTES: CPT | Performed by: PSYCHOLOGIST

## 2019-04-10 PROCEDURE — 85610 PROTHROMBIN TIME: CPT

## 2019-04-10 PROCEDURE — 36415 COLL VENOUS BLD VENIPUNCTURE: CPT

## 2019-04-10 PROCEDURE — 80053 COMPREHEN METABOLIC PANEL: CPT

## 2019-04-10 PROCEDURE — 85576 BLOOD PLATELET AGGREGATION: CPT | Mod: 91

## 2019-04-10 PROCEDURE — 85730 THROMBOPLASTIN TIME PARTIAL: CPT

## 2019-04-10 NOTE — BH THERAPY
[facility] Progress Note    Patient Name: Buster Medina  Patient MRN: [id number]  Today's Date: 4/10/2019     Type of session:Individual psychotherapy  Length of session: 50 minutes  Persons in attendance:Patient    Subjective/New Info:The patient ID/Chief Complaint:  The patient is a 68 year old male, , .  The patient self-referred and voluntarily presented for an individual intake to address concerns related to relationship problems, sleep problems, and erectile dysfunction. Reviewed limits of confidentiality and [facility] policies; patient expressed understanding and agreed to voluntarily proceed with the evaluation and treatment.    Interval History: The patient's estimated global assessment of functioning indicates a mild level of difficulty with some problems in relationships. The patient is nonetheless functioning well and has some significant relationships.  The patient reported growing frustration with his son and his disability at this point time he is needed to make a number of choices related to continuing to care for his 23-year-old adult child who has developmental disabilities.  Continued to encourage the patient to use problem solving to address his frustrations.    Therapeutic Intervention: Cognitive Behavioral Therapy    Planned Intervention:  Continue to focus on relationship issues and the patients schemas that interfere with his desires for stable interpersonal relationships with others.       Objective/Observations:    Patient did not present in acute distress. Patient was appropriately groomed. Patient was alert and oriented x4. Eye contact was appropriate. No abnormalities in attention or concentration were noted. No abnormalities of movement present; psychomotor activity was normal. Speech was fluent and regular in rhythm, rate, volume, and tone. Thought processes Linear, Logical and Goal Directed. There was no evidence of  thought disorder. No auditory or visual hallucinations. Long and short term memory appeared to be intact. Insight, judgment, and impulse control were deemed to be good.  Reported mood was “depressed.” Affect was full-ranging and appropriate to thought content and conversation.  Patient denied past and current suicidal and homicidal ideation in plan, intent, and preparatory behavior.    Psychometric Test Results:       BHM-20  Global Mental Health  Within Normal Limits  Well Being Scale  Mild Distress  Symptoms Scale  Within Normal Limits  Anxiety Subscale  Within Normal Limits  Depression Subscale  Mild Distress  Alcohol/Drug Subscale Within Normal Limits  Bipolar Subscale  Within Normal Limits  Eating Disorder Subscale Within Normal Limits  Harm to other Subscale Within Normal Limits  Suicide Monitoring Scale No Indication of Risk  Life Functioning Scale   Within Normal Limits        Diagnoses:   1. Dysthymic disorder    2. Current mild episode of major depressive disorder without prior episode (HCC)    RISK ASSESSMENT:   The patient denied any suicide-related ideation and/or behaviors and intent/plan, denied thoughts about death and dying both in session and during the period since last appointment, or past 2 weeks.    Risk Level: Not Currently at Clinically Significant Risk  Hospitalization is not deemed necessary at this time as the patient does not present a clear or imminent danger to self or others. No indication for pursuing higher level of care. Outpatient management is currently most appropriate and least restrictive level of care.      Current risk:   SUICIDE: Low   Homicide: Not applicable   Self-harm: Not applicable   Relapse: Not applicable   Other:    Safety Plan reviewed? No   If evidence of imminent risk is present, intervention/plan:         Treatment Goal(s)/Objective(s) addressed:    Goal: Improve overall mood      Get 7-8 hours of restful sleep every night using sleep hygiene handout   Develop  strategies for thought distraction when ruminating on the past        Progress toward Treatment Goals: Mild decline    Plan:    1) Follow-up 3-4 weeks  2) Barriers to Learning:  No  3) Readiness to Learn:  Yes  4) Cultural Concerns:  No  5) Patient voiced understanding of, and agreement with, plan and goals as annotated above.  6) Declare these services are medically necessary and appropriate to the patient’s diagnosis and needs  7) The point of contact [facility] the [facility] regarding this evaluation is Dr. San, Psychologist.    Geraldo San III, Ed.D.   4/10/2019

## 2019-05-03 ENCOUNTER — TELEPHONE (OUTPATIENT)
Dept: MEDICAL GROUP | Age: 69
End: 2019-05-03

## 2019-05-03 NOTE — TELEPHONE ENCOUNTER
Phone Number Called: [phone]    Message: Left VM for referral department asking to refax the referral documents     Left Message for patient to call back: yes

## 2019-05-03 NOTE — TELEPHONE ENCOUNTER
1. Caller Name: Buster Medina                                           Call Back Number: [phone] (home)         Patient approves a detailed voicemail message: N\A    Spoke with Pt and advised him to follow up with referral department at [phone]

## 2019-05-03 NOTE — TELEPHONE ENCOUNTER
VOICEMAIL  1. Caller Name: Buster Medina                        Call Back Number: [phone] (home)       2. Message: Pt left VM stating [facility] never received any of the referral information, so he is unable to schedule     3. Patient approves office to leave a detailed voicemail/MyChart message: yes

## 2019-05-22 ENCOUNTER — OFFICE VISIT (OUTPATIENT)
Dept: BEHAVIORAL HEALTH | Facility: CLINIC | Age: 69
End: 2019-05-22
Payer: MEDICARE

## 2019-05-22 DIAGNOSIS — F52.21 ERECTILE DYSFUNCTION OF NONORGANIC ORIGIN: ICD-10-CM

## 2019-05-22 DIAGNOSIS — F34.1 DYSTHYMIC DISORDER: Chronic | ICD-10-CM

## 2019-05-22 PROCEDURE — 90834 PSYTX W PT 45 MINUTES: CPT | Performed by: PSYCHOLOGIST

## 2019-05-22 NOTE — BH THERAPY
[facility]  Therapy Progress Note    Patient Name: Buster Medina  Patient MRN: [id number]  Today's Date: 5/22/2019     Type of session:Individual psychotherapy  Length of session: 50 minutes  Persons in attendance:Patient    Subjective/New Info:The patient ID/Chief Complaint:  The patient is a 68 year old male, , .  The patient self-referred and voluntarily presented for an individual intake to address concerns related to relationship problems, sleep problems, and erectile dysfunction. Reviewed limits of confidentiality and [facility] policies; patient expressed understanding and agreed to voluntarily proceed with the evaluation and treatment.    Interval History: Patient is doing well significant improvement in his marital relationship.  Patient does want to continue with occasional booster sessions as he feels like he is made significant progress over time with therapy.    Therapeutic Intervention: Cognitive Behavioral Therapy    Planned Intervention:  Continue to focus on relationship issues and the patients schemas that interfere with his desires for stable interpersonal relationships with others.       Objective/Observations:    Patient did not present in acute distress. Patient was appropriately groomed. Patient was alert and oriented x4. Eye contact was appropriate. No abnormalities in attention or concentration were noted. No abnormalities of movement present; psychomotor activity was normal. Speech was fluent and regular in rhythm, rate, volume, and tone. Thought processes Linear, Logical and Goal Directed. There was no evidence of thought disorder. No auditory or visual hallucinations. Long and short term memory appeared to be intact. Insight, judgment, and impulse control were deemed to be good.  Reported mood was “depressed.” Affect was full-ranging and appropriate to thought content and conversation.  Patient denied past and current suicidal and  homicidal ideation in plan, intent, and preparatory behavior.    Psychometric Test Results:       BHM-20  Global Mental Health  Within Normal Limits  Well Being Scale  Mild Distress  Symptoms Scale  Within Normal Limits  Anxiety Subscale  Within Normal Limits  Depression Subscale  Mild Distress  Alcohol/Drug Subscale Within Normal Limits  Bipolar Subscale  Within Normal Limits  Eating Disorder Subscale Within Normal Limits  Harm to other Subscale Within Normal Limits  Suicide Monitoring Scale No Indication of Risk  Life Functioning Scale   Within Normal Limits        Diagnoses:   1. Dysthymic disorder    2. Erectile dysfunction of nonorganic origin    RISK ASSESSMENT:   The patient denied any suicide-related ideation and/or behaviors and intent/plan, denied thoughts about death and dying both in session and during the period since last appointment, or past 2 weeks.    Risk Level: Not Currently at Clinically Significant Risk  Hospitalization is not deemed necessary at this time as the patient does not present a clear or imminent danger to self or others. No indication for pursuing higher level of care. Outpatient management is currently most appropriate and least restrictive level of care.      Current risk:   SUICIDE: Low   Homicide: Not applicable   Self-harm: Not applicable   Relapse: Not applicable   Other:    Safety Plan reviewed? No   If evidence of imminent risk is present, intervention/plan:         Treatment Goal(s)/Objective(s) addressed:    Goal: Improve overall mood      Get 7-8 hours of restful sleep every night using sleep hygiene handout   Develop strategies for thought distraction when ruminating on the past        Progress toward Treatment Goals: Mild decline    Plan:    1) Follow-up 6-8 weeks  2) Barriers to Learning:  No  3) Readiness to Learn:  Yes  4) Cultural Concerns:  No  5) Patient voiced understanding of, and agreement with, plan and goals as annotated above.  6) Declare these services are  medically necessary and appropriate to the patient’s diagnosis and needs  7) The point of contact at the [facility] regarding this evaluation is Dr. San, Psychologist.    Geraldo San III, Ed.D.   5/22/2019

## 2019-06-05 ENCOUNTER — SLEEP CENTER VISIT (OUTPATIENT)
Dept: SLEEP MEDICINE | Facility: MEDICAL CENTER | Age: 69
End: 2019-06-05
Payer: MEDICARE

## 2019-06-05 VITALS
BODY MASS INDEX: 29.03 KG/M2 | HEART RATE: 79 BPM | HEIGHT: 67 IN | DIASTOLIC BLOOD PRESSURE: 74 MMHG | WEIGHT: 185 LBS | RESPIRATION RATE: 16 BRPM | SYSTOLIC BLOOD PRESSURE: 140 MMHG | OXYGEN SATURATION: 95 %

## 2019-06-05 DIAGNOSIS — G47.31 CENTRAL SLEEP APNEA: ICD-10-CM

## 2019-06-05 PROCEDURE — 99214 OFFICE O/P EST MOD 30 MIN: CPT | Performed by: NURSE PRACTITIONER

## 2019-06-05 NOTE — PROGRESS NOTES
CC:  Here for f/u sleep issues as listed below    HPI:   Buster presents today for follow up obstructive sleep apnea.  Original PSG from 10 years ago with AHI scores between 40-60 per patient report.  Past medical history of chronic insomnia hypertension, depression.    Completed PSG from 2018 indicated an AHI of 38.5 and low oxygenation of 87%.  Currently he is being treated with ASV @ EPAP 5-15, PS 2-15, max pressure [id number]. Compliance download from the dates 5/6/2019 - 6/4/2019 indicates he is wearing the device 100% for an avg of 7 hours and 50 minutes per night with a reduced AHI of 7.3.  He was able to wean himself from the Lorazepam without difficulty.  He does tolerate pressure and mask well.  He wakes up refreshed and is less tired throughout the day. They deny morning H/A. He sleeps better overall. He will continue to clean supplies weekly and change them as insurance allows.          Patient Active Problem List    Diagnosis Date Noted   • Tinnitus of both ears 04/09/2019   • Bruising 04/09/2019   • Complex sleep apnea syndrome 12/06/2018   • Dyslipidemia 09/21/2018   • Family history of coronary artery disease in brother 09/21/2018   • Familial hyperlipidemia 09/21/2018   • Psoriatic arthritis (HCC) 08/16/2018   • Dysthymic disorder 03/05/2018   • Erectile dysfunction of nonorganic origin 03/05/2018   • Hypokalemia 09/21/2017   • Malignant neoplasm of anterior wall of urinary bladder (HCC) 09/21/2017   • Essential hypertension 09/21/2017   • Need for vaccination 09/21/2017   • Major depressive disorder 09/21/2017   • Chronic insomnia 09/21/2017   • Plantar fasciitis 09/21/2017   • Gastroesophageal reflux disease without esophagitis 09/21/2017   • Benign nodular prostatic hyperplasia without lower urinary tract symptoms 09/21/2017   • Vitamin D deficiency 09/21/2017       Past Medical History:   Diagnosis Date   • Anxiety    • Arthritis     osteo   • Cancer (HCC) 2007    bladder   • Chickenpox     as a  child   • Depression     depression   • High cholesterol    • Hyperlipidemia    • Hypertension     pt states  well controlled on meds   • Kidney stone    • Mumps     as a child    • Obesity    • Pneumothorax    • Restless leg syndrome    • Sleep apnea     uses cpap   • Tonsillitis        Past Surgical History:   Procedure Laterality Date   • BLADDER BIOPSY WITH CYSTOSCOPY  1/3/2018    Procedure: BLADDER BIOPSY WITH CYSTOSCOPY/WITH UPPER TRACT WASHING;  Surgeon: El Manuel M.D.;  Location: SURGERY [facility];  Service: Urology   • RETROGRADES Bilateral 1/3/2018    Procedure: RETROGRADES;  Surgeon: El Manuel M.D.;  Location: SURGERY [facility];  Service: Urology   • PYELOGRAM Bilateral 1/3/2018    Procedure: PYELOGRAM;  Surgeon: El Manuel M.D.;  Location: SURGERY [facility];  Service: Urology   • OTHER ORTHOPEDIC SURGERY  2015    shoulder replacement   • ABDOMINAL EXPLORATION     • ATHROPLASTY      right shoulder   • BLADDER BIOPSY WITH CYSTOSCOPY     • EYE SURGERY      lasik    • GASTRIC BYPASS LAPAROSCOPIC     • HERNIA REPAIR     • OPEN REDUCTION     • OTHER Left     thumb joint   • THORACOTOMY     • TURP-VAPOR         Family History   Problem Relation Age of Onset   • Cancer Mother 80        lung cancer   • Arthritis Mother    • Diabetes Mother    • Anxiety disorder Mother    • Lung Disease Father 65   • Cancer Father    • Diabetes Father    • Hypertension Father    • Hyperlipidemia Father    • Hyperlipidemia Brother    • Arthritis Brother    • Other Brother         Thyroid tumor   • Sleep Apnea Brother    • No Known Problems Maternal Grandmother    • Cancer Maternal Grandfather    • No Known Problems Paternal Grandmother    • No Known Problems Paternal Grandfather    • Heart Disease Brother 49        smoker, overweight   • Hypertension Brother    • Depression Other    • Suicide Attempts Neg Hx    • Bipolar disorder Neg Hx    • Alcohol abuse Neg Hx    • Drug abuse Neg Hx        Social  History     Social History   • Marital status: Single     Spouse name: N/A   • Number of children: N/A   • Years of education: N/A     Occupational History   • Not on file.     Social History Main Topics   • Smoking status: Never Smoker   • Smokeless tobacco: Never Used   • Alcohol use 8.4 oz/week     14 Glasses of wine per week      Comment: 2 per day   • Drug use: No      Comment:     • Sexual activity: Not Currently     Other Topics Concern   • Not on file     Social History Narrative   • No narrative on file       Current Outpatient Prescriptions   Medication Sig Dispense Refill   • HUMIRA PEN 40 MG/0.8ML Pen-injector Kit INJECT ONE PEN SUBCUTANEOUSLY EVERY 14 DAYS 6 Each 0   • mirtazapine (REMERON) 15 MG Tab      • KLOR-CON M10 10 MEQ Tab CR TAKE 1 TABLET AT BEDTIME 90 Tab 0   • celecoxib (CELEBREX) 200 MG Cap Take 1 Cap by mouth every day. 90 Cap 0   • atorvastatin (LIPITOR) 20 MG Tab Take 40 mg by mouth.     • omeprazole (PRILOSEC) 20 MG delayed-release capsule Take 20 mg by mouth.     • Adalimumab (HUMIRA PEN) 40 MG/0.8ML Pen-injector Kit INJECT ONE PEN (40 MG) SUBCUTANEOUSLY EVERY OTHER WEEK.     • fluticasone (FLOVENT HFA) 44 MCG/ACT Aerosol 2 Puffs by Nebulization route.     • ferrous sulfate 325 (65 Fe) MG tablet Take  by mouth.     • lisinopril (PRINIVIL) 20 MG Tab TAKE 1 TABLET BY MOUTH EVERY DAY IN THE EVENING 90 Tab 2   • aspirin EC (ECOTRIN) 81 MG Tablet Delayed Response Take 81 mg by mouth every bedtime. 30 Tab 0   • Multiple Vitamins-Minerals (MULTI COMPLETE PO) Take 1 Tab by mouth 2 Times a Day.     • Multiple Vitamins-Minerals (MELODY-DAY 1000 PO) Take 1 Tab by mouth 2 Times a Day.     • Cyanocobalamin (B-12) 2500 MCG Tab Take 1 Tab by mouth every morning.       No current facility-administered medications for this visit.           Allergies: Patient has no known allergies.      ROS   Gen: Denies fever, chills, unintentional weight loss, fatigue  Resp:Denies Dyspnea  CV: Denies chest pain, chest  "tightness  Sleep:Denies morning headache, insomnia, daytime somnolence, snoring, gasping for air, apnea  Neuro: Denies frequent headaches, weakness, dizziness  See HPI.  All other systems reviewed and negative        Vital signs for this encounter:  Vitals:    06/05/19 1331   Height: 1.702 m (5' 7\")   Weight: 83.9 kg (185 lb)   Weight % change since last entry.: 0 %   BP: 140/74   Pulse: 79   BMI (Calculated): 28.98   Resp: 16                   Physical Exam:   Gen:         Alert and oriented, No apparent distress.   Neck:        No Lymphadenopathy.  Lungs:     Clear to auscultation bilaterally.    CV:          Regular rate and rhythm. No murmurs, rubs or gallops.   Abd:         Soft non tender, non distended.            Ext:          No clubbing, cyanosis, edema.    Assessment   1. Central sleep apnea  DME Other    DME Mask and Supplies       Patient is clinically stable and will proceed with following plan.     PLAN:   Patient Instructions   1) Continue ASV at EPAP and change EPAP 6-15, PS 2-15, max pressure [id number]  2) Clean mask and supplies weekly and change them as insurance allows  4) Vaccines: Up to date with Prevnar 13, Pneumovax 23  5) Return in about 6 months (around 12/5/2019) for if not sooner, review of symptoms, follow up with YOGI Nichols.          "

## 2019-06-05 NOTE — PATIENT INSTRUCTIONS
1) Continue ASV at EPAP and change EPAP 6-15, PS 2-15, max pressure [id number]  2) Clean mask and supplies weekly and change them as insurance allows  4) Vaccines: Up to date with Prevnar 13, Pneumovax 23  5) Return in about 6 months (around 12/5/2019) for if not sooner, review of symptoms, follow up with YOGI Nichols.

## 2019-07-01 ENCOUNTER — TELEPHONE (OUTPATIENT)
Dept: MEDICAL GROUP | Age: 69
End: 2019-07-01

## 2019-07-01 RX ORDER — MIRTAZAPINE 15 MG/1
TABLET, FILM COATED ORAL
Qty: 45 TAB | Refills: 0 | Status: SHIPPED | OUTPATIENT
Start: 2019-07-01 | End: 2020-06-11 | Stop reason: SDUPTHER

## 2019-07-01 NOTE — TELEPHONE ENCOUNTER
"1. Caller Name: Nataliia                                         Call Back Number: [phone]      Patient approves a detailed voicemail message: N\A    Spoke with Nataliia at Dr. Kinney's ([facility]) office.  Pt is needing an Out of Network Exception form to be filled out by PCP.  Pt is going to the dental office for treatment of sleep apnea, a medical problem.  Pt's insurance requires this form be filled out by his PCP.  This is the only office in [facility] that can do this treatment, so they are trying to get it covered by Pt's in-network benefits.     Nataliia provided the needed information to get this approved:   \"Our Tax ID is [id number]  NPI:      Codes being requested:  57594, 79802, 21891, 31995, 21848, 05119, 61854, , 27320 and 10244    Diagnosis Codes:   G47.33, M26.10, M77.9\"  "

## 2019-07-01 NOTE — TELEPHONE ENCOUNTER
1. Caller Name: Tammy                                         Call Back Number:  [phone]      Patient approves a detailed voicemail message: N\A    Spoke with Tammy and initiated an Out Of Network Exception.  It will take about 5 days to process.  To check the status you can call them and use the reference “Robert MARTINEZ 5:50pm 7/1/19”

## 2019-07-15 ENCOUNTER — HOSPITAL ENCOUNTER (OUTPATIENT)
Dept: HOSPITAL 8 - RAD | Age: 69
Discharge: HOME | End: 2019-07-15
Attending: NURSE PRACTITIONER
Payer: MEDICARE

## 2019-07-15 DIAGNOSIS — Z88.8: ICD-10-CM

## 2019-07-15 DIAGNOSIS — M19.072: Primary | ICD-10-CM

## 2019-07-15 DIAGNOSIS — Z79.899: ICD-10-CM

## 2019-07-15 PROCEDURE — 77002 NEEDLE LOCALIZATION BY XRAY: CPT

## 2019-07-15 PROCEDURE — 20605 DRAIN/INJ JOINT/BURSA W/O US: CPT

## 2019-07-30 RX ORDER — CELECOXIB 200 MG/1
CAPSULE ORAL
Qty: 90 CAP | Refills: 2 | Status: SHIPPED | OUTPATIENT
Start: 2019-07-30 | End: 2020-07-20 | Stop reason: SDUPTHER

## 2019-09-04 DIAGNOSIS — E78.5 DYSLIPIDEMIA: Primary | ICD-10-CM

## 2019-09-04 RX ORDER — ATORVASTATIN CALCIUM 40 MG/1
40 TABLET, FILM COATED ORAL DAILY
Qty: 90 TAB | Refills: 3 | Status: SHIPPED | OUTPATIENT
Start: 2019-09-04 | End: 2020-09-23

## 2019-09-05 DIAGNOSIS — K21.9 GASTROESOPHAGEAL REFLUX DISEASE WITHOUT ESOPHAGITIS: ICD-10-CM

## 2019-09-05 DIAGNOSIS — E87.6 HYPOKALEMIA: ICD-10-CM

## 2019-09-05 NOTE — TELEPHONE ENCOUNTER
ADULT INFECTIOUS DISEASE CONSULT     Date of Service: 6/16/2018    Consult Requested By: Wiley Garcia M.D.    Reason for Consultation: Breast infection    History of Present Illness:   Pebbles Cosme is a 41 y.o. female with history of breast CA and bilateral mastectomy. She underwent expander reconstruction. On 5/16/2018 she had left breast wound and threatened implant exposure hence she  underwent excision of the wounds and local flap closures. The implant was removed . Her wound cultures were pseudomonas aeruginosa at that time. She presented to the emergency room with worsening of right breast pain for 5 days with associated redness swelling and warmth and fevers. In view of that infectious disease consult has been called    Review Of Systems:  Gen.-Complains of fevers. Denies any chills.  HEENT- denies any sore throat, headache or vision changes  Pulmonary- denies any cough, shortness of breath  Cardiovascular- denies any chest pain, leg swelling.    GI- denies any nausea vomiting diarrhea or abdominal pain  Musculoskeletal- complains of pain and swelling and redness of Right breast. The left breast seems to have healed.  Neuro- denies any weakness or sensory change  Psych- denies any depression or suicidal ideation  Genitourinary- denies any frequency or dysuria        PMH:   Past Medical History:   Diagnosis Date   • Anxiety    • Cancer (HCC) 11/2017    breast cancer   • Pain     edwar drains in place bilateral chest   • Psychiatric problem     anxiety         PSH:  Past Surgical History:   Procedure Laterality Date   • FLAP GRAFT Right 5/16/2018    Procedure: FLAP GRAFT-   ADJACENT TISSUE TRANSFER OR REARRANGEMENT, TRUNK;  Surgeon: Niki Osuna M.D.;  Location: SURGERY SAME DAY [facility];  Service: Plastics   • OTHER  04/2018    bilateral mastectomy   • OTHER  03/2018    lymph node dissection   • OTHER  10/11/2017    port placement   • OPEN REDUCTION      R foot   • OTHER      wisdom teeth  Was the patient seen in the last year in this department? Yes    Does patient have an active prescription for medications requested? No     Received Request Via: Pharmacy          removed       FAMILY HX:  Family History   Problem Relation Age of Onset   • Cancer Mother 45     ovarian ca   • Hypertension Father    • Heart Disease Father    • Hyperlipidemia Father        SOCIAL HX:  Social History     Social History   • Marital status: Single     Spouse name: N/A   • Number of children: N/A   • Years of education: N/A     Occupational History   • Not on file.     Social History Main Topics   • Smoking status: Former Smoker     Packs/day: 0.25     Types: Cigarettes   • Smokeless tobacco: Never Used   • Alcohol use Yes      Comment: Socially   • Drug use: No   • Sexual activity: Not Currently     Partners: Male     Other Topics Concern   • Not on file     Social History Narrative   • No narrative on file     History   Smoking Status   • Former Smoker   • Packs/day: 0.25   • Types: Cigarettes   Smokeless Tobacco   • Never Used     History   Alcohol Use   • Yes     Comment: Socially       Allergies/Intolerances:  No Known Allergies    History reviewed with the patient    Other Current Medications:    Current Facility-Administered Medications:   •  famotidine (PEPCID) tablet 20 mg, 20 mg, Oral, BID, Wiley Garcia M.D.  •  MD ALERT... vancomycin per pharmacy protocol, , Other, pharmacy to dose, Taye Hicks M.D.  •  senna-docusate (PERICOLACE or SENOKOT S) 8.6-50 MG per tablet 2 Tab, 2 Tab, Oral, BID, 2 Tab at 06/16/18 0940 **AND** polyethylene glycol/lytes (MIRALAX) PACKET 1 Packet, 1 Packet, Oral, QDAY PRN **AND** magnesium hydroxide (MILK OF MAGNESIA) suspension 30 mL, 30 mL, Oral, QDAY PRN **AND** bisacodyl (DULCOLAX) suppository 10 mg, 10 mg, Rectal, QDAY PRN, Taye Hicks M.D.  •  ondansetron (ZOFRAN) syringe/vial injection 4 mg, 4 mg, Intravenous, Q4HRS PRN, Taye Hicks M.D.  •  ondansetron (ZOFRAN ODT) dispertab 4 mg, 4 mg, Oral, Q4HRS PRN, Taye Hicks M.D.  •  promethazine (PHENERGAN) tablet 12.5-25 mg, 12.5-25 mg, Oral, Q4HRS PRN, Taye E Fabiola, M.D.  •  promethazine (PHENERGAN)  suppository 12.5-25 mg, 12.5-25 mg, Rectal, Q4HRS PRN, Taye Hicks M.D.  •  prochlorperazine (COMPAZINE) injection 5-10 mg, 5-10 mg, Intravenous, Q4HRS PRN, Tyae Hicks M.D.  •  acetaminophen (TYLENOL) tablet 650 mg, 650 mg, Oral, Q6HRS PRN, Taye Hicks M.D., 650 mg at 06/16/18 0938  •  Notify provider if pain remains uncontrolled, , , CONTINUOUS **AND** Use the numeric rating scale (NRS-11) on regular floors and Critical-Care Pain Observation Tool (CPOT) on ICUs/Trauma to assess pain, , , CONTINUOUS **AND** Pulse Ox (Oximetry), , , CONTINUOUS **AND** Pharmacy Consult Request ...Pain Management Review, , Other, PRN **AND** If patient difficult to arouse and/or has respiratory depression, stop any opiates that are currently infusing and call a Rapid Response., , , CONTINUOUS **AND** oxyCODONE immediate-release (ROXICODONE) tablet 2.5 mg, 2.5 mg, Oral, Q3HRS PRN **AND** oxyCODONE immediate-release (ROXICODONE) tablet 5 mg, 5 mg, Oral, Q3HRS PRN, 5 mg at 06/16/18 0938 **AND** morphine (pf) 4 mg/ml injection 2 mg, 2 mg, Intravenous, Q3HRS PRN, Taye Hicks M.D.  •  [COMPLETED] piperacillin-tazobactam (ZOSYN) 4.5 g in  mL IVPB, 4.5 g, Intravenous, Once, Stopped at 06/15/18 1546 **AND** piperacillin-tazobactam (ZOSYN) 4.5 g in  mL IVPB, 4.5 g, Intravenous, Q8HRS, Taye Hicks M.D., Stopped at 06/16/18 1034  •  vancomycin 1,600 mg in  mL IVPB, 17 mg/kg, Intravenous, Q12HR, Taye Hicks M.D., Stopped at 06/16/18 0758  •  diphenhydrAMINE (BENADRYL) injection 25 mg, 25 mg, Intravenous, Q6HRS PRN, Bello Espinosa D.O., 25 mg at 06/16/18 0533  •  ALPRAZolam (XANAX) tablet 0.25 mg, 0.25 mg, Oral, TID PRN, Taye Hicks M.D.  •  ascorbic acid tablet 500 mg, 500 mg, Oral, DAILY, Taye Hicks M.D., 500 mg at 06/16/18 0938  •  multivitamin (THERAGRAN) tablet 1 Tab, 1 Tab, Oral, DAILY, Taye Hicks M.D., 1 Tab at 06/16/18 0939  •  omeprazole (PRILOSEC) capsule 20 mg, 20 mg, Oral, DAILY, Taye Hicks,  "M.D., 20 mg at 18 0939  •  enoxaparin (LOVENOX) inj 40 mg, 40 mg, Subcutaneous, DAILY, Taye Hicks M.D., 40 mg at 18 0941  •  lidocaine-prilocaine (EMLA) 2.5-2.5 % cream, , Topical, Once, Bello Espinosa D.O., Stopped at 06/15/18 7648  [unfilled]    Most Recent Vital Signs:  /66   Pulse 65   Temp 37.4 °C (99.3 °F)   Resp 18   Ht 1.626 m (5' 4\")   Wt 94.4 kg (208 lb 1.8 oz)   LMP 2017 (Approximate) Comment: last dose chemo 3/12/18.  SpO2 96%   Breastfeeding? No   BMI 35.72 kg/m²   Temp  Av.1 °C (98.8 °F)  Min: 36.5 °C (97.7 °F)  Max: 37.4 °C (99.4 °F)    Physical Exam:  General: Looks tired but nontoxic  HEENT: sclera anicteric, PERRL, EOMI, MMM, no oral lesions  Neck: supple, no lymphadenopathy  Chest: CTAB, no r/r/w, normal work of breathing. Left breast no erythema. Significant scarring. No lymphadenopathy in the axilla. Right breast implant present. Swelling and erythema on the outer upper quadrant extending to her axilla.  Cardiac: Regular, no murmurs no gallops heard  Abdomen: + bowel sounds, soft, non-tender, non-distended, no HSM  Extremities: No edema. No joint swelling.  Skin: no rashes or erythema of the right breast  Neuro: Alert and oriented times 3, non-focal exam    Pertinent Lab Results:  Recent Labs      06/15/18   1254  18   0522   WBC  6.3  5.9      Recent Labs      06/15/18   1254  18   0522   HEMOGLOBIN  11.3*  10.4*   HEMATOCRIT  34.2*  32.8*   MCV  86.8  87.9   MCH  28.7  27.9   PLATELETCT  335  349         Recent Labs      06/15/18   1254  18   0523   SODIUM  135  138   POTASSIUM  4.2  3.9   CHLORIDE  102  106   CO2  23  24   CREATININE  0.74  0.88        Recent Labs      06/15/18   1254   ALBUMIN  4.3        Pertinent Micro:  Results     Procedure Component Value Units Date/Time    BLOOD CULTURE [[id number]] Collected:  06/15/18 1315    Order Status:  Completed Specimen:  Blood from Peripheral Updated:  18 0739     " "Significant Indicator NEG     Source BLD     Site PERIPHERAL     Blood Culture No Growth    Note: Blood cultures are incubated for 5 days and  are monitored continuously.Positive blood cultures  are called to the RN and reported as soon as  they are identified.      Narrative:       2 of 2 blood culture x2  Sites order. Per Hospital Policy:  Only change Specimen Src: to \"Line\" if specified by physician  order.    BLOOD CULTURE [[id number]] Collected:  06/15/18 1254    Order Status:  Completed Specimen:  Blood from Peripheral Updated:  06/16/18 0739     Significant Indicator NEG     Source BLD     Site PERIPHERAL     Blood Culture No Growth    Note: Blood cultures are incubated for 5 days and  are monitored continuously.Positive blood cultures  are called to the RN and reported as soon as  they are identified.      Narrative:       1 of 2 for Blood Culture x 2 sites order. Per Hospital  Policy: Only change Specimen Src: to \"Line\" if specified by  physician order.        Blood Culture   Date Value Ref Range Status   06/15/2018   Preliminary    No Growth    Note: Blood cultures are incubated for 5 days and  are monitored continuously.Positive blood cultures  are called to the RN and reported as soon as  they are identified.          Studies:  Us-breast Limited-right    Result Date: 6/15/2018  6/15/2018 1:31 PM HISTORY/REASON FOR EXAM: Status post mastectomy reconstruction and breast expander. Inflammation. Possible cellulitis. Possible abscess. TECHNIQUE/EXAM DESCRIPTION AND NUMBER OF VIEWS: Right breast ultrasound. COMPARISON:   None FINDINGS:      Postoperative changes involving the right breast. Artifact consistent with expander. Soft tissue edema is identified consistent with inflammation possibly cellulitis. No organized fluid collection consistent with abscess identified.     Postoperative changes right breast. Soft tissue edema consistent with inflammation. No drainable abscess identified. These results were " given to the patient at the time of visit.   IMPRESSION:     Right breast infection  Likely  infection of the implant  Recent left infected implant      PLAN:   Pebbles Cosme is a 41 y.o. female with breast cancer on the left side. Status post bilateral mastectomy and implant. The implant on the left side had to be removed because of infection. Her cultures had grown Pseudomonas. Now she has infection of the right breast. It will be difficult to sterilize the implant since this is likely infected as well. It's all the more important to take out the implant because she is due for her radiation. Patient is currently on vancomycin and Zosyn. Continue with the Zosyn because of the Pseudomonas. Change vancomycin to Bactrim. I have reviewed all the records      Discussed with IM. Will continue to follow    Angela Francisco M.D.

## 2019-09-06 RX ORDER — POTASSIUM CHLORIDE 750 MG/1
10 TABLET, EXTENDED RELEASE ORAL DAILY
Qty: 90 TAB | Refills: 0 | Status: SHIPPED | OUTPATIENT
Start: 2019-09-06 | End: 2020-07-16

## 2019-09-06 RX ORDER — OMEPRAZOLE 20 MG/1
20 CAPSULE, DELAYED RELEASE ORAL DAILY
Qty: 30 CAP | Refills: 3 | Status: SHIPPED | OUTPATIENT
Start: 2019-09-06 | End: 2020-01-20

## 2019-09-17 ENCOUNTER — HOSPITAL ENCOUNTER (OUTPATIENT)
Dept: LAB | Facility: MEDICAL CENTER | Age: 69
End: 2019-09-17
Attending: INTERNAL MEDICINE
Payer: MEDICARE

## 2019-09-17 ENCOUNTER — OFFICE VISIT (OUTPATIENT)
Dept: RHEUMATOLOGY | Facility: MEDICAL CENTER | Age: 69
End: 2019-09-17
Payer: MEDICARE

## 2019-09-17 VITALS
OXYGEN SATURATION: 98 % | RESPIRATION RATE: 14 BRPM | TEMPERATURE: 97.5 F | BODY MASS INDEX: 30.56 KG/M2 | WEIGHT: 195.11 LBS | SYSTOLIC BLOOD PRESSURE: 130 MMHG | HEART RATE: 68 BPM | DIASTOLIC BLOOD PRESSURE: 78 MMHG

## 2019-09-17 DIAGNOSIS — Z79.620 ADALIMUMAB (HUMIRA) LONG-TERM USE: ICD-10-CM

## 2019-09-17 DIAGNOSIS — I10 ESSENTIAL HYPERTENSION: ICD-10-CM

## 2019-09-17 DIAGNOSIS — Z98.84 H/O GASTRIC BYPASS: ICD-10-CM

## 2019-09-17 DIAGNOSIS — Z79.899 LONG-TERM USE OF PLAQUENIL: ICD-10-CM

## 2019-09-17 DIAGNOSIS — L40.50 PSORIATIC ARTHRITIS (HCC): ICD-10-CM

## 2019-09-17 LAB
25(OH)D3 SERPL-MCNC: 78 NG/ML (ref 30–100)
ALBUMIN SERPL BCP-MCNC: 4.4 G/DL (ref 3.2–4.9)
ALBUMIN/GLOB SERPL: 1.5 G/DL
ALP SERPL-CCNC: 61 U/L (ref 30–99)
ALT SERPL-CCNC: 42 U/L (ref 2–50)
ANION GAP SERPL CALC-SCNC: 10 MMOL/L (ref 0–11.9)
AST SERPL-CCNC: 29 U/L (ref 12–45)
BASOPHILS # BLD AUTO: 0.7 % (ref 0–1.8)
BASOPHILS # BLD: 0.04 K/UL (ref 0–0.12)
BILIRUB SERPL-MCNC: 0.7 MG/DL (ref 0.1–1.5)
BUN SERPL-MCNC: 19 MG/DL (ref 8–22)
CALCIUM SERPL-MCNC: 9.7 MG/DL (ref 8.5–10.5)
CHLORIDE SERPL-SCNC: 103 MMOL/L (ref 96–112)
CO2 SERPL-SCNC: 25 MMOL/L (ref 20–33)
CREAT SERPL-MCNC: 1.13 MG/DL (ref 0.5–1.4)
EOSINOPHIL # BLD AUTO: 0.17 K/UL (ref 0–0.51)
EOSINOPHIL NFR BLD: 3 % (ref 0–6.9)
ERYTHROCYTE [DISTWIDTH] IN BLOOD BY AUTOMATED COUNT: 44.7 FL (ref 35.9–50)
ERYTHROCYTE [SEDIMENTATION RATE] IN BLOOD BY WESTERGREN METHOD: 4 MM/HOUR (ref 0–20)
GLOBULIN SER CALC-MCNC: 2.9 G/DL (ref 1.9–3.5)
GLUCOSE SERPL-MCNC: 111 MG/DL (ref 65–99)
HCT VFR BLD AUTO: 44.9 % (ref 42–52)
HGB BLD-MCNC: 14.4 G/DL (ref 14–18)
IMM GRANULOCYTES # BLD AUTO: 0.04 K/UL (ref 0–0.11)
IMM GRANULOCYTES NFR BLD AUTO: 0.7 % (ref 0–0.9)
LYMPHOCYTES # BLD AUTO: 2.06 K/UL (ref 1–4.8)
LYMPHOCYTES NFR BLD: 36.7 % (ref 22–41)
MCH RBC QN AUTO: 31.1 PG (ref 27–33)
MCHC RBC AUTO-ENTMCNC: 32.1 G/DL (ref 33.7–35.3)
MCV RBC AUTO: 97 FL (ref 81.4–97.8)
MONOCYTES # BLD AUTO: 0.69 K/UL (ref 0–0.85)
MONOCYTES NFR BLD AUTO: 12.3 % (ref 0–13.4)
NEUTROPHILS # BLD AUTO: 2.62 K/UL (ref 1.82–7.42)
NEUTROPHILS NFR BLD: 46.6 % (ref 44–72)
NRBC # BLD AUTO: 0 K/UL
NRBC BLD-RTO: 0 /100 WBC
PLATELET # BLD AUTO: 139 K/UL (ref 164–446)
PMV BLD AUTO: 11.7 FL (ref 9–12.9)
POTASSIUM SERPL-SCNC: 4.4 MMOL/L (ref 3.6–5.5)
PROT SERPL-MCNC: 7.3 G/DL (ref 6–8.2)
RBC # BLD AUTO: 4.63 M/UL (ref 4.7–6.1)
SODIUM SERPL-SCNC: 138 MMOL/L (ref 135–145)
WBC # BLD AUTO: 5.6 K/UL (ref 4.8–10.8)

## 2019-09-17 PROCEDURE — 82306 VITAMIN D 25 HYDROXY: CPT

## 2019-09-17 PROCEDURE — 99214 OFFICE O/P EST MOD 30 MIN: CPT | Performed by: INTERNAL MEDICINE

## 2019-09-17 PROCEDURE — 85652 RBC SED RATE AUTOMATED: CPT

## 2019-09-17 PROCEDURE — 82955 ASSAY OF G6PD ENZYME: CPT

## 2019-09-17 PROCEDURE — 36415 COLL VENOUS BLD VENIPUNCTURE: CPT

## 2019-09-17 PROCEDURE — 80053 COMPREHEN METABOLIC PANEL: CPT

## 2019-09-17 PROCEDURE — 85025 COMPLETE CBC W/AUTO DIFF WBC: CPT

## 2019-09-17 RX ORDER — HYDROXYCHLOROQUINE SULFATE 200 MG/1
TABLET, FILM COATED ORAL
Qty: 180 TAB | Refills: 1 | Status: SHIPPED | OUTPATIENT
Start: 2019-09-17 | End: 2020-07-16

## 2019-09-17 NOTE — PROGRESS NOTES
Chief Complaint- joint pain     Subjective:   Buster Medina is a 69 y.o. male here today for follow up of rheumatological issues     This is a follow-up visit for this patient who is seen in this clinic for psoriatic arthritis.  Patient was diagnosed with psoriatic arthritis about 2008 by rheumatology in [place] with predominant manifestations of psoriasis.  Patient previously had been doing Humira 40 mg subcu about every 2 to 3 weeks, patient now comes in today stating he is having more pain in his ankles and the lateral aspects of feet and also states he is having exacerbation of psoriasis especially in his scalp.  Patient denies any side effects from the medication, denies any unexplained weight loss, denies any fevers of unknown etiology, denies any GI upset, denies any rashes, denies any new joint swelling, denies recurrent infections.      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 6 Each 1   • hydroxychloroquine (PLAQUENIL) 200 MG Tab 1 tab po bid 180 Tab 1   • omeprazole (PRILOSEC) 20 MG delayed-release capsule Take 1 Cap by mouth every day. 30 Cap 3   • potassium chloride SA (KLOR-CON M10) 10 MEQ Tab CR Take 1 Tab by mouth every day.  90 Tab 0   • atorvastatin (LIPITOR) 40 MG Tab Take 1 Tab by mouth every day. 90 Tab 3   • celecoxib (CELEBREX) 200 MG Cap TAKE 1 CAPSULE DAILY 90 Cap 2   • mirtazapine (REMERON) 15 MG Tab TAKE 1/2 TABLET AT BEDTIME 45 Tab 0   • mirtazapine (REMERON) 15 MG Tab      • fluticasone (FLOVENT HFA) 44 MCG/ACT Aerosol 2 Puffs by Nebulization route.     • ferrous sulfate 325 (65 Fe) MG tablet Take  by mouth.     • lisinopril (PRINIVIL) 20 MG Tab TAKE 1 TABLET BY MOUTH EVERY DAY IN THE EVENING 90 Tab 2   • aspirin EC (ECOTRIN) 81 MG Tablet Delayed Response Take 81 mg by mouth every bedtime. 30 Tab 0   • Multiple Vitamins-Minerals (MULTI COMPLETE PO) Take 1 Tab by mouth 2 Times a Day.     • Cyanocobalamin (B-12) 2500 MCG Tab Take 1 Tab by mouth every morning.     • Multiple Vitamins-Minerals (MELODY-DAY 1000 PO) Take 1 Tab by mouth 2 Times a Day.       No current facility-administered medications for this visit.      He  has a past medical history of Anxiety, Arthritis, Cancer ([facility]) (2007), Chickenpox, Depression, High cholesterol, Hyperlipidemia, Hypertension, Kidney stone, Mumps, Obesity, Pneumothorax, Restless leg syndrome, Sleep apnea, and Tonsillitis.    ROS   Other than what is mentioned in HPI or physical exam, there is no history of headaches, double vision or blurred vision. No temporal tenderness or jaw claudication. No trouble swallowing difficulties or sore throats.  No chest complaints including chest pain, cough or sputum production. No GI complaints including nausea, vomiting, change in bowel habits, or past peptic ulcer disease. No history of blood in the stools. No urinary complaints including dysuria or frequency. No history of alopecia, photosensitivity, oral ulcerations, Raynaud's phenomena.       Objective:     /78   Pulse 68   Temp 36.4 °C (97.5 °F) (Temporal)   Resp 14   Wt 88.5 kg (195 lb 1.7 oz)   SpO2 98%  Body mass index is 30.56 kg/m².   Physical Exam:    Constitutional: Alert and oriented  X3, patient is talkative with good eye contact.Skin: Warm, dry, good turgor, mild psoriatic scale on the extensor surface of the left knee, mild psoriasis along the hairline in the back of the neck.Eye: Equal, round and reactive, conjunctiva clear, lids normal EOM intactENMT: Lips without lesions, good dentition, no oropharyngeal ulcers, moist buccal mucosa, pinna without deformityNeck: Trachea midline, no masses, no thyromegaly.Lymph:  No cervical lymphadenopathy, no axillary lymphadenopathy, no supraclavicular lymphadenopathyRespiratory: Unlabored respiratory effort, lungs clear to auscultation, no wheezes, no ronchi.Cardiovascular: Normal S1, S2, no murmur, no edema.Abdomen: Soft, non-tender, no masses, no hepatosplenomegaly.Psych: Alert and oriented x3, normal affect and mood.Neuro: Cranial nerves 2-12 are grossly intact, no loss of sensation LEExt:no joint laxity noted in bilateral arms, no joint laxity noted in bilateral legs, no dactylitis no sausage digits no crossover toes no splay toes no deformities of the hands, some mild tenderness palpation along the lateral aspect of the left ankle as well as the lateral aspect of bilateral feet    Lab Results   Component Value Date/Time    QNTTBGOLD Negative 08/16/2018 12:13 PM     Lab Results   Component Value Date/Time    HEPBCORIGM Negative 08/16/2018 12:13 PM    HEPBSAG Negative 08/16/2018 12:13 PM     Lab Results   Component Value Date/Time    HEPCAB Negative 08/16/2018 12:13 PM     Lab Results   Component Value Date/Time    SODIUM 138 04/10/2019 11:30 AM    POTASSIUM 4.2 04/10/2019 11:30 AM    CHLORIDE 105 04/10/2019 11:30 AM    CO2 24 04/10/2019 11:30 AM    GLUCOSE 101 (H) 04/10/2019 11:30 AM    BUN 21 04/10/2019 11:30 AM    CREATININE 1.04 04/10/2019 11:30 AM      Lab Results   Component Value Date/Time    WBC 7.9 02/28/2019 11:42 AM    RBC 4.33 (L) 02/28/2019 11:42 AM    HEMOGLOBIN 14.2 02/28/2019 11:42 AM    HEMATOCRIT 43.1 02/28/2019 11:42 AM    MCV 99.5  (H) 02/28/2019 11:42 AM    MCH 32.8 02/28/2019 11:42 AM    MCHC 32.9 (L) 02/28/2019 11:42 AM    MPV 12.0 02/28/2019 11:42 AM    NEUTSPOLYS 59.20 02/28/2019 11:42 AM    LYMPHOCYTES 26.60 02/28/2019 11:42 AM    MONOCYTES 11.90 02/28/2019 11:42 AM    EOSINOPHILS 1.00 02/28/2019 11:42 AM    BASOPHILS 0.40 02/28/2019 11:42 AM      Lab Results   Component Value Date/Time    CALCIUM 9.3 04/10/2019 11:30 AM    ASTSGOT 29 04/10/2019 11:30 AM    ALTSGPT 41 04/10/2019 11:30 AM    ALKPHOSPHAT 59 04/10/2019 11:30 AM    TBILIRUBIN 0.7 04/10/2019 11:30 AM    ALBUMIN 4.1 04/10/2019 11:30 AM    TOTPROTEIN 7.1 04/10/2019 11:30 AM     Lab Results   Component Value Date/Time    URICACID 8.5 (H) 08/16/2018 12:13 PM    RHEUMFACTN <10 03/21/2018 02:19 PM    CCPANTIBODY 4 03/21/2018 02:20 PM    ANTINUCAB None Detected 03/21/2018 02:19 PM     Lab Results   Component Value Date/Time    SEDRATEWES 14 11/29/2018 10:09 AM     Results for orders placed during the hospital encounter of 03/20/18   DX-FOOT-COMPLETE 3+ LEFT    Impression 1.  There is no radiographic evidence of inflammatory arthropathy.  2.  There is mild degenerative change at the 1st tarsometatarsal joint and tibiotalar joint.  3.  Question of subchondral cyst versus early erosive change aat the base of the 1st proximal phalanx.     Results for orders placed during the hospital encounter of 03/20/18   DX-HAND 3+ LEFT    Impression 1.  There is a questionable erosion versus subchondral cyst on the ulnar aspect of the base of the left 4th metacarpal.  2.  There is postoperative change and degenerative change involving the 1st and 2nd carpometacarpal junctions. There is a question of previous trapezium resection.     Assessment and Plan:     1. Psoriatic arthritis (HCC)  Possibly mild exacerbation, patient currently on Humira 40 mg subcu every 2 weeks, long discussion with patient about other options including Otezla or Cosentyx, we opted to do a DMARD in addition to the Humira, we  discussed DMARD such as methotrexate, leflunomide, Plaquenil, sulfasalazine we opted to do Plaquenil 200 mg p.o. twice daily  - CBC WITH DIFFERENTIAL; Future  - Comp Metabolic Panel; Future  - WESTERGREN SED RATE; Future  - VITAMIN D,25 HYDROXY; Future  - G6PD QUANT + RBC; Future  - Adalimumab 40 MG/0.4ML Pen-injector Kit; Inject 40 mg as instructed every 14 days.  Dispense: 6 Each; Refill: 1  - hydroxychloroquine (PLAQUENIL) 200 MG Tab; 1 tab po bid  Dispense: 180 Tab; Refill: 1    2. Adalimumab (Humira) long-term use  On Humira 40 mg subcu every 2 weeks, of note screening labs are up-to-date, patient needs monitoring labs every 6 months patient due for monitoring labs labs ordered for patient  - CBC WITH DIFFERENTIAL; Future  - Comp Metabolic Panel; Future  - WESTERGREN SED RATE; Future  - VITAMIN D,25 HYDROXY; Future  - G6PD QUANT + RBC; Future  - Adalimumab 40 MG/0.4ML Pen-injector Kit; Inject 40 mg as instructed every 14 days.  Dispense: 6 Each; Refill: 1  - hydroxychloroquine (PLAQUENIL) 200 MG Tab; 1 tab po bid  Dispense: 180 Tab; Refill: 1    3. Long-term use of Plaquenil  Start Plaquenil 200 mg p.o. twice daily  Today check G6PD levels and CBC  Discussed with patient risks associated Plaquenil information from up-to-date printed out given to patient today  Patient states he gets ophthalmology evaluations every year  - CBC WITH DIFFERENTIAL; Future  - Comp Metabolic Panel; Future  - WESTERGREN SED RATE; Future  - VITAMIN D,25 HYDROXY; Future  - G6PD QUANT + RBC; Future  - Adalimumab 40 MG/0.4ML Pen-injector Kit; Inject 40 mg as instructed every 14 days.  Dispense: 6 Each; Refill: 1  - hydroxychloroquine (PLAQUENIL) 200 MG Tab; 1 tab po bid  Dispense: 180 Tab; Refill: 1    4. Essential hypertension  May impact the type of medications we can use for this patient's arthritis. We will have to keep this under advisement.  - CBC WITH DIFFERENTIAL; Future  - Comp Metabolic Panel; Future  - WESTERGREN SED RATE;  Future  - VITAMIN D,25 HYDROXY; Future  - G6PD QUANT + RBC; Future  - Adalimumab 40 MG/0.4ML Pen-injector Kit; Inject 40 mg as instructed every 14 days.  Dispense: 6 Each; Refill: 1  - hydroxychloroquine (PLAQUENIL) 200 MG Tab; 1 tab po bid  Dispense: 180 Tab; Refill: 1    5. H/O gastric bypass  May impact the type of medications we can use for this patient's arthritis. We will have to keep this under advisement.  Follows with  every year  - CBC WITH DIFFERENTIAL; Future  - Comp Metabolic Panel; Future  - WESTERGREN SED RATE; Future  - VITAMIN D,25 HYDROXY; Future  - G6PD QUANT + RBC; Future  - Adalimumab 40 MG/0.4ML Pen-injector Kit; Inject 40 mg as instructed every 14 days.  Dispense: 6 Each; Refill: 1  - hydroxychloroquine (PLAQUENIL) 200 MG Tab; 1 tab po bid  Dispense: 180 Tab; Refill: 1    Followup: Return in about 10 weeks (around 11/26/2019). or sooner prn to develop efficacy of the Plaquenil if not effective consider switching to another biologic such as Cosentyx or Otezla, information on these medications were printed out from up-to-date given to patient today    Buster Medina  was seen 30 minutes face-to-face of which more than 50% of the time was spent counseling the patient (excluding time for procedures)  regarding  rheumatological condition and care. Therapy was discussed in detail.      Please note that this dictation was created using voice recognition software. I have made every reasonable attempt to correct obvious errors, but I expect that there are errors of grammar and possibly content that I did not discover before finalizing the note.

## 2019-09-20 LAB — G6PD RBC-CCNC: 10.9 U/G HB (ref 9.9–16.6)

## 2019-09-26 ENCOUNTER — HOSPITAL ENCOUNTER (OUTPATIENT)
Dept: LAB | Facility: MEDICAL CENTER | Age: 69
End: 2019-09-26
Attending: FAMILY MEDICINE
Payer: MEDICARE

## 2019-09-26 LAB
25(OH)D3 SERPL-MCNC: 91 NG/ML (ref 30–100)
BASOPHILS # BLD AUTO: 1 % (ref 0–1.8)
BASOPHILS # BLD: 0.05 K/UL (ref 0–0.12)
CHOLEST SERPL-MCNC: 173 MG/DL (ref 100–199)
EOSINOPHIL # BLD AUTO: 0.17 K/UL (ref 0–0.51)
EOSINOPHIL NFR BLD: 3.4 % (ref 0–6.9)
ERYTHROCYTE [DISTWIDTH] IN BLOOD BY AUTOMATED COUNT: 44.7 FL (ref 35.9–50)
FASTING STATUS PATIENT QL REPORTED: NORMAL
FOLATE SERPL-MCNC: >22.4 NG/ML
HCT VFR BLD AUTO: 45.8 % (ref 42–52)
HDLC SERPL-MCNC: 63 MG/DL
HGB BLD-MCNC: 15.1 G/DL (ref 14–18)
IMM GRANULOCYTES # BLD AUTO: 0.01 K/UL (ref 0–0.11)
IMM GRANULOCYTES NFR BLD AUTO: 0.2 % (ref 0–0.9)
IRON SATN MFR SERPL: 33 % (ref 15–55)
IRON SERPL-MCNC: 116 UG/DL (ref 50–180)
LDLC SERPL CALC-MCNC: 87 MG/DL
LYMPHOCYTES # BLD AUTO: 2.07 K/UL (ref 1–4.8)
LYMPHOCYTES NFR BLD: 41.1 % (ref 22–41)
MCH RBC QN AUTO: 32.6 PG (ref 27–33)
MCHC RBC AUTO-ENTMCNC: 33 G/DL (ref 33.7–35.3)
MCV RBC AUTO: 98.9 FL (ref 81.4–97.8)
MONOCYTES # BLD AUTO: 0.57 K/UL (ref 0–0.85)
MONOCYTES NFR BLD AUTO: 11.3 % (ref 0–13.4)
NEUTROPHILS # BLD AUTO: 2.17 K/UL (ref 1.82–7.42)
NEUTROPHILS NFR BLD: 43 % (ref 44–72)
NRBC # BLD AUTO: 0 K/UL
NRBC BLD-RTO: 0 /100 WBC
PLATELET # BLD AUTO: 153 K/UL (ref 164–446)
PMV BLD AUTO: 12.3 FL (ref 9–12.9)
PREALB SERPL-MCNC: 32 MG/DL (ref 18–38)
RBC # BLD AUTO: 4.63 M/UL (ref 4.7–6.1)
TIBC SERPL-MCNC: 349 UG/DL (ref 250–450)
TRANSFERRIN SERPL-MCNC: 246 MG/DL (ref 200–370)
TRIGL SERPL-MCNC: 116 MG/DL (ref 0–149)
VIT B12 SERPL-MCNC: >1500 PG/ML (ref 211–911)
WBC # BLD AUTO: 5 K/UL (ref 4.8–10.8)

## 2019-09-26 PROCEDURE — 80053 COMPREHEN METABOLIC PANEL: CPT | Mod: 91

## 2019-09-26 PROCEDURE — 84466 ASSAY OF TRANSFERRIN: CPT

## 2019-09-26 PROCEDURE — 85025 COMPLETE CBC W/AUTO DIFF WBC: CPT

## 2019-09-26 PROCEDURE — 83550 IRON BINDING TEST: CPT

## 2019-09-26 PROCEDURE — 84425 ASSAY OF VITAMIN B-1: CPT

## 2019-09-26 PROCEDURE — 82607 VITAMIN B-12: CPT

## 2019-09-26 PROCEDURE — 84134 ASSAY OF PREALBUMIN: CPT

## 2019-09-26 PROCEDURE — 36415 COLL VENOUS BLD VENIPUNCTURE: CPT

## 2019-09-26 PROCEDURE — 82746 ASSAY OF FOLIC ACID SERUM: CPT

## 2019-09-26 PROCEDURE — 80061 LIPID PANEL: CPT

## 2019-09-26 PROCEDURE — 82306 VITAMIN D 25 HYDROXY: CPT | Mod: GA

## 2019-09-26 PROCEDURE — 83540 ASSAY OF IRON: CPT

## 2019-09-27 LAB
ALBUMIN SERPL BCP-MCNC: 4.7 G/DL (ref 3.2–4.9)
ALBUMIN SERPL BCP-MCNC: NORMAL G/DL (ref 3.2–4.9)
ALBUMIN/GLOB SERPL: 2.2 G/DL
ALBUMIN/GLOB SERPL: NORMAL G/DL
ALP SERPL-CCNC: 58 U/L (ref 30–99)
ALP SERPL-CCNC: NORMAL U/L (ref 30–99)
ALT SERPL-CCNC: 35 U/L (ref 2–50)
ALT SERPL-CCNC: NORMAL U/L (ref 2–50)
ANION GAP SERPL CALC-SCNC: 10 MMOL/L (ref 0–11.9)
ANION GAP SERPL CALC-SCNC: NORMAL MMOL/L (ref 0–11.9)
AST SERPL-CCNC: 29 U/L (ref 12–45)
AST SERPL-CCNC: NORMAL U/L (ref 12–45)
BILIRUB SERPL-MCNC: 0.5 MG/DL (ref 0.1–1.5)
BILIRUB SERPL-MCNC: NORMAL MG/DL (ref 0.1–1.5)
BUN SERPL-MCNC: 18 MG/DL (ref 8–22)
BUN SERPL-MCNC: NORMAL MG/DL (ref 8–22)
CALCIUM SERPL-MCNC: 9.7 MG/DL (ref 8.5–10.5)
CALCIUM SERPL-MCNC: NORMAL MG/DL (ref 8.5–10.5)
CHLORIDE SERPL-SCNC: 108 MMOL/L (ref 96–112)
CHLORIDE SERPL-SCNC: NORMAL MMOL/L (ref 96–112)
CO2 SERPL-SCNC: 24 MMOL/L (ref 20–33)
CO2 SERPL-SCNC: NORMAL MMOL/L (ref 20–33)
CREAT SERPL-MCNC: 1.17 MG/DL (ref 0.5–1.4)
CREAT SERPL-MCNC: NORMAL MG/DL (ref 0.5–1.4)
GLOBULIN SER CALC-MCNC: 2.1 G/DL (ref 1.9–3.5)
GLOBULIN SER CALC-MCNC: NORMAL G/DL (ref 1.9–3.5)
GLUCOSE SERPL-MCNC: 100 MG/DL (ref 65–99)
GLUCOSE SERPL-MCNC: NORMAL MG/DL (ref 65–99)
POTASSIUM SERPL-SCNC: 5 MMOL/L (ref 3.6–5.5)
POTASSIUM SERPL-SCNC: NORMAL MMOL/L (ref 3.6–5.5)
PROT SERPL-MCNC: 6.8 G/DL (ref 6–8.2)
PROT SERPL-MCNC: NORMAL G/DL (ref 6–8.2)
SODIUM SERPL-SCNC: 142 MMOL/L (ref 135–145)
SODIUM SERPL-SCNC: NORMAL MMOL/L (ref 135–145)

## 2019-10-02 LAB — VIT B1 BLD-MCNC: 190 NMOL/L (ref 70–180)

## 2019-10-04 ENCOUNTER — OFFICE VISIT (OUTPATIENT)
Dept: MEDICAL GROUP | Age: 69
End: 2019-10-04
Payer: MEDICARE

## 2019-10-04 VITALS
DIASTOLIC BLOOD PRESSURE: 54 MMHG | WEIGHT: 192 LBS | BODY MASS INDEX: 30.13 KG/M2 | HEART RATE: 75 BPM | TEMPERATURE: 98.2 F | OXYGEN SATURATION: 97 % | HEIGHT: 67 IN | SYSTOLIC BLOOD PRESSURE: 106 MMHG

## 2019-10-04 DIAGNOSIS — Z98.84 HISTORY OF GASTRIC BYPASS: ICD-10-CM

## 2019-10-04 DIAGNOSIS — Z12.12 SCREENING FOR COLORECTAL CANCER: Primary | ICD-10-CM

## 2019-10-04 DIAGNOSIS — Z12.11 SCREENING FOR COLORECTAL CANCER: Primary | ICD-10-CM

## 2019-10-04 DIAGNOSIS — E78.49 FAMILIAL HYPERLIPIDEMIA: ICD-10-CM

## 2019-10-04 DIAGNOSIS — Z23 NEED FOR VACCINATION: ICD-10-CM

## 2019-10-04 DIAGNOSIS — F51.04 CHRONIC INSOMNIA: ICD-10-CM

## 2019-10-04 PROCEDURE — 99214 OFFICE O/P EST MOD 30 MIN: CPT | Performed by: FAMILY MEDICINE

## 2019-10-04 RX ORDER — PREDNISONE 20 MG/1
TABLET ORAL
Qty: 12 TAB | Refills: 0 | Status: SHIPPED | OUTPATIENT
Start: 2019-10-04 | End: 2020-07-16

## 2019-10-04 NOTE — PROGRESS NOTES
This medical record contains text that has been entered with the assistance of computer voice recognition and dictation software.  Therefore, it may contain unintended errors in text, spelling, punctuation, or grammar    Chief Complaint   Patient presents with   • Annual Exam     preventitive         Buster Medina is a 69 y.o. male here evaluation and management of: non medicare routine follow up      HPI:     1. Chronic insomnia  The patient has chronic insomnia which he treats with mirtazapine. His symptoms are well controlled at this time and he is not experiencing any side effects.    2. Familial hyperlipidemia  He has been taking Atorvastatin 40 mg as prescribed for his dyslipidemia without myalgias or other side effects. Blood work was done before this visit. The patient has been on a statin for years and tolerating this fine. The patient denies any muscle aches, no abdominal pain and no history of elevated liver enzymes.        3. History of gastric bypass  Patient had gastric bypass in 2006. He lost around  60 pounds after the surgery. He notes that his appetite was suppressed after the surgery but it has not been suppressed as much lately and he is concerned he may have stretched his stomach. He has a follow up with GI scheduled.    4. Screening for colorectal cancer  Patient is due for a colon cancer screening     5. Need for vaccination  Patient is due for a shingles vaccine.      Current medicines (including changes today)  Current Outpatient Medications   Medication Sig Dispense Refill   • Zoster Vac Recomb Adjuvanted (SHINGRIX) 50 MCG/0.5ML Recon Susp 0.5 mL by Intramuscular route Once for 1 dose. 0.5 mL 0   • predniSONE (DELTASONE) 20 MG Tab Take 3 tabs po for 2 days then 2 tabs for 2 days then 1 for 2 days 12 Tab 0   • Adalimumab 40 MG/0.4ML Pen-injector Kit Inject 40 mg as instructed every 14 days. 6 Each 1   • hydroxychloroquine (PLAQUENIL) 200 MG Tab 1 tab po bid 180 Tab 1   • omeprazole  (PRILOSEC) 20 MG delayed-release capsule Take 1 Cap by mouth every day. 30 Cap 3   • potassium chloride SA (KLOR-CON M10) 10 MEQ Tab CR Take 1 Tab by mouth every day. 90 Tab 0   • atorvastatin (LIPITOR) 40 MG Tab Take 1 Tab by mouth every day. 90 Tab 3   • celecoxib (CELEBREX) 200 MG Cap TAKE 1 CAPSULE DAILY 90 Cap 2   • mirtazapine (REMERON) 15 MG Tab      • fluticasone (FLOVENT HFA) 44 MCG/ACT Aerosol 2 Puffs by Nebulization route.     • ferrous sulfate 325 (65 Fe) MG tablet Take  by mouth.     • lisinopril (PRINIVIL) 20 MG Tab TAKE 1 TABLET BY MOUTH EVERY DAY IN THE EVENING 90 Tab 2   • aspirin EC (ECOTRIN) 81 MG Tablet Delayed Response Take 81 mg by mouth every bedtime. 30 Tab 0   • Multiple Vitamins-Minerals (MULTI COMPLETE PO) Take 1 Tab by mouth 2 Times a Day.     • Multiple Vitamins-Minerals (MELODY-DAY 1000 PO) Take 1 Tab by mouth 2 Times a Day.     • Cyanocobalamin (B-12) 2500 MCG Tab Take 1 Tab by mouth every morning.     • mirtazapine (REMERON) 15 MG Tab TAKE 1/2 TABLET AT BEDTIME (Patient not taking: Reported on 10/4/2019) 45 Tab 0     No current facility-administered medications for this visit.      He  has a past medical history of Anxiety, Arthritis, Cancer (HCC) (2007), Chickenpox, Depression, High cholesterol, Hyperlipidemia, Hypertension, Kidney stone, Mumps, Obesity, Pneumothorax, Restless leg syndrome, Sleep apnea, and Tonsillitis.  He  has a past surgical history that includes bladder biopsy with cystoscopy; abdominal exploration; gastric bypass laparoscopic; eye surgery; hernia repair; athroplasty; open reduction; other orthopedic surgery (2015); bladder biopsy with cystoscopy (1/3/2018); retrogrades (Bilateral, 1/3/2018); pyelogram (Bilateral, 1/3/2018); thoracotomy; other (Left); and turp-vapor.  Social History     Tobacco Use   • Smoking status: Never Smoker   • Smokeless tobacco: Never Used   Substance Use Topics   • Alcohol use: Yes     Alcohol/week: 8.4 oz     Types: 14 Glasses of wine per  "week     Comment: 2 per day   • Drug use: No     Comment:       Social History     Social History Narrative   • Not on file     Family History   Problem Relation Age of Onset   • Cancer Mother 80        lung cancer   • Arthritis Mother    • Diabetes Mother    • Anxiety disorder Mother    • Lung Disease Father 65   • Cancer Father    • Diabetes Father    • Hypertension Father    • Hyperlipidemia Father    • Hyperlipidemia Brother    • Arthritis Brother    • Other Brother         Thyroid tumor   • Sleep Apnea Brother    • No Known Problems Maternal Grandmother    • Cancer Maternal Grandfather    • No Known Problems Paternal Grandmother    • No Known Problems Paternal Grandfather    • Heart Disease Brother 49        smoker, overweight   • Hypertension Brother    • Depression Other    • Suicide Attempts Neg Hx    • Bipolar disorder Neg Hx    • Alcohol abuse Neg Hx    • Drug abuse Neg Hx      Family Status   Relation Name Status   • Mo     • Fa     • Bro  Alive   • MG Mo     • MGFa     • PGMo     • PGFa     • Bro  Alive   • OTHER  (Not Specified)   • Neg Hx  (Not Specified)         ROS    Please see hpi     All other systems reviewed and are negative     Objective:     /54 (BP Location: Left arm, Patient Position: Sitting, BP Cuff Size: Adult)   Pulse 75   Temp 36.8 °C (98.2 °F) (Temporal)   Ht 1.702 m (5' 7\")   Wt 87.1 kg (192 lb)   SpO2 97%  Body mass index is 30.07 kg/m².  Physical Exam:    Constitutional: Alert, no distress.  Skin: Warm, dry, good turgor, no rashes in visible areas.  Eye: Equal, round and reactive, conjunctiva clear, lids normal.  ENMT: Lips without lesions, good dentition, oropharynx clear.  Neck: Trachea midline, no masses, no thyromegaly. No cervical or supraclavicular lymphadenopathy.  Respiratory: Unlabored respiratory effort, lungs clear to auscultation, no wheezes, no ronchi.  Cardiovascular: Normal S1, S2, no murmur, no edema.  Psych: " Alert and oriented x3, normal affect and mood.      Assessment and Plan:   The following treatment plan was discussed    1. Chronic insomnia  Patient has a history of insomnia. His symptoms are well controlled at this time. We will continue on his current medication regimen and continue to follow up.     2. Familial hyperlipidemia  Stable on current  dosage. I have advised the patient to increase his exercise regimen and avoid fatty foods.     3. History of gastric bypass  Patient had gastric bypass in 2006. He follows up with Gastroenterology.      4. Screening for colorectal cancer  Patient is due for a colon cancer screening and would like to do this with cologuard as opposed to a colonoscopy.      - Cologuard Colon Cancer Screening (FIT DNA)    5. Need for vaccination  Provided the patient with a prescription for the shingles vaccine. Informed him that he can have this done at either [facility] or [email or web address].     - Zoster Vac Recomb Adjuvanted (SHINGRIX) 50 MCG/0.5ML Recon Susp; 0.5 mL by Intramuscular route Once for 1 dose.  Dispense: 0.5 mL; Refill: 0      HEALTH MAINTENANCE: AWV and shingles vaccine.    Instructed to Follow up in clinic or ER for worsening symptoms, difficulty breathing, lack of expected recovery, or should new symptoms or problems arise.    Followup: Return in about 6 months (around 4/4/2020) for Reevaluation.      Once again this medical record contains text that has been entered with the assistance of computer voice recognition, dictation software, and medical scribes.  Therefore, it may contain unintended errors in text, spelling, punctuation, or grammar.    Lissy WEISS (Yaneliibradha), am scribing for, and in the presence of, Stu Blair M.D.    Electronically signed by: Lissy More (Sangeetha), 10/4/2019     Stu WEISS M.D. personally performed the services described in this documentation, as scribed by Lissy More in my presence, and it is both accurate and complete.

## 2019-10-15 DIAGNOSIS — K13.70 ORAL LESION: ICD-10-CM

## 2019-10-18 ENCOUNTER — HOSPITAL ENCOUNTER (OUTPATIENT)
Dept: HOSPITAL 8 - RAD | Age: 69
Discharge: HOME | End: 2019-10-18
Attending: NURSE PRACTITIONER
Payer: MEDICARE

## 2019-10-18 DIAGNOSIS — M19.072: Primary | ICD-10-CM

## 2019-10-18 PROCEDURE — 20600 DRAIN/INJ JOINT/BURSA W/O US: CPT

## 2019-10-18 PROCEDURE — 77002 NEEDLE LOCALIZATION BY XRAY: CPT

## 2019-11-04 RX ORDER — LISINOPRIL 20 MG/1
TABLET ORAL
Qty: 90 TAB | Refills: 0 | Status: SHIPPED | OUTPATIENT
Start: 2019-11-04 | End: 2020-01-31

## 2019-11-15 RX ORDER — VALACYCLOVIR HYDROCHLORIDE 1 G/1
TABLET, FILM COATED ORAL
Qty: 24 TAB | Refills: 0 | Status: SHIPPED | OUTPATIENT
Start: 2019-11-15 | End: 2019-12-18

## 2019-12-02 ENCOUNTER — OFFICE VISIT (OUTPATIENT)
Dept: RHEUMATOLOGY | Facility: MEDICAL CENTER | Age: 69
End: 2019-12-02
Payer: MEDICARE

## 2019-12-02 VITALS
HEART RATE: 84 BPM | WEIGHT: 189 LBS | SYSTOLIC BLOOD PRESSURE: 172 MMHG | RESPIRATION RATE: 14 BRPM | BODY MASS INDEX: 29.6 KG/M2 | TEMPERATURE: 97.2 F | DIASTOLIC BLOOD PRESSURE: 98 MMHG | OXYGEN SATURATION: 97 %

## 2019-12-02 DIAGNOSIS — Z79.899 LONG-TERM USE OF PLAQUENIL: ICD-10-CM

## 2019-12-02 DIAGNOSIS — I10 ESSENTIAL HYPERTENSION: ICD-10-CM

## 2019-12-02 DIAGNOSIS — Z98.84 H/O GASTRIC BYPASS: ICD-10-CM

## 2019-12-02 DIAGNOSIS — Z79.61 LONG-TERM CURRENT USE OF APREMILAST: ICD-10-CM

## 2019-12-02 DIAGNOSIS — L40.50 PSORIATIC ARTHRITIS (HCC): ICD-10-CM

## 2019-12-02 PROCEDURE — 99214 OFFICE O/P EST MOD 30 MIN: CPT | Performed by: INTERNAL MEDICINE

## 2019-12-02 ASSESSMENT — JOINT PAIN
TOTAL NUMBER OF TENDER JOINTS: 1
TOTAL NUMBER OF SWOLLEN JOINTS: 0
TOTAL NUMBER OF TENDER JOINTS: 0

## 2019-12-05 ENCOUNTER — OFFICE VISIT (OUTPATIENT)
Dept: PULMONOLOGY | Facility: HOSPICE | Age: 69
End: 2019-12-05
Payer: MEDICARE

## 2019-12-05 VITALS
DIASTOLIC BLOOD PRESSURE: 64 MMHG | HEART RATE: 75 BPM | WEIGHT: 193 LBS | OXYGEN SATURATION: 97 % | SYSTOLIC BLOOD PRESSURE: 128 MMHG | BODY MASS INDEX: 30.29 KG/M2 | HEIGHT: 67 IN

## 2019-12-05 DIAGNOSIS — G47.31 COMPLEX SLEEP APNEA SYNDROME: ICD-10-CM

## 2019-12-05 DIAGNOSIS — G47.33 OSA (OBSTRUCTIVE SLEEP APNEA): ICD-10-CM

## 2019-12-05 DIAGNOSIS — F51.04 CHRONIC INSOMNIA: ICD-10-CM

## 2019-12-05 PROCEDURE — 99214 OFFICE O/P EST MOD 30 MIN: CPT | Performed by: NURSE PRACTITIONER

## 2019-12-05 RX ORDER — ZOLPIDEM TARTRATE 6.25 MG/1
6.25 TABLET, FILM COATED, EXTENDED RELEASE ORAL NIGHTLY PRN
Qty: 30 TAB | Refills: 2 | Status: SHIPPED
Start: 2019-12-05 | End: 2020-01-06

## 2019-12-05 NOTE — PATIENT INSTRUCTIONS
1) Continue ASV at EPAP and change EPAP 6-15, PS 2-15, max pressure [id number]  2) Clean mask and supplies weekly and change them as insurance allows  3) Vaccines: Up to date with Prevnar 13, Pneumovax 23, flu  4) Ambien 6.25mg for acute insomnia x 2 months. Reviewed holidays of medication  5) Return in about 3 months (around 3/5/2020) for follow up with [facility] Nichols, if not sooner, Compliance.

## 2019-12-05 NOTE — PROGRESS NOTES
CC:  Here for f/u sleep issues as listed below    HPI:   Buster presents today for follow up complex sleep apnea.  Original PSG from 10 years ago with AHI scores between 40-60 per patient report.  Past medical history of chronic insomnia hypertension, depression, vitamin D deficiency.     Completed PSG from 2018 indicated an AHI of 38.5 and low oxygenation of 87%.  Currently he is being treated with ASV @ EPAP 5-15, PS 2-15, max pressure [id number]. Compliance download from the dates 11/5/2019 - 12/4/2019 indicates he is wearing the device 96.7% for an avg of 5 hours and 37 minutes per night with a reduced AHI of 2.9.   He was able to wean himself from the Lorazepam without difficulty and doing well. However, 6 weeks ago he started having fragmented sleep; sleeping 2-3 hours, waking for at least 1 hour with difficulty falling asleep even after reading. He has no known triggers. Has been taking left over ambien and lorazepam with help.  He goes to bed approximately 10 or 11 PM, falls asleep within 30 minutes, typically wakes around 7 AM.  Requesting a Rx today.     He does tolerate pressure and mask well.  He has not been waking up refreshed since fragmented sleep started. He is not napping. They deny morning H/A. He sleeps better overall. He will continue to clean supplies weekly and change them as insurance allows. BMI is 30.               Patient Active Problem List    Diagnosis Date Noted   • History of gastric bypass 10/04/2019   • Tinnitus of both ears 04/09/2019   • Bruising 04/09/2019   • Complex sleep apnea syndrome 12/06/2018   • Dyslipidemia 09/21/2018   • Family history of coronary artery disease in brother 09/21/2018   • Familial hyperlipidemia 09/21/2018   • Psoriatic arthritis (HCC) 08/16/2018   • Dysthymic disorder 03/05/2018   • Erectile dysfunction of nonorganic origin 03/05/2018   • Hypokalemia 09/21/2017   • Malignant neoplasm of anterior wall of urinary bladder (HCC) 09/21/2017   • Essential  hypertension 09/21/2017   • Need for vaccination 09/21/2017   • Major depressive disorder 09/21/2017   • Chronic insomnia 09/21/2017   • Plantar fasciitis 09/21/2017   • Gastroesophageal reflux disease without esophagitis 09/21/2017   • Benign nodular prostatic hyperplasia without lower urinary tract symptoms 09/21/2017   • Vitamin D deficiency 09/21/2017       Past Medical History:   Diagnosis Date   • Anxiety    • Arthritis     osteo   • Cancer (HCC) 2007    bladder   • Chickenpox     as a child   • Depression     depression   • High cholesterol    • Hyperlipidemia    • Hypertension     pt states  well controlled on meds   • Kidney stone    • Mumps     as a child    • Obesity    • Pneumothorax    • Restless leg syndrome    • Sleep apnea     uses cpap   • Tonsillitis        Past Surgical History:   Procedure Laterality Date   • BLADDER BIOPSY WITH CYSTOSCOPY  1/3/2018    Procedure: BLADDER BIOPSY WITH CYSTOSCOPY/WITH UPPER TRACT WASHING;  Surgeon: El Manuel M.D.;  Location: SURGERY [facility];  Service: Urology   • RETROGRADES Bilateral 1/3/2018    Procedure: RETROGRADES;  Surgeon: El Manuel M.D.;  Location: SURGERY [facility];  Service: Urology   • PYELOGRAM Bilateral 1/3/2018    Procedure: PYELOGRAM;  Surgeon: El Manuel M.D.;  Location: SURGERY [facility];  Service: Urology   • OTHER ORTHOPEDIC SURGERY  2015    shoulder replacement   • ABDOMINAL EXPLORATION     • ATHROPLASTY      right shoulder   • BLADDER BIOPSY WITH CYSTOSCOPY     • EYE SURGERY      lasik    • GASTRIC BYPASS LAPAROSCOPIC     • HERNIA REPAIR     • OPEN REDUCTION     • OTHER Left     thumb joint   • THORACOTOMY     • TURP-VAPOR         Family History   Problem Relation Age of Onset   • Cancer Mother 80        lung cancer   • Arthritis Mother    • Diabetes Mother    • Anxiety disorder Mother    • Lung Disease Father 65   • Cancer Father    • Diabetes Father    • Hypertension Father    • Hyperlipidemia Father    •  Hyperlipidemia Brother    • Arthritis Brother    • Other Brother         Thyroid tumor   • Sleep Apnea Brother    • No Known Problems Maternal Grandmother    • Cancer Maternal Grandfather    • No Known Problems Paternal Grandmother    • No Known Problems Paternal Grandfather    • Heart Disease Brother 49        smoker, overweight   • Hypertension Brother    • Depression Other    • Suicide Attempts Neg Hx    • Bipolar disorder Neg Hx    • Alcohol abuse Neg Hx    • Drug abuse Neg Hx        Social History     Socioeconomic History   • Marital status: Single     Spouse name: Not on file   • Number of children: Not on file   • Years of education: Not on file   • Highest education level: Not on file   Occupational History   • Not on file   Social Needs   • Financial resource strain: Not on file   • Food insecurity:     Worry: Not on file     Inability: Not on file   • Transportation needs:     Medical: Not on file     Non-medical: Not on file   Tobacco Use   • Smoking status: Never Smoker   • Smokeless tobacco: Never Used   Substance and Sexual Activity   • Alcohol use: Yes     Alcohol/week: 8.4 oz     Types: 14 Glasses of wine per week     Comment: 2 per day   • Drug use: No     Comment:     • Sexual activity: Not Currently   Lifestyle   • Physical activity:     Days per week: Not on file     Minutes per session: Not on file   • Stress: Not on file   Relationships   • Social connections:     Talks on phone: Not on file     Gets together: Not on file     Attends [employer] service: Not on file     Active member of club or organization: Not on file     Attends meetings of clubs or organizations: Not on file     Relationship status: Not on file   • Intimate partner violence:     Fear of current or ex partner: Not on file     Emotionally abused: Not on file     Physically abused: Not on file     Forced sexual activity: Not on file   Other Topics Concern   • Not on file   Social History Narrative   • Not on file       Current  Outpatient Medications   Medication Sig Dispense Refill   • zolpidem (AMBIEN CR) 6.25 MG CR tablet Take 1 Tab by mouth at bedtime as needed for Sleep for up to 90 doses. 30 Tab 2   • Naltrexone-buPROPion HCl ER (CONTRAVE) 8-90 MG TABLET SR 12 HR Take  by mouth 2 Times a Day.     • Apremilast (OTEZLA) 10 & 20 & 30 MG Tablet Therapy Pack 10 mg po qday for one day then 10 mg po bid for one day then 20 mg po qam then 10 mg po qpm for one day then 20 mg po bid for one day then 30 mg po qam and 20 mg po q pm for one day then 30 mg po bid 21 Each 0   • Apremilast (OTEZLA) 30 MG Tab 1 tab po bid after finishing the loading dose 180 Tab 1   • valacyclovir (VALTREX) 1 GM Tab TAKE 1 TABLET TWICE A DAY  FOR 7 DAYS AS NEEDED FOR   OUTBREAK 24 Tab 0   • lisinopril (PRINIVIL) 20 MG Tab TAKE 1 TABLET BY MOUTH EVERY DAY IN THE EVENING 90 Tab 0   • Adalimumab 40 MG/0.4ML Pen-injector Kit Inject 40 mg as instructed every 14 days. 6 Each 1   • hydroxychloroquine (PLAQUENIL) 200 MG Tab 1 tab po bid 180 Tab 1   • omeprazole (PRILOSEC) 20 MG delayed-release capsule Take 1 Cap by mouth every day. 30 Cap 3   • atorvastatin (LIPITOR) 40 MG Tab Take 1 Tab by mouth every day. 90 Tab 3   • celecoxib (CELEBREX) 200 MG Cap TAKE 1 CAPSULE DAILY 90 Cap 2   • mirtazapine (REMERON) 15 MG Tab 7.5 mg.     • fluticasone (FLOVENT HFA) 44 MCG/ACT Aerosol 2 Puffs by Nebulization route.     • ferrous sulfate 325 (65 Fe) MG tablet Take  by mouth.     • aspirin EC (ECOTRIN) 81 MG Tablet Delayed Response Take 81 mg by mouth every bedtime. 30 Tab 0   • Multiple Vitamins-Minerals (MULTI COMPLETE PO) Take 1 Tab by mouth 2 Times a Day.     • Multiple Vitamins-Minerals (MELODY-DAY 1000 PO) Take 1 Tab by mouth 2 Times a Day.     • Cyanocobalamin (B-12) 2500 MCG Tab Take 1 Tab by mouth every morning.     • predniSONE (DELTASONE) 20 MG Tab Take 3 tabs po for 2 days then 2 tabs for 2 days then 1 for 2 days (Patient not taking: Reported on 12/2/2019) 12 Tab 0   • potassium  "chloride SA (KLOR-CON M10) 10 MEQ Tab CR Take 1 Tab by mouth every day. (Patient not taking: Reported on 12/2/2019) 90 Tab 0   • mirtazapine (REMERON) 15 MG Tab TAKE 1/2 TABLET AT BEDTIME 45 Tab 0     No current facility-administered medications for this visit.           Allergies: Patient has no known allergies.      ROS   Gen: Denies fever, chills, unintentional weight loss, fatigue  Resp:Denies Dyspnea  CV: Denies chest pain, chest tightness  Sleep:Denies morning headache, snoring, gasping for air, apnea  Neuro: Denies frequent headaches, weakness, dizziness  See HPI.  All other systems reviewed and negative        Vital signs for this encounter:  Vitals:    12/05/19 1321   Height: 1.702 m (5' 7\")   Weight: 87.5 kg (193 lb)   Weight % change since last entry.: 0 %   BP: 128/64   Pulse: 75   BMI (Calculated): 30.23                   Physical Exam:   Gen:         Alert and oriented, No apparent distress.   Neck:        No Lymphadenopathy.  Lungs:     Clear to auscultation bilaterally.    CV:          Regular rate and rhythm. No murmurs, rubs or gallops.   Abd:         Soft non tender, non distended.            Ext:          No clubbing, cyanosis, edema.    Assessment   1. Chronic insomnia  zolpidem (AMBIEN CR) 6.25 MG CR tablet   2. ARNULFO (obstructive sleep apnea)     3. BMI 30.0-30.9,adult  OBESITY COUNSELING (No Charge): Patient identified as having weight management issue.  Appropriate orders and counseling given.   4. Complex sleep apnea syndrome  DME Mask and Supplies       Face to face time greater than 50% of 25 minutes reviewing questions and concerns relating to acute insomnia and treatment plan. Patient is clinically stable and will proceed with following plan.  We will place him on Ambien for the next 2 months to help with acute insomnia.  Reviewed holidays of the medication.  Reviewed in detail sleep hygiene.     PLAN:   Patient Instructions   1) Continue ASV at EPAP and change EPAP 6-15, PS 2-15, max " pressure [id number]  2) Clean mask and supplies weekly and change them as insurance allows  3) Vaccines: Up to date with Prevnar 13, Pneumovax 23, flu  4) Ambien 6.25mg for acute insomnia x 2 months. Reviewed holidays of medication  5) Return in about 3 months (around 3/5/2020) for follow up with [facility] Nichols, if not sooner, Compliance.

## 2019-12-18 RX ORDER — VALACYCLOVIR HYDROCHLORIDE 1 G/1
TABLET, FILM COATED ORAL
Qty: 24 TAB | Refills: 0 | Status: ON HOLD | OUTPATIENT
Start: 2019-12-18 | End: 2021-07-06

## 2020-01-01 NOTE — PROGRESS NOTES
Subjective:   Chief Complaint:   Chief Complaint   Patient presents with   • HTN (Controlled)       Buster Medina is a 68 y.o. male who returns today for further management of risk factors for CAD.      He has hypertension, hyperlipidemia and psoriatic arthritis for which he takes Biologics.    Family history includes his brother having a heart attack at the age of 49.   He has been on cholesterol medication and meds for HTN since his 30s, partially familial.    His LDL was 113 on lipitor 20 mg so we increased to 40 mg.  On primary prevention aspirin.    Had gastric bypass and HTN improved, is gaining some weight back.    Echo showed mild concentric LVH, mildly dilated left atrium and moderate MR, no RVSP  Had a carotid bruit but only minimal plaque and no stenosis on ultrasound.  ABIs were normal.  Blood pressure controlled today.  On lisinopril only at this point.  ARNULFO, on CPAP.    He is not limited by chest pain, pressure or tightness. No significant dyspnea on exertion, orthopnea or lower extremity swelling. No significant palpitations, dizziness, or presyncope/syncope. No symptoms of leg claudication. Walks his dog for about 4 miles, slowly, takes 1 hour.    Brother had MI at 49.      DATA REVIEWED by me:  EKG 3/1/2019  Sinus bradycardia, rate 59, early R wave progression,    ECG 12-29-17  Sinus, 70, early R wave progression, normal variant    Echo 10/8/2018  EF 70%, mild concentric LVH, mildly dilated left atrium, moderate MR, no RVSP    MRA of the abdomen 3/10/2019  No aneurysm    Abdominal ultrasound 3/4/2018  No aneurysm    Carotid ultrasound 10/8/2018  Mild plaque, no stenosis bilaterally    Lower extremity REVA 9/21/2018  Left 1.19, right 1.18    Most recent labs:     2/28/2019 hemoglobin 14.2, platelets 165, sodium 143, creatinine 0.95, LFTs normal    August 16, 2018 hemoglobin 14.5, platelets 149, sodium 141, potassium 4.6, BUN 24, creatinine 1.28 with a GFR 56, LFTs normal    June 8, 2018 total  cholesterol 211, triglycerides 125, HDL 73,     Past Medical History:   Diagnosis Date   • Anxiety    • Arthritis     osteo   • Cancer (HCC) 2007    bladder   • Chickenpox     as a child   • Depression     depression   • High cholesterol    • Hyperlipidemia    • Hypertension     pt states  well controlled on meds   • Kidney stone    • Mumps     as a child    • Obesity    • Pneumothorax    • Restless leg syndrome    • Sleep apnea     uses cpap   • Tonsillitis      Past Surgical History:   Procedure Laterality Date   • BLADDER BIOPSY WITH CYSTOSCOPY  1/3/2018    Procedure: BLADDER BIOPSY WITH CYSTOSCOPY/WITH UPPER TRACT WASHING;  Surgeon: El Manuel M.D.;  Location: SURGERY [facility];  Service: Urology   • RETROGRADES Bilateral 1/3/2018    Procedure: RETROGRADES;  Surgeon: El Manuel M.D.;  Location: SURGERY [facility];  Service: Urology   • PYELOGRAM Bilateral 1/3/2018    Procedure: PYELOGRAM;  Surgeon: El Manuel M.D.;  Location: SURGERY [facility];  Service: Urology   • OTHER ORTHOPEDIC SURGERY  2015    shoulder replacement   • ABDOMINAL EXPLORATION     • ATHROPLASTY      right shoulder   • BLADDER BIOPSY WITH CYSTOSCOPY     • EYE SURGERY      lasik    • GASTRIC BYPASS LAPAROSCOPIC     • HERNIA REPAIR     • OPEN REDUCTION     • OTHER Left     thumb joint   • THORACOTOMY     • TURP-VAPOR       Family History   Problem Relation Age of Onset   • Cancer Mother 80        lung cancer   • Arthritis Mother    • Diabetes Mother    • Anxiety disorder Mother    • Lung Disease Father 65   • Cancer Father    • Diabetes Father    • Hypertension Father    • Hyperlipidemia Father    • Hyperlipidemia Brother    • Arthritis Brother    • Other Brother         Thyroid tumor   • Sleep Apnea Brother    • No Known Problems Maternal Grandmother    • Cancer Maternal Grandfather    • No Known Problems Paternal Grandmother    • No Known Problems Paternal Grandfather    • Heart Disease Brother 49         smoker, overweight   • Hypertension Brother    • Depression Other    • Suicide Attempts Neg Hx    • Bipolar disorder Neg Hx    • Alcohol abuse Neg Hx    • Drug abuse Neg Hx      Social History     Social History   • Marital status: Single     Spouse name: N/A   • Number of children: N/A   • Years of education: N/A     Occupational History   • Not on file.     Social History Main Topics   • Smoking status: Never Smoker   • Smokeless tobacco: Never Used   • Alcohol use 8.4 oz/week     14 Glasses of wine per week      Comment: 2 per day   • Drug use: No      Comment:     • Sexual activity: Not Currently     Other Topics Concern   • Not on file     Social History Narrative   • No narrative on file     No Known Allergies    Current Outpatient Prescriptions   Medication Sig Dispense Refill   • potassium chloride SA (K-DUR) 10 MEQ Tab CR Take 1 Tab by mouth every bedtime. 90 Tab 0   • celecoxib (CELEBREX) 200 MG Cap Take 1 Cap by mouth every day. 90 Cap 0   • atorvastatin (LIPITOR) 20 MG Tab Take 40 mg by mouth.     • omeprazole (PRILOSEC) 20 MG delayed-release capsule Take 20 mg by mouth.     • Adalimumab (HUMIRA PEN) 40 MG/0.8ML Pen-injector Kit INJECT ONE PEN (40 MG) SUBCUTANEOUSLY EVERY OTHER WEEK.     • fluticasone (FLOVENT HFA) 44 MCG/ACT Aerosol 2 Puffs by Nebulization route.     • ferrous sulfate 325 (65 Fe) MG tablet Take  by mouth.     • lisinopril (PRINIVIL) 20 MG Tab TAKE 1 TABLET BY MOUTH EVERY DAY IN THE EVENING 90 Tab 2   • aspirin EC (ECOTRIN) 81 MG Tablet Delayed Response Take 81 mg by mouth every bedtime. 30 Tab 0   • Multiple Vitamins-Minerals (MULTI COMPLETE PO) Take 1 Tab by mouth 2 Times a Day.     • Multiple Vitamins-Minerals (MELODY-DAY 1000 PO) Take 1 Tab by mouth 2 Times a Day.     • Cyanocobalamin (B-12) 2500 MCG Tab Take 1 Tab by mouth every morning.       No current facility-administered medications for this visit.        Review of Systems   Musculoskeletal: Positive for back pain and joint pain.  "  Psychiatric/Behavioral: Positive for depression.     All others systems reviewed and negative.     Objective:     Blood pressure 122/70, pulse 76, height 1.702 m (5' 7\"), weight 86.2 kg (190 lb). Body mass index is 29.76 kg/m².    Physical Exam   General: No acute distress. Well nourished.  HEENT: EOM grossly intact, no scleral icterus, no pharyngeal erythema.   Neck:  No JVD, no bruits, trachea midline  CVS: RRR. Normal S1, S2. 1/6 USMAN  No LE edema.  2+ radial pulses, 2+ DP pulses  Resp: CTAB. No wheezing or crackles/rhonchi. Normal respiratory effort.  Abdomen: Soft, NT, no bonita hepatomegaly.  MSK/Ext: No clubbing or cyanosis.  Skin: Warm and dry, no rashes.  Neurological: CN III-XII grossly intact. No focal deficits.   Psych: A&O x 3, appropriate affect, good judgement  Physical exam performed today and unchanged compared to 9-21-18.      Assessment:     1. LVH (left ventricular hypertrophy)     2. Essential hypertension  Comp Metabolic Panel    Lipid Profile   3. Dyslipidemia  Comp Metabolic Panel    Lipid Profile   4. Family history of coronary artery disease in brother     5. ARNULFO (obstructive sleep apnea)     6. Aortic valve sclerosis     7. Malignant neoplasm of anterior wall of urinary bladder (HCC)     8. Hypokalemia     9. Erectile dysfunction of nonorganic origin     10. Psoriatic arthritis (HCC)     11. Current mild episode of major depressive disorder without prior episode (HCC)     12. Familial hyperlipidemia     13. Bruit of right carotid artery     14. Carotid artery plaque, bilateral     15. Nonrheumatic mitral valve regurgitation         Medical Decision Making:  Today's Assessment / Status / Plan:     -Monitor MR, repeat echo in   -10 year risk of MI/CVA is over 15%, on ASA and statin, also has the FH and other risk factors  -Cont to monitor for progression of symptoms related to LVH, MR, Diast Dys or CAD  -Low threshold to get a stress test if he develops symptoms, treadmill stress " echo  -RTC 1 year, CMP and lipids at his convenience before he returns      Return in about 1 year (around 3/21/2020).    It is my pleasure to participate in the care of Mr. Medina.  Please do not hesitate to contact me with questions or concerns.    Fatou Hamilton MD, [facility]  Cardiologist [facility]    Please note that this dictation was created using voice recognition software. I have made every reasonable attempt to correct obvious errors, but it is possible there are errors of grammar and possibly content that I did not discover before finalizing the note.   40

## 2020-01-06 DIAGNOSIS — F51.04 CHRONIC INSOMNIA: ICD-10-CM

## 2020-01-06 RX ORDER — ZOLPIDEM TARTRATE 12.5 MG/1
12.5 TABLET, FILM COATED, EXTENDED RELEASE ORAL NIGHTLY PRN
Qty: 30 TAB | Refills: 1 | Status: SHIPPED
Start: 2020-01-06 | End: 2020-03-06

## 2020-01-07 NOTE — PROGRESS NOTES
RX for zolpidem (AMBIEN CR) 12.5 MG CR tablet was called into [facility]/pharmacy #[id number] - [place], [place] - [address]. Called pt to inform him of this.

## 2020-01-15 ENCOUNTER — HOSPITAL ENCOUNTER (OUTPATIENT)
Dept: HOSPITAL 8 - RAD | Age: 70
Discharge: HOME | End: 2020-01-15
Attending: NURSE PRACTITIONER
Payer: MEDICARE

## 2020-01-15 DIAGNOSIS — M19.072: Primary | ICD-10-CM

## 2020-01-15 PROCEDURE — 20605 DRAIN/INJ JOINT/BURSA W/O US: CPT

## 2020-01-15 PROCEDURE — 77002 NEEDLE LOCALIZATION BY XRAY: CPT

## 2020-01-17 DIAGNOSIS — K21.9 GASTROESOPHAGEAL REFLUX DISEASE WITHOUT ESOPHAGITIS: ICD-10-CM

## 2020-01-21 RX ORDER — OMEPRAZOLE 20 MG/1
CAPSULE, DELAYED RELEASE ORAL
Qty: 30 CAP | Refills: 3 | Status: SHIPPED | OUTPATIENT
Start: 2020-01-21 | End: 2020-04-30

## 2020-01-31 RX ORDER — LISINOPRIL 20 MG/1
TABLET ORAL
Qty: 90 TAB | Refills: 0 | Status: SHIPPED | OUTPATIENT
Start: 2020-01-31 | End: 2020-05-04

## 2020-03-02 ENCOUNTER — OFFICE VISIT (OUTPATIENT)
Dept: RHEUMATOLOGY | Facility: MEDICAL CENTER | Age: 70
End: 2020-03-02
Payer: MEDICARE

## 2020-03-02 ENCOUNTER — HOSPITAL ENCOUNTER (OUTPATIENT)
Dept: LAB | Facility: MEDICAL CENTER | Age: 70
End: 2020-03-02
Attending: INTERNAL MEDICINE
Payer: MEDICARE

## 2020-03-02 VITALS
HEART RATE: 90 BPM | SYSTOLIC BLOOD PRESSURE: 140 MMHG | TEMPERATURE: 97.2 F | BODY MASS INDEX: 28.66 KG/M2 | WEIGHT: 183 LBS | DIASTOLIC BLOOD PRESSURE: 70 MMHG | OXYGEN SATURATION: 97 % | RESPIRATION RATE: 14 BRPM

## 2020-03-02 DIAGNOSIS — Z98.84 H/O GASTRIC BYPASS: ICD-10-CM

## 2020-03-02 DIAGNOSIS — I10 ESSENTIAL HYPERTENSION: ICD-10-CM

## 2020-03-02 DIAGNOSIS — L40.50 PSORIATIC ARTHRITIS (HCC): ICD-10-CM

## 2020-03-02 DIAGNOSIS — Z79.61 LONG-TERM CURRENT USE OF APREMILAST: ICD-10-CM

## 2020-03-02 LAB
BASOPHILS # BLD AUTO: 0.7 % (ref 0–1.8)
BASOPHILS # BLD: 0.05 K/UL (ref 0–0.12)
EOSINOPHIL # BLD AUTO: 0.07 K/UL (ref 0–0.51)
EOSINOPHIL NFR BLD: 1 % (ref 0–6.9)
ERYTHROCYTE [DISTWIDTH] IN BLOOD BY AUTOMATED COUNT: 44.3 FL (ref 35.9–50)
ERYTHROCYTE [SEDIMENTATION RATE] IN BLOOD BY WESTERGREN METHOD: 8 MM/HOUR (ref 0–20)
HCT VFR BLD AUTO: 44.1 % (ref 42–52)
HGB BLD-MCNC: 14.8 G/DL (ref 14–18)
IMM GRANULOCYTES # BLD AUTO: 0.02 K/UL (ref 0–0.11)
IMM GRANULOCYTES NFR BLD AUTO: 0.3 % (ref 0–0.9)
LYMPHOCYTES # BLD AUTO: 2.89 K/UL (ref 1–4.8)
LYMPHOCYTES NFR BLD: 39.7 % (ref 22–41)
MCH RBC QN AUTO: 32.2 PG (ref 27–33)
MCHC RBC AUTO-ENTMCNC: 33.6 G/DL (ref 33.7–35.3)
MCV RBC AUTO: 96.1 FL (ref 81.4–97.8)
MONOCYTES # BLD AUTO: 0.8 K/UL (ref 0–0.85)
MONOCYTES NFR BLD AUTO: 11 % (ref 0–13.4)
NEUTROPHILS # BLD AUTO: 3.45 K/UL (ref 1.82–7.42)
NEUTROPHILS NFR BLD: 47.3 % (ref 44–72)
NRBC # BLD AUTO: 0 K/UL
NRBC BLD-RTO: 0 /100 WBC
PLATELET # BLD AUTO: 175 K/UL (ref 164–446)
PMV BLD AUTO: 11.4 FL (ref 9–12.9)
RBC # BLD AUTO: 4.59 M/UL (ref 4.7–6.1)
WBC # BLD AUTO: 7.3 K/UL (ref 4.8–10.8)

## 2020-03-02 PROCEDURE — 85652 RBC SED RATE AUTOMATED: CPT

## 2020-03-02 PROCEDURE — 86480 TB TEST CELL IMMUN MEASURE: CPT

## 2020-03-02 PROCEDURE — 80053 COMPREHEN METABOLIC PANEL: CPT

## 2020-03-02 PROCEDURE — 36415 COLL VENOUS BLD VENIPUNCTURE: CPT

## 2020-03-02 PROCEDURE — 86705 HEP B CORE ANTIBODY IGM: CPT

## 2020-03-02 PROCEDURE — 87340 HEPATITIS B SURFACE AG IA: CPT | Mod: GA

## 2020-03-02 PROCEDURE — 86803 HEPATITIS C AB TEST: CPT

## 2020-03-02 PROCEDURE — 99214 OFFICE O/P EST MOD 30 MIN: CPT | Performed by: INTERNAL MEDICINE

## 2020-03-02 PROCEDURE — 85025 COMPLETE CBC W/AUTO DIFF WBC: CPT

## 2020-03-02 RX ORDER — APREMILAST 30 MG/1
TABLET, FILM COATED ORAL
Qty: 180 TAB | Refills: 1 | Status: SHIPPED | OUTPATIENT
Start: 2020-03-02 | End: 2020-09-14

## 2020-03-02 ASSESSMENT — FIBROSIS 4 INDEX: FIB4 SCORE: 2.18

## 2020-03-02 NOTE — PROGRESS NOTES
Chief Complaint- joint pain     Subjective:   Buster Medina is a 69 y.o. male here today for follow up of rheumatological issues    This is a follow-up visit for this patient who is seen in this clinic for psoriatic arthritis that was diagnosed about 2008 by rheumatology in [place], [place].  At last visit we switch patient from Humira to Otezla 30 mg p.o. twice daily with great benefit.  Patient denies any side effects from the medication, denies any unexplained weight loss, denies any fevers of unknown etiology, denies any GI upset, denies any rashes, denies any new joint swelling, denies recurrent infections.      Patient recently status post evaluation by Dr. Block at [facility] for DJD in left foot, was told that surgical option would be fusion of the bones in his left foot for which patient is pursuing.    Co morbidities include HTN, high cholesterol, hx left ahilles tendon rupture and repair, hx left knee arthroscopic surgery for meniscal tear, s/p left rotator cuff tear, s/p bladder cancer about 2012 no recurrence s/p surgical intervention only, hx of gastric bypass 2005 [facility], pt does f/u with Dr Gamez q year     Right TSA     S/p topical treatments  S/p humira-lost efficacy  S/p plaquenil-cause taste abnormalities    Addednum 3/3/2020  HBsAg/HBcAbIgM neg 2/2020  HCV neg 2/2020  Quantiferon Gold neg 2/2020        G6PD 10.9 adequate 9/2019  HBsAg/HBcAbIgM neg 8/2018  HCV neg 8/2018  Quantiferon Gold neg 8/2018  CCP neg 3/2018  RF neg 3/2018  SEAN neg 3/2018  Hand x-rays 3/2018-indicates possible erosion of the fourth metacarpal on the left hand, OA of the right first CMC joint  Feet x-rays 3/2018-DJD     Current medicines (including changes today)  Current Outpatient Medications   Medication Sig Dispense Refill   • Apremilast (OTEZLA) 30 MG Tab 1 tab po bid after finishing the loading dose 180 Tab 1   • lisinopril (PRINIVIL) 20 MG Tab TAKE 1 TABLET BY MOUTH EVERY DAY IN THE EVENING 90 Tab 0   •  omeprazole (PRILOSEC) 20 MG delayed-release capsule TAKE 1 CAPSULE DAILY 30 Cap 3   • zolpidem (AMBIEN CR) 12.5 MG CR tablet Take 1 Tab by mouth at bedtime as needed for Sleep (Insomnia) for up to 60 days. 30 Tab 1   • valacyclovir (VALTREX) 1 GM Tab TAKE 1 TABLET TWICE A DAY  FOR 7 DAYS AS NEEDED FOR   OUTBREAK 24 Tab 0   • atorvastatin (LIPITOR) 40 MG Tab Take 1 Tab by mouth every day. 90 Tab 3   • celecoxib (CELEBREX) 200 MG Cap TAKE 1 CAPSULE DAILY 90 Cap 2   • mirtazapine (REMERON) 15 MG Tab 7.5 mg.     • fluticasone (FLOVENT HFA) 44 MCG/ACT Aerosol 2 Puffs by Nebulization route.     • ferrous sulfate 325 (65 Fe) MG tablet Take  by mouth.     • aspirin EC (ECOTRIN) 81 MG Tablet Delayed Response Take 81 mg by mouth every bedtime. 30 Tab 0   • Multiple Vitamins-Minerals (MULTI COMPLETE PO) Take 1 Tab by mouth 2 Times a Day.     • Multiple Vitamins-Minerals (MELODY-DAY 1000 PO) Take 1 Tab by mouth 2 Times a Day.     • Cyanocobalamin (B-12) 2500 MCG Tab Take 1 Tab by mouth every morning.     • Naltrexone-buPROPion HCl ER (CONTRAVE) 8-90 MG TABLET SR 12 HR Take  by mouth 2 Times a Day.     • Apremilast (OTEZLA) 10 & 20 & 30 MG Tablet Therapy Pack 10 mg po qday for one day then 10 mg po bid for one day then 20 mg po qam then 10 mg po qpm for one day then 20 mg po bid for one day then 30 mg po qam and 20 mg po q pm for one day then 30 mg po bid (Patient not taking: Reported on 3/2/2020) 21 Each 0   • predniSONE (DELTASONE) 20 MG Tab Take 3 tabs po for 2 days then 2 tabs for 2 days then 1 for 2 days (Patient not taking: Reported on 12/2/2019) 12 Tab 0   • hydroxychloroquine (PLAQUENIL) 200 MG Tab 1 tab po bid (Patient not taking: Reported on 3/2/2020) 180 Tab 1   • potassium chloride SA (KLOR-CON M10) 10 MEQ Tab CR Take 1 Tab by mouth every day. (Patient not taking: Reported on 12/2/2019) 90 Tab 0   • mirtazapine (REMERON) 15 MG Tab TAKE 1/2 TABLET AT BEDTIME 45 Tab 0     No current facility-administered medications for  this visit.      He  has a past medical history of Anxiety, Arthritis, Cancer (HCC) (2007), Chickenpox, Depression, High cholesterol, Hyperlipidemia, Hypertension, Kidney stone, Mumps, Obesity, Pneumothorax, Restless leg syndrome, Sleep apnea, and Tonsillitis.    ROS   Other than what is mentioned in HPI or physical exam, there is no history of headaches, double vision or blurred vision. No temporal tenderness or jaw claudication. No trouble swallowing difficulties or sore throats.  No chest complaints including chest pain, cough or sputum production. No GI complaints including nausea, vomiting, change in bowel habits, or past peptic ulcer disease. No history of blood in the stools. No urinary complaints including dysuria or frequency. No history of alopecia, photosensitivity, oral ulcerations, Raynaud's phenomena.       Objective:     /70   Pulse 90   Temp 36.2 °C (97.2 °F) (Temporal)   Resp 14   Wt 83 kg (183 lb)   SpO2 97%  Body mass index is 28.66 kg/m².   Physical Exam:    Constitutional: Alert and oriented X3, patient is talkative with good eye contact.Skin: Warm, dry, good turgor, no rashes in visible areas, did not appreciate any psoriatic plaques.Eye: Equal, round and reactive, conjunctiva clear, lids normal EOM intactENMT: Lips without lesions, good dentition, no oropharyngeal ulcers, moist buccal mucosa, pinna without deformityNeck: Trachea midline, no masses, no thyromegaly.Lymph:  No cervical lymphadenopathy, no axillary lymphadenopathy, no supraclavicular lymphadenopathyRespiratory: Unlabored respiratory effort, lungs clear to auscultation, no wheezes, no ronchi.Cardiovascular: Normal S1, S2, no murmur, no edema.Abdomen: Soft, non-tender, no masses, no hepatosplenomegaly.Psych: Alert and oriented x3, normal affect and mood.Neuro: Cranial nerves 2-12 are grossly intact, no loss of sensation LEExt:no joint laxity noted in bilateral arms, no joint laxity noted in bilateral legs, joints look  great no swan-neck or boutonniere deformities no sausage digits no dactylitis no enthesitis, gait without antalgia and without foot drop    Lab Results   Component Value Date/Time    QNTTBGOLD Negative 08/16/2018 12:13 PM     Lab Results   Component Value Date/Time    HEPBCORIGM Negative 08/16/2018 12:13 PM    HEPBSAG Negative 08/16/2018 12:13 PM     Lab Results   Component Value Date/Time    HEPCAB Negative 08/16/2018 12:13 PM     Lab Results   Component Value Date/Time    SODIUM RR 09/26/2019 10:06 AM    SODIUM 142 09/26/2019 10:06 AM    POTASSIUM RR 09/26/2019 10:06 AM    POTASSIUM 5.0 09/26/2019 10:06 AM    CHLORIDE RR 09/26/2019 10:06 AM    CHLORIDE 108 09/26/2019 10:06 AM    CO2 RR 09/26/2019 10:06 AM    CO2 24 09/26/2019 10:06 AM    GLUCOSE RR 09/26/2019 10:06 AM    GLUCOSE 100 (H) 09/26/2019 10:06 AM    BUN RR 09/26/2019 10:06 AM    BUN 18 09/26/2019 10:06 AM    CREATININE RR 09/26/2019 10:06 AM    CREATININE 1.17 09/26/2019 10:06 AM      Lab Results   Component Value Date/Time    WBC 5.0 09/26/2019 10:06 AM    RBC 4.63 (L) 09/26/2019 10:06 AM    HEMOGLOBIN 15.1 09/26/2019 10:06 AM    HEMATOCRIT 45.8 09/26/2019 10:06 AM    MCV 98.9 (H) 09/26/2019 10:06 AM    MCH 32.6 09/26/2019 10:06 AM    MCHC 33.0 (L) 09/26/2019 10:06 AM    MPV 12.3 09/26/2019 10:06 AM    NEUTSPOLYS 43.00 (L) 09/26/2019 10:06 AM    LYMPHOCYTES 41.10 (H) 09/26/2019 10:06 AM    MONOCYTES 11.30 09/26/2019 10:06 AM    EOSINOPHILS 3.40 09/26/2019 10:06 AM    BASOPHILS 1.00 09/26/2019 10:06 AM      Lab Results   Component Value Date/Time    CALCIUM RR 09/26/2019 10:06 AM    CALCIUM 9.7 09/26/2019 10:06 AM    ASTSGOT RR 09/26/2019 10:06 AM    ASTSGOT 29 09/26/2019 10:06 AM    ALTSGPT RR 09/26/2019 10:06 AM    ALTSGPT 35 09/26/2019 10:06 AM    ALKPHOSPHAT RR 09/26/2019 10:06 AM    ALKPHOSPHAT 58 09/26/2019 10:06 AM    TBILIRUBIN RR 09/26/2019 10:06 AM    TBILIRUBIN 0.5 09/26/2019 10:06 AM    ALBUMIN RR 09/26/2019 10:06 AM    ALBUMIN 4.7 09/26/2019  10:06 AM    TOTPROTEIN RR 09/26/2019 10:06 AM    TOTPROTEIN 6.8 09/26/2019 10:06 AM     Lab Results   Component Value Date/Time    URICACID 8.5 (H) 08/16/2018 12:13 PM    RHEUMFACTN <10 03/21/2018 02:19 PM    CCPANTIBODY 4 03/21/2018 02:20 PM    ANTINUCAB None Detected 03/21/2018 02:19 PM     Lab Results   Component Value Date/Time    SEDRATEWES 4 09/17/2019 10:30 AM     Lab Results   Component Value Date/Time    G6PD 10.9 09/17/2019 10:30 AM     Results for orders placed during the hospital encounter of 03/20/18   DX-FOOT-COMPLETE 3+ LEFT    Impression 1.  There is no radiographic evidence of inflammatory arthropathy.  2.  There is mild degenerative change at the 1st tarsometatarsal joint and tibiotalar joint.  3.  Question of subchondral cyst versus early erosive change aat the base of the 1st proximal phalanx.     Results for orders placed during the hospital encounter of 03/20/18   DX-HAND 3+ LEFT    Impression 1.  There is a questionable erosion versus subchondral cyst on the ulnar aspect of the base of the left 4th metacarpal.  2.  There is postoperative change and degenerative change involving the 1st and 2nd carpometacarpal junctions. There is a question of previous trapezium resection.     Assessment and Plan:     1. Psoriatic arthritis (HCC)  Doing really well on Otezla 30 mg p.o. twice daily, will continue with such  - Comp Metabolic Panel; Future  - CBC WITH DIFFERENTIAL; Future  - Sed Rate; Future  - Apremilast (OTEZLA) 30 MG Tab; 1 tab po bid after finishing the loading dose  Dispense: 180 Tab; Refill: 1    2. Long-term current use of apremilast  On Otezla 30 mg p.o. twice daily, will check labs about every 6 months next labs due about May 2020, labs ordered for patient  Also recheck screening serologies i.e. hepatitis B hepatitis C and TB.  We reviewed risks of biological medications with patient including hematological pathology, cancer risks, neurological and infection issues, patient states  understanding.  - Comp Metabolic Panel; Future  - CBC WITH DIFFERENTIAL; Future  - Sed Rate; Future  - Apremilast (OTEZLA) 30 MG Tab; 1 tab po bid after finishing the loading dose  Dispense: 180 Tab; Refill: 1    3. Essential hypertension  May impact the type of medications we can use for this patient's arthritis. We will have to keep this under advisement.  - Comp Metabolic Panel; Future  - CBC WITH DIFFERENTIAL; Future  - Sed Rate; Future    4. H/O gastric bypass    Followup: Return in about 6 months (around 9/2/2020). or sooner ankush Medina  was seen 30 minutes face-to-face of which more than 50% of the time was spent counseling the patient (excluding time for procedures)  regarding  rheumatological condition and care. Therapy was discussed in detail.      Please note that this dictation was created using voice recognition software. I have made every reasonable attempt to correct obvious errors, but I expect that there are errors of grammar and possibly content that I did not discover before finalizing the note.

## 2020-03-03 LAB
ALBUMIN SERPL BCP-MCNC: 4.1 G/DL (ref 3.2–4.9)
ALBUMIN/GLOB SERPL: 1.6 G/DL
ALP SERPL-CCNC: 63 U/L (ref 30–99)
ALT SERPL-CCNC: 31 U/L (ref 2–50)
ANION GAP SERPL CALC-SCNC: 7 MMOL/L (ref 0–11.9)
AST SERPL-CCNC: 27 U/L (ref 12–45)
BILIRUB SERPL-MCNC: 0.4 MG/DL (ref 0.1–1.5)
BUN SERPL-MCNC: 18 MG/DL (ref 8–22)
CALCIUM SERPL-MCNC: 9.3 MG/DL (ref 8.5–10.5)
CHLORIDE SERPL-SCNC: 109 MMOL/L (ref 96–112)
CO2 SERPL-SCNC: 25 MMOL/L (ref 20–33)
CREAT SERPL-MCNC: 0.99 MG/DL (ref 0.5–1.4)
GLOBULIN SER CALC-MCNC: 2.5 G/DL (ref 1.9–3.5)
GLUCOSE SERPL-MCNC: 81 MG/DL (ref 65–99)
HBV CORE IGM SER QL: NEGATIVE
HBV SURFACE AG SER QL: NEGATIVE
HCV AB SER QL: NEGATIVE
POTASSIUM SERPL-SCNC: 4.1 MMOL/L (ref 3.6–5.5)
PROT SERPL-MCNC: 6.6 G/DL (ref 6–8.2)
SODIUM SERPL-SCNC: 141 MMOL/L (ref 135–145)

## 2020-03-04 LAB
GAMMA INTERFERON BACKGROUND BLD IA-ACNC: 0.13 IU/ML
M TB IFN-G BLD-IMP: NEGATIVE
M TB IFN-G CD4+ BCKGRND COR BLD-ACNC: -0.05 IU/ML
MITOGEN IGNF BCKGRD COR BLD-ACNC: >10 IU/ML
QFT TB2 - NIL TBQ2: -0.07 IU/ML

## 2020-04-13 ENCOUNTER — APPOINTMENT (OUTPATIENT)
Dept: SLEEP MEDICINE | Facility: MEDICAL CENTER | Age: 70
End: 2020-04-13
Payer: MEDICARE

## 2020-04-30 DIAGNOSIS — K21.9 GASTROESOPHAGEAL REFLUX DISEASE WITHOUT ESOPHAGITIS: ICD-10-CM

## 2020-04-30 RX ORDER — OMEPRAZOLE 20 MG/1
CAPSULE, DELAYED RELEASE ORAL
Qty: 30 CAP | Refills: 0 | Status: SHIPPED | OUTPATIENT
Start: 2020-04-30 | End: 2020-05-26

## 2020-05-04 RX ORDER — LISINOPRIL 20 MG/1
TABLET ORAL
Qty: 90 TAB | Refills: 0 | Status: SHIPPED | OUTPATIENT
Start: 2020-05-04 | End: 2020-08-04

## 2020-05-23 DIAGNOSIS — K21.9 GASTROESOPHAGEAL REFLUX DISEASE WITHOUT ESOPHAGITIS: ICD-10-CM

## 2020-05-26 RX ORDER — OMEPRAZOLE 20 MG/1
CAPSULE, DELAYED RELEASE ORAL
Qty: 30 CAP | Refills: 0 | Status: SHIPPED | OUTPATIENT
Start: 2020-05-26 | End: 2020-06-23

## 2020-06-11 RX ORDER — MIRTAZAPINE 15 MG/1
TABLET, FILM COATED ORAL
Qty: 45 TAB | Refills: 0 | Status: SHIPPED | OUTPATIENT
Start: 2020-06-11 | End: 2020-09-23

## 2020-06-21 DIAGNOSIS — K21.9 GASTROESOPHAGEAL REFLUX DISEASE WITHOUT ESOPHAGITIS: ICD-10-CM

## 2020-06-23 RX ORDER — OMEPRAZOLE 20 MG/1
CAPSULE, DELAYED RELEASE ORAL
Qty: 30 CAP | Refills: 0 | Status: SHIPPED | OUTPATIENT
Start: 2020-06-23 | End: 2020-07-22

## 2020-06-26 ENCOUNTER — PATIENT OUTREACH (OUTPATIENT)
Dept: SCHEDULING | Facility: IMAGING CENTER | Age: 70
End: 2020-06-26

## 2020-06-30 RX ORDER — FLUOXETINE HYDROCHLORIDE 40 MG/1
40 CAPSULE ORAL DAILY
Qty: 90 CAP | Refills: 0 | Status: SHIPPED | OUTPATIENT
Start: 2020-06-30 | End: 2020-09-14

## 2020-07-16 ENCOUNTER — TELEMEDICINE (OUTPATIENT)
Dept: MEDICAL GROUP | Age: 70
End: 2020-07-16
Payer: MEDICARE

## 2020-07-16 ENCOUNTER — NURSE TRIAGE (OUTPATIENT)
Dept: HEALTH INFORMATION MANAGEMENT | Facility: OTHER | Age: 70
End: 2020-07-16

## 2020-07-16 VITALS — RESPIRATION RATE: 12 BRPM | HEIGHT: 67 IN | WEIGHT: 180 LBS | BODY MASS INDEX: 28.25 KG/M2

## 2020-07-16 DIAGNOSIS — Z20.822 EXPOSURE TO COVID-19 VIRUS: ICD-10-CM

## 2020-07-16 DIAGNOSIS — I10 ESSENTIAL HYPERTENSION: ICD-10-CM

## 2020-07-16 DIAGNOSIS — E78.5 DYSLIPIDEMIA: ICD-10-CM

## 2020-07-16 DIAGNOSIS — K59.1 FUNCTIONAL DIARRHEA: ICD-10-CM

## 2020-07-16 DIAGNOSIS — N40.0 BENIGN PROSTATIC HYPERPLASIA WITHOUT LOWER URINARY TRACT SYMPTOMS: ICD-10-CM

## 2020-07-16 PROCEDURE — 99214 OFFICE O/P EST MOD 30 MIN: CPT | Mod: 95,CS,CR | Performed by: FAMILY MEDICINE

## 2020-07-16 RX ORDER — METHYLPREDNISOLONE 4 MG/1
TABLET ORAL
COMMUNITY
Start: 2020-07-15 | End: 2020-07-16

## 2020-07-16 RX ORDER — LORAZEPAM 2 MG/1
2 TABLET ORAL
COMMUNITY
End: 2020-12-11 | Stop reason: SDUPTHER

## 2020-07-16 RX ORDER — LOPERAMIDE HYDROCHLORIDE 2 MG/1
2 CAPSULE ORAL 4 TIMES DAILY PRN
Qty: 30 CAP | Refills: 0 | Status: SHIPPED | OUTPATIENT
Start: 2020-07-16 | End: 2022-05-23

## 2020-07-16 RX ORDER — ROPINIROLE 2 MG/1
2 TABLET, FILM COATED ORAL
COMMUNITY
End: 2020-07-16

## 2020-07-16 RX ORDER — ZOLPIDEM TARTRATE 6.25 MG/1
TABLET, FILM COATED, EXTENDED RELEASE ORAL
COMMUNITY
End: 2020-12-08

## 2020-07-16 RX ORDER — ZOSTER VACCINE RECOMBINANT, ADJUVANTED 50 MCG/0.5
KIT INTRAMUSCULAR
COMMUNITY
End: 2020-07-16

## 2020-07-16 ASSESSMENT — FIBROSIS 4 INDEX: FIB4 SCORE: 1.91

## 2020-07-16 NOTE — TELEPHONE ENCOUNTER
Patient had telemedicine appt.today and would like to make an appt for a SARS covid test and an antibody test.  Orders are in his chart.      Patient declined further information on Covid 19.      Pt transferred to lab scheduling to secure an appt.

## 2020-07-16 NOTE — PROGRESS NOTES
Telemedicine Visit: Established Patient     This encounter was conducted via FIRE1.   Verbal consent was obtained. Patient's identity was verified.    Subjective:   CC: Diarrhea and cold concerns  Buster Medina is a 70 y.o. male presenting for evaluation and management of:    1.  Functional diarrhea  2. Exposure to COVID-19 virus  NEW PROBLEM    The patient states he has been having loose watery stool for several weeks now.  He does not recall taking any antibiotics recently, no swimming in lakes or rivers, his girlfriend did not have similar symptoms no new restaurants.  He denies any abdominal pain no nausea or vomiting he is wondering if this is a COVID symptom.  He denies any blood in the stool no dark tarry stool, no vomiting of blood.  He denies any fevers, no chills no night sweats no shortness of breath.    ROS   Denies any recent fevers or chills. No nausea or vomiting. No chest pains or shortness of breath.     No Known Allergies    Current medicines (including changes today)  Current Outpatient Medications   Medication Sig Dispense Refill   • zolpidem (AMBIEN CR) 6.25 MG CR tablet zolpidem ER 6.25 mg tablet,extended release,multiphase     • loperamide (IMODIUM) 2 MG Cap Take 1 Cap by mouth 4 times a day as needed for Diarrhea. 30 Cap 0   • fluoxetine (PROZAC) 40 MG capsule Take 1 Cap by mouth every day. 90 Cap 0   • omeprazole (PRILOSEC) 20 MG delayed-release capsule TAKE 1 CAPSULE DAILY 30 Cap 0   • mirtazapine (REMERON) 15 MG Tab TAKE 1/2 TABLET AT BEDTIME 45 Tab 0   • lisinopril (PRINIVIL) 20 MG Tab TAKE 1 TABLET BY MOUTH EVERY DAY IN THE EVENING 90 Tab 0   • Apremilast (OTEZLA) 30 MG Tab 1 tab po bid after finishing the loading dose 180 Tab 1   • valacyclovir (VALTREX) 1 GM Tab TAKE 1 TABLET TWICE A DAY  FOR 7 DAYS AS NEEDED FOR   OUTBREAK 24 Tab 0   • Apremilast (OTEZLA) 10 & 20 & 30 MG Tablet Therapy Pack 10 mg po qday for one day then 10 mg po bid for one day then 20 mg po qam then 10 mg po  qpm for one day then 20 mg po bid for one day then 30 mg po qam and 20 mg po q pm for one day then 30 mg po bid 21 Each 0   • atorvastatin (LIPITOR) 40 MG Tab Take 1 Tab by mouth every day. 90 Tab 3   • celecoxib (CELEBREX) 200 MG Cap TAKE 1 CAPSULE DAILY 90 Cap 2   • fluticasone (FLOVENT HFA) 44 MCG/ACT Aerosol 2 Puffs by Nebulization route.     • ferrous sulfate 325 (65 Fe) MG tablet Take  by mouth.     • aspirin EC (ECOTRIN) 81 MG Tablet Delayed Response Take 81 mg by mouth every bedtime. 30 Tab 0   • Multiple Vitamins-Minerals (MELODY-DAY 1000 PO) Take 1 Tab by mouth 2 Times a Day.     • Cyanocobalamin (B-12) 2500 MCG Tab Take 1 Tab by mouth every morning.     • LORazepam (ATIVAN) 2 MG tablet Take 2 mg by mouth.       No current facility-administered medications for this visit.        Patient Active Problem List    Diagnosis Date Noted   • Functional diarrhea 07/16/2020   • History of gastric bypass 10/04/2019   • Tinnitus of both ears 04/09/2019   • Bruising 04/09/2019   • Complex sleep apnea syndrome 12/06/2018   • Dyslipidemia 09/21/2018   • Family history of coronary artery disease in brother 09/21/2018   • Familial hyperlipidemia 09/21/2018   • Psoriatic arthritis (HCC) 08/16/2018   • Dysthymic disorder 03/05/2018   • Erectile dysfunction of nonorganic origin 03/05/2018   • Hypokalemia 09/21/2017   • Malignant neoplasm of anterior wall of urinary bladder (HCC) 09/21/2017   • Essential hypertension 09/21/2017   • Need for vaccination 09/21/2017   • Major depressive disorder 09/21/2017   • Chronic insomnia 09/21/2017   • Plantar fasciitis 09/21/2017   • Gastroesophageal reflux disease without esophagitis 09/21/2017   • Benign nodular prostatic hyperplasia without lower urinary tract symptoms 09/21/2017   • Vitamin D deficiency 09/21/2017       Family History   Problem Relation Age of Onset   • Cancer Mother 80        lung cancer   • Arthritis Mother    • Diabetes Mother    • Anxiety disorder Mother    • Lung  "Disease Father 65   • Cancer Father    • Diabetes Father    • Hypertension Father    • Hyperlipidemia Father    • Hyperlipidemia Brother    • Arthritis Brother    • Other Brother         Thyroid tumor   • Sleep Apnea Brother    • No Known Problems Maternal Grandmother    • Cancer Maternal Grandfather    • No Known Problems Paternal Grandmother    • No Known Problems Paternal Grandfather    • Heart Disease Brother 49        smoker, overweight   • Hypertension Brother    • Depression Other    • Suicide Attempts Neg Hx    • Bipolar disorder Neg Hx    • Alcohol abuse Neg Hx    • Drug abuse Neg Hx        He  has a past medical history of Anxiety, Arthritis, Cancer (HCC) (2007), Chickenpox, Depression, High cholesterol, Hyperlipidemia, Hypertension, Kidney stone, Mumps, Obesity, Pneumothorax, Restless leg syndrome, Sleep apnea, and Tonsillitis.  He  has a past surgical history that includes bladder biopsy with cystoscopy; abdominal exploration; gastric bypass laparoscopic; eye surgery; hernia repair; arthroplasty; open reduction; other orthopedic surgery (2015); bladder biopsy with cystoscopy (1/3/2018); retrogrades (Bilateral, 1/3/2018); pyelogram (Bilateral, 1/3/2018); thoracotomy; other (Left); and turp-vapor.       Objective:   Resp 12   Ht 1.702 m (5' 7\")   Wt 81.6 kg (180 lb)   BMI 28.19 kg/m²     Physical Exam:  Constitutional: Alert, no distress, well-groomed.  Skin: No rashes in visible areas.  Eye: Round. Conjunctiva clear, lids normal. No icterus.   ENMT: Lips pink without lesions, good dentition, moist mucous membranes. Phonation normal.  Neck: No masses, no thyromegaly. Moves freely without pain.  CV: Pulse as reported by patient  Respiratory: Unlabored respiratory effort, no cough or audible wheeze  Psych: Alert and oriented x3, normal affect and mood.       Assessment and Plan:   The following treatment plan was discussed:     1. Functional diarrhea  2. Exposure to COVID-19 virus  We will obtain COVID " antibody testing  We will also obtain stool studies  He was also given Imodium  He was also given instructions to hydrate adequately.    - C Diff by PCR rflx Toxin; Future  - CULTURE STOOL; Future  - STOOL REDUCING SUBST; Future      - Miscellaneous Test; Future  - COVID/SARS CoV-2 PCR; Future  - loperamide (IMODIUM) 2 MG Cap; Take 1 Cap by mouth 4 times a day as needed for Diarrhea.  Dispense: 30 Cap; Refill: 0    Follow-up: Return in about 3 months (around 10/16/2020) for Reevaluation.

## 2020-07-21 DIAGNOSIS — K21.9 GASTROESOPHAGEAL REFLUX DISEASE WITHOUT ESOPHAGITIS: ICD-10-CM

## 2020-07-22 RX ORDER — CELECOXIB 200 MG/1
CAPSULE ORAL
Qty: 90 CAP | Refills: 2 | Status: SHIPPED | OUTPATIENT
Start: 2020-07-22 | End: 2021-03-26

## 2020-07-22 RX ORDER — OMEPRAZOLE 20 MG/1
CAPSULE, DELAYED RELEASE ORAL
Qty: 30 CAP | Refills: 0 | Status: SHIPPED | OUTPATIENT
Start: 2020-07-22 | End: 2020-08-21

## 2020-07-24 ENCOUNTER — HOSPITAL ENCOUNTER (OUTPATIENT)
Dept: LAB | Facility: MEDICAL CENTER | Age: 70
End: 2020-07-24
Attending: FAMILY MEDICINE
Payer: MEDICARE

## 2020-07-24 DIAGNOSIS — Z20.822 EXPOSURE TO COVID-19 VIRUS: ICD-10-CM

## 2020-07-24 LAB — COVID ORDER STATUS COVID19: NORMAL

## 2020-07-24 PROCEDURE — U0003 INFECTIOUS AGENT DETECTION BY NUCLEIC ACID (DNA OR RNA); SEVERE ACUTE RESPIRATORY SYNDROME CORONAVIRUS 2 (SARS-COV-2) (CORONAVIRUS DISEASE [COVID-19]), AMPLIFIED PROBE TECHNIQUE, MAKING USE OF HIGH THROUGHPUT TECHNOLOGIES AS DESCRIBED BY CMS-2020-01-R: HCPCS

## 2020-07-24 PROCEDURE — C9803 HOPD COVID-19 SPEC COLLECT: HCPCS

## 2020-07-25 LAB
SARS-COV-2 RNA RESP QL NAA+PROBE: NOTDETECTED
SPECIMEN SOURCE: NORMAL

## 2020-07-28 ENCOUNTER — HOSPITAL ENCOUNTER (OUTPATIENT)
Dept: LAB | Facility: MEDICAL CENTER | Age: 70
End: 2020-07-28
Attending: INTERNAL MEDICINE
Payer: MEDICARE

## 2020-07-28 ENCOUNTER — HOSPITAL ENCOUNTER (OUTPATIENT)
Dept: LAB | Facility: MEDICAL CENTER | Age: 70
End: 2020-07-28
Attending: FAMILY MEDICINE
Payer: MEDICARE

## 2020-07-28 DIAGNOSIS — Z20.822 EXPOSURE TO COVID-19 VIRUS: ICD-10-CM

## 2020-07-28 DIAGNOSIS — E78.5 DYSLIPIDEMIA: ICD-10-CM

## 2020-07-28 DIAGNOSIS — N40.0 BENIGN PROSTATIC HYPERPLASIA WITHOUT LOWER URINARY TRACT SYMPTOMS: ICD-10-CM

## 2020-07-28 LAB
ALBUMIN SERPL BCP-MCNC: 4.3 G/DL (ref 3.2–4.9)
ALBUMIN/GLOB SERPL: 1.9 G/DL
ALP SERPL-CCNC: 80 U/L (ref 30–99)
ALT SERPL-CCNC: 39 U/L (ref 2–50)
ANION GAP SERPL CALC-SCNC: 13 MMOL/L (ref 7–16)
AST SERPL-CCNC: 25 U/L (ref 12–45)
BILIRUB SERPL-MCNC: 0.5 MG/DL (ref 0.1–1.5)
BUN SERPL-MCNC: 17 MG/DL (ref 8–22)
CALCIUM SERPL-MCNC: 9.5 MG/DL (ref 8.5–10.5)
CHLORIDE SERPL-SCNC: 105 MMOL/L (ref 96–112)
CHOLEST SERPL-MCNC: 171 MG/DL (ref 100–199)
CO2 SERPL-SCNC: 24 MMOL/L (ref 20–33)
CREAT SERPL-MCNC: 1.07 MG/DL (ref 0.5–1.4)
ERYTHROCYTE [DISTWIDTH] IN BLOOD BY AUTOMATED COUNT: 45.7 FL (ref 35.9–50)
FASTING STATUS PATIENT QL REPORTED: NORMAL
GLOBULIN SER CALC-MCNC: 2.3 G/DL (ref 1.9–3.5)
GLUCOSE SERPL-MCNC: 104 MG/DL (ref 65–99)
HCT VFR BLD AUTO: 42.9 % (ref 42–52)
HDLC SERPL-MCNC: 85 MG/DL
HGB BLD-MCNC: 13.7 G/DL (ref 14–18)
LDLC SERPL CALC-MCNC: 71 MG/DL
MCH RBC QN AUTO: 31.4 PG (ref 27–33)
MCHC RBC AUTO-ENTMCNC: 31.9 G/DL (ref 33.7–35.3)
MCV RBC AUTO: 98.4 FL (ref 81.4–97.8)
PLATELET # BLD AUTO: 153 K/UL (ref 164–446)
PMV BLD AUTO: 12 FL (ref 9–12.9)
POTASSIUM SERPL-SCNC: 4.9 MMOL/L (ref 3.6–5.5)
PROT SERPL-MCNC: 6.6 G/DL (ref 6–8.2)
PSA SERPL-MCNC: 1.04 NG/ML (ref 0–4)
RBC # BLD AUTO: 4.36 M/UL (ref 4.7–6.1)
SARS-COV-2 AB SERPL QL IA: NORMAL
SODIUM SERPL-SCNC: 142 MMOL/L (ref 135–145)
TRIGL SERPL-MCNC: 74 MG/DL (ref 0–149)
WBC # BLD AUTO: 8.3 K/UL (ref 4.8–10.8)

## 2020-07-28 PROCEDURE — 86769 SARS-COV-2 COVID-19 ANTIBODY: CPT

## 2020-07-28 PROCEDURE — 84153 ASSAY OF PSA TOTAL: CPT

## 2020-07-28 PROCEDURE — 36415 COLL VENOUS BLD VENIPUNCTURE: CPT

## 2020-07-28 PROCEDURE — 80061 LIPID PANEL: CPT

## 2020-07-28 PROCEDURE — 80053 COMPREHEN METABOLIC PANEL: CPT

## 2020-07-28 PROCEDURE — 85027 COMPLETE CBC AUTOMATED: CPT

## 2020-08-04 RX ORDER — LISINOPRIL 20 MG/1
TABLET ORAL
Qty: 90 TAB | Refills: 0 | Status: SHIPPED | OUTPATIENT
Start: 2020-08-04 | End: 2020-10-29

## 2020-09-08 ENCOUNTER — PATIENT MESSAGE (OUTPATIENT)
Dept: SLEEP MEDICINE | Facility: MEDICAL CENTER | Age: 70
End: 2020-09-08

## 2020-09-09 NOTE — PATIENT COMMUNICATION
Called pt to inform him that due to Leny Goodman not being here no longer he would need an OV before he can get a refill on his prescription.  I was able to schedule the pt on 9/10/2020 @ 11:40 am with Dr. Mancuso.

## 2020-09-09 NOTE — TELEPHONE ENCOUNTER
From: Buster Medina  To: Cyndy Mancuso M.D.  Sent: 9/8/2020 9:54 PM PDT  Subject: Prescription Question    I need a refill of zolpidem ER 12.5 mg initially prescribed by ALBERTO Goodman on 3/29/20    Please send it to [facility]

## 2020-09-10 ENCOUNTER — OFFICE VISIT (OUTPATIENT)
Dept: SLEEP MEDICINE | Facility: MEDICAL CENTER | Age: 70
End: 2020-09-10
Payer: MEDICARE

## 2020-09-10 VITALS
WEIGHT: 185 LBS | BODY MASS INDEX: 29.03 KG/M2 | RESPIRATION RATE: 14 BRPM | HEIGHT: 67 IN | HEART RATE: 86 BPM | SYSTOLIC BLOOD PRESSURE: 124 MMHG | OXYGEN SATURATION: 96 % | DIASTOLIC BLOOD PRESSURE: 72 MMHG

## 2020-09-10 DIAGNOSIS — F51.04 CHRONIC INSOMNIA: ICD-10-CM

## 2020-09-10 DIAGNOSIS — G47.33 OSA (OBSTRUCTIVE SLEEP APNEA): ICD-10-CM

## 2020-09-10 PROCEDURE — 99213 OFFICE O/P EST LOW 20 MIN: CPT | Performed by: FAMILY MEDICINE

## 2020-09-10 RX ORDER — ZOLPIDEM TARTRATE 12.5 MG/1
12.5 TABLET, FILM COATED, EXTENDED RELEASE ORAL NIGHTLY PRN
Qty: 90 TAB | Refills: 0 | Status: SHIPPED | OUTPATIENT
Start: 2020-09-10 | End: 2020-12-08

## 2020-09-10 ASSESSMENT — FIBROSIS 4 INDEX: FIB4 SCORE: 1.81

## 2020-09-10 NOTE — PATIENT INSTRUCTIONS
Zolpidem extended-release tablets  What is this medicine?  ZOLPIDEM (zole PI dem) is used to treat insomnia. This medicine helps you to fall asleep and sleep through the night.  This medicine may be used for other purposes; ask your health care provider or pharmacist if you have questions.  COMMON BRAND NAME(S): Darius MARIELA  What should I tell my health care provider before I take this medicine?  They need to know if you have any of these conditions:  · depression  · history of drug abuse or addiction  · if you often drink alcohol  · liver disease  · lung or breathing disease  · myasthenia gravis  · sleep apnea  · sleep-walking, driving, eating or other activity while not fully awake after taking a sleep medicine  · suicidal thoughts, plans, or attempt; a previous suicide attempt by you or a family member  · an unusual or allergic reaction to zolpidem, other medicines, foods, dyes, or preservatives  · pregnant or trying to get pregnant  · breast-feeding  How should I use this medicine?  Take this medicine by mouth with a glass of water. Follow the directions on the prescription label. Do not crush, split, or chew the tablet before swallowing. It is better to take this medicine on an empty stomach and only when you are ready for bed. Do not take your medicine more often than directed. If you have been taking this medicine for several weeks and suddenly stop taking it, you may get unpleasant withdrawal symptoms. Your doctor or health care professional may want to gradually reduce the dose. Do not stop taking this medicine on your own. Always follow your doctor or health care professional's advice.  A special MedGuide will be given to you by the pharmacist with each prescription and refill. Be sure to read this information carefully each time.  Talk to your pediatrician regarding the use of this medicine in children. Special care may be needed.  Overdosage: If you think you have taken too much of this medicine contact a  poison control center or emergency room at once.  NOTE: This medicine is only for you. Do not share this medicine with others.  What if I miss a dose?  This does not apply. This medicine should only be taken immediately before going to sleep. Do not take double or extra doses.  What may interact with this medicine?  · alcohol  · antihistamines for allergy, cough and cold  · certain medicines for anxiety or sleep  · certain medicines for depression, like amitriptyline, fluoxetine, sertraline  · certain medicines for fungal infections like ketoconazole and itraconazole  · certain medicines for seizures like phenobarbital, primidone  · ciprofloxacin  · dietary supplements for sleep, like valerian or kava kava  · general anesthetics like halothane, isoflurane, methoxyflurane, propofol  · local anesthetics like lidocaine, pramoxine, tetracaine  · medicines that relax muscles for surgery  · narcotic medicines for pain  · phenothiazines like chlorpromazine, mesoridazine, prochlorperazine, thioridazine  · rifampin  This list may not describe all possible interactions. Give your health care provider a list of all the medicines, herbs, non-prescription drugs, or dietary supplements you use. Also tell them if you smoke, drink alcohol, or use illegal drugs. Some items may interact with your medicine.  What should I watch for while using this medicine?  Visit your doctor or health care professional for regular checks on your progress. Keep a regular sleep schedule by going to bed at about the same time each night. Avoid caffeine-containing drinks in the evening hours. When sleep medicines are used every night for more than a few weeks, they may stop working. Talk to your doctor if your insomnia worsens or is not better within 7 to 10 days.  After taking this medicine, you may get up out of bed and do an activity that you do not know you are doing. The next morning, you may have no memory of this. Activities include driving a car  "(\"sleep-driving\"), making and eating food, talking on the phone, sexual activity, and sleep-walking. Serious injuries have occurred. Stop the medicine and call your doctor right away if you find out you have done any of these activities. Do not take this medicine if you have used alcohol that evening. Do not take it if you have taken another medicine for sleep. The risk of doing these sleep-related activities is higher.  Do not take this medicine unless you are able to stay in bed for a full night (7 to 8 hours) before you must be active again. Do not drive, use machinery, or do anything that needs mental alertness the day after you take this medicine. You may have a decrease in mental alertness the day after use, even if you feel that you are fully awake. Tell your doctor if you will need to perform activities requiring full alertness, such as driving, the next day. Do not stand or sit up quickly, especially if you are an older patient. This reduces the risk of dizzy or fainting spells.  If you or your family notice any changes in your moods or behavior, such as new or worsening depression, thoughts of harming yourself, anxiety, other unusual or disturbing thoughts, or memory loss, call your doctor right away.  After you stop taking this medicine, you may have trouble falling asleep. This is called rebound insomnia. This problem usually goes away on its own after 1 or 2 nights.  What side effects may I notice from receiving this medicine?  Side effects that you should report to your doctor or health care professional as soon as possible:  · allergic reactions like skin rash, itching or hives, swelling of the face, lips, or tongue  · breathing problems  · changes in vision  · confusion  · depressed mood or other changes in moods or emotions  · feeling faint or lightheaded, falls  · hallucinations  · loss of balance or coordination  · loss of memory  · numbness or tingling of the tongue  · restlessness, excitability, " or feelings of anxiety or agitation  · signs and symptoms of liver injury like dark yellow or brown urine; general ill feeling or flu-like symptoms; light-colored stools; loss of appetite; nausea; right upper belly pain; unusually weak or tired; yellowing of the eyes or skin  · suicidal thoughts  · unusual activities while not fully awake like driving, eating, making phone calls, or sexual activity  Side effects that usually do not require medical attention (report to your doctor or health care professional if they continue or are bothersome):  · dizziness  · drowsiness the day after you take this medicine  · headache  This list may not describe all possible side effects. Call your doctor for medical advice about side effects. You may report side effects to FDA at 4-505-VUA-3758.  Where should I keep my medicine?  Keep out of the reach of children. This medicine can be abused. Keep your medicine in a safe place to protect it from theft. Do not share this medicine with anyone. Selling or giving away this medicine is dangerous and against the law.  This medicine may cause accidental overdose and death if taken by other adults, children, or pets. Mix any unused medicine with a substance like cat litter or coffee grounds. Then throw the medicine away in a sealed container like a sealed bag or a coffee can with a lid. Do not use the medicine after the expiration date.  Store at controlled room temperature between 15 and 25 degrees C (59 and 77 degrees F).  NOTE: This sheet is a summary. It may not cover all possible information. If you have questions about this medicine, talk to your doctor, pharmacist, or health care provider.  © 2020 [employer]/Gold Standard (2019-06-14 12:52:00)

## 2020-09-10 NOTE — PROGRESS NOTES
[facility] Follow Up Note     Date: 9/10/2020 / Time: 11:41 AM    Patient ID:   Name:             Buster Medina   YOB: 1950  Age:                 70 y.o.  male   MRN:               [id number]      Thank you for requesting a sleep medicine consultation on Buster Medina at the sleep center. He presents today with the chief complaints of complex sleep apnea and insomnia follow up.     HISTORY OF PRESENT ILLNESS:       Pt is currently on ASV EPAP 6/15 cm PS 2/20 cm. He goes to sleep around 9:30-10:30 pm and wakes up around 7-8 am. He is getting about 7-8 hrs of sleep on a good night and about 3-4 hr of sleep on a bad night. The bad nights are intermittent and comes in cluster. He was previously on lorazepam as a sleep however he self d/merlene it last year. However he was started on ambien CR 12.5 mg by Leny w/o titrating him us slowly. He has been on ambien CR for last year or so and takes it very very sparingly. Overall, he does finds his sleep refreshing when he gets enough hours of sleep. He does not take regular naps. He denies any symptoms of RLS, narcolepsy or any symptoms to suggest parasomnias such as nightmares, sleep walking or acting out of dreams.      He is using ASV most days of the week. Pt reports 6-7 hrs of average nightly use of CPAP. Pt denies snoring, gasping,choking.Pt also denies significant mask leak that is interfering with sleep. The 30 day compliance was downloaded which shows adequate compliance with more that 4 hr usage about 86%. The AHI is has improved to 3.8/hr. The mask leak is normal. The symptoms of ARNULFO has improved.       SLEEP HISTORY   ASV titration and the best tolerated pressure was ASV EPAP 5/15 cm PS 2/20 cm, the AHI improved to 3.8/hr and O2 halle 90 %. He was observed in REM sleep on this pressure.     Sever obstructive sleep apnea with AHI of 38.5/hr and O2 halle 87 %. Due to severity of the disease he met the split study protocol. The  titration started with CPAP 7 cm and the best tolerated was ASV EPAP 4/15 cm PS 2/20 cm cm. The AHI improved to 19/hr with improved O2 halle of 88% and average O2 saturation of 94 %.        REVIEW OF SYSTEMS:       Constitutional: Denies fevers, Denies weight changes  Eyes: Denies changes in vision, no eye pain  Ears/Nose/Throat/Mouth: Denies nasal congestion or sore throat   Cardiovascular: Denies chest pain or palpitations   Respiratory: Denies shortness of breath , Denies cough  Gastrointestinal/Hepatic: Denies abdominal pain, nausea, vomiting, diarrhea, constipation or GI bleeding   Genitourinary: Deniesdysuria or frequency  Musculoskeletal/Rheum: Denies  joint pain and swelling   Skin/Breast: Denies rash,   Neurological: Denies headache, confusion, memory loss or focal weakness/parasthesias  Psychiatric: denies mood disorder   Sleep: + chronic insomnia    Comprehensive review of systems form is reviewed with the patient and is attached in the EMR.     PMH:  has a past medical history of Anxiety, Arthritis, Cancer (HCC) (2007), Chickenpox, Depression, High cholesterol, Hyperlipidemia, Hypertension, Kidney stone, Mumps, Obesity, Pneumothorax, Restless leg syndrome, Sleep apnea, and Tonsillitis.  MEDS:   Current Outpatient Medications:   •  omeprazole (PRILOSEC) 20 MG delayed-release capsule, TAKE 1 CAPSULE DAILY, Disp: 15 Cap, Rfl: 0  •  lisinopril (PRINIVIL) 20 MG Tab, TAKE 1 TABLET BY MOUTH EVERY DAY IN THE EVENING, Disp: 90 Tab, Rfl: 0  •  celecoxib (CELEBREX) 200 MG Cap, TAKE 1 CAPSULE DAILY, Disp: 90 Cap, Rfl: 2  •  zolpidem (AMBIEN CR) 6.25 MG CR tablet, zolpidem ER 6.25 mg tablet,extended release,multiphase, Disp: , Rfl:   •  LORazepam (ATIVAN) 2 MG tablet, Take 2 mg by mouth., Disp: , Rfl:   •  atorvastatin (LIPITOR) 40 MG Tab, Take 1 Tab by mouth every day., Disp: 90 Tab, Rfl: 3  •  fluticasone (FLOVENT HFA) 44 MCG/ACT Aerosol, 2 Puffs by Nebulization route., Disp: , Rfl:   •  ferrous sulfate 325 (65 Fe)  MG tablet, Take  by mouth., Disp: , Rfl:   •  aspirin EC (ECOTRIN) 81 MG Tablet Delayed Response, Take 81 mg by mouth every bedtime., Disp: 30 Tab, Rfl: 0  •  Multiple Vitamins-Minerals (MELODY-DAY 1000 PO), Take 1 Tab by mouth 2 Times a Day., Disp: , Rfl:   •  Cyanocobalamin (B-12) 2500 MCG Tab, Take 1 Tab by mouth every morning., Disp: , Rfl:   •  loperamide (IMODIUM) 2 MG Cap, Take 1 Cap by mouth 4 times a day as needed for Diarrhea., Disp: 30 Cap, Rfl: 0  •  fluoxetine (PROZAC) 40 MG capsule, Take 1 Cap by mouth every day. (Patient not taking: Reported on 9/10/2020), Disp: 90 Cap, Rfl: 0  •  mirtazapine (REMERON) 15 MG Tab, TAKE 1/2 TABLET AT BEDTIME, Disp: 45 Tab, Rfl: 0  •  Apremilast (OTEZLA) 30 MG Tab, 1 tab po bid after finishing the loading dose, Disp: 180 Tab, Rfl: 1  •  valacyclovir (VALTREX) 1 GM Tab, TAKE 1 TABLET TWICE A DAY  FOR 7 DAYS AS NEEDED FOR   OUTBREAK (Patient not taking: Reported on 9/10/2020), Disp: 24 Tab, Rfl: 0  •  Apremilast (OTEZLA) 10 & 20 & 30 MG Tablet Therapy Pack, 10 mg po qday for one day then 10 mg po bid for one day then 20 mg po qam then 10 mg po qpm for one day then 20 mg po bid for one day then 30 mg po qam and 20 mg po q pm for one day then 30 mg po bid, Disp: 21 Each, Rfl: 0  ALLERGIES: No Known Allergies  SURGHX:   Past Surgical History:   Procedure Laterality Date   • BLADDER BIOPSY WITH CYSTOSCOPY  1/3/2018    Procedure: BLADDER BIOPSY WITH CYSTOSCOPY/WITH UPPER TRACT WASHING;  Surgeon: El Manuel M.D.;  Location: [facility];  Service: Urology   • RETROGRADES Bilateral 1/3/2018    Procedure: RETROGRADES;  Surgeon: El Manuel M.D.;  Location: [facility];  Service: Urology   • PYELOGRAM Bilateral 1/3/2018    Procedure: PYELOGRAM;  Surgeon: El Manuel M.D.;  Location: SUR[facility];  Service: Urology   • OTHER ORTHOPEDIC SURGERY  2015    shoulder replacement   • ABDOMINAL EXPLORATION     • ARTHROPLASTY      right shoulder   •  "BLADDER BIOPSY WITH CYSTOSCOPY     • EYE SURGERY      lasik    • GASTRIC BYPASS LAPAROSCOPIC     • HERNIA REPAIR     • OPEN REDUCTION     • OTHER Left     thumb joint   • THORACOTOMY     • TURP-VAPOR       SOCHX:  reports that he has never smoked. He has never used smokeless tobacco. He reports current alcohol use of about 8.4 oz of alcohol per week. He reports that he does not use drugs..  FH:   Family History   Problem Relation Age of Onset   • Cancer Mother 80        lung cancer   • Arthritis Mother    • Diabetes Mother    • Anxiety disorder Mother    • Lung Disease Father 65   • Cancer Father    • Diabetes Father    • Hypertension Father    • Hyperlipidemia Father    • Hyperlipidemia Brother    • Arthritis Brother    • Other Brother         Thyroid tumor   • Sleep Apnea Brother    • No Known Problems Maternal Grandmother    • Cancer Maternal Grandfather    • No Known Problems Paternal Grandmother    • No Known Problems Paternal Grandfather    • Heart Disease Brother 49        smoker, overweight   • Hypertension Brother    • Depression Other    • Suicide Attempts Neg Hx    • Bipolar disorder Neg Hx    • Alcohol abuse Neg Hx    • Drug abuse Neg Hx          Physical Exam:  Vitals/ General Appearance:   Weight/BMI: Body mass index is 28.98 kg/m².  /72 (BP Location: Left arm, Patient Position: Sitting, BP Cuff Size: Adult)   Pulse 86   Resp 14   Ht 1.702 m (5' 7\")   Wt 83.9 kg (185 lb)   SpO2 96%   Vitals:    09/10/20 1136   BP: 124/72   BP Location: Left arm   Patient Position: Sitting   BP Cuff Size: Adult   Pulse: 86   Resp: 14   SpO2: 96%   Weight: 83.9 kg (185 lb)   Height: 1.702 m (5' 7\")       Pt. is alert and oriented to time, place and person. Cooperative and in no apparent distress.       Constitutional: Alert, no distress, well-groomed.  Skin: No rashes in visible areas.  Eye: Round. Conjunctiva clear, lids normal. No icterus.    Throat: Oropharynx appears crowded in that the palate is " overhanging  Phonation normal.  Neck: No masses, no thyromegaly. Moves freely without pain.  -. Lungs auscultation: B/L good air entry, vesicular breath sounds, no adventitious sounds  -. Heart auscultation: 1st and 2nd heart sounds normal, regular rhythm. No appreciable murmur.  - Extremities: no clubbing, no pedal edema.  - NEUROLOGICAL EXAMINATION: On neurological exam, the patient was alert and oriented x3. speech was clear and fluent without dysarthria.    ASSESSMENT AND PLAN     1. Sleep Apnea .     He is urged to avoid supine sleep, weight gain and alcoholic beverages since all of these can worsen sleep apnea. He is cautioned against drowsy driving. If He feels sleepy while driving, He must pull over for a break/nap, rather than persist on the road, in the interest of He own safety and that of others on the road.   Plan   - Continue   ASV EPAP 6/15 cm PS 2/20 cm cm with nasal pillow mask    - compliance download was reviewed and discussed with the pt   - compliance was reinforced     2. Chronic insomnia:   - stimulus control and sleep hygiene reinforced   - Refill ambien CR 12.5 mg PRN 90 tabs no refill. He is using it very sparingly. SE and drug holiday was dicussed in detail  - Discussed CBT-I with Dr. Mireles       Regarding treatment of other past medical problems and general health maintenance,  He is urged to follow up with PCP.

## 2020-09-14 ENCOUNTER — OFFICE VISIT (OUTPATIENT)
Dept: RHEUMATOLOGY | Facility: MEDICAL CENTER | Age: 70
End: 2020-09-14
Payer: MEDICARE

## 2020-09-14 VITALS
RESPIRATION RATE: 14 BRPM | WEIGHT: 186 LBS | SYSTOLIC BLOOD PRESSURE: 140 MMHG | OXYGEN SATURATION: 97 % | HEART RATE: 76 BPM | TEMPERATURE: 96.5 F | BODY MASS INDEX: 29.13 KG/M2 | DIASTOLIC BLOOD PRESSURE: 70 MMHG

## 2020-09-14 DIAGNOSIS — Z79.61 LONG-TERM CURRENT USE OF APREMILAST: ICD-10-CM

## 2020-09-14 DIAGNOSIS — L40.50 PSORIATIC ARTHRITIS (HCC): ICD-10-CM

## 2020-09-14 DIAGNOSIS — Z98.84 H/O GASTRIC BYPASS: ICD-10-CM

## 2020-09-14 DIAGNOSIS — I10 ESSENTIAL HYPERTENSION: ICD-10-CM

## 2020-09-14 PROCEDURE — 99214 OFFICE O/P EST MOD 30 MIN: CPT | Performed by: INTERNAL MEDICINE

## 2020-09-14 RX ORDER — APREMILAST 30 MG/1
TABLET, FILM COATED ORAL
Qty: 180 TAB | Refills: 1 | Status: SHIPPED | OUTPATIENT
Start: 2020-09-14 | End: 2021-01-07

## 2020-09-14 ASSESSMENT — FIBROSIS 4 INDEX: FIB4 SCORE: 1.81

## 2020-09-14 NOTE — PROGRESS NOTES
Chief Complaint- joint pain     Subjective:   Buster Medina is a 70 y.o. male here today for follow up of rheumatological issues    This is a follow-up visit for this patient who we see in this clinic for psoriatic arthritis that was diagnosed about 2008 by rheumatologist in [place], [place].  Patient currently on Otezla 30 mg p.o. twice daily.  Patient states that he feels his psoriasis and joints are doing quite well, patient however does have some loose stools about 3-4 times a day.  Patient however has not had any weight loss.     Co morbidities include HTN, high cholesterol, hx left ahilles tendon rupture and repair, hx left knee arthroscopic surgery for meniscal tear, s/p left rotator cuff tear, s/p bladder cancer about 2012 no recurrence s/p surgical intervention only, hx of gastric bypass 2005 [facility], pt does f/u with Dr Gamez q year     Right TSA     S/p topical treatments  S/p humira-lost efficacy  S/p plaquenil-cause taste abnormalities     HBsAg/HBcAbIgM neg 2/2020  HCV neg 2/2020  Quantiferon Gold neg 2/2020  G6PD 10.9 adequate 9/2019  CCP neg 3/2018  RF neg 3/2018  SEAN neg 3/2018  Hand x-rays 3/2018-indicates possible erosion of the fourth metacarpal on the left hand, 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 90 Tab 0   • omeprazole (PRILOSEC) 20 MG delayed-release capsule TAKE 1 CAPSULE DAILY 15 Cap 0   • lisinopril (PRINIVIL) 20 MG Tab TAKE 1 TABLET BY MOUTH EVERY DAY IN THE EVENING 90 Tab 0   • celecoxib (CELEBREX) 200 MG Cap TAKE 1 CAPSULE DAILY 90 Cap 2   • LORazepam (ATIVAN) 2 MG tablet Take 2 mg by mouth.     • loperamide (IMODIUM) 2 MG Cap Take 1 Cap by  mouth 4 times a day as needed for Diarrhea. 30 Cap 0   • mirtazapine (REMERON) 15 MG Tab TAKE 1/2 TABLET AT BEDTIME 45 Tab 0   • valacyclovir (VALTREX) 1 GM Tab TAKE 1 TABLET TWICE A DAY  FOR 7 DAYS AS NEEDED FOR   OUTBREAK 24 Tab 0   • atorvastatin (LIPITOR) 40 MG Tab Take 1 Tab by mouth every day. 90 Tab 3   • fluticasone (FLOVENT HFA) 44 MCG/ACT Aerosol 2 Puffs by Nebulization route.     • ferrous sulfate 325 (65 Fe) MG tablet Take  by mouth.     • aspirin EC (ECOTRIN) 81 MG Tablet Delayed Response Take 81 mg by mouth every bedtime. 30 Tab 0   • Multiple Vitamins-Minerals (MELODY-DAY 1000 PO) Take 1 Tab by mouth 2 Times a Day.     • Cyanocobalamin (B-12) 2500 MCG Tab Take 1 Tab by mouth every morning.     • zolpidem (AMBIEN CR) 6.25 MG CR tablet zolpidem ER 6.25 mg tablet,extended release,multiphase       No current facility-administered medications for this visit.      He  has a past medical history of Anxiety, Arthritis, Cancer ([facility]) (2007), Chickenpox, Depression, High cholesterol, Hyperlipidemia, Hypertension, Kidney stone, Mumps, Obesity, Pneumothorax, Restless leg syndrome, Sleep apnea, and Tonsillitis.    ROS   Other than what is mentioned in HPI or physical exam, there is no history of headaches, double vision or blurred vision. No temporal tenderness or jaw claudication. No trouble swallowing difficulties or sore throats.  No chest complaints including chest pain, cough or sputum production. No GI complaints including nausea, vomiting, change in bowel habits, or past peptic ulcer disease. No history of blood in the stools. No urinary complaints including dysuria or frequency. No history of alopecia, photosensitivity, oral ulcerations, Raynaud's phenomena.       Objective:     /70   Pulse 76   Temp 35.8 °C (96.5 °F) (Temporal)   Resp 14   Wt 84.4 kg (186 lb)   SpO2 97%  Body mass index is 29.13 kg/m².   Physical Exam:    Constitutional: Alert and oriented X3, patient is talkative with good eye  contact.Skin: Warm, dry, good turgor, some mild psoriatic plaques extensor surfaces bilateral knees.Eye: Equal, round and reactive, conjunctiva clear, lids normal EOM intactENMT: Lips without lesions, good dentition, no oropharyngeal ulcers, moist buccal mucosa, pinna without deformityNeck: Trachea midline, no masses, no thyromegaly.Lymph:  No cervical lymphadenopathy, no axillary lymphadenopathy, no supraclavicular lymphadenopathyRespiratory: Unlabored respiratory effort, lungs clear to auscultation, no wheezes, no ronchi.Cardiovascular: Normal S1, S2, no murmur, no edema.Abdomen: Soft, non-tender, no masses, no hepatosplenomegaly.Psych: Alert and oriented x3, normal affect and mood.Neuro: Cranial nerves 2-12 are grossly intact, no loss of sensation LEExt:no joint laxity noted in bilateral arms, no joint laxity noted in bilateral legs, joints look good no enthesitis no sausage digits no dactylitis, no joint effusions good range of motion of shoulders no flexion contractures no nodules no tophi    Lab Results   Component Value Date/Time    QNTTBGOLD Negative 08/16/2018 12:13 PM     Lab Results   Component Value Date/Time    HEPBCORIGM Negative 03/02/2020 02:35 PM    HEPBSAG Negative 03/02/2020 02:35 PM     Lab Results   Component Value Date/Time    HEPCAB Negative 03/02/2020 02:35 PM     Lab Results   Component Value Date/Time    SODIUM 142 07/28/2020 08:56 AM    POTASSIUM 4.9 07/28/2020 08:56 AM    CHLORIDE 105 07/28/2020 08:56 AM    CO2 24 07/28/2020 08:56 AM    GLUCOSE 104 (H) 07/28/2020 08:56 AM    BUN 17 07/28/2020 08:56 AM    CREATININE 1.07 07/28/2020 08:56 AM      Lab Results   Component Value Date/Time    WBC 8.3 07/28/2020 08:56 AM    RBC 4.36 (L) 07/28/2020 08:56 AM    HEMOGLOBIN 13.7 (L) 07/28/2020 08:56 AM    HEMATOCRIT 42.9 07/28/2020 08:56 AM    MCV 98.4 (H) 07/28/2020 08:56 AM    MCH 31.4 07/28/2020 08:56 AM    MCHC 31.9 (L) 07/28/2020 08:56 AM    MPV 12.0 07/28/2020 08:56 AM    NEUTSPOLYS 47.30  03/02/2020 02:35 PM    LYMPHOCYTES 39.70 03/02/2020 02:35 PM    MONOCYTES 11.00 03/02/2020 02:35 PM    EOSINOPHILS 1.00 03/02/2020 02:35 PM    BASOPHILS 0.70 03/02/2020 02:35 PM      Lab Results   Component Value Date/Time    CALCIUM 9.5 07/28/2020 08:56 AM    ASTSGOT 25 07/28/2020 08:56 AM    ALTSGPT 39 07/28/2020 08:56 AM    ALKPHOSPHAT 80 07/28/2020 08:56 AM    TBILIRUBIN 0.5 07/28/2020 08:56 AM    ALBUMIN 4.3 07/28/2020 08:56 AM    TOTPROTEIN 6.6 07/28/2020 08:56 AM     Lab Results   Component Value Date/Time    URICACID 8.5 (H) 08/16/2018 12:13 PM    RHEUMFACTN <10 03/21/2018 02:19 PM    CCPANTIBODY 4 03/21/2018 02:20 PM    ANTINUCAB None Detected 03/21/2018 02:19 PM     Lab Results   Component Value Date/Time    SEDRATEWES 8 03/02/2020 02:35 PM     Lab Results   Component Value Date/Time    G6PD 10.9 09/17/2019 10:30 AM     Results for orders placed during the hospital encounter of 03/20/18   DX-FOOT-COMPLETE 3+ LEFT    Impression 1.  There is no radiographic evidence of inflammatory arthropathy.  2.  There is mild degenerative change at the 1st tarsometatarsal joint and tibiotalar joint.  3.  Question of subchondral cyst versus early erosive change aat the base of the 1st proximal phalanx.     Results for orders placed during the hospital encounter of 03/20/18   DX-HAND 3+ LEFT    Impression 1.  There is a questionable erosion versus subchondral cyst on the ulnar aspect of the base of the left 4th metacarpal.  2.  There is postoperative change and degenerative change involving the 1st and 2nd carpometacarpal junctions. There is a question of previous trapezium resection.       Assessment and Plan:     1. Psoriatic arthritis (HCC)  Clinically doing well on Otezla 30 mg p.o. twice daily, patient is having some loose stools, long discussion with patient regarding other options, patient opts to stay with the Otezla as it has potential of side effects.  - DX-JOINT SURVEY-HANDS SINGLE VIEW; Future  - CBC WITH  DIFFERENTIAL; Future  - Comp Metabolic Panel; Future  - Sed Rate; Future  - Apremilast (OTEZLA) 30 MG Tab; 1 tab po bid after finishing the loading dose  Dispense: 180 Tab; Refill: 1    2. Long-term current use of apremilast  On Otezla 30 mg p.o. twice daily  Will need monitoring labs every 6 months next labs due about January 2021, labs ordered for patient  Reviewed risks of Otezla with patient including diarrhea and weight loss, patient states understanding  Information regarding Otezla printed out from up-to-date and given to patient today  - CBC WITH DIFFERENTIAL; Future  - Comp Metabolic Panel; Future  - Sed Rate; Future  - Apremilast (OTEZLA) 30 MG Tab; 1 tab po bid after finishing the loading dose  Dispense: 180 Tab; Refill: 1    3. Essential hypertension  May impact the type of medications we can use for this patient's arthritis. We will have to keep this under advisement.    - CBC WITH DIFFERENTIAL; Future  - Comp Metabolic Panel; Future  - Sed Rate; Future    4. H/O gastric bypass    Followup: Return in about 6 months (around 3/14/2021). or sooner ankush Medina  was seen 30 minutes face-to-face of which more than 50% of the time was spent counseling the patient (excluding time for procedures)  regarding  rheumatological condition and care. Therapy was discussed in detail.      Please note that this dictation was created using voice recognition software. I have made every reasonable attempt to correct obvious errors, but I expect that there are errors of grammar and possibly content that I did not discover before finalizing the note.

## 2020-09-21 DIAGNOSIS — E78.5 DYSLIPIDEMIA: ICD-10-CM

## 2020-09-23 ENCOUNTER — PATIENT MESSAGE (OUTPATIENT)
Dept: CARDIOLOGY | Facility: MEDICAL CENTER | Age: 70
End: 2020-09-23

## 2020-09-23 RX ORDER — MIRTAZAPINE 15 MG/1
TABLET, FILM COATED ORAL
Qty: 45 TAB | Refills: 0 | Status: SHIPPED | OUTPATIENT
Start: 2020-09-23 | End: 2020-09-24 | Stop reason: SDUPTHER

## 2020-09-23 RX ORDER — ATORVASTATIN CALCIUM 40 MG/1
TABLET, FILM COATED ORAL
Qty: 90 TAB | Refills: 0 | Status: SHIPPED | OUTPATIENT
Start: 2020-09-23 | End: 2020-12-10

## 2020-10-17 ENCOUNTER — OFFICE VISIT (OUTPATIENT)
Dept: URGENT CARE | Facility: CLINIC | Age: 70
End: 2020-10-17
Payer: MEDICARE

## 2020-10-17 VITALS
DIASTOLIC BLOOD PRESSURE: 86 MMHG | HEART RATE: 71 BPM | RESPIRATION RATE: 16 BRPM | BODY MASS INDEX: 29.03 KG/M2 | WEIGHT: 185 LBS | HEIGHT: 67 IN | TEMPERATURE: 97.8 F | SYSTOLIC BLOOD PRESSURE: 148 MMHG | OXYGEN SATURATION: 97 %

## 2020-10-17 DIAGNOSIS — M10.9 GOUT, ARTHRITIS: ICD-10-CM

## 2020-10-17 PROCEDURE — 99214 OFFICE O/P EST MOD 30 MIN: CPT | Performed by: NURSE PRACTITIONER

## 2020-10-17 RX ORDER — METHYLPREDNISOLONE 4 MG/1
TABLET ORAL
Qty: 21 TAB | Refills: 0 | Status: SHIPPED | OUTPATIENT
Start: 2020-10-17 | End: 2020-11-08

## 2020-10-17 RX ORDER — HYDROCODONE BITARTRATE AND ACETAMINOPHEN 5; 325 MG/1; MG/1
1 TABLET ORAL EVERY 6 HOURS PRN
Qty: 10 TAB | Refills: 0 | Status: SHIPPED | OUTPATIENT
Start: 2020-10-17 | End: 2020-10-24

## 2020-10-17 ASSESSMENT — ENCOUNTER SYMPTOMS
TINGLING: 0
FEVER: 0

## 2020-10-17 ASSESSMENT — FIBROSIS 4 INDEX: FIB4 SCORE: 1.81

## 2020-10-17 NOTE — PROGRESS NOTES
"Subjective:      Aaron Medina is a 70 y.o. male who presents with Toe Pain (red and swelling x5 days )    Reviewed past medical, surgical and family history. Reviewed prescription and OTC medications with patient in electronic health record today  Allergies: Patient has no known allergies.                HPI this new problem.  Aaron is 70-year-old male patient presents with right great toe and metatarsal pain with swelling and redness x5 days.  It was precipitated by his large dog stepping on his foot.  He he also ate shellfish on several nights prior to the onset of pain as well as drink alcohol.  He has history of gout.  He feels his gout has flared up on him.  He has been taking over-the-counter Tylenol to help him with his pain.  Last night his pain was excruciating.  It was difficult for him to sleep.  Even the sheets on the bed caused him significant pain.  Pain is 10/10.  No other aggravating or alleviating factors.    Review of Systems   Constitutional: Negative for fever and malaise/fatigue.   Musculoskeletal: Positive for joint pain.   Neurological: Negative for tingling.          Objective:     /86   Pulse 71   Temp 36.6 °C (97.8 °F) (Temporal)   Resp 16   Ht 1.702 m (5' 7\")   Wt 83.9 kg (185 lb)   SpO2 97%   BMI 28.98 kg/m²      Physical Exam  Vitals signs reviewed.   Constitutional:       General: He is not in acute distress.     Appearance: Normal appearance. He is normal weight. He is not ill-appearing.   Cardiovascular:      Rate and Rhythm: Normal rate.      Pulses: Normal pulses.   Pulmonary:      Effort: Pulmonary effort is normal.   Musculoskeletal:        Feet:    Skin:     General: Skin is warm.      Capillary Refill: Capillary refill takes less than 2 seconds.      Findings: Erythema present.   Neurological:      Mental Status: He is alert and oriented to person, place, and time.   Psychiatric:         Mood and Affect: Mood normal.         Behavior: Behavior normal.      " Thought Content: Thought content normal.                 Assessment/Plan:        1. Gout, arthritis  methylPREDNISolone (MEDROL DOSEPAK) 4 MG Tablet Therapy Pack    HYDROcodone-acetaminophen (NORCO) 5-325 MG Tab per tablet           Hold Celebrex and Ambien while taking RX medication     Educated in proper administration of medication(s) ordered today including safety, possible SE, risks, benefits, rationale and alternatives to therapy.  Do not take OTC NSAIDS while taking RX .     Advised not to drink ETOH, drive car or operate machinery while taking narcotic RX . Verbalizes understanding of safety instructions. Narc Check verified. [place]  Aware web site evaluation: I have obtained and reviewed patient utilization report from [facility] pharmacy database prior to writing prescription for controlled substance II, III or IV per [place] bill . Opioid Risk Tool screen completed.  I believe the benefits of controlled substance therapy outweigh the risks. The reasons for prescribing controlled substances include non-narcotic alternatives have been inadequate for symptom control. Accordingly, I have discussed the risk and benefits, treatment plan, and alternative therapies with the patient.

## 2020-10-20 ENCOUNTER — OFFICE VISIT (OUTPATIENT)
Dept: BEHAVIORAL HEALTH | Facility: CLINIC | Age: 70
End: 2020-10-20
Payer: MEDICARE

## 2020-10-20 DIAGNOSIS — F34.1 DYSTHYMIC DISORDER: Chronic | ICD-10-CM

## 2020-10-20 PROCEDURE — 90834 PSYTX W PT 45 MINUTES: CPT | Performed by: PSYCHOLOGIST

## 2020-10-21 NOTE — BH THERAPY
[facility] Progress Note    Patient Name: Buster Medina  Patient MRN: [id number]  Today's Date: 10/20/2020     Type of session:Individual psychotherapy  Length of session: 45 minutes  Persons in attendance:Patient    Subjective/New Info:The patient ID/Chief Complaint:  The patient is a 70 year old male, , .  The patient self-referred and voluntarily presented for an individual intake to address concerns related to relationship problems, sleep problems, and erectile dysfunction. Reviewed limits of confidentiality and [facility] policies; patient expressed understanding and agreed to voluntarily proceed with the evaluation and treatment.    Interval History: The patient's estimated global assessment of functioning indicates a mild level of difficulty with some problems in relationships functioning. The patient is nonetheless functioning well and has some significant relationships.  The patient returns to therapy as a result of increasing stressors associated with his long-term relationship.  Pacifically the patient needed assistance the benefits of remaining in the relationship which provides for many of his needs but it lacks passion.  The patient was assisted with problem solving.  Updated history was obtained about other stressors in the patient's life including his disabled son who lives in [place].  Patient also reports that he has been treated for gout over the last few weeks and he is scheduled to have knee surgery next week.    Therapeutic Intervention: Cognitive Behavioral Therapy    Planned Intervention:  Continue to focus on relationship issues and the patients schemas that interfere with his desires for stable interpersonal relationships with others.       Objective/Observations:    Patient did not present in acute distress. Patient was appropriately groomed. Patient was alert and oriented x4. Eye contact was appropriate. No  abnormalities in attention or concentration were noted. No abnormalities of movement present; psychomotor activity was normal. Speech was fluent and regular in rhythm, rate, volume, and tone. Thought processes Linear, Logical and Goal Directed. There was no evidence of thought disorder. No auditory or visual hallucinations. Long and short term memory appeared to be intact. Insight, judgment, and impulse control were deemed to be good.  Reported mood was “depressed and concerned.” Affect was full-ranging and appropriate to thought content and conversation.  Patient denied past and current suicidal and homicidal ideation in plan, intent, and preparatory behavior.      Diagnoses:   1. Dysthymic disorder      RISK ASSESSMENT:   The patient denied any suicide-related ideation and/or behaviors and intent/plan, denied thoughts about death and dying both in session and during the period since last appointment, or past 2 weeks.    Risk Level: Not Currently at Clinically Significant Risk  Hospitalization is not deemed necessary at this time as the patient does not present a clear or imminent danger to self or others. No indication for pursuing higher level of care. Outpatient management is currently most appropriate and least restrictive level of care.      Current risk:   SUICIDE: Low   Homicide: Not applicable   Self-harm: Not applicable   Relapse: Not applicable   Other:    Safety Plan reviewed? No   If evidence of imminent risk is present, intervention/plan:         Treatment Goal(s)/Objective(s) addressed:    Goal: Improve overall mood      Get 7-8 hours of restful sleep every night using sleep hygiene handout   Develop strategies for thought distraction when ruminating on the past        Progress toward Treatment Goals: Mild decline    Plan:    1) Follow-up 3-6 weeks  2) Barriers to Learning:  No  3) Readiness to Learn:  Yes  4) Cultural Concerns:  No  5) Patient voiced understanding of, and agreement with, plan and  goals as annotated above.  6) Declare these services are medically necessary and appropriate to the patient’s diagnosis and needs  7) The point of contact at the [facility] regarding this evaluation is Dr. San, Psychologist.    Geraldo San III, Ed.D.

## 2020-10-29 RX ORDER — LISINOPRIL 20 MG/1
TABLET ORAL
Qty: 90 TAB | Refills: 0 | Status: SHIPPED | OUTPATIENT
Start: 2020-10-29 | End: 2021-01-21

## 2020-11-08 ENCOUNTER — OFFICE VISIT (OUTPATIENT)
Dept: URGENT CARE | Facility: CLINIC | Age: 70
End: 2020-11-08
Payer: MEDICARE

## 2020-11-08 VITALS
OXYGEN SATURATION: 99 % | BODY MASS INDEX: 29.03 KG/M2 | SYSTOLIC BLOOD PRESSURE: 110 MMHG | HEART RATE: 74 BPM | RESPIRATION RATE: 20 BRPM | WEIGHT: 185 LBS | TEMPERATURE: 98.3 F | HEIGHT: 67 IN | DIASTOLIC BLOOD PRESSURE: 60 MMHG

## 2020-11-08 DIAGNOSIS — M10.9 ACUTE GOUT INVOLVING TOE OF RIGHT FOOT, UNSPECIFIED CAUSE: ICD-10-CM

## 2020-11-08 PROCEDURE — 99214 OFFICE O/P EST MOD 30 MIN: CPT | Performed by: PHYSICIAN ASSISTANT

## 2020-11-08 RX ORDER — PREDNISONE 20 MG/1
40 TABLET ORAL DAILY
Qty: 10 TAB | Refills: 0 | Status: SHIPPED | OUTPATIENT
Start: 2020-11-08 | End: 2020-11-13

## 2020-11-08 ASSESSMENT — ENCOUNTER SYMPTOMS
FOCAL WEAKNESS: 0
SENSORY CHANGE: 0
FEVER: 0
TINGLING: 0
CHILLS: 0
SHORTNESS OF BREATH: 0

## 2020-11-08 ASSESSMENT — FIBROSIS 4 INDEX: FIB4 SCORE: 1.81

## 2020-11-08 NOTE — PROGRESS NOTES
Subjective:   Buster Medina is a 70 y.o. male who presents for Gout (Last visit: 10/17/20, right foot, redness, swelling, tender )      HPI  Patient is a 70-year-old male who presents with right toe pain, redness, swelling, warmth onset 3 days.  He reports a history of gout.  Recent history of gout flareup in the same location at the end of October.  He was treated with Medrol dose pack which resolved his symptoms.  Reports recent surgical procedure of right knee replacement.  He states he was on a nonsteroidal anti-inflammatory for his right knee.  He has not taking this anymore.  Pain is worse with weightbearing and palpation. No numbness, tingling, weakness.  Denies any calf pain.  Denies any fevers or chills, chest pain, or shortness of breath.      Review of Systems   Constitutional: Negative for chills and fever.   Respiratory: Negative for shortness of breath.    Cardiovascular: Negative for chest pain.   Musculoskeletal:        Right toe pain, warmth, redness   Neurological: Negative for tingling, sensory change and focal weakness.       Medications:    • aspirin EC Tbec  • atorvastatin Tabs  • B-12 Tabs  • MELODY-DAY 1000 PO  • celecoxib Caps  • ferrous sulfate  • fluticasone Aero  • lisinopril Tabs  • loperamide Caps  • LORazepam  • methylPREDNISolone Tbpk  • mirtazapine Tabs  • omeprazole  • Otezla Tabs  • valacyclovir Tabs  • zolpidem    Allergies: Patient has no known allergies.    Problem List: Buster Medina has Hypokalemia; Malignant neoplasm of anterior wall of urinary bladder (HCC); Essential hypertension; Need for vaccination; Major depressive disorder; Chronic insomnia; Plantar fasciitis; Gastroesophageal reflux disease without esophagitis; Benign nodular prostatic hyperplasia without lower urinary tract symptoms; Vitamin D deficiency; Dysthymic disorder; Erectile dysfunction of nonorganic origin; Psoriatic arthritis (HCC); Dyslipidemia; Family history of coronary artery disease in brother;  Familial hyperlipidemia; Complex sleep apnea syndrome; Tinnitus of both ears; Bruising; History of gastric bypass; and Functional diarrhea on their problem list.    Surgical History:  Past Surgical History:   Procedure Laterality Date   • BLADDER BIOPSY WITH CYSTOSCOPY  1/3/2018    Procedure: BLADDER BIOPSY WITH CYSTOSCOPY/WITH UPPER TRACT WASHING;  Surgeon: El Manuel M.D.;  Location: SURGERY [facility];  Service: Urology   • RETROGRADES Bilateral 1/3/2018    Procedure: RETROGRADES;  Surgeon: El Manuel M.D.;  Location: SURGERY [facility];  Service: Urology   • PYELOGRAM Bilateral 1/3/2018    Procedure: PYELOGRAM;  Surgeon: El Manuel M.D.;  Location: SURGERY [facility];  Service: Urology   • OTHER ORTHOPEDIC SURGERY  2015    shoulder replacement   • ABDOMINAL EXPLORATION     • ARTHROPLASTY      right shoulder   • BLADDER BIOPSY WITH CYSTOSCOPY     • EYE SURGERY      lasik    • GASTRIC BYPASS LAPAROSCOPIC     • HERNIA REPAIR     • OPEN REDUCTION     • OTHER Left     thumb joint   • THORACOTOMY     • TURP-VAPOR         Past Social Hx: Buster Medina  reports that he has never smoked. He has never used smokeless tobacco. He reports previous alcohol use of about 8.4 oz of alcohol per week. He reports that he does not use drugs.     Past Family Hx:  Buster Medina family history includes Anxiety disorder in his mother; Arthritis in his brother and mother; Cancer in his father and maternal grandfather; Cancer (age of onset: 80) in his mother; Depression in an other family member; Diabetes in his father and mother; Heart Disease (age of onset: 49) in his brother; Hyperlipidemia in his brother and father; Hypertension in his brother and father; Lung Disease (age of onset: 65) in his father; No Known Problems in his maternal grandmother, paternal grandfather, and paternal grandmother; Other in his brother; Sleep Apnea in his brother.     Problem list, medications, and allergies reviewed  "by myself today in Epic.     Objective:     /60 (BP Location: Right arm, Patient Position: Sitting, BP Cuff Size: Adult)   Pulse 74   Temp 36.8 °C (98.3 °F) (Temporal)   Resp 20   Ht 1.702 m (5' 7\")   Wt 83.9 kg (185 lb)   SpO2 99%   BMI 28.98 kg/m²     Physical Exam  Vitals signs reviewed.   Constitutional:       General: He is not in acute distress.     Appearance: Normal appearance. He is not ill-appearing or toxic-appearing.   HENT:      Mouth/Throat:      Mouth: Mucous membranes are moist.      Pharynx: Oropharynx is clear. No oropharyngeal exudate or posterior oropharyngeal erythema.   Eyes:      Conjunctiva/sclera: Conjunctivae normal.      Pupils: Pupils are equal, round, and reactive to light.   Neck:      Musculoskeletal: Neck supple.   Cardiovascular:      Rate and Rhythm: Normal rate and regular rhythm.      Heart sounds: Normal heart sounds.   Pulmonary:      Effort: Pulmonary effort is normal. No respiratory distress.      Breath sounds: Normal breath sounds. No wheezing, rhonchi or rales.   Musculoskeletal:        Feet:       Comments: Negative Homans sign of right calf.   Negative leg edema.    Feet:      Comments: Neurovascularly intact distally.  Pulses intact.  Full range of motion.  Lymphadenopathy:      Cervical: No cervical adenopathy.   Skin:     General: Skin is warm and dry.   Neurological:      General: No focal deficit present.      Mental Status: He is alert and oriented to person, place, and time.   Psychiatric:         Mood and Affect: Mood normal.         Behavior: Behavior normal.         Assessment/Plan:     Diagnosis and associated orders:     1. Acute gout involving toe of right foot, unspecified cause  predniSONE (DELTASONE) 20 MG Tab    Diclofenac Sodium (VOLTAREN) 1 % Gel      Comments/MDM:     • Discussed with patient most likely acute gout flareup.   • Treatment prednisone for 5 days.  He may try Voltaren gel as a topical for pain and inflammation.   • Avoid " alcohol consumption and purine rich foods.   • AVS printed for patient.  Hold celebrex and other NSAIDS while taking this medication.           Red flags discussed  Supportive care, differential diagnoses, and indications for immediate follow-up discussed with patient.    Pathogenesis of diagnosis discussed including typical length and natural progression. Patient expresses understanding and agrees to plan.    Advised the patient to follow-up with the primary care physician for recheck, reevaluation, and consideration of further management.    Please note that this dictation was created using voice recognition software. I have made a reasonable attempt to correct obvious errors, but I expect that there are errors of grammar and possibly content that I did not discover before finalizing the note.    This note was electronically signed by Fredi Fulton PA-C

## 2020-12-07 ENCOUNTER — APPOINTMENT (OUTPATIENT)
Dept: BEHAVIORAL HEALTH | Facility: CLINIC | Age: 70
End: 2020-12-07
Payer: MEDICARE

## 2020-12-08 ENCOUNTER — TELEMEDICINE (OUTPATIENT)
Dept: SLEEP MEDICINE | Facility: MEDICAL CENTER | Age: 70
End: 2020-12-08
Payer: MEDICARE

## 2020-12-08 VITALS — WEIGHT: 185 LBS | HEIGHT: 67 IN | BODY MASS INDEX: 29.03 KG/M2

## 2020-12-08 DIAGNOSIS — F51.04 CHRONIC INSOMNIA: Chronic | ICD-10-CM

## 2020-12-08 DIAGNOSIS — I10 ESSENTIAL HYPERTENSION: ICD-10-CM

## 2020-12-08 DIAGNOSIS — G47.31 COMPLEX SLEEP APNEA SYNDROME: Chronic | ICD-10-CM

## 2020-12-08 DIAGNOSIS — Z78.9 NONSMOKER: ICD-10-CM

## 2020-12-08 PROBLEM — G47.39 COMPLEX SLEEP APNEA SYNDROME: Chronic | Status: ACTIVE | Noted: 2018-12-06

## 2020-12-08 PROCEDURE — 99214 OFFICE O/P EST MOD 30 MIN: CPT | Mod: 95,CR | Performed by: NURSE PRACTITIONER

## 2020-12-08 RX ORDER — SUVOREXANT 15 MG/1
15 TABLET, FILM COATED ORAL NIGHTLY PRN
Qty: 30 TAB | Refills: 0 | Status: SHIPPED | OUTPATIENT
Start: 2020-12-08 | End: 2020-12-28

## 2020-12-08 ASSESSMENT — FIBROSIS 4 INDEX: FIB4 SCORE: 1.81

## 2020-12-08 NOTE — PROGRESS NOTES
Virtual Visit: Established Patient   This visit was conducted via Zoom using secure and encrypted videoconferencing technology. The patient was in a private location in the state of [place].    The patient's identity was confirmed and verbal consent was obtained for this virtual visit.    Subjective:   CC:   Chief Complaint   Patient presents with   • Apnea       Buster Medina is a 70 y.o. male presenting for evaluation and management of:    Complex sleep apnea  Last OV 9/10/20 with Dr. Mancuso    Currently using ASV 25, EPAP 15/6, PS 15/2, auto breath rate. Current device obtained in 2019.  Compliance report 11/5/20-12/4/20 indicates 100% compliance, avg nightly use of 6.5hrs, minimal to moderate mask leak with overall AHI 5.4/hr. I reviewed with patient.  He tolerates mask and pressure well. He uses a nasal mask and heated tubing.     Hx of chronic insomnia. Currently using Ambien CR 12.5mcg 1/2 tab qhs prn. Previously filled 9/10/20 by Dr. Mancuso. He notes having TKA 6 weeks ago and this is effecting his sleep. He is having difficulty falling asleep. He uses Vicodin a few nights per week. He has also used lorazepam 2mg either at bedtime or later in the night if he wakes. He tries to avoid taking ambien and ativan too closely together.   He remains on celebrex daily. He is in PT right now and pain controlled well.     Hx of dyslipidemia, GERD, dysthymic disorder, hx of gastric bypass, psoriatic arthritis, nonsmoker, BMI 28.    SLEEP HISTORY   PSG 12/18/18 indicated severe obstructive sleep apnea with AHI of 38.5/hr and O2 halle 87 % with incomplete titration. ASV titration 12/27/18 indicated best response to EPAP 15/5, PS 15/2 with reduced AHI 3.8/hr and O2 halle 90%.    ROS in HPI; otherwise negative.    No Known Allergies    Current medicines (including changes today)  Current Outpatient Medications   Medication Sig Dispense Refill   • lisinopril (PRINIVIL) 20 MG Tab TAKE 1 TABLET BY MOUTH EVERY DAY IN THE  EVENING 90 Tab 0   • mirtazapine (REMERON) 15 MG Tab Take 1 Tab by mouth every bedtime. 45 Tab 2   • atorvastatin (LIPITOR) 40 MG Tab TAKE 1 TABLET DAILY 90 Tab 0   • Apremilast (OTEZLA) 30 MG Tab 1 tab po bid after finishing the loading dose 180 Tab 1   • zolpidem (AMBIEN CR) 12.5 MG CR tablet Take 1 Tab by mouth at bedtime as needed for Sleep for up to 90 days. 90 Tab 0   • omeprazole (PRILOSEC) 20 MG delayed-release capsule TAKE 1 CAPSULE DAILY 15 Cap 0   • celecoxib (CELEBREX) 200 MG Cap TAKE 1 CAPSULE DAILY 90 Cap 2   • LORazepam (ATIVAN) 2 MG tablet Take 2 mg by mouth.     • loperamide (IMODIUM) 2 MG Cap Take 1 Cap by mouth 4 times a day as needed for Diarrhea. 30 Cap 0   • valacyclovir (VALTREX) 1 GM Tab TAKE 1 TABLET TWICE A DAY  FOR 7 DAYS AS NEEDED FOR   OUTBREAK 24 Tab 0   • fluticasone (FLOVENT HFA) 44 MCG/ACT Aerosol 2 Puffs by Nebulization route.     • ferrous sulfate 325 (65 Fe) MG tablet Take  by mouth.     • aspirin EC (ECOTRIN) 81 MG Tablet Delayed Response Take 81 mg by mouth every bedtime. 30 Tab 0   • Multiple Vitamins-Minerals (MELODY-DAY 1000 PO) Take 1 Tab by mouth 2 Times a Day.     • Cyanocobalamin (B-12) 2500 MCG Tab Take 1 Tab by mouth every morning.     • zolpidem (AMBIEN CR) 6.25 MG CR tablet zolpidem ER 6.25 mg tablet,extended release,multiphase       No current facility-administered medications for this visit.        Patient Active Problem List    Diagnosis Date Noted   • Functional diarrhea 07/16/2020   • History of gastric bypass 10/04/2019   • Tinnitus of both ears 04/09/2019   • Bruising 04/09/2019   • Complex sleep apnea syndrome 12/06/2018   • Dyslipidemia 09/21/2018   • Family history of coronary artery disease in brother 09/21/2018   • Familial hyperlipidemia 09/21/2018   • Psoriatic arthritis (HCC) 08/16/2018   • Dysthymic disorder 03/05/2018   • Erectile dysfunction of nonorganic origin 03/05/2018   • Hypokalemia 09/21/2017   • Malignant neoplasm of anterior wall of urinary  "bladder (HCC) 09/21/2017   • Essential hypertension 09/21/2017   • Need for vaccination 09/21/2017   • Major depressive disorder 09/21/2017   • Chronic insomnia 09/21/2017   • Plantar fasciitis 09/21/2017   • Gastroesophageal reflux disease without esophagitis 09/21/2017   • Benign nodular prostatic hyperplasia without lower urinary tract symptoms 09/21/2017   • Vitamin D deficiency 09/21/2017       Family History   Problem Relation Age of Onset   • Cancer Mother 80        lung cancer   • Arthritis Mother    • Diabetes Mother    • Anxiety disorder Mother    • Lung Disease Father 65   • Cancer Father    • Diabetes Father    • Hypertension Father    • Hyperlipidemia Father    • Hyperlipidemia Brother    • Arthritis Brother    • Other Brother         Thyroid tumor   • Sleep Apnea Brother    • No Known Problems Maternal Grandmother    • Cancer Maternal Grandfather    • No Known Problems Paternal Grandmother    • No Known Problems Paternal Grandfather    • Heart Disease Brother 49        smoker, overweight   • Hypertension Brother    • Depression Other    • Suicide Attempts Neg Hx    • Bipolar disorder Neg Hx    • Alcohol abuse Neg Hx    • Drug abuse Neg Hx        He  has a past medical history of Anxiety, Arthritis, Cancer ([facility]) (2007), Chickenpox, Depression, High cholesterol, Hyperlipidemia, Hypertension, Kidney stone, Mumps, Obesity, Pneumothorax, Restless leg syndrome, Sleep apnea, and Tonsillitis.  He  has a past surgical history that includes bladder biopsy with cystoscopy; abdominal exploration; gastric bypass laparoscopic; eye surgery; hernia repair; arthroplasty; open reduction; other orthopedic surgery (2015); bladder biopsy with cystoscopy (1/3/2018); retrogrades (Bilateral, 1/3/2018); pyelogram (Bilateral, 1/3/2018); thoracotomy; other (Left); and turp-vapor.       Objective:   Ht 1.702 m (5' 7\")   Wt 83.9 kg (185 lb)   BMI 28.98 kg/m²     Physical Exam:  Constitutional: Alert, no distress, " well-groomed.  Skin: No rashes in visible areas.  Eye: Round. Conjunctiva clear, lids normal. No icterus.   ENMT: Lips pink without lesions, good dentition, moist mucous membranes. Phonation normal.  Neck: No masses, no thyromegaly. Moves freely without pain.  Respiratory: Unlabored respiratory effort, no cough or audible wheeze  Psych: Alert and oriented x3, normal affect and mood.       Assessment and Plan:   The following treatment plan was discussed:     1. Complex sleep apnea syndrome    2. Chronic insomnia    3. Essential hypertension    4. BMI 28.0-28.9,adult    5. Nonsmoker    Continue ASV nightly. Patient continues to benefit from therapy.  Worsening insomnia issue due to recent knee surgery and discomfort. Pain is overall well managed and using orthopedic device for neutral spine at night. Start Belsomra 15mg qhs prn - reviewed side effects/benefit and usage. Stop Ambien CR and lorazepam. Encouraged patient to update me with use/benefit via mychart.  Discussed sleep hygiene in depth  F/u with PCP for other health concerns    Follow-up: February 2021 if ongoing insomnia issues; sleep apnea well treated

## 2020-12-09 DIAGNOSIS — E78.5 DYSLIPIDEMIA: ICD-10-CM

## 2020-12-10 RX ORDER — ATORVASTATIN CALCIUM 40 MG/1
TABLET, FILM COATED ORAL
Qty: 90 TAB | Refills: 0 | Status: SHIPPED | OUTPATIENT
Start: 2020-12-10 | End: 2021-03-02

## 2020-12-11 ENCOUNTER — PATIENT MESSAGE (OUTPATIENT)
Dept: MEDICAL GROUP | Age: 70
End: 2020-12-11

## 2020-12-11 DIAGNOSIS — F51.04 CHRONIC INSOMNIA: ICD-10-CM

## 2020-12-11 RX ORDER — LORAZEPAM 2 MG/1
2 TABLET ORAL NIGHTLY PRN
Qty: 30 TAB | Refills: 0 | Status: SHIPPED | OUTPATIENT
Start: 2020-12-11 | End: 2021-01-10

## 2020-12-15 ENCOUNTER — TELEMEDICINE (OUTPATIENT)
Dept: BEHAVIORAL HEALTH | Facility: CLINIC | Age: 70
End: 2020-12-15
Payer: MEDICARE

## 2020-12-15 DIAGNOSIS — F34.1 DYSTHYMIC DISORDER: Chronic | ICD-10-CM

## 2020-12-15 PROCEDURE — 90834 PSYTX W PT 45 MINUTES: CPT | Mod: 95,CR | Performed by: PSYCHOLOGIST

## 2020-12-15 NOTE — BH THERAPY
[facility]  Therapy Progress Note  Met with the patient per their request via (HIPPA compliant) Zoom video conference to accommodate state imposed restrictions on social contact due to the COVID-19 pandemic.      Patient Name: Buster Medina  Patient MRN: [id number]  Today's Date: 12/15/2020    Type of session:Individual psychotherapy  Length of session: 38 minutes  Persons in attendance:Patient    Subjective/New Info:The patient ID/Chief Complaint:  The patient is a 70 year old male, , .  The patient self-referred and voluntarily presented for an individual intake to address concerns related to relationship problems, sleep problems, and erectile dysfunction. Reviewed limits of confidentiality and [facility] policies; patient expressed understanding and agreed to voluntarily proceed with the evaluation and treatment.    Interval History: The patient's estimated global assessment of functioning indicates a mild level of difficulty with some problems in relationships functioning. The patient is nonetheless functioning well and has some significant relationships.  The patient indicates that he comes to therapy today with only one question he is decided to allow for his girlfriend to move in after his recovery from his knee surgery.  The patient is concerned about how to do to join financial management assisted the patient with problem solving and identifying ways to decrease negative interactions as it relates to finances.  Patient reports things are going well with his son in [place].  Patient was informed that this provider will be leaving [facility] and he was provided information about obtaining additional services if necessary.    Therapeutic Intervention: Cognitive Behavioral Therapy    Planned Intervention:  Continue to focus on relationship issues and the patients schemas that interfere with his desires for stable interpersonal relationships with others.        Objective/Observations:    Patient did not present in acute distress. Patient was appropriately groomed. Patient was alert and oriented x4. Eye contact was appropriate. No abnormalities in attention or concentration were noted. No abnormalities of movement present; psychomotor activity was normal. Speech was fluent and regular in rhythm, rate, volume, and tone. Thought processes Linear, Logical and Goal Directed. There was no evidence of thought disorder. No auditory or visual hallucinations. Long and short term memory appeared to be intact. Insight, judgment, and impulse control were deemed to be good.  Reported mood was “comfortable.” Affect was full-ranging and appropriate to thought content and conversation.  Patient denied past and current suicidal and homicidal ideation in plan, intent, and preparatory behavior.      Diagnoses:   1. Dysthymic disorder      RISK ASSESSMENT:   The patient denied any suicide-related ideation and/or behaviors and intent/plan, denied thoughts about death and dying both in session and during the period since last appointment, or past 2 weeks.    Risk Level: Not Currently at Clinically Significant Risk  Hospitalization is not deemed necessary at this time as the patient does not present a clear or imminent danger to self or others. No indication for pursuing higher level of care. Outpatient management is currently most appropriate and least restrictive level of care.      Current risk:   SUICIDE: Low   Homicide: Not applicable   Self-harm: Not applicable   Relapse: Not applicable   Other:    Safety Plan reviewed? No   If evidence of imminent risk is present, intervention/plan:         Treatment Goal(s)/Objective(s) addressed:    Goal: Improve overall mood      Get 7-8 hours of restful sleep every night using sleep hygiene handout   Develop strategies for thought distraction when ruminating on the past        Progress toward Treatment Goals: Moderate  improvement    Plan:    1) Follow-up PRN  2) Barriers to Learning:  No  3) Readiness to Learn:  Yes  4) Cultural Concerns:  No  5) Patient voiced understanding of, and agreement with, plan and goals as annotated above.  6) Declare these services are medically necessary and appropriate to the patient’s diagnosis and needs  7) The [facility] of contact [facility] the [facility] regarding this evaluation is Dr. San, Psychologist.    Geraldo San III, Ed.D.

## 2020-12-18 DIAGNOSIS — K21.9 GASTROESOPHAGEAL REFLUX DISEASE WITHOUT ESOPHAGITIS: ICD-10-CM

## 2020-12-22 RX ORDER — OMEPRAZOLE 20 MG/1
CAPSULE, DELAYED RELEASE ORAL
Qty: 15 CAP | Refills: 0 | Status: SHIPPED | OUTPATIENT
Start: 2020-12-22 | End: 2020-12-29 | Stop reason: SDUPTHER

## 2020-12-27 DIAGNOSIS — F51.04 CHRONIC INSOMNIA: Chronic | ICD-10-CM

## 2020-12-28 NOTE — TELEPHONE ENCOUNTER
Have we ever prescribed this med? Yes.  If yes, what date? 12/08/2020    Last OV: 12/08/2020 - Amy Sotomayor    Next OV: 02/09/2021 - Dr. Mancuso     DX: Chronic Insomnia    Medications: Belsomra

## 2020-12-29 ENCOUNTER — PATIENT MESSAGE (OUTPATIENT)
Dept: MEDICAL GROUP | Age: 70
End: 2020-12-29

## 2020-12-29 DIAGNOSIS — K21.9 GASTROESOPHAGEAL REFLUX DISEASE WITHOUT ESOPHAGITIS: ICD-10-CM

## 2020-12-29 RX ORDER — OMEPRAZOLE 20 MG/1
CAPSULE, DELAYED RELEASE ORAL
Qty: 15 CAP | Refills: 0 | Status: SHIPPED | OUTPATIENT
Start: 2020-12-29 | End: 2021-01-14

## 2020-12-30 RX ORDER — SUVOREXANT 15 MG/1
TABLET, FILM COATED ORAL
Qty: 30 TAB | Refills: 1 | Status: SHIPPED | OUTPATIENT
Start: 2020-12-30 | End: 2021-01-29

## 2021-01-04 ENCOUNTER — PATIENT MESSAGE (OUTPATIENT)
Dept: MEDICAL GROUP | Age: 71
End: 2021-01-04

## 2021-01-04 ENCOUNTER — HOSPITAL ENCOUNTER (OUTPATIENT)
Dept: LAB | Facility: MEDICAL CENTER | Age: 71
End: 2021-01-04
Attending: INTERNAL MEDICINE
Payer: MEDICARE

## 2021-01-04 DIAGNOSIS — Z79.61 LONG-TERM CURRENT USE OF APREMILAST: ICD-10-CM

## 2021-01-04 DIAGNOSIS — L40.50 PSORIATIC ARTHRITIS (HCC): ICD-10-CM

## 2021-01-04 DIAGNOSIS — I10 ESSENTIAL HYPERTENSION: ICD-10-CM

## 2021-01-04 LAB
ALBUMIN SERPL BCP-MCNC: 4.1 G/DL (ref 3.2–4.9)
ALBUMIN/GLOB SERPL: 1.5 G/DL
ALP SERPL-CCNC: 91 U/L (ref 30–99)
ALT SERPL-CCNC: 21 U/L (ref 2–50)
ANION GAP SERPL CALC-SCNC: 10 MMOL/L (ref 7–16)
AST SERPL-CCNC: 22 U/L (ref 12–45)
BASOPHILS # BLD AUTO: 0.7 % (ref 0–1.8)
BASOPHILS # BLD: 0.05 K/UL (ref 0–0.12)
BILIRUB SERPL-MCNC: 0.3 MG/DL (ref 0.1–1.5)
BUN SERPL-MCNC: 13 MG/DL (ref 8–22)
CALCIUM SERPL-MCNC: 9.8 MG/DL (ref 8.4–10.2)
CHLORIDE SERPL-SCNC: 108 MMOL/L (ref 96–112)
CO2 SERPL-SCNC: 26 MMOL/L (ref 20–33)
CREAT SERPL-MCNC: 0.97 MG/DL (ref 0.5–1.4)
EOSINOPHIL # BLD AUTO: 0.1 K/UL (ref 0–0.51)
EOSINOPHIL NFR BLD: 1.4 % (ref 0–6.9)
ERYTHROCYTE [DISTWIDTH] IN BLOOD BY AUTOMATED COUNT: 43.8 FL (ref 35.9–50)
ERYTHROCYTE [SEDIMENTATION RATE] IN BLOOD BY WESTERGREN METHOD: 11 MM/HOUR (ref 0–20)
GLOBULIN SER CALC-MCNC: 2.7 G/DL (ref 1.9–3.5)
GLUCOSE SERPL-MCNC: 102 MG/DL (ref 65–99)
HCT VFR BLD AUTO: 41.6 % (ref 42–52)
HGB BLD-MCNC: 13.7 G/DL (ref 14–18)
IMM GRANULOCYTES # BLD AUTO: 0.02 K/UL (ref 0–0.11)
IMM GRANULOCYTES NFR BLD AUTO: 0.3 % (ref 0–0.9)
LYMPHOCYTES # BLD AUTO: 1.47 K/UL (ref 1–4.8)
LYMPHOCYTES NFR BLD: 21 % (ref 22–41)
MCH RBC QN AUTO: 31.4 PG (ref 27–33)
MCHC RBC AUTO-ENTMCNC: 32.9 G/DL (ref 33.7–35.3)
MCV RBC AUTO: 95.4 FL (ref 81.4–97.8)
MONOCYTES # BLD AUTO: 0.67 K/UL (ref 0–0.85)
MONOCYTES NFR BLD AUTO: 9.6 % (ref 0–13.4)
NEUTROPHILS # BLD AUTO: 4.7 K/UL (ref 1.82–7.42)
NEUTROPHILS NFR BLD: 67 % (ref 44–72)
NRBC # BLD AUTO: 0 K/UL
NRBC BLD-RTO: 0 /100 WBC
PLATELET # BLD AUTO: 153 K/UL (ref 164–446)
PMV BLD AUTO: 10.7 FL (ref 9–12.9)
POTASSIUM SERPL-SCNC: 4.7 MMOL/L (ref 3.6–5.5)
PROT SERPL-MCNC: 6.8 G/DL (ref 6–8.2)
RBC # BLD AUTO: 4.36 M/UL (ref 4.7–6.1)
SODIUM SERPL-SCNC: 144 MMOL/L (ref 135–145)
WBC # BLD AUTO: 7 K/UL (ref 4.8–10.8)

## 2021-01-04 PROCEDURE — 36415 COLL VENOUS BLD VENIPUNCTURE: CPT

## 2021-01-04 PROCEDURE — 85025 COMPLETE CBC W/AUTO DIFF WBC: CPT

## 2021-01-04 PROCEDURE — 85652 RBC SED RATE AUTOMATED: CPT

## 2021-01-04 PROCEDURE — 80053 COMPREHEN METABOLIC PANEL: CPT

## 2021-01-06 RX ORDER — MIRTAZAPINE 15 MG/1
15 TABLET, FILM COATED ORAL
Qty: 45 TAB | Refills: 2 | Status: ON HOLD | OUTPATIENT
Start: 2021-01-06 | End: 2021-07-06

## 2021-01-07 ENCOUNTER — HOSPITAL ENCOUNTER (OUTPATIENT)
Dept: LAB | Facility: MEDICAL CENTER | Age: 71
End: 2021-01-07
Attending: INTERNAL MEDICINE
Payer: MEDICARE

## 2021-01-07 ENCOUNTER — OFFICE VISIT (OUTPATIENT)
Dept: RHEUMATOLOGY | Facility: MEDICAL CENTER | Age: 71
End: 2021-01-07
Payer: MEDICARE

## 2021-01-07 VITALS
WEIGHT: 189 LBS | BODY MASS INDEX: 29.6 KG/M2 | TEMPERATURE: 96.2 F | OXYGEN SATURATION: 96 % | SYSTOLIC BLOOD PRESSURE: 130 MMHG | DIASTOLIC BLOOD PRESSURE: 60 MMHG | HEART RATE: 84 BPM | RESPIRATION RATE: 14 BRPM

## 2021-01-07 DIAGNOSIS — M10.9 GOUT, UNSPECIFIED CAUSE, UNSPECIFIED CHRONICITY, UNSPECIFIED SITE: ICD-10-CM

## 2021-01-07 DIAGNOSIS — Z79.899 ON ALLOPURINOL THERAPY: ICD-10-CM

## 2021-01-07 DIAGNOSIS — L40.50 PSORIATIC ARTHRITIS (HCC): ICD-10-CM

## 2021-01-07 DIAGNOSIS — Z79.620 ADALIMUMAB (HUMIRA) LONG-TERM USE: ICD-10-CM

## 2021-01-07 DIAGNOSIS — I10 ESSENTIAL HYPERTENSION: ICD-10-CM

## 2021-01-07 DIAGNOSIS — Z98.84 H/O GASTRIC BYPASS: ICD-10-CM

## 2021-01-07 LAB — URATE SERPL-MCNC: 6.9 MG/DL (ref 2.5–8.3)

## 2021-01-07 PROCEDURE — 36415 COLL VENOUS BLD VENIPUNCTURE: CPT

## 2021-01-07 PROCEDURE — 84550 ASSAY OF BLOOD/URIC ACID: CPT

## 2021-01-07 PROCEDURE — 99214 OFFICE O/P EST MOD 30 MIN: CPT | Performed by: INTERNAL MEDICINE

## 2021-01-07 RX ORDER — ALLOPURINOL 100 MG/1
TABLET ORAL
Qty: 90 TAB | Refills: 0 | Status: SHIPPED | OUTPATIENT
Start: 2021-01-07 | End: 2021-02-09

## 2021-01-07 ASSESSMENT — FIBROSIS 4 INDEX: FIB4 SCORE: 2.17

## 2021-01-07 NOTE — PROGRESS NOTES
Chief Complaint- joint pain     Subjective:   Buster Medina is a 70 y.o. male here today for follow up of rheumatological issues    This is a follow-up visit for this patient who we see in this clinic for psoriatic arthritis that was diagnosed about 2008 by a rheumatologist in [place], [place].  Patient is currently on Otezla 30 mg p.o. twice daily with benefit however patient is suffering from the side effect of diarrhea and feels that he wants to go back on Humira.  Last sedimentation rate equals 11 January 2021.    Additionally patient states he is having a flare of his gout, patient states he used to have gout but has not had a flare for over 7 years, patient now in the last month or 2 has had flares x3 in his right great toe treated with prednisone through urgent care.  Patient would like to restart treatment for his gout.     Co morbidities include HTN, high cholesterol, hx left ahilles tendon rupture and repair, hx left knee arthroscopic surgery for meniscal tear, s/p left rotator cuff tear, s/p bladder cancer about 2012 no recurrence s/p surgical intervention only, hx of gastric bypass 2005 [facility], pt does f/u with Dr Gamez q year     Right TSA  Right TKA     S/p topical treatments  S/p plaquenil-cause taste abnormalities  S/p Otezla-diarrhea    Addendum 1/8/2021  Uric acid 6.9 1/2021 (on no treatment)    Addendum 5/10/2021  Uric acid 4.1 5/2021 (on no treatment)     HBsAg/HBcAbIgM neg 2/2020  HCV neg 2/2020  Quantiferon Gold neg 2/2020  G6PD 10.9 adequate 9/2019  CCP neg 3/2018  RF neg 3/2018  SEAN neg 3/2018  Hand x-rays 3/2018-indicates possible erosion of the fourth metacarpal on the left hand, 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 6 Each 1   • allopurinol (ZYLOPRIM) 100 MG  Tab 1 tab po qday for 1 week then 2 tabs po qday for 1 week then 3 tabs po qday 90 Tab 0   • mirtazapine (REMERON) 15 MG Tab Take 1 Tab by mouth every bedtime. 45 Tab 2   • BELSOMRA 15 MG Tab TAKE 1 TABLET BY MOUTH AT BEDTIME AS NEEDED FOR INSOMNIA FOR UP TO 30 DAYS 30 Tab 1   • omeprazole (PRILOSEC) 20 MG delayed-release capsule TAKE 1 CAPSULE DAILY (NEEDSAT LEAST A VIRTUAL VISIT   FOR MORE MEDS) 15 Cap 0   • atorvastatin (LIPITOR) 40 MG Tab TAKE 1 TABLET DAILY 90 Tab 0   • lisinopril (PRINIVIL) 20 MG Tab TAKE 1 TABLET BY MOUTH EVERY DAY IN THE EVENING 90 Tab 0   • celecoxib (CELEBREX) 200 MG Cap TAKE 1 CAPSULE DAILY 90 Cap 2   • loperamide (IMODIUM) 2 MG Cap Take 1 Cap by mouth 4 times a day as needed for Diarrhea. 30 Cap 0   • valacyclovir (VALTREX) 1 GM Tab TAKE 1 TABLET TWICE A DAY  FOR 7 DAYS AS NEEDED FOR   OUTBREAK 24 Tab 0   • fluticasone (FLOVENT HFA) 44 MCG/ACT Aerosol 2 Puffs by Nebulization route.     • aspirin EC (ECOTRIN) 81 MG Tablet Delayed Response Take 81 mg by mouth every bedtime. 30 Tab 0   • Multiple Vitamins-Minerals (MELODY-DAY 1000 PO) Take 1 Tab by mouth 2 Times a Day.     • Cyanocobalamin (B-12) 2500 MCG Tab Take 1 Tab by mouth every morning.     • LORazepam (ATIVAN) 2 MG tablet Take 1 Tab by mouth at bedtime as needed for Anxiety for up to 30 days. (Patient not taking: Reported on 1/7/2021) 30 Tab 0   • ferrous sulfate 325 (65 Fe) MG tablet Take  by mouth.       No current facility-administered medications for this visit.      He  has a past medical history of Anxiety, Arthritis, Cancer (HCC) (2007), Chickenpox, Depression, High cholesterol, Hyperlipidemia, Hypertension, Kidney stone, Mumps, Obesity, Pneumothorax, Restless leg syndrome, Sleep apnea, and Tonsillitis.    ROS   Other than what is mentioned in HPI or physical exam, there is no history of headaches, double vision or blurred vision. No temporal tenderness or jaw claudication. No trouble swallowing difficulties or sore throats.  No  chest complaints including chest pain, cough or sputum production. No GI complaints including nausea, vomiting, change in bowel habits, or past peptic ulcer disease. No history of blood in the stools. No urinary complaints including dysuria or frequency. No history of alopecia, photosensitivity, oral ulcerations, Raynaud's phenomena.       Objective:     /60   Pulse 84   Temp (!) 35.7 °C (96.2 °F) (Temporal)   Resp 14   Wt 85.7 kg (189 lb)   SpO2 96%  Body mass index is 29.6 kg/m².   Physical Exam:    Constitutional: Alert and oriented X3, patient is talkative with good eye contact.Skin: Warm, dry, good turgor, minimal psoriatic plaque on the extensor surface of the left shin.Eye: Equal, round and reactive, conjunctiva clear, lids normal EOM intactENMT: Lips without lesions, good dentition, no oropharyngeal ulcers, moist buccal mucosa, pinna without deformityNeck: Trachea midline, no masses, no thyromegaly.Lymph:  No cervical lymphadenopathy, no axillary lymphadenopathy, no supraclavicular lymphadenopathyRespiratory: Unlabored respiratory effort, lungs clear to auscultation, no wheezes, no ronchi.Cardiovascular: Normal S1, S2, no murmur, no edema.Abdomen: Soft, non-tender, no masses, no hepatosplenomegaly.Psych: Alert and oriented x3, normal affect and mood.Neuro: Cranial nerves 2-12 are grossly intact, no loss of sensation LEExt:no joint laxity noted in bilateral arms, no joint laxity noted in bilateral legs patient does have some tenderness in the right great toe MTP joint with some slight erythema, there is mild valgus deformity of both great toes, hands without any sausage digits no dactylitis,    Lab Results   Component Value Date/Time    QNTTBGOLD Negative 08/16/2018 12:13 PM     Lab Results   Component Value Date/Time    HEPBCORIGM Negative 03/02/2020 02:35 PM    HEPBSAG Negative 03/02/2020 02:35 PM     Lab Results   Component Value Date/Time    HEPCAB Negative 03/02/2020 02:35 PM     Lab Results    Component Value Date/Time    SODIUM 144 01/04/2021 02:12 PM    POTASSIUM 4.7 01/04/2021 02:12 PM    CHLORIDE 108 01/04/2021 02:12 PM    CO2 26 01/04/2021 02:12 PM    GLUCOSE 102 (H) 01/04/2021 02:12 PM    BUN 13 01/04/2021 02:12 PM    CREATININE 0.97 01/04/2021 02:12 PM      Lab Results   Component Value Date/Time    WBC 7.0 01/04/2021 02:12 PM    RBC 4.36 (L) 01/04/2021 02:12 PM    HEMOGLOBIN 13.7 (L) 01/04/2021 02:12 PM    HEMATOCRIT 41.6 (L) 01/04/2021 02:12 PM    MCV 95.4 01/04/2021 02:12 PM    MCH 31.4 01/04/2021 02:12 PM    MCHC 32.9 (L) 01/04/2021 02:12 PM    MPV 10.7 01/04/2021 02:12 PM    NEUTSPOLYS 67.00 01/04/2021 02:12 PM    LYMPHOCYTES 21.00 (L) 01/04/2021 02:12 PM    MONOCYTES 9.60 01/04/2021 02:12 PM    EOSINOPHILS 1.40 01/04/2021 02:12 PM    BASOPHILS 0.70 01/04/2021 02:12 PM      Lab Results   Component Value Date/Time    CALCIUM 9.8 01/04/2021 02:12 PM    ASTSGOT 22 01/04/2021 02:12 PM    ALTSGPT 21 01/04/2021 02:12 PM    ALKPHOSPHAT 91 01/04/2021 02:12 PM    TBILIRUBIN 0.3 01/04/2021 02:12 PM    ALBUMIN 4.1 01/04/2021 02:12 PM    TOTPROTEIN 6.8 01/04/2021 02:12 PM     Lab Results   Component Value Date/Time    URICACID 8.5 (H) 08/16/2018 12:13 PM    RHEUMFACTN <10 03/21/2018 02:19 PM    CCPANTIBODY 4 03/21/2018 02:20 PM    ANTINUCAB None Detected 03/21/2018 02:19 PM     Lab Results   Component Value Date/Time    SEDRATEWES 11 01/04/2021 02:12 PM     Lab Results   Component Value Date/Time    G6PD 10.9 09/17/2019 10:30 AM     Results for orders placed during the hospital encounter of 03/20/18   DX-FOOT-COMPLETE 3+ LEFT    Impression 1.  There is no radiographic evidence of inflammatory arthropathy.  2.  There is mild degenerative change at the 1st tarsometatarsal joint and tibiotalar joint.  3.  Question of subchondral cyst versus early erosive change aat the base of the 1st proximal phalanx.     Results for orders placed during the hospital encounter of 03/20/18   DX-HAND 3+ LEFT    Impression  1.  There is a questionable erosion versus subchondral cyst on the ulnar aspect of the base of the left 4th metacarpal.  2.  There is postoperative change and degenerative change involving the 1st and 2nd carpometacarpal junctions. There is a question of previous trapezium resection.      Assessment and Plan:     1. Psoriatic arthritis (HCC)  We will stop the Otezla restart Humira 40 mg subcu every 2 weeks per patient request for treatment of psoriatic arthritis  - Adalimumab 40 MG/0.4ML Pen-injector Kit; Inject 40 mg under the skin every 14 days.  Dispense: 6 Each; Refill: 1  - allopurinol (ZYLOPRIM) 100 MG Tab; 1 tab po qday for 1 week then 2 tabs po qday for 1 week then 3 tabs po qday  Dispense: 90 Tab; Refill: 0  - URIC ACID, SERUM  - CBC WITH DIFFERENTIAL; Future  - Comp Metabolic Panel; Future  - Sed Rate; Future  - URIC ACID, SERUM    2. Adalimumab (Humira) long-term use  Restart Humira 40 mg subcu every 2 weeks, patient screening labs up-to-date neck screening labs will be due February 2022, patient needs monitoring labs every 6 months, next labs due about July 2021, labs ordered for patient  We reviewed risks of biological medications with patient including hematological pathology, cancer risks, neurological and infection issues especially in the Covid-19 pandemic environment, patient states understanding.  - Adalimumab 40 MG/0.4ML Pen-injector Kit; Inject 40 mg under the skin every 14 days.  Dispense: 6 Each; Refill: 1  - allopurinol (ZYLOPRIM) 100 MG Tab; 1 tab po qday for 1 week then 2 tabs po qday for 1 week then 3 tabs po qday  Dispense: 90 Tab; Refill: 0  - URIC ACID, SERUM  - CBC WITH DIFFERENTIAL; Future  - Comp Metabolic Panel; Future  - Sed Rate; Future  - URIC ACID, SERUM    3. Gout, unspecified cause, unspecified chronicity, unspecified site  Today do uric acid level, start allopurinol at 100 mg p.o. daily  and titer up to 300 mg p.o. daily  Risks regarding allopurinol reviewed with patient,  patient states understanding  Reviewed foods to avoid and  advised a low purine diet-patient states understanding  We will recheck uric acid level again in July 2021  - Adalimumab 40 MG/0.4ML Pen-injector Kit; Inject 40 mg under the skin every 14 days.  Dispense: 6 Each; Refill: 1  - allopurinol (ZYLOPRIM) 100 MG Tab; 1 tab po qday for 1 week then 2 tabs po qday for 1 week then 3 tabs po qday  Dispense: 90 Tab; Refill: 0  - URIC ACID, SERUM  - CBC WITH DIFFERENTIAL; Future  - Comp Metabolic Panel; Future  - Sed Rate; Future  - URIC ACID, SERUM    4. On allopurinol therapy  Start allopurinol 100 mg p.o. daily and titer up to 300 mg over the course of 3 weeks  Risks reviewed with patient patient states understanding  We will check monitoring labs every 6 months next labs due July 2021, labs ordered for patient  - Adalimumab 40 MG/0.4ML Pen-injector Kit; Inject 40 mg under the skin every 14 days.  Dispense: 6 Each; Refill: 1  - allopurinol (ZYLOPRIM) 100 MG Tab; 1 tab po qday for 1 week then 2 tabs po qday for 1 week then 3 tabs po qday  Dispense: 90 Tab; Refill: 0  - CBC WITH DIFFERENTIAL; Future  - Comp Metabolic Panel; Future  - Sed Rate; Future  - URIC ACID, SERUM    5. Essential hypertension  May impact the type of medications we can use for this patient's arthritis. We will have to keep this under advisement.    6. H/O gastric bypass    Followup: Return in about 6 months (around 7/7/2021). or sooner ankush Medina  was seen 30 minutes face-to-face of which more than 50% of the time was spent counseling the patient (excluding time for procedures)  regarding  rheumatological condition and care. Therapy was discussed in detail.      Please note that this dictation was created using voice recognition software. I have made every reasonable attempt to correct obvious errors, but I expect that there are errors of grammar and possibly content that I did not discover before finalizing the note.

## 2021-01-14 DIAGNOSIS — K21.9 GASTROESOPHAGEAL REFLUX DISEASE WITHOUT ESOPHAGITIS: ICD-10-CM

## 2021-01-14 RX ORDER — OMEPRAZOLE 20 MG/1
CAPSULE, DELAYED RELEASE ORAL
Qty: 90 CAP | Refills: 1 | Status: SHIPPED | OUTPATIENT
Start: 2021-01-14 | End: 2021-01-15 | Stop reason: SDUPTHER

## 2021-01-15 ENCOUNTER — TELEMEDICINE (OUTPATIENT)
Dept: MEDICAL GROUP | Age: 71
End: 2021-01-15
Payer: MEDICARE

## 2021-01-15 VITALS — WEIGHT: 185 LBS | HEIGHT: 67 IN | BODY MASS INDEX: 29.03 KG/M2

## 2021-01-15 DIAGNOSIS — K21.9 GASTROESOPHAGEAL REFLUX DISEASE WITHOUT ESOPHAGITIS: ICD-10-CM

## 2021-01-15 DIAGNOSIS — E78.49 FAMILIAL HYPERLIPIDEMIA: ICD-10-CM

## 2021-01-15 DIAGNOSIS — I10 ESSENTIAL HYPERTENSION: ICD-10-CM

## 2021-01-15 PROCEDURE — 99213 OFFICE O/P EST LOW 20 MIN: CPT | Mod: 95,CR | Performed by: FAMILY MEDICINE

## 2021-01-15 RX ORDER — OMEPRAZOLE 20 MG/1
CAPSULE, DELAYED RELEASE ORAL
Qty: 90 CAP | Refills: 3 | Status: ON HOLD | OUTPATIENT
Start: 2021-01-15 | End: 2021-07-06

## 2021-01-15 ASSESSMENT — PATIENT HEALTH QUESTIONNAIRE - PHQ9
5. POOR APPETITE OR OVEREATING: NOT AT ALL
9. THOUGHTS THAT YOU WOULD BE BETTER OFF DEAD, OR OF HURTING YOURSELF: NOT AT ALL
2. FEELING DOWN, DEPRESSED, IRRITABLE, OR HOPELESS: NOT AT ALL
4. FEELING TIRED OR HAVING LITTLE ENERGY: NOT AT ALL
3. TROUBLE FALLING OR STAYING ASLEEP OR SLEEPING TOO MUCH: NOT AT ALL
1. LITTLE INTEREST OR PLEASURE IN DOING THINGS: NOT AT ALL
8. MOVING OR SPEAKING SO SLOWLY THAT OTHER PEOPLE COULD HAVE NOTICED. OR THE OPPOSITE, BEING SO FIGETY OR RESTLESS THAT YOU HAVE BEEN MOVING AROUND A LOT MORE THAN USUAL: NOT AT ALL
7. TROUBLE CONCENTRATING ON THINGS, SUCH AS READING THE NEWSPAPER OR WATCHING TELEVISION: NOT AT ALL
6. FEELING BAD ABOUT YOURSELF - OR THAT YOU ARE A FAILURE OR HAVE LET YOURSELF OR YOUR FAMILY DOWN: NOT AL ALL
SUM OF ALL RESPONSES TO PHQ9 QUESTIONS 1 AND 2: 0
SUM OF ALL RESPONSES TO PHQ QUESTIONS 1-9: 0

## 2021-01-15 ASSESSMENT — FIBROSIS 4 INDEX: FIB4 SCORE: 2.17

## 2021-01-15 NOTE — PROGRESS NOTES
Virtual Visit: Established Patient   This visit was conducted via Zoom using secure and encrypted videoconferencing technology. The patient was in a private location in the state [facility].    The patient's identity was confirmed and verbal consent was obtained for this virtual visit.    Subjective:   CC:   Chief Complaint   Patient presents with   • Gastrophageal Reflux     Omeprazole refill       Buster Medina is a 70 y.o. male presenting for evaluation and management of:    1. Gastroesophageal reflux disease without esophagitis  The patient is status post gastric bypass several years ago and has had good results.  Since then he has been on omeprazole and been dependent upon it.  He denies any difficulty swallowing no pain on swallowing no food getting stuck in the throat, no regurgitation of undigested food.    2. Familial hyperlipidemia  Atorvastatin 40 mg p.o. nightly    The patient has been on a statin for years and tolerating this fine. The patient denies any muscle aches, no abdominal pain and no history of elevated liver enzymes.    3. Essential hypertension  Lisinopril 20 mg p.o. daily    Patient has been compliant and states his home BP readings are always in normal limits.  He denies any adverse events, no side effects such as cough, syncopal presyncopal episodes, no chest pain shortness of breath back pain headache changes in vision.      ROS   Denies any recent fevers or chills. No nausea or vomiting. No chest pains or shortness of breath.     No Known Allergies    Current medicines (including changes today)  Current Outpatient Medications   Medication Sig Dispense Refill   • omeprazole (PRILOSEC) 20 MG delayed-release capsule Take one tab Qam on ES 90 Cap 3   • Adalimumab 40 MG/0.4ML Pen-injector Kit Inject 40 mg under the skin every 14 days. 6 Each 1   • allopurinol (ZYLOPRIM) 100 MG Tab 1 tab po qday for 1 week then 2 tabs po qday for 1 week then 3 tabs po qday 90 Tab 0   • mirtazapine  (REMERON) 15 MG Tab Take 1 Tab by mouth every bedtime. 45 Tab 2   • BELSOMRA 15 MG Tab TAKE 1 TABLET BY MOUTH AT BEDTIME AS NEEDED FOR INSOMNIA FOR UP TO 30 DAYS 30 Tab 1   • atorvastatin (LIPITOR) 40 MG Tab TAKE 1 TABLET DAILY 90 Tab 0   • lisinopril (PRINIVIL) 20 MG Tab TAKE 1 TABLET BY MOUTH EVERY DAY IN THE EVENING 90 Tab 0   • celecoxib (CELEBREX) 200 MG Cap TAKE 1 CAPSULE DAILY 90 Cap 2   • loperamide (IMODIUM) 2 MG Cap Take 1 Cap by mouth 4 times a day as needed for Diarrhea. 30 Cap 0   • valacyclovir (VALTREX) 1 GM Tab TAKE 1 TABLET TWICE A DAY  FOR 7 DAYS AS NEEDED FOR   OUTBREAK 24 Tab 0   • fluticasone (FLOVENT HFA) 44 MCG/ACT Aerosol 2 Puffs by Nebulization route.     • ferrous sulfate 325 (65 Fe) MG tablet Take  by mouth.     • aspirin EC (ECOTRIN) 81 MG Tablet Delayed Response Take 81 mg by mouth every bedtime. 30 Tab 0   • Multiple Vitamins-Minerals (MELODY-DAY 1000 PO) Take 1 Tab by mouth 2 Times a Day.     • Cyanocobalamin (B-12) 2500 MCG Tab Take 1 Tab by mouth every morning.       No current facility-administered medications for this visit.        Patient Active Problem List    Diagnosis Date Noted   • Functional diarrhea 07/16/2020   • History of gastric bypass 10/04/2019   • Tinnitus of both ears 04/09/2019   • Bruising 04/09/2019   • Complex sleep apnea syndrome 12/06/2018   • Dyslipidemia 09/21/2018   • Family history of coronary artery disease in brother 09/21/2018   • Familial hyperlipidemia 09/21/2018   • Psoriatic arthritis (HCC) 08/16/2018   • Dysthymic disorder 03/05/2018   • Erectile dysfunction of nonorganic origin 03/05/2018   • Hypokalemia 09/21/2017   • Malignant neoplasm of anterior wall of urinary bladder (HCC) 09/21/2017   • Essential hypertension 09/21/2017   • Need for vaccination 09/21/2017   • Major depressive disorder 09/21/2017   • Chronic insomnia 09/21/2017   • Plantar fasciitis 09/21/2017   • Gastroesophageal reflux disease without esophagitis 09/21/2017   • Benign nodular  "prostatic hyperplasia without lower urinary tract symptoms 09/21/2017   • Vitamin D deficiency 09/21/2017       Family History   Problem Relation Age of Onset   • Cancer Mother 80        lung cancer   • Arthritis Mother    • Diabetes Mother    • Anxiety disorder Mother    • Lung Disease Father 65   • Cancer Father    • Diabetes Father    • Hypertension Father    • Hyperlipidemia Father    • Hyperlipidemia Brother    • Arthritis Brother    • Other Brother         Thyroid tumor   • Sleep Apnea Brother    • No Known Problems Maternal Grandmother    • Cancer Maternal Grandfather    • No Known Problems Paternal Grandmother    • No Known Problems Paternal Grandfather    • Heart Disease Brother 49        smoker, overweight   • Hypertension Brother    • Depression Other    • Suicide Attempts Neg Hx    • Bipolar disorder Neg Hx    • Alcohol abuse Neg Hx    • Drug abuse Neg Hx        He  has a past medical history of Anxiety, Arthritis, Cancer (HCC) (2007), Chickenpox, Depression, High cholesterol, Hyperlipidemia, Hypertension, Kidney stone, Mumps, Obesity, Pneumothorax, Restless leg syndrome, Sleep apnea, and Tonsillitis.  He  has a past surgical history that includes bladder biopsy with cystoscopy; abdominal exploration; gastric bypass laparoscopic; eye surgery; hernia repair; arthroplasty; open reduction; other orthopedic surgery (2015); bladder biopsy with cystoscopy (1/3/2018); retrogrades (Bilateral, 1/3/2018); pyelogram (Bilateral, 1/3/2018); thoracotomy; other (Left); and turp-vapor.       Objective:   Ht 1.702 m (5' 7\") Comment: pt states  Wt 83.9 kg (185 lb) Comment: pt states  BMI 28.98 kg/m²     Physical Exam:  Constitutional: Alert, no distress, well-groomed.  Skin: No rashes in visible areas.  Eye: Round. Conjunctiva clear, lids normal. No icterus.   ENMT: Lips pink without lesions, good dentition, moist mucous membranes. Phonation normal.  Neck: No masses, no thyromegaly. Moves freely without " pain.  Respiratory: Unlabored respiratory effort, no cough or audible wheeze  Psych: Alert and oriented x3, normal affect and mood.       Assessment and Plan:   The following treatment plan was discussed:     1. Gastroesophageal reflux disease without esophagitis    Gerd precautions given (avoid the supine position after eating, avoid tight fitting clothing, head of bed elevation by raisin legs of bed,  avoid eating prior to sleeping, avoid reflux-inducing foods (foods high in fat, chocolate, peppermint, and excessive alcohol, which can decrease LES pressure), promote salivation by chewing gum or lozenges,    - omeprazole (PRILOSEC) 20 MG delayed-release capsule; Take one tab Qam on ES  Dispense: 90 Cap; Refill: 3    2. Familial hyperlipidemia    Patient has been stable with current management  We will make no changes for now    3. Essential hypertension    Patient has been stable with current management  We will make no changes for now      Follow-up: Return in about 6 months (around 7/15/2021) for Reevaluation, labs.

## 2021-01-21 RX ORDER — LISINOPRIL 20 MG/1
TABLET ORAL
Qty: 90 TAB | Refills: 0 | Status: SHIPPED | OUTPATIENT
Start: 2021-01-21 | End: 2021-05-12 | Stop reason: SDUPTHER

## 2021-02-09 ENCOUNTER — TELEMEDICINE (OUTPATIENT)
Dept: SLEEP MEDICINE | Facility: MEDICAL CENTER | Age: 71
End: 2021-02-09
Payer: MEDICARE

## 2021-02-09 VITALS — HEIGHT: 67 IN | BODY MASS INDEX: 29.03 KG/M2 | WEIGHT: 185 LBS

## 2021-02-09 DIAGNOSIS — G47.31 COMPLEX SLEEP APNEA SYNDROME: Chronic | ICD-10-CM

## 2021-02-09 DIAGNOSIS — F51.04 CHRONIC INSOMNIA: ICD-10-CM

## 2021-02-09 PROCEDURE — 99214 OFFICE O/P EST MOD 30 MIN: CPT | Mod: 95,CR | Performed by: FAMILY MEDICINE

## 2021-02-09 RX ORDER — ALLOPURINOL 300 MG/1
TABLET ORAL
Qty: 90 TAB | Refills: 1 | Status: SHIPPED | OUTPATIENT
Start: 2021-02-09 | End: 2021-06-24

## 2021-02-09 RX ORDER — SUVOREXANT 15 MG/1
15 TABLET, FILM COATED ORAL NIGHTLY PRN
Qty: 30 TAB | Refills: 3 | Status: SHIPPED | OUTPATIENT
Start: 2021-02-09 | End: 2021-04-09

## 2021-02-09 ASSESSMENT — FIBROSIS 4 INDEX: FIB4 SCORE: 2.17

## 2021-02-09 NOTE — PATIENT INSTRUCTIONS
Suvorexant oral tablets  What is this medicine?  SUVOREXANT (wp-[id number]-[id number]-ant) is used to treat insomnia. This medicine helps you to fall asleep and sleep through the night.  This medicine may be used for other purposes; ask your health care provider or pharmacist if you have questions.  COMMON BRAND NAME(S): Belsomra  What should I tell my health care provider before I take this medicine?  They need to know if you have any of these conditions:  · depression  · drink alcohol  · drug abuse or addiction  · feel sleepy or have fallen asleep suddenly during the day  · history of a sudden onset of muscle weakness (cataplexy)  · liver disease  · lung or breathing disease, like asthma or emphysema  · sleep apnea  · suicidal thoughts, plans, or attempt; a previous suicide attempt by you or a family member  · an unusual or allergic reaction to suvorexant, other medicines, foods, dyes, or preservatives  · pregnant or trying to get pregnant  · breast-feeding  How should I use this medicine?  Take this medicine by mouth within 30 minutes of going to bed. Do not take it unless you are able to stay in bed a full night before you must be active again. Follow the directions on the prescription label. You may take this medicine with or without a food. However, this medicine may take longer to work if you take it with or right after meals. Do not take your medicine more often than directed. Do not stop taking this medicine on your own. Always follow your doctor or health care professional's advice.  A special MedGuide will be given to you by the pharmacist with each prescription and refill. Be sure to read this information carefully each time.  Talk to your pediatrician regarding the use of this medicine in children. Special care may be needed.  Overdosage: If you think you have taken too much of this medicine contact a poison control center or emergency room at once.  NOTE: This medicine is only for you. Do not share this medicine with  "others.  What if I miss a dose?  This medicine should only be taken immediately before going to sleep. Do not take double or extra doses.  What may interact with this medicine?  · alcohol  · antihistamines for allergy, cough, or cold  · aprepitant  · boceprevir  · certain antibiotics like ciprofloxacin, clarithromycin, erythromycin, telithromycin  · certain antivirals for HIV or AIDS  · certain medicines for anxiety or sleep  · certain medicines for depression like amitriptyline, fluoxetine, nefazodone, sertraline  · certain medicines for fungal infections like ketoconazole, posaconazole, fluconazole, itraconazole  · certain medicines for seizures like carbamazepine, phenobarbital, primidone, phenytoin  · conivaptan  · digoxin  · diltiazem  · general anesthetics like halothane, isoflurane, methoxyflurane, propofol  · grapefruit juice  · imatinib  · medicines that relax muscles for surgery  · narcotic medicines for pain  · phenothiazines like chlorpromazine, mesoridazine, prochlorperazine, thioridazine  · rifampin  · verapamil  This list may not describe all possible interactions. Give your health care provider a list of all the medicines, herbs, non-prescription drugs, or dietary supplements you use. Also tell them if you smoke, drink alcohol, or use illegal drugs. Some items may interact with your medicine.  What should I watch for while using this medicine?  Visit your health care professional for regular checks on your progress. Tell your health care professional if your symptoms do not start to get better or if they get worse. Avoid caffeine-containing drinks in the evening hours.  After taking this medicine, you may get up out of bed and do an activity that you do not know you are doing. The next morning, you may have no memory of this. Activities include driving a car (\"sleep-driving\"), making and eating food, talking on the phone, sexual activity, and sleep-walking. Serious injuries have occurred. Call your " doctor right away if you find out you have done any of these activities. Do not take this medicine if you have used alcohol that evening. Do not take it if you have taken another medicine for sleep.  Do not take this medicine unless you are able to stay in bed for a full night (7 to 8 hours) and do not drive or perform other activities requiring full alertness within 8 hours of a dose. Do not drive, use machinery, or do anything that needs mental alertness the day after you take the 20 mg dose of this medicine. The use of lower doses (10 mg) may also cause driving impairment the next day. You may have a decrease in mental alertness the day after use, even if you feel that you are fully awake. Tell your doctor if you will need to perform activities requiring full alertness, such as driving, the next day. Do not stand or sit up quickly after taking this medicine, especially if you are an older patient. This reduces the risk of dizzy or fainting spells.  If you or your family notice any changes in your behavior, such as new or worsening depression, thoughts of harming yourself, anxiety, other unusual or disturbing thoughts, or memory loss, call your health care professional right away.  After you stop taking this medicine, you may have trouble falling asleep. This is called rebound insomnia. This problem usually goes away on its own after 1 or 2 nights.  What side effects may I notice from receiving this medicine?  Side effects that you should report to your doctor or health care professional as soon as possible:  · allergic reactions like skin rash, itching or hives, swelling of the face, lips, or tongue  · hallucinations  · periods of leg weakness lasting from seconds to a few minutes  · suicidal thoughts, mood changes  · unable to move or speak for several minutes while going to sleep or waking up  · unusual activities while not fully awake like driving, eating, making phone calls, or sexual activity  Side effects  that usually do not require medical attention (report these to your doctor or health care professional if they continue or are bothersome):  · daytime drowsiness  · headache  · nightmares or abnormal dreams  · tiredness  This list may not describe all possible side effects. Call your doctor for medical advice about side effects. You may report side effects to FDA at 5-756-LPF-4815.  Where should I keep my medicine?  Keep out of the reach of children. This medicine can be abused. Keep your medicine in a safe place to protect it from theft. Do not share this medicine with anyone. Selling or giving away this medicine is dangerous and against the law.  Store at room temperature between 15 and 30 degrees C (59 and 86 degrees F). Throw away any unused medicine after the expiration date.  NOTE: This sheet is a summary. It may not cover all possible information. If you have questions about this medicine, talk to your doctor, pharmacist, or health care provider.  © 2020 [employer] (2020-01-03 16:37:12)

## 2021-02-09 NOTE — PROGRESS NOTES
Telemedicine Visit: Established Patient     This encounter was conducted via Zoom. Verbal consent was obtained. Patient's identity was verified.       Given the importance of social distancing and other strategies recommended to reduce the risk of COVID-19 transmission, I am providing medical care to this patient via audio/video visit in place of an in person visit at the request of the patient.    [facility] Follow Up Note     Date: 2/9/2021 / Time: 10:06 AM    Patient ID:   Name:             Buster Mdeina   YOB: 1950  Age:                 70 y.o.  male   MRN:               [id number]      Thank you for requesting a sleep medicine consultation on Buster Medina at the sleep center. He presents today with the chief complaints of ARNULFO and chronic insomnia follow up.     HISTORY OF PRESENT ILLNESS:       Pt is currently on ASV 25, EPAP 15/6, PS 15/2, auto breath rate. He goes to sleep around 10-1 pm and wakes up around 8 am. He is getting about 7-8 hrs of sleep on a good night. The bad nights are per week, specially since he has been on the belsomra. Currently using belsomra 15 qhs prn which was switched from ambien CR 12,5 mg on his last visit. He is also on lorazepam 2mg along with hydrocodone. He uses lorazepam PRN for anxiety and hydrocodone for his RA. he trys to avoid polypharmacy if he has take lorazepam or hydrocodone with a sleep aid.  Overall,  He does finds his sleep refreshing and denies afternoon naps. .HHe denies any symptoms of RLS, narcolepsy or any symptoms to suggest parasomnias such as nightmares, sleep walking or acting out of dreams.      He is using ASV most days of the week. Pt reports 8 hrs of average nightly use of ASV. Pt denies snoring, gasping,choking.Pt also denies significant mask leak that is interfering with sleep. The 30 day compliance was downloaded which shows adequate compliance with more that 4 hr usage about 93%. The AHI is has improved to  4.7/hr. The mask leak is normal. The symptoms of excessive daytime, snoring and gasping has improved with the therapy.     SLEEP HISTORY   ASV titration and the best tolerated pressure was ASV EPAP 5/15 cm PS 2/20 cm, the AHI improved to 3.8/hr and O2 halle 90 %. He was observed in REM sleep on this pressure.      Sever obstructive sleep apnea with AHI of 38.5/hr and O2 halle 87 %. Due to severity of the disease he met the split study protocol. The titration started with CPAP 7 cm and the best tolerated was ASV EPAP 4/15 cm PS 2/20 cm cm. The AHI improved to 19/hr with improved O2 halle of 88% and average O2 saturation of 94 %.       REVIEW OF SYSTEMS:       Constitutional: Denies fevers, Denies weight changes  Eyes: Denies changes in vision, no eye pain  Ears/Nose/Throat/Mouth: Denies nasal congestion or sore throat   Cardiovascular: Denies chest pain or palpitations   Respiratory: Denies shortness of breath , Denies cough  Gastrointestinal/Hepatic: Denies abdominal pain, nausea, vomiting, diarrhea, constipation or GI bleeding   Genitourinary: Deniesdysuria or frequency  Musculoskeletal/Rheum: Denies  joint pain and swelling   Skin/Breast: Denies rash,   Neurological: Denies headache, confusion, memory loss or focal weakness/parasthesias  Psychiatric: denies mood disorder   Sleep: + insomnia, denies snoring and apneas     Comprehensive review of systems form is reviewed with the patient and is attached in the EMR.     PMH:  has a past medical history of Anxiety, Arthritis, Cancer (HCC) (2007), Chickenpox, Depression, High cholesterol, Hyperlipidemia, Hypertension, Kidney stone, Mumps, Obesity, Pneumothorax, Restless leg syndrome, Sleep apnea, and Tonsillitis.  MEDS:   Current Outpatient Medications:   •  lisinopril (PRINIVIL) 20 MG Tab, TAKE 1 TABLET BY MOUTH EVERY DAY IN THE EVENING, Disp: 90 Tab, Rfl: 0  •  omeprazole (PRILOSEC) 20 MG delayed-release capsule, Take one tab Qam on ES, Disp: 90 Cap, Rfl: 3  •   Adalimumab 40 MG/0.4ML Pen-injector Kit, Inject 40 mg under the skin every 14 days., Disp: 6 Each, Rfl: 1  •  allopurinol (ZYLOPRIM) 100 MG Tab, 1 tab po qday for 1 week then 2 tabs po qday for 1 week then 3 tabs po qday, Disp: 90 Tab, Rfl: 0  •  mirtazapine (REMERON) 15 MG Tab, Take 1 Tab by mouth every bedtime., Disp: 45 Tab, Rfl: 2  •  atorvastatin (LIPITOR) 40 MG Tab, TAKE 1 TABLET DAILY, Disp: 90 Tab, Rfl: 0  •  celecoxib (CELEBREX) 200 MG Cap, TAKE 1 CAPSULE DAILY, Disp: 90 Cap, Rfl: 2  •  valacyclovir (VALTREX) 1 GM Tab, TAKE 1 TABLET TWICE A DAY  FOR 7 DAYS AS NEEDED FOR   OUTBREAK, Disp: 24 Tab, Rfl: 0  •  fluticasone (FLOVENT HFA) 44 MCG/ACT Aerosol, 2 Puffs by Nebulization route., Disp: , Rfl:   •  ferrous sulfate 325 (65 Fe) MG tablet, Take  by mouth., Disp: , Rfl:   •  aspirin EC (ECOTRIN) 81 MG Tablet Delayed Response, Take 81 mg by mouth every bedtime., Disp: 30 Tab, Rfl: 0  •  Multiple Vitamins-Minerals (MELODY-DAY 1000 PO), Take 1 Tab by mouth 2 Times a Day., Disp: , Rfl:   •  Cyanocobalamin (B-12) 2500 MCG Tab, Take 1 Tab by mouth every morning., Disp: , Rfl:   •  loperamide (IMODIUM) 2 MG Cap, Take 1 Cap by mouth 4 times a day as needed for Diarrhea., Disp: 30 Cap, Rfl: 0  ALLERGIES: No Known Allergies  SURGHX:   Past Surgical History:   Procedure Laterality Date   • BLADDER BIOPSY WITH CYSTOSCOPY  1/3/2018    Procedure: BLADDER BIOPSY WITH CYSTOSCOPY/WITH UPPER TRACT WASHING;  Surgeon: El Manuel M.D.;  Location: [facility];  Service: Urology   • RETROGRADES Bilateral 1/3/2018    Procedure: RETROGRADES;  Surgeon: El Manuel M.D.;  Location: [facility];  Service: Urology   • PYELOGRAM Bilateral 1/3/2018    Procedure: PYELOGRAM;  Surgeon: El Manuel M.D.;  Location: [facility];  Service: Urology   • OTHER ORTHOPEDIC SURGERY  2015    shoulder replacement   • ABDOMINAL EXPLORATION     • ARTHROPLASTY      right shoulder   • BLADDER BIOPSY WITH CYSTOSCOPY  "    • EYE SURGERY      lasik    • GASTRIC BYPASS LAPAROSCOPIC     • HERNIA REPAIR     • OPEN REDUCTION     • OTHER Left     thumb joint   • THORACOTOMY     • TURP-VAPOR       SOCHX:  reports that he has never smoked. He has never used smokeless tobacco. He reports current alcohol use of about 8.4 oz of alcohol per week. He reports that he does not use drugs..  FH:   Family History   Problem Relation Age of Onset   • Cancer Mother 80        lung cancer   • Arthritis Mother    • Diabetes Mother    • Anxiety disorder Mother    • Lung Disease Father 65   • Cancer Father    • Diabetes Father    • Hypertension Father    • Hyperlipidemia Father    • Hyperlipidemia Brother    • Arthritis Brother    • Other Brother         Thyroid tumor   • Sleep Apnea Brother    • No Known Problems Maternal Grandmother    • Cancer Maternal Grandfather    • No Known Problems Paternal Grandmother    • No Known Problems Paternal Grandfather    • Heart Disease Brother 49        smoker, overweight   • Hypertension Brother    • Depression Other    • Suicide Attempts Neg Hx    • Bipolar disorder Neg Hx    • Alcohol abuse Neg Hx    • Drug abuse Neg Hx          Physical Exam:  Vitals/ General Appearance:   Weight/BMI: Body mass index is 28.98 kg/m².  Ht 1.702 m (5' 7\")   Wt 83.9 kg (185 lb)   Vitals:    02/09/21 1002   Weight: 83.9 kg (185 lb)   Height: 1.702 m (5' 7\")       Pt. is alert and oriented to time, place and person. Cooperative and in no apparent distress.       Constitutional: Alert, no distress, well-groomed.  Skin: No rashes in visible areas.  Eye: Round. Conjunctiva clear, lids normal. No icterus.   ENMT: Lips pink without lesions, good dentition, moist mucous membranes. Phonation normal.  Neck: No masses, no thyromegaly. Moves freely without pain.  CV: Pulse as reported by patient  Respiratory: Unlabored respiratory effort, no cough or audible wheeze  Psych: Alert and oriented x3, normal affect and mood.     ASSESSMENT AND PLAN   "   1.Complex Sleep Apnea    The pathophysiology of sleep anea and the increased risk of cardiovascular morbidity from untreated sleep apnea is discussed in detail with the patient.      He is urged to avoid supine sleep, weight gain and alcoholic beverages since all of these can worsen sleep apnea. He is cautioned against drowsy driving. If He feels sleepy while driving, He must pull over for a break/nap, rather than persist on the road, in the interest of He own safety and that of others on the road.   Plan   - Continue ASV 25, EPAP 15/6, PS 15/2, auto breath rate.   - compliance download was reviewed and discussed with the pt   - compliance was reinforced     2. 2. Chronic insomnia:   - stimulus control and sleep hygiene reinforced   - continue belsomra 15 mg. SE and drug holiday was dicussed in detail. Strongly recommended against poly pharmacy with opioids and benzodiazapine. Refill x3

## 2021-03-01 DIAGNOSIS — E78.5 DYSLIPIDEMIA: ICD-10-CM

## 2021-03-02 DIAGNOSIS — Z20.822 EXPOSURE TO COVID-19 VIRUS: ICD-10-CM

## 2021-03-02 RX ORDER — ATORVASTATIN CALCIUM 40 MG/1
TABLET, FILM COATED ORAL
Qty: 90 TABLET | Refills: 1 | Status: ON HOLD | OUTPATIENT
Start: 2021-03-02 | End: 2021-07-06

## 2021-03-03 DIAGNOSIS — Z23 NEED FOR VACCINATION: ICD-10-CM

## 2021-03-25 ENCOUNTER — APPOINTMENT (OUTPATIENT)
Dept: MEDICAL GROUP | Age: 71
End: 2021-03-25
Payer: MEDICARE

## 2021-03-26 RX ORDER — CELECOXIB 200 MG/1
CAPSULE ORAL
Qty: 90 CAPSULE | Refills: 2 | Status: ON HOLD | OUTPATIENT
Start: 2021-03-26 | End: 2021-07-06

## 2021-03-30 ENCOUNTER — OFFICE VISIT (OUTPATIENT)
Dept: MEDICAL GROUP | Age: 71
End: 2021-03-30
Payer: MEDICARE

## 2021-03-30 VITALS
SYSTOLIC BLOOD PRESSURE: 130 MMHG | OXYGEN SATURATION: 96 % | HEIGHT: 67 IN | HEART RATE: 69 BPM | DIASTOLIC BLOOD PRESSURE: 76 MMHG | WEIGHT: 190.8 LBS | TEMPERATURE: 97.7 F | BODY MASS INDEX: 29.95 KG/M2 | RESPIRATION RATE: 16 BRPM

## 2021-03-30 DIAGNOSIS — R49.0 HOARSENESS: ICD-10-CM

## 2021-03-30 PROCEDURE — 99214 OFFICE O/P EST MOD 30 MIN: CPT | Performed by: FAMILY MEDICINE

## 2021-03-30 ASSESSMENT — FIBROSIS 4 INDEX: FIB4 SCORE: 2.17

## 2021-03-30 NOTE — PROGRESS NOTES
This medical record contains text that has been entered with the assistance of computer voice recognition and dictation software.  Therefore, it may contain unintended errors in text, spelling, punctuation, or grammar      Chief Complaint   Patient presents with   • Laryngitis     x 2 weeks         Buster Medina is a 70 y.o. male here evaluation and management of: Hoarseness      HPI:     1. Hoarseness  NEW PROBLEM    Patient is a very pleasant 70-year-old male who presents to clinic with a chief complaint of having lost his voice for the past 2 weeks.  He states that it comes and goes, he denies smoking never smoked cigarettes he does have a history of GERD and has been taking omeprazole mainly because of his history of gastric bypass.  He denies any food getting stuck in the throat, no pain on swallowing, no difficulty breathing, no regurgitation of undigested food, no early satiety, no unintentional weight loss.  He is not a singer, he does not think he talks too much or excessively has not been yelling recently.    Current medicines (including changes today)  Current Outpatient Medications   Medication Sig Dispense Refill   • benzocaine-menthol (CEPACOL) 15-3.6 MG Lozenge Dissolve 1 Lozenge in the mouth every 2 hours as needed for up to 7 days. 24 Lozenge 0   • celecoxib (CELEBREX) 200 MG Cap TAKE 1 CAPSULE DAILY 90 capsule 2   • atorvastatin (LIPITOR) 40 MG Tab TAKE 1 TABLET DAILY 90 tablet 1   • allopurinol (ZYLOPRIM) 300 MG Tab 1 tab p.o. daily 90 Tab 1   • lisinopril (PRINIVIL) 20 MG Tab TAKE 1 TABLET BY MOUTH EVERY DAY IN THE EVENING 90 Tab 0   • omeprazole (PRILOSEC) 20 MG delayed-release capsule Take one tab Qam on ES 90 Cap 3   • Adalimumab 40 MG/0.4ML Pen-injector Kit Inject 40 mg under the skin every 14 days. 6 Each 1   • mirtazapine (REMERON) 15 MG Tab Take 1 Tab by mouth every bedtime. 45 Tab 2   • valacyclovir (VALTREX) 1 GM Tab TAKE 1 TABLET TWICE A DAY  FOR 7 DAYS AS NEEDED FOR   OUTBREAK 24  Tab 0   • fluticasone (FLOVENT HFA) 44 MCG/ACT Aerosol 2 Puffs by Nebulization route.     • ferrous sulfate 325 (65 Fe) MG tablet Take  by mouth.     • aspirin EC (ECOTRIN) 81 MG Tablet Delayed Response Take 81 mg by mouth every bedtime. 30 Tab 0   • Multiple Vitamins-Minerals (MELODY-DAY 1000 PO) Take 1 Tab by mouth 2 Times a Day.     • Cyanocobalamin (B-12) 2500 MCG Tab Take 1 Tab by mouth every morning.     • loperamide (IMODIUM) 2 MG Cap Take 1 Cap by mouth 4 times a day as needed for Diarrhea. 30 Cap 0     No current facility-administered medications for this visit.     He  has a past medical history of Anxiety, Arthritis, Cancer ([facility]) (2007), Chickenpox, Depression, High cholesterol, Hyperlipidemia, Hypertension, Kidney stone, Mumps, Obesity, Pneumothorax, Restless leg syndrome, Sleep apnea, and Tonsillitis.  He  has a past surgical history that includes bladder biopsy with cystoscopy; abdominal exploration; gastric bypass laparoscopic; eye surgery; hernia repair; arthroplasty; open reduction; other orthopedic surgery (2015); bladder biopsy with cystoscopy (1/3/2018); retrogrades (Bilateral, 1/3/2018); pyelogram (Bilateral, 1/3/2018); thoracotomy; other (Left); and turp-vapor.  Social History     Tobacco Use   • Smoking status: Never Smoker   • Smokeless tobacco: Never Used   Substance Use Topics   • Alcohol use: Yes     Alcohol/week: 8.4 oz     Types: 14 Glasses of wine per week     Comment: 2 per day   • Drug use: No     Comment:       Social History     Social History Narrative   • Not on file     Family History   Problem Relation Age of Onset   • Cancer Mother 80        lung cancer   • Arthritis Mother    • Diabetes Mother    • Anxiety disorder Mother    • Lung Disease Father 65   • Cancer Father    • Diabetes Father    • Hypertension Father    • Hyperlipidemia Father    • Hyperlipidemia Brother    • Arthritis Brother    • Other Brother         Thyroid tumor   • Sleep Apnea Brother    • No Known Problems  "Maternal Grandmother    • Cancer Maternal Grandfather    • No Known Problems Paternal Grandmother    • No Known Problems Paternal Grandfather    • Heart Disease Brother 49        smoker, overweight   • Hypertension Brother    • Depression Other    • Suicide Attempts Neg Hx    • Bipolar disorder Neg Hx    • Alcohol abuse Neg Hx    • Drug abuse Neg Hx      Family Status   Relation Name Status   • Mo     • Fa     • Bro  Alive   • MGMo     • MGFa     • PGMo     • PGFa     • Bro  Alive   • OTHER  (Not Specified)   • Neg Hx  (Not Specified)         ROS    The pertinent  ROS findings can be seen in the HPI above.     All other systems reviewed and are negative     Objective:     /76 (BP Location: Right arm, Patient Position: Sitting, BP Cuff Size: Adult)   Pulse 69   Temp 36.5 °C (97.7 °F) (Temporal)   Resp 16   Ht 1.702 m (5' 7\")   Wt 86.5 kg (190 lb 12.8 oz)   SpO2 96%  Body mass index is 29.88 kg/m².      Physical Exam:    Constitutional: Alert, no distress.  Skin: No rashes  Eye: Equal, round and reactive, conjunctiva clear, lids normal.  ENMT: Lips without lesions, good dentition, oropharynx clear.  Neck: Trachea midline, no masses, no thyromegaly. No cervical or supraclavicular lymphadenopathy.  Respiratory: Unlabored respiratory effort, lungs clear to auscultation, no wheezes, no ronchi.  Cardiovascular: Normal S1, S2, no murmur, no edema  Abdomen: Soft, non-tender, no masses, no hepatosplenomegaly.        Assessment and Plan:   The following treatment plan was discussed      1. Hoarseness  We will use Cepacol to soothe the throat  However he will need to have his vocal cords visualized    - REFERRAL TO ENT  - benzocaine-menthol (CEPACOL) 15-3.6 MG Lozenge; Dissolve 1 Lozenge in the mouth every 2 hours as needed for up to 7 days.  Dispense: 24 Lozenge; Refill: 0        Instructed to Follow up in clinic or ER for worsening symptoms, difficulty breathing, " lack of expected recovery, or should new symptoms or problems arise.    Followup: Return in about 3 months (around 6/30/2021) for Reevaluation.

## 2021-04-09 DIAGNOSIS — G47.31 COMPLEX SLEEP APNEA SYNDROME: Chronic | ICD-10-CM

## 2021-04-09 DIAGNOSIS — F51.04 CHRONIC INSOMNIA: ICD-10-CM

## 2021-04-09 RX ORDER — SUVOREXANT 15 MG/1
15 TABLET, FILM COATED ORAL NIGHTLY PRN
Qty: 30 TABLET | Refills: 2 | Status: SHIPPED | OUTPATIENT
Start: 2021-04-09 | End: 2021-06-24

## 2021-04-09 NOTE — TELEPHONE ENCOUNTER
Have we ever prescribed this med? Yes.  If yes, what date? 02/09/2021    Last OV: 02/09/2021 - Dr. barber     Next OV: 06/07/2021 - Amy Sotomayor    DX: Complex sleep apnea, Chronic insomnia    Medications: Belsomra

## 2021-05-06 ENCOUNTER — HOSPITAL ENCOUNTER (OUTPATIENT)
Dept: LAB | Facility: MEDICAL CENTER | Age: 71
End: 2021-05-06
Attending: STUDENT IN AN ORGANIZED HEALTH CARE EDUCATION/TRAINING PROGRAM
Payer: MEDICARE

## 2021-05-06 ENCOUNTER — HOSPITAL ENCOUNTER (OUTPATIENT)
Dept: LAB | Facility: MEDICAL CENTER | Age: 71
End: 2021-05-06
Attending: INTERNAL MEDICINE
Payer: MEDICARE

## 2021-05-06 DIAGNOSIS — Z79.899 ON ALLOPURINOL THERAPY: ICD-10-CM

## 2021-05-06 DIAGNOSIS — M10.9 GOUT, UNSPECIFIED CAUSE, UNSPECIFIED CHRONICITY, UNSPECIFIED SITE: ICD-10-CM

## 2021-05-06 DIAGNOSIS — L40.50 PSORIATIC ARTHRITIS (HCC): ICD-10-CM

## 2021-05-06 DIAGNOSIS — Z79.620 ADALIMUMAB (HUMIRA) LONG-TERM USE: ICD-10-CM

## 2021-05-06 PROCEDURE — 84134 ASSAY OF PREALBUMIN: CPT

## 2021-05-06 PROCEDURE — 80061 LIPID PANEL: CPT

## 2021-05-06 PROCEDURE — 82746 ASSAY OF FOLIC ACID SERUM: CPT

## 2021-05-06 PROCEDURE — 80053 COMPREHEN METABOLIC PANEL: CPT

## 2021-05-06 PROCEDURE — 84207 ASSAY OF VITAMIN B-6: CPT

## 2021-05-06 PROCEDURE — 85652 RBC SED RATE AUTOMATED: CPT

## 2021-05-06 PROCEDURE — 82607 VITAMIN B-12: CPT

## 2021-05-06 PROCEDURE — 84550 ASSAY OF BLOOD/URIC ACID: CPT

## 2021-05-06 PROCEDURE — 84252 ASSAY OF VITAMIN B-2: CPT

## 2021-05-06 PROCEDURE — 85025 COMPLETE CBC W/AUTO DIFF WBC: CPT

## 2021-05-06 PROCEDURE — 36415 COLL VENOUS BLD VENIPUNCTURE: CPT

## 2021-05-06 PROCEDURE — 84630 ASSAY OF ZINC: CPT

## 2021-05-06 PROCEDURE — 84425 ASSAY OF VITAMIN B-1: CPT

## 2021-05-07 LAB
ALBUMIN SERPL BCP-MCNC: 4.3 G/DL (ref 3.2–4.9)
ALBUMIN/GLOB SERPL: 1.7 G/DL
ALP SERPL-CCNC: 81 U/L (ref 30–99)
ALT SERPL-CCNC: 32 U/L (ref 2–50)
ANION GAP SERPL CALC-SCNC: 9 MMOL/L (ref 7–16)
AST SERPL-CCNC: 25 U/L (ref 12–45)
BASOPHILS # BLD AUTO: 0.8 % (ref 0–1.8)
BASOPHILS # BLD: 0.05 K/UL (ref 0–0.12)
BILIRUB SERPL-MCNC: 0.7 MG/DL (ref 0.1–1.5)
BUN SERPL-MCNC: 13 MG/DL (ref 8–22)
CALCIUM SERPL-MCNC: 9.4 MG/DL (ref 8.5–10.5)
CHLORIDE SERPL-SCNC: 103 MMOL/L (ref 96–112)
CHOLEST SERPL-MCNC: 184 MG/DL (ref 100–199)
CO2 SERPL-SCNC: 26 MMOL/L (ref 20–33)
CREAT SERPL-MCNC: 1.04 MG/DL (ref 0.5–1.4)
EOSINOPHIL # BLD AUTO: 0.24 K/UL (ref 0–0.51)
EOSINOPHIL NFR BLD: 3.9 % (ref 0–6.9)
ERYTHROCYTE [DISTWIDTH] IN BLOOD BY AUTOMATED COUNT: 52.5 FL (ref 35.9–50)
ERYTHROCYTE [SEDIMENTATION RATE] IN BLOOD BY WESTERGREN METHOD: 6 MM/HOUR (ref 0–20)
FASTING STATUS PATIENT QL REPORTED: NORMAL
FOLATE SERPL-MCNC: 28.8 NG/ML
GLOBULIN SER CALC-MCNC: 2.5 G/DL (ref 1.9–3.5)
GLUCOSE SERPL-MCNC: 109 MG/DL (ref 65–99)
HCT VFR BLD AUTO: 46.4 % (ref 42–52)
HDLC SERPL-MCNC: 78 MG/DL
HGB BLD-MCNC: 15.1 G/DL (ref 14–18)
IMM GRANULOCYTES # BLD AUTO: 0.02 K/UL (ref 0–0.11)
IMM GRANULOCYTES NFR BLD AUTO: 0.3 % (ref 0–0.9)
LDLC SERPL CALC-MCNC: 84 MG/DL
LYMPHOCYTES # BLD AUTO: 2.43 K/UL (ref 1–4.8)
LYMPHOCYTES NFR BLD: 39.7 % (ref 22–41)
MCH RBC QN AUTO: 32.1 PG (ref 27–33)
MCHC RBC AUTO-ENTMCNC: 32.5 G/DL (ref 33.7–35.3)
MCV RBC AUTO: 98.5 FL (ref 81.4–97.8)
MONOCYTES # BLD AUTO: 0.59 K/UL (ref 0–0.85)
MONOCYTES NFR BLD AUTO: 9.6 % (ref 0–13.4)
NEUTROPHILS # BLD AUTO: 2.79 K/UL (ref 1.82–7.42)
NEUTROPHILS NFR BLD: 45.7 % (ref 44–72)
NRBC # BLD AUTO: 0 K/UL
NRBC BLD-RTO: 0 /100 WBC
PLATELET # BLD AUTO: 179 K/UL (ref 164–446)
PMV BLD AUTO: 11.4 FL (ref 9–12.9)
POTASSIUM SERPL-SCNC: 4.2 MMOL/L (ref 3.6–5.5)
PREALB SERPL-MCNC: 35.1 MG/DL (ref 18–38)
PROT SERPL-MCNC: 6.8 G/DL (ref 6–8.2)
RBC # BLD AUTO: 4.71 M/UL (ref 4.7–6.1)
SODIUM SERPL-SCNC: 138 MMOL/L (ref 135–145)
TRIGL SERPL-MCNC: 112 MG/DL (ref 0–149)
URATE SERPL-MCNC: 4.1 MG/DL (ref 2.5–8.3)
VIT B12 SERPL-MCNC: 1442 PG/ML (ref 211–911)
WBC # BLD AUTO: 6.1 K/UL (ref 4.8–10.8)

## 2021-05-09 LAB — ZINC BLD-MCNC: 741.5 UG/DL (ref 440–860)

## 2021-05-10 ENCOUNTER — PATIENT MESSAGE (OUTPATIENT)
Dept: MEDICAL GROUP | Age: 71
End: 2021-05-10

## 2021-05-10 DIAGNOSIS — F51.04 CHRONIC INSOMNIA: ICD-10-CM

## 2021-05-10 LAB — VIT B2 SERPL-SCNC: 23 NMOL/L (ref 5–50)

## 2021-05-10 RX ORDER — LORAZEPAM 2 MG/1
TABLET ORAL
COMMUNITY
End: 2021-05-10 | Stop reason: SDUPTHER

## 2021-05-10 RX ORDER — LORAZEPAM 2 MG/1
TABLET ORAL
Qty: 30 TABLET | Refills: 0 | Status: CANCELLED | OUTPATIENT
Start: 2021-05-10 | End: 2021-06-10

## 2021-05-11 LAB
VIT B1 BLD-MCNC: 301 NMOL/L (ref 70–180)
VIT B6 SERPL-MCNC: 224.2 NMOL/L (ref 20–125)

## 2021-05-11 RX ORDER — LORAZEPAM 2 MG/1
TABLET ORAL
Qty: 30 TABLET | Refills: 0 | Status: SHIPPED | OUTPATIENT
Start: 2021-05-11 | End: 2021-09-15

## 2021-05-12 RX ORDER — LORAZEPAM 2 MG/1
TABLET ORAL
Qty: 30 TABLET | Refills: 2 | Status: SHIPPED | OUTPATIENT
Start: 2021-05-12 | End: 2021-06-11

## 2021-05-13 RX ORDER — LISINOPRIL 20 MG/1
20 TABLET ORAL
Qty: 90 TABLET | Refills: 0 | Status: ON HOLD | OUTPATIENT
Start: 2021-05-13 | End: 2021-07-06

## 2021-06-24 ENCOUNTER — PRE-ADMISSION TESTING (OUTPATIENT)
Dept: ADMISSIONS | Facility: MEDICAL CENTER | Age: 71
End: 2021-06-24
Attending: UROLOGY
Payer: MEDICARE

## 2021-06-24 ENCOUNTER — PATIENT MESSAGE (OUTPATIENT)
Dept: HEALTH INFORMATION MANAGEMENT | Facility: OTHER | Age: 71
End: 2021-06-24

## 2021-06-24 DIAGNOSIS — Z01.810 PRE-OPERATIVE CARDIOVASCULAR EXAMINATION: ICD-10-CM

## 2021-06-24 DIAGNOSIS — Z01.812 PRE-OPERATIVE LABORATORY EXAMINATION: ICD-10-CM

## 2021-06-24 DIAGNOSIS — G47.31 COMPLEX SLEEP APNEA SYNDROME: Chronic | ICD-10-CM

## 2021-06-24 DIAGNOSIS — F51.04 CHRONIC INSOMNIA: ICD-10-CM

## 2021-06-24 DIAGNOSIS — Z01.811 PRE-OPERATIVE RESPIRATORY EXAMINATION: ICD-10-CM

## 2021-06-24 LAB
ANION GAP SERPL CALC-SCNC: 10 MMOL/L (ref 7–16)
APPEARANCE UR: CLEAR
BASOPHILS # BLD AUTO: 0.7 % (ref 0–1.8)
BASOPHILS # BLD: 0.04 K/UL (ref 0–0.12)
BILIRUB UR QL STRIP.AUTO: NEGATIVE
BUN SERPL-MCNC: 14 MG/DL (ref 8–22)
CALCIUM SERPL-MCNC: 9.4 MG/DL (ref 8.5–10.5)
CHLORIDE SERPL-SCNC: 108 MMOL/L (ref 96–112)
CO2 SERPL-SCNC: 24 MMOL/L (ref 20–33)
COLOR UR: YELLOW
CREAT SERPL-MCNC: 1.04 MG/DL (ref 0.5–1.4)
EKG IMPRESSION: NORMAL
EOSINOPHIL # BLD AUTO: 0.18 K/UL (ref 0–0.51)
EOSINOPHIL NFR BLD: 3 % (ref 0–6.9)
ERYTHROCYTE [DISTWIDTH] IN BLOOD BY AUTOMATED COUNT: 47.6 FL (ref 35.9–50)
GLUCOSE SERPL-MCNC: 93 MG/DL (ref 65–99)
GLUCOSE UR STRIP.AUTO-MCNC: NEGATIVE MG/DL
HCT VFR BLD AUTO: 45.7 % (ref 42–52)
HGB BLD-MCNC: 15.4 G/DL (ref 14–18)
IMM GRANULOCYTES # BLD AUTO: 0.04 K/UL (ref 0–0.11)
IMM GRANULOCYTES NFR BLD AUTO: 0.7 % (ref 0–0.9)
INR PPP: 1.05 (ref 0.87–1.13)
KETONES UR STRIP.AUTO-MCNC: NEGATIVE MG/DL
LEUKOCYTE ESTERASE UR QL STRIP.AUTO: NEGATIVE
LYMPHOCYTES # BLD AUTO: 2.51 K/UL (ref 1–4.8)
LYMPHOCYTES NFR BLD: 41.8 % (ref 22–41)
MCH RBC QN AUTO: 33 PG (ref 27–33)
MCHC RBC AUTO-ENTMCNC: 33.7 G/DL (ref 33.7–35.3)
MCV RBC AUTO: 97.9 FL (ref 81.4–97.8)
MICRO URNS: NORMAL
MONOCYTES # BLD AUTO: 0.81 K/UL (ref 0–0.85)
MONOCYTES NFR BLD AUTO: 13.5 % (ref 0–13.4)
NEUTROPHILS # BLD AUTO: 2.43 K/UL (ref 1.82–7.42)
NEUTROPHILS NFR BLD: 40.3 % (ref 44–72)
NITRITE UR QL STRIP.AUTO: NEGATIVE
NRBC # BLD AUTO: 0 K/UL
NRBC BLD-RTO: 0 /100 WBC
PH UR STRIP.AUTO: 5.5 [PH] (ref 5–8)
PLATELET # BLD AUTO: 156 K/UL (ref 164–446)
PMV BLD AUTO: 10.8 FL (ref 9–12.9)
POTASSIUM SERPL-SCNC: 4.6 MMOL/L (ref 3.6–5.5)
PROT UR QL STRIP: NEGATIVE MG/DL
PROTHROMBIN TIME: 13.4 SEC (ref 12–14.6)
RBC # BLD AUTO: 4.67 M/UL (ref 4.7–6.1)
RBC UR QL AUTO: NEGATIVE
SODIUM SERPL-SCNC: 142 MMOL/L (ref 135–145)
SP GR UR STRIP.AUTO: 1.02
UROBILINOGEN UR STRIP.AUTO-MCNC: 0.2 MG/DL
WBC # BLD AUTO: 6 K/UL (ref 4.8–10.8)

## 2021-06-24 PROCEDURE — 87086 URINE CULTURE/COLONY COUNT: CPT

## 2021-06-24 PROCEDURE — 85610 PROTHROMBIN TIME: CPT

## 2021-06-24 PROCEDURE — 85025 COMPLETE CBC W/AUTO DIFF WBC: CPT

## 2021-06-24 PROCEDURE — 93005 ELECTROCARDIOGRAM TRACING: CPT

## 2021-06-24 PROCEDURE — 81003 URINALYSIS AUTO W/O SCOPE: CPT

## 2021-06-24 PROCEDURE — 80048 BASIC METABOLIC PNL TOTAL CA: CPT

## 2021-06-24 PROCEDURE — 93010 ELECTROCARDIOGRAM REPORT: CPT | Performed by: INTERNAL MEDICINE

## 2021-06-24 PROCEDURE — 36415 COLL VENOUS BLD VENIPUNCTURE: CPT

## 2021-06-24 RX ORDER — SUVOREXANT 15 MG/1
15 TABLET, FILM COATED ORAL NIGHTLY PRN
Qty: 30 TABLET | Refills: 2 | Status: ON HOLD | OUTPATIENT
Start: 2021-06-24 | End: 2021-07-06

## 2021-06-24 ASSESSMENT — FIBROSIS 4 INDEX: FIB4 SCORE: 1.73

## 2021-06-24 NOTE — TELEPHONE ENCOUNTER
Have we ever prescribed this med? Yes.  If yes, what date? 04/09/2021    Last OV: 02/09/2021 - Dr. Mancuso     Next OV: 07/27/2021 - Amy Sotomayor    DX: Complex sleep apnea     Medications: Belsomra

## 2021-06-26 LAB
BACTERIA UR CULT: NORMAL
SIGNIFICANT IND 70042: NORMAL
SITE SITE: NORMAL
SOURCE SOURCE: NORMAL

## 2021-07-06 ENCOUNTER — HOSPITAL ENCOUNTER (OUTPATIENT)
Facility: MEDICAL CENTER | Age: 71
End: 2021-07-06
Attending: UROLOGY | Admitting: UROLOGY
Payer: MEDICARE

## 2021-07-06 ENCOUNTER — ANESTHESIA EVENT (OUTPATIENT)
Dept: SURGERY | Facility: MEDICAL CENTER | Age: 71
End: 2021-07-06
Payer: MEDICARE

## 2021-07-06 ENCOUNTER — ANESTHESIA (OUTPATIENT)
Dept: SURGERY | Facility: MEDICAL CENTER | Age: 71
End: 2021-07-06
Payer: MEDICARE

## 2021-07-06 VITALS
HEIGHT: 67 IN | DIASTOLIC BLOOD PRESSURE: 84 MMHG | BODY MASS INDEX: 29.03 KG/M2 | TEMPERATURE: 97 F | HEART RATE: 64 BPM | RESPIRATION RATE: 16 BRPM | SYSTOLIC BLOOD PRESSURE: 139 MMHG | OXYGEN SATURATION: 97 % | WEIGHT: 184.97 LBS

## 2021-07-06 LAB — PATHOLOGY CONSULT NOTE: NORMAL

## 2021-07-06 PROCEDURE — 700111 HCHG RX REV CODE 636 W/ 250 OVERRIDE (IP): Performed by: ANESTHESIOLOGY

## 2021-07-06 PROCEDURE — 160048 HCHG OR STATISTICAL LEVEL 1-5: Performed by: UROLOGY

## 2021-07-06 PROCEDURE — A9270 NON-COVERED ITEM OR SERVICE: HCPCS | Performed by: ANESTHESIOLOGY

## 2021-07-06 PROCEDURE — 700102 HCHG RX REV CODE 250 W/ 637 OVERRIDE(OP): Performed by: UROLOGY

## 2021-07-06 PROCEDURE — 700102 HCHG RX REV CODE 250 W/ 637 OVERRIDE(OP): Performed by: ANESTHESIOLOGY

## 2021-07-06 PROCEDURE — 700111 HCHG RX REV CODE 636 W/ 250 OVERRIDE (IP): Performed by: UROLOGY

## 2021-07-06 PROCEDURE — 88305 TISSUE EXAM BY PATHOLOGIST: CPT

## 2021-07-06 PROCEDURE — 160025 RECOVERY II MINUTES (STATS): Performed by: UROLOGY

## 2021-07-06 PROCEDURE — 160028 HCHG SURGERY MINUTES - 1ST 30 MINS LEVEL 3: Performed by: UROLOGY

## 2021-07-06 PROCEDURE — 160046 HCHG PACU - 1ST 60 MINS PHASE II: Performed by: UROLOGY

## 2021-07-06 PROCEDURE — 700101 HCHG RX REV CODE 250: Performed by: UROLOGY

## 2021-07-06 PROCEDURE — 160002 HCHG RECOVERY MINUTES (STAT): Performed by: UROLOGY

## 2021-07-06 PROCEDURE — 700105 HCHG RX REV CODE 258: Performed by: UROLOGY

## 2021-07-06 PROCEDURE — 160035 HCHG PACU - 1ST 60 MINS PHASE I: Performed by: UROLOGY

## 2021-07-06 PROCEDURE — C1769 GUIDE WIRE: HCPCS | Performed by: UROLOGY

## 2021-07-06 PROCEDURE — A9270 NON-COVERED ITEM OR SERVICE: HCPCS | Performed by: UROLOGY

## 2021-07-06 PROCEDURE — 160039 HCHG SURGERY MINUTES - EA ADDL 1 MIN LEVEL 3: Performed by: UROLOGY

## 2021-07-06 PROCEDURE — 700101 HCHG RX REV CODE 250: Performed by: ANESTHESIOLOGY

## 2021-07-06 PROCEDURE — 160009 HCHG ANES TIME/MIN: Performed by: UROLOGY

## 2021-07-06 RX ORDER — ROCURONIUM BROMIDE 10 MG/ML
INJECTION, SOLUTION INTRAVENOUS PRN
Status: DISCONTINUED | OUTPATIENT
Start: 2021-07-06 | End: 2021-07-06 | Stop reason: SURG

## 2021-07-06 RX ORDER — MEPERIDINE HYDROCHLORIDE 25 MG/ML
12.5 INJECTION INTRAMUSCULAR; INTRAVENOUS; SUBCUTANEOUS
Status: DISCONTINUED | OUTPATIENT
Start: 2021-07-06 | End: 2021-07-06 | Stop reason: HOSPADM

## 2021-07-06 RX ORDER — MIRTAZAPINE 15 MG/1
7.5 TABLET, FILM COATED ORAL NIGHTLY
COMMUNITY
End: 2021-11-03 | Stop reason: SDUPTHER

## 2021-07-06 RX ORDER — ADALIMUMAB 40MG/0.4ML
40 KIT SUBCUTANEOUS
COMMUNITY
End: 2021-12-13 | Stop reason: SDUPTHER

## 2021-07-06 RX ORDER — HYDROMORPHONE HYDROCHLORIDE 1 MG/ML
0.2 INJECTION, SOLUTION INTRAMUSCULAR; INTRAVENOUS; SUBCUTANEOUS
Status: DISCONTINUED | OUTPATIENT
Start: 2021-07-06 | End: 2021-07-06 | Stop reason: HOSPADM

## 2021-07-06 RX ORDER — ALLOPURINOL 300 MG/1
300 TABLET ORAL DAILY
COMMUNITY
End: 2021-07-12

## 2021-07-06 RX ORDER — ATORVASTATIN CALCIUM 40 MG/1
40 TABLET, FILM COATED ORAL NIGHTLY
COMMUNITY
End: 2021-09-07 | Stop reason: SDUPTHER

## 2021-07-06 RX ORDER — ACETAMINOPHEN 500 MG
1000 TABLET ORAL ONCE
Status: COMPLETED | OUTPATIENT
Start: 2021-07-06 | End: 2021-07-06

## 2021-07-06 RX ORDER — LABETALOL HYDROCHLORIDE 5 MG/ML
5 INJECTION, SOLUTION INTRAVENOUS
Status: DISCONTINUED | OUTPATIENT
Start: 2021-07-06 | End: 2021-07-06 | Stop reason: HOSPADM

## 2021-07-06 RX ORDER — SODIUM CHLORIDE, SODIUM LACTATE, POTASSIUM CHLORIDE, CALCIUM CHLORIDE 600; 310; 30; 20 MG/100ML; MG/100ML; MG/100ML; MG/100ML
INJECTION, SOLUTION INTRAVENOUS CONTINUOUS
Status: DISCONTINUED | OUTPATIENT
Start: 2021-07-06 | End: 2021-07-06 | Stop reason: HOSPADM

## 2021-07-06 RX ORDER — HALOPERIDOL 5 MG/ML
1 INJECTION INTRAMUSCULAR
Status: DISCONTINUED | OUTPATIENT
Start: 2021-07-06 | End: 2021-07-06 | Stop reason: HOSPADM

## 2021-07-06 RX ORDER — CEFAZOLIN SODIUM 1 G/3ML
INJECTION, POWDER, FOR SOLUTION INTRAMUSCULAR; INTRAVENOUS PRN
Status: DISCONTINUED | OUTPATIENT
Start: 2021-07-06 | End: 2021-07-06 | Stop reason: SURG

## 2021-07-06 RX ORDER — HYDROMORPHONE HYDROCHLORIDE 1 MG/ML
0.4 INJECTION, SOLUTION INTRAMUSCULAR; INTRAVENOUS; SUBCUTANEOUS
Status: DISCONTINUED | OUTPATIENT
Start: 2021-07-06 | End: 2021-07-06 | Stop reason: HOSPADM

## 2021-07-06 RX ORDER — CELECOXIB 200 MG/1
200 CAPSULE ORAL DAILY
COMMUNITY
End: 2021-07-19 | Stop reason: SDUPTHER

## 2021-07-06 RX ORDER — HYDROMORPHONE HYDROCHLORIDE 1 MG/ML
0.1 INJECTION, SOLUTION INTRAMUSCULAR; INTRAVENOUS; SUBCUTANEOUS
Status: DISCONTINUED | OUTPATIENT
Start: 2021-07-06 | End: 2021-07-06 | Stop reason: HOSPADM

## 2021-07-06 RX ORDER — ONDANSETRON 2 MG/ML
4 INJECTION INTRAMUSCULAR; INTRAVENOUS
Status: DISCONTINUED | OUTPATIENT
Start: 2021-07-06 | End: 2021-07-06 | Stop reason: HOSPADM

## 2021-07-06 RX ORDER — OMEPRAZOLE 20 MG/1
20 CAPSULE, DELAYED RELEASE ORAL EVERY EVENING
COMMUNITY
End: 2023-10-10 | Stop reason: SDUPTHER

## 2021-07-06 RX ORDER — MIDAZOLAM HYDROCHLORIDE 1 MG/ML
1 INJECTION INTRAMUSCULAR; INTRAVENOUS
Status: DISCONTINUED | OUTPATIENT
Start: 2021-07-06 | End: 2021-07-06 | Stop reason: HOSPADM

## 2021-07-06 RX ORDER — SUVOREXANT 15 MG/1
1 TABLET, FILM COATED ORAL
COMMUNITY
End: 2022-03-29 | Stop reason: SDUPTHER

## 2021-07-06 RX ORDER — LISINOPRIL 20 MG/1
20 TABLET ORAL EVERY EVENING
COMMUNITY
End: 2021-09-07 | Stop reason: SDUPTHER

## 2021-07-06 RX ORDER — PROPOFOL 10 MG/ML
INJECTION, EMULSION INTRAVENOUS PRN
Status: DISCONTINUED | OUTPATIENT
Start: 2021-07-06 | End: 2021-07-06 | Stop reason: SURG

## 2021-07-06 RX ORDER — HYDRALAZINE HYDROCHLORIDE 20 MG/ML
5 INJECTION INTRAMUSCULAR; INTRAVENOUS
Status: DISCONTINUED | OUTPATIENT
Start: 2021-07-06 | End: 2021-07-06 | Stop reason: HOSPADM

## 2021-07-06 RX ORDER — DEXAMETHASONE SODIUM PHOSPHATE 4 MG/ML
INJECTION, SOLUTION INTRA-ARTICULAR; INTRALESIONAL; INTRAMUSCULAR; INTRAVENOUS; SOFT TISSUE PRN
Status: DISCONTINUED | OUTPATIENT
Start: 2021-07-06 | End: 2021-07-06 | Stop reason: SURG

## 2021-07-06 RX ORDER — OXYCODONE HCL 5 MG/5 ML
5 SOLUTION, ORAL ORAL
Status: DISCONTINUED | OUTPATIENT
Start: 2021-07-06 | End: 2021-07-06 | Stop reason: HOSPADM

## 2021-07-06 RX ORDER — METOPROLOL TARTRATE 1 MG/ML
1 INJECTION, SOLUTION INTRAVENOUS
Status: DISCONTINUED | OUTPATIENT
Start: 2021-07-06 | End: 2021-07-06 | Stop reason: HOSPADM

## 2021-07-06 RX ORDER — OXYCODONE HCL 5 MG/5 ML
10 SOLUTION, ORAL ORAL
Status: DISCONTINUED | OUTPATIENT
Start: 2021-07-06 | End: 2021-07-06 | Stop reason: HOSPADM

## 2021-07-06 RX ORDER — DIPHENHYDRAMINE HYDROCHLORIDE 50 MG/ML
12.5 INJECTION INTRAMUSCULAR; INTRAVENOUS
Status: DISCONTINUED | OUTPATIENT
Start: 2021-07-06 | End: 2021-07-06 | Stop reason: HOSPADM

## 2021-07-06 RX ORDER — LIDOCAINE HYDROCHLORIDE 20 MG/ML
INJECTION, SOLUTION EPIDURAL; INFILTRATION; INTRACAUDAL; PERINEURAL PRN
Status: DISCONTINUED | OUTPATIENT
Start: 2021-07-06 | End: 2021-07-06 | Stop reason: SURG

## 2021-07-06 RX ORDER — ONDANSETRON 2 MG/ML
INJECTION INTRAMUSCULAR; INTRAVENOUS PRN
Status: DISCONTINUED | OUTPATIENT
Start: 2021-07-06 | End: 2021-07-06 | Stop reason: SURG

## 2021-07-06 RX ORDER — PHENAZOPYRIDINE HYDROCHLORIDE 100 MG/1
200 TABLET, FILM COATED ORAL
Status: COMPLETED | OUTPATIENT
Start: 2021-07-06 | End: 2021-07-06

## 2021-07-06 RX ADMIN — SODIUM CHLORIDE, POTASSIUM CHLORIDE, SODIUM LACTATE AND CALCIUM CHLORIDE: 600; 310; 30; 20 INJECTION, SOLUTION INTRAVENOUS at 14:01

## 2021-07-06 RX ADMIN — ONDANSETRON 4 MG: 2 INJECTION INTRAMUSCULAR; INTRAVENOUS at 14:55

## 2021-07-06 RX ADMIN — PHENAZOPYRIDINE HYDROCHLORIDE 200 MG: 100 TABLET ORAL at 15:40

## 2021-07-06 RX ADMIN — ACETAMINOPHEN 1000 MG: 500 TABLET ORAL at 14:01

## 2021-07-06 RX ADMIN — FENTANYL CITRATE 50 MCG: 50 INJECTION, SOLUTION INTRAMUSCULAR; INTRAVENOUS at 15:06

## 2021-07-06 RX ADMIN — FENTANYL CITRATE 100 MCG: 50 INJECTION, SOLUTION INTRAMUSCULAR; INTRAVENOUS at 14:51

## 2021-07-06 RX ADMIN — PROPOFOL 150 MG: 10 INJECTION, EMULSION INTRAVENOUS at 14:51

## 2021-07-06 RX ADMIN — FENTANYL CITRATE 100 MCG: 50 INJECTION, SOLUTION INTRAMUSCULAR; INTRAVENOUS at 15:04

## 2021-07-06 RX ADMIN — ROCURONIUM BROMIDE 25 MG: 10 INJECTION, SOLUTION INTRAVENOUS at 14:51

## 2021-07-06 RX ADMIN — DEXAMETHASONE SODIUM PHOSPHATE 4 MG: 4 INJECTION, SOLUTION INTRA-ARTICULAR; INTRALESIONAL; INTRAMUSCULAR; INTRAVENOUS; SOFT TISSUE at 14:55

## 2021-07-06 RX ADMIN — LIDOCAINE HYDROCHLORIDE 100 MG: 20 INJECTION, SOLUTION EPIDURAL; INFILTRATION; INTRACAUDAL at 14:51

## 2021-07-06 RX ADMIN — PROPOFOL 50 MG: 10 INJECTION, EMULSION INTRAVENOUS at 15:06

## 2021-07-06 RX ADMIN — POVIDONE IODINE 15 ML: 100 SOLUTION TOPICAL at 14:03

## 2021-07-06 RX ADMIN — LIDOCAINE HYDROCHLORIDE 0.5 ML: 10 INJECTION, SOLUTION EPIDURAL; INFILTRATION; INTRACAUDAL; PERINEURAL at 14:02

## 2021-07-06 RX ADMIN — SUGAMMADEX 200 MG: 100 INJECTION, SOLUTION INTRAVENOUS at 15:20

## 2021-07-06 RX ADMIN — CEFAZOLIN 2 G: 330 INJECTION, POWDER, FOR SOLUTION INTRAMUSCULAR; INTRAVENOUS at 14:55

## 2021-07-06 ASSESSMENT — FIBROSIS 4 INDEX: FIB4 SCORE: 1.98

## 2021-07-06 ASSESSMENT — PAIN DESCRIPTION - PAIN TYPE
TYPE: SURGICAL PAIN

## 2021-07-06 ASSESSMENT — PAIN SCALES - GENERAL: PAIN_LEVEL: 1

## 2021-07-06 NOTE — OR NURSING
D/c orders received. IV dc'd. Pt changed into clothing with assistance. Pt up and ambulated to BR, steady gait, voided adequately. Discharge instructions given as well as pain management handout; pt and family verbalized understanding and questions answered. Patient states ready to d/c home. No prescriptions given. Pt dc'd in w/c with CNA.

## 2021-07-06 NOTE — DISCHARGE INSTRUCTIONS
ACTIVITY: Rest and take it easy for the first 24 hours.  A responsible adult is recommended to remain with you during that time.  It is normal to feel sleepy.  We encourage you to not do anything that requires balance, judgment or coordination.    MILD FLU-LIKE SYMPTOMS ARE NORMAL. YOU MAY EXPERIENCE GENERALIZED MUSCLE ACHES, THROAT IRRITATION, HEADACHE AND/OR SOME NAUSEA.    FOR 24 HOURS DO NOT:  Drive, operate machinery or run household appliances.  Drink beer or alcoholic beverages.   Make important decisions or sign legal documents.    SPECIAL INSTRUCTIONS: follow MD instructions    DIET: To avoid nausea, slowly advance diet as tolerated, avoiding spicy or greasy foods for the first day.  Add more substantial food to your diet according to your physician's instructions.    INCREASE FLUIDS AND FIBER TO AVOID CONSTIPATION.    SURGICAL DRESSING/BATHING: ok to shower    FOLLOW-UP APPOINTMENT:  A follow-up appointment should be arranged with your doctor in 1-2 weeks; call to schedule.    You should CALL YOUR PHYSICIAN if you develop:  Fever greater than 101 degrees F.  Pain not relieved by medication, or persistent nausea or vomiting.  Excessive bleeding (blood soaking through dressing) or unexpected drainage from the wound.  Extreme redness or swelling around the incision site, drainage of pus or foul smelling drainage.  Inability to urinate or empty your bladder within 8 hours.  Problems with breathing or chest pain.    You should call 911 if you develop problems with breathing or chest pain.  If you are unable to contact your doctor or surgical center, you should go to the nearest emergency room or urgent care center.  Physician's telephone #: [phone]    If any questions arise, call your doctor.  If your doctor is not available, please feel free to call the Surgical Center at [phone]. The Contact Center is open Monday through Friday 7AM to 5PM and may speak to a nurse at [phone], or toll free at  [phone].     A registered nurse may call you a few days after your surgery to see how you are doing after your procedure.    MEDICATIONS: Resume taking daily medication.  Take prescribed pain medication with food.  If no medication is prescribed, you may take non-aspirin pain medication if needed.  PAIN MEDICATION CAN BE VERY CONSTIPATING.  Take a stool softener or laxative such as senokot, pericolace, or milk of magnesia if needed.    No Prescriptions .     If your physician has prescribed pain medication that includes Acetaminophen (Tylenol), do not take additional Acetaminophen (Tylenol) while taking the prescribed medication.    Depression / Suicide Risk    As you are discharged from this [facility] facility, it is important to learn how to keep safe from harming yourself.    Recognize the warning signs:  · Abrupt changes in personality, positive or negative- including increase in energy   · Giving away possessions  · Change in eating patterns- significant weight changes-  positive or negative  · Change in sleeping patterns- unable to sleep or sleeping all the time   · Unwillingness or inability to communicate  · Depression  · Unusual sadness, discouragement and loneliness  · Talk of wanting to die  · Neglect of personal appearance   · Rebelliousness- reckless behavior  · Withdrawal from people/activities they love  · Confusion- inability to concentrate     If you or a loved one observes any of these behaviors or has concerns about self-harm, here's what you can do:  · Talk about it- your feelings and reasons for harming yourself  · Remove any means that you might use to hurt yourself (examples: pills, rope, extension cords, firearm)  · Get professional help from the community (Mental Health, Substance Abuse, psychological counseling)  · Do not be alone:Call your Safe Contact- someone whom you trust who will be there for you.  · Call your local CRISIS HOTLINE 502-7470 or 457-989-6700  · Call your local  Children's Mobile Crisis Response Team [place] (687) 894-1033 or www.Storie.Ketera  · Call the toll free National Suicide Prevention Hotlines   · National Suicide Prevention Lifeline 258-704-JIGE (7284)  · National Hope Line Network 800-SUICIDE (258-1220)

## 2021-07-06 NOTE — ANESTHESIA PROCEDURE NOTES
Airway    Date/Time: 7/6/2021 2:53 PM  Performed by: Mich Ortega D.O.  Authorized by: Mich Ortega D.O.     Location:  OR  Urgency:  Elective  Indications for Airway Management:  Anesthesia      Spontaneous Ventilation: absent    Sedation Level:  Deep  Preoxygenated: Yes    Final Airway Type:  Supraglottic airway  Final Supraglottic Airway:  Standard LMA    SGA Size:  4  Number of Attempts at Approach:  1

## 2021-07-06 NOTE — DISCHARGE INSTR - OTHER INFO
DR. Millan DISCHARGE INSTRUCTIONS FOLLOWING    Bladder biopsy     DIET:  You can resume your regular diet. We encourage you to eat well-balanced and nutritious meals.      ACTIVITY:  Please restrain from strenuous activity or heavy lifting (more than 10 pounds) for two weeks following your TUR procedure.  Please walk daily as much as tolerated, making exercise a part of your daily life. Do not drive while using narcotics for pain control if you are using them.  Please avoid excessive straining with defecation and use stool softeners as directed to prevent constipation!     WOUND CARE:  You have no dressing or wound to manage.     CATHETER CARE:  You may go home with a urethral catheter (“mccoy). The purpose of this catheter is to make sure your bladder is continuously drained and does not become too distended as it heals over the next couple of days. Take care of the mccoy as you were shown in the recovery area.     MEDICATIONS:  1. Please use plain Tylenol or Advil (Motrin, Ibuprofen, etc) for pain and stool softeners (Miralax and Colace) as directed.     2. Pyridium - as needed three times a day, bladder pain medication (turns your urine orange)    FOLLOW-UP:  You may also contact this number if you have questions or concerns that can be answered by Dr. Millan staff.     We will call you to schedule your follow up appointment in 2 weeks for follow up. If you have not heard from us in 1-2 business days, please call [phone] to schedule your follow-up appointment. You may also contact this number if you have questions or concerns that can be answered by Dr. Millan staff.     WARNING SIGNS:  Fever greater than 101 degrees Fahrenheit, chills, nausea or vomiting, Large amount of clots in urine that make it difficult to urinate or for urine to drain from mccoy, increasing pain, or abdominal swelling. If you are experiencing these symptoms, call the Urology Clinic or go to your local PCP or emergency  room.    It is normal to see blood in your urine for up to 2 weeks even from surgery. The urine may clear up entirely, and then turn bloody again a few days later depending on your activity level; do not be alarmed. However, if you experience severe pain or tenderness, have a lot of increased bleeding, or find that you are unable to urinate because of large clots, please notify your doctor immediately

## 2021-07-06 NOTE — ANESTHESIA POSTPROCEDURE EVALUATION
Patient: Buster Medina    Procedure Summary     Date: 07/06/21 Room / Location: [place] [address] / SURGERY [facility]    Anesthesia Start: 1448 Anesthesia Stop: 1526    Procedure: BIOPSY, BLADDER WITH FULGERATION (Bladder) Diagnosis: (HISTORY OF MALIGNANT NEOPLASM OF BLADDER)    Surgeons: Bob Millan M.D. Responsible Provider: Mich Ortega D.O.    Anesthesia Type: general ASA Status: 2          Final Anesthesia Type: general  Last vitals  BP   Blood Pressure : 145/90    Temp   36.4 °C (97.6 °F)    Pulse   75   Resp   18    SpO2   95 %      Anesthesia Post Evaluation    Patient location during evaluation: PACU  Patient participation: complete - patient participated  Level of consciousness: awake and alert  Pain score: 1    Airway patency: patent  Anesthetic complications: no  Cardiovascular status: hemodynamically stable  Respiratory status: acceptable  Hydration status: euvolemic    PONV: none          There were no known complications for this encounter.

## 2021-07-06 NOTE — ANESTHESIA TIME REPORT
Anesthesia Start and Stop Event Times     Date Time Event    7/6/2021 1428 Ready for Procedure     1448 Anesthesia Start     1526 Anesthesia Stop        Responsible Staff  07/06/21    Name Role Begin End    Mich Ortega D.O. Anesth 1448 1526        Preop Diagnosis (Free Text):  Pre-op Diagnosis     HISTORY OF MALIGNANT NEOPLASM OF BLADDER        Preop Diagnosis (Codes):    Post op Diagnosis  Bladder tumor      Premium Reason  Non-Premium    Comments:

## 2021-07-06 NOTE — OR NURSING
Pt. denies any pain.No penile bleeding.Dr. Millan at bedside and discussed next plan of care.  Pt's. friend/ride updated.

## 2021-07-06 NOTE — OP REPORT
[facility] [place] Operative Report    Pre-operative Diagnosis: 1. History of bladder cancer     Post-operative Diagnosis: Same as above  2. Bladder mass   Procedure: 1. Cystoscopy  2. Bladder biopsy and fulguration   Surgeon Bob Millan M.D.   Assistant: None   Anesthesia: LMA, General, MD Shannon   Estimated Blood Loss: minimal       Specimens: 1. Posterior bladder mass   Drains: No mccoy   Wound class II clean contaminated   Condition and complications Stable, procedure well tolerated without complications   Disposition:  PACU, follow up to discuss pathology in 2-4 weeks   Findings: 1. Cystoscopy findings: Normal urethral anatomy without stricture or masses.  Prostate had evidence of previous TURP, with some mild left greater than right irregular regrowth of prostatic tissue.  The bladder neck was wide open.  The left ureteral orifice appeared very wide caliber, and the right ureteral orifice appeared normal caliber both in their anatomic location.  Throughout the bladder was multiple areas of previous TURBT resection sites.  Cystoscopy was performed with 30 and 70 degree lens.  The only area of concern was directly posterior surrounding to previous scars there was increased erythema and hypervascularity of the bladder.  There were no significant papillary lesions throughout the bladder.  Given the hypervascularity and his history of bladder cancer the decision was made to take 3 cold cup biopsy specimens and send all as one specimen.     Indications for Procedure:  This patient is a 71-year-old male history of urothelial carcinoma with latest recurrence approximately 2018.  He does not tolerate an office cystoscopy very well and therefore he required surveillance cystoscopy with in the operating room.  We discussed there would be a possibility of finding new bladder tumor and he was consented for transurethral resection of bladder tumor in the event that we needed to do this.  We discussed the risks of  hematuria, urinary tract infection, and low risk of bladder perforation requiring other procedures.     Risks discussed, but not limited to, were infection, sepsis, bleeding, bladder injury, need for secondary procedure, possible need for mccoy post op, possible admission post operatively, and the cardiovascular and pulmonary complications of anesthesia/surgery including DVT, Mi, PE, and death.      Procedure in Detail:  Patient was explained the risks and benefits of the procedure including bleeding, infection, bladder ruptured, pain, hematuria, and elects to proceed. In the lithotomy position, he was prepped and draped in the usual sterile fashion, given intravenous Kefzol. A 22-Kazakh rigid cystoscope with 30-degree  lens was introduced per urethra sterilely.     Pancystoscopy was performed a with both 30 and 70 degree lenses.  Please see above findings.  Using cold cup biopsy forcep 3 biopsies were performed around the posterior bladder erythematous area.  Using Bugbee electrocautery this area was then fulgurated.  Bladder was emptied and filled once more and there was no significant bleeding appreciated.  The bladder was left emptied and this concluded my portion of the procedure.      Blood loss was zero. Final sponge and instrument counts correct x2. The patient tolerated the procedure well.       Bob Millan MD  [facility] [address]  [place] [address]  [phone]

## 2021-07-06 NOTE — ANESTHESIA PREPROCEDURE EVALUATION
Relevant Problems   ANESTHESIA   (positive) Complex sleep apnea syndrome      CARDIAC   (positive) Essential hypertension      GI   (positive) Gastroesophageal reflux disease without esophagitis      Other   (positive) Psoriatic arthritis (HCC)       Physical Exam    Airway   Mallampati: II  TM distance: >3 FB  Neck ROM: full       Cardiovascular - normal exam  Rhythm: regular  Rate: normal  (-) murmur     Dental - normal exam           Pulmonary - normal exam  Breath sounds clear to auscultation     Abdominal    Neurological - normal exam                 Anesthesia Plan    ASA 2       Plan - general       Airway plan will be LMA          Induction: intravenous    Postoperative Plan: Postoperative administration of opioids is intended.    Pertinent diagnostic labs and testing reviewed    Informed Consent:    Anesthetic plan and risks discussed with patient.    Use of blood products discussed with: patient whom consented to blood products.

## 2021-07-12 ENCOUNTER — OFFICE VISIT (OUTPATIENT)
Dept: RHEUMATOLOGY | Facility: MEDICAL CENTER | Age: 71
End: 2021-07-12
Payer: MEDICARE

## 2021-07-12 VITALS
DIASTOLIC BLOOD PRESSURE: 70 MMHG | OXYGEN SATURATION: 95 % | SYSTOLIC BLOOD PRESSURE: 140 MMHG | TEMPERATURE: 97.6 F | HEART RATE: 78 BPM | RESPIRATION RATE: 12 BRPM | WEIGHT: 183 LBS | BODY MASS INDEX: 28.66 KG/M2

## 2021-07-12 DIAGNOSIS — Z98.84 H/O GASTRIC BYPASS: ICD-10-CM

## 2021-07-12 DIAGNOSIS — I10 ESSENTIAL HYPERTENSION: ICD-10-CM

## 2021-07-12 DIAGNOSIS — M10.9 GOUT, UNSPECIFIED CAUSE, UNSPECIFIED CHRONICITY, UNSPECIFIED SITE: ICD-10-CM

## 2021-07-12 DIAGNOSIS — L40.50 PSORIATIC ARTHRITIS (HCC): ICD-10-CM

## 2021-07-12 DIAGNOSIS — Z79.620 ADALIMUMAB (HUMIRA) LONG-TERM USE: ICD-10-CM

## 2021-07-12 DIAGNOSIS — C67.9 MALIGNANT NEOPLASM OF URINARY BLADDER, UNSPECIFIED SITE (HCC): ICD-10-CM

## 2021-07-12 PROCEDURE — 99214 OFFICE O/P EST MOD 30 MIN: CPT | Performed by: INTERNAL MEDICINE

## 2021-07-12 ASSESSMENT — FIBROSIS 4 INDEX: FIB4 SCORE: 1.98

## 2021-07-12 NOTE — PROGRESS NOTES
Chief Complaint- joint pain     Subjective:   Buster Medina is a 71 y.o. male here today for follow up of rheumatological issues    This is a follow-up visit for this patient who we see in this clinic for psoriatic arthritis diagnosed about 2008 by rheumatologist in [place], [place].  Since last visit patient was switched from Otezla to Humira 40 mg subcu every 2 weeks with great benefit.  Patient states his arthritis and his psoriasis is doing quite well.      We also follow patient for hyperuricemia, patient currently stable on no treatment.    Current issues is the recent biopsy of the bladder mucosa with cystoscopy, pathology does not indicate any malignancy but does indicate some erosive type lesions.,  Patient following up with urology     Co morbidities include HTN, high cholesterol, hx left ahilles tendon rupture and repair, hx left knee arthroscopic surgery for meniscal tear, s/p left rotator cuff tear, s/p bladder cancer about 2012 no recurrence s/p surgical intervention only, hx of gastric bypass 2005 [facility], pt does f/u with Dr Gamez q year     Right TSA  Right TKA     S/p topical treatments  S/p plaquenil-cause taste abnormalities  S/p Otezla-diarrhea     Uric acid 6.9 1/2021 (on no treatment)  Uric acid 4.1 5/2021 (on no treatment)    Addendum 12/27/2021  HBsAg neg 12/2021  HCV neg 12/2021  Quantiferon Gold neg 12/2021     HBsAg/HBcAbIgM neg 2/2020  HCV neg 2/2020  Quantiferon Gold neg 2/2020  G6PD 10.9 adequate 9/2019  CCP neg 3/2018  RF neg 3/2018  SEAN neg 3/2018  Hand x-rays 3/2018-indicates possible erosion of the fourth metacarpal on the left hand, 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     • Suvorexant (BELSOMRA) 15 MG Tab Take 1 tablet by mouth at bedtime as needed.     • celecoxib (CELEBREX) 200 MG Cap Take 200 mg by mouth  every day.     • lisinopril (PRINIVIL) 20 MG Tab Take 20 mg by mouth every evening.     • mirtazapine (REMERON) 15 MG Tab Take 7.5 mg by mouth every evening.     • omeprazole (PRILOSEC) 20 MG delayed-release capsule Take 20 mg by mouth every evening.     • Adalimumab (HUMIRA PEN) 40 MG/0.4ML Pen-injector Kit Inject 40 mg under the skin every 14 days.     • loperamide (IMODIUM) 2 MG Cap Take 1 Cap by mouth 4 times a day as needed for Diarrhea. 30 Cap 0   • aspirin EC (ECOTRIN) 81 MG Tablet Delayed Response Take 81 mg by mouth every bedtime. 30 Tab 0   • Multiple Vitamins-Minerals (MELODY-DAY 1000 PO) Take 1 Tab by mouth 2 Times a Day.       No current facility-administered medications for this visit.     He  has a past medical history of Anxiety, Arthritis, Bowel habit changes (06/24/2021), Cancer ([facility]) (2007), Chickenpox, Depression, High cholesterol, Hyperlipidemia, Hypertension, Kidney stone, Mumps, Obesity, Pneumothorax, Psoriatic arthritis ([facility]), Restless leg syndrome, Sleep apnea, and Tonsillitis.    ROS   Other than what is mentioned in HPI or physical exam, there is no history of headaches, double vision or blurred vision. No temporal tenderness or jaw claudication. No trouble swallowing difficulties or sore throats.  No chest complaints including chest pain, cough or sputum production. No GI complaints including nausea, vomiting, change in bowel habits, or past peptic ulcer disease. No history of blood in the stools. No urinary complaints including dysuria or frequency. No history of alopecia, photosensitivity, oral ulcerations, Raynaud's phenomena.       Objective:     /70   Pulse 78   Temp 36.4 °C (97.6 °F) (Temporal)   Resp 12   Wt 83 kg (183 lb)   SpO2 95%  Body mass index is 28.66 kg/m².   Physical Exam:    Constitutional: Alert and oriented X3, patient is talkative with good eye contact.Skin: Warm, dry, good turgor, no rashes in visible areas, mild psoriatic plaques on the extensor surface of  the right shin.Eye: Equal, round and reactive, conjunctiva clear, lids normal EOM intactENMT: Lips without lesions, good dentition, no oropharyngeal ulcers, moist buccal mucosa, pinna without deformityNeck: Trachea midline, no masses, no thyromegaly.Lymph:  No cervical lymphadenopathy, no axillary lymphadenopathy, no supraclavicular lymphadenopathyRespiratory: Unlabored respiratory effort, lungs clear to auscultation, no wheezes, no ronchi.Cardiovascular: Normal S1, S2, no murmur, no edema.Abdomen: Soft, non-tender, no masses, no hepatosplenomegaly.Psych: Alert and oriented x3, normal affect and mood.Neuro: Cranial nerves 2-12 are grossly intact, no loss of sensation LEExt:no joint laxity noted in bilateral arms, no joint laxity noted in bilateral legs, well-healed surgical scar in the right knee for right TKA otherwise no dactylitis no sausage digits, no flexion contractures, no nodules no tophi, shoulders full range of motion without limitations, gait without antalgia and without foot drop    Lab Results   Component Value Date/Time    QNTTBGOLD Negative 08/16/2018 12:13 PM     Lab Results   Component Value Date/Time    HEPBCORIGM Negative 03/02/2020 02:35 PM    HEPBSAG Negative 03/02/2020 02:35 PM     Lab Results   Component Value Date/Time    HEPCAB Negative 03/02/2020 02:35 PM     Lab Results   Component Value Date/Time    SODIUM 142 06/24/2021 10:49 AM    POTASSIUM 4.6 06/24/2021 10:49 AM    CHLORIDE 108 06/24/2021 10:49 AM    CO2 24 06/24/2021 10:49 AM    GLUCOSE 93 06/24/2021 10:49 AM    BUN 14 06/24/2021 10:49 AM    CREATININE 1.04 06/24/2021 10:49 AM      Lab Results   Component Value Date/Time    WBC 6.0 06/24/2021 10:49 AM    RBC 4.67 (L) 06/24/2021 10:49 AM    HEMOGLOBIN 15.4 06/24/2021 10:49 AM    HEMATOCRIT 45.7 06/24/2021 10:49 AM    MCV 97.9 (H) 06/24/2021 10:49 AM    MCH 33.0 06/24/2021 10:49 AM    MCHC 33.7 06/24/2021 10:49 AM    MPV 10.8 06/24/2021 10:49 AM    NEUTSPOLYS 40.30 (L) 06/24/2021 10:49  AM    LYMPHOCYTES 41.80 (H) 06/24/2021 10:49 AM    MONOCYTES 13.50 (H) 06/24/2021 10:49 AM    EOSINOPHILS 3.00 06/24/2021 10:49 AM    BASOPHILS 0.70 06/24/2021 10:49 AM      Lab Results   Component Value Date/Time    CALCIUM 9.4 06/24/2021 10:49 AM    ASTSGOT 25 05/06/2021 08:46 AM    ALTSGPT 32 05/06/2021 08:46 AM    ALKPHOSPHAT 81 05/06/2021 08:46 AM    TBILIRUBIN 0.7 05/06/2021 08:46 AM    ALBUMIN 4.3 05/06/2021 08:46 AM    TOTPROTEIN 6.8 05/06/2021 08:46 AM     Lab Results   Component Value Date/Time    URICACID 4.1 05/06/2021 08:43 AM    RHEUMFACTN <10 03/21/2018 02:19 PM    CCPANTIBODY 4 03/21/2018 02:20 PM    ANTINUCAB None Detected 03/21/2018 02:19 PM     Lab Results   Component Value Date/Time    SEDRATEWES 6 05/06/2021 08:43 AM     Lab Results   Component Value Date/Time    G6PD 10.9 09/17/2019 10:30 AM     Assessment and Plan:     1. Psoriatic arthritis (HCC)  Stable on Humira 40 mg subcu every 2 weeks, we will continue as such  - Comp Metabolic Panel; Future  - CBC WITH DIFFERENTIAL; Future  - Sed Rate; Future    2. Adalimumab (Humira) long-term use  On Humira 40 mg subcu every 2 weeks  Patient need screening labs every 2 years neck screening labs due February 2022, patient needs monitoring labs every 6 months, next labs due November 2021, labs ordered for patient  We reviewed risks of biological medications with patient including hematological pathology, cancer risks, neurological and infection issues especially in the Covid-19 pandemic environment, patient states understanding.  - Comp Metabolic Panel; Future  - CBC WITH DIFFERENTIAL; Future  - Sed Rate; Future    3. Gout, unspecified cause, unspecified chronicity, unspecified site  Monitored/managed with diet only, continue to monitor  - Comp Metabolic Panel; Future  - CBC WITH DIFFERENTIAL; Future  - Sed Rate; Future    4. Essential hypertension  May impact the type of medications we can use for this patient's arthritis. We will have to keep this  under advisement.  - Comp Metabolic Panel; Future  - CBC WITH DIFFERENTIAL; Future  - Sed Rate; Future    5. Malignant neoplasm of urinary bladder, unspecified site (HCC)  Status post cystoscopy, pathology does not indicate any malignancy at this time    6. H/O gastric bypass    Followup: Return in about 6 months (around 1/12/2022). or sooner ankush Medina  was seen 30 minutes face-to-face of which more than 50% of the time was spent counseling the patient (excluding time for procedures)  regarding  rheumatological condition and care. Therapy was discussed in detail.      Please note that this dictation was created using voice recognition software. I have made every reasonable attempt to correct obvious errors, but I expect that there are errors of grammar and possibly content that I did not discover before finalizing the note.

## 2021-07-17 ENCOUNTER — PATIENT MESSAGE (OUTPATIENT)
Dept: MEDICAL GROUP | Age: 71
End: 2021-07-17

## 2021-07-20 RX ORDER — CELECOXIB 200 MG/1
200 CAPSULE ORAL DAILY
Qty: 90 CAPSULE | Refills: 3 | Status: SHIPPED | OUTPATIENT
Start: 2021-07-20 | End: 2021-10-07 | Stop reason: SDUPTHER

## 2021-07-27 ENCOUNTER — SLEEP CENTER VISIT (OUTPATIENT)
Dept: SLEEP MEDICINE | Facility: MEDICAL CENTER | Age: 71
End: 2021-07-27
Payer: MEDICARE

## 2021-07-27 VITALS
HEART RATE: 70 BPM | WEIGHT: 181 LBS | HEIGHT: 67 IN | RESPIRATION RATE: 16 BRPM | OXYGEN SATURATION: 95 % | DIASTOLIC BLOOD PRESSURE: 76 MMHG | BODY MASS INDEX: 28.41 KG/M2 | SYSTOLIC BLOOD PRESSURE: 128 MMHG

## 2021-07-27 DIAGNOSIS — F51.04 CHRONIC INSOMNIA: Chronic | ICD-10-CM

## 2021-07-27 DIAGNOSIS — G47.31 COMPLEX SLEEP APNEA SYNDROME: Chronic | ICD-10-CM

## 2021-07-27 DIAGNOSIS — I10 ESSENTIAL HYPERTENSION: ICD-10-CM

## 2021-07-27 DIAGNOSIS — Z78.9 NONSMOKER: ICD-10-CM

## 2021-07-27 PROCEDURE — 99214 OFFICE O/P EST MOD 30 MIN: CPT | Performed by: NURSE PRACTITIONER

## 2021-07-27 ASSESSMENT — FIBROSIS 4 INDEX: FIB4 SCORE: 1.98

## 2021-07-27 NOTE — PROGRESS NOTES
Chief Complaint   Patient presents with   • Apnea     last seen 2/9/2021        HPI:  Buster Medina is a 71 y.o. year old male here today for follow-up on complex sleep apnea and insomnia.  Last OV 29/21 with Dr. Mancuso    Currently using ASV 25, EPAP 15/6, PS 15/2, auto breath rate; RESPIRONICS; device obtained 2019.  Recall reviewed patient letter provided.  Advised him to register his device. He has not noticed any new respiratory symptoms or issues of black particles with device. He will continue to use his current device.    Compliance report 6/26/2021 through 7/25/2021 notes 76.7% compliance, average nightly use 5 hours 52 minutes, minimal to moderate mask leak of 8 minutes with reduced AHI of 5.2/h.  Average EPAP 6.3 cm, average pressure support 4.4 cm which average breath rate 16.6 bpm.  Reviewed finds with patient.  He is sleeping less since last compliance review in January. He tolerates mask and pressure well.  He is replacing these regularly.  He admits to not cleaning his devices often so we reviewed this in detail.  He overall feels he does sleep better on therapy.    Currently using Belsomra 15 mg nightly as needed which was previously switched from Ambien CR 12.5 mg.  He also uses lorazepam 2 mg as needed anxiety and hydrocodone for his rheumatoid arthritis.  It has been discussed multiple times in the past about polypharmacy and to avoid taking these with his sleep aid.  He notes currently sleeping well for 7 to 8 hours at night but will go through periods of 2 to 3 weeks where he will have insomnia and finds lorazepam to be the most beneficial he may sleep through night.  Advised patient to not use his Belsomra or other sleep aids available if using this.  He may have some underlying stress or anxiety causing him to wake early in the night.    His cardiac respiratory symptoms today.  Staying indoors to avoid smoke exposure.    SLEEP HISTORY   ASV titration and the best tolerated pressure  was ASV EPAP 5/15 cm PS 2/20 cm, the AHI improved to 3.8/hr and O2 halle 90 %. He was observed in REM sleep on this pressure.      Sever obstructive sleep apnea with AHI of 38.5/hr and O2 halle 87 %. Due to severity of the disease he met the split study protocol. The titration started with CPAP 7 cm and the best tolerated was ASV EPAP 4/15 cm PS 2/20 cm cm. The AHI improved to 19/hr with improved O2 halle of 88% and average O2 saturation of 94 %.       ROS: As per HPI and otherwise negative if not stated.    Past Medical History:   Diagnosis Date   • Anxiety    • Arthritis     osteo   • Bowel habit changes 06/24/2021    Constipation   • Cancer (HCC) 2007    bladder   • Chickenpox     as a child   • Depression     depression   • High cholesterol    • Hyperlipidemia    • Hypertension     pt states  well controlled on meds   • Kidney stone    • Mumps     as a child    • Obesity    • Pneumothorax    • Psoriatic arthritis (HCC)    • Restless leg syndrome    • Sleep apnea     uses cpap   • Tonsillitis        Past Surgical History:   Procedure Laterality Date   • PB CYSTOURETHROSCOPY,BIOPSIES  7/6/2021    Procedure: BIOPSY, BLADDER WITH FULGERATION;  Surgeon: Bob Millan M.D.;  Location: [facility];  Service: Urology   • BLADDER BIOPSY WITH CYSTOSCOPY  1/3/2018    Procedure: BLADDER BIOPSY WITH CYSTOSCOPY/WITH UPPER TRACT WASHING;  Surgeon: El Manuel M.D.;  Location: [facility];  Service: Urology   • RETROGRADES Bilateral 1/3/2018    Procedure: RETROGRADES;  Surgeon: El Manuel M.D.;  Location: [facility];  Service: Urology   • PYELOGRAM Bilateral 1/3/2018    Procedure: PYELOGRAM;  Surgeon: El Manuel M.D.;  Location: [facility];  Service: Urology   • OTHER ORTHOPEDIC SURGERY  2015    shoulder replacement   • ABDOMINAL EXPLORATION     • ARTHROPLASTY      right shoulder   • BLADDER BIOPSY WITH CYSTOSCOPY     • EYE SURGERY      lasik    • GASTRIC BYPASS  "LAPAROSCOPIC     • HERNIA REPAIR     • OPEN REDUCTION     • OTHER Left     thumb joint   • THORACOTOMY     • TURP-VAPOR         Family History   Problem Relation Age of Onset   • Cancer Mother 80        lung cancer   • Arthritis Mother    • Diabetes Mother    • Anxiety disorder Mother    • Lung Disease Father 65   • Cancer Father    • Diabetes Father    • Hypertension Father    • Hyperlipidemia Father    • Hyperlipidemia Brother    • Arthritis Brother    • Other Brother         Thyroid tumor   • Sleep Apnea Brother    • No Known Problems Maternal Grandmother    • Cancer Maternal Grandfather    • No Known Problems Paternal Grandmother    • No Known Problems Paternal Grandfather    • Heart Disease Brother 49        smoker, overweight   • Hypertension Brother    • Depression Other    • Suicide Attempts Neg Hx    • Bipolar disorder Neg Hx    • Alcohol abuse Neg Hx    • Drug abuse Neg Hx        Social History     Socioeconomic History   • Marital status: Single     Spouse name: Not on file   • Number of children: Not on file   • Years of education: Not on file   • Highest education level: Not on file   Occupational History   • Not on file   Tobacco Use   • Smoking status: Never Smoker   • Smokeless tobacco: Never Used   Vaping Use   • Vaping Use: Never used   Substance and Sexual Activity   • Alcohol use: Yes     Alcohol/week: 8.4 oz     Types: 14 Glasses of wine per week     Comment: 2 per day   • Drug use: Yes     Comment: THC edible - \"once a month\".   • Sexual activity: Not Currently   Other Topics Concern   • Not on file   Social History Narrative   • Not on file     Social Determinants of Health     Financial Resource Strain:    • Difficulty of Paying Living Expenses:    Food Insecurity:    • Worried About Running Out of Food in the Last Year:    • Ran Out of Food in the Last Year:    Transportation Needs:    • Lack of Transportation (Medical):    • Lack of Transportation (Non-Medical):    Physical Activity:    • " "Days of Exercise per Week:    • Minutes of Exercise per Session:    Stress:    • Feeling of Stress :    Social Connections:    • Frequency of Communication with Friends and Family:    • Frequency of Social Gatherings with Friends and Family:    • Attends [employer] Services:    • Active Member of Clubs or Organizations:    • Attends Club or Organization Meetings:    • Marital Status:    Intimate Partner Violence:    • Fear of Current or Ex-Partner:    • Emotionally Abused:    • Physically Abused:    • Sexually Abused:        Allergies as of 07/27/2021   • (No Known Allergies)        Vitals:  /76 (BP Location: Left arm, Patient Position: Sitting, BP Cuff Size: Adult)   Pulse 70   Resp 16   Ht 1.702 m (5' 7\")   Wt 82.1 kg (181 lb)   SpO2 95%     Current medications as of today   Current Outpatient Medications   Medication Sig Dispense Refill   • celecoxib (CELEBREX) 200 MG Cap Take 1 capsule by mouth every day. 90 capsule 3   • atorvastatin (LIPITOR) 40 MG Tab Take 40 mg by mouth every evening.     • Suvorexant (BELSOMRA) 15 MG Tab Take 1 tablet by mouth at bedtime as needed.     • lisinopril (PRINIVIL) 20 MG Tab Take 20 mg by mouth every evening.     • mirtazapine (REMERON) 15 MG Tab Take 7.5 mg by mouth every evening.     • omeprazole (PRILOSEC) 20 MG delayed-release capsule Take 20 mg by mouth every evening.     • Adalimumab (HUMIRA PEN) 40 MG/0.4ML Pen-injector Kit Inject 40 mg under the skin every 14 days.     • loperamide (IMODIUM) 2 MG Cap Take 1 Cap by mouth 4 times a day as needed for Diarrhea. 30 Cap 0   • aspirin EC (ECOTRIN) 81 MG Tablet Delayed Response Take 81 mg by mouth every bedtime. 30 Tab 0   • Multiple Vitamins-Minerals (MELODY-DAY 1000 PO) Take 1 Tab by mouth 2 Times a Day.       No current facility-administered medications for this visit.         Physical Exam:   Gen:           Alert and oriented, No apparent distress. Mood and affect appropriate, normal interaction with examiner.  Eyes:   "        PERRL, EOM intact, sclere white, conjunctive moist.  Ears:          Not examined.   Hearing:     Grossly intact.  Nose:          Normal, no lesions or deformities.  Dentition:    mask  Oropharynx:   mask  Mallampati Classification: mask  Neck:        Supple, trachea midline, no masses.  Respiratory Effort: No intercostal retractions or use of accessory muscles.   Lung Auscultation:      Clear to auscultation bilaterally; no rales, rhonchi or wheezing.  CV:            Regular rate and rhythm. No murmurs, rubs or gallops.  Abd:           Not examined.   Lymphadenopathy: Not examined.  Gait and Station: Normal.  Digits and Nails: No clubbing, cyanosis, petechiae, or nodes.   Cranial Nerves: II-XII grossly intact.  Skin:        No rashes, lesions or ulcers noted.               Ext:           No cyanosis or edema.      Assessment:  1. Complex sleep apnea syndrome     2. Chronic insomnia     3. Essential hypertension     4. BMI 28.0-28.9,adult     5. Nonsmoker         Immunizations:    Flu:recommend in fall  Pneumovax 23:2020  Prevnar 13:2017  COVID-19: 3/25/21, 2/25/21    Plan:  1.  Patient's complex sleep apnea is well treated at this time.  Continue ASV therapy night.  He will go ahead and register his device with [employer] for replacement.  DME mask/place  2.  Discussed sleep hygiene.  3.  Patient is managing his insomnia using Belsomra or occasional lorazepam for anxiety.  Overall he is sleeping well at this time.  Reviewed stress/anxiety reduction techniques which may further improve his sleep.  Reviewed relaxation techniques prior to bedtime.  4.  Follow-up primary care for other health concerns including ongoing management of hypertension  5.  Follow-up in 1 year compliance report, sooner if needed.    Please note that this dictation was created using voice recognition software. I have made every reasonable attempt to correct obvious errors, but it is possible there are errors of grammar and possibly  content that I did not discover before finalizing the note.

## 2021-08-17 ENCOUNTER — PATIENT MESSAGE (OUTPATIENT)
Dept: SLEEP MEDICINE | Facility: MEDICAL CENTER | Age: 71
End: 2021-08-17

## 2021-08-17 DIAGNOSIS — G47.31 COMPLEX SLEEP APNEA SYNDROME: ICD-10-CM

## 2021-08-18 NOTE — TELEPHONE ENCOUNTER
From: Buster Medina  To: Nurse Practitioner Amy Sotomayor  Sent: 8/17/2021 5:51 PM PDT  Subject: Cpap recall    My machine is on the recall list but it will be more than a year     Should I remove the foam from my machine?  It doesn’t look that hard

## 2021-09-03 ENCOUNTER — OFFICE VISIT (OUTPATIENT)
Dept: MEDICAL GROUP | Age: 71
End: 2021-09-03
Payer: MEDICARE

## 2021-09-03 VITALS
WEIGHT: 194.4 LBS | RESPIRATION RATE: 16 BRPM | SYSTOLIC BLOOD PRESSURE: 102 MMHG | TEMPERATURE: 97.4 F | HEART RATE: 91 BPM | BODY MASS INDEX: 30.51 KG/M2 | HEIGHT: 67 IN | OXYGEN SATURATION: 94 % | DIASTOLIC BLOOD PRESSURE: 72 MMHG

## 2021-09-03 DIAGNOSIS — L57.8 SUN-DAMAGED SKIN: ICD-10-CM

## 2021-09-03 DIAGNOSIS — R26.89 BALANCE DISORDER: ICD-10-CM

## 2021-09-03 DIAGNOSIS — K21.9 GASTROESOPHAGEAL REFLUX DISEASE WITHOUT ESOPHAGITIS: ICD-10-CM

## 2021-09-03 DIAGNOSIS — E78.5 DYSLIPIDEMIA: ICD-10-CM

## 2021-09-03 DIAGNOSIS — F32.0 CURRENT MILD EPISODE OF MAJOR DEPRESSIVE DISORDER, UNSPECIFIED WHETHER RECURRENT (HCC): ICD-10-CM

## 2021-09-03 DIAGNOSIS — Z00.00 MEDICARE ANNUAL WELLNESS VISIT, INITIAL: ICD-10-CM

## 2021-09-03 DIAGNOSIS — F51.04 CHRONIC INSOMNIA: Chronic | ICD-10-CM

## 2021-09-03 PROCEDURE — 99214 OFFICE O/P EST MOD 30 MIN: CPT | Mod: 25 | Performed by: FAMILY MEDICINE

## 2021-09-03 PROCEDURE — G0439 PPPS, SUBSEQ VISIT: HCPCS | Performed by: FAMILY MEDICINE

## 2021-09-03 ASSESSMENT — ENCOUNTER SYMPTOMS: GENERAL WELL-BEING: GOOD

## 2021-09-03 ASSESSMENT — FIBROSIS 4 INDEX: FIB4 SCORE: 1.98

## 2021-09-03 ASSESSMENT — ACTIVITIES OF DAILY LIVING (ADL): BATHING_REQUIRES_ASSISTANCE: 0

## 2021-09-03 ASSESSMENT — PATIENT HEALTH QUESTIONNAIRE - PHQ9: CLINICAL INTERPRETATION OF PHQ2 SCORE: 0

## 2021-09-03 NOTE — PROGRESS NOTES
Chief Complaint   Patient presents with   • Annual Exam       HPI:  Buster is a 71 y.o. here for Medicare Annual Wellness Visit  1. Medicare annual wellness visit, initial  See below    2. Balance disorder    NEW UNDIAGNOSED PROBLEM    Buster states over the past 9 months or so he has been having some balance problems. For example [place] notices that he is just leaning forward and then almost falling. He does not recall any weakness in any extremity he just falls forward. He does not have any dizziness prior to the episode no shortness of breath no dyspnea on exertion.    Patient Active Problem List    Diagnosis Date Noted   • Balance disorder 09/03/2021   • Sun-damaged skin 09/03/2021   • Hoarseness 03/30/2021   • Functional diarrhea 07/16/2020   • History of gastric bypass 10/04/2019   • Tinnitus of both ears 04/09/2019   • Bruising 04/09/2019   • Complex sleep apnea syndrome 12/06/2018   • Dyslipidemia 09/21/2018   • Family history of coronary artery disease in brother 09/21/2018   • Familial hyperlipidemia 09/21/2018   • Psoriatic arthritis (HCC) 08/16/2018   • Dysthymic disorder 03/05/2018   • Erectile dysfunction of nonorganic origin 03/05/2018   • Hypokalemia 09/21/2017   • Malignant neoplasm of anterior wall of urinary bladder (HCC) 09/21/2017   • Essential hypertension 09/21/2017   • Need for vaccination 09/21/2017   • Major depressive disorder 09/21/2017   • Chronic insomnia 09/21/2017   • Plantar fasciitis 09/21/2017   • Gastroesophageal reflux disease without esophagitis 09/21/2017   • Benign nodular prostatic hyperplasia without lower urinary tract symptoms 09/21/2017   • Vitamin D deficiency 09/21/2017       Current Outpatient Medications   Medication Sig Dispense Refill   • celecoxib (CELEBREX) 200 MG Cap Take 1 capsule by mouth every day. 90 capsule 3   • atorvastatin (LIPITOR) 40 MG Tab Take 40 mg by mouth every evening.     • Suvorexant (BELSOMRA) 15 MG Tab Take 1  tablet by mouth at bedtime as needed.     • lisinopril (PRINIVIL) 20 MG Tab Take 20 mg by mouth every evening.     • mirtazapine (REMERON) 15 MG Tab Take 7.5 mg by mouth every evening.     • omeprazole (PRILOSEC) 20 MG delayed-release capsule Take 20 mg by mouth every evening.     • Adalimumab (HUMIRA PEN) 40 MG/0.4ML Pen-injector Kit Inject 40 mg under the skin every 14 days.     • loperamide (IMODIUM) 2 MG Cap Take 1 Cap by mouth 4 times a day as needed for Diarrhea. 30 Cap 0   • aspirin EC (ECOTRIN) 81 MG Tablet Delayed Response Take 81 mg by mouth every bedtime. 30 Tab 0   • Multiple Vitamins-Minerals (MELODY-DAY 1000 PO) Take 1 Tab by mouth 2 Times a Day.       No current facility-administered medications for this visit.        Patient is taking medications as noted in medication list.  Current supplements as per medication list.     Allergies: Patient has no known allergies.    Current social contact/activities: Freddie Dixon     Is patient current with immunizations? Yes.    He  reports that he has never smoked. He has never used smokeless tobacco. He reports current alcohol use of about 8.4 oz of alcohol per week. He reports current drug use.  Counseling given: Not Answered      DPA/Advanced directive: Patient has Advanced Directive, but it is not on file. Instructed to bring in a copy to scan into their chart.    ROS:    Gait: Uses no assistive device   Ostomy: No   Other tubes: No   Amputations: No   Chronic oxygen use No   Last eye exam 1/2021   Wears hearing aids: No   : Denies any urinary leakage during the last 6 months      Screening:    Depression Screening  Little interest or pleasure in doing things?  0 - not at all  Feeling down, depressed, or hopeless? 0 - not at all  Trouble falling or staying asleep, or sleeping too much?     Feeling tired or having little energy?     Poor appetite or overeating?     Feeling bad about yourself - or that you are a failure or have let yourself or your family down?     Trouble concentrating on things, such as reading the newspaper or watching television?    Moving or speaking so slowly that other people could have noticed.  Or the opposite - being so fidgety or restless that you have been moving around a lot more than usual?     Thoughts that you would be better off dead, or of hurting yourself?     Patient Health Questionnaire Score:      If depressive symptoms identified deferred to follow up visit unless specifically addressed in assessment and plan.    Interpretation of PHQ-9 Total Score   Score Severity   1-4 No Depression   5-9 Mild Depression   10-14 Moderate Depression   15-19 Moderately Severe Depression   20-27 Severe Depression      Screening for Cognitive Impairment  Three Minute Recall (captain, camelia, picture)  2/3    Draw clock face with all 12 numbers and set the hands to show 5 past 8.  Yes 5/5  If cognitive concerns identified, deferred for follow up unless specifically addressed in assessment and plan.    Fall Risk Assessment  Has the patient had two or more falls in the last year or any fall with injury in the last year?  Yes  If fall risk identified, deferred for follow up unless specifically addressed in assessment and plan.    Safety Assessment  Throw rugs on floor.  No  Handrails on all stairs.  Yes  Good lighting in all hallways.  Yes  Difficulty hearing.  Yes  Patient counseled about all safety risks that were identified.    Functional Assessment ADLs  Are there any barriers preventing you from cooking for yourself or meeting nutritional needs?  No.    Are there any barriers preventing you from driving safely or obtaining transportation?  No.    Are there any barriers preventing you from using a telephone or calling for help?  No.    Are there any barriers preventing you from shopping?  No.    Are there any barriers preventing you from taking care of your own finances?  No.    Are there any barriers preventing you from managing your medications?    No.   "  Are there any barriers preventing you from showering, bathing or dressing yourself?  No.    Are you currently engaging in any exercise or physical activity?  Yes.  Walking, Pickle Ball  What is your perception of your health?  Good.    Health Maintenance Summary                IMM INFLUENZA Overdue 9/1/2021      Done 9/18/2020 Imm Admin: Influenza Vaccine Quad Inj (Pf)     Patient has more history with this topic...    COLORECTAL CANCER SCREENING Next Due 10/22/2022     IMM DTaP/Tdap/Td Vaccine Next Due 9/22/2027      Done 9/22/2017 Imm Admin: Tdap Vaccine     Patient has more history with this topic...          Patient Care Team:  Stu Blair M.D. as PCP - General (Family Medicine)  Mira Mitchell M.D. as Consulting Physician (Rheumatology)  [facility] as Respiratory Therapist (DME Supplier)    Social History     Tobacco Use   • Smoking status: Never Smoker   • Smokeless tobacco: Never Used   Vaping Use   • Vaping Use: Never used   Substance Use Topics   • Alcohol use: Yes     Alcohol/week: 8.4 oz     Types: 14 Glasses of wine per week     Comment: 2 per day   • Drug use: Yes     Comment: THC edible - \"once a month\".     Family History   Problem Relation Age of Onset   • Cancer Mother 80        lung cancer   • Arthritis Mother    • Diabetes Mother    • Anxiety disorder Mother    • Lung Disease Father 65   • Cancer Father    • Diabetes Father    • Hypertension Father    • Hyperlipidemia Father    • Hyperlipidemia Brother    • Arthritis Brother    • Other Brother         Thyroid tumor   • Sleep Apnea Brother    • No Known Problems Maternal Grandmother    • Cancer Maternal Grandfather    • No Known Problems Paternal Grandmother    • No Known Problems Paternal Grandfather    • Heart Disease Brother 49        smoker, overweight   • Hypertension Brother    • Depression Other    • Suicide Attempts Neg Hx    • Bipolar disorder Neg Hx    • Alcohol abuse Neg Hx    • Drug abuse Neg Hx      He  has a past " "medical history of Anxiety, Arthritis, Bowel habit changes (06/24/2021), Cancer (HCC) (2007), Chickenpox, Depression, High cholesterol, Hyperlipidemia, Hypertension, Kidney stone, Mumps, Obesity, Pneumothorax, Psoriatic arthritis (HCC), Restless leg syndrome, Sleep apnea, and Tonsillitis.   Past Surgical History:   Procedure Laterality Date   • PB CYSTOURETHROSCOPY,BIOPSIES  7/6/2021    Procedure: BIOPSY, BLADDER WITH FULGERATION;  Surgeon: Bob Millan M.D.;  Location: SURGERY [facility];  Service: Urology   • BLADDER BIOPSY WITH CYSTOSCOPY  1/3/2018    Procedure: BLADDER BIOPSY WITH CYSTOSCOPY/WITH UPPER TRACT WASHING;  Surgeon: El Manuel M.D.;  Location: [facility];  Service: Urology   • RETROGRADES Bilateral 1/3/2018    Procedure: RETROGRADES;  Surgeon: El Manuel M.D.;  Location: [facility];  Service: Urology   • PYELOGRAM Bilateral 1/3/2018    Procedure: PYELOGRAM;  Surgeon: El Manuel M.D.;  Location: [facility];  Service: Urology   • OTHER ORTHOPEDIC SURGERY  2015    shoulder replacement   • ABDOMINAL EXPLORATION     • ARTHROPLASTY      right shoulder   • BLADDER BIOPSY WITH CYSTOSCOPY     • EYE SURGERY      lasik    • GASTRIC BYPASS LAPAROSCOPIC     • HERNIA REPAIR     • OPEN REDUCTION     • OTHER Left     thumb joint   • THORACOTOMY     • TURP-VAPOR           Exam:   /72 (BP Location: Right arm, Patient Position: Sitting, BP Cuff Size: Adult)   Pulse 91   Temp 36.3 °C (97.4 °F) (Temporal)   Resp 16   Ht 1.702 m (5' 7\")   Wt 88.2 kg (194 lb 6.4 oz)   SpO2 94%  Body mass index is 30.45 kg/m².    Hearing fair.    Dentition good  Alert, oriented in no acute distress.  Eye contact is good, speech goal directed, affect calm    Neuro:  CN II-XII checked and intact, DTRs 2+ throughout, intact sensation to light touch, vibration, normal gait, No focal deficits, Romberg Normal, Normal tandem gait, normal heel to shin, normal finger to nose.  "           Assessment and Plan. The following treatment and monitoring plan is recommended:    1. Medicare annual wellness visit, initial    2. Balance disorder  - MR-BRAIN-W/O; Future    3. Sun-damaged skin  - REFERRAL TO DERMATOLOGY    4. Gastroesophageal reflux disease without esophagitis    5. Current mild episode of major depressive disorder, unspecified whether recurrent (HCC)    6. Chronic insomnia    7. Dyslipidemia     Plan    1. Completed    2.  NEW UNDIAGNOSED PROBLEM  We will proceed with MRI of the brain given his symptoms    3. Dermatology referral    4. Patient has been stable with current management  We will make no changes for now    5. Patient has been stable with current management  We will make no changes for now    6. Patient has been stable with current management  We will make no changes for now    7. Patient has been stable with current management  We will make no changes for now      Services suggested: No services needed at this time  Health Care Screening recommendations as per orders if indicated.  Referrals offered: PT/OT/Nutrition counseling/Behavioral Health/Smoking cessation as per orders if indicated.    Discussion today about general wellness and lifestyle habits:    · Prevent falls and reduce trip hazards; Cautioned about securing or removing rugs.  · Have a working fire alarm and carbon monoxide detector;   · Engage in regular physical activity and social activities     Follow-up: Return in about 6 months (around 3/3/2022) for Reevaluation, labs.

## 2021-09-04 ENCOUNTER — PATIENT MESSAGE (OUTPATIENT)
Dept: MEDICAL GROUP | Age: 71
End: 2021-09-04

## 2021-09-04 DIAGNOSIS — I10 ESSENTIAL HYPERTENSION: ICD-10-CM

## 2021-09-04 DIAGNOSIS — E78.5 DYSLIPIDEMIA: ICD-10-CM

## 2021-09-07 ENCOUNTER — HOSPITAL ENCOUNTER (OUTPATIENT)
Facility: MEDICAL CENTER | Age: 71
End: 2021-09-07
Attending: FAMILY MEDICINE
Payer: MEDICARE

## 2021-09-07 ENCOUNTER — OFFICE VISIT (OUTPATIENT)
Dept: MEDICAL GROUP | Age: 71
End: 2021-09-07
Payer: MEDICARE

## 2021-09-07 VITALS
WEIGHT: 185.2 LBS | HEART RATE: 95 BPM | BODY MASS INDEX: 29.07 KG/M2 | SYSTOLIC BLOOD PRESSURE: 126 MMHG | TEMPERATURE: 98.1 F | DIASTOLIC BLOOD PRESSURE: 62 MMHG | HEIGHT: 67 IN | OXYGEN SATURATION: 96 %

## 2021-09-07 DIAGNOSIS — D48.9 NEOPLASM OF UNCERTAIN BEHAVIOR: ICD-10-CM

## 2021-09-07 LAB — PATHOLOGY CONSULT NOTE: NORMAL

## 2021-09-07 PROCEDURE — 11401 EXC TR-EXT B9+MARG 0.6-1 CM: CPT | Performed by: FAMILY MEDICINE

## 2021-09-07 PROCEDURE — 88305 TISSUE EXAM BY PATHOLOGIST: CPT

## 2021-09-07 RX ORDER — ATORVASTATIN CALCIUM 40 MG/1
40 TABLET, FILM COATED ORAL DAILY
Qty: 90 TABLET | Refills: 3 | Status: SHIPPED | OUTPATIENT
Start: 2021-09-07 | End: 2022-06-02

## 2021-09-07 RX ORDER — LISINOPRIL 20 MG/1
20 TABLET ORAL EVERY EVENING
Qty: 90 TABLET | Refills: 3 | Status: SHIPPED | OUTPATIENT
Start: 2021-09-07 | End: 2022-06-30

## 2021-09-07 ASSESSMENT — FIBROSIS 4 INDEX: FIB4 SCORE: 1.98

## 2021-09-07 NOTE — PROGRESS NOTES
This medical record contains text that has been entered with the assistance of computer voice recognition and dictation software.  Therefore, it may contain unintended errors in text, spelling, punctuation, or grammar      Chief Complaint   Patient presents with   • Biopsy         Buster Medina is a 71 y.o. male here evaluation and management of: Mole on the back      HPI:     1. Neoplasm of uncertain behavior  The patient's girlfriend noticed a very irregular to colored papule on his back.  He states has been there for years but it is growing and it is painful.    Current medicines (including changes today)  Current Outpatient Medications   Medication Sig Dispense Refill   • celecoxib (CELEBREX) 200 MG Cap Take 1 capsule by mouth every day. 90 capsule 3   • Suvorexant (BELSOMRA) 15 MG Tab Take 1 tablet by mouth at bedtime as needed.     • mirtazapine (REMERON) 15 MG Tab Take 7.5 mg by mouth every evening.     • omeprazole (PRILOSEC) 20 MG delayed-release capsule Take 20 mg by mouth every evening.     • Adalimumab (HUMIRA PEN) 40 MG/0.4ML Pen-injector Kit Inject 40 mg under the skin every 14 days.     • loperamide (IMODIUM) 2 MG Cap Take 1 Cap by mouth 4 times a day as needed for Diarrhea. 30 Cap 0   • aspirin EC (ECOTRIN) 81 MG Tablet Delayed Response Take 81 mg by mouth every bedtime. 30 Tab 0   • Multiple Vitamins-Minerals (MELODY-DAY 1000 PO) Take 1 Tab by mouth 2 Times a Day.     • lisinopril (PRINIVIL) 20 MG Tab Take 1 Tablet by mouth every evening. 90 Tablet 3   • atorvastatin (LIPITOR) 40 MG Tab Take 1 Tablet by mouth every day. 90 Tablet 3     No current facility-administered medications for this visit.     He  has a past medical history of Anxiety, Arthritis, Bowel habit changes (06/24/2021), Cancer (HCC) (2007), Chickenpox, Depression, High cholesterol, Hyperlipidemia, Hypertension, Kidney stone, Mumps, Obesity, Pneumothorax, Psoriatic arthritis (HCC), Restless leg syndrome, Sleep apnea, and  "Tonsillitis.  He  has a past surgical history that includes bladder biopsy with cystoscopy; abdominal exploration; gastric bypass laparoscopic; eye surgery; hernia repair; arthroplasty; open reduction; other orthopedic surgery (2015); bladder biopsy with cystoscopy (1/3/2018); retrogrades (Bilateral, 1/3/2018); pyelogram (Bilateral, 1/3/2018); thoracotomy; other (Left); turp-vapor; and pr cystourethroscopy,biopsies (2021).  Social History     Tobacco Use   • Smoking status: Never Smoker   • Smokeless tobacco: Never Used   Vaping Use   • Vaping Use: Never used   Substance Use Topics   • Alcohol use: Yes     Alcohol/week: 8.4 oz     Types: 14 Glasses of wine per week     Comment: 2 per day   • Drug use: Yes     Types: Marijuana, Oral     Comment: THC edible - \"once a month\".     Social History     Social History Narrative   • Not on file     Family History   Problem Relation Age of Onset   • Cancer Mother 80        lung cancer   • Arthritis Mother    • Diabetes Mother    • Anxiety disorder Mother    • Lung Disease Father 65   • Cancer Father    • Diabetes Father    • Hypertension Father    • Hyperlipidemia Father    • Hyperlipidemia Brother    • Arthritis Brother    • Other Brother         Thyroid tumor   • Sleep Apnea Brother    • No Known Problems Maternal Grandmother    • Cancer Maternal Grandfather    • No Known Problems Paternal Grandmother    • No Known Problems Paternal Grandfather    • Heart Disease Brother 49        smoker, overweight   • Hypertension Brother    • Depression Other    • Suicide Attempts Neg Hx    • Bipolar disorder Neg Hx    • Alcohol abuse Neg Hx    • Drug abuse Neg Hx      Family Status   Relation Name Status   • Mo     • Fa     • Bro  Alive   • MGMo     • MGFa     • PGMo     • PGFa     • Bro  Alive   • OTHER  (Not Specified)   • Neg Hx  (Not Specified)         ROS    The pertinent  ROS findings can be seen in the HPI above.     All other " "systems reviewed and are negative     Objective:     /62 (BP Location: Left arm, Patient Position: Sitting, BP Cuff Size: Adult)   Pulse 95   Temp 36.7 °C (98.1 °F) (Temporal)   Ht 1.702 m (5' 7\")   Wt 84 kg (185 lb 3.2 oz)   SpO2 96%  Body mass index is 29.01 kg/m².      Physical Exam:    Constitutional: Alert, no distress.  Skin:       Excision--- 7 mm, irregular, symmetric, 2 color dark and flesh-colored papule on upper mid back     After informed written consent was obtained, using Betadine for cleansing and 1% Lidocaine w- epinephrine for anesthetic, with sterile technique a 7 mm punch tool was used to obtain and excise  the lesion through dermis (full thickness) . Intent was to remove entire lesion with margins . Hemostasis was obtained by pressure and wound was  Sutured  With 3.0 Ethilon [id number] dressing is applied, and wound care instructions provided. Be alert for any signs of cutaneous infection. The specimen is labeled and sent to pathology for evaluation. The procedure was well tolerated without complications.    Final repair length (measurement of repaired defect): 1.6mm      Assessment and Plan:   The following treatment plan was discussed      1. Neoplasm of uncertain behavior    Patient tolerated procedure well  There were no adverse events  Patient was given post procedure precautions     - Pathology Specimen; Future            Instructed to Follow up in clinic or ER for worsening symptoms, difficulty breathing, lack of expected recovery, or should new symptoms or problems arise.    Followup: Return in about 10 days (around 9/17/2021) for Suture Removal with MA.             "

## 2021-09-15 DIAGNOSIS — F51.04 CHRONIC INSOMNIA: ICD-10-CM

## 2021-09-17 RX ORDER — LORAZEPAM 2 MG/1
TABLET ORAL
Qty: 30 TABLET | Refills: 0 | Status: SHIPPED | OUTPATIENT
Start: 2021-09-17 | End: 2021-10-07 | Stop reason: SDUPTHER

## 2021-09-27 ENCOUNTER — APPOINTMENT (OUTPATIENT)
Dept: RADIOLOGY | Facility: MEDICAL CENTER | Age: 71
End: 2021-09-27
Attending: FAMILY MEDICINE
Payer: MEDICARE

## 2021-09-27 ENCOUNTER — APPOINTMENT (RX ONLY)
Dept: URBAN - METROPOLITAN AREA CLINIC 35 | Facility: CLINIC | Age: 71
Setting detail: DERMATOLOGY
End: 2021-09-27

## 2021-09-27 DIAGNOSIS — L40.0 PSORIASIS VULGARIS: ICD-10-CM

## 2021-09-27 DIAGNOSIS — L81.4 OTHER MELANIN HYPERPIGMENTATION: ICD-10-CM

## 2021-09-27 DIAGNOSIS — D18.0 HEMANGIOMA: ICD-10-CM

## 2021-09-27 DIAGNOSIS — L82.1 OTHER SEBORRHEIC KERATOSIS: ICD-10-CM

## 2021-09-27 DIAGNOSIS — Z71.89 OTHER SPECIFIED COUNSELING: ICD-10-CM

## 2021-09-27 DIAGNOSIS — D22 MELANOCYTIC NEVI: ICD-10-CM

## 2021-09-27 DIAGNOSIS — Z48.02 ENCOUNTER FOR REMOVAL OF SUTURES: ICD-10-CM

## 2021-09-27 PROBLEM — D22.61 MELANOCYTIC NEVI OF RIGHT UPPER LIMB, INCLUDING SHOULDER: Status: ACTIVE | Noted: 2021-09-27

## 2021-09-27 PROBLEM — D18.01 HEMANGIOMA OF SKIN AND SUBCUTANEOUS TISSUE: Status: ACTIVE | Noted: 2021-09-27

## 2021-09-27 PROCEDURE — 99203 OFFICE O/P NEW LOW 30 MIN: CPT

## 2021-09-27 PROCEDURE — ? SUTURE REMOVAL (NO GLOBAL PERIOD)

## 2021-09-27 PROCEDURE — ? COUNSELING

## 2021-09-27 PROCEDURE — ? SUNSCREEN RECOMMENDATIONS

## 2021-09-27 ASSESSMENT — LOCATION DETAILED DESCRIPTION DERM
LOCATION DETAILED: LEFT PROXIMAL DORSAL FOREARM
LOCATION DETAILED: LEFT KNEE
LOCATION DETAILED: LEFT DISTAL POSTERIOR THIGH
LOCATION DETAILED: RIGHT POSTERIOR SHOULDER
LOCATION DETAILED: RIGHT LATERAL UPPER BACK
LOCATION DETAILED: RIGHT KNEE
LOCATION DETAILED: RIGHT PROXIMAL POSTERIOR THIGH
LOCATION DETAILED: INFERIOR THORACIC SPINE
LOCATION DETAILED: RIGHT ELBOW

## 2021-09-27 ASSESSMENT — LOCATION ZONE DERM
LOCATION ZONE: ARM
LOCATION ZONE: LEG
LOCATION ZONE: TRUNK

## 2021-09-27 ASSESSMENT — LOCATION SIMPLE DESCRIPTION DERM
LOCATION SIMPLE: RIGHT UPPER BACK
LOCATION SIMPLE: LEFT KNEE
LOCATION SIMPLE: RIGHT KNEE
LOCATION SIMPLE: UPPER BACK
LOCATION SIMPLE: RIGHT ELBOW
LOCATION SIMPLE: RIGHT SHOULDER
LOCATION SIMPLE: LEFT POSTERIOR THIGH
LOCATION SIMPLE: RIGHT POSTERIOR THIGH
LOCATION SIMPLE: LEFT FOREARM

## 2021-09-27 NOTE — PROCEDURE: COUNSELING
Detail Level: Generalized
Detail Level: Zone
Patient Specific Counseling (Will Not Stick From Patient To Patient): Being treated with humira by RA Dr. Amisha Johnson

## 2021-09-27 NOTE — PROCEDURE: MIPS QUALITY
Quality 402: Tobacco Use And Help With Quitting Among Adolescents: Patient screened for tobacco and never smoked
Quality 130: Documentation Of Current Medications In The Medical Record: Current Medications Documented
Quality 226: Preventive Care And Screening: Tobacco Use: Screening And Cessation Intervention: Patient screened for tobacco use and is an ex/non-smoker
Detail Level: Detailed
Quality 111:Pneumonia Vaccination Status For Older Adults: Pneumococcal Vaccination Previously Received

## 2021-10-07 DIAGNOSIS — F51.04 CHRONIC INSOMNIA: ICD-10-CM

## 2021-10-08 RX ORDER — LORAZEPAM 2 MG/1
TABLET ORAL
Qty: 30 TABLET | Refills: 0 | Status: SHIPPED | OUTPATIENT
Start: 2021-10-08 | End: 2021-11-06

## 2021-10-08 RX ORDER — CELECOXIB 200 MG/1
200 CAPSULE ORAL DAILY
Qty: 90 CAPSULE | Refills: 0 | Status: SHIPPED | OUTPATIENT
Start: 2021-10-08 | End: 2022-03-21

## 2021-11-03 ENCOUNTER — PATIENT MESSAGE (OUTPATIENT)
Dept: MEDICAL GROUP | Age: 71
End: 2021-11-03

## 2021-11-03 NOTE — PATIENT COMMUNICATION
Received request via: Patient    Was the patient seen in the last year in this department? Yes    Does the patient have an active prescription (recently filled or refills available) for medication(s) requested? No

## 2021-11-04 RX ORDER — MIRTAZAPINE 15 MG/1
15 TABLET, FILM COATED ORAL
Qty: 90 TABLET | Refills: 3 | Status: SHIPPED | OUTPATIENT
Start: 2021-11-04 | End: 2021-11-15

## 2021-11-15 ENCOUNTER — HOSPITAL ENCOUNTER (OUTPATIENT)
Dept: LAB | Facility: MEDICAL CENTER | Age: 71
End: 2021-11-15
Attending: INTERNAL MEDICINE
Payer: MEDICARE

## 2021-11-15 DIAGNOSIS — L40.50 PSORIATIC ARTHRITIS (HCC): ICD-10-CM

## 2021-11-15 DIAGNOSIS — I10 ESSENTIAL HYPERTENSION: ICD-10-CM

## 2021-11-15 DIAGNOSIS — Z79.620 ADALIMUMAB (HUMIRA) LONG-TERM USE: ICD-10-CM

## 2021-11-15 DIAGNOSIS — M10.9 GOUT, UNSPECIFIED CAUSE, UNSPECIFIED CHRONICITY, UNSPECIFIED SITE: ICD-10-CM

## 2021-11-15 LAB
ALBUMIN SERPL BCP-MCNC: 4.4 G/DL (ref 3.2–4.9)
ALBUMIN/GLOB SERPL: 1.6 G/DL
ALP SERPL-CCNC: 81 U/L (ref 30–99)
ALT SERPL-CCNC: 33 U/L (ref 2–50)
ANION GAP SERPL CALC-SCNC: 11 MMOL/L (ref 7–16)
AST SERPL-CCNC: 44 U/L (ref 12–45)
BASOPHILS # BLD AUTO: 0.5 % (ref 0–1.8)
BASOPHILS # BLD: 0.04 K/UL (ref 0–0.12)
BILIRUB SERPL-MCNC: 0.4 MG/DL (ref 0.1–1.5)
BUN SERPL-MCNC: 20 MG/DL (ref 8–22)
CALCIUM SERPL-MCNC: 9.6 MG/DL (ref 8.4–10.2)
CHLORIDE SERPL-SCNC: 109 MMOL/L (ref 96–112)
CO2 SERPL-SCNC: 22 MMOL/L (ref 20–33)
CREAT SERPL-MCNC: 1.5 MG/DL (ref 0.5–1.4)
EOSINOPHIL # BLD AUTO: 0.19 K/UL (ref 0–0.51)
EOSINOPHIL NFR BLD: 2.5 % (ref 0–6.9)
ERYTHROCYTE [DISTWIDTH] IN BLOOD BY AUTOMATED COUNT: 42.5 FL (ref 35.9–50)
ERYTHROCYTE [SEDIMENTATION RATE] IN BLOOD BY WESTERGREN METHOD: 11 MM/HOUR (ref 0–20)
GLOBULIN SER CALC-MCNC: 2.7 G/DL (ref 1.9–3.5)
GLUCOSE SERPL-MCNC: 91 MG/DL (ref 65–99)
HCT VFR BLD AUTO: 45.4 % (ref 42–52)
HGB BLD-MCNC: 15.1 G/DL (ref 14–18)
IMM GRANULOCYTES # BLD AUTO: 0.02 K/UL (ref 0–0.11)
IMM GRANULOCYTES NFR BLD AUTO: 0.3 % (ref 0–0.9)
LYMPHOCYTES # BLD AUTO: 2.59 K/UL (ref 1–4.8)
LYMPHOCYTES NFR BLD: 34 % (ref 22–41)
MCH RBC QN AUTO: 32.2 PG (ref 27–33)
MCHC RBC AUTO-ENTMCNC: 33.3 G/DL (ref 33.7–35.3)
MCV RBC AUTO: 96.8 FL (ref 81.4–97.8)
MONOCYTES # BLD AUTO: 0.8 K/UL (ref 0–0.85)
MONOCYTES NFR BLD AUTO: 10.5 % (ref 0–13.4)
NEUTROPHILS # BLD AUTO: 3.97 K/UL (ref 1.82–7.42)
NEUTROPHILS NFR BLD: 52.2 % (ref 44–72)
NRBC # BLD AUTO: 0 K/UL
NRBC BLD-RTO: 0 /100 WBC
PLATELET # BLD AUTO: 176 K/UL (ref 164–446)
PMV BLD AUTO: 11.9 FL (ref 9–12.9)
POTASSIUM SERPL-SCNC: 4.6 MMOL/L (ref 3.6–5.5)
PROT SERPL-MCNC: 7.1 G/DL (ref 6–8.2)
RBC # BLD AUTO: 4.69 M/UL (ref 4.7–6.1)
SODIUM SERPL-SCNC: 142 MMOL/L (ref 135–145)
WBC # BLD AUTO: 7.6 K/UL (ref 4.8–10.8)

## 2021-11-15 PROCEDURE — 36415 COLL VENOUS BLD VENIPUNCTURE: CPT

## 2021-11-15 PROCEDURE — 85652 RBC SED RATE AUTOMATED: CPT

## 2021-11-15 PROCEDURE — 85025 COMPLETE CBC W/AUTO DIFF WBC: CPT

## 2021-11-15 PROCEDURE — 80053 COMPREHEN METABOLIC PANEL: CPT

## 2021-11-15 RX ORDER — MIRTAZAPINE 15 MG/1
TABLET, FILM COATED ORAL
Qty: 45 TABLET | Refills: 0 | Status: SHIPPED | OUTPATIENT
Start: 2021-11-15 | End: 2022-03-22

## 2021-12-16 ENCOUNTER — HOSPITAL ENCOUNTER (OUTPATIENT)
Dept: LAB | Facility: MEDICAL CENTER | Age: 71
End: 2021-12-16
Attending: INTERNAL MEDICINE
Payer: MEDICARE

## 2021-12-16 DIAGNOSIS — L40.50 PSORIATIC ARTHRITIS (HCC): ICD-10-CM

## 2021-12-16 DIAGNOSIS — Z79.620 ADALIMUMAB (HUMIRA) LONG-TERM USE: ICD-10-CM

## 2021-12-16 PROCEDURE — 86803 HEPATITIS C AB TEST: CPT

## 2021-12-16 PROCEDURE — 86480 TB TEST CELL IMMUN MEASURE: CPT

## 2021-12-16 PROCEDURE — 36415 COLL VENOUS BLD VENIPUNCTURE: CPT

## 2021-12-16 PROCEDURE — 87340 HEPATITIS B SURFACE AG IA: CPT | Mod: GA

## 2021-12-17 LAB
HBV SURFACE AG SER QL: NORMAL
HCV AB SER QL: NORMAL

## 2021-12-20 LAB
GAMMA INTERFERON BACKGROUND BLD IA-ACNC: 0.07 IU/ML
M TB IFN-G BLD-IMP: NEGATIVE
M TB IFN-G CD4+ BCKGRND COR BLD-ACNC: -0.01 IU/ML
MITOGEN IGNF BCKGRD COR BLD-ACNC: >10 IU/ML
QFT TB2 - NIL TBQ2: -0.01 IU/ML

## 2022-03-21 ENCOUNTER — HOSPITAL ENCOUNTER (OUTPATIENT)
Dept: LAB | Facility: MEDICAL CENTER | Age: 72
End: 2022-03-21
Attending: ORTHOPAEDIC SURGERY
Payer: MEDICARE

## 2022-03-21 DIAGNOSIS — Z96.611 HX OF TOTAL SHOULDER REPLACEMENT, RIGHT: ICD-10-CM

## 2022-03-21 LAB
BASOPHILS # BLD AUTO: 0.9 % (ref 0–1.8)
BASOPHILS # BLD: 0.06 K/UL (ref 0–0.12)
CRP SERPL HS-MCNC: <0.3 MG/DL (ref 0–0.75)
EOSINOPHIL # BLD AUTO: 0.15 K/UL (ref 0–0.51)
EOSINOPHIL NFR BLD: 2.4 % (ref 0–6.9)
ERYTHROCYTE [DISTWIDTH] IN BLOOD BY AUTOMATED COUNT: 43.4 FL (ref 35.9–50)
ERYTHROCYTE [SEDIMENTATION RATE] IN BLOOD BY WESTERGREN METHOD: 12 MM/HOUR (ref 0–20)
HCT VFR BLD AUTO: 42.4 % (ref 42–52)
HGB BLD-MCNC: 13.4 G/DL (ref 14–18)
IMM GRANULOCYTES # BLD AUTO: 0.01 K/UL (ref 0–0.11)
IMM GRANULOCYTES NFR BLD AUTO: 0.2 % (ref 0–0.9)
LYMPHOCYTES # BLD AUTO: 1.97 K/UL (ref 1–4.8)
LYMPHOCYTES NFR BLD: 30.9 % (ref 22–41)
MCH RBC QN AUTO: 30.2 PG (ref 27–33)
MCHC RBC AUTO-ENTMCNC: 31.6 G/DL (ref 33.7–35.3)
MCV RBC AUTO: 95.7 FL (ref 81.4–97.8)
MONOCYTES # BLD AUTO: 0.63 K/UL (ref 0–0.85)
MONOCYTES NFR BLD AUTO: 9.9 % (ref 0–13.4)
NEUTROPHILS # BLD AUTO: 3.55 K/UL (ref 1.82–7.42)
NEUTROPHILS NFR BLD: 55.7 % (ref 44–72)
NRBC # BLD AUTO: 0 K/UL
NRBC BLD-RTO: 0 /100 WBC
PLATELET # BLD AUTO: 161 K/UL (ref 164–446)
PMV BLD AUTO: 12.1 FL (ref 9–12.9)
RBC # BLD AUTO: 4.43 M/UL (ref 4.7–6.1)
WBC # BLD AUTO: 6.4 K/UL (ref 4.8–10.8)

## 2022-03-21 PROCEDURE — 85652 RBC SED RATE AUTOMATED: CPT

## 2022-03-21 PROCEDURE — 36415 COLL VENOUS BLD VENIPUNCTURE: CPT

## 2022-03-21 PROCEDURE — 85025 COMPLETE CBC W/AUTO DIFF WBC: CPT

## 2022-03-21 PROCEDURE — 86140 C-REACTIVE PROTEIN: CPT

## 2022-03-21 RX ORDER — CELECOXIB 200 MG/1
CAPSULE ORAL
Qty: 90 CAPSULE | Refills: 0 | Status: SHIPPED | OUTPATIENT
Start: 2022-03-21 | End: 2022-06-07

## 2022-03-22 RX ORDER — MIRTAZAPINE 15 MG/1
TABLET, FILM COATED ORAL
Qty: 45 TABLET | Refills: 0 | Status: SHIPPED | OUTPATIENT
Start: 2022-03-22 | End: 2022-06-28

## 2022-03-25 ENCOUNTER — HOSPITAL ENCOUNTER (OUTPATIENT)
Dept: RADIOLOGY | Facility: MEDICAL CENTER | Age: 72
End: 2022-03-25
Attending: ORTHOPAEDIC SURGERY
Payer: MEDICARE

## 2022-03-25 DIAGNOSIS — Z96.611 HX OF TOTAL SHOULDER REPLACEMENT, RIGHT: ICD-10-CM

## 2022-03-25 PROCEDURE — 73200 CT UPPER EXTREMITY W/O DYE: CPT | Mod: RT,MF

## 2022-03-28 RX ORDER — AMOXICILLIN 500 MG/1
500 CAPSULE ORAL 3 TIMES DAILY
Qty: 30 CAPSULE | Refills: 0 | Status: ON HOLD | OUTPATIENT
Start: 2022-03-28 | End: 2022-08-17

## 2022-03-29 DIAGNOSIS — F51.04 CHRONIC INSOMNIA: ICD-10-CM

## 2022-03-31 RX ORDER — SUVOREXANT 15 MG/1
TABLET, FILM COATED ORAL
Qty: 30 TABLET | Refills: 2 | Status: SHIPPED | OUTPATIENT
Start: 2022-03-31 | End: 2022-10-05 | Stop reason: SDUPTHER

## 2022-03-31 NOTE — TELEPHONE ENCOUNTER
Have we ever prescribed this med? Yes.  If yes, what date? 03/29/2022    Last OV: 07/27/2021- Bran WHEATLEY     Next OV: No appointment on file-   5.  Follow-up in 1 year compliance report, sooner if needed.    DX: Chronic insomnia (F51.04)    Medications:   Requested Prescriptions     Pending Prescriptions Disp Refills   • BELSOMRA 15 MG Tab [Pharmacy Med Name: BELSOMRA 15 MG TABLET] 30 Tablet      Sig: TAKE 1 TABLET BY MOUTH AT BEDTIME AS NEEDED FOR INSOMNIA FOR UP TO 30 DAYS. F51.04

## 2022-04-18 ENCOUNTER — HOSPITAL ENCOUNTER (OUTPATIENT)
Facility: MEDICAL CENTER | Age: 72
End: 2022-04-18
Attending: PHYSICIAN ASSISTANT
Payer: MEDICARE

## 2022-04-18 PROCEDURE — 87086 URINE CULTURE/COLONY COUNT: CPT

## 2022-04-19 ENCOUNTER — HOSPITAL ENCOUNTER (OUTPATIENT)
Dept: LAB | Facility: MEDICAL CENTER | Age: 72
End: 2022-04-19
Attending: ORTHOPAEDIC SURGERY
Payer: MEDICARE

## 2022-04-21 LAB
BACTERIA UR CULT: NORMAL
SIGNIFICANT IND 70042: NORMAL
SITE SITE: NORMAL
SOURCE SOURCE: NORMAL

## 2022-04-27 ENCOUNTER — HOSPITAL ENCOUNTER (OUTPATIENT)
Dept: LAB | Facility: MEDICAL CENTER | Age: 72
End: 2022-04-27
Attending: PHYSICIAN ASSISTANT
Payer: MEDICARE

## 2022-04-27 LAB
ALBUMIN SERPL BCP-MCNC: 4.5 G/DL (ref 3.2–4.9)
ALBUMIN/GLOB SERPL: 1.9 G/DL
ALP SERPL-CCNC: 78 U/L (ref 30–99)
ALT SERPL-CCNC: 37 U/L (ref 2–50)
ANION GAP SERPL CALC-SCNC: 10 MMOL/L (ref 7–16)
AST SERPL-CCNC: 31 U/L (ref 12–45)
BASOPHILS # BLD AUTO: 1.2 % (ref 0–1.8)
BASOPHILS # BLD: 0.08 K/UL (ref 0–0.12)
BILIRUB SERPL-MCNC: 0.3 MG/DL (ref 0.1–1.5)
BUN SERPL-MCNC: 19 MG/DL (ref 8–22)
CALCIUM SERPL-MCNC: 10 MG/DL (ref 8.5–10.5)
CHLORIDE SERPL-SCNC: 104 MMOL/L (ref 96–112)
CHOLEST SERPL-MCNC: 205 MG/DL (ref 100–199)
CO2 SERPL-SCNC: 27 MMOL/L (ref 20–33)
CREAT SERPL-MCNC: 1.31 MG/DL (ref 0.5–1.4)
EOSINOPHIL # BLD AUTO: 0.3 K/UL (ref 0–0.51)
EOSINOPHIL NFR BLD: 4.6 % (ref 0–6.9)
ERYTHROCYTE [DISTWIDTH] IN BLOOD BY AUTOMATED COUNT: 50 FL (ref 35.9–50)
FASTING STATUS PATIENT QL REPORTED: NORMAL
FERRITIN SERPL-MCNC: 48.6 NG/ML (ref 22–322)
FOLATE SERPL-MCNC: 32.6 NG/ML
GFR SERPLBLD CREATININE-BSD FMLA CKD-EPI: 58 ML/MIN/1.73 M 2
GLOBULIN SER CALC-MCNC: 2.4 G/DL (ref 1.9–3.5)
GLUCOSE SERPL-MCNC: 105 MG/DL (ref 65–99)
HCT VFR BLD AUTO: 45.1 % (ref 42–52)
HDLC SERPL-MCNC: 85 MG/DL
HGB BLD-MCNC: 14.1 G/DL (ref 14–18)
IMM GRANULOCYTES # BLD AUTO: 0.02 K/UL (ref 0–0.11)
IMM GRANULOCYTES NFR BLD AUTO: 0.3 % (ref 0–0.9)
LDLC SERPL CALC-MCNC: 101 MG/DL
LYMPHOCYTES # BLD AUTO: 2.46 K/UL (ref 1–4.8)
LYMPHOCYTES NFR BLD: 37.8 % (ref 22–41)
MCH RBC QN AUTO: 30.3 PG (ref 27–33)
MCHC RBC AUTO-ENTMCNC: 31.3 G/DL (ref 33.7–35.3)
MCV RBC AUTO: 97 FL (ref 81.4–97.8)
MONOCYTES # BLD AUTO: 0.72 K/UL (ref 0–0.85)
MONOCYTES NFR BLD AUTO: 11.1 % (ref 0–13.4)
NEUTROPHILS # BLD AUTO: 2.92 K/UL (ref 1.82–7.42)
NEUTROPHILS NFR BLD: 45 % (ref 44–72)
NRBC # BLD AUTO: 0 K/UL
NRBC BLD-RTO: 0 /100 WBC
PLATELET # BLD AUTO: 178 K/UL (ref 164–446)
PMV BLD AUTO: 12.7 FL (ref 9–12.9)
POTASSIUM SERPL-SCNC: 4.7 MMOL/L (ref 3.6–5.5)
PREALB SERPL-MCNC: 34.1 MG/DL (ref 18–38)
PROT SERPL-MCNC: 6.9 G/DL (ref 6–8.2)
RBC # BLD AUTO: 4.65 M/UL (ref 4.7–6.1)
SODIUM SERPL-SCNC: 141 MMOL/L (ref 135–145)
TRIGL SERPL-MCNC: 97 MG/DL (ref 0–149)
VIT B12 SERPL-MCNC: 1230 PG/ML (ref 211–911)
WBC # BLD AUTO: 6.5 K/UL (ref 4.8–10.8)

## 2022-04-27 PROCEDURE — 84134 ASSAY OF PREALBUMIN: CPT

## 2022-04-27 PROCEDURE — 84630 ASSAY OF ZINC: CPT

## 2022-04-27 PROCEDURE — 80061 LIPID PANEL: CPT

## 2022-04-27 PROCEDURE — 85025 COMPLETE CBC W/AUTO DIFF WBC: CPT

## 2022-04-27 PROCEDURE — 82607 VITAMIN B-12: CPT

## 2022-04-27 PROCEDURE — 36415 COLL VENOUS BLD VENIPUNCTURE: CPT

## 2022-04-27 PROCEDURE — 84207 ASSAY OF VITAMIN B-6: CPT

## 2022-04-27 PROCEDURE — 82728 ASSAY OF FERRITIN: CPT | Mod: GA

## 2022-04-27 PROCEDURE — 84425 ASSAY OF VITAMIN B-1: CPT

## 2022-04-27 PROCEDURE — 82746 ASSAY OF FOLIC ACID SERUM: CPT

## 2022-04-27 PROCEDURE — 84252 ASSAY OF VITAMIN B-2: CPT

## 2022-04-27 PROCEDURE — 80053 COMPREHEN METABOLIC PANEL: CPT

## 2022-05-01 LAB
VIT B2 SERPL-SCNC: 17 NMOL/L (ref 5–50)
ZINC SERPL-MCNC: 78.4 UG/DL (ref 60–120)

## 2022-05-03 LAB — VIT B6 SERPL-MCNC: 132 NMOL/L (ref 20–125)

## 2022-05-09 LAB — VIT B1 BLD-MCNC: 270 NMOL/L (ref 70–180)

## 2022-05-23 PROBLEM — M65.332 TRIGGER FINGER, LEFT MIDDLE FINGER: Status: ACTIVE | Noted: 2022-05-23

## 2022-06-01 DIAGNOSIS — E78.5 DYSLIPIDEMIA: ICD-10-CM

## 2022-06-01 RX ORDER — FLUOXETINE HYDROCHLORIDE 40 MG/1
40 CAPSULE ORAL DAILY
Qty: 90 CAPSULE | Refills: 2 | Status: ON HOLD | OUTPATIENT
Start: 2022-06-01 | End: 2022-08-17

## 2022-06-02 RX ORDER — ATORVASTATIN CALCIUM 40 MG/1
TABLET, FILM COATED ORAL
Qty: 90 TABLET | Refills: 3 | Status: SHIPPED | OUTPATIENT
Start: 2022-06-02 | End: 2022-08-23

## 2022-06-02 NOTE — TELEPHONE ENCOUNTER
Received request via: Pharmacy    Was the patient seen in the last year in this department? Yes 9/17/21    Does the patient have an active prescription (recently filled or refills available) for medication(s) requested? No

## 2022-06-07 RX ORDER — CELECOXIB 200 MG/1
CAPSULE ORAL
Qty: 90 CAPSULE | Refills: 0 | Status: SHIPPED | OUTPATIENT
Start: 2022-06-07 | End: 2022-08-23 | Stop reason: SDUPTHER

## 2022-06-23 ENCOUNTER — TELEPHONE (OUTPATIENT)
Dept: RHEUMATOLOGY | Facility: MEDICAL CENTER | Age: 72
End: 2022-06-23
Payer: MEDICARE

## 2022-06-23 NOTE — TELEPHONE ENCOUNTER
Please notify patient that we have refilled his Humira for 3 months but patient will need a follow-up appointment for more refills after this.  Of note patient was a no-show at his last scheduled appointment

## 2022-06-27 ENCOUNTER — OFFICE VISIT (OUTPATIENT)
Dept: RHEUMATOLOGY | Facility: MEDICAL CENTER | Age: 72
End: 2022-06-27
Payer: MEDICARE

## 2022-06-27 VITALS
TEMPERATURE: 96.6 F | RESPIRATION RATE: 12 BRPM | HEART RATE: 80 BPM | SYSTOLIC BLOOD PRESSURE: 148 MMHG | OXYGEN SATURATION: 99 % | DIASTOLIC BLOOD PRESSURE: 78 MMHG | BODY MASS INDEX: 26.78 KG/M2 | WEIGHT: 171 LBS

## 2022-06-27 DIAGNOSIS — Z79.620 ADALIMUMAB (HUMIRA) LONG-TERM USE: ICD-10-CM

## 2022-06-27 DIAGNOSIS — L40.50 PSORIATIC ARTHRITIS (HCC): ICD-10-CM

## 2022-06-27 DIAGNOSIS — I10 ESSENTIAL HYPERTENSION: ICD-10-CM

## 2022-06-27 DIAGNOSIS — Z98.84 H/O GASTRIC BYPASS: ICD-10-CM

## 2022-06-27 DIAGNOSIS — M10.9 GOUT, UNSPECIFIED CAUSE, UNSPECIFIED CHRONICITY, UNSPECIFIED SITE: ICD-10-CM

## 2022-06-27 PROCEDURE — 99214 OFFICE O/P EST MOD 30 MIN: CPT | Performed by: INTERNAL MEDICINE

## 2022-06-27 ASSESSMENT — FIBROSIS 4 INDEX: FIB4 SCORE: 2.03

## 2022-06-27 NOTE — PROGRESS NOTES
Chief Complaint- joint pain     Subjective:   Buster Medina is a 72 y.o. male here today for follow up of rheumatological issues    This is a follow-up visit for this patient who we see in this clinic for psoriatic arthritis diagnosed about 2008 by rheumatologist in [place], [place].  Patient currently on Humira 40 mg subcu every 2 weeks with great benefit.  States his arthritis and psoriasis is doing quite well.  Patient denies any side effects from the medication, denies any unexplained weight loss, denies any fevers of unknown etiology, denies any GI upset, denies any rashes, denies any new joint swelling, denies recurrent infections.  Of note recent sedimentation rate equals 12 March 2022     We also follow patient for hyperuricemia, patient currently stable on no treatment.     Additional abilities include  biopsy of the bladder mucosa with cystoscopy, pathology does not indicate any malignancy but does indicate some erosive type lesions.,  Patient following up with urology     Co morbidities include HTN, high cholesterol, hx left ahilles tendon rupture and repair, hx left knee arthroscopic surgery for meniscal tear, s/p left rotator cuff tear, s/p bladder cancer about 2012 no recurrence s/p surgical intervention only, hx of gastric bypass 2005 [facility], pt does f/u with Dr Gamez q year     Right TSA  Right TKA     S/p topical treatments  S/p plaquenil-cause taste abnormalities  S/p Otezla-diarrhea     Uric acid 6.9 1/2021 (on no treatment)  Uric acid 4.1 5/2021 (on no treatment)     HBsAg neg 12/2021  HCV neg 12/2021  Quantiferon Gold neg 12/2021  G6PD 10.9 adequate 9/2019  CCP neg 3/2018  RF neg 3/2018  SEAN neg 3/2018    Hand x-rays 3/2018-indicates possible erosion of the fourth metacarpal on the left hand, 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 NEED  OFFICE VISIT FOR REFILL 90 Capsule 0   • atorvastatin (LIPITOR) 40 MG Tab TAKE 1 TABLET DAILY 90 Tablet 3   • fluoxetine (PROZAC) 40 MG capsule Take 1 Capsule by mouth every day. 90 Capsule 2   • LORazepam (ATIVAN) 2 MG tablet lorazepam 2 mg tablet     • ACYCLOVIR EX      • ferrous sulfate (FEOSOL) 220 (44 Fe) MG/5ML Elixir      • B Complex Vitamins (VITAMIN B COMPLEX PO)      • Cholecalciferol (VITAMIN D3) 1.25 MG (42862 UT) Cap      • amoxicillin (AMOXIL) 500 MG Cap Take 1 Capsule by mouth 3 times a day. 30 Capsule 0   • mirtazapine (REMERON) 15 MG Tab TAKE 1/2 TABLET AT BEDTIME 45 Tablet 0   • valacyclovir (VALTREX) 1 GM Tab TAKE 1 TABLET TWICE A DAY  FOR 7 DAYS AS NEEDED FOR   OUTBREAK 24 Tablet 5   • lisinopril (PRINIVIL) 20 MG Tab Take 1 Tablet by mouth every evening. 90 Tablet 3   • omeprazole (PRILOSEC) 20 MG delayed-release capsule Take 20 mg by mouth every evening.     • aspirin EC (ECOTRIN) 81 MG Tablet Delayed Response Take 81 mg by mouth every bedtime. 30 Tab 0   • Multiple Vitamins-Minerals (MELODY-DAY 1000 PO) Take 1 Tab by mouth 2 Times a Day.     • HUMIRA PEN 40 MG/0.4ML Pen-injector Kit INJECT 1 PEN UNDER THE SKIN EVERY 14 DAYS. 6 Each 0   • Magnesium Hydroxide (MAGNESIA PO)      • BELSOMRA 15 MG Tab TAKE 1 TABLET BY MOUTH AT BEDTIME AS NEEDED FOR INSOMNIA FOR UP TO 30 DAYS. F51.04 30 Tablet 2     No current facility-administered medications for this visit.     He  has a past medical history of Anxiety, Arthritis, Bowel habit changes (06/24/2021), Cancer ([facility]) (2007), Chickenpox, Depression, High cholesterol, Hyperlipidemia, Hypertension, Kidney stone, Mumps, Obesity, Pneumothorax, Psoriatic arthritis ([facility]), Restless leg syndrome, Sleep apnea, and Tonsillitis.    ROS   Other than what is mentioned in HPI or physical exam, there is no history of headaches, double vision or blurred vision. No temporal tenderness or jaw claudication. No trouble swallowing difficulties or sore throats.  No chest  complaints including chest pain, cough or sputum production. No GI complaints including nausea, vomiting, change in bowel habits, or past peptic ulcer disease. No history of blood in the stools. No urinary complaints including dysuria or frequency. No history of alopecia, photosensitivity, oral ulcerations, Raynaud's phenomena.       Objective:     BP (!) 148/78   Pulse 80   Temp 35.9 °C (96.6 °F) (Temporal)   Resp 12   Wt 77.6 kg (171 lb)   SpO2 99%  Body mass index is 26.78 kg/m².   Physical Exam:    Constitutional: Alert and oriented X3, patient is talkative with good eye contact.Skin: Warm, dry, good turgor, no rashes in visible areas.Eye: Equal, round and reactive, conjunctiva clear, lids normal EOM intactENMT: Lips without lesions, good dentition, no oropharyngeal ulcers, moist buccal mucosa, pinna without deformityNeck: Trachea midline, no masses, no thyromegaly.Lymph:  No cervical lymphadenopathy, no axillary lymphadenopathy, no supraclavicular lymphadenopathyRespiratory: Unlabored respiratory effort, lungs clear to auscultation, no wheezes, no ronchi.Cardiovascular: Normal S1, S2, .regular rate and rhythm no murmurs rubs or gallops abdomen: Soft, non-tender, no masses, no hepatosplenomegaly.Psych: Alert and oriented x3, normal affect and mood.Neuro: Cranial nerves 2-12 are grossly intact, no loss of sensation LEExt:no joint laxity noted in bilateral arms, no joint laxity noted in bilateral legs, joints look great no dactylitis no sausage digits there is mild dystrophic toenails on the right second and third toenail but no crossover toes no splay toes no pes planus, shoulders full range of motion without limitations, elbows without flexion contractures no nodules no tophi    Lab Results   Component Value Date/Time    QNTTBGOLD Negative 08/16/2018 12:13 PM     Lab Results   Component Value Date/Time    HEPBCORIGM Negative 03/02/2020 02:35 PM    HEPBSAG Non-Reactive 12/16/2021 04:21 PM     Lab Results    Component Value Date/Time    HEPCAB Non-Reactive 12/16/2021 04:21 PM     Lab Results   Component Value Date/Time    SODIUM 141 04/27/2022 09:48 AM    POTASSIUM 4.7 04/27/2022 09:48 AM    CHLORIDE 104 04/27/2022 09:48 AM    CO2 27 04/27/2022 09:48 AM    GLUCOSE 105 (H) 04/27/2022 09:48 AM    BUN 19 04/27/2022 09:48 AM    CREATININE 1.31 04/27/2022 09:48 AM      Lab Results   Component Value Date/Time    WBC 6.5 04/27/2022 09:48 AM    RBC 4.65 (L) 04/27/2022 09:48 AM    HEMOGLOBIN 14.1 04/27/2022 09:48 AM    HEMATOCRIT 45.1 04/27/2022 09:48 AM    MCV 97.0 04/27/2022 09:48 AM    MCH 30.3 04/27/2022 09:48 AM    MCHC 31.3 (L) 04/27/2022 09:48 AM    MPV 12.7 04/27/2022 09:48 AM    NEUTSPOLYS 45.00 04/27/2022 09:48 AM    LYMPHOCYTES 37.80 04/27/2022 09:48 AM    MONOCYTES 11.10 04/27/2022 09:48 AM    EOSINOPHILS 4.60 04/27/2022 09:48 AM    BASOPHILS 1.20 04/27/2022 09:48 AM      Lab Results   Component Value Date/Time    CALCIUM 10.0 04/27/2022 09:48 AM    ASTSGOT 31 04/27/2022 09:48 AM    ALTSGPT 37 04/27/2022 09:48 AM    ALKPHOSPHAT 78 04/27/2022 09:48 AM    TBILIRUBIN 0.3 04/27/2022 09:48 AM    ALBUMIN 4.5 04/27/2022 09:48 AM    TOTPROTEIN 6.9 04/27/2022 09:48 AM     Lab Results   Component Value Date/Time    URICACID 4.1 05/06/2021 08:43 AM    RHEUMFACTN <10 03/21/2018 02:19 PM    CCPANTIBODY 4 03/21/2018 02:20 PM    ANTINUCAB None Detected 03/21/2018 02:19 PM     Lab Results   Component Value Date/Time    SEDRATEWES 12 03/21/2022 03:04 PM     Lab Results   Component Value Date/Time    G6PD 10.9 09/17/2019 10:30 AM     Assessment and Plan:     1. Psoriatic arthritis (HCC)  Doing really well on Humira 40 mg subcu every 2 weeks monotherapy, we will continue as such  Today do hand and feet x-rays for evaluation of progression of erosive arthritis comparing them to x-rays done 2018  - DX-JOINT SURVEY-HANDS SINGLE VIEW; Future  - DX-JOINT SURVEY-FEET SINGLE VIEW; Future  - CBC WITH DIFFERENTIAL; Future  - Comp Metabolic  Panel; Future  - Sed Rate; Future    2. Adalimumab (Humira) long-term use  On Humira 40 mg subcu every 2 weeks, screening labs are up-to-date, neck screening labs due by December 2023, patient needs monitoring labs every 6 months, next labs due about October 2022, labs ordered for patient  We reviewed risks of biological medications with patient including hematological pathology, cancer risks, neurological and infection issues especially in the Covid-19 pandemic environment, patient states understanding.  - DX-JOINT SURVEY-HANDS SINGLE VIEW; Future  - DX-JOINT SURVEY-FEET SINGLE VIEW; Future  - CBC WITH DIFFERENTIAL; Future  - Comp Metabolic Panel; Future  - Sed Rate; Future    3. Gout, unspecified cause, unspecified chronicity, unspecified site  Controlled with diet, no flares continue to monitor  - DX-JOINT SURVEY-HANDS SINGLE VIEW; Future  - DX-JOINT SURVEY-FEET SINGLE VIEW; Future  - CBC WITH DIFFERENTIAL; Future  - Comp Metabolic Panel; Future  - Sed Rate; Future    4. Essential hypertension  May impact the type of medications we can use for this patient's arthritis. We will have to keep this under advisement.  - DX-JOINT SURVEY-HANDS SINGLE VIEW; Future  - DX-JOINT SURVEY-FEET SINGLE VIEW; Future  - CBC WITH DIFFERENTIAL; Future  - Comp Metabolic Panel; Future  - Sed Rate; Future    5. H/O gastric bypass  May impact the type of medications we can use for this patient's arthritis. We will have to keep this under advisement.    Followup: Return in about 1 year (around 6/27/2023). or sooner ankush Medina  was seen 30 minutes face-to-face of which more than 50% of the time was spent counseling the patient (excluding time for procedures)  regarding  rheumatological condition and care. Therapy was discussed in detail.      Please note that this dictation was created using voice recognition software. I have made every reasonable attempt to correct obvious errors, but I expect that there are errors of  grammar and possibly content that I did not discover before finalizing the note.

## 2022-06-28 RX ORDER — MIRTAZAPINE 15 MG/1
TABLET, FILM COATED ORAL
Qty: 45 TABLET | Refills: 0 | Status: SHIPPED | OUTPATIENT
Start: 2022-06-28 | End: 2022-08-23

## 2022-06-30 ENCOUNTER — OFFICE VISIT (OUTPATIENT)
Dept: MEDICAL GROUP | Age: 72
End: 2022-06-30
Payer: MEDICARE

## 2022-06-30 VITALS
HEART RATE: 80 BPM | DIASTOLIC BLOOD PRESSURE: 76 MMHG | SYSTOLIC BLOOD PRESSURE: 122 MMHG | BODY MASS INDEX: 27.5 KG/M2 | TEMPERATURE: 97.8 F | HEIGHT: 67 IN | OXYGEN SATURATION: 96 % | RESPIRATION RATE: 16 BRPM | WEIGHT: 175.2 LBS

## 2022-06-30 DIAGNOSIS — R42 POSTURAL DIZZINESS WITH PRESYNCOPE: ICD-10-CM

## 2022-06-30 DIAGNOSIS — R55 POSTURAL DIZZINESS WITH PRESYNCOPE: ICD-10-CM

## 2022-06-30 PROCEDURE — 99214 OFFICE O/P EST MOD 30 MIN: CPT | Performed by: FAMILY MEDICINE

## 2022-06-30 RX ORDER — LISINOPRIL 10 MG/1
10 TABLET ORAL DAILY
Qty: 90 TABLET | Refills: 0 | Status: SHIPPED | OUTPATIENT
Start: 2022-06-30 | End: 2022-09-21

## 2022-06-30 ASSESSMENT — FIBROSIS 4 INDEX: FIB4 SCORE: 2.03

## 2022-06-30 ASSESSMENT — PATIENT HEALTH QUESTIONNAIRE - PHQ9: CLINICAL INTERPRETATION OF PHQ2 SCORE: 0

## 2022-06-30 NOTE — PROGRESS NOTES
This medical record contains text that has been entered with the assistance of computer voice recognition and dictation software.  Therefore, it may contain unintended errors in text, spelling, punctuation, or grammar      Chief Complaint   Patient presents with   • Lightheadedness     When Standing in the evening, stands up too fast         Buster Medina is a 72 y.o. male here evaluation and management of: Lightheadedness when standing up in the evening      HPI:     1. Postural dizziness with presyncope  NEW UNDIAGNOSED PROBLEM    Aaron is a very pleasant 72-year-old male with a significant past medical history of hypertension who presents to clinic with chief plaint of feeling dizzy when he stands up at night.  He states he usually does get up quickly when he is going to get a glass of water or wine feels like he is going to faint so he has to sit down.  He does take blood pressure medication lisinopril 20 mg p.o. daily his BP has been in the 115-140/72-88 range on his home BP readings.  He denies any recent diarrhea no vomiting, no palpitations no shortness of breath no dyspnea exertion.  He goes to the gym usually lifts weights vigorously without any issues in his chest.    Current medicines (including changes today)  Current Outpatient Medications   Medication Sig Dispense Refill   • lisinopril (PRINIVIL) 10 MG Tab Take 1 Tablet by mouth every day. STOP 20 MG DOSE 90 Tablet 0   • mirtazapine (REMERON) 15 MG Tab TAKE 1/2 TABLET AT BEDTIME 45 Tablet 0   • HUMIRA PEN 40 MG/0.4ML Pen-injector Kit INJECT 1 PEN UNDER THE SKIN EVERY 14 DAYS. 6 Each 0   • celecoxib (CELEBREX) 200 MG Cap TAKE 1 CAPSULE DAILY. NEED OFFICE VISIT FOR REFILL 90 Capsule 0   • atorvastatin (LIPITOR) 40 MG Tab TAKE 1 TABLET DAILY 90 Tablet 3   • fluoxetine (PROZAC) 40 MG capsule Take 1 Capsule by mouth every day. 90 Capsule 2   • LORazepam (ATIVAN) 2 MG tablet lorazepam 2 mg tablet     • ferrous sulfate (FEOSOL) 220 (44 Fe) MG/5ML Elixir    "   • B Complex Vitamins (VITAMIN B COMPLEX PO)      • Cholecalciferol (VITAMIN D3) 1.25 MG (51512 UT) Cap      • amoxicillin (AMOXIL) 500 MG Cap Take 1 Capsule by mouth 3 times a day. 30 Capsule 0   • valacyclovir (VALTREX) 1 GM Tab TAKE 1 TABLET TWICE A DAY  FOR 7 DAYS AS NEEDED FOR   OUTBREAK 24 Tablet 5   • omeprazole (PRILOSEC) 20 MG delayed-release capsule Take 20 mg by mouth every evening.     • aspirin EC (ECOTRIN) 81 MG Tablet Delayed Response Take 81 mg by mouth every bedtime. 30 Tab 0   • Multiple Vitamins-Minerals (MELODY-DAY 1000 PO) Take 1 Tab by mouth 2 Times a Day.       No current facility-administered medications for this visit.     He  has a past medical history of Anxiety, Arthritis, Bowel habit changes (06/24/2021), Cancer ([facility]) (2007), Chickenpox, Depression, High cholesterol, Hyperlipidemia, Hypertension, Kidney stone, Mumps, Obesity, Pneumothorax, Psoriatic arthritis (HCC), Restless leg syndrome, Sleep apnea, and Tonsillitis.  He  has a past surgical history that includes bladder biopsy with cystoscopy; abdominal exploration; gastric bypass laparoscopic; eye surgery; hernia repair; arthroplasty; open reduction; other orthopedic surgery (2015); bladder biopsy with cystoscopy (1/3/2018); retrogrades (Bilateral, 1/3/2018); pyelogram (Bilateral, 1/3/2018); thoracotomy; other (Left); turp-vapor; and pr cystourethroscopy,biopsies (7/6/2021).  Social History     Tobacco Use   • Smoking status: Never Smoker   • Smokeless tobacco: Never Used   Vaping Use   • Vaping Use: Never used   Substance Use Topics   • Alcohol use: Yes     Alcohol/week: 8.4 oz     Types: 14 Glasses of wine per week     Comment: 2 per day   • Drug use: Yes     Types: Marijuana, Oral     Comment: THC edible - \"once a month\".     Social History     Social History Narrative   • Not on file     Family History   Problem Relation Age of Onset   • Cancer Mother 80        lung cancer   • Arthritis Mother    • Diabetes Mother    • Anxiety " "disorder Mother    • Lung Disease Father 65   • Cancer Father    • Diabetes Father    • Hypertension Father    • Hyperlipidemia Father    • Hyperlipidemia Brother    • Arthritis Brother    • Other Brother         Thyroid tumor   • Sleep Apnea Brother    • No Known Problems Maternal Grandmother    • Cancer Maternal Grandfather    • No Known Problems Paternal Grandmother    • No Known Problems Paternal Grandfather    • Heart Disease Brother 49        smoker, overweight   • Hypertension Brother    • Depression Other    • Suicide Attempts Neg Hx    • Bipolar disorder Neg Hx    • Alcohol abuse Neg Hx    • Drug abuse Neg Hx      Family Status   Relation Name Status   • Mo     • Fa     • Bro  Alive   • MGMo     • MGFa     • PGMo     • PGFa     • Bro  Alive   • OTHER  (Not Specified)   • Neg Hx  (Not Specified)         ROS    The pertinent  ROS findings can be seen in the HPI above.     All other systems reviewed and are negative     Objective:     /76 (BP Location: Left arm, Patient Position: Standing, BP Cuff Size: Adult)   Pulse 80   Temp 36.6 °C (97.8 °F) (Temporal)   Resp 16   Ht 1.702 m (5' 7\")   Wt 79.5 kg (175 lb 3.2 oz)   SpO2 96%  Body mass index is 27.44 kg/m².      Physical Exam:    Constitutional: Alert, no distress.  Skin: No suspicious lesions  Eye: Equal, round and reactive, conjunctiva clear, lids normal.  ENMT: Lips without lesions, good dentition, oropharynx clear.  Neck: Trachea midline, no masses, no thyromegaly. No cervical or supraclavicular lymphadenopathy.  Respiratory: Unlabored respiratory effort, lungs clear to auscultation, no wheezes, no ronchi.  Cardiovascular: Normal S1, S2, no murmur, no edema  Abdomen: Soft, non-tender, no masses, no hepatosplenomegaly.    Orthostatics do not reveal orthostatic hypotension    Assessment and Plan:   The following treatment plan was discussed      1. Postural dizziness with presyncope  Decrease " lisinopril to 10 mg p.o. daily  Refer to cardiology  Instructions were given to always get up slowly  Maintain adequate hydration  If you feel dizzy sit down    - lisinopril (PRINIVIL) 10 MG Tab; Take 1 Tablet by mouth every day. STOP 20 MG DOSE  Dispense: 90 Tablet; Refill: 0  - REFERRAL TO CARDIOLOGY            Instructed to Follow up in clinic or ER for worsening symptoms, difficulty breathing, lack of expected recovery, or should new symptoms or problems arise.    Followup: Return in about 3 months (around 9/30/2022) for Reevaluation.

## 2022-07-12 ENCOUNTER — OFFICE VISIT (OUTPATIENT)
Dept: SLEEP MEDICINE | Facility: MEDICAL CENTER | Age: 72
End: 2022-07-12
Payer: MEDICARE

## 2022-07-12 VITALS
WEIGHT: 177 LBS | HEIGHT: 67 IN | RESPIRATION RATE: 16 BRPM | HEART RATE: 79 BPM | BODY MASS INDEX: 27.78 KG/M2 | DIASTOLIC BLOOD PRESSURE: 62 MMHG | OXYGEN SATURATION: 98 % | SYSTOLIC BLOOD PRESSURE: 118 MMHG

## 2022-07-12 DIAGNOSIS — Z78.9 NONSMOKER: ICD-10-CM

## 2022-07-12 DIAGNOSIS — I10 ESSENTIAL HYPERTENSION: ICD-10-CM

## 2022-07-12 DIAGNOSIS — G47.31 COMPLEX SLEEP APNEA SYNDROME: Chronic | ICD-10-CM

## 2022-07-12 DIAGNOSIS — F51.04 CHRONIC INSOMNIA: Chronic | ICD-10-CM

## 2022-07-12 PROCEDURE — 99213 OFFICE O/P EST LOW 20 MIN: CPT | Performed by: NURSE PRACTITIONER

## 2022-07-12 ASSESSMENT — FIBROSIS 4 INDEX: FIB4 SCORE: 2.03

## 2022-07-12 NOTE — PROGRESS NOTES
Chief Complaint   Patient presents with   • Follow-Up     Chronic insomnia // last seen 7/27/2021       HPI:  Buster Medina is a 72 y.o. year old male here today for follow-up on ARNULFO.  Last OV 7/27/21     Currently using ASV 25, EPAP 15/6, PS 15/2, auto breath rate; RESPIRONICS; device obtained 2019.  Compliance report 6/11/2022 through 7/10/2022 notes 25 days of use, average nightly use 7 hours 10 minutes, average EPAP 6.4 cm, moderate to significant mask leak with a reduced AHI of 7.0/h.  Patient did have 1 night of elevated hypopneas.  Reviewed mask fit as well.  He denies significant mask fit but does use a nasal mask and reports ongoing mouth dryness.  He is try chinstrap which he feels are ineffective.  He recently tried somnifix CPAP tape which was also ineffective.  We discussed other brands of CPAP tape and chinstrap's to try versus a full facemask.  He denies waking with morning headaches.  He tolerates pressure well.  He goes to bed at 10:30 PM and usually uses a sleep aid and will fall asleep within 30 minutes to 1 hour.  He currently rotates between Belsomra 15 mg nightly, 1 mg lorazepam, and mirtazapine 15 mg.  His Belsomra was last filled by me in March which she still has medication on hand.  We reviewed appropriate use of sleep aids versus antianxiety medications and polypharmacy.  He notes generally having 2 bad nights of sleep within a month.  He does have a history of anxiety/depression and takes Prozac on an as-needed basis but finds it effective.  He will wake 1-2 times at night due to dry mouth and then wakes between 6:30 AM and 8:30 AM with no headaches.  He denies any dozing off or napping during the day.  He denies cardiac or respiratory symptoms.  He is anticipating going on vacation for 2 weeks on a motorcycle trip and discussed use of his ASV.    SLEEP HISTORY   ASV titration and the best tolerated pressure was ASV EPAP 5/15 cm PS 2/20 cm, the AHI improved to 3.8/hr and O2 halle 90  %. He was observed in REM sleep on this pressure.      Severe obstructive sleep apnea with AHI of 38.5/hr and O2 halle 87 %. Due to severity of the disease he met the split study protocol. The titration started with CPAP 7 cm and the best tolerated was ASV EPAP 4/15 cm PS 2/20 cm cm. The AHI improved to 19/hr with improved O2 halle of 88% and average O2 saturation of 94 %.     ROS: As per HPI and otherwise negative if not stated.    Past Medical History:   Diagnosis Date   • Anxiety    • Arthritis     osteo   • Bowel habit changes 06/24/2021    Constipation   • Cancer (HCC) 2007    bladder   • Chickenpox     as a child   • Depression     depression   • High cholesterol    • Hyperlipidemia    • Hypertension     pt states  well controlled on meds   • Kidney stone    • Mumps     as a child    • Obesity    • Pneumothorax    • Psoriatic arthritis (HCC)    • Restless leg syndrome    • Sleep apnea     uses cpap   • Tonsillitis        Past Surgical History:   Procedure Laterality Date   • OR CYSTOURETHROSCOPY,BIOPSIES  7/6/2021    Procedure: BIOPSY, BLADDER WITH FULGERATION;  Surgeon: Bob Millan M.D.;  Location: [facility];  Service: Urology   • BLADDER BIOPSY WITH CYSTOSCOPY  1/3/2018    Procedure: BLADDER BIOPSY WITH CYSTOSCOPY/WITH UPPER TRACT WASHING;  Surgeon: El Manuel M.D.;  Location: [facility];  Service: Urology   • RETROGRADES Bilateral 1/3/2018    Procedure: RETROGRADES;  Surgeon: El Manuel M.D.;  Location: [facility];  Service: Urology   • PYELOGRAM Bilateral 1/3/2018    Procedure: PYELOGRAM;  Surgeon: El Manuel M.D.;  Location: [facility];  Service: Urology   • OTHER ORTHOPEDIC SURGERY  2015    shoulder replacement   • ABDOMINAL EXPLORATION     • ARTHROPLASTY      right shoulder   • BLADDER BIOPSY WITH CYSTOSCOPY     • EYE SURGERY      lasik    • GASTRIC BYPASS LAPAROSCOPIC     • HERNIA REPAIR     • OPEN REDUCTION     • OTHER Left     thumb  "joint   • THORACOTOMY     • TURP-VAPOR         Family History   Problem Relation Age of Onset   • Cancer Mother 80        lung cancer   • Arthritis Mother    • Diabetes Mother    • Anxiety disorder Mother    • Lung Disease Father 65   • Cancer Father    • Diabetes Father    • Hypertension Father    • Hyperlipidemia Father    • Hyperlipidemia Brother    • Arthritis Brother    • Other Brother         Thyroid tumor   • Sleep Apnea Brother    • No Known Problems Maternal Grandmother    • Cancer Maternal Grandfather    • No Known Problems Paternal Grandmother    • No Known Problems Paternal Grandfather    • Heart Disease Brother 49        smoker, overweight   • Hypertension Brother    • Depression Other    • Suicide Attempts Neg Hx    • Bipolar disorder Neg Hx    • Alcohol abuse Neg Hx    • Drug abuse Neg Hx        Social History     Socioeconomic History   • Marital status: Single     Spouse name: Not on file   • Number of children: Not on file   • Years of education: Not on file   • Highest education level: Not on file   Occupational History   • Not on file   Tobacco Use   • Smoking status: Never Smoker   • Smokeless tobacco: Never Used   Vaping Use   • Vaping Use: Never used   Substance and Sexual Activity   • Alcohol use: Yes     Alcohol/week: 8.4 oz     Types: 14 Glasses of wine per week     Comment: 2 per day   • Drug use: Yes     Types: Marijuana, Oral     Comment: THC edible - \"once a month\".   • Sexual activity: Not Currently   Other Topics Concern   • Not on file   Social History Narrative   • Not on file     Social Determinants of Health     Financial Resource Strain: Not on file   Food Insecurity: Not on file   Transportation Needs: Not on file   Physical Activity: Not on file   Stress: Not on file   Social Connections: Not on file   Intimate Partner Violence: Not on file   Housing Stability: Not on file       Allergies as of 07/12/2022   • (No Known Allergies)        Vitals:  /62 (BP Location: Left " "arm, Patient Position: Sitting, BP Cuff Size: Adult)   Pulse 79   Resp 16   Ht 1.702 m (5' 7\")   Wt 80.3 kg (177 lb)   SpO2 98%     Current medications as of today   Current Outpatient Medications   Medication Sig Dispense Refill   • lisinopril (PRINIVIL) 10 MG Tab Take 1 Tablet by mouth every day. STOP 20 MG DOSE 90 Tablet 0   • mirtazapine (REMERON) 15 MG Tab TAKE 1/2 TABLET AT BEDTIME 45 Tablet 0   • HUMIRA PEN 40 MG/0.4ML Pen-injector Kit INJECT 1 PEN UNDER THE SKIN EVERY 14 DAYS. 6 Each 0   • celecoxib (CELEBREX) 200 MG Cap TAKE 1 CAPSULE DAILY. NEED OFFICE VISIT FOR REFILL 90 Capsule 0   • atorvastatin (LIPITOR) 40 MG Tab TAKE 1 TABLET DAILY 90 Tablet 3   • fluoxetine (PROZAC) 40 MG capsule Take 1 Capsule by mouth every day. 90 Capsule 2   • LORazepam (ATIVAN) 2 MG tablet lorazepam 2 mg tablet     • ferrous sulfate (FEOSOL) 220 (44 Fe) MG/5ML Elixir      • B Complex Vitamins (VITAMIN B COMPLEX PO)      • Cholecalciferol (VITAMIN D3) 1.25 MG (23305 UT) Cap      • amoxicillin (AMOXIL) 500 MG Cap Take 1 Capsule by mouth 3 times a day. 30 Capsule 0   • valacyclovir (VALTREX) 1 GM Tab TAKE 1 TABLET TWICE A DAY  FOR 7 DAYS AS NEEDED FOR   OUTBREAK 24 Tablet 5   • omeprazole (PRILOSEC) 20 MG delayed-release capsule Take 20 mg by mouth every evening.     • aspirin EC (ECOTRIN) 81 MG Tablet Delayed Response Take 81 mg by mouth every bedtime. 30 Tab 0   • Multiple Vitamins-Minerals (MELODY-DAY 1000 PO) Take 1 Tab by mouth 2 Times a Day.       No current facility-administered medications for this visit.         Physical Exam:   Gen:           Alert and oriented, No apparent distress. Mood and affect appropriate, normal interaction with examiner.  Eyes:          PERRL, EOM intact, sclere white, conjunctive moist.  Ears:          Not examined.   Hearing:     Grossly intact.  Nose:          Normal, no lesions or deformities.  Dentition:    mask  Oropharynx:   mask  Mallampati Classification: mask  Neck:        Supple, " trachea midline, no masses.  Respiratory Effort: No intercostal retractions or use of accessory muscles.   Lung Auscultation:      Clear to auscultation bilaterally; no rales, rhonchi or wheezing.  CV:            Regular rate and rhythm. No murmurs, rubs or gallops.  Abd:           Not examined.   Lymphadenopathy: Not examined.  Gait and Station: Normal.  Digits and Nails: No clubbing, cyanosis, petechiae, or nodes.   Cranial Nerves: II-XII grossly intact.  Skin:        No rashes, lesions or ulcers noted.               Ext:           No cyanosis or edema.      Assessment:  1. Complex sleep apnea syndrome     2. Chronic insomnia     3. Essential hypertension     4. BMI 27.0-27.9,adult     5. Nonsmoker         Immunizations:    Flu:10/12/21  Pneumovax 23:2020  Prevnar 13:2020  COVID-19: 4/1/22, 9/25/21, 3/25/21, 2/25/21    Plan:  1.  Patient's complex sleep apnea is well controlled.  He will continue current pressure settings.  DME mask/supplies  We will adjust humidifier to a fixed setting, using heated tubing at 3, humidifier set at 4  He will try a paper tape in an X shape on his lips at night and also review other chinstrap's to prevent mouth dryness.  May also use oral lubricants including XyliMelts, Biotene mouthwash/toothbrush/spray.  2.  Discussed sleep hygiene  3.  Discussed sleep aid use in depth.  Advise trying to stick with one to prevent polypharmacy and for overall benefit of therapy.  No refills given today on Belsomra.  4.  Follow primary care for the health concerns  5.  Follow-up in 1 year with compliance report, sooner if needed.    Please note that this dictation was created using voice recognition software. I have made every reasonable attempt to correct obvious errors, but it is possible there are errors of grammar and possibly content that I did not discover before finalizing the note.

## 2022-08-15 ENCOUNTER — PRE-ADMISSION TESTING (OUTPATIENT)
Dept: ADMISSIONS | Facility: MEDICAL CENTER | Age: 72
End: 2022-08-15
Attending: UROLOGY
Payer: MEDICARE

## 2022-08-15 DIAGNOSIS — Z01.810 PRE-OPERATIVE CARDIOVASCULAR EXAMINATION: ICD-10-CM

## 2022-08-15 DIAGNOSIS — Z01.812 PRE-OPERATIVE LABORATORY EXAMINATION: ICD-10-CM

## 2022-08-15 LAB
ANION GAP SERPL CALC-SCNC: 10 MMOL/L (ref 7–16)
APPEARANCE UR: CLEAR
APTT PPP: 24.7 SEC (ref 24.7–36)
BASOPHILS # BLD AUTO: 0.9 % (ref 0–1.8)
BASOPHILS # BLD: 0.05 K/UL (ref 0–0.12)
BILIRUB UR QL STRIP.AUTO: NEGATIVE
BUN SERPL-MCNC: 17 MG/DL (ref 8–22)
CALCIUM SERPL-MCNC: 9.6 MG/DL (ref 8.5–10.5)
CHLORIDE SERPL-SCNC: 104 MMOL/L (ref 96–112)
CO2 SERPL-SCNC: 25 MMOL/L (ref 20–33)
COLOR UR: NORMAL
CREAT SERPL-MCNC: 1.11 MG/DL (ref 0.5–1.4)
EKG IMPRESSION: NORMAL
EOSINOPHIL # BLD AUTO: 0.15 K/UL (ref 0–0.51)
EOSINOPHIL NFR BLD: 2.8 % (ref 0–6.9)
ERYTHROCYTE [DISTWIDTH] IN BLOOD BY AUTOMATED COUNT: 47.4 FL (ref 35.9–50)
GFR SERPLBLD CREATININE-BSD FMLA CKD-EPI: 71 ML/MIN/1.73 M 2
GLUCOSE SERPL-MCNC: 107 MG/DL (ref 65–99)
GLUCOSE UR STRIP.AUTO-MCNC: NEGATIVE MG/DL
HCT VFR BLD AUTO: 42.6 % (ref 42–52)
HGB BLD-MCNC: 13.8 G/DL (ref 14–18)
IMM GRANULOCYTES # BLD AUTO: 0.02 K/UL (ref 0–0.11)
IMM GRANULOCYTES NFR BLD AUTO: 0.4 % (ref 0–0.9)
INR PPP: 1 (ref 0.87–1.13)
KETONES UR STRIP.AUTO-MCNC: NEGATIVE MG/DL
LEUKOCYTE ESTERASE UR QL STRIP.AUTO: NEGATIVE
LYMPHOCYTES # BLD AUTO: 2 K/UL (ref 1–4.8)
LYMPHOCYTES NFR BLD: 37.2 % (ref 22–41)
MCH RBC QN AUTO: 31.7 PG (ref 27–33)
MCHC RBC AUTO-ENTMCNC: 32.4 G/DL (ref 33.7–35.3)
MCV RBC AUTO: 97.9 FL (ref 81.4–97.8)
MICRO URNS: NORMAL
MONOCYTES # BLD AUTO: 0.67 K/UL (ref 0–0.85)
MONOCYTES NFR BLD AUTO: 12.5 % (ref 0–13.4)
NEUTROPHILS # BLD AUTO: 2.48 K/UL (ref 1.82–7.42)
NEUTROPHILS NFR BLD: 46.2 % (ref 44–72)
NITRITE UR QL STRIP.AUTO: NEGATIVE
NRBC # BLD AUTO: 0 K/UL
NRBC BLD-RTO: 0 /100 WBC
PH UR STRIP.AUTO: 6.5 [PH] (ref 5–8)
PLATELET # BLD AUTO: 139 K/UL (ref 164–446)
PMV BLD AUTO: 12.4 FL (ref 9–12.9)
POTASSIUM SERPL-SCNC: 4.3 MMOL/L (ref 3.6–5.5)
PROT UR QL STRIP: NEGATIVE MG/DL
PROTHROMBIN TIME: 13.1 SEC (ref 12–14.6)
RBC # BLD AUTO: 4.35 M/UL (ref 4.7–6.1)
RBC UR QL AUTO: NEGATIVE
SODIUM SERPL-SCNC: 139 MMOL/L (ref 135–145)
SP GR UR STRIP.AUTO: 1.02
UROBILINOGEN UR STRIP.AUTO-MCNC: 0.2 MG/DL
WBC # BLD AUTO: 5.4 K/UL (ref 4.8–10.8)

## 2022-08-15 PROCEDURE — 85610 PROTHROMBIN TIME: CPT

## 2022-08-15 PROCEDURE — 93005 ELECTROCARDIOGRAM TRACING: CPT

## 2022-08-15 PROCEDURE — 80048 BASIC METABOLIC PNL TOTAL CA: CPT

## 2022-08-15 PROCEDURE — 85730 THROMBOPLASTIN TIME PARTIAL: CPT

## 2022-08-15 PROCEDURE — 93010 ELECTROCARDIOGRAM REPORT: CPT | Performed by: INTERNAL MEDICINE

## 2022-08-15 PROCEDURE — 36415 COLL VENOUS BLD VENIPUNCTURE: CPT

## 2022-08-15 PROCEDURE — 85025 COMPLETE CBC W/AUTO DIFF WBC: CPT

## 2022-08-15 PROCEDURE — 81003 URINALYSIS AUTO W/O SCOPE: CPT

## 2022-08-15 PROCEDURE — 87086 URINE CULTURE/COLONY COUNT: CPT

## 2022-08-15 ASSESSMENT — FIBROSIS 4 INDEX: FIB4 SCORE: 2.03

## 2022-08-16 ENCOUNTER — TELEPHONE (OUTPATIENT)
Dept: RHEUMATOLOGY | Facility: MEDICAL CENTER | Age: 72
End: 2022-08-16
Payer: MEDICARE

## 2022-08-16 NOTE — TELEPHONE ENCOUNTER
Patient having problems with his joints, please schedule follow-up appointment according to patient's calendar to evaluate.

## 2022-08-17 ENCOUNTER — ANESTHESIA (OUTPATIENT)
Dept: SURGERY | Facility: MEDICAL CENTER | Age: 72
End: 2022-08-17
Payer: MEDICARE

## 2022-08-17 ENCOUNTER — OFFICE VISIT (OUTPATIENT)
Dept: CARDIOLOGY | Facility: MEDICAL CENTER | Age: 72
End: 2022-08-17
Payer: MEDICARE

## 2022-08-17 ENCOUNTER — ANESTHESIA EVENT (OUTPATIENT)
Dept: SURGERY | Facility: MEDICAL CENTER | Age: 72
End: 2022-08-17
Payer: MEDICARE

## 2022-08-17 ENCOUNTER — HOSPITAL ENCOUNTER (OUTPATIENT)
Facility: MEDICAL CENTER | Age: 72
End: 2022-08-17
Attending: UROLOGY | Admitting: UROLOGY
Payer: MEDICARE

## 2022-08-17 VITALS
RESPIRATION RATE: 16 BRPM | WEIGHT: 178.79 LBS | SYSTOLIC BLOOD PRESSURE: 126 MMHG | HEIGHT: 67 IN | DIASTOLIC BLOOD PRESSURE: 62 MMHG | HEART RATE: 61 BPM | OXYGEN SATURATION: 97 % | TEMPERATURE: 97.1 F | BODY MASS INDEX: 28.06 KG/M2

## 2022-08-17 VITALS
WEIGHT: 180.1 LBS | HEIGHT: 67 IN | DIASTOLIC BLOOD PRESSURE: 80 MMHG | RESPIRATION RATE: 14 BRPM | BODY MASS INDEX: 28.27 KG/M2 | HEART RATE: 75 BPM | OXYGEN SATURATION: 98 % | SYSTOLIC BLOOD PRESSURE: 130 MMHG

## 2022-08-17 DIAGNOSIS — I34.0 NONRHEUMATIC MITRAL VALVE REGURGITATION: ICD-10-CM

## 2022-08-17 LAB
BACTERIA UR CULT: NORMAL
SIGNIFICANT IND 70042: NORMAL
SITE SITE: NORMAL
SOURCE SOURCE: NORMAL

## 2022-08-17 PROCEDURE — 700105 HCHG RX REV CODE 258: Performed by: UROLOGY

## 2022-08-17 PROCEDURE — 160009 HCHG ANES TIME/MIN: Performed by: UROLOGY

## 2022-08-17 PROCEDURE — 700111 HCHG RX REV CODE 636 W/ 250 OVERRIDE (IP): Performed by: ANESTHESIOLOGY

## 2022-08-17 PROCEDURE — 700101 HCHG RX REV CODE 250: Performed by: ANESTHESIOLOGY

## 2022-08-17 PROCEDURE — 160028 HCHG SURGERY MINUTES - 1ST 30 MINS LEVEL 3: Performed by: UROLOGY

## 2022-08-17 PROCEDURE — 700101 HCHG RX REV CODE 250: Performed by: STUDENT IN AN ORGANIZED HEALTH CARE EDUCATION/TRAINING PROGRAM

## 2022-08-17 PROCEDURE — 00910 ANES TRANSURETHRAL PX NOS: CPT | Performed by: ANESTHESIOLOGY

## 2022-08-17 PROCEDURE — 160035 HCHG PACU - 1ST 60 MINS PHASE I: Performed by: UROLOGY

## 2022-08-17 PROCEDURE — 160048 HCHG OR STATISTICAL LEVEL 1-5: Performed by: UROLOGY

## 2022-08-17 PROCEDURE — 99100 ANES PT EXTEME AGE<1 YR&>70: CPT | Performed by: ANESTHESIOLOGY

## 2022-08-17 PROCEDURE — 160002 HCHG RECOVERY MINUTES (STAT): Performed by: UROLOGY

## 2022-08-17 PROCEDURE — 160025 RECOVERY II MINUTES (STATS): Performed by: UROLOGY

## 2022-08-17 PROCEDURE — 160046 HCHG PACU - 1ST 60 MINS PHASE II: Performed by: UROLOGY

## 2022-08-17 PROCEDURE — 99204 OFFICE O/P NEW MOD 45 MIN: CPT | Performed by: INTERNAL MEDICINE

## 2022-08-17 PROCEDURE — 700105 HCHG RX REV CODE 258: Performed by: ANESTHESIOLOGY

## 2022-08-17 RX ORDER — FERROUS SULFATE 325(65) MG
325 TABLET ORAL DAILY
COMMUNITY

## 2022-08-17 RX ORDER — ONDANSETRON 2 MG/ML
4 INJECTION INTRAMUSCULAR; INTRAVENOUS
Status: DISCONTINUED | OUTPATIENT
Start: 2022-08-17 | End: 2022-08-17 | Stop reason: HOSPADM

## 2022-08-17 RX ORDER — OXYCODONE HCL 5 MG/5 ML
5 SOLUTION, ORAL ORAL
Status: DISCONTINUED | OUTPATIENT
Start: 2022-08-17 | End: 2022-08-17 | Stop reason: HOSPADM

## 2022-08-17 RX ORDER — SODIUM CHLORIDE, SODIUM LACTATE, POTASSIUM CHLORIDE, CALCIUM CHLORIDE 600; 310; 30; 20 MG/100ML; MG/100ML; MG/100ML; MG/100ML
INJECTION, SOLUTION INTRAVENOUS CONTINUOUS
Status: DISCONTINUED | OUTPATIENT
Start: 2022-08-17 | End: 2022-08-17 | Stop reason: HOSPADM

## 2022-08-17 RX ORDER — HYDROMORPHONE HYDROCHLORIDE 1 MG/ML
0.2 INJECTION, SOLUTION INTRAMUSCULAR; INTRAVENOUS; SUBCUTANEOUS
Status: DISCONTINUED | OUTPATIENT
Start: 2022-08-17 | End: 2022-08-17 | Stop reason: HOSPADM

## 2022-08-17 RX ORDER — MEPERIDINE HYDROCHLORIDE 25 MG/ML
12.5 INJECTION INTRAMUSCULAR; INTRAVENOUS; SUBCUTANEOUS
Status: DISCONTINUED | OUTPATIENT
Start: 2022-08-17 | End: 2022-08-17 | Stop reason: HOSPADM

## 2022-08-17 RX ORDER — DEXAMETHASONE SODIUM PHOSPHATE 4 MG/ML
INJECTION, SOLUTION INTRA-ARTICULAR; INTRALESIONAL; INTRAMUSCULAR; INTRAVENOUS; SOFT TISSUE PRN
Status: DISCONTINUED | OUTPATIENT
Start: 2022-08-17 | End: 2022-08-17 | Stop reason: SURG

## 2022-08-17 RX ORDER — ALBUTEROL SULFATE 2.5 MG/3ML
2.5 SOLUTION RESPIRATORY (INHALATION)
Status: DISCONTINUED | OUTPATIENT
Start: 2022-08-17 | End: 2022-08-17 | Stop reason: HOSPADM

## 2022-08-17 RX ORDER — HYDROMORPHONE HYDROCHLORIDE 1 MG/ML
0.4 INJECTION, SOLUTION INTRAMUSCULAR; INTRAVENOUS; SUBCUTANEOUS
Status: DISCONTINUED | OUTPATIENT
Start: 2022-08-17 | End: 2022-08-17 | Stop reason: HOSPADM

## 2022-08-17 RX ORDER — SODIUM CHLORIDE, SODIUM LACTATE, POTASSIUM CHLORIDE, CALCIUM CHLORIDE 600; 310; 30; 20 MG/100ML; MG/100ML; MG/100ML; MG/100ML
INJECTION, SOLUTION INTRAVENOUS CONTINUOUS
Status: DISCONTINUED | OUTPATIENT
Start: 2022-08-17 | End: 2022-08-17

## 2022-08-17 RX ORDER — LIDOCAINE HYDROCHLORIDE 20 MG/ML
JELLY TOPICAL
Status: DISCONTINUED | OUTPATIENT
Start: 2022-08-17 | End: 2022-08-17 | Stop reason: HOSPADM

## 2022-08-17 RX ORDER — LIDOCAINE HYDROCHLORIDE 20 MG/ML
INJECTION, SOLUTION EPIDURAL; INFILTRATION; INTRACAUDAL; PERINEURAL PRN
Status: DISCONTINUED | OUTPATIENT
Start: 2022-08-17 | End: 2022-08-17 | Stop reason: SURG

## 2022-08-17 RX ORDER — ONDANSETRON 2 MG/ML
INJECTION INTRAMUSCULAR; INTRAVENOUS PRN
Status: DISCONTINUED | OUTPATIENT
Start: 2022-08-17 | End: 2022-08-17 | Stop reason: SURG

## 2022-08-17 RX ORDER — HYDRALAZINE HYDROCHLORIDE 20 MG/ML
5 INJECTION INTRAMUSCULAR; INTRAVENOUS
Status: DISCONTINUED | OUTPATIENT
Start: 2022-08-17 | End: 2022-08-17 | Stop reason: HOSPADM

## 2022-08-17 RX ORDER — CEFAZOLIN SODIUM 1 G/3ML
INJECTION, POWDER, FOR SOLUTION INTRAMUSCULAR; INTRAVENOUS PRN
Status: DISCONTINUED | OUTPATIENT
Start: 2022-08-17 | End: 2022-08-17 | Stop reason: SURG

## 2022-08-17 RX ORDER — HYDROMORPHONE HYDROCHLORIDE 1 MG/ML
0.1 INJECTION, SOLUTION INTRAMUSCULAR; INTRAVENOUS; SUBCUTANEOUS
Status: DISCONTINUED | OUTPATIENT
Start: 2022-08-17 | End: 2022-08-17 | Stop reason: HOSPADM

## 2022-08-17 RX ORDER — HALOPERIDOL 5 MG/ML
1 INJECTION INTRAMUSCULAR
Status: DISCONTINUED | OUTPATIENT
Start: 2022-08-17 | End: 2022-08-17 | Stop reason: HOSPADM

## 2022-08-17 RX ORDER — SODIUM CHLORIDE, SODIUM LACTATE, POTASSIUM CHLORIDE, CALCIUM CHLORIDE 600; 310; 30; 20 MG/100ML; MG/100ML; MG/100ML; MG/100ML
INJECTION, SOLUTION INTRAVENOUS
Status: DISCONTINUED | OUTPATIENT
Start: 2022-08-17 | End: 2022-08-17 | Stop reason: SURG

## 2022-08-17 RX ORDER — SUVOREXANT 15 MG/1
15 TABLET, FILM COATED ORAL NIGHTLY PRN
COMMUNITY
Start: 2022-07-19 | End: 2023-08-22

## 2022-08-17 RX ORDER — OXYCODONE HCL 5 MG/5 ML
10 SOLUTION, ORAL ORAL
Status: DISCONTINUED | OUTPATIENT
Start: 2022-08-17 | End: 2022-08-17 | Stop reason: HOSPADM

## 2022-08-17 RX ADMIN — FENTANYL CITRATE 100 MCG: 50 INJECTION, SOLUTION INTRAMUSCULAR; INTRAVENOUS at 15:45

## 2022-08-17 RX ADMIN — PROPOFOL 200 MG: 10 INJECTION, EMULSION INTRAVENOUS at 15:39

## 2022-08-17 RX ADMIN — FENTANYL CITRATE 100 MCG: 50 INJECTION, SOLUTION INTRAMUSCULAR; INTRAVENOUS at 15:39

## 2022-08-17 RX ADMIN — SODIUM CHLORIDE, POTASSIUM CHLORIDE, SODIUM LACTATE AND CALCIUM CHLORIDE: 600; 310; 30; 20 INJECTION, SOLUTION INTRAVENOUS at 15:35

## 2022-08-17 RX ADMIN — CEFAZOLIN 2 G: 330 INJECTION, POWDER, FOR SOLUTION INTRAMUSCULAR; INTRAVENOUS at 15:39

## 2022-08-17 RX ADMIN — SODIUM CHLORIDE, POTASSIUM CHLORIDE, SODIUM LACTATE AND CALCIUM CHLORIDE: 600; 310; 30; 20 INJECTION, SOLUTION INTRAVENOUS at 15:05

## 2022-08-17 RX ADMIN — DEXAMETHASONE SODIUM PHOSPHATE 8 MG: 4 INJECTION, SOLUTION INTRA-ARTICULAR; INTRALESIONAL; INTRAMUSCULAR; INTRAVENOUS; SOFT TISSUE at 15:39

## 2022-08-17 RX ADMIN — ONDANSETRON 4 MG: 2 INJECTION INTRAMUSCULAR; INTRAVENOUS at 15:49

## 2022-08-17 RX ADMIN — MIDAZOLAM 2 MG: 1 INJECTION INTRAMUSCULAR; INTRAVENOUS at 15:35

## 2022-08-17 RX ADMIN — LIDOCAINE HYDROCHLORIDE 100 MG: 20 INJECTION, SOLUTION EPIDURAL; INFILTRATION; INTRACAUDAL at 15:39

## 2022-08-17 ASSESSMENT — FIBROSIS 4 INDEX
FIB4 SCORE: 2.6
FIB4 SCORE: 2.6

## 2022-08-17 NOTE — ANESTHESIA TIME REPORT
Anesthesia Start and Stop Event Times     Date Time Event    8/17/2022 1509 Ready for Procedure     1535 Anesthesia Start     1559 Anesthesia Stop        Responsible Staff  08/17/22    Name Role Begin End    Karlos Dean M.D. Anesth 1532 1556        Overtime Reason:  no overtime (within assigned shift)    Comments:

## 2022-08-17 NOTE — DISCHARGE INSTRUCTIONS
HOME CARE INSTRUCTIONS    ACTIVITY: Rest and take it easy for the first 24 hours.  A responsible adult is recommended to remain with you during that time.  It is normal to feel sleepy.  We encourage you to not do anything that requires balance, judgment or coordination.    FOR 24 HOURS DO NOT:  Drive, operate machinery or run household appliances.  Drink beer or alcoholic beverages.  Make important decisions or sign legal documents.    SPECIAL INSTRUCTIONS:       DR. ANDERSON'S DISCHARGE INSTRUCTIONS FOLLOWING    CYSTOSCOPY     DIET:  You can resume your regular diet. We encourage you to eat well-balanced and nutritious meals.      ACTIVITY:  Please restrain from strenuous activity or heavy lifting (more than 10 pounds) for 1 week following your procedure.  Please walk daily as much as tolerated, making exercise a part of your daily life. Do not drive while using narcotics for pain control if you are using them.  Please avoid excessive straining with defecation and use stool softeners as directed to prevent constipation!     WOUND CARE:  You have no dressing or wound to manage.      MEDICATIONS:  1. Please use plain tylenol for pain and stool softeners (Miralax and Colace) as directed.   2. Continue your home medications.    FOLLOW-UP:  We will call you to schedule your follow up appointment for repeat procedure in 1 year. Please call if you do not hear from us.    WARNING SIGNS:  Fever greater than 101 degrees Fahrenheit, chills, nausea or vomiting, Large amount of clots in urine that make it difficult to urinate or for urine to drain from mccoy, increasing pain, or abdominal swelling. If you are experiencing these symptoms, call the Urology Clinic or go to your local PCP or emergency room.    It is normal to see blood in your urine for up to 2 weeks even from surgery. The urine may clear up entirely, and then turn bloody again a few days later depending on your activity level; do not be alarmed. However, if you  experience severe pain or tenderness, have a lot of increased bleeding, or find that you are unable to urinate because of large clots, please notify your doctor immediately     MEDICAL HELP DURING NORMAL BUSINESS HOURS  Between the hours of 7 AM and 7 PM, please call [phone] to speak with Dr. Weber's staff.     MEDICAL HELP AFTER HOURS  If you have a serious emergency such as chest pain, shortness of breath, relentless pain you should call 911. For other urgent problems after hours you may contact the urology physician on call by phoning the [phone]. You may also visit the Emergency room at local [facility] for help.     For non-emergent problems such as prescription refills or routine questions, please do your best to contact us during normal business hours. This after-hours number should be used for urgent or emergent questions only.         Antony Weber M.D.   [address] [facility] [place] [address]   [phone]             DIET: To avoid nausea, slowly advance diet as tolerated, avoiding spicy or greasy foods for the first day.  Add more substantial food to your diet according to your physician's instructions. INCREASE FLUIDS AND FIBER TO AVOID CONSTIPATION.    SURGICAL DRESSING/BATHING: see above    MEDICATIONS: Resume taking daily medication.  Take prescribed pain medication with food.  If no medication is prescribed, you may take non-aspirin pain medication if needed.  PAIN MEDICATION CAN BE VERY CONSTIPATING.  Take a stool softener or laxative such as senokot, pericolace, or milk of magnesia if needed.    A follow-up appointment should be arranged with your doctor in 1-2 weeks or as instructed; call to schedule.    You should CALL YOUR PHYSICIAN if you develop:  Fever greater than 101 degrees F.  Pain not relieved by medication, or persistent nausea or vomiting.  Excessive bleeding (blood soaking through dressing) or unexpected drainage from the wound.  Extreme redness or swelling around the incision  site, drainage of pus or foul smelling drainage.  Inability to urinate or empty your bladder within 8 hours.  Problems with breathing or chest pain.    You should call 911 if you develop problems with breathing or chest pain.  If you are unable to contact your doctor or surgical center, you should go to the nearest emergency room or urgent care center.  Physician's telephone #: Dr. Weber: [phone]    MILD FLU-LIKE SYMPTOMS ARE NORMAL.  YOU MAY EXPERIENCE GENERALIZED MUSCLE ACHES, THROAT IRRITATION, HEADACHE AND/OR SOME NAUSEA.    If any questions arise, call your doctor.  If your doctor is not available, please feel free to call the Surgical Center at [phone].  The Center is open Monday through Friday from 7AM to 7PM.      A registered nurse may call you a few days after your surgery to see how you are doing after your procedure.    You may also receive a survey in the mail within the next two weeks and we ask that you take a few moments to complete the survey and return it to us.  Our goal is to provide you with very good care and we value your comments.

## 2022-08-17 NOTE — OR NURSING
Assumed care of patient in pre-op.   Consents for surgery and anesthesia signed.   Pre-op checklist complete.   PIV started.   Blood sample sent to lab for N/A.  Belongings at bedside, will lock up prior to patient going to surgery.   Call light in reach. Safety precautions in place.      Waiting on contract approval with [facility] for dialysis.  devaughn on executive team for approval

## 2022-08-17 NOTE — OP REPORT
Urologic Surgery Operative Report     Pre-operative Diagnosis: History of bladder cancer   Post-operative Diagnosis: Same as above   Procedure: Cystoscopy   Attending: Antony Weber M.D.    Assistant: Jerzy Jasso MD   Anesthesia: General   Estimated Blood Loss: Minimal   IV fluids Per anesthesia   Specimens: None   Drains: None   Complications: None   Condition: Stable, procedure well tolerated    Wound classification: II Clean contaminated   Disposition:  PACU, discharge home after recovery, follow-up in 1 year with repeat cystoscopy under anesthesia   Findings: Normal bladder     INDICATIONS FOR PROCEDURE:  Buster Medina is a with a prior history of bladder cancer/CIS dating back to 2013 who presents for routine surveillance cystoscopy after being lost to follow-up for several years. Risks discussed, but not limited to, were infection, sepsis, bleeding, bladder injury, need for secondary procedure, inability to resect all of tumor, injury to ureters, need for mccoy post op, possible admission post operatively, and the cardiovascular and pulmonary complications of anesthesia/surgery including DVT, Mi, PE, and death.  After a full discussion of the alternatives risks and benefits of the procedure, the patient consented to proceeding with the planned procedure.    PROCEDURE IN DETAIL:   After informed consent was obtained, the patient was taken to the operating room and given Ancef for preoperative antibiotics.  After satisfactory induction of general anesthesia, he was then placed in the lithotomy position after a time out was called and prepped and draped in the normal sterile fashion.  Cystoscopy was initially performed using 30 degree lens which revealed a enlarged prostate.  Pan cystoscopy was performed thoroughly.  No tumors, stones, or lesions were noted.  Bladder was emptied and scope was withdrawn.    Dispo: Home    Plan: Repeat cystoscopy under anesthesia in 1 year.

## 2022-08-17 NOTE — ANESTHESIA PREPROCEDURE EVALUATION
Case: [id number] Anesthesia Start Date/Time: 08/17/22 1535    Procedure: CYSTOSCOPY, WITH POSSIBLE BLADDER BIOPSY (Bladder)    Anesthesia type: General    Pre-op diagnosis: HISTORY OF MALIGNANT NEOPLASM OF BLADDER    Location: [facility] OR [facility] / SURGERY [facility]    Surgeons: Jerzy Jasso M.D.      History of malignant neoplasm of bladder  Moderate mitral regurgitation    Relevant Problems   ANESTHESIA   (positive) Obstructive sleep apnea (adult) (pediatric)      CARDIAC   (positive) Essential hypertension      GI   (positive) Gastroesophageal reflux disease without esophagitis      Other   (positive) Familial hyperlipidemia   (positive) History of gastric bypass   (positive) Major depressive disorder   (positive) Periodic limb movement   (positive) Psoriatic arthritis (HCC)   (positive) Testicular hypofunction       Physical Exam    Airway   Mallampati: II  TM distance: >3 FB  Neck ROM: full       Cardiovascular - normal exam  Rhythm: regular  Rate: normal  (-) murmur     Dental - normal exam           Pulmonary - normal exam  Breath sounds clear to auscultation     Abdominal    Neurological - normal exam                 Anesthesia Plan    ASA 2       Plan - general       Airway plan will be LMA          Induction: intravenous    Postoperative Plan: Postoperative administration of opioids is intended.    Pertinent diagnostic labs and testing reviewed    Informed Consent:    Anesthetic plan and risks discussed with patient.

## 2022-08-17 NOTE — ANESTHESIA PROCEDURE NOTES
Airway    Date/Time: 8/17/2022 3:40 PM  Performed by: Karlos Dean M.D.  Authorized by: Karlos Dean M.D.     Location:  OR  Urgency:  Elective  Indications for Airway Management:  Anesthesia      Spontaneous Ventilation: absent    Sedation Level:  Deep  Preoxygenated: Yes    Final Airway Type:  Supraglottic airway  Final Supraglottic Airway:  Standard LMA    SGA Size:  5  Number of Attempts at Approach:  1

## 2022-08-17 NOTE — PROGRESS NOTES
Cardiology Initial Consultation Note    Date of note:    8/17/2022      Patient Name: Buster Medina   YOB: 1950  MRN:              [id number]    Chief Complaint: Lightheadedness    Buster Medina is a 72 y.o. male  patient presented today for preoperative cardiovascular examination also lightheadedness when he is standing up.  When he stands up he feels lightheadedness, if he is not careful he could faint.  Denies chest pain, shortness of breath with exertion.  He has bilateral shoulder pains shooting down into his arms.  He is having a cystoscopy later today, his preoperative EKG read as possible lateral infarct that is another reason that he is here today.        Past Medical History:   Diagnosis Date    Anxiety     Arthritis     osteo    Bowel habit changes 06/24/2021    Constipation    Cancer (HCC) 2007    bladder    Chickenpox     as a child    Depression     depression    High cholesterol     Hyperlipidemia     Hypertension     pt states  well controlled on meds    Kidney stone     Mumps     as a child     Obesity     Pneumothorax     Psoriatic arthritis (HCC)     Restless leg syndrome     Sleep apnea     uses cpap    Tonsillitis              Current Outpatient Medications   Medication Sig Dispense Refill    BELSOMRA 15 MG Tab TAKE 1 TABLET BY MOUTH AT BEDTIME AS NEEDED FOR UP TO 90 DAYS. F51      LORazepam (ATIVAN) 2 MG tablet TAKE 1 TABLET DAILY AT     BEDTIME AS NEEDED FOR      ANXIETY 30 Tablet 2    lisinopril (PRINIVIL) 10 MG Tab Take 1 Tablet by mouth every day. STOP 20 MG DOSE 90 Tablet 0    mirtazapine (REMERON) 15 MG Tab TAKE 1/2 TABLET AT BEDTIME 45 Tablet 0    HUMIRA PEN 40 MG/0.4ML Pen-injector Kit INJECT 1 PEN UNDER THE SKIN EVERY 14 DAYS. 6 Each 0    celecoxib (CELEBREX) 200 MG Cap TAKE 1 CAPSULE DAILY. NEED OFFICE VISIT FOR REFILL 90 Capsule 0    atorvastatin (LIPITOR) 40 MG Tab TAKE 1 TABLET DAILY 90 Tablet 3    fluoxetine (PROZAC) 40 MG capsule Take 1 Capsule by  "mouth every day. 90 Capsule 2    ferrous sulfate (FEOSOL) 220 (44 Fe) MG/5ML Elixir       B Complex Vitamins (VITAMIN B COMPLEX PO)       Cholecalciferol (VITAMIN D3) 1.25 MG (47174 UT) Cap       amoxicillin (AMOXIL) 500 MG Cap Take 1 Capsule by mouth 3 times a day. 30 Capsule 0    valacyclovir (VALTREX) 1 GM Tab TAKE 1 TABLET TWICE A DAY  FOR 7 DAYS AS NEEDED FOR   OUTBREAK (Patient taking differently: TAKE 1 TABLET TWICE A DAY  FOR 7 DAYS AS NEEDED FOR   OUTBREAK) 24 Tablet 5    omeprazole (PRILOSEC) 20 MG delayed-release capsule Take 20 mg by mouth every evening.      aspirin EC (ECOTRIN) 81 MG Tablet Delayed Response Take 81 mg by mouth every bedtime. 30 Tab 0    Multiple Vitamins-Minerals (MELODY-DAY 1000 PO) Take 1 Tab by mouth 2 Times a Day.       No current facility-administered medications for this visit.             Physical Exam:  Ambulatory Vitals  /78 (BP Location: Left arm, Patient Position: Sitting, BP Cuff Size: Adult)   Pulse 75   Resp 14   Ht 1.702 m (5' 7\")   Wt 81.7 kg (180 lb 1.6 oz)   SpO2 98%    Oxygen Therapy:  Pulse Oximetry: 98 %  BP Readings from Last 4 Encounters:   08/17/22 138/78   07/12/22 118/62   06/30/22 122/76   06/27/22 (!) 148/78       Weight/BMI: Body mass index is 28.21 kg/m².  Wt Readings from Last 4 Encounters:   08/17/22 81.7 kg (180 lb 1.6 oz)   07/12/22 80.3 kg (177 lb)   06/30/22 79.5 kg (175 lb 3.2 oz)   06/27/22 77.6 kg (171 lb)           General: Well appearing and in no apparent distress  Neck: carotid bruits absent  Lungs: respiratory sounds  normal  Heart: Regular rhythm,  No palpable thrills on palpation, murmurs 3 x 6 holosystolic murmur apex, no rubs,   Lowe extremity edema absent.       Echocardiogram 10/8/2018 reviewed, independently interpreted  CONCLUSIONS  No prior study is available for comparison.   Left ventricular ejection fraction is visually estimated to be 70%.  Mild concentric left ventricular hypertrophy.  Mildly dilated left " atrium.  Moderate mitral regurgitation, 2 jets.  Unable to estimate pulmonary artery pressure due to an inadequate   tricuspid regurgitant jet.      Medical Decision Makin-year-old male patient with dyslipidemia, hypertension, family history of coronary artery disease with orthostatic lightheadedness, mitral regurgitation.    EKG from 8/15/2022 reviewed, not convinced about lateral wall MI.  He should be able to proceed with cystoscopy planned later today.  He is at low risk for cardiovascular events.  He has significant holosystolic murmur, I suspect his mitral regurgitation could be worsening, repeat echocardiogram, follow-up with me after.    No follow-ups on file.    This note was dictated using Dragon speech recognition software.    Davi NUGENT  Interventional cardiologist  [facility]  [address] [address]  Phone: [phone]  Fax: [phone]

## 2022-08-18 ENCOUNTER — OFFICE VISIT (OUTPATIENT)
Dept: RHEUMATOLOGY | Facility: MEDICAL CENTER | Age: 72
End: 2022-08-18
Attending: INTERNAL MEDICINE
Payer: MEDICARE

## 2022-08-18 VITALS
OXYGEN SATURATION: 96 % | BODY MASS INDEX: 28.51 KG/M2 | WEIGHT: 182 LBS | TEMPERATURE: 97.8 F | DIASTOLIC BLOOD PRESSURE: 70 MMHG | SYSTOLIC BLOOD PRESSURE: 122 MMHG | RESPIRATION RATE: 14 BRPM | HEART RATE: 81 BPM

## 2022-08-18 DIAGNOSIS — I10 ESSENTIAL HYPERTENSION: ICD-10-CM

## 2022-08-18 DIAGNOSIS — Z79.620 ADALIMUMAB (HUMIRA) LONG-TERM USE: ICD-10-CM

## 2022-08-18 DIAGNOSIS — L40.9 PSORIASIS: ICD-10-CM

## 2022-08-18 DIAGNOSIS — M54.2 CERVICALGIA: ICD-10-CM

## 2022-08-18 DIAGNOSIS — Z98.84 H/O GASTRIC BYPASS: ICD-10-CM

## 2022-08-18 DIAGNOSIS — L40.50 PSORIATIC ARTHRITIS (HCC): ICD-10-CM

## 2022-08-18 DIAGNOSIS — M10.9 GOUT, UNSPECIFIED CAUSE, UNSPECIFIED CHRONICITY, UNSPECIFIED SITE: ICD-10-CM

## 2022-08-18 PROCEDURE — 99214 OFFICE O/P EST MOD 30 MIN: CPT | Performed by: INTERNAL MEDICINE

## 2022-08-18 PROCEDURE — 99212 OFFICE O/P EST SF 10 MIN: CPT | Performed by: INTERNAL MEDICINE

## 2022-08-18 ASSESSMENT — JOINT PAIN
TOTAL NUMBER OF SWOLLEN JOINTS: 0
TOTAL NUMBER OF TENDER JOINTS: 8
TOTAL NUMBER OF TENDER JOINTS: 9

## 2022-08-18 ASSESSMENT — PAIN SCALES - GENERAL: PAINLEVEL: 4=SLIGHT-MODERATE PAIN

## 2022-08-18 ASSESSMENT — FIBROSIS 4 INDEX: FIB4 SCORE: 2.6

## 2022-08-18 NOTE — OR NURSING
Patient and handoff received from OR team. VSS. Patient awake and alert. Denies pain or nausea. Tolerating PO intake. Family Inna updated on plan of care. Report called to phase 2 CHAI Dumont. Cell phone and glasses in patient possession in [facility], no other belongings in pacu. Patient transported to phase 2 in [facility] and assisted to recliner chair. Handoff given to Tim PAULA.

## 2022-08-18 NOTE — PROGRESS NOTES
Chief Complaint- joint pain     Subjective:   Buster Medina is a 72 y.o. male here today for follow up of rheumatological issues    This is a follow-up visit for this patient who we see in this clinic for psoriatic arthritis that was diagnosed about 2008 by a rheumatologist in [place], [place].  Patient is currently on Humira 40 mg subcu every 2 weeks with great benefit.  Patient comes in today stating however that he has been having exacerbation of pain in his knuckles and his shoulders where he points to his neck has been ongoing now for the last couple of months since about June 2022.  Patient states he is also had pruritus without rashes ongoing for about 1 month.  Patient states he does not see any swelling in his joints but states it does wake him up from sleep and he does have to get up to take an anti-inflammatory as needed.  As far as the pruritus patient states that he has not had any flares of psoriasis that he can see, denies any change in medications denies any changes in detergents or new herbal supplements or any dietary changes.     We also follow patient for hyperuricemia, patient currently stable on no treatment.     Comorbidities include a history of malignant bladder lesion that was resected patient states 2008, patient states his urologist after reviewing records states about 2012,  Patient following up with urology     Additional Co morbidities include HTN, high cholesterol, hx left ahilles tendon rupture and repair, hx left knee arthroscopic surgery for meniscal tear, s/p left rotator cuff tear, s/p bladder cancer about 2012 no recurrence s/p surgical intervention only, hx of gastric bypass 2005 [facility], pt does f/u with Dr Gamez q year     Right TSA  Right TKA     S/p topical treatments  S/p plaquenil-cause taste abnormalities  S/p Otezla-diarrhea     Uric acid 6.9 1/2021 (on no treatment)  Uric acid 4.1 5/2021 (on no treatment)    Addendum 7/24/2023  Quantiferon Gold neg 7/2023      HBsAg neg 12/2021  HCV neg 12/2021  Quantiferon Gold neg 12/2021  G6PD 10.9 adequate 9/2019  CCP neg 3/2018  RF neg 3/2018  SEAN neg 3/2018     Hand x-rays 3/2018-indicates possible erosion of the fourth metacarpal on the left hand, OA of the right first CMC joint    Addendum 8/22/2022  Hand x-rays 8/2022-IMPRESSION:  New subtle DIP and carpus erosions could represent psoriasis or other inflammatory arthropathy. The pattern is not typical of rheumatoid arthritis  Right severe first CMC osteoarthritis, left trapeziectomy and suspensionplasty     Feet x-rays 3/2018-DJD       Addendum 8/22/2022  Feet x-rays 8/2022-IMPRESSION:  No specific findings to confirm inflammatory arthropathy  Mild right hallux valgus deformity and first metatarsal-phalangeal osteoarthritis as before  Interval left first second and third TMT fusion with no hardware fracture identified      Current Outpatient Medications   Medication Sig Dispense Refill    BELSOMRA 15 MG Tab Take 15 mg by mouth at bedtime as needed.      ferrous sulfate 325 (65 Fe) MG tablet Take 325 mg by mouth every day.      LORazepam (ATIVAN) 2 MG tablet TAKE 1 TABLET DAILY AT     BEDTIME AS NEEDED FOR      ANXIETY (Patient taking differently: Take 2 mg by mouth at bedtime as needed.) 30 Tablet 2    lisinopril (PRINIVIL) 10 MG Tab Take 1 Tablet by mouth every day. STOP 20 MG DOSE 90 Tablet 0    HUMIRA PEN 40 MG/0.4ML Pen-injector Kit INJECT 1 PEN UNDER THE SKIN EVERY 14 DAYS. (Patient taking differently: Inject 40 mg under the skin every 14 days.) 6 Each 0    celecoxib (CELEBREX) 200 MG Cap TAKE 1 CAPSULE DAILY. NEED OFFICE VISIT FOR REFILL (Patient taking differently: Take 200 mg by mouth every day.) 90 Capsule 0    atorvastatin (LIPITOR) 40 MG Tab TAKE 1 TABLET DAILY (Patient taking differently: Take 40 mg by mouth every evening.) 90 Tablet 3    B Complex Vitamins (VITAMIN B COMPLEX PO) Take 1 Tablet by mouth every day.      Cholecalciferol (VITAMIN D3 PO) Take 1 Tablet by  mouth every 7 days. OTC      omeprazole (PRILOSEC) 20 MG delayed-release capsule Take 20 mg by mouth every evening.      aspirin EC (ECOTRIN) 81 MG Tablet Delayed Response Take 81 mg by mouth every bedtime. 30 Tab 0    Multiple Vitamins-Minerals (MELODY-DAY 1000 PO) Take 1 Tab by mouth 2 Times a Day.      mirtazapine (REMERON) 15 MG Tab TAKE 1/2 TABLET AT BEDTIME (Patient not taking: Reported on 8/18/2022) 45 Tablet 0     No current facility-administered medications for this visit.     He  has a past medical history of Anxiety, Arthritis, Bowel habit changes (06/24/2021), Cancer ([facility]) (2007), Chickenpox, Depression, High cholesterol, Hyperlipidemia, Hypertension, Kidney stone, Mumps, Obesity, Pneumothorax, Psoriatic arthritis ([facility]), Restless leg syndrome, Sleep apnea, and Tonsillitis.    ROS   Other than what is mentioned in HPI or physical exam, there is no history of headaches, double vision or blurred vision. No temporal tenderness or jaw claudication. No trouble swallowing difficulties or sore throats.  No chest complaints including chest pain, cough or sputum production. No GI complaints including nausea, vomiting, change in bowel habits, or past peptic ulcer disease. No history of blood in the stools. No urinary complaints including dysuria or frequency. No history of alopecia, photosensitivity, oral ulcerations, Raynaud's phenomena.       Objective:     /70   Pulse 81   Temp 36.6 °C (97.8 °F) (Temporal)   Resp 14   Wt 82.6 kg (182 lb)   SpO2 96%  Body mass index is 28.51 kg/m².   Physical Exam:    Constitutional: Alert and oriented X3, patient is talkative with good eye contact.Skin: Warm, dry, good turgor, no rashes in visible areas no psoriatic lesions seen.Eye: Equal, round and reactive, conjunctiva clear, lids normal EOM intactENMT: Lips without lesions, good dentition, no oropharyngeal ulcers, moist buccal mucosa, pinna without deformityNeck: Trachea midline, no masses, no thyromegaly.Lymph:  No  cervical lymphadenopathy, no axillary lymphadenopathy, no supraclavicular lymphadenopathyRespiratory: Unlabored respiratory effort, lungs clear to auscultation, no wheezes, no ronchi.Cardiovascular: Normal S1, S2, Regular rate and rhythm, no murmurs rubs or gallops  Abdomen: Soft, non-tender, no masses, no hepatosplenomegaly.Psych: Alert and oriented x3, normal affect and mood.Neuro: Cranial nerves 2-12 are grossly intact, no loss of sensation LEExt:no joint laxity noted in bilateral arms, no joint laxity noted in bilateral legs, joints look quite good, no swan-neck or boutonniere deformities no sausage digits no dactylitis, no enthesitis noted, shoulders good range of motion, elbows without flexion contracture, toes without crossover toes without splay toes      Lab Results   Component Value Date/Time    QNTTBGOLD Negative 08/16/2018 12:13 PM     Lab Results   Component Value Date/Time    HEPBCORIGM Negative 03/02/2020 02:35 PM    HEPBSAG Non-Reactive 12/16/2021 04:21 PM     Lab Results   Component Value Date/Time    HEPCAB Non-Reactive 12/16/2021 04:21 PM     Lab Results   Component Value Date/Time    SODIUM 139 08/15/2022 10:30 AM    POTASSIUM 4.3 08/15/2022 10:30 AM    CHLORIDE 104 08/15/2022 10:30 AM    CO2 25 08/15/2022 10:30 AM    GLUCOSE 107 (H) 08/15/2022 10:30 AM    BUN 17 08/15/2022 10:30 AM    CREATININE 1.11 08/15/2022 10:30 AM      Lab Results   Component Value Date/Time    WBC 5.4 08/15/2022 10:30 AM    RBC 4.35 (L) 08/15/2022 10:30 AM    HEMOGLOBIN 13.8 (L) 08/15/2022 10:30 AM    HEMATOCRIT 42.6 08/15/2022 10:30 AM    MCV 97.9 (H) 08/15/2022 10:30 AM    MCH 31.7 08/15/2022 10:30 AM    MCHC 32.4 (L) 08/15/2022 10:30 AM    MPV 12.4 08/15/2022 10:30 AM    NEUTSPOLYS 46.20 08/15/2022 10:30 AM    LYMPHOCYTES 37.20 08/15/2022 10:30 AM    MONOCYTES 12.50 08/15/2022 10:30 AM    EOSINOPHILS 2.80 08/15/2022 10:30 AM    BASOPHILS 0.90 08/15/2022 10:30 AM      Lab Results   Component Value Date/Time    CALCIUM  9.6 08/15/2022 10:30 AM    ASTSGOT 31 04/27/2022 09:48 AM    ALTSGPT 37 04/27/2022 09:48 AM    ALKPHOSPHAT 78 04/27/2022 09:48 AM    TBILIRUBIN 0.3 04/27/2022 09:48 AM    ALBUMIN 4.5 04/27/2022 09:48 AM    TOTPROTEIN 6.9 04/27/2022 09:48 AM     Lab Results   Component Value Date/Time    URICACID 4.1 05/06/2021 08:43 AM    RHEUMFACTN <10 03/21/2018 02:19 PM    CCPANTIBODY 4 03/21/2018 02:20 PM    ANTINUCAB None Detected 03/21/2018 02:19 PM     Lab Results   Component Value Date/Time    SEDRATEWES 12 03/21/2022 03:04 PM     Lab Results   Component Value Date/Time    G6PD 10.9 09/17/2019 10:30 AM     Assessment and Plan:     1. Psoriatic arthritis (HCC)  Clinically looks quite good, discussed with patient about the use of Humira, patient would like to continue with Humira he feels it helps really well, we will continue Humira 40 mg subcu every 2 weeks, however patient is having pain in his hands and neck, we had ordered hand and feet x-rays at last visit, we asked patient to get those x-rays done we will also do a C-spine x-ray as well for evaluation of DDD/DJD versus neuroforaminal impingement.  - DX-CERVICAL SPINE-2 OR 3 VIEWS; Future    2. Psoriasis  Query if this may be causing some of the pruritus that patient is having issues with, patient does have a dermatology appointment pending    3. Adalimumab (Humira) long-term use  Currently on Humira 40 mg subcu every 2 weeks, screening labs are up-to-date, next screening labs will be due by December 2023, patient needs monitoring labs every 6 months, next labs due about 2/2023  We reviewed risks of biological medications with patient including hematological pathology, cancer risks, neurological and infection issues especially in the Covid-19 pandemic environment, patient states understanding.  - DX-CERVICAL SPINE-2 OR 3 VIEWS; Future    4. Gout, unspecified cause, unspecified chronicity, unspecified site  Currently diet controlled with uric acid level in normal range,  continue to monitor  - DX-CERVICAL SPINE-2 OR 3 VIEWS; Future    5. Cervicalgia  Today do C-spine x-ray for evaluation of DDD , DJD and neuroforaminal impingement  - DX-CERVICAL SPINE-2 OR 3 VIEWS; Future    6. Essential hypertension  May impact the type of medications we can use for this patient's arthritis. We will have to keep this under advisement.  - DX-CERVICAL SPINE-2 OR 3 VIEWS; Future    7. H/O gastric bypass  Currently followed by Dr. Gamez    Followup: Return in about 10 months (around 6/6/2023). or sooner ankush Medina  was seen 30 minutes face-to-face of which more than 50% of the time was spent counseling the patient (excluding time for procedures)  regarding  rheumatological condition and care. Therapy was discussed in detail.      Please note that this dictation was created using voice recognition software. I have made every reasonable attempt to correct obvious errors, but I expect that there are errors of grammar and possibly content that I did not discover before finalizing the note.

## 2022-08-18 NOTE — OR NURSING
Pt recovered uneventfully through phase 2. VSS. Pt rates pain as 0-10. Denies nausea, tolerating oral fluids and responding appropriately. Patient was able to void without difficulty prior to discharge. Written discharge instructions reviewed and provided to patient, patient verbalized understanding of instructions and denies any questions or concerns. Physician's contact information provided. IV d/c'd. All belongings returned. Pt ambulated with wife off unit at discharge.

## 2022-08-19 ENCOUNTER — HOSPITAL ENCOUNTER (OUTPATIENT)
Dept: RADIOLOGY | Facility: MEDICAL CENTER | Age: 72
End: 2022-08-19
Attending: INTERNAL MEDICINE
Payer: MEDICARE

## 2022-08-19 DIAGNOSIS — L40.50 PSORIATIC ARTHRITIS (HCC): ICD-10-CM

## 2022-08-19 DIAGNOSIS — M10.9 GOUT, UNSPECIFIED CAUSE, UNSPECIFIED CHRONICITY, UNSPECIFIED SITE: ICD-10-CM

## 2022-08-19 DIAGNOSIS — Z79.620 ADALIMUMAB (HUMIRA) LONG-TERM USE: ICD-10-CM

## 2022-08-19 DIAGNOSIS — I10 ESSENTIAL HYPERTENSION: ICD-10-CM

## 2022-08-19 DIAGNOSIS — M54.2 CERVICALGIA: ICD-10-CM

## 2022-08-19 PROCEDURE — 72040 X-RAY EXAM NECK SPINE 2-3 VW: CPT

## 2022-08-19 PROCEDURE — 77077 JOINT SURVEY SINGLE VIEW: CPT

## 2022-08-20 SDOH — HEALTH STABILITY: PHYSICAL HEALTH: ON AVERAGE, HOW MANY MINUTES DO YOU ENGAGE IN EXERCISE AT THIS LEVEL?: 20 MIN

## 2022-08-20 SDOH — HEALTH STABILITY: PHYSICAL HEALTH: ON AVERAGE, HOW MANY DAYS PER WEEK DO YOU ENGAGE IN MODERATE TO STRENUOUS EXERCISE (LIKE A BRISK WALK)?: 7 DAYS

## 2022-08-20 SDOH — ECONOMIC STABILITY: HOUSING INSECURITY
IN THE LAST 12 MONTHS, WAS THERE A TIME WHEN YOU DID NOT HAVE A STEADY PLACE TO SLEEP OR SLEPT IN A SHELTER (INCLUDING NOW)?: NO

## 2022-08-20 SDOH — ECONOMIC STABILITY: TRANSPORTATION INSECURITY
IN THE PAST 12 MONTHS, HAS THE LACK OF TRANSPORTATION KEPT YOU FROM MEDICAL APPOINTMENTS OR FROM GETTING MEDICATIONS?: NO

## 2022-08-20 SDOH — ECONOMIC STABILITY: TRANSPORTATION INSECURITY
IN THE PAST 12 MONTHS, HAS LACK OF RELIABLE TRANSPORTATION KEPT YOU FROM MEDICAL APPOINTMENTS, MEETINGS, WORK OR FROM GETTING THINGS NEEDED FOR DAILY LIVING?: NO

## 2022-08-20 SDOH — ECONOMIC STABILITY: FOOD INSECURITY: WITHIN THE PAST 12 MONTHS, THE FOOD YOU BOUGHT JUST DIDN'T LAST AND YOU DIDN'T HAVE MONEY TO GET MORE.: NEVER TRUE

## 2022-08-20 SDOH — ECONOMIC STABILITY: INCOME INSECURITY: IN THE LAST 12 MONTHS, WAS THERE A TIME WHEN YOU WERE NOT ABLE TO PAY THE MORTGAGE OR RENT ON TIME?: NO

## 2022-08-20 SDOH — ECONOMIC STABILITY: FOOD INSECURITY: WITHIN THE PAST 12 MONTHS, YOU WORRIED THAT YOUR FOOD WOULD RUN OUT BEFORE YOU GOT MONEY TO BUY MORE.: NEVER TRUE

## 2022-08-20 SDOH — HEALTH STABILITY: MENTAL HEALTH
STRESS IS WHEN SOMEONE FEELS TENSE, NERVOUS, ANXIOUS, OR CAN'T SLEEP AT NIGHT BECAUSE THEIR MIND IS TROUBLED. HOW STRESSED ARE YOU?: TO SOME EXTENT

## 2022-08-20 SDOH — ECONOMIC STABILITY: INCOME INSECURITY: HOW HARD IS IT FOR YOU TO PAY FOR THE VERY BASICS LIKE FOOD, HOUSING, MEDICAL CARE, AND HEATING?: NOT HARD AT ALL

## 2022-08-20 SDOH — ECONOMIC STABILITY: HOUSING INSECURITY: IN THE LAST 12 MONTHS, HOW MANY PLACES HAVE YOU LIVED?: 1

## 2022-08-20 SDOH — ECONOMIC STABILITY: TRANSPORTATION INSECURITY
IN THE PAST 12 MONTHS, HAS LACK OF TRANSPORTATION KEPT YOU FROM MEETINGS, WORK, OR FROM GETTING THINGS NEEDED FOR DAILY LIVING?: NO

## 2022-08-20 ASSESSMENT — SOCIAL DETERMINANTS OF HEALTH (SDOH)
HOW OFTEN DO YOU ATTENT MEETINGS OF THE CLUB OR ORGANIZATION YOU BELONG TO?: PATIENT DECLINED
HOW OFTEN DO YOU GET TOGETHER WITH FRIENDS OR RELATIVES?: TWICE A WEEK
HOW OFTEN DO YOU HAVE A DRINK CONTAINING ALCOHOL: 4 OR MORE TIMES A WEEK
HOW OFTEN DO YOU ATTEND CHURCH OR RELIGIOUS SERVICES?: NEVER
HOW OFTEN DO YOU ATTENT MEETINGS OF THE CLUB OR ORGANIZATION YOU BELONG TO?: PATIENT DECLINED
HOW OFTEN DO YOU HAVE SIX OR MORE DRINKS ON ONE OCCASION: LESS THAN MONTHLY
HOW HARD IS IT FOR YOU TO PAY FOR THE VERY BASICS LIKE FOOD, HOUSING, MEDICAL CARE, AND HEATING?: NOT HARD AT ALL
DO YOU BELONG TO ANY CLUBS OR ORGANIZATIONS SUCH AS CHURCH GROUPS UNIONS, FRATERNAL OR ATHLETIC GROUPS, OR SCHOOL GROUPS?: NO
IN A TYPICAL WEEK, HOW MANY TIMES DO YOU TALK ON THE PHONE WITH FAMILY, FRIENDS, OR NEIGHBORS?: THREE TIMES A WEEK
IN A TYPICAL WEEK, HOW MANY TIMES DO YOU TALK ON THE PHONE WITH FAMILY, FRIENDS, OR NEIGHBORS?: THREE TIMES A WEEK
ARE YOU MARRIED, WIDOWED, DIVORCED, SEPARATED, NEVER MARRIED, OR LIVING WITH A PARTNER?: LIVING WITH PARTNER
WITHIN THE PAST 12 MONTHS, YOU WORRIED THAT YOUR FOOD WOULD RUN OUT BEFORE YOU GOT THE MONEY TO BUY MORE: NEVER TRUE
DO YOU BELONG TO ANY CLUBS OR ORGANIZATIONS SUCH AS CHURCH GROUPS UNIONS, FRATERNAL OR ATHLETIC GROUPS, OR SCHOOL GROUPS?: NO
HOW OFTEN DO YOU ATTEND CHURCH OR RELIGIOUS SERVICES?: NEVER
ARE YOU MARRIED, WIDOWED, DIVORCED, SEPARATED, NEVER MARRIED, OR LIVING WITH A PARTNER?: LIVING WITH PARTNER
HOW OFTEN DO YOU GET TOGETHER WITH FRIENDS OR RELATIVES?: TWICE A WEEK
HOW MANY DRINKS CONTAINING ALCOHOL DO YOU HAVE ON A TYPICAL DAY WHEN YOU ARE DRINKING: 1 OR 2

## 2022-08-20 ASSESSMENT — LIFESTYLE VARIABLES
HOW MANY STANDARD DRINKS CONTAINING ALCOHOL DO YOU HAVE ON A TYPICAL DAY: 1 OR 2
AUDIT-C TOTAL SCORE: 5
SKIP TO QUESTIONS 9-10: 0
HOW OFTEN DO YOU HAVE A DRINK CONTAINING ALCOHOL: 4 OR MORE TIMES A WEEK
HOW OFTEN DO YOU HAVE SIX OR MORE DRINKS ON ONE OCCASION: LESS THAN MONTHLY

## 2022-08-20 NOTE — ANESTHESIA POSTPROCEDURE EVALUATION
Patient: Buster Medina    Procedure Summary     Date: 08/17/22 Room / Location: [place] [address] / SURGERY [facility]    Anesthesia Start: 1535 Anesthesia Stop: 1559    Procedure: CYSTOSCOPY (Bladder) Diagnosis: (HISTORY OF MALIGNANT NEOPLASM OF BLADDER)    Surgeons: Jerzy Jasso M.D. Responsible Provider: Karlos Dean M.D.    Anesthesia Type: general ASA Status: 2          Final Anesthesia Type: general    Anesthesia Post Evaluation    Patient location during evaluation: PACU  Patient participation: complete - patient participated  Level of consciousness: awake and alert    Airway patency: patent  Anesthetic complications: no  Cardiovascular status: hemodynamically stable  Respiratory status: acceptable  Hydration status: euvolemic    PONV: none          No notable events documented.     Nurse Pain Score: 0 (NPRS)

## 2022-08-22 ENCOUNTER — TELEPHONE (OUTPATIENT)
Dept: RHEUMATOLOGY | Facility: MEDICAL CENTER | Age: 72
End: 2022-08-22
Payer: MEDICARE

## 2022-08-22 NOTE — TELEPHONE ENCOUNTER
Please notify patient that we received the results of his hand and feet x-rays as well as his neck x-rays, his hand x-rays do show some mild progression of his arthritis, his neck x-rays show minimal old-age arthritis.  At last visit we had discussed about possibly adjusting his medications, patient had not wanted to at that time, if patient feels he is having more symptoms of would like to adjust his medications, please schedule follow-up appointment based on patient's calendar.

## 2022-08-23 ENCOUNTER — APPOINTMENT (RX ONLY)
Dept: URBAN - METROPOLITAN AREA CLINIC 31 | Facility: CLINIC | Age: 72
Setting detail: DERMATOLOGY
End: 2022-08-23

## 2022-08-23 ENCOUNTER — OFFICE VISIT (OUTPATIENT)
Dept: MEDICAL GROUP | Age: 72
End: 2022-08-23
Payer: MEDICARE

## 2022-08-23 VITALS
DIASTOLIC BLOOD PRESSURE: 86 MMHG | SYSTOLIC BLOOD PRESSURE: 138 MMHG | HEIGHT: 67 IN | WEIGHT: 183 LBS | TEMPERATURE: 97.8 F | BODY MASS INDEX: 28.72 KG/M2 | HEART RATE: 74 BPM | RESPIRATION RATE: 16 BRPM | OXYGEN SATURATION: 98 %

## 2022-08-23 DIAGNOSIS — F32.0 MAJOR DEPRESSIVE DISORDER, SINGLE EPISODE, MILD (HCC): ICD-10-CM

## 2022-08-23 DIAGNOSIS — L81.0 POSTINFLAMMATORY HYPERPIGMENTATION: ICD-10-CM

## 2022-08-23 DIAGNOSIS — R55 POSTURAL DIZZINESS WITH PRESYNCOPE: ICD-10-CM

## 2022-08-23 DIAGNOSIS — B36.0 PITYRIASIS VERSICOLOR: ICD-10-CM

## 2022-08-23 DIAGNOSIS — I10 ESSENTIAL HYPERTENSION: ICD-10-CM

## 2022-08-23 DIAGNOSIS — G47.33 OBSTRUCTIVE SLEEP APNEA (ADULT) (PEDIATRIC): ICD-10-CM

## 2022-08-23 DIAGNOSIS — R42 POSTURAL DIZZINESS WITH PRESYNCOPE: ICD-10-CM

## 2022-08-23 DIAGNOSIS — E78.5 DYSLIPIDEMIA: ICD-10-CM

## 2022-08-23 DIAGNOSIS — Z00.00 MEDICARE ANNUAL WELLNESS VISIT, INITIAL: ICD-10-CM

## 2022-08-23 DIAGNOSIS — F32.1 CURRENT MODERATE EPISODE OF MAJOR DEPRESSIVE DISORDER, UNSPECIFIED WHETHER RECURRENT (HCC): ICD-10-CM

## 2022-08-23 DIAGNOSIS — L82.0 INFLAMED SEBORRHEIC KERATOSIS: ICD-10-CM

## 2022-08-23 PROCEDURE — ? MEDICATION COUNSELING

## 2022-08-23 PROCEDURE — ? PRESCRIPTION

## 2022-08-23 PROCEDURE — 99213 OFFICE O/P EST LOW 20 MIN: CPT

## 2022-08-23 PROCEDURE — ? KOH PREP

## 2022-08-23 PROCEDURE — 99214 OFFICE O/P EST MOD 30 MIN: CPT | Performed by: FAMILY MEDICINE

## 2022-08-23 PROCEDURE — ? COUNSELING

## 2022-08-23 RX ORDER — CELECOXIB 200 MG/1
200 CAPSULE ORAL DAILY
Qty: 90 CAPSULE | Refills: 0
Start: 2022-08-23 | End: 2022-11-04 | Stop reason: SDUPTHER

## 2022-08-23 RX ORDER — ATORVASTATIN CALCIUM 40 MG/1
40 TABLET, FILM COATED ORAL EVERY EVENING
Qty: 90 TABLET | Refills: 2
Start: 2022-08-23 | End: 2022-11-15

## 2022-08-23 RX ORDER — CIPROFLOXACIN 500 MG/1
500 TABLET, FILM COATED ORAL 2 TIMES DAILY
Qty: 6 TABLET | Refills: 0 | Status: SHIPPED | OUTPATIENT
Start: 2022-08-23 | End: 2023-07-19

## 2022-08-23 RX ORDER — TRIAMCINOLONE ACETONIDE 1 MG/G
CREAM TOPICAL
Qty: 80 | Refills: 1 | Status: ERX | COMMUNITY
Start: 2022-08-23

## 2022-08-23 RX ORDER — KETOCONAZOLE 20 MG/G
CREAM TOPICAL BID
Qty: 60 | Refills: 0 | Status: ERX

## 2022-08-23 RX ADMIN — TRIAMCINOLONE ACETONIDE 1: 1 CREAM TOPICAL at 00:00

## 2022-08-23 ASSESSMENT — LOCATION SIMPLE DESCRIPTION DERM
LOCATION SIMPLE: LEFT CLAVICULAR SKIN
LOCATION SIMPLE: POSTERIOR NECK
LOCATION SIMPLE: CHEST
LOCATION SIMPLE: LEFT FOREARM
LOCATION SIMPLE: RIGHT FOREARM

## 2022-08-23 ASSESSMENT — ACTIVITIES OF DAILY LIVING (ADL): BATHING_REQUIRES_ASSISTANCE: 0

## 2022-08-23 ASSESSMENT — LOCATION DETAILED DESCRIPTION DERM
LOCATION DETAILED: RIGHT VENTRAL PROXIMAL FOREARM
LOCATION DETAILED: LEFT CLAVICULAR SKIN
LOCATION DETAILED: LEFT MEDIAL TRAPEZIAL NECK
LOCATION DETAILED: RIGHT MEDIAL SUPERIOR CHEST
LOCATION DETAILED: LEFT VENTRAL PROXIMAL FOREARM

## 2022-08-23 ASSESSMENT — LOCATION ZONE DERM
LOCATION ZONE: ARM
LOCATION ZONE: NECK
LOCATION ZONE: TRUNK

## 2022-08-23 ASSESSMENT — FIBROSIS 4 INDEX: FIB4 SCORE: 2.6

## 2022-08-23 ASSESSMENT — ENCOUNTER SYMPTOMS: GENERAL WELL-BEING: GOOD

## 2022-08-23 ASSESSMENT — PATIENT HEALTH QUESTIONNAIRE - PHQ9: CLINICAL INTERPRETATION OF PHQ2 SCORE: 0

## 2022-08-23 NOTE — PROCEDURE: MEDICATION COUNSELING
Drysol Pregnancy And Lactation Text: This medication is considered safe during pregnancy and breast feeding.
Solaraze Counseling:  I discussed with the patient the risks of Solaraze including but not limited to erythema, scaling, itching, weeping, crusting, and pain.
Metronidazole Counseling:  I discussed with the patient the risks of metronidazole including but not limited to seizures, nausea/vomiting, a metallic taste in the mouth, nausea/vomiting and severe allergy.
Odomzo Pregnancy And Lactation Text: This medication is Pregnancy Category X and is absolutely contraindicated during pregnancy. It is unknown if it is excreted in breast milk.
Tranexamic Acid Counseling:  Patient advised of the small risk of bleeding problems with tranexamic acid. They were also instructed to call if they developed any nausea, vomiting or diarrhea. All of the patient's questions and concerns were addressed.
Winlevi Pregnancy And Lactation Text: This medication is considered safe during pregnancy and breastfeeding.
Benzoyl Peroxide Pregnancy And Lactation Text: This medication is Pregnancy Category C. It is unknown if benzoyl peroxide is excreted in breast milk.
Carac Pregnancy And Lactation Text: This medication is Pregnancy Category X and contraindicated in pregnancy and in women who may become pregnant. It is unknown if this medication is excreted in breast milk.
Rituxan Counseling:  I discussed with the patient the risks of Rituxan infusions. Side effects can include infusion reactions, severe drug rashes including mucocutaneous reactions, reactivation of latent hepatitis and other infections and rarely progressive multifocal leukoencephalopathy.  All of the patient's questions and concerns were addressed.
Isotretinoin Pregnancy And Lactation Text: This medication is Pregnancy Category X and is considered extremely dangerous during pregnancy. It is unknown if it is excreted in breast milk.
Xolair Pregnancy And Lactation Text: This medication is Pregnancy Category B and is considered safe during pregnancy. This medication is excreted in breast milk.
Propranolol Pregnancy And Lactation Text: This medication is Pregnancy Category C and it isn't known if it is safe during pregnancy. It is excreted in breast milk.
Cimetidine Counseling:  I discussed with the patient the risks of Cimetidine including but not limited to gynecomastia, headache, diarrhea, nausea, drowsiness, arrhythmias, pancreatitis, skin rashes, psychosis, bone marrow suppression and kidney toxicity.
Clofazimine Counseling:  I discussed with the patient the risks of clofazimine including but not limited to skin and eye pigmentation, liver damage, nausea/vomiting, gastrointestinal bleeding and allergy.
Ivermectin Counseling:  Patient instructed to take medication on an empty stomach with a full glass of water.  Patient informed of potential adverse effects including but not limited to nausea, diarrhea, dizziness, itching, and swelling of the extremities or lymph nodes.  The patient verbalized understanding of the proper use and possible adverse effects of ivermectin.  All of the patient's questions and concerns were addressed.
Hydroxychloroquine Pregnancy And Lactation Text: This medication has been shown to cause fetal harm but it isn't assigned a Pregnancy Risk Category. There are small amounts excreted in breast milk.
Cimzia Pregnancy And Lactation Text: This medication crosses the placenta but can be considered safe in certain situations. Cimzia may be excreted in breast milk.
Cyclosporine Pregnancy And Lactation Text: This medication is Pregnancy Category C and it isn't know if it is safe during pregnancy. This medication is excreted in breast milk.
Sarecycline Pregnancy And Lactation Text: This medication is Pregnancy Category D and not consider safe during pregnancy. It is also excreted in breast milk.
Itraconazole Pregnancy And Lactation Text: This medication is Pregnancy Category C and it isn't know if it is safe during pregnancy. It is also excreted in breast milk.
Birth Control Pills Counseling: Birth Control Pill Counseling: I discussed with the patient the potential side effects of OCPs including but not limited to increased risk of stroke, heart attack, thrombophlebitis, deep venous thrombosis, hepatic adenomas, breast changes, GI upset, headaches, and depression.  The patient verbalized understanding of the proper use and possible adverse effects of OCPs. All of the patient's questions and concerns were addressed.
Cephalexin Counseling: I counseled the patient regarding use of cephalexin as an antibiotic for prophylactic and/or therapeutic purposes. Cephalexin (commonly prescribed under brand name Keflex) is a cephalosporin antibiotic which is active against numerous classes of bacteria, including most skin bacteria. Side effects may include nausea, diarrhea, gastrointestinal upset, rash, hives, yeast infections, and in rare cases, hepatitis, kidney disease, seizures, fever, confusion, neurologic symptoms, and others. Patients with severe allergies to penicillin medications are cautioned that there is about a 10% incidence of cross-reactivity with cephalosporins. When possible, patients with penicillin allergies should use alternatives to cephalosporins for antibiotic therapy.
Stelara Pregnancy And Lactation Text: This medication is Pregnancy Category B and is considered safe during pregnancy. It is unknown if this medication is excreted in breast milk.
Picato Counseling:  I discussed with the patient the risks of Picato including but not limited to erythema, scaling, itching, weeping, crusting, and pain.
Ilumya Counseling: I discussed with the patient the risks of tildrakizumab including but not limited to immunosuppression, malignancy, posterior leukoencephalopathy syndrome, and serious infections.  The patient understands that monitoring is required including a PPD at baseline and must alert us or the primary physician if symptoms of infection or other concerning signs are noted.
Calcipotriene Counseling:  I discussed with the patient the risks of calcipotriene including but not limited to erythema, scaling, itching, and irritation.
Tranexamic Acid Pregnancy And Lactation Text: It is unknown if this medication is safe during pregnancy or breast feeding.
Elidel Counseling: Patient may experience a mild burning sensation during topical application. Elidel is not approved in children less than 2 years of age. There have been case reports of hematologic and skin malignancies in patients using topical calcineurin inhibitors although causality is questionable.
Klisyri Counseling:  I discussed with the patient the risks of Klisyri including but not limited to erythema, scaling, itching, weeping, crusting, and pain.
Oral Minoxidil Counseling- I discussed with the patient the risks of oral minoxidil including but not limited to shortness of breath, swelling of the feet or ankles, dizziness, lightheadedness, unwanted hair growth and allergic reaction.  The patient verbalized understanding of the proper use and possible adverse effects of oral minoxidil.  All of the patient's questions and concerns were addressed.
Zyclara Counseling:  I discussed with the patient the risks of imiquimod including but not limited to erythema, scaling, itching, weeping, crusting, and pain.  Patient understands that the inflammatory response to imiquimod is variable from person to person and was educated regarded proper titration schedule.  If flu-like symptoms develop, patient knows to discontinue the medication and contact us.
Finasteride Male Counseling: Finasteride Counseling:  I discussed with the patient the risks of use of finasteride including but not limited to decreased libido, decreased ejaculate volume, gynecomastia, and depression. Women should not handle medication.  All of the patient's questions and concerns were addressed.
Metronidazole Pregnancy And Lactation Text: This medication is Pregnancy Category B and considered safe during pregnancy.  It is also excreted in breast milk.
Ivermectin Pregnancy And Lactation Text: This medication is Pregnancy Category C and it isn't known if it is safe during pregnancy. It is also excreted in breast milk.
Prednisone Counseling:  I discussed with the patient the risks of prolonged use of prednisone including but not limited to weight gain, insomnia, osteoporosis, mood changes, diabetes, susceptibility to infection, glaucoma and high blood pressure.  In cases where prednisone use is prolonged, patients should be monitored with blood pressure checks, serum glucose levels and an eye exam.  Additionally, the patient may need to be placed on GI prophylaxis, PCP prophylaxis, and calcium and vitamin D supplementation and/or a bisphosphonate.  The patient verbalized understanding of the proper use and the possible adverse effects of prednisone.  All of the patient's questions and concerns were addressed.
Cimetidine Pregnancy And Lactation Text: This medication is Pregnancy Category B and is considered safe during pregnancy. It is also excreted in breast milk and breast feeding isn't recommended.
Include Pregnancy/Lactation Warning?: No
Cosentyx Counseling:  I discussed with the patient the risks of Cosentyx including but not limited to worsening of Crohn's disease, immunosuppression, allergic reactions and infections.  The patient understands that monitoring is required including a PPD at baseline and must alert us or the primary physician if symptoms of infection or other concerning signs are noted.
High Dose Vitamin A Counseling: Side effects reviewed, pt to contact office should one occur.
Rituxan Pregnancy And Lactation Text: This medication is Pregnancy Category C and it isn't know if it is safe during pregnancy. It is unknown if this medication is excreted in breast milk but similar antibodies are known to be excreted.
Clofazimine Pregnancy And Lactation Text: This medication is Pregnancy Category C and isn't considered safe during pregnancy. It is excreted in breast milk.
Libtayo Counseling- I discussed with the patient the risks of Libtayo including but not limited to nausea, vomiting, diarrhea, and bone or muscle pain.  The patient verbalized understanding of the proper use and possible adverse effects of Libtayo.  All of the patient's questions and concerns were addressed.
Solaraze Pregnancy And Lactation Text: This medication is Pregnancy Category B and is considered safe. There is some data to suggest avoiding during the third trimester. It is unknown if this medication is excreted in breast milk.
Cephalexin Pregnancy And Lactation Text: This medication is Pregnancy Category B and considered safe during pregnancy.  It is also excreted in breast milk but can be used safely for shorter doses.
Colchicine Counseling:  Patient counseled regarding adverse effects including but not limited to stomach upset (nausea, vomiting, stomach pain, or diarrhea).  Patient instructed to limit alcohol consumption while taking this medication.  Colchicine may reduce blood counts especially with prolonged use.  The patient understands that monitoring of kidney function and blood counts may be required, especially at baseline. The patient verbalized understanding of the proper use and possible adverse effects of colchicine.  All of the patient's questions and concerns were addressed.
Picato Pregnancy And Lactation Text: This medication is Pregnancy Category C. It is unknown if this medication is excreted in breast milk.
Tetracycline Counseling: Patient counseled regarding possible photosensitivity and increased risk for sunburn.  Patient instructed to avoid sunlight, if possible.  When exposed to sunlight, patients should wear protective clothing, sunglasses, and sunscreen.  The patient was instructed to call the office immediately if the following severe adverse effects occur:  hearing changes, easy bruising/bleeding, severe headache, or vision changes.  The patient verbalized understanding of the proper use and possible adverse effects of tetracycline.  All of the patient's questions and concerns were addressed. Patient understands to avoid pregnancy while on therapy due to potential birth defects.
High Dose Vitamin A Pregnancy And Lactation Text: High dose vitamin A therapy is contraindicated during pregnancy and breast feeding.
Ketoconazole Counseling:   Patient counseled regarding improving absorption with orange juice.  Adverse effects include but are not limited to breast enlargement, headache, diarrhea, nausea, upset stomach, liver function test abnormalities, taste disturbance, and stomach pain.  There is a rare possibility of liver failure that can occur when taking ketoconazole. The patient understands that monitoring of LFTs may be required, especially at baseline. The patient verbalized understanding of the proper use and possible adverse effects of ketoconazole.  All of the patient's questions and concerns were addressed.
Siliq Counseling:  I discussed with the patient the risks of Siliq including but not limited to new or worsening depression, suicidal thoughts and behavior, immunosuppression, malignancy, posterior leukoencephalopathy syndrome, and serious infections.  The patient understands that monitoring is required including a PPD at baseline and must alert us or the primary physician if symptoms of infection or other concerning signs are noted. There is also a special program designed to monitor depression which is required with Siliq.
Libtayo Pregnancy And Lactation Text: This medication is contraindicated in pregnancy and when breast feeding.
Birth Control Pills Pregnancy And Lactation Text: This medication should be avoided if pregnant and for the first 30 days post-partum.
Ilumya Pregnancy And Lactation Text: The risk during pregnancy and breastfeeding is uncertain with this medication.
Azathioprine Counseling:  I discussed with the patient the risks of azathioprine including but not limited to myelosuppression, immunosuppression, hepatotoxicity, lymphoma, and infections.  The patient understands that monitoring is required including baseline LFTs, Creatinine, possible TPMP genotyping and weekly CBCs for the first month and then every 2 weeks thereafter.  The patient verbalized understanding of the proper use and possible adverse effects of azathioprine.  All of the patient's questions and concerns were addressed.
Taltz Counseling: I discussed with the patient the risks of ixekizumab including but not limited to immunosuppression, serious infections, worsening of inflammatory bowel disease and drug reactions.  The patient understands that monitoring is required including a PPD at baseline and must alert us or the primary physician if symptoms of infection or other concerning signs are noted.
Valtrex Counseling: I discussed with the patient the risks of valacyclovir including but not limited to kidney damage, nausea, vomiting and severe allergy.  The patient understands that if the infection seems to be worsening or is not improving, they are to call.
Oral Minoxidil Pregnancy And Lactation Text: This medication should only be used when clearly needed if you are pregnant, attempting to become pregnant or breast feeding.
Calcipotriene Pregnancy And Lactation Text: This medication has not been proven safe during pregnancy. It is unknown if this medication is excreted in breast milk.
Finasteride Pregnancy And Lactation Text: This medication is absolutely contraindicated during pregnancy. It is unknown if it is excreted in breast milk.
Doxepin Counseling:  Patient advised that the medication is sedating and not to drive a car after taking this medication. Patient informed of potential adverse effects including but not limited to dry mouth, urinary retention, and blurry vision.  The patient verbalized understanding of the proper use and possible adverse effects of doxepin.  All of the patient's questions and concerns were addressed.
Klisyri Pregnancy And Lactation Text: It is unknown if this medication can harm a developing fetus or if it is excreted in breast milk.
Minocycline Counseling: Patient advised regarding possible photosensitivity and discoloration of the teeth, skin, lips, tongue and gums.  Patient instructed to avoid sunlight, if possible.  When exposed to sunlight, patients should wear protective clothing, sunglasses, and sunscreen.  The patient was instructed to call the office immediately if the following severe adverse effects occur:  hearing changes, easy bruising/bleeding, severe headache, or vision changes.  The patient verbalized understanding of the proper use and possible adverse effects of minocycline.  All of the patient's questions and concerns were addressed.
Topical Retinoid counseling:  Patient advised to apply a pea-sized amount only at bedtime and wait 30 minutes after washing their face before applying.  If too drying, patient may add a non-comedogenic moisturizer. The patient verbalized understanding of the proper use and possible adverse effects of retinoids.  All of the patient's questions and concerns were addressed.
Topical Ketoconazole Pregnancy And Lactation Text: This medication is Pregnancy Category B and is considered safe during pregnancy. It is unknown if it is excreted in breast milk.
Clindamycin Counseling: I counseled the patient regarding use of clindamycin as an antibiotic for prophylactic and/or therapeutic purposes. Clindamycin is active against numerous classes of bacteria, including skin bacteria. Side effects may include nausea, diarrhea, gastrointestinal upset, rash, hives, yeast infections, and in rare cases, colitis.
Doxepin Pregnancy And Lactation Text: This medication is Pregnancy Category C and it isn't known if it is safe during pregnancy. It is also excreted in breast milk and breast feeding isn't recommended.
Dupixent Counseling: I discussed with the patient the risks of dupilumab including but not limited to eye infection and irritation, cold sores, injection site reactions, worsening of asthma, allergic reactions and increased risk of parasitic infection.  Live vaccines should be avoided while taking dupilumab. Dupilumab will also interact with certain medications such as warfarin and cyclosporine. The patient understands that monitoring is required and they must alert us or the primary physician if symptoms of infection or other concerning signs are noted.
Niacinamide Counseling: I recommended taking niacin or niacinamide, also know as vitamin B3, twice daily. Recent evidence suggests that taking vitamin B3 (500 mg twice daily) can reduce the risk of actinic keratoses and non-melanoma skin cancers. Side effects of vitamin B3 include flushing and headache.
Azathioprine Pregnancy And Lactation Text: This medication is Pregnancy Category D and isn't considered safe during pregnancy. It is unknown if this medication is excreted in breast milk.
Protopic Counseling: Patient may experience a mild burning sensation during topical application. Protopic is not approved in children less than 2 years of age. There have been case reports of hematologic and skin malignancies in patients using topical calcineurin inhibitors although causality is questionable.
Infliximab Counseling:  I discussed with the patient the risks of infliximab including but not limited to myelosuppression, immunosuppression, autoimmune hepatitis, demyelinating diseases, lymphoma, and serious infections.  The patient understands that monitoring is required including a PPD at baseline and must alert us or the primary physician if symptoms of infection or other concerning signs are noted.
Spironolactone Counseling: Patient advised regarding risks of diarrhea, abdominal pain, hyperkalemia, birth defects (for female patients), liver toxicity and renal toxicity. The patient may need blood work to monitor liver and kidney function and potassium levels while on therapy. The patient verbalized understanding of the proper use and possible adverse effects of spironolactone.  All of the patient's questions and concerns were addressed.
Otezla Counseling: The side effects of Otezla were discussed with the patient, including but not limited to worsening or new depression, weight loss, diarrhea, nausea, upper respiratory tract infection, and headache. Patient instructed to call the office should any adverse effect occur.  The patient verbalized understanding of the proper use and possible adverse effects of Otezla.  All the patient's questions and concerns were addressed.
Adbry Counseling: I discussed with the patient the risks of tralokinumab including but not limited to eye infection and irritation, cold sores, injection site reactions, worsening of asthma, allergic reactions and increased risk of parasitic infection.  Live vaccines should be avoided while taking tralokinumab. The patient understands that monitoring is required and they must alert us or the primary physician if symptoms of infection or other concerning signs are noted.
Acitretin Counseling:  I discussed with the patient the risks of acitretin including but not limited to hair loss, dry lips/skin/eyes, liver damage, hyperlipidemia, depression/suicidal ideation, photosensitivity.  Serious rare side effects can include but are not limited to pancreatitis, pseudotumor cerebri, bony changes, clot formation/stroke/heart attack.  Patient understands that alcohol is contraindicated since it can result in liver toxicity and significantly prolong the elimination of the drug by many years.
Valtrex Pregnancy And Lactation Text: this medication is Pregnancy Category B and is considered safe during pregnancy. This medication is not directly found in breast milk but it's metabolite acyclovir is present.
Gabapentin Counseling: I discussed with the patient the risks of gabapentin including but not limited to dizziness, somnolence, fatigue and ataxia.
Cellcept Counseling:  I discussed with the patient the risks of mycophenolate mofetil including but not limited to infection/immunosuppression, GI upset, hypokalemia, hypercholesterolemia, bone marrow suppression, lymphoproliferative disorders, malignancy, GI ulceration/bleed/perforation, colitis, interstitial lung disease, kidney failure, progressive multifocal leukoencephalopathy, and birth defects.  The patient understands that monitoring is required including a baseline creatinine and regular CBC testing. In addition, patient must alert us immediately if symptoms of infection or other concerning signs are noted.
Tremfya Counseling: I discussed with the patient the risks of guselkumab including but not limited to immunosuppression, serious infections, and drug reactions.  The patient understands that monitoring is required including a PPD at baseline and must alert us or the primary physician if symptoms of infection or other concerning signs are noted.
Eucrisa Counseling: Patient may experience a mild burning sensation during topical application. Eucrisa is not approved in children less than 2 years of age.
Minoxidil Counseling: Minoxidil is a topical medication which can increase blood flow where it is applied. It is uncertain how this medication increases hair growth. Side effects are uncommon and include stinging and allergic reactions.
Topical Sulfur Applications Counseling: Topical Sulfur Counseling: Patient counseled that this medication may cause skin irritation or allergic reactions.  In the event of skin irritation, the patient was advised to reduce the amount of the drug applied or use it less frequently.   The patient verbalized understanding of the proper use and possible adverse effects of topical sulfur application.  All of the patient's questions and concerns were addressed.
Spironolactone Pregnancy And Lactation Text: This medication can cause feminization of the male fetus and should be avoided during pregnancy. The active metabolite is also found in breast milk.
Hydroxyzine Counseling: Patient advised that the medication is sedating and not to drive a car after taking this medication.  Patient informed of potential adverse effects including but not limited to dry mouth, urinary retention, and blurry vision.  The patient verbalized understanding of the proper use and possible adverse effects of hydroxyzine.  All of the patient's questions and concerns were addressed.
Dupixent Pregnancy And Lactation Text: This medication likely crosses the placenta but the risk for the fetus is uncertain. This medication is excreted in breast milk.
Simponi Counseling:  I discussed with the patient the risks of golimumab including but not limited to myelosuppression, immunosuppression, autoimmune hepatitis, demyelinating diseases, lymphoma, and serious infections.  The patient understands that monitoring is required including a PPD at baseline and must alert us or the primary physician if symptoms of infection or other concerning signs are noted.
Opioid Counseling: I discussed with the patient the potential side effects of opioids including but not limited to addiction, altered mental status, and depression. I stressed avoiding alcohol, benzodiazepines, muscle relaxants and sleep aids unless specifically okayed by a physician. The patient verbalized understanding of the proper use and possible adverse effects of opioids. All of the patient's questions and concerns were addressed. They were instructed to flush the remaining pills down the toilet if they did not need them for pain.
Dapsone Counseling: I discussed with the patient the risks of dapsone including but not limited to hemolytic anemia, agranulocytosis, rashes, methemoglobinemia, kidney failure, peripheral neuropathy, headaches, GI upset, and liver toxicity.  Patients who start dapsone require monitoring including baseline LFTs and weekly CBCs for the first month, then every month thereafter.  The patient verbalized understanding of the proper use and possible adverse effects of dapsone.  All of the patient's questions and concerns were addressed.
Protopic Pregnancy And Lactation Text: This medication is Pregnancy Category C. It is unknown if this medication is excreted in breast milk when applied topically.
Niacinamide Pregnancy And Lactation Text: These medications are considered safe during pregnancy.
Clindamycin Pregnancy And Lactation Text: This medication can be used in pregnancy if certain situations. Clindamycin is also present in breast milk.
Aklief counseling:  Patient advised to apply a pea-sized amount only at bedtime and wait 30 minutes after washing their face before applying.  If too drying, patient may add a non-comedogenic moisturizer.  The most commonly reported side effects including irritation, redness, scaling, dryness, stinging, burning, itching, and increased risk of sunburn.  The patient verbalized understanding of the proper use and possible adverse effects of retinoids.  All of the patient's questions and concerns were addressed.
Adbry Pregnancy And Lactation Text: It is unknown if this medication will adversely affect pregnancy or breast feeding.
Quinolones Counseling:  I discussed with the patient the risks of fluoroquinolones including but not limited to GI upset, allergic reaction, drug rash, diarrhea, dizziness, photosensitivity, yeast infections, liver function test abnormalities, tendonitis/tendon rupture.
Acitretin Pregnancy And Lactation Text: This medication is Pregnancy Category X and should not be given to women who are pregnant or may become pregnant in the future. This medication is excreted in breast milk.
Olumiant Counseling: I discussed with the patient the risks of Olumiant therapy including but not limited to upper respiratory tract infections, shingles, cold sores, and nausea. Live vaccines should be avoided.  This medication has been linked to serious infections; higher rate of mortality; malignancy and lymphoproliferative disorders; major adverse cardiovascular events; thrombosis; gastrointestinal perforations; neutropenia; lymphopenia; anemia; liver enzyme elevations; and lipid elevations.
Qbrexza Counseling:  I discussed with the patient the risks of Qbrexza including but not limited to headache, mydriasis, blurred vision, dry eyes, nasal dryness, dry mouth, dry throat, dry skin, urinary hesitation, and constipation.  Local skin reactions including erythema, burning, stinging, and itching can also occur.
Tazorac Counseling:  Patient advised that medication is irritating and drying.  Patient may need to apply sparingly and wash off after an hour before eventually leaving it on overnight.  The patient verbalized understanding of the proper use and possible adverse effects of tazorac.  All of the patient's questions and concerns were addressed.
Minoxidil Pregnancy And Lactation Text: This medication has not been assigned a Pregnancy Risk Category but animal studies failed to show danger with the topical medication. It is unknown if the medication is excreted in breast milk.
Fluconazole Counseling:  Patient counseled regarding adverse effects of fluconazole including but not limited to headache, diarrhea, nausea, upset stomach, liver function test abnormalities, taste disturbance, and stomach pain.  There is a rare possibility of liver failure that can occur when taking fluconazole.  The patient understands that monitoring of LFTs and kidney function test may be required, especially at baseline. The patient verbalized understanding of the proper use and possible adverse effects of fluconazole.  All of the patient's questions and concerns were addressed.
Cantharidin Pregnancy And Lactation Text: The use of this medication during pregnancy or lactation is not recommended as there is insufficient data.
Otezla Pregnancy And Lactation Text: This medication is Pregnancy Category C and it isn't known if it is safe during pregnancy. It is unknown if it is excreted in breast milk.
Hydroxyzine Pregnancy And Lactation Text: This medication is not safe during pregnancy and should not be taken. It is also excreted in breast milk and breast feeding isn't recommended.
Nsaids Counseling: NSAID Counseling: I discussed with the patient that NSAIDs should be taken with food. Prolonged use of NSAIDs can result in the development of stomach ulcers.  Patient advised to stop taking NSAIDs if abdominal pain occurs.  The patient verbalized understanding of the proper use and possible adverse effects of NSAIDs.  All of the patient's questions and concerns were addressed.
Enbrel Counseling:  I discussed with the patient the risks of etanercept including but not limited to myelosuppression, immunosuppression, autoimmune hepatitis, demyelinating diseases, lymphoma, and infections.  The patient understands that monitoring is required including a PPD at baseline and must alert us or the primary physician if symptoms of infection or other concerning signs are noted.
SSKI Counseling:  I discussed with the patient the risks of SSKI including but not limited to thyroid abnormalities, metallic taste, GI upset, fever, headache, acne, arthralgias, paraesthesias, lymphadenopathy, easy bleeding, arrhythmias, and allergic reaction.
Doxycycline Counseling:  Patient counseled regarding possible photosensitivity and increased risk for sunburn.  Patient instructed to avoid sunlight, if possible.  When exposed to sunlight, patients should wear protective clothing, sunglasses, and sunscreen.  The patient was instructed to call the office immediately if the following severe adverse effects occur:  hearing changes, easy bruising/bleeding, severe headache, or vision changes.  The patient verbalized understanding of the proper use and possible adverse effects of doxycycline.  All of the patient's questions and concerns were addressed.
Opioid Pregnancy And Lactation Text: These medications can lead to premature delivery and should be avoided during pregnancy. These medications are also present in breast milk in small amounts.
Dapsone Pregnancy And Lactation Text: This medication is Pregnancy Category C and is not considered safe during pregnancy or breast feeding.
Ketoconazole Pregnancy And Lactation Text: This medication is Pregnancy Category C and it isn't know if it is safe during pregnancy. It is also excreted in breast milk and breast feeding isn't recommended.
Topical Sulfur Applications Pregnancy And Lactation Text: This medication is Pregnancy Category C and has an unknown safety profile during pregnancy. It is unknown if this topical medication is excreted in breast milk.
Aklief Pregnancy And Lactation Text: It is unknown if this medication is safe to use during pregnancy.  It is unknown if this medication is excreted in breast milk.  Breastfeeding women should use the topical cream on the smallest area of the skin for the shortest time needed while breastfeeding.  Do not apply to nipple and areola.
Cyclophosphamide Counseling:  I discussed with the patient the risks of cyclophosphamide including but not limited to hair loss, hormonal abnormalities, decreased fertility, abdominal pain, diarrhea, nausea and vomiting, bone marrow suppression and infection. The patient understands that monitoring is required while taking this medication.
Xeljanz Counseling: I discussed with the patient the risks of Xeljanz therapy including increased risk of infection, liver issues, headache, diarrhea, or cold symptoms. Live vaccines should be avoided. They were instructed to call if they have any problems.
Qbrexza Pregnancy And Lactation Text: There is no available data on Qbrexza use in pregnant women.  There is no available data on Qbrexza use in lactation.
Doxycycline Pregnancy And Lactation Text: This medication is Pregnancy Category D and not consider safe during pregnancy. It is also excreted in breast milk but is considered safe for shorter treatment courses.
Olumiant Pregnancy And Lactation Text: Based on animal studies, Olumiant may cause embryo-fetal harm when administered to pregnant women.  The medication should not be used in pregnancy.  Breastfeeding is not recommended during treatment.
Mirvaso Counseling: Mirvaso is a topical medication which can decrease superficial blood flow where applied. Side effects are uncommon and include stinging, redness and allergic reactions.
Hydroquinone Counseling:  Patient advised that medication may result in skin irritation, lightening (hypopigmentation), dryness, and burning.  In the event of skin irritation, the patient was advised to reduce the amount of the drug applied or use it less frequently.  Rarely, spots that are treated with hydroquinone can become darker (pseudoochronosis).  Should this occur, patient instructed to stop medication and call the office. The patient verbalized understanding of the proper use and possible adverse effects of hydroquinone.  All of the patient's questions and concerns were addressed.
Azithromycin Counseling:  I discussed with the patient the risks of azithromycin including but not limited to GI upset, allergic reaction, drug rash, diarrhea, and yeast infections.
Tazorac Pregnancy And Lactation Text: This medication is not safe during pregnancy. It is unknown if this medication is excreted in breast milk.
Bexarotene Counseling:  I discussed with the patient the risks of bexarotene including but not limited to hair loss, dry lips/skin/eyes, liver abnormalities, hyperlipidemia, pancreatitis, depression/suicidal ideation, photosensitivity, drug rash/allergic reactions, hypothyroidism, anemia, leukopenia, infection, cataracts, and teratogenicity.  Patient understands that they will need regular blood tests to check lipid profile, liver function tests, white blood cell count, thyroid function tests and pregnancy test if applicable.
Oxybutynin Counseling:  I discussed with the patient the risks of oxybutynin including but not limited to skin rash, drowsiness, dry mouth, difficulty urinating, and blurred vision.
5-Fu Counseling: 5-Fluorouracil Counseling:  I discussed with the patient the risks of 5-fluorouracil including but not limited to erythema, scaling, itching, weeping, crusting, and pain.
Glycopyrrolate Counseling:  I discussed with the patient the risks of glycopyrrolate including but not limited to skin rash, drowsiness, dry mouth, difficulty urinating, and blurred vision.
Cibinqo Counseling: I discussed with the patient the risks of Cibinqo therapy including but not limited to common cold, nausea, headache, cold sores, increased blood CPK levels, dizziness, UTIs, fatigue, acne, and vomitting. Live vaccines should be avoided.  This medication has been linked to serious infections; higher rate of mortality; malignancy and lymphoproliferative disorders; major adverse cardiovascular events; thrombosis; thrombocytopenia and lymphopenia; lipid elevations; and retinal detachment.
Skyrizi Counseling: I discussed with the patient the risks of risankizumab-rzaa including but not limited to immunosuppression, and serious infections.  The patient understands that monitoring is required including a PPD at baseline and must alert us or the primary physician if symptoms of infection or other concerning signs are noted.
Azelaic Acid Counseling: Patient counseled that medicine may cause skin irritation and to avoid applying near the eyes.  In the event of skin irritation, the patient was advised to reduce the amount of the drug applied or use it less frequently.   The patient verbalized understanding of the proper use and possible adverse effects of azelaic acid.  All of the patient's questions and concerns were addressed.
Terbinafine Counseling: Patient counseling regarding adverse effects of terbinafine including but not limited to headache, diarrhea, rash, upset stomach, liver function test abnormalities, itching, taste/smell disturbance, nausea, abdominal pain, and flatulence.  There is a rare possibility of liver failure that can occur when taking terbinafine.  The patient understands that a baseline LFT and kidney function test may be required. The patient verbalized understanding of the proper use and possible adverse effects of terbinafine.  All of the patient's questions and concerns were addressed.
Dutasteride Male Counseling: Dustasteride Counseling:  I discussed with the patient the risks of use of dutasteride including but not limited to decreased libido, decreased ejaculate volume, and gynecomastia. Women who can become pregnant should not handle medication.  All of the patient's questions and concerns were addressed.
Mirvaso Pregnancy And Lactation Text: This medication has not been assigned a Pregnancy Risk Category. It is unknown if the medication is excreted in breast milk.
Nsaids Pregnancy And Lactation Text: These medications are considered safe up to 30 weeks gestation. It is excreted in breast milk.
Topical Clindamycin Counseling: Patient counseled that this medication may cause skin irritation or allergic reactions.  In the event of skin irritation, the patient was advised to reduce the amount of the drug applied or use it less frequently.   The patient verbalized understanding of the proper use and possible adverse effects of clindamycin.  All of the patient's questions and concerns were addressed.
Sski Pregnancy And Lactation Text: This medication is Pregnancy Category D and isn't considered safe during pregnancy. It is excreted in breast milk.
Wartpeel Counseling:  I discussed with the patient the risks of Wartpeel including but not limited to erythema, scaling, itching, weeping, crusting, and pain.
Cyclophosphamide Pregnancy And Lactation Text: This medication is Pregnancy Category D and it isn't considered safe during pregnancy. This medication is excreted in breast milk.
Xelrehanz Pregnancy And Lactation Text: This medication is Pregnancy Category D and is not considered safe during pregnancy.  The risk during breast feeding is also uncertain.
Rhofade Counseling: Rhofade is a topical medication which can decrease superficial blood flow where applied. Side effects are uncommon and include stinging, redness and allergic reactions.
Azithromycin Pregnancy And Lactation Text: This medication is considered safe during pregnancy and is also secreted in breast milk.
Rifampin Counseling: I discussed with the patient the risks of rifampin including but not limited to liver damage, kidney damage, red-orange body fluids, nausea/vomiting and severe allergy.
Cibinqo Pregnancy And Lactation Text: It is unknown if this medication will adversely affect pregnancy or breast feeding.  You should not take this medication if you are currently pregnant or planning a pregnancy or while breastfeeding.
Bexarotene Pregnancy And Lactation Text: This medication is Pregnancy Category X and should not be given to women who are pregnant or may become pregnant. This medication should not be used if you are breast feeding.
Rinvoq Counseling: I discussed with the patient the risks of Rinvoq therapy including but not limited to upper respiratory tract infections, shingles, cold sores, bronchitis, nausea, cough, fever, acne, and headache. Live vaccines should be avoided.  This medication has been linked to serious infections; higher rate of mortality; malignancy and lymphoproliferative disorders; major adverse cardiovascular events; thrombosis; thrombocytopenia, anemia, and neutropenia; lipid elevations; liver enzyme elevations; and gastrointestinal perforations.
Griseofulvin Counseling:  I discussed with the patient the risks of griseofulvin including but not limited to photosensitivity, cytopenia, liver damage, nausea/vomiting and severe allergy.  The patient understands that this medication is best absorbed when taken with a fatty meal (e.g., ice cream or french fries).
Arava Counseling:  Patient counseled regarding adverse effects of Arava including but not limited to nausea, vomiting, abnormalities in liver function tests. Patients may develop mouth sores, rash, diarrhea, and abnormalities in blood counts. The patient understands that monitoring is required including LFTs and blood counts.  There is a rare possibility of scarring of the liver and lung problems that can occur when taking methotrexate. Persistent nausea, loss of appetite, pale stools, dark urine, cough, and shortness of breath should be reported immediately. Patient advised to discontinue Arava treatment and consult with a physician prior to attempting conception. The patient will have to undergo a treatment to eliminate Arava from the body prior to conception.
Glycopyrrolate Pregnancy And Lactation Text: This medication is Pregnancy Category B and is considered safe during pregnancy. It is unknown if it is excreted breast milk.
Albendazole Counseling:  I discussed with the patient the risks of albendazole including but not limited to cytopenia, kidney damage, nausea/vomiting and severe allergy.  The patient understands that this medication is being used in an off-label manner.
Imiquimod Counseling:  I discussed with the patient the risks of imiquimod including but not limited to erythema, scaling, itching, weeping, crusting, and pain.  Patient understands that the inflammatory response to imiquimod is variable from person to person and was educated regarded proper titration schedule.  If flu-like symptoms develop, patient knows to discontinue the medication and contact us.
Bactrim Counseling:  I discussed with the patient the risks of sulfa antibiotics including but not limited to GI upset, allergic reaction, drug rash, diarrhea, dizziness, photosensitivity, and yeast infections.  Rarely, more serious reactions can occur including but not limited to aplastic anemia, agranulocytosis, methemoglobinemia, blood dyscrasias, liver or kidney failure, lung infiltrates or desquamative/blistering drug rashes.
Opzelura Counseling:  I discussed with the patient the risks of Opzelura including but not limited to nasopharngitis, bronchitis, ear infection, eosinophila, hives, diarrhea, folliculitis, tonsillitis, and rhinorrhea.  Taken orally, this medication has been linked to serious infections; higher rate of mortality; malignancy and lymphoproliferative disorders; major adverse cardiovascular events; thrombosis; thrombocytopenia, anemia, and neutropenia; and lipid elevations.
Rifampin Pregnancy And Lactation Text: This medication is Pregnancy Category C and it isn't know if it is safe during pregnancy. It is also excreted in breast milk and should not be used if you are breast feeding.
Griseofulvin Pregnancy And Lactation Text: This medication is Pregnancy Category X and is known to cause serious birth defects. It is unknown if this medication is excreted in breast milk but breast feeding should be avoided.
Dutasteride Pregnancy And Lactation Text: This medication is absolutely contraindicated in women, especially during pregnancy and breast feeding. Feminization of male fetuses is possible if taking while pregnant.
Odomzo Counseling- I discussed with the patient the risks of Odomzo including but not limited to nausea, vomiting, diarrhea, constipation, weight loss, changes in the sense of taste, decreased appetite, muscle spasms, and hair loss.  The patient verbalized understanding of the proper use and possible adverse effects of Odomzo.  All of the patient's questions and concerns were addressed.
Erythromycin Counseling:  I discussed with the patient the risks of erythromycin including but not limited to GI upset, allergic reaction, drug rash, diarrhea, increase in liver enzymes, and yeast infections.
Humira Counseling:  I discussed with the patient the risks of adalimumab including but not limited to myelosuppression, immunosuppression, autoimmune hepatitis, demyelinating diseases, lymphoma, and serious infections.  The patient understands that monitoring is required including a PPD at baseline and must alert us or the primary physician if symptoms of infection or other concerning signs are noted.
Thalidomide Counseling: I discussed with the patient the risks of thalidomide including but not limited to birth defects, anxiety, weakness, chest pain, dizziness, cough and severe allergy.
Xolair Counseling:  Patient informed of potential adverse effects including but not limited to fever, muscle aches, rash and allergic reactions.  The patient verbalized understanding of the proper use and possible adverse effects of Xolair.  All of the patient's questions and concerns were addressed.
Rinvoq Pregnancy And Lactation Text: Based on animal studies, Rinvoq may cause embryo-fetal harm when administered to pregnant women.  The medication should not be used in pregnancy.  Breastfeeding is not recommended during treatment and for 6 days after the last dose.
Cyclosporine Counseling:  I discussed with the patient the risks of cyclosporine including but not limited to hypertension, gingival hyperplasia,myelosuppression, immunosuppression, liver damage, kidney damage, neurotoxicity, lymphoma, and serious infections. The patient understands that monitoring is required including baseline blood pressure, CBC, CMP, lipid panel and uric acid, and then 1-2 times monthly CMP and blood pressure.
Carac Counseling:  I discussed with the patient the risks of Carac including but not limited to erythema, scaling, itching, weeping, crusting, and pain.
Drysol Counseling:  I discussed with the patient the risks of drysol/aluminum chloride including but not limited to skin rash, itching, irritation, burning.
Propranolol Counseling:  I discussed with the patient the risks of propranolol including but not limited to low heart rate, low blood pressure, low blood sugar, restlessness and increased cold sensitivity. They should call the office if they experience any of these side effects.
Hydroxychloroquine Counseling:  I discussed with the patient that a baseline ophthalmologic exam is needed at the start of therapy and every year thereafter while on therapy. A CBC may also be warranted for monitoring.  The side effects of this medication were discussed with the patient, including but not limited to agranulocytosis, aplastic anemia, seizures, rashes, retinopathy, and liver toxicity. Patient instructed to call the office should any adverse effect occur.  The patient verbalized understanding of the proper use and possible adverse effects of Plaquenil.  All the patient's questions and concerns were addressed.
Methotrexate Counseling:  Patient counseled regarding adverse effects of methotrexate including but not limited to nausea, vomiting, abnormalities in liver function tests. Patients may develop mouth sores, rash, diarrhea, and abnormalities in blood counts. The patient understands that monitoring is required including LFT's and blood counts.  There is a rare possibility of scarring of the liver and lung problems that can occur when taking methotrexate. Persistent nausea, loss of appetite, pale stools, dark urine, cough, and shortness of breath should be reported immediately. Patient advised to discontinue methotrexate treatment at least three months before attempting to become pregnant.  I discussed the need for folate supplements while taking methotrexate.  These supplements can decrease side effects during methotrexate treatment. The patient verbalized understanding of the proper use and possible adverse effects of methotrexate.  All of the patient's questions and concerns were addressed.
Cimzia Counseling:  I discussed with the patient the risks of Cimzia including but not limited to immunosuppression, allergic reactions and infections.  The patient understands that monitoring is required including a PPD at baseline and must alert us or the primary physician if symptoms of infection or other concerning signs are noted.
Isotretinoin Counseling: Patient should get monthly blood tests, not donate blood, not drive at night if vision affected, not share medication, and not undergo elective surgery for 6 months after tx completed. Side effects reviewed, pt to contact office should one occur.
Topical Ketoconazole Counseling: Patient counseled that this medication may cause skin irritation or allergic reactions.  In the event of skin irritation, the patient was advised to reduce the amount of the drug applied or use it less frequently.   The patient verbalized understanding of the proper use and possible adverse effects of ketoconazole.  All of the patient's questions and concerns were addressed.
Opzelura Pregnancy And Lactation Text: There is insufficient data to evaluate drug-associated risk for major birth defects, miscarriage, or other adverse maternal or fetal outcomes.  There is a pregnancy registry that monitors pregnancy outcomes in pregnant persons exposed to the medication during pregnancy.  It is unknown if this medication is excreted in breast milk.  Do not breastfeed during treatment and for about 4 weeks after the last dose.
Itraconazole Counseling:  I discussed with the patient the risks of itraconazole including but not limited to liver damage, nausea/vomiting, neuropathy, and severe allergy.  The patient understands that this medication is best absorbed when taken with acidic beverages such as non-diet cola or ginger ale.  The patient understands that monitoring is required including baseline LFTs and repeat LFTs at intervals.  The patient understands that they are to contact us or the primary physician if concerning signs are noted.
Sarecycline Counseling: Patient advised regarding possible photosensitivity and discoloration of the teeth, skin, lips, tongue and gums.  Patient instructed to avoid sunlight, if possible.  When exposed to sunlight, patients should wear protective clothing, sunglasses, and sunscreen.  The patient was instructed to call the office immediately if the following severe adverse effects occur:  hearing changes, easy bruising/bleeding, severe headache, or vision changes.  The patient verbalized understanding of the proper use and possible adverse effects of sarecycline.  All of the patient's questions and concerns were addressed.
Bactrim Pregnancy And Lactation Text: This medication is Pregnancy Category D and is known to cause fetal risk.  It is also excreted in breast milk.
Methotrexate Pregnancy And Lactation Text: This medication is Pregnancy Category X and is known to cause fetal harm. This medication is excreted in breast milk.
Erythromycin Pregnancy And Lactation Text: This medication is Pregnancy Category B and is considered safe during pregnancy. It is also excreted in breast milk.
Erivedge Counseling- I discussed with the patient the risks of Erivedge including but not limited to nausea, vomiting, diarrhea, constipation, weight loss, changes in the sense of taste, decreased appetite, muscle spasms, and hair loss.  The patient verbalized understanding of the proper use and possible adverse effects of Erivedge.  All of the patient's questions and concerns were addressed.
Winlevi Counseling:  I discussed with the patient the risks of topical clascoterone including but not limited to erythema, scaling, itching, and stinging. Patient voiced their understanding.
Detail Level: Generalized
Benzoyl Peroxide Counseling: Patient counseled that medicine may cause skin irritation and bleach clothing.  In the event of skin irritation, the patient was advised to reduce the amount of the drug applied or use it less frequently.   The patient verbalized understanding of the proper use and possible adverse effects of benzoyl peroxide.  All of the patient's questions and concerns were addressed.
Stelara Counseling:  I discussed with the patient the risks of ustekinumab including but not limited to immunosuppression, malignancy, posterior leukoencephalopathy syndrome, and serious infections.  The patient understands that monitoring is required including a PPD at baseline and must alert us or the primary physician if symptoms of infection or other concerning signs are noted.

## 2022-08-23 NOTE — PROCEDURE: KOH PREP
Detail Level: Detailed
Cpt Desired: No Billing
Showing: spores with pseudohyphae
Koh Intro Text (From The.....): A Koh prep was ordered and evaluated from the
Koh Procedure Text (Tissue Harvesting Technique): A 15-blade scalpel was used to scrape the skin. The skin scrapings were placed on a glass slide, covered with a coverslip and a Koh solution was applied.

## 2022-08-23 NOTE — TELEPHONE ENCOUNTER
Spoke with pt and relayed your message.  I made him a follow up appt to discuss medication management. Thank you

## 2022-08-23 NOTE — PROGRESS NOTES
Chief Complaint   Patient presents with    Annual Wellness Visit       HPI:  Buster Medina is a 72 y.o. here for Medicare Annual Wellness Visit     Patient Active Problem List    Diagnosis Date Noted    Postural dizziness with presyncope 06/30/2022    Trigger finger, left middle finger 05/23/2022    Balance disorder 09/03/2021    Sun-damaged skin 09/03/2021    Hoarseness 03/30/2021    Functional diarrhea 07/16/2020    History of gastric bypass 10/04/2019    Tinnitus of both ears 04/09/2019    Bruising 04/09/2019    Complex sleep apnea syndrome 12/06/2018    Dyslipidemia 09/21/2018    Family history of coronary artery disease in brother 09/21/2018    Familial hyperlipidemia 09/21/2018    Psoriatic arthritis (HCC) 08/16/2018    Dysthymic disorder 03/05/2018    Erectile dysfunction of nonorganic origin 03/05/2018    Hypokalemia 09/21/2017    Malignant neoplasm of anterior wall of urinary bladder (HCC) 09/21/2017    Essential hypertension 09/21/2017    Need for vaccination 09/21/2017    Major depressive disorder 09/21/2017    Chronic insomnia 09/21/2017    Plantar fasciitis 09/21/2017    Gastroesophageal reflux disease without esophagitis 09/21/2017    Benign nodular prostatic hyperplasia without lower urinary tract symptoms 09/21/2017    Vitamin D deficiency 09/21/2017    Periodic limb movement 07/31/2014    Essential tremor 06/25/2011    Status post bariatric surgery 03/08/2009    Obstructive sleep apnea (adult) (pediatric) 03/08/2009    Testicular hypofunction 05/26/2005       Current Outpatient Medications   Medication Sig Dispense Refill    ciprofloxacin (CIPRO) 500 MG Tab Take 1 Tablet by mouth 2 times a day. X 3 day prn diarrhea 6 Tablet 0    Nirmatrelvir&Ritonavir 300/100 20 x 150 MG & 10 x 100MG Tablet Therapy Pack Take 300 mg nirmatrelvir (two 150 mg tablets) with 100 mg ritonavir (one 100 mg tablet) by mouth, with all three tablets taken together twice daily for 5 days. 30 Each 0    BELSOMRA 15 MG Tab  Take 15 mg by mouth at bedtime as needed.      ferrous sulfate 325 (65 Fe) MG tablet Take 325 mg by mouth every day.      LORazepam (ATIVAN) 2 MG tablet TAKE 1 TABLET DAILY AT     BEDTIME AS NEEDED FOR      ANXIETY (Patient taking differently: Take 2 mg by mouth at bedtime as needed.) 30 Tablet 2    lisinopril (PRINIVIL) 10 MG Tab Take 1 Tablet by mouth every day. STOP 20 MG DOSE 90 Tablet 0    HUMIRA PEN 40 MG/0.4ML Pen-injector Kit INJECT 1 PEN UNDER THE SKIN EVERY 14 DAYS. (Patient taking differently: Inject 40 mg under the skin every 14 days.) 6 Each 0    celecoxib (CELEBREX) 200 MG Cap TAKE 1 CAPSULE DAILY. NEED OFFICE VISIT FOR REFILL (Patient taking differently: Take 200 mg by mouth every day.) 90 Capsule 0    atorvastatin (LIPITOR) 40 MG Tab TAKE 1 TABLET DAILY (Patient taking differently: Take 40 mg by mouth every evening.) 90 Tablet 3    B Complex Vitamins (VITAMIN B COMPLEX PO) Take 1 Tablet by mouth every day.      Cholecalciferol (VITAMIN D3 PO) Take 1 Tablet by mouth every 7 days. OTC      omeprazole (PRILOSEC) 20 MG delayed-release capsule Take 20 mg by mouth every evening.      aspirin EC (ECOTRIN) 81 MG Tablet Delayed Response Take 81 mg by mouth every bedtime. 30 Tab 0    Multiple Vitamins-Minerals (MELODY-DAY 1000 PO) Take 1 Tab by mouth 2 Times a Day.      mirtazapine (REMERON) 15 MG Tab TAKE 1/2 TABLET AT BEDTIME (Patient not taking: No sig reported) 45 Tablet 0     No current facility-administered medications for this visit.          Current supplements as per medication list.     Allergies: Patient has no known allergies.    Current social contact/activities: walking, gym, motorcycle,     He  reports that he has never smoked. He has never used smokeless tobacco. He reports current alcohol use of about 8.4 oz per week. He reports current drug use. Drugs: Marijuana and Oral.  Counseling given: Not Answered      DPA/Advanced Directive:  Patient has Advanced Directive on file.     ROS:    Gait: Uses  no assistive device  Ostomy: No  Other tubes: No  Amputations: No  Chronic oxygen use: No  Last eye exam: 2021  Wears hearing aids: No   : Denies any urinary leakage during the last 6 months    Screening:  Annual  Depression Screening  Little interest or pleasure in doing things?  0 - not at all  Feeling down, depressed , or hopeless? 0 - not at all  Patient Health Questionnaire Score: 0     If depressive symptoms identified deferred to follow up visit unless specifically addressed in assessment and plan.    Interpretation of PHQ-9 Total Score   Score Severity   1-4 No Depression   5-9 Mild Depression   10-14 Moderate Depression   15-19 Moderately Severe Depression   20-27 Severe Depression    Screening for Cognitive Impairment  Three Minute Recall (daughter, heaven, mountain) 3/3    Kaleb clock face with all 12 numbers and set the hands to show 10 past 11.  Yes    Cognitive concerns identified deferred for follow up unless specifically addressed in assessment and plan.    Fall Risk Assessment  Has the patient had two or more falls in the last year or any fall with injury in the last year?  Yes    Safety Assessment  Throw rugs on floor.  No  Handrails on all stairs.  No  Good lighting in all hallways.  Yes  Difficulty hearing.  No  Patient counseled about all safety risks that were identified.    Functional Assessment ADLs  Are there any barriers preventing you from cooking for yourself or meeting nutritional needs?  No.    Are there any barriers preventing you from driving safely or obtaining transportation?  No.    Are there any barriers preventing you from using a telephone or calling for help?  No.    Are there any barriers preventing you from shopping?  No.    Are there any barriers preventing you from taking care of your own finances?  No.    Are there any barriers preventing you from managing your medications?  No.    Are there any barriers preventing you from showering, bathing or dressing yourself?  No.     Are you currently engaging in any exercise or physical activity?  Yes.     What is your perception of your health?  Good.      Health Maintenance Summary            Overdue - IMM HEP B VACCINE (1 of 3 - 3-dose series) Overdue - never done      No completion history exists for this topic.              IMM INFLUENZA (1) Next due on 9/1/2022      10/12/2021  Imm Admin: Influenza Vaccine Adult HD     09/18/2020  Imm Admin: Influenza Vaccine Quad Inj (Pf)     09/15/2019  Imm Admin: Influenza Vaccine Adult HD     09/18/2018  Imm Admin: Influenza Vaccine Adult HD     09/21/2017  Imm Admin: Influenza Vaccine Adult HD     Only the first 5 history entries have been loaded, but more history exists.            COLORECTAL CANCER SCREENING (COLOGUARD STOOL DNA - Every 3 Years) Next due on 10/22/2022      10/22/2019  COLOGUARD COLON CANCER SCREENING             Annual Wellness Visit (Every 366 Days) Next due on 8/24/2023 08/23/2022  Visit Dx: Medicare annual wellness visit, initial     09/03/2021  Visit Dx: Medicare annual wellness visit, initial             IMM DTaP/Tdap/Td Vaccine (3 - Td or Tdap) Next due on 9/22/2027 09/22/2017  Imm Admin: Tdap Vaccine     02/17/2011  Imm Admin: Tdap Vaccine     01/22/2004  Imm Admin: TD Vaccine             IMM ZOSTER VACCINES (Series Information) Completed      07/27/2020  Imm Admin: Zoster Vaccine Recombinant (RZV) (SHINGRIX)     02/17/2020  Imm Admin: Zoster Vaccine Recombinant (RZV) (SHINGRIX)     08/17/2016  Imm Admin: Zoster Vaccine Live (ZVL) (Zostavax) - HISTORICAL DATA     05/07/2012  Imm Admin: Zoster Vaccine Live (ZVL) (Zostavax) - HISTORICAL DATA     06/20/2011  Imm Admin: Zoster Vaccine Live (ZVL) (Zostavax) - HISTORICAL DATA             IMM PNEUMOCOCCAL VACCINE: 65+ Years (Series Information) Completed      09/18/2020  Imm Admin: Pneumococcal polysaccharide vaccine (PPSV-23)     09/01/2020  Imm Admin: Pneumococcal Conjugate Vaccine (Prevnar/PCV-13)     09/21/2017   "Imm Admin: Pneumococcal Conjugate Vaccine (Prevnar/PCV-13)     02/14/2017  Imm Admin: Pneumococcal polysaccharide vaccine (PPSV-23)     11/18/2016  Imm Admin: Pneumococcal Conjugate Vaccine (Prevnar/PCV-13)     Only the first 5 history entries have been loaded, but more history exists.            HEPATITIS C SCREENING  Completed      12/16/2021  HEP C VIRUS ANTIBODY     03/02/2020  HEP C VIRUS ANTIBODY     08/16/2018  HEP C VIRUS ANTIBODY             COVID-19 Vaccine (Series Information) Completed      04/01/2022  Imm Admin: Moderna SARS-CoV-2 Vaccine     09/25/2021  Imm Admin: Moderna SARS-CoV-2 Vaccine     03/25/2021  Imm Admin: Moderna SARS-CoV-2 Vaccine     02/25/2021  Imm Admin: Moderna SARS-CoV-2 Vaccine             IMM MENINGOCOCCAL ACWY VACCINE (Series Information) Aged Out      No completion history exists for this topic.                    Patient Care Team:  Stu Blair M.D. as PCP - General (Family Medicine)  Mira Mitchell M.D. as Consulting Physician (Rheumatology)  [facility] as Respiratory Therapist (DME Supplier)        Social History     Tobacco Use    Smoking status: Never    Smokeless tobacco: Never   Vaping Use    Vaping Use: Never used   Substance Use Topics    Alcohol use: Yes     Alcohol/week: 8.4 oz     Types: 14 Glasses of wine per week     Comment: wine / daily    Drug use: Yes     Types: Marijuana, Oral     Comment: THC edible - \"once a month\".     Family History   Problem Relation Age of Onset    Cancer Mother 80        lung cancer    Arthritis Mother     Diabetes Mother     Anxiety disorder Mother     Lung Disease Father 65    Cancer Father     Diabetes Father     Hypertension Father     Hyperlipidemia Father     Hyperlipidemia Brother     Arthritis Brother     Other Brother         Thyroid tumor    Sleep Apnea Brother     No Known Problems Maternal Grandmother     Cancer Maternal Grandfather     No Known Problems Paternal Grandmother     No Known Problems Paternal " "Grandfather     Heart Disease Brother 49        smoker, overweight    Hypertension Brother     Depression Other     Suicide Attempts Neg Hx     Bipolar disorder Neg Hx     Alcohol abuse Neg Hx     Drug abuse Neg Hx      He  has a past medical history of Anxiety, Arthritis, Bowel habit changes (06/24/2021), Cancer ([facility]) (2007), Chickenpox, Depression, High cholesterol, Hyperlipidemia, Hypertension, Kidney stone, Mumps, Obesity, Pneumothorax, Psoriatic arthritis ([facility]), Restless leg syndrome, Sleep apnea, and Tonsillitis.   Past Surgical History:   Procedure Laterality Date    MT CYSTOURETHROSCOPY,BIOPSIES N/A 8/17/2022    Procedure: CYSTOSCOPY;  Surgeon: Jerzy Jasso M.D.;  Location: [facility];  Service: Urology    MT CYSTOURETHROSCOPY,BIOPSIES  7/6/2021    Procedure: BIOPSY, BLADDER WITH FULGERATION;  Surgeon: Bob Millan M.D.;  Location: [facility];  Service: Urology    BLADDER BIOPSY WITH CYSTOSCOPY  1/3/2018    Procedure: BLADDER BIOPSY WITH CYSTOSCOPY/WITH UPPER TRACT WASHING;  Surgeon: El Manuel M.D.;  Location: [facility];  Service: Urology    RETROGRADES Bilateral 1/3/2018    Procedure: RETROGRADES;  Surgeon: El Manuel M.D.;  Location: [facility];  Service: Urology    PYELOGRAM Bilateral 1/3/2018    Procedure: PYELOGRAM;  Surgeon: El Manuel M.D.;  Location: [facility];  Service: Urology    OTHER ORTHOPEDIC SURGERY  2015    shoulder replacement    ABDOMINAL EXPLORATION      ARTHROPLASTY      right shoulder    BLADDER BIOPSY WITH CYSTOSCOPY      EYE SURGERY      lasik     GASTRIC BYPASS LAPAROSCOPIC      HERNIA REPAIR      OPEN REDUCTION      OTHER Left     thumb joint    THORACOTOMY      TURP-VAPOR         Exam:   /86 (BP Location: Right arm, Patient Position: Sitting, BP Cuff Size: Adult long)   Pulse 74   Temp 36.6 °C (97.8 °F) (Temporal)   Resp 16   Ht 1.702 m (5' 7\")   Wt 83 kg (183 lb)   SpO2 98%  Body mass index " is 28.66 kg/m².    Hearing good.    Dentition good  Alert, oriented in no acute distress.  Eye contact is good, speech goal directed, affect calm    Assessment and Plan. The following treatment and monitoring plan is recommended:    1. Medicare annual wellness visit, initial    2. Current moderate episode of major depressive disorder, unspecified whether recurrent (HCC)    3. Postural dizziness with presyncope  - EC-ECHOCARDIOGRAM COMPLETE W/ CONT; Future    4. Dyslipidemia    5. Essential hypertension    6. Obstructive sleep apnea (adult) (pediatric)    Other orders  - ciprofloxacin (CIPRO) 500 MG Tab; Take 1 Tablet by mouth 2 times a day. X 3 day prn diarrhea  Dispense: 6 Tablet; Refill: 0  - Nirmatrelvir&Ritonavir 300/100 20 x 150 MG & 10 x 100MG Tablet Therapy Pack; Take 300 mg nirmatrelvir (two 150 mg tablets) with 100 mg ritonavir (one 100 mg tablet) by mouth, with all three tablets taken together twice daily for 5 days.  Dispense: 30 Each; Refill: 0      Plan    Completed      2. Patient has been stable with current management  We will make no changes for now    3. Order echo as stat    4. Patient has been stable with current management  We will make no changes for now    5. Patient has been stable with current management  We will make no changes for now    6. Patient has been stable with current management  We will make no changes for now      Services suggested: No services needed at this time  Health Care Screening: Age-appropriate preventive services recommended by USPTF and ACIP covered by Medicare were discussed today. Services ordered if indicated and agreed upon by the patient.  Referrals offered: Community-based lifestyle interventions to reduce health risks and promote self-management and wellness, fall prevention, nutrition, physical activity, tobacco-use cessation, weight loss, and mental health services as per orders if indicated.    Discussion today about general wellness and lifestyle habits:     Prevent falls and reduce trip hazards; Cautioned about securing or removing rugs.  Have a working fire alarm and carbon monoxide detector;   Engage in regular physical activity and social activities     Follow-up: Return in about 6 months (around 2/23/2023) for Reevaluation, labs.

## 2022-08-25 ENCOUNTER — OFFICE VISIT (OUTPATIENT)
Dept: RHEUMATOLOGY | Facility: MEDICAL CENTER | Age: 72
End: 2022-08-25
Attending: INTERNAL MEDICINE
Payer: MEDICARE

## 2022-08-25 ENCOUNTER — HOSPITAL ENCOUNTER (OUTPATIENT)
Dept: CARDIOLOGY | Facility: MEDICAL CENTER | Age: 72
End: 2022-08-25
Attending: FAMILY MEDICINE
Payer: MEDICARE

## 2022-08-25 VITALS
OXYGEN SATURATION: 97 % | RESPIRATION RATE: 14 BRPM | DIASTOLIC BLOOD PRESSURE: 68 MMHG | TEMPERATURE: 97.6 F | SYSTOLIC BLOOD PRESSURE: 122 MMHG | BODY MASS INDEX: 28.04 KG/M2 | WEIGHT: 179 LBS | HEART RATE: 72 BPM

## 2022-08-25 DIAGNOSIS — L40.50 PSORIATIC ARTHRITIS (HCC): ICD-10-CM

## 2022-08-25 DIAGNOSIS — M10.9 GOUT, UNSPECIFIED CAUSE, UNSPECIFIED CHRONICITY, UNSPECIFIED SITE: ICD-10-CM

## 2022-08-25 DIAGNOSIS — I10 ESSENTIAL HYPERTENSION: ICD-10-CM

## 2022-08-25 DIAGNOSIS — L40.9 PSORIASIS: ICD-10-CM

## 2022-08-25 DIAGNOSIS — Z98.84 H/O GASTRIC BYPASS: ICD-10-CM

## 2022-08-25 DIAGNOSIS — R55 POSTURAL DIZZINESS WITH PRESYNCOPE: ICD-10-CM

## 2022-08-25 DIAGNOSIS — Z79.620 ADALIMUMAB (HUMIRA) LONG-TERM USE: ICD-10-CM

## 2022-08-25 DIAGNOSIS — R42 POSTURAL DIZZINESS WITH PRESYNCOPE: ICD-10-CM

## 2022-08-25 LAB
LV EJECT FRACT  99904: 65
LV EJECT FRACT MOD 2C 99903: 71.86
LV EJECT FRACT MOD 4C 99902: 56.9
LV EJECT FRACT MOD BP 99901: 67.11

## 2022-08-25 PROCEDURE — 93306 TTE W/DOPPLER COMPLETE: CPT

## 2022-08-25 PROCEDURE — 99214 OFFICE O/P EST MOD 30 MIN: CPT | Performed by: INTERNAL MEDICINE

## 2022-08-25 PROCEDURE — 99212 OFFICE O/P EST SF 10 MIN: CPT | Performed by: INTERNAL MEDICINE

## 2022-08-25 PROCEDURE — 93306 TTE W/DOPPLER COMPLETE: CPT | Mod: 26 | Performed by: INTERNAL MEDICINE

## 2022-08-25 ASSESSMENT — FIBROSIS 4 INDEX: FIB4 SCORE: 2.6

## 2022-08-25 NOTE — ADDENDUM NOTE
Encounter addended by: Maria Del Carmen Reid on: 8/25/2022 10:59 AM   Actions taken: Imaging Exam begun

## 2022-08-25 NOTE — ADDENDUM NOTE
Encounter addended by: Maria Del Carmen Reid on: 8/25/2022 10:59 AM   Actions taken: Imaging Exam ended

## 2022-08-25 NOTE — PROGRESS NOTES
Chief Complaint- joint pain     Subjective:   Buster Medina is a 72 y.o. male here today for follow up of rheumatological issues    This is a follow-up visit for this patient who we see in this clinic for psoriatic arthritis that was diagnosed about 2008 by other rheumatologist in [place], [place].  Patient is currently on Humira 40 mg subcu every 2 weeks, patient continues to feel he does quite well this particular regiment, patient comes in today to review x-rays that were done at last visit.  Last x-rays do indicate some mild progression of psoriatic arthritis in the hands however patient states he is completely asymptomatic, is not having problems with Humira, and would like to continue with the Humira.  Patient denies any side effects from the medication, denies any unexplained weight loss, denies any fevers of unknown etiology, denies any GI upset, denies any rashes, denies any new joint swelling, denies recurrent infections.      We also follow patient for hyperuricemia, patient currently stable on no treatment.     Comorbidities include a history of malignant bladder lesion that was resected patient states 2008, patient states his urologist after reviewing records states about 2012,  Patient following up with urology     Additional Co morbidities include HTN, high cholesterol, hx left ahilles tendon rupture and repair, hx left knee arthroscopic surgery for meniscal tear, s/p left rotator cuff tear, s/p bladder cancer about 2012 no recurrence s/p surgical intervention only, hx of gastric bypass 2005 [facility], pt does f/u with Dr Gamez q year    Of note, patient on lorazepam, as this is considered a high risk medication, we will not be able to prescribe any narcotic pain medications for this patient in this clinic.     Right TSA  Right TKA     S/p topical treatments  S/p plaquenil-cause taste abnormalities  S/p Otezla-diarrhea     Uric acid 6.9 1/2021 (on no treatment)  Uric acid 4.1 5/2021 (on no  treatment)    Addendum 7/20/2023  HBsAg/HBcAb neg 7/2023  HCV neg 7/2023  Uric acid 7. 7/2023 (on no treatment)     HBsAg neg 12/2021  HCV neg 12/2021  Quantiferon Gold neg 12/2021  G6PD 10.9 adequate 9/2019  CCP neg 3/2018  RF neg 3/2018  SEAN neg 3/2018     Hand x-rays 3/2018-indicates possible erosion of the fourth metacarpal on the left hand, OA of the right first CMC joint  Hand x-rays 8/2022-IMPRESSION:  New subtle DIP and carpus erosions could represent psoriasis or other inflammatory arthropathy. The pattern is not typical of rheumatoid arthritis  Right severe first CMC osteoarthritis, left trapeziectomy and suspensionplasty     Feet x-rays 3/2018-DJD     Feet x-rays 8/2022-IMPRESSION:  No specific findings to confirm inflammatory arthropathy  Mild right hallux valgus deformity and first metatarsal-phalangeal osteoarthritis as before  Interval left first second and third TMT fusion with no hardware fracture identified     Current Outpatient Medications   Medication Sig Dispense Refill    ciprofloxacin (CIPRO) 500 MG Tab Take 1 Tablet by mouth 2 times a day. X 3 day prn diarrhea 6 Tablet 0    atorvastatin (LIPITOR) 40 MG Tab Take 1 Tablet by mouth every evening. 90 Tablet 2    celecoxib (CELEBREX) 200 MG Cap Take 1 Capsule by mouth every day. 90 Capsule 0    BELSOMRA 15 MG Tab Take 15 mg by mouth at bedtime as needed.      ferrous sulfate 325 (65 Fe) MG tablet Take 325 mg by mouth every day.      LORazepam (ATIVAN) 2 MG tablet TAKE 1 TABLET DAILY AT     BEDTIME AS NEEDED FOR      ANXIETY (Patient taking differently: Take 2 mg by mouth at bedtime as needed.) 30 Tablet 2    lisinopril (PRINIVIL) 10 MG Tab Take 1 Tablet by mouth every day. STOP 20 MG DOSE 90 Tablet 0    HUMIRA PEN 40 MG/0.4ML Pen-injector Kit INJECT 1 PEN UNDER THE SKIN EVERY 14 DAYS. (Patient taking differently: Inject 40 mg under the skin every 14 days.) 6 Each 0    B Complex Vitamins (VITAMIN B COMPLEX PO) Take 1 Tablet by mouth every day.       Cholecalciferol (VITAMIN D3 PO) Take 1 Tablet by mouth every 7 days. OTC      omeprazole (PRILOSEC) 20 MG delayed-release capsule Take 20 mg by mouth every evening.      aspirin EC (ECOTRIN) 81 MG Tablet Delayed Response Take 81 mg by mouth every bedtime. 30 Tab 0    Multiple Vitamins-Minerals (MELODY-DAY 1000 PO) Take 1 Tab by mouth 2 Times a Day.      Nirmatrelvir&Ritonavir 300/100 20 x 150 MG & 10 x 100MG Tablet Therapy Pack Take 300 mg nirmatrelvir (two 150 mg tablets) with 100 mg ritonavir (one 100 mg tablet) by mouth, with all three tablets taken together twice daily for 5 days. (Patient not taking: Reported on 8/25/2022) 30 Each 0     No current facility-administered medications for this visit.     He  has a past medical history of Anxiety, Arthritis, Bowel habit changes (06/24/2021), Cancer (HCC) (2007), Chickenpox, Depression, High cholesterol, Hyperlipidemia, Hypertension, Kidney stone, Mumps, Obesity, Pneumothorax, Psoriatic arthritis ([facility]), Restless leg syndrome, Sleep apnea, and Tonsillitis.    ROS   Other than what is mentioned in HPI or physical exam, there is no history of headaches, double vision or blurred vision. No temporal tenderness or jaw claudication. No trouble swallowing difficulties or sore throats.  No chest complaints including chest pain, cough or sputum production. No GI complaints including nausea, vomiting, change in bowel habits, or past peptic ulcer disease. No history of blood in the stools. No urinary complaints including dysuria or frequency. No history of alopecia, photosensitivity, oral ulcerations, Raynaud's phenomena.       Objective:     /68   Pulse 72   Temp 36.4 °C (97.6 °F) (Temporal)   Resp 14   Wt 81.2 kg (179 lb)   SpO2 97%  Body mass index is 28.04 kg/m².   Physical Exam:    Constitutional: Alert and oriented X3, patient is talkative with good eye contact.Skin: Warm, dry, there is about a 1 inch diameter psoriatic plaque on the extensor surface of the left  knee.Eye: Equal, round and reactive, conjunctiva clear, lids normal EOM intactENMT: Lips without lesions, good dentition, no oropharyngeal ulcers, moist buccal mucosa, pinna without deformityNeck: Trachea midline, no masses, no thyromegaly.Lymph:  No cervical lymphadenopathy, no axillary lymphadenopathy, no supraclavicular lymphadenopathyRespiratory: Unlabored respiratory effort, lungs clear to auscultation, no wheezes, no ronchi.Cardiovascular: Normal S1, S2, .Abdomen: Soft, non-tender, no masses, no hepatosplenomegaly.Psych: Alert and oriented x3, normal affect and mood.Neuro: Cranial nerves 2-12 are grossly intact, no loss of sensation LEExt:no joint laxity noted in bilateral arms, no joint laxity noted in bilateral legs, joints look good, no sausage digits no dactylitis, no enthesitis's, shoulders full range of motion, elbows without flexion contractures, toes without crossover toes and without splay toes    Lab Results   Component Value Date/Time    QNTTBGOLD Negative 08/16/2018 12:13 PM     Lab Results   Component Value Date/Time    HEPBCORIGM Negative 03/02/2020 02:35 PM    HEPBSAG Non-Reactive 12/16/2021 04:21 PM     Lab Results   Component Value Date/Time    HEPCAB Non-Reactive 12/16/2021 04:21 PM     Lab Results   Component Value Date/Time    SODIUM 139 08/15/2022 10:30 AM    POTASSIUM 4.3 08/15/2022 10:30 AM    CHLORIDE 104 08/15/2022 10:30 AM    CO2 25 08/15/2022 10:30 AM    GLUCOSE 107 (H) 08/15/2022 10:30 AM    BUN 17 08/15/2022 10:30 AM    CREATININE 1.11 08/15/2022 10:30 AM      Lab Results   Component Value Date/Time    WBC 5.4 08/15/2022 10:30 AM    RBC 4.35 (L) 08/15/2022 10:30 AM    HEMOGLOBIN 13.8 (L) 08/15/2022 10:30 AM    HEMATOCRIT 42.6 08/15/2022 10:30 AM    MCV 97.9 (H) 08/15/2022 10:30 AM    MCH 31.7 08/15/2022 10:30 AM    MCHC 32.4 (L) 08/15/2022 10:30 AM    MPV 12.4 08/15/2022 10:30 AM    NEUTSPOLYS 46.20 08/15/2022 10:30 AM    LYMPHOCYTES 37.20 08/15/2022 10:30 AM    MONOCYTES 12.50  08/15/2022 10:30 AM    EOSINOPHILS 2.80 08/15/2022 10:30 AM    BASOPHILS 0.90 08/15/2022 10:30 AM      Lab Results   Component Value Date/Time    CALCIUM 9.6 08/15/2022 10:30 AM    ASTSGOT 31 04/27/2022 09:48 AM    ALTSGPT 37 04/27/2022 09:48 AM    ALKPHOSPHAT 78 04/27/2022 09:48 AM    TBILIRUBIN 0.3 04/27/2022 09:48 AM    ALBUMIN 4.5 04/27/2022 09:48 AM    TOTPROTEIN 6.9 04/27/2022 09:48 AM     Lab Results   Component Value Date/Time    URICACID 4.1 05/06/2021 08:43 AM    RHEUMFACTN <10 03/21/2018 02:19 PM    CCPANTIBODY 4 03/21/2018 02:20 PM    ANTINUCAB None Detected 03/21/2018 02:19 PM     Lab Results   Component Value Date/Time    SEDRATEWES 12 03/21/2022 03:04 PM     Lab Results   Component Value Date/Time    G6PD 10.9 09/17/2019 10:30 AM     Assessment and Plan:     1. Psoriatic arthritis (HCC)  Continue Humira 40 mg subcu every 2 weeks,    2. Psoriasis  Patient has topical corticosteroid ointment to be used as needed    3. Adalimumab (Humira) long-term use  Continue Humira 40 mg subcu every 2 weeks, screening labs are up-to-date, neck screening labs due December 2023, patient needs monitoring labs every 6 months, next labs due 2/2023  We reviewed risks of biological medications with patient including hematological pathology, cancer risks, neurological and infection issues especially in the Covid-19 pandemic environment, patient states understanding.    4. Gout, unspecified cause, unspecified chronicity, unspecified site  No symptoms, currently diet controlled, continue to monitor    5. Essential hypertension  May impact the type of medications we can use for this patient's arthritis. We will have to keep this under advisement.    6. H/O gastric bypass  May impact the type of medications we can use for this patient's arthritis. We will have to keep this under advisement.    Followup: Return in about 6 months (around 2/25/2023). or sooner prnoy Medina  was seen 30 minutes face-to-face of which  more than 50% of the time was spent counseling the patient (excluding time for procedures)  regarding  rheumatological condition and care. Therapy was discussed in detail.      Please note that this dictation was created using voice recognition software. I have made every reasonable attempt to correct obvious errors, but I expect that there are errors of grammar and possibly content that I did not discover before finalizing the note.

## 2022-08-25 NOTE — ADDENDUM NOTE
Encounter addended by: Maria Del Carmen Reid on: 8/25/2022 11:00 AM   Actions taken: Imaging Exam ended

## 2022-08-30 PROBLEM — Z47.89 ORTHOPEDIC AFTERCARE: Status: ACTIVE | Noted: 2022-08-30

## 2022-09-21 DIAGNOSIS — R55 POSTURAL DIZZINESS WITH PRESYNCOPE: ICD-10-CM

## 2022-09-21 DIAGNOSIS — R42 POSTURAL DIZZINESS WITH PRESYNCOPE: ICD-10-CM

## 2022-09-21 RX ORDER — LISINOPRIL 10 MG/1
10 TABLET ORAL DAILY
Qty: 90 TABLET | Refills: 0 | Status: SHIPPED | OUTPATIENT
Start: 2022-09-21 | End: 2023-01-02

## 2022-09-26 ENCOUNTER — PATIENT MESSAGE (OUTPATIENT)
Dept: SLEEP MEDICINE | Facility: MEDICAL CENTER | Age: 72
End: 2022-09-26
Payer: MEDICARE

## 2022-09-26 ENCOUNTER — APPOINTMENT (RX ONLY)
Dept: URBAN - METROPOLITAN AREA CLINIC 35 | Facility: CLINIC | Age: 72
Setting detail: DERMATOLOGY
End: 2022-09-26

## 2022-09-26 DIAGNOSIS — B36.0 PITYRIASIS VERSICOLOR: ICD-10-CM | Status: INADEQUATELY CONTROLLED

## 2022-09-26 DIAGNOSIS — D22 MELANOCYTIC NEVI: ICD-10-CM

## 2022-09-26 DIAGNOSIS — Z71.89 OTHER SPECIFIED COUNSELING: ICD-10-CM

## 2022-09-26 DIAGNOSIS — L81.0 POSTINFLAMMATORY HYPERPIGMENTATION: ICD-10-CM

## 2022-09-26 DIAGNOSIS — D18.0 HEMANGIOMA: ICD-10-CM

## 2022-09-26 DIAGNOSIS — L82.1 OTHER SEBORRHEIC KERATOSIS: ICD-10-CM

## 2022-09-26 DIAGNOSIS — L81.4 OTHER MELANIN HYPERPIGMENTATION: ICD-10-CM

## 2022-09-26 DIAGNOSIS — L40.0 PSORIASIS VULGARIS: ICD-10-CM | Status: WELL CONTROLLED

## 2022-09-26 PROBLEM — D22.61 MELANOCYTIC NEVI OF RIGHT UPPER LIMB, INCLUDING SHOULDER: Status: ACTIVE | Noted: 2022-09-26

## 2022-09-26 PROBLEM — D18.01 HEMANGIOMA OF SKIN AND SUBCUTANEOUS TISSUE: Status: ACTIVE | Noted: 2022-09-26

## 2022-09-26 PROCEDURE — ? PRESCRIPTION

## 2022-09-26 PROCEDURE — 99214 OFFICE O/P EST MOD 30 MIN: CPT

## 2022-09-26 PROCEDURE — ? TREATMENT REGIMEN

## 2022-09-26 PROCEDURE — ? SUNSCREEN RECOMMENDATIONS

## 2022-09-26 PROCEDURE — ? COUNSELING

## 2022-09-26 RX ORDER — KETOCONAZOLE 20 MG/ML
1 SHAMPOO, SUSPENSION TOPICAL
Qty: 120 | Refills: 11 | Status: ERX | COMMUNITY
Start: 2022-09-26

## 2022-09-26 RX ADMIN — KETOCONAZOLE 1: 20 SHAMPOO, SUSPENSION TOPICAL at 00:00

## 2022-09-26 ASSESSMENT — LOCATION SIMPLE DESCRIPTION DERM
LOCATION SIMPLE: RIGHT POSTERIOR THIGH
LOCATION SIMPLE: LEFT KNEE
LOCATION SIMPLE: RIGHT SHOULDER
LOCATION SIMPLE: LEFT CLAVICULAR SKIN
LOCATION SIMPLE: RIGHT FOREARM
LOCATION SIMPLE: RIGHT KNEE
LOCATION SIMPLE: LEFT POSTERIOR THIGH
LOCATION SIMPLE: RIGHT ELBOW
LOCATION SIMPLE: LEFT FOREARM
LOCATION SIMPLE: RIGHT UPPER BACK

## 2022-09-26 ASSESSMENT — LOCATION DETAILED DESCRIPTION DERM
LOCATION DETAILED: LEFT KNEE
LOCATION DETAILED: LEFT DISTAL POSTERIOR THIGH
LOCATION DETAILED: LEFT CLAVICULAR SKIN
LOCATION DETAILED: RIGHT LATERAL UPPER BACK
LOCATION DETAILED: RIGHT POSTERIOR SHOULDER
LOCATION DETAILED: RIGHT KNEE
LOCATION DETAILED: LEFT PROXIMAL DORSAL FOREARM
LOCATION DETAILED: RIGHT VENTRAL PROXIMAL FOREARM
LOCATION DETAILED: RIGHT PROXIMAL POSTERIOR THIGH
LOCATION DETAILED: LEFT VENTRAL PROXIMAL FOREARM
LOCATION DETAILED: RIGHT ELBOW

## 2022-09-26 ASSESSMENT — LOCATION ZONE DERM
LOCATION ZONE: TRUNK
LOCATION ZONE: ARM
LOCATION ZONE: LEG

## 2022-09-26 NOTE — PROCEDURE: COUNSELING
Detail Level: Generalized
Detail Level: Zone
Patient Specific Counseling (Will Not Stick From Patient To Patient): Being treated with humira by RA Dr. Amisha Johnson
Detail Level: Detailed

## 2022-10-05 ENCOUNTER — PATIENT MESSAGE (OUTPATIENT)
Dept: SLEEP MEDICINE | Facility: MEDICAL CENTER | Age: 72
End: 2022-10-05
Payer: MEDICARE

## 2022-10-05 DIAGNOSIS — F51.04 CHRONIC INSOMNIA: ICD-10-CM

## 2022-10-05 RX ORDER — SUVOREXANT 15 MG/1
1 TABLET, FILM COATED ORAL
Qty: 90 TABLET | Refills: 0 | Status: SHIPPED | OUTPATIENT
Start: 2022-10-05 | End: 2023-01-12 | Stop reason: SDUPTHER

## 2022-10-05 RX ORDER — SUVOREXANT 15 MG/1
15 TABLET, FILM COATED ORAL NIGHTLY PRN
Qty: 30 TABLET | Status: CANCELLED
Start: 2022-10-05

## 2022-11-04 RX ORDER — CELECOXIB 200 MG/1
200 CAPSULE ORAL DAILY
Qty: 90 CAPSULE | Refills: 0 | Status: SHIPPED | OUTPATIENT
Start: 2022-11-04 | End: 2022-12-01 | Stop reason: SDUPTHER

## 2022-11-10 ENCOUNTER — PATIENT MESSAGE (OUTPATIENT)
Dept: HEALTH INFORMATION MANAGEMENT | Facility: OTHER | Age: 72
End: 2022-11-10

## 2022-11-13 DIAGNOSIS — E78.5 DYSLIPIDEMIA: ICD-10-CM

## 2022-11-15 RX ORDER — MIRTAZAPINE 15 MG/1
TABLET, FILM COATED ORAL
Qty: 90 TABLET | Refills: 3 | Status: SHIPPED | OUTPATIENT
Start: 2022-11-15 | End: 2023-07-19

## 2022-11-15 RX ORDER — ATORVASTATIN CALCIUM 40 MG/1
TABLET, FILM COATED ORAL
Qty: 90 TABLET | Refills: 3 | Status: SHIPPED | OUTPATIENT
Start: 2022-11-15 | End: 2022-12-01 | Stop reason: SDUPTHER

## 2022-11-15 NOTE — TELEPHONE ENCOUNTER
Received request via: Pharmacy    Was the patient seen in the last year in this department? Yes    Does the patient have an active prescription (recently filled or refills available) for medication(s) requested? No    Does the patient have [facility] and need 100 day supply (blood pressure, diabetes and cholesterol meds only)? Patient does not have SCP

## 2022-12-01 DIAGNOSIS — E78.5 DYSLIPIDEMIA: ICD-10-CM

## 2022-12-04 RX ORDER — ATORVASTATIN CALCIUM 40 MG/1
40 TABLET, FILM COATED ORAL DAILY
Qty: 90 TABLET | Refills: 3 | Status: SHIPPED | OUTPATIENT
Start: 2022-12-04 | End: 2023-02-14 | Stop reason: SDUPTHER

## 2022-12-04 RX ORDER — CELECOXIB 200 MG/1
200 CAPSULE ORAL DAILY
Qty: 90 CAPSULE | Refills: 0 | Status: SHIPPED | OUTPATIENT
Start: 2022-12-04 | End: 2023-01-27 | Stop reason: SDUPTHER

## 2022-12-05 ENCOUNTER — OFFICE VISIT (OUTPATIENT)
Dept: URGENT CARE | Facility: CLINIC | Age: 72
End: 2022-12-05
Payer: MEDICARE

## 2022-12-05 VITALS
RESPIRATION RATE: 20 BRPM | HEIGHT: 67 IN | OXYGEN SATURATION: 98 % | WEIGHT: 180 LBS | BODY MASS INDEX: 28.25 KG/M2 | HEART RATE: 54 BPM | TEMPERATURE: 97.3 F | SYSTOLIC BLOOD PRESSURE: 124 MMHG | DIASTOLIC BLOOD PRESSURE: 76 MMHG

## 2022-12-05 DIAGNOSIS — H10.33 ACUTE CONJUNCTIVITIS OF BOTH EYES, UNSPECIFIED ACUTE CONJUNCTIVITIS TYPE: ICD-10-CM

## 2022-12-05 PROCEDURE — 99213 OFFICE O/P EST LOW 20 MIN: CPT | Performed by: PHYSICIAN ASSISTANT

## 2022-12-05 RX ORDER — TRIAMCINOLONE ACETONIDE 1 MG/G
CREAM TOPICAL
COMMUNITY
Start: 2022-09-20 | End: 2023-07-19

## 2022-12-05 RX ORDER — LORAZEPAM 2 MG/1
TABLET ORAL
COMMUNITY
Start: 2022-09-21 | End: 2023-09-05 | Stop reason: SDUPTHER

## 2022-12-05 RX ORDER — KETOCONAZOLE 20 MG/ML
SHAMPOO TOPICAL
COMMUNITY
Start: 2022-12-01 | End: 2023-07-19

## 2022-12-05 RX ORDER — POLYMYXIN B SULFATE AND TRIMETHOPRIM 1; 10000 MG/ML; [USP'U]/ML
1 SOLUTION OPHTHALMIC EVERY 4 HOURS
Qty: 10 ML | Refills: 0 | Status: SHIPPED | OUTPATIENT
Start: 2022-12-05 | End: 2022-12-12

## 2022-12-05 ASSESSMENT — ENCOUNTER SYMPTOMS
EYE PAIN: 0
BLURRED VISION: 1
DOUBLE VISION: 0
EYE DISCHARGE: 1
PHOTOPHOBIA: 0
EYE REDNESS: 1

## 2022-12-05 ASSESSMENT — VISUAL ACUITY: OU: 1

## 2022-12-05 ASSESSMENT — FIBROSIS 4 INDEX: FIB4 SCORE: 2.6

## 2022-12-23 DIAGNOSIS — R55 POSTURAL DIZZINESS WITH PRESYNCOPE: ICD-10-CM

## 2022-12-23 DIAGNOSIS — R42 POSTURAL DIZZINESS WITH PRESYNCOPE: ICD-10-CM

## 2022-12-30 DIAGNOSIS — R55 POSTURAL DIZZINESS WITH PRESYNCOPE: ICD-10-CM

## 2022-12-30 DIAGNOSIS — R42 POSTURAL DIZZINESS WITH PRESYNCOPE: ICD-10-CM

## 2023-01-02 RX ORDER — LISINOPRIL 10 MG/1
10 TABLET ORAL DAILY
Qty: 90 TABLET | Refills: 0 | Status: SHIPPED | OUTPATIENT
Start: 2023-01-02 | End: 2023-03-31

## 2023-01-04 RX ORDER — LISINOPRIL 10 MG/1
10 TABLET ORAL DAILY
Qty: 90 TABLET | Refills: 0 | Status: SHIPPED | OUTPATIENT
Start: 2023-01-04 | End: 2023-06-02

## 2023-01-12 DIAGNOSIS — F51.04 CHRONIC INSOMNIA: ICD-10-CM

## 2023-01-12 RX ORDER — SUVOREXANT 15 MG/1
1 TABLET, FILM COATED ORAL
Qty: 90 TABLET | Refills: 0 | Status: SHIPPED | OUTPATIENT
Start: 2023-01-12 | End: 2023-04-23 | Stop reason: SDUPTHER

## 2023-01-12 NOTE — TELEPHONE ENCOUNTER
Have we ever prescribed this med? Yes.  If yes, what date? 10/5/2022    Last OV: 7/12/2022 NICOLE Sotomayor APRN     Next OV: 7/19/2023 NICOLE CALIX     DX: Chronic insomnia (F51.04)    Medications: Suvorexant (BELSOMRA) 15 MG Tab

## 2023-01-31 RX ORDER — CELECOXIB 200 MG/1
200 CAPSULE ORAL DAILY
Qty: 90 CAPSULE | Refills: 0 | Status: SHIPPED | OUTPATIENT
Start: 2023-01-31 | End: 2023-04-24 | Stop reason: SDUPTHER

## 2023-02-14 ENCOUNTER — TELEPHONE (OUTPATIENT)
Dept: MEDICAL GROUP | Age: 73
End: 2023-02-14
Payer: MEDICARE

## 2023-02-14 DIAGNOSIS — E78.5 DYSLIPIDEMIA: ICD-10-CM

## 2023-02-14 RX ORDER — ATORVASTATIN CALCIUM 40 MG/1
40 TABLET, FILM COATED ORAL DAILY
Qty: 90 TABLET | Refills: 3 | Status: SHIPPED | OUTPATIENT
Start: 2023-02-14 | End: 2023-09-08 | Stop reason: SDUPTHER

## 2023-02-14 RX ORDER — FLUOXETINE HYDROCHLORIDE 40 MG/1
40 CAPSULE ORAL DAILY
Qty: 90 CAPSULE | Refills: 2 | Status: SHIPPED | OUTPATIENT
Start: 2023-02-14 | End: 2023-08-04

## 2023-03-31 DIAGNOSIS — R42 POSTURAL DIZZINESS WITH PRESYNCOPE: ICD-10-CM

## 2023-03-31 DIAGNOSIS — R55 POSTURAL DIZZINESS WITH PRESYNCOPE: ICD-10-CM

## 2023-03-31 RX ORDER — LISINOPRIL 10 MG/1
TABLET ORAL
Qty: 90 TABLET | Refills: 0 | Status: SHIPPED | OUTPATIENT
Start: 2023-03-31 | End: 2023-09-05

## 2023-04-23 DIAGNOSIS — F51.04 CHRONIC INSOMNIA: ICD-10-CM

## 2023-04-24 ENCOUNTER — PATIENT MESSAGE (OUTPATIENT)
Dept: MEDICAL GROUP | Age: 73
End: 2023-04-24
Payer: MEDICARE

## 2023-04-24 RX ORDER — CELECOXIB 200 MG/1
200 CAPSULE ORAL DAILY
Qty: 90 CAPSULE | Refills: 0 | OUTPATIENT
Start: 2023-04-24

## 2023-04-24 RX ORDER — SUVOREXANT 15 MG/1
1 TABLET, FILM COATED ORAL
Qty: 90 TABLET | Refills: 0 | Status: SHIPPED | OUTPATIENT
Start: 2023-04-24 | End: 2023-08-01 | Stop reason: SDUPTHER

## 2023-04-24 NOTE — TELEPHONE ENCOUNTER
Have we ever prescribed this med? Yes.  If yes, what date? 1/12/2023    Last OV: 7/12/2022 NICOLE CALIX     Next OV: 7/19/2023 NICOLE CALIX     DX: Chronic insomnia (F51.04)    Medications: Suvorexant (BELSOMRA) 15 MG Tab

## 2023-04-25 RX ORDER — CELECOXIB 200 MG/1
200 CAPSULE ORAL DAILY
Qty: 90 CAPSULE | Refills: 0 | Status: SHIPPED | OUTPATIENT
Start: 2023-04-25 | End: 2023-07-18 | Stop reason: SDUPTHER

## 2023-05-05 ENCOUNTER — PATIENT MESSAGE (OUTPATIENT)
Dept: SLEEP MEDICINE | Facility: MEDICAL CENTER | Age: 73
End: 2023-05-05
Payer: MEDICARE

## 2023-05-05 DIAGNOSIS — G47.33 OSA (OBSTRUCTIVE SLEEP APNEA): ICD-10-CM

## 2023-05-05 DIAGNOSIS — F51.04 CHRONIC INSOMNIA: ICD-10-CM

## 2023-05-08 RX ORDER — ZOLPIDEM TARTRATE 12.5 MG/1
12.5 TABLET, FILM COATED, EXTENDED RELEASE ORAL NIGHTLY PRN
Qty: 90 TABLET | Refills: 0 | OUTPATIENT
Start: 2023-05-08 | End: 2023-08-06

## 2023-05-08 NOTE — PATIENT COMMUNICATION
DANIEL Blunt.  You 3 hours ago (11:52 AM)     Patient was not using this at our last OV and he was already using multiple medications for sleep at that time and I did not refill sleep aides.   He needs an OV to discuss what medications he will continue using due to risk of polypharmacy.        Called pt and left vm to inform him of this message. Advised he call back or send a my chart message with any questions or concerns.

## 2023-05-31 DIAGNOSIS — R42 POSTURAL DIZZINESS WITH PRESYNCOPE: ICD-10-CM

## 2023-05-31 DIAGNOSIS — R55 POSTURAL DIZZINESS WITH PRESYNCOPE: ICD-10-CM

## 2023-06-02 RX ORDER — LISINOPRIL 10 MG/1
TABLET ORAL
Qty: 90 TABLET | Refills: 0 | Status: SHIPPED | OUTPATIENT
Start: 2023-06-02 | End: 2023-07-19

## 2023-07-13 ENCOUNTER — TELEPHONE (OUTPATIENT)
Dept: RHEUMATOLOGY | Facility: MEDICAL CENTER | Age: 73
End: 2023-07-13
Payer: MEDICARE

## 2023-07-13 DIAGNOSIS — Z79.620 ADALIMUMAB (HUMIRA) LONG-TERM USE: ICD-10-CM

## 2023-07-13 DIAGNOSIS — L40.50 PSORIATIC ARTHRITIS (HCC): ICD-10-CM

## 2023-07-13 DIAGNOSIS — I10 ESSENTIAL HYPERTENSION: ICD-10-CM

## 2023-07-13 DIAGNOSIS — M10.9 GOUT, UNSPECIFIED CAUSE, UNSPECIFIED CHRONICITY, UNSPECIFIED SITE: ICD-10-CM

## 2023-07-13 NOTE — TELEPHONE ENCOUNTER
Pt called, has appoint 7/31/23, wanted to know if he needs any labs done before visit, if so please order labs and I will call to advise pt. Thank you!

## 2023-07-19 ENCOUNTER — HOSPITAL ENCOUNTER (OUTPATIENT)
Dept: LAB | Facility: MEDICAL CENTER | Age: 73
End: 2023-07-19
Attending: INTERNAL MEDICINE
Payer: MEDICARE

## 2023-07-19 ENCOUNTER — OFFICE VISIT (OUTPATIENT)
Dept: SLEEP MEDICINE | Facility: MEDICAL CENTER | Age: 73
End: 2023-07-19
Attending: NURSE PRACTITIONER
Payer: MEDICARE

## 2023-07-19 VITALS
HEIGHT: 67 IN | DIASTOLIC BLOOD PRESSURE: 74 MMHG | BODY MASS INDEX: 28.41 KG/M2 | HEART RATE: 69 BPM | SYSTOLIC BLOOD PRESSURE: 128 MMHG | OXYGEN SATURATION: 96 % | WEIGHT: 181 LBS | RESPIRATION RATE: 16 BRPM

## 2023-07-19 DIAGNOSIS — Z79.620 ADALIMUMAB (HUMIRA) LONG-TERM USE: ICD-10-CM

## 2023-07-19 DIAGNOSIS — M10.9 GOUT, UNSPECIFIED CAUSE, UNSPECIFIED CHRONICITY, UNSPECIFIED SITE: ICD-10-CM

## 2023-07-19 DIAGNOSIS — I10 ESSENTIAL HYPERTENSION: ICD-10-CM

## 2023-07-19 DIAGNOSIS — F51.04 CHRONIC INSOMNIA: Chronic | ICD-10-CM

## 2023-07-19 DIAGNOSIS — G47.31 COMPLEX SLEEP APNEA SYNDROME: Chronic | ICD-10-CM

## 2023-07-19 DIAGNOSIS — Z78.9 NONSMOKER: ICD-10-CM

## 2023-07-19 DIAGNOSIS — L40.50 PSORIATIC ARTHRITIS (HCC): ICD-10-CM

## 2023-07-19 DIAGNOSIS — F32.1 CURRENT MODERATE EPISODE OF MAJOR DEPRESSIVE DISORDER, UNSPECIFIED WHETHER RECURRENT (HCC): ICD-10-CM

## 2023-07-19 LAB
ALBUMIN SERPL BCP-MCNC: 4.4 G/DL (ref 3.2–4.9)
ALBUMIN/GLOB SERPL: 1.5 G/DL
ALP SERPL-CCNC: 81 U/L (ref 30–99)
ALT SERPL-CCNC: 32 U/L (ref 2–50)
ANION GAP SERPL CALC-SCNC: 11 MMOL/L (ref 7–16)
AST SERPL-CCNC: 31 U/L (ref 12–45)
BASOPHILS # BLD AUTO: 0.7 % (ref 0–1.8)
BASOPHILS # BLD: 0.04 K/UL (ref 0–0.12)
BILIRUB SERPL-MCNC: 0.5 MG/DL (ref 0.1–1.5)
BUN SERPL-MCNC: 17 MG/DL (ref 8–22)
CALCIUM ALBUM COR SERPL-MCNC: 9.8 MG/DL (ref 8.5–10.5)
CALCIUM SERPL-MCNC: 10.1 MG/DL (ref 8.4–10.2)
CHLORIDE SERPL-SCNC: 103 MMOL/L (ref 96–112)
CO2 SERPL-SCNC: 25 MMOL/L (ref 20–33)
CREAT SERPL-MCNC: 1.16 MG/DL (ref 0.5–1.4)
EOSINOPHIL # BLD AUTO: 0.16 K/UL (ref 0–0.51)
EOSINOPHIL NFR BLD: 2.8 % (ref 0–6.9)
ERYTHROCYTE [DISTWIDTH] IN BLOOD BY AUTOMATED COUNT: 43.8 FL (ref 35.9–50)
ERYTHROCYTE [SEDIMENTATION RATE] IN BLOOD BY WESTERGREN METHOD: 5 MM/HOUR (ref 0–20)
GFR SERPLBLD CREATININE-BSD FMLA CKD-EPI: 67 ML/MIN/1.73 M 2
GLOBULIN SER CALC-MCNC: 2.9 G/DL (ref 1.9–3.5)
GLUCOSE SERPL-MCNC: 95 MG/DL (ref 65–99)
HBV CORE IGM SER QL: NORMAL
HBV SURFACE AG SER QL: NORMAL
HCT VFR BLD AUTO: 47.4 % (ref 42–52)
HCV AB SER QL: NORMAL
HGB BLD-MCNC: 15.5 G/DL (ref 14–18)
IMM GRANULOCYTES # BLD AUTO: 0.02 K/UL (ref 0–0.11)
IMM GRANULOCYTES NFR BLD AUTO: 0.4 % (ref 0–0.9)
LYMPHOCYTES # BLD AUTO: 1.98 K/UL (ref 1–4.8)
LYMPHOCYTES NFR BLD: 35 % (ref 22–41)
MCH RBC QN AUTO: 31.8 PG (ref 27–33)
MCHC RBC AUTO-ENTMCNC: 32.7 G/DL (ref 32.3–36.5)
MCV RBC AUTO: 97.1 FL (ref 81.4–97.8)
MONOCYTES # BLD AUTO: 0.6 K/UL (ref 0–0.85)
MONOCYTES NFR BLD AUTO: 10.6 % (ref 0–13.4)
NEUTROPHILS # BLD AUTO: 2.86 K/UL (ref 1.82–7.42)
NEUTROPHILS NFR BLD: 50.5 % (ref 44–72)
NRBC # BLD AUTO: 0 K/UL
NRBC BLD-RTO: 0 /100 WBC (ref 0–0.2)
PLATELET # BLD AUTO: 106 K/UL (ref 164–446)
PMV BLD AUTO: 11.1 FL (ref 9–12.9)
POTASSIUM SERPL-SCNC: 4.3 MMOL/L (ref 3.6–5.5)
PROT SERPL-MCNC: 7.3 G/DL (ref 6–8.2)
RBC # BLD AUTO: 4.88 M/UL (ref 4.7–6.1)
SODIUM SERPL-SCNC: 139 MMOL/L (ref 135–145)
URATE SERPL-MCNC: 7.6 MG/DL (ref 2.5–8.3)
WBC # BLD AUTO: 5.7 K/UL (ref 4.8–10.8)

## 2023-07-19 PROCEDURE — 84550 ASSAY OF BLOOD/URIC ACID: CPT

## 2023-07-19 PROCEDURE — 99212 OFFICE O/P EST SF 10 MIN: CPT | Performed by: NURSE PRACTITIONER

## 2023-07-19 PROCEDURE — 86705 HEP B CORE ANTIBODY IGM: CPT

## 2023-07-19 PROCEDURE — 3074F SYST BP LT 130 MM HG: CPT | Performed by: NURSE PRACTITIONER

## 2023-07-19 PROCEDURE — 86803 HEPATITIS C AB TEST: CPT

## 2023-07-19 PROCEDURE — 87340 HEPATITIS B SURFACE AG IA: CPT | Mod: GA

## 2023-07-19 PROCEDURE — 99214 OFFICE O/P EST MOD 30 MIN: CPT | Performed by: NURSE PRACTITIONER

## 2023-07-19 PROCEDURE — 85652 RBC SED RATE AUTOMATED: CPT

## 2023-07-19 PROCEDURE — 3078F DIAST BP <80 MM HG: CPT | Performed by: NURSE PRACTITIONER

## 2023-07-19 PROCEDURE — 80053 COMPREHEN METABOLIC PANEL: CPT

## 2023-07-19 PROCEDURE — 36415 COLL VENOUS BLD VENIPUNCTURE: CPT

## 2023-07-19 PROCEDURE — 86480 TB TEST CELL IMMUN MEASURE: CPT

## 2023-07-19 PROCEDURE — 85025 COMPLETE CBC W/AUTO DIFF WBC: CPT

## 2023-07-19 RX ORDER — ZOLPIDEM TARTRATE 12.5 MG/1
12.5 TABLET, FILM COATED, EXTENDED RELEASE ORAL NIGHTLY PRN
Qty: 90 TABLET | Refills: 0 | Status: SHIPPED | OUTPATIENT
Start: 2023-07-19 | End: 2023-10-17

## 2023-07-19 RX ORDER — CELECOXIB 200 MG/1
200 CAPSULE ORAL DAILY
Qty: 90 CAPSULE | Refills: 0 | Status: SHIPPED | OUTPATIENT
Start: 2023-07-19 | End: 2023-10-18

## 2023-07-19 RX ORDER — ZOLPIDEM TARTRATE 12.5 MG/1
12.5 TABLET, FILM COATED, EXTENDED RELEASE ORAL NIGHTLY PRN
Qty: 90 TABLET | Refills: 0 | Status: SHIPPED | OUTPATIENT
Start: 2023-07-19 | End: 2023-07-19 | Stop reason: SDUPTHER

## 2023-07-19 ASSESSMENT — FIBROSIS 4 INDEX: FIB4 SCORE: 2.64

## 2023-07-19 NOTE — PROGRESS NOTES
Chief Complaint   Patient presents with    Follow-Up     Apnea // last seen 7/12/2022       HPI:  Buster Medina is a 73 y.o. year old male here today for follow-up on ARNULFO and chronic insomnia.  Last OV 7/12/22     Currently using ASV 25, EPAP 15/6, PS 15/2, auto breath rate; RESPIRONICS; device obtained 2019.  Compliance report 6/19/2023 through 7/18/2023 indicates 100% compliance, average nightly 7 hours 35 minutes, average EPAP 6.3 cm, moderate mask of 21 minutes per night with a reduced AHI of 5.8/h.  Reviewed with patient.  He tolerates mask and pressure well.  He has no complaints about device.  He has not received replacement from recall yet but will be eligible for new device or insurance after January 2024.  He denies any ongoing mouth dryness.  Tolerates mask and pressure well.    Echo 8/25/2022 notes LVEF 65%, severely dilated left atrium, mild to moderate MR, RVSP 24 mmHg.    Currently using Belsomra 15 mg nightly (per sleep) and 1 mg of lorazepam for anxiety (per PCP) to help initiate sleep.  He has an old prescription for Ambien CR 12.5 mg nightly which he was given 60 pills and took 3 years to use.  He is requesting a refill.  He is also prescribed Remeron and takes nightly per PCP.  We reviewed polypharmacy and the effects of these multiple drugs.  He notes using Ambien CR only if he is gone 2-3 nights without good sleep.  We reviewed that he may use Ambien CR only by itself without Belsomra or lorazepam.  He is to stop Remeron.  He continues to note having at least 3 days/month where he does not sleep.  He will go to bed between 930 to 10 PM and fall asleep on a good night at 10:30 PM.  He will then wake every 2 hours watching the clock.  He denies having to urinate at night.  Usually he is able to resume sleep.  He wakes between 7/8 AM.  He denies morning headaches or grogginess.  He does not exercise during the day.  He does have a history of major depression but feels it is well controlled on  fluoxetine.  He does have periodic episodes of anxiety issues that are situational.  He denies MSK issues causing discomfort at night.  GERD is well controlled with Prilosec.  He also uses magnesium supplement prior to bedtime.  No daytime sleepiness or napping.    SLEEP HISTORY   ASV titration and the best tolerated pressure was ASV EPAP 5/15 cm PS 2/20 cm, the AHI improved to 3.8/hr and O2 halle 90 %. He was observed in REM sleep on this pressure.      Severe obstructive sleep apnea with AHI of 38.5/hr and O2 halle 87 %. Due to severity of the disease he met the split study protocol. The titration started with CPAP 7 cm and the best tolerated was ASV EPAP 4/15 cm PS 2/20 cm cm. The AHI improved to 19/hr with improved O2 halle of 88% and average O2 saturation of 94 %.       ROS: As per HPI and otherwise negative if not stated.    Past Medical History:   Diagnosis Date    Anxiety     Arthritis     osteo    Bowel habit changes 06/24/2021    Constipation    Cancer (HCC) 2007    bladder    Chickenpox     as a child    Depression     depression    High cholesterol     Hyperlipidemia     Hypertension     pt states  well controlled on meds    Kidney stone     Mumps     as a child     Obesity     Pneumothorax     Psoriatic arthritis (HCC)     Restless leg syndrome     Sleep apnea     uses cpap    Tonsillitis        Past Surgical History:   Procedure Laterality Date    PB INCISE FINGER TENDON SHEATH Left 8/30/2022    Procedure: LEFT MIDDLE FINGER TRIGGER RELEASE;  Surgeon: Td Soliman M.D.;  Location: [facility];  Service: Orthopedics    UT CYSTOURETHROSCOPY,BIOPSIES N/A 8/17/2022    Procedure: CYSTOSCOPY;  Surgeon: Jerzy Jasso M.D.;  Location: [facility];  Service: Urology    UT CYSTOURETHROSCOPY,BIOPSIES  7/6/2021    Procedure: BIOPSY, BLADDER WITH FULGERATION;  Surgeon: Bob Millan M.D.;  Location: [facility];  Service: Urology    BLADDER BIOPSY WITH CYSTOSCOPY  1/3/2018     Procedure: BLADDER BIOPSY WITH CYSTOSCOPY/WITH UPPER TRACT WASHING;  Surgeon: El Manuel M.D.;  Location: [facility];  Service: Urology    RETROGRADES Bilateral 1/3/2018    Procedure: RETROGRADES;  Surgeon: El Manuel M.D.;  Location: SUR[facility]ERY [facility];  Service: Urology    PYELOGRAM Bilateral 1/3/2018    Procedure: PYELOGRAM;  Surgeon: El Manuel M.D.;  Location: SURGERY [facility];  Service: Urology    OTHER ORTHOPEDIC SURGERY  2015    shoulder replacement    ABDOMINAL EXPLORATION      ARTHROPLASTY      right shoulder    BLADDER BIOPSY WITH CYSTOSCOPY      EYE SURGERY      lasik     GASTRIC BYPASS LAPAROSCOPIC      HERNIA REPAIR      OPEN REDUCTION      OTHER Left     thumb joint    THORACOTOMY      TURP-VAPOR         Family History   Problem Relation Age of Onset    Cancer Mother 80        lung cancer    Arthritis Mother     Diabetes Mother     Anxiety disorder Mother     Lung Disease Father 65    Cancer Father     Diabetes Father     Hypertension Father     Hyperlipidemia Father     Hyperlipidemia Brother     Arthritis Brother     Other Brother         Thyroid tumor    Sleep Apnea Brother     No Known Problems Maternal Grandmother     Cancer Maternal Grandfather     No Known Problems Paternal Grandmother     No Known Problems Paternal Grandfather     Heart Disease Brother 49        smoker, overweight    Hypertension Brother     Depression Other     Suicide Attempts Neg Hx     Bipolar disorder Neg Hx     Alcohol abuse Neg Hx     Drug abuse Neg Hx        Social History     Socioeconomic History    Marital status: Single     Spouse name: Not on file    Number of children: Not on file    Years of education: Not on file    Highest education level: Master's degree (e.g., MA, MS, Rhiannon, MEd, MSW, LUIS)   Occupational History    Not on file   Tobacco Use    Smoking status: Never    Smokeless tobacco: Never   Vaping Use    Vaping Use: Never used   Substance and Sexual Activity     "Alcohol use: Not Currently     Alcohol/week: 8.4 oz     Types: 14 Glasses of wine per week     Comment: wine / daily    Drug use: Not Currently     Types: Marijuana, Oral     Comment: THC edible - \"once a month\".    Sexual activity: Not Currently   Other Topics Concern    Not on file   Social History Narrative    Not on file     Social Determinants of Health     Financial Resource Strain: Low Risk  (8/20/2022)    Overall Financial Resource Strain (CARDIA)     Difficulty of Paying Living Expenses: Not hard at all   Food Insecurity: No Food Insecurity (8/20/2022)    Hunger Vital Sign     Worried About Running Out of Food in the Last Year: Never true     Ran Out of Food in the Last Year: Never true   Transportation Needs: No Transportation Needs (8/20/2022)    PRAPARE - Transportation     Lack of Transportation (Medical): No     Lack of Transportation (Non-Medical): No   Physical Activity: Insufficiently Active (8/20/2022)    Exercise Vital Sign     Days of Exercise per Week: 7 days     Minutes of Exercise per Session: 20 min   Stress: Stress Concern Present (8/20/2022)    [employer] - Occupational Stress Questionnaire     Feeling of Stress : To some extent   Social Connections: Moderately Isolated (8/20/2022)    Social Connection and Isolation Panel [NHANES]     Frequency of Communication with Friends and Family: Three times a week     Frequency of Social Gatherings with Friends and Family: Twice a week     Attends [employer] Services: Never     Active Member of Clubs or Organizations: No     Attends Club or Organization Meetings: Patient refused     Marital Status: Living with partner   Intimate Partner Violence: Not on file   Housing Stability: Low Risk  (8/20/2022)    Housing Stability Vital Sign     Unable to Pay for Housing in the Last Year: No     Number of Places Lived in the Last Year: 1     Unstable Housing in the Last Year: No       Allergies as of 07/19/2023    (No Known " "Allergies)        Vitals:  /74 (BP Location: Left arm, Patient Position: Sitting, BP Cuff Size: Adult)   Pulse 69   Resp 16   Ht 1.702 m (5' 7\")   Wt 82.1 kg (181 lb)   SpO2 96%     Current medications as of today   Current Outpatient Medications   Medication Sig Dispense Refill    celecoxib (CELEBREX) 200 MG Cap Take 1 Capsule by mouth every day. 90 Capsule 0    Suvorexant (BELSOMRA) 15 MG Tab Take 1 Tablet by mouth at bedtime as needed (insomnia) for up to 90 days. 90 Tablet 0    lisinopril (PRINIVIL) 10 MG Tab TAKE 1 TABLET BY MOUTH EVERY DAY. (STOP 20 MG DOSE) 90 Tablet 0    atorvastatin (LIPITOR) 40 MG Tab Take 1 Tablet by mouth every day. 90 Tablet 3    fluoxetine (PROZAC) 40 MG capsule Take 1 Capsule by mouth every day. 90 Capsule 2    HUMIRA PEN 40 MG/0.4ML Pen-injector Kit INJECT 1 PEN UNDER THE SKIN EVERY 14 DAYS. 6 Each 0    LORazepam (ATIVAN) 2 MG tablet       mirtazapine (REMERON) 15 MG Tab TAKE 1 TABLET AT BEDTIME 90 Tablet 3    BELSOMRA 15 MG Tab Take 15 mg by mouth at bedtime as needed.      ferrous sulfate 325 (65 Fe) MG tablet Take 325 mg by mouth every day.      B Complex Vitamins (VITAMIN B COMPLEX PO) Take 1 Tablet by mouth every day.      Cholecalciferol (VITAMIN D3 PO) Take 1 Tablet by mouth every 7 days. OTC      omeprazole (PRILOSEC) 20 MG delayed-release capsule Take 20 mg by mouth every evening.      aspirin EC (ECOTRIN) 81 MG Tablet Delayed Response Take 81 mg by mouth every bedtime. 30 Tab 0    Multiple Vitamins-Minerals (MELODY-DAY 1000 PO) Take 1 Tab by mouth 2 Times a Day.       No current facility-administered medications for this visit.         Physical Exam:   Gen:           Alert and oriented, No apparent distress. Mood and affect appropriate, normal interaction with examiner.  Eyes:          PERRL, EOM intact, sclere white, conjunctive moist.  Ears:          Not examined.   Hearing:     Grossly intact.  Nose:          Normal, no lesions or deformities.  Dentition:    Not " "examined.   Oropharynx:   Not examined.   Mallampati Classification: Not examined.   Neck:        Supple, trachea midline, no masses.  Respiratory Effort: No intercostal retractions or use of accessory muscles.   Lung Auscultation:      Clear to auscultation bilaterally; no rales, rhonchi or wheezing.  CV:            Regular rate and rhythm. No murmurs, rubs or gallops.  Abd:           Not examined.   Lymphadenopathy: Not examined.  Gait and Station: Normal.  Digits and Nails: No clubbing, cyanosis, petechiae, or nodes.   Cranial Nerves: II-XII grossly intact.  Skin:        No rashes, lesions or ulcers noted.               Ext:           No cyanosis or edema.      Assessment:  1. Complex sleep apnea syndrome        2. Chronic insomnia        3. Essential hypertension        4. Current moderate episode of major depressive disorder, unspecified whether recurrent (HCC)        5. BMI 28.0-28.9,adult        6. Nonsmoker            Immunizations:    Flu:recommend in the fall  Pneumovax 23:2020  Prevnar 13:2020  PCV 20: not due  COVID-19: 4/27/23    Plan:  Complex sleep apnea is well treated with ASV therapy.  He will continue current settings.  Patient has a complex history of chronic insomnia.  He has had polypharmacy multiple medications given to him.  We reviewed his list of medications and recommend continued use Belsomra 15 mg nightly.  He has been using lorazepam 1 mg to reduce anxiety at bedtime and has not had any side effects with use.  I will let PCP continue to manage lorazepam.  He has been placed on Remeron by PCP and advise he stop this to avoid polypharmacy.  Rx was discontinued.  I will refill his Ambien CR 12.5 mg nightly to use for his \"bad nights\" and will monitor closely.  I do not expect to refill this for another 3 years.  We reviewed sleep hygiene in depth.  He will cover his clock at night and set an alarm for waking in the morning.  Advise he increase activity during the day such as 30 minutes of " brisk walking.  He does take 1 cup of caffeine in the early morning.  Consider referral to psychiatry for cognitive behavioral therapy.  We reviewed the side effects of multiple drugs he is using to induce sleep and the risk of cognitive and neurologic adverse events.  He is a minimal to changes.  Follow-up with primary care for ongoing management hypertension.  Consider referral to psychiatry to review chronic insomnia with history of anxiety/depression.  Follow-up in 1 year with compliance report, sooner if needed.    Please note that this dictation was created using voice recognition software. I have made every reasonable attempt to correct obvious errors, but it is possible there are errors of grammar and possibly content that I did not discover before finalizing the note.

## 2023-07-21 LAB
GAMMA INTERFERON BACKGROUND BLD IA-ACNC: 0.29 IU/ML
M TB IFN-G BLD-IMP: NEGATIVE
M TB IFN-G CD4+ BCKGRND COR BLD-ACNC: -0.06 IU/ML
MITOGEN IGNF BCKGRD COR BLD-ACNC: >10 IU/ML
QFT TB2 - NIL TBQ2: -0.08 IU/ML

## 2023-07-31 ENCOUNTER — OFFICE VISIT (OUTPATIENT)
Dept: RHEUMATOLOGY | Facility: MEDICAL CENTER | Age: 73
End: 2023-07-31
Attending: INTERNAL MEDICINE
Payer: MEDICARE

## 2023-07-31 VITALS
HEART RATE: 64 BPM | RESPIRATION RATE: 14 BRPM | DIASTOLIC BLOOD PRESSURE: 60 MMHG | BODY MASS INDEX: 27.88 KG/M2 | TEMPERATURE: 97.4 F | WEIGHT: 178 LBS | OXYGEN SATURATION: 97 % | SYSTOLIC BLOOD PRESSURE: 138 MMHG

## 2023-07-31 DIAGNOSIS — I34.1 MITRAL VALVE PROLAPSE: ICD-10-CM

## 2023-07-31 DIAGNOSIS — L40.9 PSORIASIS: ICD-10-CM

## 2023-07-31 DIAGNOSIS — I10 ESSENTIAL HYPERTENSION: ICD-10-CM

## 2023-07-31 DIAGNOSIS — Z98.84 H/O GASTRIC BYPASS: ICD-10-CM

## 2023-07-31 DIAGNOSIS — M10.9 GOUT, UNSPECIFIED CAUSE, UNSPECIFIED CHRONICITY, UNSPECIFIED SITE: ICD-10-CM

## 2023-07-31 DIAGNOSIS — G47.30 SLEEP APNEA, UNSPECIFIED TYPE: ICD-10-CM

## 2023-07-31 DIAGNOSIS — Z79.620 ADALIMUMAB (HUMIRA) LONG-TERM USE: ICD-10-CM

## 2023-07-31 DIAGNOSIS — L40.50 PSORIATIC ARTHRITIS (HCC): ICD-10-CM

## 2023-07-31 PROCEDURE — 3075F SYST BP GE 130 - 139MM HG: CPT | Performed by: INTERNAL MEDICINE

## 2023-07-31 PROCEDURE — 99212 OFFICE O/P EST SF 10 MIN: CPT | Performed by: INTERNAL MEDICINE

## 2023-07-31 PROCEDURE — 3078F DIAST BP <80 MM HG: CPT | Performed by: INTERNAL MEDICINE

## 2023-07-31 PROCEDURE — 99214 OFFICE O/P EST MOD 30 MIN: CPT | Performed by: INTERNAL MEDICINE

## 2023-07-31 ASSESSMENT — FIBROSIS 4 INDEX: FIB4 SCORE: 3.72

## 2023-07-31 NOTE — PROGRESS NOTES
Chief Complaint- joint pain     Subjective:   Buster Medina is a 73 y.o. male here today for follow up of rheumatological issues    This is a follow-up visit for this patient who we see in this clinic for psoriatic arthritis that was diagnosed about 2008 by a rheumatologist in [place], [place].  Patient is currently on Humira 40 mg subcu every 2 weeks, patient continues to do quite well with this particular regiment. Patient denies any side effects from the medication, denies any unexplained weight loss, denies any fevers of unknown etiology, denies any GI upset, denies any rashes, denies any new joint swelling, denies recurrent infections. Of note recent ESR=5 7/2023     We also follow patient for hyperuricemia, patient currently stable on no treatment.  Last uric acid level at 7.6 in July 2023.     Comorbidities include a history of malignant bladder lesion that was resected patient states 2008, patient states his urologist after reviewing records states about 2012,  Patient following up with urology     Additional Co morbidities include HTN, high cholesterol, hx left ahilles tendon rupture and repair, hx left knee arthroscopic surgery for meniscal tear, s/p left rotator cuff tear, s/p bladder cancer about 2012 no recurrence s/p surgical intervention only, hx of gastric bypass 2005 [facility], pt does f/u with Dr Gamez q year.  Patient also with sleep apnea on CPAP nightly as well as mitral valve prolapse.    Addendum 1/4/2024  Patient now on [place] prescribed by dermatology      Right TSA  Right TKA     S/p topical treatments  S/p plaquenil-cause taste abnormalities  S/p Otezla-diarrhea     Uric acid 6.9 1/2021 (on no treatment)  Uric acid 4.1 5/2021 (on no treatment)     HBsAg/HBcAb neg 7/2023  HCV neg 7/2023  Quantiferon Gold neg 7/2023  Uric acid 7.6 7/2023 (on no treatment)     G6PD 10.9 adequate 9/2019  CCP neg 3/2018  RF neg 3/2018  SEAN neg 3/2018     Hand x-rays 3/2018-indicates possible erosion  of the fourth metacarpal on the left hand, OA of the right first CMC joint  Hand x-rays 8/2022-IMPRESSION:  New subtle DIP and carpus erosions could represent psoriasis or other inflammatory arthropathy. The pattern is not typical of rheumatoid arthritis  Right severe first CMC osteoarthritis, left trapeziectomy and suspensionplasty     Feet x-rays 3/2018-DJD     Feet x-rays 8/2022-IMPRESSION:  No specific findings to confirm inflammatory arthropathy  Mild right hallux valgus deformity and first metatarsal-phalangeal osteoarthritis as before  Interval left first second and third TMT fusion with no hardware fracture identified     Current Outpatient Medications   Medication Sig Dispense Refill    HUMIRA PEN 40 MG/0.4ML Pen-injector Kit INJECT 1 PEN UNDER THE SKIN EVERY 14 DAYS. 3 Each 0    celecoxib (CELEBREX) 200 MG Cap Take 1 Capsule by mouth every day. 90 Capsule 0    Magnesium 100 MG Cap Take  by mouth.      zolpidem (AMBIEN CR) 12.5 MG CR tablet Take 1 Tablet by mouth at bedtime as needed for Sleep (Insomnia) for up to 90 days. 90 Tablet 0    lisinopril (PRINIVIL) 10 MG Tab TAKE 1 TABLET BY MOUTH EVERY DAY. (STOP 20 MG DOSE) 90 Tablet 0    atorvastatin (LIPITOR) 40 MG Tab Take 1 Tablet by mouth every day. 90 Tablet 3    fluoxetine (PROZAC) 40 MG capsule Take 1 Capsule by mouth every day. 90 Capsule 2    LORazepam (ATIVAN) 2 MG tablet       BELSOMRA 15 MG Tab Take 15 mg by mouth at bedtime as needed.      ferrous sulfate 325 (65 Fe) MG tablet Take 325 mg by mouth every day.      B Complex Vitamins (VITAMIN B COMPLEX PO) Take 1 Tablet by mouth every day.      Cholecalciferol (VITAMIN D3 PO) Take 1 Tablet by mouth every 7 days. OTC      omeprazole (PRILOSEC) 20 MG delayed-release capsule Take 20 mg by mouth every evening.      aspirin EC (ECOTRIN) 81 MG Tablet Delayed Response Take 81 mg by mouth every bedtime. 30 Tab 0    Multiple Vitamins-Minerals (MELODY-DAY 1000 PO) Take 1 Tab by mouth 2 Times a Day.       No  current facility-administered medications for this visit.     He  has a past medical history of Anxiety, Arthritis, Bowel habit changes (06/24/2021), Cancer ([facility]) (2007), Chickenpox, Depression, High cholesterol, Hyperlipidemia, Hypertension, Kidney stone, Mumps, Obesity, Pneumothorax, Psoriatic arthritis ([facility]), Restless leg syndrome, Sleep apnea, and Tonsillitis.    ROS   Other than what is mentioned in HPI or physical exam, there is no history of headaches, double vision or blurred vision.  No trouble swallowing difficulties .  No chest complaints including chest pain, cough or sputum production. No GI complaints including nausea, vomiting, change in bowel habits, or past peptic ulcer disease. No history of blood in the stools. No urinary complaints including dysuria or frequency. No history of alopecia, photosensitivity     Objective:     /60   Pulse 64   Temp 36.3 °C (97.4 °F) (Temporal)   Resp 14   Wt 80.7 kg (178 lb)   SpO2 97%  Body mass index is 27.88 kg/m².   Physical Exam:    Constitutional: Alert and oriented X3, patient is talkative with good eye contact.Skin: Warm, dry, good turgor, very small patch of psoriasis on the extensor surface of the left knee.Eye: Equal, round and reactive, conjunctiva clear, lids normal EOM intactENMT: Lips without lesions,  pinna without deformityNeck: Trachea midline, no masses, no thyromegaly.Lymph:  No cervical lymphadenopathy, no axillary lymphadenopathy, no supraclavicular lymphadenopathyRespiratory: Unlabored respiratory effort, lungs clear to auscultation, no wheezes, no ronchi.Cardiovascular: Normal S1, S2, Regular rate and rhythm, positive 3 out of 6 systolic murmur heard best at the left sternal border rubs or gallops  .Abdomen: Soft, non-distended.Psych: Alert and oriented x3, normal affect and mood.Neuro: Cranial nerves 2-12 are grossly intact Ext:no joint laxity noted in bilateral arms, no joint laxity noted in bilateral legs, joints look great no  swan-neck or boutonniere deformities, no sausage digits no dactylitis, gait without antalgia and without foot drop, mild pes planus bilateral feet    Lab Results   Component Value Date/Time    QNTTBGOLD Negative 08/16/2018 12:13 PM     Lab Results   Component Value Date/Time    HEPBCORIGM Non-Reactive 07/19/2023 12:12 PM    HEPBSAG Non-Reactive 07/19/2023 12:12 PM     Lab Results   Component Value Date/Time    HEPCAB Non-Reactive 07/19/2023 12:12 PM     Lab Results   Component Value Date/Time    SODIUM 139 07/19/2023 12:12 PM    POTASSIUM 4.3 07/19/2023 12:12 PM    CHLORIDE 103 07/19/2023 12:12 PM    CO2 25 07/19/2023 12:12 PM    GLUCOSE 95 07/19/2023 12:12 PM    BUN 17 07/19/2023 12:12 PM    CREATININE 1.16 07/19/2023 12:12 PM      Lab Results   Component Value Date/Time    WBC 5.7 07/19/2023 12:12 PM    RBC 4.88 07/19/2023 12:12 PM    HEMOGLOBIN 15.5 07/19/2023 12:12 PM    HEMATOCRIT 47.4 07/19/2023 12:12 PM    MCV 97.1 07/19/2023 12:12 PM    MCH 31.8 07/19/2023 12:12 PM    MCHC 32.7 07/19/2023 12:12 PM    MPV 11.1 07/19/2023 12:12 PM    NEUTSPOLYS 50.50 07/19/2023 12:12 PM    LYMPHOCYTES 35.00 07/19/2023 12:12 PM    MONOCYTES 10.60 07/19/2023 12:12 PM    EOSINOPHILS 2.80 07/19/2023 12:12 PM    BASOPHILS 0.70 07/19/2023 12:12 PM      Lab Results   Component Value Date/Time    CALCIUM 10.1 07/19/2023 12:12 PM    ASTSGOT 31 07/19/2023 12:12 PM    ALTSGPT 32 07/19/2023 12:12 PM    ALKPHOSPHAT 81 07/19/2023 12:12 PM    TBILIRUBIN 0.5 07/19/2023 12:12 PM    ALBUMIN 4.4 07/19/2023 12:12 PM    TOTPROTEIN 7.3 07/19/2023 12:12 PM     Lab Results   Component Value Date/Time    URICACID 7.6 07/19/2023 12:12 PM    RHEUMFACTN <10 03/21/2018 02:19 PM    CCPANTIBODY 4 03/21/2018 02:20 PM    ANTINUCAB None Detected 03/21/2018 02:19 PM     Lab Results   Component Value Date/Time    SEDRATEWES 5 07/19/2023 12:12 PM     Lab Results   Component Value Date/Time    G6PD 10.9 09/17/2019 10:30 AM     Assessment and Plan:     1. Psoriatic  arthritis (HCC)  Clinically stable on Humira 40 mg subcu every 2 weeks, we will continue as such  - CBC WITH DIFFERENTIAL; Future  - Comp Metabolic Panel; Future  - Sed Rate; Future  - REFERRAL TO CARDIOLOGY    2. Psoriasis  Doing quite well, patient uses topical options as needed  - CBC WITH DIFFERENTIAL; Future  - Comp Metabolic Panel; Future  - Sed Rate; Future  - REFERRAL TO CARDIOLOGY    3. Adalimumab (Humira) long-term use  Currently on Humira 40 mg subcu every 2 weeks, screening labs are up-to-date, next screening labs to be due July 2025.  Patient needs monitoring labs every 6 months, next labs due January 2024, labs ordered for patient  - CBC WITH DIFFERENTIAL; Future  - Comp Metabolic Panel; Future  - Sed Rate; Future  - REFERRAL TO CARDIOLOGY    4. Gout, unspecified cause, unspecified chronicity, unspecified site  Diet controlled, recent uric acid level at 7.6 elevated patient without any gout attacks, we rediscussed foods to avoid patient states understanding  - CBC WITH DIFFERENTIAL; Future  - Comp Metabolic Panel; Future  - Sed Rate; Future  - REFERRAL TO CARDIOLOGY    5. Mitral valve prolapse  Last saw cardiology about a year and a half ago, patient's cardiologist left the area, we will put a referral in for new cardiology.  - REFERRAL TO CARDIOLOGY    6. Essential hypertension  May impact the type of medications we can use for this patient's arthritis. We will have to keep this under advisement.  - CBC WITH DIFFERENTIAL; Future  - Comp Metabolic Panel; Future  - Sed Rate; Future    7. Sleep apnea, unspecified type  Currently on CPAP nightly, followed by pulmonology  - REFERRAL TO CARDIOLOGY    8. H/O gastric bypass  Followed by Dr. Damian q. year    Followup: Return in about 7 months (around 2/29/2024). or sooner ankush Medina  was seen 30 minutes face-to-face of which more than 50% of the time was spent counseling the patient (excluding time for procedures)  regarding  rheumatological  condition and care. Therapy was discussed in detail.      Please note that this dictation was created using voice recognition software. I have made every reasonable attempt to correct obvious errors, but I expect that there are errors of grammar and possibly content that I did not discover before finalizing the note.

## 2023-08-04 PROBLEM — M65.322 TRIGGER FINGER, LEFT INDEX FINGER: Status: ACTIVE | Noted: 2023-08-04

## 2023-08-22 ENCOUNTER — OFFICE VISIT (OUTPATIENT)
Dept: CARDIOLOGY | Facility: MEDICAL CENTER | Age: 73
End: 2023-08-22
Payer: MEDICARE

## 2023-08-22 VITALS
RESPIRATION RATE: 15 BRPM | DIASTOLIC BLOOD PRESSURE: 68 MMHG | HEART RATE: 75 BPM | WEIGHT: 181.6 LBS | SYSTOLIC BLOOD PRESSURE: 132 MMHG | BODY MASS INDEX: 28.5 KG/M2 | HEIGHT: 67 IN | OXYGEN SATURATION: 97 %

## 2023-08-22 DIAGNOSIS — G47.33 OBSTRUCTIVE SLEEP APNEA (ADULT) (PEDIATRIC): ICD-10-CM

## 2023-08-22 DIAGNOSIS — I34.0 NONRHEUMATIC MITRAL VALVE REGURGITATION: ICD-10-CM

## 2023-08-22 DIAGNOSIS — I10 ESSENTIAL HYPERTENSION: ICD-10-CM

## 2023-08-22 DIAGNOSIS — E78.49 FAMILIAL HYPERLIPIDEMIA: ICD-10-CM

## 2023-08-22 PROBLEM — E78.5 DYSLIPIDEMIA: Status: RESOLVED | Noted: 2018-09-21 | Resolved: 2023-08-22

## 2023-08-22 PROBLEM — Z98.84 HISTORY OF GASTRIC BYPASS: Status: RESOLVED | Noted: 2019-10-04 | Resolved: 2023-08-22

## 2023-08-22 PROBLEM — Z23 NEED FOR VACCINATION: Status: RESOLVED | Noted: 2017-09-21 | Resolved: 2023-08-22

## 2023-08-22 PROBLEM — T14.8XXA BRUISING: Status: RESOLVED | Noted: 2019-04-09 | Resolved: 2023-08-22

## 2023-08-22 PROBLEM — G47.31 COMPLEX SLEEP APNEA SYNDROME: Chronic | Status: RESOLVED | Noted: 2018-12-06 | Resolved: 2023-08-22

## 2023-08-22 PROBLEM — G47.39 COMPLEX SLEEP APNEA SYNDROME: Chronic | Status: RESOLVED | Noted: 2018-12-06 | Resolved: 2023-08-22

## 2023-08-22 PROCEDURE — 3078F DIAST BP <80 MM HG: CPT

## 2023-08-22 PROCEDURE — 3075F SYST BP GE 130 - 139MM HG: CPT

## 2023-08-22 PROCEDURE — 99214 OFFICE O/P EST MOD 30 MIN: CPT

## 2023-08-22 PROCEDURE — 99213 OFFICE O/P EST LOW 20 MIN: CPT

## 2023-08-22 ASSESSMENT — ENCOUNTER SYMPTOMS
NEUROLOGICAL NEGATIVE: 1
GASTROINTESTINAL NEGATIVE: 1
ORTHOPNEA: 0
MUSCULOSKELETAL NEGATIVE: 1
PALPITATIONS: 0
NERVOUS/ANXIOUS: 0
DEPRESSION: 0
CONSTITUTIONAL NEGATIVE: 1
PND: 0
EYES NEGATIVE: 1
SHORTNESS OF BREATH: 0

## 2023-08-22 ASSESSMENT — FIBROSIS 4 INDEX: FIB4 SCORE: 3.72

## 2023-08-22 NOTE — PROGRESS NOTES
Chief Complaint   Patient presents with    Hypertension     F/V Dx: LVH (left ventricular hypertrophy)  Nonrheumatic mitral valve regurgitation       Subjective     Aaron Medina is a 73 y.o. male who presents today for follow up of his MR because he recently learned of significant cardiac family history. They have a history of HTN, familial HLD, ARNULFO    He has a family history of MI in his brother (smoker), uncle had 6 bipasses, Mother had cardiomyopathy, other brother has CAD    Last seen by  Dr. Caraballo on 8/17/2022 found to have a holosystolic murmur, repeat echocardiogram showed mild to moderate MR with proapse of posterior leaflet.    Today 8/22/2023:  he is feeling overall well, no significant symptoms    Activity: he has been playing some golf and skiing    No symptoms of chest pain, palpitations, shortness of breath, exercise intolerance, or lower extremity edema.      Past Medical History:   Diagnosis Date    Anxiety     Arthritis     osteo    Bowel habit changes 06/24/2021    Constipation    Cancer (HCC) 2007    bladder    Chickenpox     as a child    Depression     depression    High cholesterol     Hyperlipidemia     Hypertension     pt states  well controlled on meds    Kidney stone     Mumps     as a child     Obesity     Pneumothorax     Psoriatic arthritis (HCC)     Restless leg syndrome     Sleep apnea     uses cpap    Tonsillitis      Past Surgical History:   Procedure Laterality Date    PB INCISE FINGER TENDON SHEATH Left 8/30/2022    Procedure: LEFT MIDDLE FINGER TRIGGER RELEASE;  Surgeon: Td Soliman M.D.;  Location: [facility];  Service: Orthopedics    OH CYSTOURETHROSCOPY,BIOPSIES N/A 8/17/2022    Procedure: CYSTOSCOPY;  Surgeon: Jerzy Jasso M.D.;  Location: [facility];  Service: Urology    OH CYSTOURETHROSCOPY,BIOPSIES  7/6/2021    Procedure: BIOPSY, BLADDER WITH FULGERATION;  Surgeon: Bob Millan M.D.;  Location: [facility];  Service:  Urology    BLADDER BIOPSY WITH CYSTOSCOPY  1/3/2018    Procedure: BLADDER BIOPSY WITH CYSTOSCOPY/WITH UPPER TRACT WASHING;  Surgeon: El Manuel M.D.;  Location: [facility];  Service: Urology    RETROGRADES Bilateral 1/3/2018    Procedure: RETROGRADES;  Surgeon: El Manuel M.D.;  Location: [facility];  Service: Urology    PYELOGRAM Bilateral 1/3/2018    Procedure: PYELOGRAM;  Surgeon: El Manuel M.D.;  Location: [facility];  Service: Urology    OTHER ORTHOPEDIC SURGERY  2015    shoulder replacement    ABDOMINAL EXPLORATION      ARTHROPLASTY      right shoulder    BLADDER BIOPSY WITH CYSTOSCOPY      EYE SURGERY      lasik     GASTRIC BYPASS LAPAROSCOPIC      HERNIA REPAIR      OPEN REDUCTION      OTHER Left     thumb joint    THORACOTOMY      TURP-VAPOR       Family History   Problem Relation Age of Onset    Cancer Mother 80        lung cancer    Arthritis Mother     Diabetes Mother     Anxiety disorder Mother     Lung Disease Father 65    Cancer Father     Diabetes Father     Hypertension Father     Hyperlipidemia Father     Hyperlipidemia Brother     Arthritis Brother     Other Brother         Thyroid tumor    Sleep Apnea Brother     No Known Problems Maternal Grandmother     Cancer Maternal Grandfather     No Known Problems Paternal Grandmother     No Known Problems Paternal Grandfather     Heart Disease Brother 49        smoker, overweight    Hypertension Brother     Depression Other     Suicide Attempts Neg Hx     Bipolar disorder Neg Hx     Alcohol abuse Neg Hx     Drug abuse Neg Hx      Social History     Socioeconomic History    Marital status: Single     Spouse name: Not on file    Number of children: Not on file    Years of education: Not on file    Highest education level: Master's degree (e.g., MA, MS, Rhiannon, MEd, MSW, LUIS)   Occupational History    Not on file   Tobacco Use    Smoking status: Never    Smokeless tobacco: Never   Vaping Use    Vaping Use:  "Never used   Substance and Sexual Activity    Alcohol use: Not Currently     Alcohol/week: 8.4 oz     Types: 14 Glasses of wine per week     Comment: wine / daily    Drug use: Not Currently     Types: Marijuana, Oral     Comment: THC edible - \"once a month\".    Sexual activity: Not Currently   Other Topics Concern    Not on file   Social History Narrative    Not on file     Social Determinants of Health     Financial Resource Strain: Low Risk  (8/20/2022)    Overall Financial Resource Strain (CARDIA)     Difficulty of Paying Living Expenses: Not hard at all   Food Insecurity: No Food Insecurity (8/20/2022)    Hunger Vital Sign     Worried About Running Out of Food in the Last Year: Never true     Ran Out of Food in the Last Year: Never true   Transportation Needs: No Transportation Needs (8/20/2022)    PRAPARE - Transportation     Lack of Transportation (Medical): No     Lack of Transportation (Non-Medical): No   Physical Activity: Insufficiently Active (8/20/2022)    Exercise Vital Sign     Days of Exercise per Week: 7 days     Minutes of Exercise per Session: 20 min   Stress: Stress Concern Present (8/20/2022)    [employer] - Occupational Stress Questionnaire     Feeling of Stress : To some extent   Social Connections: Moderately Isolated (8/20/2022)    Social Connection and Isolation Panel [NHANES]     Frequency of Communication with Friends and Family: Three times a week     Frequency of Social Gatherings with Friends and Family: Twice a week     Attends Rastafari Services: Never     Active Member of Clubs or Organizations: No     Attends Club or Organization Meetings: Patient refused     Marital Status: Living with partner   Intimate Partner Violence: Not on file   Housing Stability: Low Risk  (8/20/2022)    Housing Stability Vital Sign     Unable to Pay for Housing in the Last Year: No     Number of Places Lived in the Last Year: 1     Unstable Housing in the Last Year: No     No " Known Allergies  Outpatient Encounter Medications as of 8/22/2023   Medication Sig Dispense Refill    HUMIRA PEN 40 MG/0.4ML Pen-injector Kit INJECT 1 PEN UNDER THE SKIN EVERY 14 DAYS. 3 Each 0    celecoxib (CELEBREX) 200 MG Cap Take 1 Capsule by mouth every day. 90 Capsule 0    Magnesium 100 MG Cap Take  by mouth.      zolpidem (AMBIEN CR) 12.5 MG CR tablet Take 1 Tablet by mouth at bedtime as needed for Sleep (Insomnia) for up to 90 days. 90 Tablet 0    lisinopril (PRINIVIL) 10 MG Tab TAKE 1 TABLET BY MOUTH EVERY DAY. (STOP 20 MG DOSE) 90 Tablet 0    atorvastatin (LIPITOR) 40 MG Tab Take 1 Tablet by mouth every day. 90 Tablet 3    LORazepam (ATIVAN) 2 MG tablet       ferrous sulfate 325 (65 Fe) MG tablet Take 325 mg by mouth every day.      B Complex Vitamins (VITAMIN B COMPLEX PO) Take 1 Tablet by mouth every day.      Cholecalciferol (VITAMIN D3 PO) Take 1 Tablet by mouth every 7 days. OTC      omeprazole (PRILOSEC) 20 MG delayed-release capsule Take 20 mg by mouth every evening.      aspirin EC (ECOTRIN) 81 MG Tablet Delayed Response Take 81 mg by mouth every bedtime. 30 Tab 0    Multiple Vitamins-Minerals (MELODY-DAY 1000 PO) Take 1 Tab by mouth 2 Times a Day.      [DISCONTINUED] BELSOMRA 15 MG Tab Take 15 mg by mouth at bedtime as needed.       No facility-administered encounter medications on file as of 8/22/2023.     Review of Systems   Constitutional: Negative.    HENT: Negative.     Eyes: Negative.    Respiratory:  Negative for shortness of breath.    Cardiovascular:  Negative for chest pain, palpitations, orthopnea, leg swelling and PND.   Gastrointestinal: Negative.    Genitourinary: Negative.    Musculoskeletal: Negative.    Skin: Negative.    Neurological: Negative.    Endo/Heme/Allergies: Negative.    Psychiatric/Behavioral:  Negative for depression. The patient is not nervous/anxious.               Objective     /68 (BP Location: Left arm, Patient Position: Sitting, BP Cuff Size: Adult)   Pulse  "75   Resp 15   Ht 1.702 m (5' 7\")   Wt 82.4 kg (181 lb 9.6 oz)   SpO2 97%   BMI 28.44 kg/m²     Physical Exam  Constitutional:       Appearance: Normal appearance.   HENT:      Head: Normocephalic.   Neck:      Vascular: No JVD.   Cardiovascular:      Rate and Rhythm: Normal rate and regular rhythm.      Pulses: Normal pulses.      Heart sounds: Murmur heard.      Systolic murmur is present with a grade of 3/6.      No friction rub.   Pulmonary:      Effort: Pulmonary effort is normal.      Breath sounds: Normal breath sounds.   Abdominal:      Palpations: Abdomen is soft.   Musculoskeletal:         General: Normal range of motion.      Right lower leg: No edema.      Left lower leg: No edema.   Skin:     General: Skin is warm and dry.   Neurological:      General: No focal deficit present.      Mental Status: He is alert and oriented to person, place, and time.   Psychiatric:         Mood and Affect: Mood normal.         Behavior: Behavior normal.            Lab Results   Component Value Date/Time    CHOLSTRLTOT 205 (H) 04/27/2022 09:48 AM     (H) 04/27/2022 09:48 AM    HDL 85 04/27/2022 09:48 AM    TRIGLYCERIDE 97 04/27/2022 09:48 AM       Lab Results   Component Value Date/Time    SODIUM 139 07/19/2023 12:12 PM    POTASSIUM 4.3 07/19/2023 12:12 PM    CHLORIDE 103 07/19/2023 12:12 PM    CO2 25 07/19/2023 12:12 PM    GLUCOSE 95 07/19/2023 12:12 PM    BUN 17 07/19/2023 12:12 PM    CREATININE 1.16 07/19/2023 12:12 PM     Lab Results   Component Value Date/Time    ALKPHOSPHAT 81 07/19/2023 12:12 PM    ASTSGOT 31 07/19/2023 12:12 PM    ALTSGPT 32 07/19/2023 12:12 PM    TBILIRUBIN 0.5 07/19/2023 12:12 PM      Echocardiogram 8/25/2023  CONCLUSIONS  Normal left ventricular size, thickness, and systolic function.  Mild to moderate mitral regurgitation.  Mild mitral annular calcification Prolapse of the posterior mitral   valve leaflet.    Assessment & Plan     1. Nonrheumatic mitral valve regurgitation      "   2. Essential hypertension        3. Familial hyperlipidemia        4. Obstructive sleep apnea (adult) (pediatric)            Medical Decision Making: Today's Assessment/Status/Plan:        Mitral regurgitation  - symptoms none  -repeat echo ordered    Hypertension  -Borderline control  - lower at home  - continue lisinopril 10 mg daily  - goal < 130/80  - check BP daily 2 hours after taking medication and keep log of measurements   -check in in 2 weeks with home blood pressure readings    Hyperlipidemia  -Most recent   -Continue atorvastatin 40 mg daily  -Check lipid panel   -Due to his significant family history of CAD we will complete a coronary calcium score    Follow up with YOGI Zhao in about 3 months after echocardiogram    This note was dictated using Dragon speech recognition software.

## 2023-08-23 ENCOUNTER — TELEPHONE (OUTPATIENT)
Dept: HEALTH INFORMATION MANAGEMENT | Facility: OTHER | Age: 73
End: 2023-08-23

## 2023-08-25 ENCOUNTER — PATIENT MESSAGE (OUTPATIENT)
Dept: SLEEP MEDICINE | Facility: MEDICAL CENTER | Age: 73
End: 2023-08-25
Payer: MEDICARE

## 2023-09-02 DIAGNOSIS — R55 POSTURAL DIZZINESS WITH PRESYNCOPE: ICD-10-CM

## 2023-09-02 DIAGNOSIS — R42 POSTURAL DIZZINESS WITH PRESYNCOPE: ICD-10-CM

## 2023-09-05 ENCOUNTER — HOSPITAL ENCOUNTER (OUTPATIENT)
Dept: RADIOLOGY | Facility: MEDICAL CENTER | Age: 73
End: 2023-09-05
Payer: COMMERCIAL

## 2023-09-05 DIAGNOSIS — E78.49 FAMILIAL HYPERLIPIDEMIA: ICD-10-CM

## 2023-09-05 DIAGNOSIS — F51.01 PRIMARY INSOMNIA: ICD-10-CM

## 2023-09-05 PROCEDURE — 4410556 CT-CARDIAC SCORING (SELF PAY ONLY)

## 2023-09-05 RX ORDER — LISINOPRIL 10 MG/1
TABLET ORAL
Qty: 90 TABLET | Refills: 0 | Status: SHIPPED | OUTPATIENT
Start: 2023-09-05 | End: 2023-11-28

## 2023-09-05 NOTE — TELEPHONE ENCOUNTER
Received request via: Patient    Was the patient seen in the last year in this department? Yes    Does the patient have an active prescription (recently filled or refills available) for medication(s) requested? No    Does the patient have long term Plus and need 100 day supply (blood pressure, diabetes and cholesterol meds only)? Patient does not have SCP     [FreeTextEntry1] : 17 y.o female presents to health center hyperventilating \par V/S stable POX maintenance % on RA\par Encouraged breathing exercise, positive response \par Exam WNL\par Denies smoking, vaping ETOH and drug use \par Denies depression and suicidal thoughts \par Discuss mental health well being in length \par Counseling/education on health maintenance and promotion \par Emotional support provided \par Does not know what triggered reaction\par Discussed services within  health center \par Referred to [facility] for counseling, declined for now \par Student symptoms subsided on it own, offered to call home, declined \par Safe D/C to class\par Encourage F/U  \par

## 2023-09-06 RX ORDER — LORAZEPAM 2 MG/1
2 TABLET ORAL
Qty: 17 TABLET | Refills: 0 | Status: SHIPPED | OUTPATIENT
Start: 2023-09-06 | End: 2023-09-23

## 2023-09-08 ENCOUNTER — HOSPITAL ENCOUNTER (OUTPATIENT)
Dept: LAB | Facility: MEDICAL CENTER | Age: 73
End: 2023-09-08
Attending: INTERNAL MEDICINE
Payer: MEDICARE

## 2023-09-08 ENCOUNTER — TELEPHONE (OUTPATIENT)
Dept: CARDIOLOGY | Facility: MEDICAL CENTER | Age: 73
End: 2023-09-08
Payer: MEDICARE

## 2023-09-08 DIAGNOSIS — E78.49 FAMILIAL HYPERLIPIDEMIA: ICD-10-CM

## 2023-09-08 DIAGNOSIS — E78.5 DYSLIPIDEMIA: ICD-10-CM

## 2023-09-08 LAB
CHOLEST SERPL-MCNC: 178 MG/DL (ref 100–199)
FASTING STATUS PATIENT QL REPORTED: NORMAL
HDLC SERPL-MCNC: 77 MG/DL
LDLC SERPL CALC-MCNC: 85 MG/DL
TRIGL SERPL-MCNC: 81 MG/DL (ref 0–149)

## 2023-09-08 PROCEDURE — 36415 COLL VENOUS BLD VENIPUNCTURE: CPT

## 2023-09-08 PROCEDURE — 80061 LIPID PANEL: CPT

## 2023-09-08 RX ORDER — ATORVASTATIN CALCIUM 40 MG/1
80 TABLET, FILM COATED ORAL DAILY
Qty: 90 TABLET | Refills: 3 | Status: SHIPPED | OUTPATIENT
Start: 2023-09-08 | End: 2023-11-28 | Stop reason: SDUPTHER

## 2023-09-08 NOTE — TELEPHONE ENCOUNTER
----- Message from ASHLEY GoodePRobertaRAZUL sent at 9/8/2023  9:34 AM PDT -----  Please have him increase atorvastatin to 80 mg daily and recheck lipid panel in 3 months

## 2023-09-25 ENCOUNTER — APPOINTMENT (RX ONLY)
Dept: URBAN - METROPOLITAN AREA CLINIC 35 | Facility: CLINIC | Age: 73
Setting detail: DERMATOLOGY
End: 2023-09-25

## 2023-09-25 DIAGNOSIS — L81.0 POSTINFLAMMATORY HYPERPIGMENTATION: ICD-10-CM

## 2023-09-25 DIAGNOSIS — L21.8 OTHER SEBORRHEIC DERMATITIS: ICD-10-CM

## 2023-09-25 DIAGNOSIS — L40.0 PSORIASIS VULGARIS: ICD-10-CM

## 2023-09-25 DIAGNOSIS — D18.0 HEMANGIOMA: ICD-10-CM

## 2023-09-25 DIAGNOSIS — B36.0 PITYRIASIS VERSICOLOR: ICD-10-CM | Status: IMPROVED

## 2023-09-25 DIAGNOSIS — D22 MELANOCYTIC NEVI: ICD-10-CM

## 2023-09-25 DIAGNOSIS — L82.1 OTHER SEBORRHEIC KERATOSIS: ICD-10-CM

## 2023-09-25 DIAGNOSIS — Z71.89 OTHER SPECIFIED COUNSELING: ICD-10-CM

## 2023-09-25 DIAGNOSIS — L81.4 OTHER MELANIN HYPERPIGMENTATION: ICD-10-CM

## 2023-09-25 PROBLEM — D18.01 HEMANGIOMA OF SKIN AND SUBCUTANEOUS TISSUE: Status: ACTIVE | Noted: 2023-09-25

## 2023-09-25 PROBLEM — D22.61 MELANOCYTIC NEVI OF RIGHT UPPER LIMB, INCLUDING SHOULDER: Status: ACTIVE | Noted: 2023-09-25

## 2023-09-25 PROCEDURE — ? SUNSCREEN RECOMMENDATIONS

## 2023-09-25 PROCEDURE — ? COUNSELING

## 2023-09-25 PROCEDURE — ? TREATMENT REGIMEN

## 2023-09-25 PROCEDURE — ? PRESCRIPTION

## 2023-09-25 PROCEDURE — 99214 OFFICE O/P EST MOD 30 MIN: CPT

## 2023-09-25 RX ORDER — CLOBETASOL PROPIONATE 0.05 G/100ML
1 SHAMPOO TOPICAL
Qty: 118 | Refills: 3 | Status: ERX | COMMUNITY
Start: 2023-09-25

## 2023-09-25 RX ADMIN — CLOBETASOL PROPIONATE 1: 0.05 SHAMPOO TOPICAL at 00:00

## 2023-09-25 ASSESSMENT — LOCATION DETAILED DESCRIPTION DERM
LOCATION DETAILED: LEFT CLAVICULAR SKIN
LOCATION DETAILED: LEFT PROXIMAL DORSAL FOREARM
LOCATION DETAILED: RIGHT POSTERIOR SHOULDER
LOCATION DETAILED: RIGHT CENTRAL PARIETAL SCALP
LOCATION DETAILED: RIGHT KNEE
LOCATION DETAILED: RIGHT VENTRAL PROXIMAL FOREARM
LOCATION DETAILED: LEFT VENTRAL PROXIMAL FOREARM
LOCATION DETAILED: LEFT CENTRAL FRONTAL SCALP
LOCATION DETAILED: RIGHT PROXIMAL POSTERIOR THIGH
LOCATION DETAILED: RIGHT LATERAL UPPER BACK
LOCATION DETAILED: LEFT KNEE
LOCATION DETAILED: LEFT DISTAL POSTERIOR THIGH
LOCATION DETAILED: RIGHT ELBOW

## 2023-09-25 ASSESSMENT — LOCATION SIMPLE DESCRIPTION DERM
LOCATION SIMPLE: RIGHT POSTERIOR THIGH
LOCATION SIMPLE: RIGHT KNEE
LOCATION SIMPLE: LEFT SCALP
LOCATION SIMPLE: RIGHT ELBOW
LOCATION SIMPLE: LEFT FOREARM
LOCATION SIMPLE: RIGHT FOREARM
LOCATION SIMPLE: SCALP
LOCATION SIMPLE: LEFT CLAVICULAR SKIN
LOCATION SIMPLE: LEFT KNEE
LOCATION SIMPLE: LEFT POSTERIOR THIGH
LOCATION SIMPLE: RIGHT UPPER BACK
LOCATION SIMPLE: RIGHT SHOULDER

## 2023-09-25 ASSESSMENT — LOCATION ZONE DERM
LOCATION ZONE: LEG
LOCATION ZONE: ARM
LOCATION ZONE: TRUNK
LOCATION ZONE: SCALP

## 2023-09-25 NOTE — PROCEDURE: TREATMENT REGIMEN
Detail Level: Zone
Action 3: Continue
Continue Regimen: - humira pen
Initiate Treatment: -Clobetasol 0.05 % shampoo Day Wash scalp once a day, use up to 10 days consecutively, then take break and use only as needed
Continue Regimen: - ketoconazole 2 % shampoo 3-4 times a week  Cleanse body 3-4 times a week

## 2023-09-25 NOTE — PROCEDURE: COUNSELING
Detail Level: Generalized
Detail Level: Zone
Patient Specific Counseling (Will Not Stick From Patient To Patient): Being treated with humira by RA Dr. Amisha Johnson
Detail Level: Detailed
Detail Level: Simple

## 2023-10-05 ENCOUNTER — APPOINTMENT (RX ONLY)
Dept: URBAN - METROPOLITAN AREA CLINIC 35 | Facility: CLINIC | Age: 73
Setting detail: DERMATOLOGY
End: 2023-10-05

## 2023-10-06 ENCOUNTER — HOSPITAL ENCOUNTER (OUTPATIENT)
Dept: CARDIOLOGY | Facility: MEDICAL CENTER | Age: 73
End: 2023-10-06
Payer: MEDICARE

## 2023-10-06 DIAGNOSIS — I34.0 NONRHEUMATIC MITRAL VALVE REGURGITATION: ICD-10-CM

## 2023-10-06 LAB
LV EJECT FRACT MOD 2C 99903: 55.47
LV EJECT FRACT MOD 4C 99902: 59.93
LV EJECT FRACT MOD BP 99901: 58.29

## 2023-10-06 PROCEDURE — 93306 TTE W/DOPPLER COMPLETE: CPT

## 2023-10-06 PROCEDURE — 93306 TTE W/DOPPLER COMPLETE: CPT | Mod: 26 | Performed by: STUDENT IN AN ORGANIZED HEALTH CARE EDUCATION/TRAINING PROGRAM

## 2023-10-10 RX ORDER — OMEPRAZOLE 20 MG/1
20 CAPSULE, DELAYED RELEASE ORAL EVERY EVENING
Qty: 100 CAPSULE | Refills: 2 | Status: SHIPPED | OUTPATIENT
Start: 2023-10-10 | End: 2023-12-05 | Stop reason: SDUPTHER

## 2023-10-18 RX ORDER — CELECOXIB 200 MG/1
200 CAPSULE ORAL DAILY
Qty: 90 CAPSULE | Refills: 0 | Status: SHIPPED | OUTPATIENT
Start: 2023-10-18 | End: 2024-01-31 | Stop reason: SDUPTHER

## 2023-10-23 ENCOUNTER — APPOINTMENT (OUTPATIENT)
Dept: BEHAVIORAL HEALTH | Facility: CLINIC | Age: 73
End: 2023-10-23
Payer: MEDICARE

## 2023-10-24 ENCOUNTER — PATIENT MESSAGE (OUTPATIENT)
Dept: SLEEP MEDICINE | Facility: MEDICAL CENTER | Age: 73
End: 2023-10-24
Payer: MEDICARE

## 2023-10-24 DIAGNOSIS — F51.04 CHRONIC INSOMNIA: ICD-10-CM

## 2023-10-25 ENCOUNTER — PATIENT MESSAGE (OUTPATIENT)
Dept: CARDIOLOGY | Facility: MEDICAL CENTER | Age: 73
End: 2023-10-25
Payer: MEDICARE

## 2023-10-25 NOTE — PATIENT COMMUNICATION
To : did you discuss with patient increasing atorvastatin from 40 to 80 mg? Let me know and I can place new order! Thank you!

## 2023-10-30 ENCOUNTER — PATIENT MESSAGE (OUTPATIENT)
Dept: SLEEP MEDICINE | Facility: MEDICAL CENTER | Age: 73
End: 2023-10-30

## 2023-10-30 ENCOUNTER — OFFICE VISIT (OUTPATIENT)
Dept: BEHAVIORAL HEALTH | Facility: CLINIC | Age: 73
End: 2023-10-30
Payer: MEDICARE

## 2023-10-30 DIAGNOSIS — Z65.8 PSYCHOSOCIAL STRESSORS: ICD-10-CM

## 2023-10-30 DIAGNOSIS — F51.04 CHRONIC INSOMNIA: ICD-10-CM

## 2023-10-30 DIAGNOSIS — F33.1 MODERATE EPISODE OF RECURRENT MAJOR DEPRESSIVE DISORDER (HCC): ICD-10-CM

## 2023-10-30 DIAGNOSIS — G47.00 INSOMNIA, UNSPECIFIED TYPE: ICD-10-CM

## 2023-10-30 PROCEDURE — 99204 OFFICE O/P NEW MOD 45 MIN: CPT | Mod: GC | Performed by: STUDENT IN AN ORGANIZED HEALTH CARE EDUCATION/TRAINING PROGRAM

## 2023-10-30 RX ORDER — BUPROPION HYDROCHLORIDE 200 MG/1
400 TABLET, EXTENDED RELEASE ORAL DAILY
COMMUNITY
End: 2023-11-01 | Stop reason: SDUPTHER

## 2023-10-30 RX ORDER — CLOBETASOL PROPIONATE 0.05 G/100ML
SHAMPOO TOPICAL
COMMUNITY
Start: 2023-09-25

## 2023-10-30 RX ORDER — FLUOXETINE HYDROCHLORIDE 40 MG/1
40 CAPSULE ORAL
COMMUNITY
Start: 2023-09-23 | End: 2023-10-31 | Stop reason: SDUPTHER

## 2023-10-31 RX ORDER — SUVOREXANT 20 MG/1
1 TABLET, FILM COATED ORAL
Qty: 90 TABLET | Refills: 0 | Status: SHIPPED | OUTPATIENT
Start: 2023-10-31 | End: 2024-01-29

## 2023-11-01 PROBLEM — F33.1 MODERATE EPISODE OF RECURRENT MAJOR DEPRESSIVE DISORDER (HCC): Status: ACTIVE | Noted: 2017-09-21

## 2023-11-01 PROBLEM — F32.0 MAJOR DEPRESSIVE DISORDER, SINGLE EPISODE, MILD (HCC): Status: RESOLVED | Noted: 2022-08-23 | Resolved: 2023-11-01

## 2023-11-01 RX ORDER — FLUOXETINE HYDROCHLORIDE 40 MG/1
40 CAPSULE ORAL
Qty: 30 CAPSULE | Refills: 1 | Status: SHIPPED | OUTPATIENT
Start: 2023-10-31 | End: 2023-11-27 | Stop reason: SDUPTHER

## 2023-11-01 RX ORDER — HYDROXYZINE 50 MG/1
50 TABLET, FILM COATED ORAL 3 TIMES DAILY PRN
Qty: 90 TABLET | Refills: 1 | Status: SHIPPED | OUTPATIENT
Start: 2023-10-31 | End: 2023-11-29

## 2023-11-01 RX ORDER — FLUOXETINE HYDROCHLORIDE 20 MG/1
20 CAPSULE ORAL DAILY
Qty: 30 CAPSULE | Refills: 1 | Status: SHIPPED | OUTPATIENT
Start: 2023-11-01 | End: 2023-11-27 | Stop reason: SDUPTHER

## 2023-11-01 RX ORDER — FLUOXETINE HYDROCHLORIDE 40 MG/1
40 CAPSULE ORAL
Qty: 30 CAPSULE | Refills: 1 | Status: SHIPPED | OUTPATIENT
Start: 2023-10-31 | End: 2023-11-27

## 2023-11-01 RX ORDER — BUPROPION HYDROCHLORIDE 200 MG/1
400 TABLET, EXTENDED RELEASE ORAL DAILY
Qty: 60 TABLET | Refills: 1 | Status: SHIPPED | OUTPATIENT
Start: 2023-11-01 | End: 2023-11-27 | Stop reason: SDUPTHER

## 2023-11-01 NOTE — PROGRESS NOTES
Evaluation completed by: Luigi King M.D.   Date of Service: 10/30/23   Appointment type: in-office appointment.    Information below was collected from: patient      CHIEF COMPLIANT:  New Patient (Depression, insomnia)        HPI:   Buster Medina is a 73 y.o. old male who presents today for initial psychiatric evaluation to address New Patient (Depression, insomnia)  Depression on and off for most of life - Prozac when depressed  Recently started taking old prescription of Wellbutrin  mg  Worse since Feb due to social stress resulting in being fired from [employer] (accused of groping coworker)    Insomnia chronic - CPAP and multiple sleep meds managed by sleep specialist  Not in therapy  Establish care      CURRENT MEDICATIONS    Current Outpatient Medications:     celecoxib (CELEBREX) 200 MG Cap, TAKE 1 CAPSULE DAILY, Disp: 90 Capsule, Rfl: 0    lisinopril (PRINIVIL) 10 MG Tab, TAKE 1 TABLET BY MOUTH EVERY DAY. (STOP 20 MG DOSE), Disp: 90 Tablet, Rfl: 0    Suvorexant (BELSOMRA) 20 MG Tab, Take 1 Tablet by mouth at bedtime as needed (for insomnia) for up to 90 days. Take 1 tablet by mouth at bedtime as needed for insomnia., Disp: 90 Tablet, Rfl: 0    fluoxetine (PROZAC) 40 MG capsule, Take 40 mg by mouth every day., Disp: , Rfl:     buPROPion (WELLBUTRIN SR) 200 MG SR tablet, Take 400 mg by mouth every day., Disp: , Rfl:     Clobetasol Propionate 0.05 % Shampoo, WASH SCALP ONCE A DAY, USE UP TO 10 DAYS CONSECUTIVELY, THEN TAKE BREAK AND USE ONLY AS NEEDED, Disp: , Rfl:     HUMIRA PEN 40 MG/0.4ML Pen-injector Kit, INJECT 1 PEN UNDER THE SKIN EVERY 14 DAYS., Disp: 3 Each, Rfl: 0    omeprazole (PRILOSEC) 20 MG delayed-release capsule, Take 1 Capsule by mouth every evening., Disp: 100 Capsule, Rfl: 2    atorvastatin (LIPITOR) 40 MG Tab, Take 2 Tablets by mouth every day., Disp: 90 Tablet, Rfl: 3    Magnesium 100 MG Cap, Take  by mouth., Disp: , Rfl:     ferrous sulfate 325 (65 Fe) MG tablet, Take  325 mg by mouth every day., Disp: , Rfl:     B Complex Vitamins (VITAMIN B COMPLEX PO), Take 1 Tablet by mouth every day., Disp: , Rfl:     Cholecalciferol (VITAMIN D3 PO), Take 1 Tablet by mouth every 7 days. OTC, Disp: , Rfl:     aspirin EC (ECOTRIN) 81 MG Tablet Delayed Response, Take 81 mg by mouth every bedtime., Disp: 30 Tab, Rfl: 0    Multiple Vitamins-Minerals (MELODY-DAY 1000 PO), Take 1 Tab by mouth 2 Times a Day., Disp: , Rfl:     Psychiatric Review of Systems:current symptoms as reported by pt.  Depression: Depressed mood, Difficulty sleeping, Anhedonia, Excessive feelings of guilt, Low energy, and Passive thoughts of death  Malathi: denies sx  Anxiety/Panic Attacks: insomnia, racing thoughts, feelings of losing control  PTSD symptom:   Psychosis: Patient reports no signs or symptoms indicative of psychosis  ADHD: , denies sx  Tics/Abnormal movements: denies  Sleep: chronic insomnia   Behavioral/Aggression: Tense  Substance Use: uses cannabis for sleep, uses alcohol while taking Ativan (different times at night per pt)    MEDICAL REVIEW OF SYSTEMS   ROS negative      PAST PSYCHIATRIC HISTORY  -Previous Psychiatric Diagnosis: Major Depressive Disorder and Generalized Anxiety Disorder  -Inpatient Psychiatric Hospitalizations: denies  -Outpatient Psychiatric Care:  with different psychiatrists in [place]  -Self Harm: denies  -Suicide Attempts: denies  -Access to Firearms: yes  -Psychiatric Medications:  history of several sleep medications (Belsomra, Ativan, Ambien), prozac on and fof    MEDICAL HISTORY  Other Medical History:   Past Medical History:   Diagnosis Date    Anxiety     Arthritis     osteo    Bowel habit changes 06/24/2021    Constipation    Cancer (HCC) 2007    bladder    Chickenpox     as a child    Depression     depression    High cholesterol     Hyperlipidemia     Hypertension     pt states  well controlled on meds    Kidney stone     Mumps     as a child     Obesity     Pneumothorax     Psoriatic  arthritis (HCC)     Restless leg syndrome     Sleep apnea     uses cpap    Tonsillitis      Allergies:   No Known Allergies    SURGICAL HISTORY  Past Surgical History:   Procedure Laterality Date    PB INCISE FINGER TENDON SHEATH Left 9/19/2023    Procedure: LEFT INDEX FINGER TRIGGER RELEASE;  Surgeon: Td Soliman M.D.;  Location: [facility];  Service: Orthopedics    PB INCISE FINGER TENDON SHEATH Left 8/30/2022    Procedure: LEFT MIDDLE FINGER TRIGGER RELEASE;  Surgeon: Td Soliman M.D.;  Location: [facility];  Service: Orthopedics    MS CYSTOURETHROSCOPY,BIOPSIES N/A 8/17/2022    Procedure: CYSTOSCOPY;  Surgeon: Jerzy Jasso M.D.;  Location: [facility];  Service: Urology    MS CYSTOURETHROSCOPY,BIOPSIES  7/6/2021    Procedure: BIOPSY, BLADDER WITH FULGERATION;  Surgeon: Bob Millan M.D.;  Location: [facility];  Service: Urology    BLADDER BIOPSY WITH CYSTOSCOPY  1/3/2018    Procedure: BLADDER BIOPSY WITH CYSTOSCOPY/WITH UPPER TRACT WASHING;  Surgeon: El Manuel M.D.;  Location: [facility];  Service: Urology    RETROGRADES Bilateral 1/3/2018    Procedure: RETROGRADES;  Surgeon: El Manuel M.D.;  Location: [facility];  Service: Urology    PYELOGRAM Bilateral 1/3/2018    Procedure: PYELOGRAM;  Surgeon: El Manuel M.D.;  Location: [facility];  Service: Urology    OTHER ORTHOPEDIC SURGERY  2015    shoulder replacement    ABDOMINAL EXPLORATION      ARTHROPLASTY      right shoulder    BLADDER BIOPSY WITH CYSTOSCOPY      EYE SURGERY      lasik     GASTRIC BYPASS LAPAROSCOPIC      HERNIA REPAIR      OPEN REDUCTION      OTHER Left     thumb joint    THORACOTOMY      TURP-VAPOR          FAMILY PSYCHIATRIC HISTORY  Family History   Problem Relation Age of Onset    Cancer Mother 80        lung cancer    Arthritis Mother     Diabetes Mother     Anxiety disorder Mother     Lung Disease Father 65    Cancer  "Father     Diabetes Father     Hypertension Father     Hyperlipidemia Father     Hyperlipidemia Brother     Arthritis Brother     Other Brother         Thyroid tumor    Sleep Apnea Brother     No Known Problems Maternal Grandmother     Cancer Maternal Grandfather     No Known Problems Paternal Grandmother     No Known Problems Paternal Grandfather     Heart Disease Brother 49        smoker, overweight    Hypertension Brother     Depression Other     Suicide Attempts Neg Hx     Bipolar disorder Neg Hx     Alcohol abuse Neg Hx     Drug abuse Neg Hx        SOCIAL HISTORY    Recently out of contact with ex wife due to conflict about supporting adult son with ADHD    SUBSTANCE USE HISTORY  Alcohol: daily use  Cannabis: daily use  Opioids: denies  Other prescription medications:  benzodiazepines, Ambien, Belsomra for sleep - advised not to take these at the same time    PSYCHIATRIC EXAMINATION   Vitals: There were no vitals taken for this visit.  Musculoskeletal:  tremor in hands  Abnormal Movements: tremors  Gait:within normal limits      General:   - Grooming and hygiene: Casual  - Apparent distress: NAD   - Behavior: Calm  - Eye Contact:  Good  - no psychomotor agitation or retardation   - Participation: Active verbal participation, Attentive, Engaged, and Open to feedback  Orientation:Alert and Fully Oriented to person, place and time  Mood: \"fine\"  Affect: Flexible, Full range, Congruent with content, and Congruent to stated mood  Thought Process: Linear, Logical, and Goal-directed  Thought Content: Within normal limits  Perception: Denies auditory or visual hallucinations. No delusions noted Within normal limits  Attention span and concentration: Intact   Speech:Clear with normal rate and rhythm  Language: Appropriate spontaneous, comprehends spoken commands, and fluent  Insight: Good  Judgment:  Good  Recent and remote memory: No gross evidence of memory deficits    LABS:  Hospital Outpatient Visit on " 10/06/2023   Component Date Value    Eject.Frac. MOD BP 10/06/2023 58.29     Eject.Frac. MOD 4C 10/06/2023 59.93     Eject.Frac. MOD 2C 10/06/2023 55.47    Hospital Outpatient Visit on 09/08/2023   Component Date Value    Cholesterol,Tot 09/08/2023 178     Triglycerides 09/08/2023 81     HDL 09/08/2023 77     LDL 09/08/2023 85     Fasting Status 09/08/2023 Unknown    Hospital Outpatient Visit on 07/19/2023   Component Date Value    WBC 07/19/2023 5.7     RBC 07/19/2023 4.88     Hemoglobin 07/19/2023 15.5     Hematocrit 07/19/2023 47.4     MCV 07/19/2023 97.1     MCH 07/19/2023 31.8     MCHC 07/19/2023 32.7     RDW 07/19/2023 43.8     Platelet Count 07/19/2023 106 (L)     MPV 07/19/2023 11.1     Neutrophils-Polys 07/19/2023 50.50     Lymphocytes 07/19/2023 35.00     Monocytes 07/19/2023 10.60     Eosinophils 07/19/2023 2.80     Basophils 07/19/2023 0.70     Immature Granulocytes 07/19/2023 0.40     Nucleated RBC 07/19/2023 0.00     Neutrophils (Absolute) 07/19/2023 2.86     Lymphs (Absolute) 07/19/2023 1.98     Monos (Absolute) 07/19/2023 0.60     Eos (Absolute) 07/19/2023 0.16     Baso (Absolute) 07/19/2023 0.04     Immature Granulocytes (a* 07/19/2023 0.02     NRBC (Absolute) 07/19/2023 0.00     Sodium 07/19/2023 139     Potassium 07/19/2023 4.3     Chloride 07/19/2023 103     Co2 07/19/2023 25     Anion Gap 07/19/2023 11.0     Glucose 07/19/2023 95     Bun 07/19/2023 17     Creatinine 07/19/2023 1.16     Calcium 07/19/2023 10.1     AST(SGOT) 07/19/2023 31     ALT(SGPT) 07/19/2023 32     Alkaline Phosphatase 07/19/2023 81     Total Bilirubin 07/19/2023 0.5     Albumin 07/19/2023 4.4     Total Protein 07/19/2023 7.3     Globulin 07/19/2023 2.9     A-G Ratio 07/19/2023 1.5     Sed Rate Westergren 07/19/2023 5     Hepatitis B Cors Ab,IgM 07/19/2023 Non-Reactive     Hepatitis B Surface Anti* 07/19/2023 Non-Reactive     Hepatitis C Antibody 07/19/2023 Non-Reactive     QFT NIL 07/19/2023 0.29     QFT TB1 - NIL 07/19/2023  -0.06     QFT TB2 - NIL 07/19/2023 -0.08     QFT Mitogen - NIL 07/19/2023 >10.00     QFT Gold Plus 07/19/2023 Negative     Uric Acid 07/19/2023 7.6     Correct Calcium 07/19/2023 9.8     GFR (CKD-EPI) 07/19/2023 67    Hospital Outpatient Visit on 08/25/2022   Component Date Value    Eject.Frac. MOD BP 08/25/2022 67.11     Eject.Frac. MOD 4C 08/25/2022 56.9     Eject.Frac. MOD 2C 08/25/2022 71.86     Left Ventrical Ejection * 08/25/2022 65    Pre-Admission Testing on 08/15/2022   Component Date Value    Sodium 08/15/2022 139     Potassium 08/15/2022 4.3     Chloride 08/15/2022 104     Co2 08/15/2022 25     Glucose 08/15/2022 107 (H)     Bun 08/15/2022 17     Creatinine 08/15/2022 1.11     Calcium 08/15/2022 9.6     Anion Gap 08/15/2022 10.0     WBC 08/15/2022 5.4     RBC 08/15/2022 4.35 (L)     Hemoglobin 08/15/2022 13.8 (L)     Hematocrit 08/15/2022 42.6     MCV 08/15/2022 97.9 (H)     MCH 08/15/2022 31.7     MCHC 08/15/2022 32.4 (L)     RDW 08/15/2022 47.4     Platelet Count 08/15/2022 139 (L)     MPV 08/15/2022 12.4     Neutrophils-Polys 08/15/2022 46.20     Lymphocytes 08/15/2022 37.20     Monocytes 08/15/2022 12.50     Eosinophils 08/15/2022 2.80     Basophils 08/15/2022 0.90     Immature Granulocytes 08/15/2022 0.40     Nucleated RBC 08/15/2022 0.00     Neutrophils (Absolute) 08/15/2022 2.48     Lymphs (Absolute) 08/15/2022 2.00     Monos (Absolute) 08/15/2022 0.67     Eos (Absolute) 08/15/2022 0.15     Baso (Absolute) 08/15/2022 0.05     Immature Granulocytes (a* 08/15/2022 0.02     NRBC (Absolute) 08/15/2022 0.00     PT 08/15/2022 13.1     INR 08/15/2022 1.00     APTT 08/15/2022 24.7     Color 08/15/2022 DK Yellow     Character 08/15/2022 Clear     Specific Gravity 08/15/2022 1.022     Ph 08/15/2022 6.5     Glucose 08/15/2022 Negative     Ketones 08/15/2022 Negative     Protein 08/15/2022 Negative     Bilirubin 08/15/2022 Negative     Urobilinogen, Urine 08/15/2022 0.2     Nitrite 08/15/2022 Negative      Leukocyte Esterase 08/15/2022 Negative     Occult Blood 08/15/2022 Negative     Micro Urine Req 08/15/2022 see below     Significant Indicator 08/15/2022 NEG     Source 08/15/2022 UR     Site 08/15/2022 -     Culture Result 08/15/2022 No growth at 48 hours.     Report 08/15/2022                      Value:[facility]    Test Date:  2022-08-15  Pt Name:    ALBINO GARRISON                Department: [facility]  MRN:        [id number]                      Room:  Gender:     Male                         Technician: JOHNATHAN  :        1950                   Requested By:ERIN LOUISE  Order #:    [id number]                    Reading MD: Ariel Daniels MD    Measurements  Intervals                                Axis  Rate:       61                           P:          22  NJ:         152                          QRS:        57  QRSD:       90                           T:          43  QT:         436  QTc:        440    Interpretive Statements  SINUS BRADYCARDIA  VENTRICULAR PREMATURE COMPLEX  POSSIBLE PRIOR LATERAL INFARCT  Electronically Signed On 8- 11:24:18 PDT by Ariel Daniels MD      GFR (CKD-EPI) 08/15/2022 71    Hospital Outpatient Visit on 2022   Component Date Value    Sodium 2022 141     Potassium 2022 4.7     Chloride 2022 104     Co2 2022 27     Anion Gap 2022 10.0     Glucose 2022 105 (H)     Bun 2022 19     Creatinine 2022 1.31     Calcium 2022 10.0     AST(SGOT) 2022 31     ALT(SGPT) 2022 37     Alkaline Phosphatase 2022 78     Total Bilirubin 2022 0.3     Albumin 2022 4.5     Total Protein 2022 6.9     Globulin 2022 2.4     A-G Ratio 2022 1.9     WBC 2022 6.5     RBC 2022 4.65 (L)     Hemoglobin 2022 14.1     Hematocrit 2022 45.1     MCV 2022 97.0     MCH 2022 30.3     MCHC 2022 31.3 (L)     RDW 2022 50.0     Platelet Count 2022 178      MPV 04/27/2022 12.7     Neutrophils-Polys 04/27/2022 45.00     Lymphocytes 04/27/2022 37.80     Monocytes 04/27/2022 11.10     Eosinophils 04/27/2022 4.60     Basophils 04/27/2022 1.20     Immature Granulocytes 04/27/2022 0.30     Nucleated RBC 04/27/2022 0.00     Neutrophils (Absolute) 04/27/2022 2.92     Lymphs (Absolute) 04/27/2022 2.46     Monos (Absolute) 04/27/2022 0.72     Eos (Absolute) 04/27/2022 0.30     Baso (Absolute) 04/27/2022 0.08     Immature Granulocytes (a* 04/27/2022 0.02     NRBC (Absolute) 04/27/2022 0.00     Pre-Albumin 04/27/2022 34.1     Folate -Folic Acid 04/27/2022 32.6     Vitamin B12 -True Cobala* 04/27/2022 1230 (H)     Ferritin 04/27/2022 48.6     Cholesterol,Tot 04/27/2022 205 (H)     Triglycerides 04/27/2022 97     HDL 04/27/2022 85     LDL 04/27/2022 101 (H)     Fasting Status 04/27/2022 Fasting     Vitamin B1 04/27/2022 270 (H)     Vitamin B6 04/27/2022 132.0 (H)     Vitamin B2 (Riboflavin) 04/27/2022 17     Zinc Serum 04/27/2022 78.4     GFR (CKD-EPI) 04/27/2022 58 (A)    Hospital Outpatient Visit on 04/18/2022   Component Date Value    Significant Indicator 04/18/2022 NEG     Source 04/18/2022 UR     Site 04/18/2022 -     Culture Result 04/18/2022 Usual skin emi <10,000 cfu/mL    Hospital Outpatient Visit on 03/21/2022   Component Date Value    Stat C-Reactive Protein 03/21/2022 <0.30     Sed Rate Westergren 03/21/2022 12     WBC 03/21/2022 6.4     RBC 03/21/2022 4.43 (L)     Hemoglobin 03/21/2022 13.4 (L)     Hematocrit 03/21/2022 42.4     MCV 03/21/2022 95.7     MCH 03/21/2022 30.2     MCHC 03/21/2022 31.6 (L)     RDW 03/21/2022 43.4     Platelet Count 03/21/2022 161 (L)     MPV 03/21/2022 12.1     Neutrophils-Polys 03/21/2022 55.70     Lymphocytes 03/21/2022 30.90     Monocytes 03/21/2022 9.90     Eosinophils 03/21/2022 2.40     Basophils 03/21/2022 0.90     Immature Granulocytes 03/21/2022 0.20     Nucleated RBC 03/21/2022 0.00     Neutrophils (Absolute) 03/21/2022 3.55      Lymphs (Absolute) 03/21/2022 1.97     Monos (Absolute) 03/21/2022 0.63     Eos (Absolute) 03/21/2022 0.15     Baso (Absolute) 03/21/2022 0.06     Immature Granulocytes (a* 03/21/2022 0.01     NRBC (Absolute) 03/21/2022 0.00    Hospital Outpatient Visit on 12/16/2021   Component Date Value    QFT NIL 12/16/2021 0.07     QFT TB1 - NIL 12/16/2021 -0.01     QFT TB2 - NIL 12/16/2021 -0.01     QFT Mitogen - NIL 12/16/2021 >10.00     QFT Gold Plus 12/16/2021 Negative     Hepatitis C Antibody 12/16/2021 Non-Reactive     Hepatitis B Surface Anti* 12/16/2021 Non-Reactive    Hospital Outpatient Visit on 11/15/2021   Component Date Value    Sed Rate Westergren 11/15/2021 11     WBC 11/15/2021 7.6     RBC 11/15/2021 4.69 (L)     Hemoglobin 11/15/2021 15.1     Hematocrit 11/15/2021 45.4     MCV 11/15/2021 96.8     MCH 11/15/2021 32.2     MCHC 11/15/2021 33.3 (L)     RDW 11/15/2021 42.5     Platelet Count 11/15/2021 176     MPV 11/15/2021 11.9     Neutrophils-Polys 11/15/2021 52.20     Lymphocytes 11/15/2021 34.00     Monocytes 11/15/2021 10.50     Eosinophils 11/15/2021 2.50     Basophils 11/15/2021 0.50     Immature Granulocytes 11/15/2021 0.30     Nucleated RBC 11/15/2021 0.00     Neutrophils (Absolute) 11/15/2021 3.97     Lymphs (Absolute) 11/15/2021 2.59     Monos (Absolute) 11/15/2021 0.80     Eos (Absolute) 11/15/2021 0.19     Baso (Absolute) 11/15/2021 0.04     Immature Granulocytes (a* 11/15/2021 0.02     NRBC (Absolute) 11/15/2021 0.00     Sodium 11/15/2021 142     Potassium 11/15/2021 4.6     Chloride 11/15/2021 109     Co2 11/15/2021 22     Anion Gap 11/15/2021 11.0     Glucose 11/15/2021 91     Bun 11/15/2021 20     Creatinine 11/15/2021 1.50 (H)     Calcium 11/15/2021 9.6     AST(SGOT) 11/15/2021 44     ALT(SGPT) 11/15/2021 33     Alkaline Phosphatase 11/15/2021 81     Total Bilirubin 11/15/2021 0.4     Albumin 11/15/2021 4.4     Total Protein 11/15/2021 7.1     Globulin 11/15/2021 2.7     A-G Ratio 11/15/2021 1.6      GFR If  11/15/2021 56 (A)     GFR If Non  Ameri* 11/15/2021 46 (A)    There may be more visits with results that are not included.          SAFETY ASSESSMENT - RISK TO SELF  Current suicide attempts or self harm: No  Past suicide attempts or self harm: No  History of suicide by family member: No  History of suicide by friend/significant other: No  Recent change in amount/specificity/intensity of suicidal thoughts or self-harm behavior: Yes - passive thoughts about crashing car  Ongoing substance use disorder:  taking meds for sleep and anxiety in combinations advised to avoid, also using alcohol and cannabis for sleep  Current access to firearms, medications, or other identified means of suicide/self-harm: Yes  If yes, willing to restrict access to means of suicide/self-harm: No  Protective factors present: Yes     SAFETY ASSESSMENT - RISK TO OTHERS  Current aggressive behavior or risk to others: No  Past aggressive behavior or risk to others: No  Recent change in amount/specificity/intensity of thoughts or threats to harm others? No  Current access to firearms/other identified means of harm? Yes  If yes, willing to restrict access to weapons/means of harm? No     CURRENT RISK ASSESSMENT       Suicide: Low       Homicide: Low       Self-Harm: Low       Relapse: Not applicable       Crisis Safety Plan Reviewed Yes    [place]  records   reviewed.  No concerns about misuse of controlled substance.    ASSESSMENT  Buster Medina is a 73 y.o. old male who presents today for initial psychiatric evaluation to address New Patient (Depression, insomnia)  History of chronic insomnia and seeing sleep specialist - CPAP, anxiety  Prozac on and off throughout life  Willing to try therapy  Depression since February 2023 after being accused of groping coworker and being fired  Uses cannabis to help sleep    DIAGNOSES/PLAN  Encounter Diagnoses   Name Primary?    Moderate episode of recurrent major  depressive disorder (HCC)     Insomnia, unspecified type     Psychosocial stressors      Increase Prozac to 60 mg po daily  Continue Wellbutrin 400 mg po daily  Start hydroxyzine 50 mg po tid as needed for anxiety    Sleep specialist is helping manage sleep medications    List of therapists provided    Medication options, alternatives (including no medications) and medication risks/benefits/side effects were discussed in detail.  The patient was advised to call, message clinician on [email or web address], or come in to the clinic if symptoms worsen or if questions/issues regarding their medications arise.  The patient verbalized understanding and agreement.    The patient was educated to call 911, call the suicide hotline, or go to the local ER if having thoughts of suicide or homicide.  The patient verbalized understanding and agreement.   The proposed treatment plan was discussed with the patient who was provided the opportunity to ask questions and make suggestions regarding alternative treatment. Patient verbalized understanding and expressed agreement with the plan.      Return to clinic in 6 weeks or sooner if symptoms worsen.    This appointment was supervised by attending psychiatrist, who agrees with assessment and treatment plan.  See attending attestation for more details.     Luigi King M.D.

## 2023-11-15 ENCOUNTER — RX ONLY (OUTPATIENT)
Age: 73
Setting detail: RX ONLY
End: 2023-11-15

## 2023-11-15 RX ORDER — TILDRAKIZUMAB-ASMN 100 MG/ML
1 INJECTION, SOLUTION SUBCUTANEOUS
Qty: 1 | Refills: 0 | Status: CANCELLED

## 2023-11-24 DIAGNOSIS — F33.1 MODERATE EPISODE OF RECURRENT MAJOR DEPRESSIVE DISORDER (HCC): ICD-10-CM

## 2023-11-24 DIAGNOSIS — Z65.8 PSYCHOSOCIAL STRESSORS: ICD-10-CM

## 2023-11-24 DIAGNOSIS — G47.00 INSOMNIA, UNSPECIFIED TYPE: ICD-10-CM

## 2023-11-26 DIAGNOSIS — R42 POSTURAL DIZZINESS WITH PRESYNCOPE: ICD-10-CM

## 2023-11-26 DIAGNOSIS — R55 POSTURAL DIZZINESS WITH PRESYNCOPE: ICD-10-CM

## 2023-11-27 ENCOUNTER — OFFICE VISIT (OUTPATIENT)
Dept: BEHAVIORAL HEALTH | Facility: CLINIC | Age: 73
End: 2023-11-27
Payer: MEDICARE

## 2023-11-27 DIAGNOSIS — F51.04 CHRONIC INSOMNIA: Chronic | ICD-10-CM

## 2023-11-27 DIAGNOSIS — F41.1 ANXIETY, GENERALIZED: ICD-10-CM

## 2023-11-27 DIAGNOSIS — F33.1 MODERATE EPISODE OF RECURRENT MAJOR DEPRESSIVE DISORDER (HCC): ICD-10-CM

## 2023-11-27 DIAGNOSIS — G47.00 INSOMNIA, UNSPECIFIED TYPE: ICD-10-CM

## 2023-11-27 DIAGNOSIS — G47.31 COMPLEX SLEEP APNEA SYNDROME: Chronic | ICD-10-CM

## 2023-11-27 DIAGNOSIS — Z65.8 PSYCHOSOCIAL STRESSORS: ICD-10-CM

## 2023-11-27 DIAGNOSIS — F33.41 RECURRENT MAJOR DEPRESSIVE DISORDER, IN PARTIAL REMISSION (HCC): ICD-10-CM

## 2023-11-27 PROBLEM — F34.1 DYSTHYMIC DISORDER: Chronic | Status: RESOLVED | Noted: 2018-03-05 | Resolved: 2023-11-27

## 2023-11-27 PROCEDURE — 99999 PR NO CHARGE: CPT | Mod: GE | Performed by: STUDENT IN AN ORGANIZED HEALTH CARE EDUCATION/TRAINING PROGRAM

## 2023-11-27 RX ORDER — BUPROPION HYDROCHLORIDE 200 MG/1
400 TABLET, EXTENDED RELEASE ORAL DAILY
Qty: 180 TABLET | Refills: 1 | OUTPATIENT
Start: 2023-11-27

## 2023-11-27 RX ORDER — FLUOXETINE HYDROCHLORIDE 40 MG/1
40 CAPSULE ORAL
Qty: 90 CAPSULE | Refills: 0 | Status: SHIPPED | OUTPATIENT
Start: 2023-11-27 | End: 2024-02-25

## 2023-11-27 RX ORDER — FLUOXETINE HYDROCHLORIDE 20 MG/1
20 CAPSULE ORAL DAILY
Qty: 90 CAPSULE | Refills: 1 | OUTPATIENT
Start: 2023-11-27

## 2023-11-27 RX ORDER — BUPROPION HYDROCHLORIDE 200 MG/1
400 TABLET, EXTENDED RELEASE ORAL DAILY
Qty: 180 TABLET | Refills: 0 | Status: SHIPPED | OUTPATIENT
Start: 2023-11-27 | End: 2024-02-20 | Stop reason: SDUPTHER

## 2023-11-27 RX ORDER — FLUOXETINE HYDROCHLORIDE 20 MG/1
20 CAPSULE ORAL DAILY
Qty: 90 CAPSULE | Refills: 0 | Status: SHIPPED | OUTPATIENT
Start: 2023-11-27 | End: 2024-02-13 | Stop reason: SDUPTHER

## 2023-11-27 NOTE — TELEPHONE ENCOUNTER
Ins is requesting a 90 day supply.    Received request via: Pharmacy    Was the patient seen in the last year in this department? Yes    Does the patient have an active prescription (recently filled or refills available) for medication(s) requested? No    Does the patient have [facility] and need 100 day supply (blood pressure, diabetes and cholesterol meds only)? Medication is not for cholesterol, blood pressure or diabetes and Patient does not have SCP

## 2023-11-28 ENCOUNTER — OFFICE VISIT (OUTPATIENT)
Dept: CARDIOLOGY | Facility: MEDICAL CENTER | Age: 73
End: 2023-11-28
Payer: MEDICARE

## 2023-11-28 VITALS
RESPIRATION RATE: 14 BRPM | OXYGEN SATURATION: 97 % | HEIGHT: 67 IN | BODY MASS INDEX: 28.72 KG/M2 | HEART RATE: 68 BPM | WEIGHT: 183 LBS | DIASTOLIC BLOOD PRESSURE: 78 MMHG | SYSTOLIC BLOOD PRESSURE: 122 MMHG

## 2023-11-28 DIAGNOSIS — I10 ESSENTIAL HYPERTENSION: ICD-10-CM

## 2023-11-28 DIAGNOSIS — E78.5 DYSLIPIDEMIA: ICD-10-CM

## 2023-11-28 DIAGNOSIS — I34.0 NONRHEUMATIC MITRAL VALVE REGURGITATION: ICD-10-CM

## 2023-11-28 DIAGNOSIS — E78.49 FAMILIAL HYPERLIPIDEMIA: ICD-10-CM

## 2023-11-28 DIAGNOSIS — Z82.49 FAMILY HISTORY OF CORONARY ARTERY DISEASE IN BROTHER: ICD-10-CM

## 2023-11-28 PROCEDURE — 99214 OFFICE O/P EST MOD 30 MIN: CPT

## 2023-11-28 PROCEDURE — 3074F SYST BP LT 130 MM HG: CPT

## 2023-11-28 PROCEDURE — 99213 OFFICE O/P EST LOW 20 MIN: CPT

## 2023-11-28 PROCEDURE — 3078F DIAST BP <80 MM HG: CPT

## 2023-11-28 RX ORDER — LISINOPRIL 10 MG/1
TABLET ORAL
Qty: 90 TABLET | Refills: 0 | Status: SHIPPED | OUTPATIENT
Start: 2023-11-28 | End: 2024-02-13 | Stop reason: SDUPTHER

## 2023-11-28 RX ORDER — ATORVASTATIN CALCIUM 80 MG/1
80 TABLET, FILM COATED ORAL DAILY
Qty: 100 TABLET | Refills: 3 | Status: SHIPPED | OUTPATIENT
Start: 2023-11-28

## 2023-11-28 ASSESSMENT — ENCOUNTER SYMPTOMS
MUSCULOSKELETAL NEGATIVE: 1
EYES NEGATIVE: 1
NERVOUS/ANXIOUS: 0
NEUROLOGICAL NEGATIVE: 1
ORTHOPNEA: 0
PALPITATIONS: 0
CONSTITUTIONAL NEGATIVE: 1
SHORTNESS OF BREATH: 0
DEPRESSION: 0
PND: 0
GASTROINTESTINAL NEGATIVE: 1

## 2023-11-28 ASSESSMENT — FIBROSIS 4 INDEX: FIB4 SCORE: 3.72

## 2023-11-28 NOTE — PROGRESS NOTES
"Chief Complaint   Patient presents with    Other     Nonrheumatic mitral valve regurgitation         Subjective     Aaron Medina is a 73 y.o. male who presents today for follow up of his echo and CT calcium score. They have a history of MR, HTN, familial HLD, ARNULFO     He has a family history of MI in his brother (smoker), uncle had 6 bipasses, Mother had cardiomyopathy, other brother has CAD     Seen by  Dr. Caraballo on 8/17/2022 found to have a holosystolic murmur, repeat echocardiogram showed mild to moderate MR with proapse of posterior leaflet.     Visit with myself on 8/22/2023:  he is feeling overall well, no significant symptoms     Today 11/28/2023 point.  He does report that he has not been walking as much (about a mile at a time) because around the mile gabby he starts to \"feel like he is falling forward\" and has to sit down to hold onto something to keep from falling.  He reports that this has been going on for about 2 years but that he has never previously mentioned it to any of his providers.  He denies chest pain, shortness of breath, palpitations, lower extremity edema    Past Medical History:   Diagnosis Date    Anxiety     Arthritis     osteo    Bowel habit changes 06/24/2021    Constipation    Cancer ([facility]) 2007    bladder    Chickenpox     as a child    Depression     depression    High cholesterol     Hyperlipidemia     Hypertension     pt states  well controlled on meds    Kidney stone     Mumps     as a child     Obesity     Pneumothorax     Psoriatic arthritis (HCC)     Recurrent major depressive disorder, in partial remission (HCC) 09/21/2017    Restless leg syndrome     Sleep apnea     uses cpap    Tonsillitis      Past Surgical History:   Procedure Laterality Date    PB INCISE FINGER TENDON SHEATH Left 9/19/2023    Procedure: LEFT INDEX FINGER TRIGGER RELEASE;  Surgeon: Td Soliman M.D.;  Location: [facility];  Service: Orthopedics    PB INCISE FINGER TENDON SHEATH " Left 8/30/2022    Procedure: LEFT MIDDLE FINGER TRIGGER RELEASE;  Surgeon: Td Soliman M.D.;  Location: [facility];  Service: Orthopedics    RI CYSTOURETHROSCOPY,BIOPSIES N/A 8/17/2022    Procedure: CYSTOSCOPY;  Surgeon: Jerzy Jasso M.D.;  Location: [facility];  Service: Urology    RI CYSTOURETHROSCOPY,BIOPSIES  7/6/2021    Procedure: BIOPSY, BLADDER WITH FULGERATION;  Surgeon: Bob Millan M.D.;  Location: [facility];  Service: Urology    BLADDER BIOPSY WITH CYSTOSCOPY  1/3/2018    Procedure: BLADDER BIOPSY WITH CYSTOSCOPY/WITH UPPER TRACT WASHING;  Surgeon: El Manuel M.D.;  Location: [facility];  Service: Urology    RETROGRADES Bilateral 1/3/2018    Procedure: RETROGRADES;  Surgeon: El Manuel M.D.;  Location: [facility];  Service: Urology    PYELOGRAM Bilateral 1/3/2018    Procedure: PYELOGRAM;  Surgeon: El Manuel M.D.;  Location: [facility];  Service: Urology    OTHER ORTHOPEDIC SURGERY  2015    shoulder replacement    ABDOMINAL EXPLORATION      ARTHROPLASTY      right shoulder    BLADDER BIOPSY WITH CYSTOSCOPY      EYE SURGERY      lasik     GASTRIC BYPASS LAPAROSCOPIC      HERNIA REPAIR      OPEN REDUCTION      OTHER Left     thumb joint    THORACOTOMY      TURP-VAPOR       Family History   Problem Relation Age of Onset    Cancer Mother 80        lung cancer    Arthritis Mother     Diabetes Mother     Anxiety disorder Mother     Lung Disease Father 65    Cancer Father     Diabetes Father     Hypertension Father     Hyperlipidemia Father     Hyperlipidemia Brother     Arthritis Brother     Other Brother         Thyroid tumor    Sleep Apnea Brother     No Known Problems Maternal Grandmother     Cancer Maternal Grandfather     No Known Problems Paternal Grandmother     No Known Problems Paternal Grandfather     Heart Disease Brother 49        smoker, overweight    Hypertension Brother     Depression Other      "Suicide Attempts Neg Hx     Bipolar disorder Neg Hx     Alcohol abuse Neg Hx     Drug abuse Neg Hx      Social History     Socioeconomic History    Marital status: Single     Spouse name: Not on file    Number of children: Not on file    Years of education: Not on file    Highest education level: Master's degree (e.g., MA, MS, Rhian[place], MEd, MSW, LUIS)   Occupational History    Not on file   Tobacco Use    Smoking status: Never    Smokeless tobacco: Never   Vaping Use    Vaping Use: Never used   Substance and Sexual Activity    Alcohol use: Not Currently     Alcohol/week: 8.4 oz     Types: 14 Glasses of wine per week     Comment: wine / daily    Drug use: Not Currently     Types: Marijuana, Oral     Comment: THC edible - \"once a month\".    Sexual activity: Not Currently   Other Topics Concern    Not on file   Social History Narrative    Not on file     Social Determinants of Health     Financial Resource Strain: Low Risk  (8/20/2022)    Overall Financial Resource Strain (CARDIA)     Difficulty of Paying Living Expenses: Not hard at all   Food Insecurity: No Food Insecurity (8/20/2022)    Hunger Vital Sign     Worried About Running Out of Food in the Last Year: Never true     Ran Out of Food in the Last Year: Never true   Transportation Needs: No Transportation Needs (8/20/2022)    PRAPARE - Transportation     Lack of Transportation (Medical): No     Lack of Transportation (Non-Medical): No   Physical Activity: Insufficiently Active (8/20/2022)    Exercise Vital Sign     Days of Exercise per Week: 7 days     Minutes of Exercise per Session: 20 min   Stress: Stress Concern Present (8/20/2022)    Citizen of [place] of Occupational Health - Occupational Stress Questionnaire     Feeling of Stress : To some extent   Social Connections: Moderately Isolated (8/20/2022)    Social Connection and Isolation Panel [NHANES]     Frequency of Communication with Friends and Family: Three times a week     Frequency of Social Gatherings " with Friends and Family: Twice a week     Attends Sabianist Services: Never     Active Member of Clubs or Organizations: No     Attends Club or Organization Meetings: Patient refused     Marital Status: Living with partner   Intimate Partner Violence: Not on file   Housing Stability: Low Risk  (8/20/2022)    Housing Stability Vital Sign     Unable to Pay for Housing in the Last Year: No     Number of Places Lived in the Last Year: 1     Unstable Housing in the Last Year: No     No Known Allergies  Outpatient Encounter Medications as of 11/28/2023   Medication Sig Dispense Refill    lisinopril (PRINIVIL) 10 MG Tab TAKE 1 TABLET BY MOUTH EVERY DAY. (STOP 20 MG DOSE) 90 Tablet 0    atorvastatin (LIPITOR) 80 MG tablet Take 1 Tablet by mouth every day. 100 Tablet 3    buPROPion (WELLBUTRIN SR) 200 MG SR tablet Take 2 Tablets by mouth every day for 90 days. Indications: Major Depressive Disorder 180 Tablet 0    FLUoxetine (PROZAC) 20 MG Cap Take 1 Capsule by mouth every day for 90 days. Indications: Depression, Generalized Anxiety Disorder 90 Capsule 0    fluoxetine (PROZAC) 40 MG capsule Take 1 Capsule by mouth every day for 90 days. Indications: Depression, Generalized Anxiety Disorder 90 Capsule 0    hydrOXYzine HCl (ATARAX) 50 MG Tab Take 1 Tablet by mouth 3 times a day as needed for Itching for up to 30 days. Indications: Feeling Anxious 90 Tablet 1    Suvorexant (BELSOMRA) 20 MG Tab Take 1 Tablet by mouth at bedtime as needed (for insomnia) for up to 90 days. Take 1 tablet by mouth at bedtime as needed for insomnia. 90 Tablet 0    Clobetasol Propionate 0.05 % Shampoo WASH SCALP ONCE A DAY, USE UP TO 10 DAYS CONSECUTIVELY, THEN TAKE BREAK AND USE ONLY AS NEEDED      celecoxib (CELEBREX) 200 MG Cap TAKE 1 CAPSULE DAILY 90 Capsule 0    HUMIRA PEN 40 MG/0.4ML Pen-injector Kit INJECT 1 PEN UNDER THE SKIN EVERY 14 DAYS. 3 Each 0    omeprazole (PRILOSEC) 20 MG delayed-release capsule Take 1 Capsule by mouth every evening.  "100 Capsule 2    Magnesium 100 MG Cap Take  by mouth.      ferrous sulfate 325 (65 Fe) MG tablet Take 325 mg by mouth every day.      B Complex Vitamins (VITAMIN B COMPLEX PO) Take 1 Tablet by mouth every day.      Cholecalciferol (VITAMIN D3 PO) Take 1 Tablet by mouth every 7 days. OTC      aspirin EC (ECOTRIN) 81 MG Tablet Delayed Response Take 81 mg by mouth every bedtime. 30 Tab 0    Multiple Vitamins-Minerals (MELODY-DAY 1000 PO) Take 1 Tab by mouth 2 Times a Day.      [DISCONTINUED] atorvastatin (LIPITOR) 40 MG Tab Take 2 Tablets by mouth every day. 90 Tablet 3     No facility-administered encounter medications on file as of 11/28/2023.     Review of Systems   Constitutional: Negative.    HENT: Negative.     Eyes: Negative.    Respiratory:  Negative for shortness of breath.    Cardiovascular:  Negative for chest pain, palpitations, orthopnea, leg swelling and PND.   Gastrointestinal: Negative.    Genitourinary: Negative.    Musculoskeletal: Negative.    Skin: Negative.    Neurological: Negative.         Gait/balance issue as described in HPI   Endo/Heme/Allergies: Negative.    Psychiatric/Behavioral:  Negative for depression. The patient is not nervous/anxious.               Objective     /78 (BP Location: Left arm, Patient Position: Sitting, BP Cuff Size: Adult)   Pulse 68   Resp 14   Ht 1.702 m (5' 7\")   Wt 83 kg (183 lb)   SpO2 97%   BMI 28.66 kg/m²     Physical Exam  Constitutional:       Appearance: Normal appearance.   HENT:      Head: Normocephalic.   Neck:      Vascular: No JVD.   Cardiovascular:      Rate and Rhythm: Normal rate and regular rhythm.      Pulses: Normal pulses.      Heart sounds: Murmur heard.      Systolic murmur is present with a grade of 4/6.      No friction rub.   Pulmonary:      Effort: Pulmonary effort is normal.      Breath sounds: Normal breath sounds.   Abdominal:      Palpations: Abdomen is soft.   Musculoskeletal:         General: Normal range of motion.      Right " lower leg: No edema.      Left lower leg: No edema.   Skin:     General: Skin is warm and dry.   Neurological:      General: No focal deficit present.      Mental Status: He is alert and oriented to person, place, and time.   Psychiatric:         Mood and Affect: Mood normal.         Behavior: Behavior normal.            Lab Results   Component Value Date/Time    CHOLSTRLTOT 178 09/08/2023 08:33 AM    LDL 85 09/08/2023 08:33 AM    HDL 77 09/08/2023 08:33 AM    TRIGLYCERIDE 81 09/08/2023 08:33 AM       Lab Results   Component Value Date/Time    SODIUM 139 07/19/2023 12:12 PM    POTASSIUM 4.3 07/19/2023 12:12 PM    CHLORIDE 103 07/19/2023 12:12 PM    CO2 25 07/19/2023 12:12 PM    GLUCOSE 95 07/19/2023 12:12 PM    BUN 17 07/19/2023 12:12 PM    CREATININE 1.16 07/19/2023 12:12 PM     Lab Results   Component Value Date/Time    ALKPHOSPHAT 81 07/19/2023 12:12 PM    ASTSGOT 31 07/19/2023 12:12 PM    ALTSGPT 32 07/19/2023 12:12 PM    TBILIRUBIN 0.5 07/19/2023 12:12 PM      Echocardiogram 8/25/2022  CONCLUSIONS  Normal left ventricular size, thickness, and systolic function.  Mild to moderate mitral regurgitation.  Mild mitral annular calcification Prolapse of the posterior mitral   valve leaflet.    Coronary calcium score 9/5/2023  FINDINGS:     Coronary calcification:  LMA - 108.9  LCX - 225.7  LAD - 754.9  RCA - 1379.6     Total Calcium Score: 2469.1     Percentile: Calcium score is above the 90th percentile for the patient's age and sex.    Echocardiogram 10/6/2023  CONCLUSIONS  Normal left ventricular systolic function. The ejection fraction is   measured to be  58% by Snowden's biplane.   The right ventricle is normal in size and systolic function.  Moderate mitral regurgitation. Anteriorly directed mitral regurgitation   jet.  Mild tricuspid regurgitation. Estimated right ventricular systolic   pressure is 25 mmHg (normal).   Compared to the prior study on 8/25/22, mitral regurgitation is now   moderate (previously  mild to moderate).    Assessment & Plan     1. Dyslipidemia  atorvastatin (LIPITOR) 80 MG tablet      2. Nonrheumatic mitral valve regurgitation        3. Essential hypertension        4. Familial hyperlipidemia        5. Family history of coronary artery disease in brother            Medical Decision Making: Today's Assessment/Status/Plan:        Mitral regurgitation  - symptoms none  -repeat echo showed moderate mitral regurgitation (previously mild to moderate)  -Repeat echocardiogram annually, to be ordered at next visit  -Discussed with the patient along with warning signs of severe MR including but not limited to chest pain, dizziness, dyspnea, presyncope or syncopal events, excessive fatigue and unable to do activities with the same energy level.    Hypertension  -Borderline control  - lower at home  - continue lisinopril 10 mg daily  - goal < 130/80  - check BP daily 2 hours after taking medication and keep log of measurements   -check in in 2 weeks with home blood pressure readings     Hyperlipidemia  -Most recent LDL 85  -Continue atorvastatin, increase dose to 80 mg daily  -Check lipid panel in 3 months  -Goal of less than 70, ideally less than 55     Follow up with YOGI Zhao in 6 months     This note was dictated using Dragon speech recognition software.

## 2023-11-29 RX ORDER — HYDROXYZINE 50 MG/1
50 TABLET, FILM COATED ORAL 3 TIMES DAILY PRN
Qty: 270 TABLET | Refills: 1 | Status: SHIPPED | OUTPATIENT
Start: 2023-11-29 | End: 2024-01-26

## 2023-11-30 ASSESSMENT — PATIENT HEALTH QUESTIONNAIRE - PHQ9
2. FEELING DOWN, DEPRESSED, IRRITABLE, OR HOPELESS: NOT AT ALL
SUM OF ALL RESPONSES TO PHQ QUESTIONS 1-9: 0
8. MOVING OR SPEAKING SO SLOWLY THAT OTHER PEOPLE COULD HAVE NOTICED. OR THE OPPOSITE, BEING SO FIGETY OR RESTLESS THAT YOU HAVE BEEN MOVING AROUND A LOT MORE THAN USUAL: NOT AT ALL
9. THOUGHTS THAT YOU WOULD BE BETTER OFF DEAD, OR OF HURTING YOURSELF: NOT AT ALL
SUM OF ALL RESPONSES TO PHQ9 QUESTIONS 1 AND 2: 0
5. POOR APPETITE OR OVEREATING: NOT AT ALL
6. FEELING BAD ABOUT YOURSELF - OR THAT YOU ARE A FAILURE OR HAVE LET YOURSELF OR YOUR FAMILY DOWN: NOT AL ALL
3. TROUBLE FALLING OR STAYING ASLEEP OR SLEEPING TOO MUCH: NOT AT ALL
4. FEELING TIRED OR HAVING LITTLE ENERGY: NOT AT ALL
1. LITTLE INTEREST OR PLEASURE IN DOING THINGS: NOT AT ALL
7. TROUBLE CONCENTRATING ON THINGS, SUCH AS READING THE NEWSPAPER OR WATCHING TELEVISION: NOT AT ALL

## 2023-12-01 NOTE — PROGRESS NOTES
Evaluation completed by: Luigi King M.D.   Date of Service: 11/27/2023   Appointment type: in-office appointment.    Information below was collected from: patient      CHIEF COMPLIANT:  Medication Management    HPI:   Buster Mdeina is a 73 y.o. old male who presents today for follow up appointment to address Medication Management  He has a history of anxiety, depression, and insomnia.  He is currently taking Wellbutrin  mg po daily and Prozac 60 mg po daily, which was increased from 40 mg at last appointment.  He also takes Belsomra for sleep.  He had previously taken Ativan and Ambien for sleep but no longer taking either.  He reports that he has been doing significantly better since our last appointment.  He has found that his increased dose of Prozac and the Wellbutrin are a good combination for his needs..  He is aware that 400 mg is a higher than recommended dose of Wellbutrin SR.  He has been sleeping better since the night after the last appointment and feels rested during the day.  He is no longer feeling sad, depressed, or anxious.  He still has negative feelings about his job loss but is not fixated.  He is doing more household chores and completes his daily routine with out difficulty.  He denies ongoing anxiety.  He denies hyperactivity, elevated mood causing problems, or risky behaviors.  He is still interested in trying therapy to resolve negative feelings about job loss.    PHQ9 and GAD7 are both 0 today.    CURRENT MEDICATIONS    Current Outpatient Medications:     buPROPion (WELLBUTRIN SR) 200 MG SR tablet, Take 2 Tablets by mouth every day for 90 days. Indications: Major Depressive Disorder, Disp: 180 Tablet, Rfl: 0    FLUoxetine (PROZAC) 20 MG Cap, Take 1 Capsule by mouth every day for 90 days. Indications: Depression, Generalized Anxiety Disorder, Disp: 90 Capsule, Rfl: 0    fluoxetine (PROZAC) 40 MG capsule, Take 1 Capsule by mouth every day for 90 days. Indications: Depression,  Generalized Anxiety Disorder, Disp: 90 Capsule, Rfl: 0    Suvorexant (BELSOMRA) 20 MG Tab, Take 1 Tablet by mouth at bedtime as needed (for insomnia) for up to 90 days. Take 1 tablet by mouth at bedtime as needed for insomnia., Disp: 90 Tablet, Rfl: 0    hydrOXYzine HCl (ATARAX) 50 MG Tab, Take 1 Tablet by mouth 3 times a day as needed for Anxiety for up to 90 days., Disp: 270 Tablet, Rfl: 1    lisinopril (PRINIVIL) 10 MG Tab, TAKE 1 TABLET BY MOUTH EVERY DAY. (STOP 20 MG DOSE), Disp: 90 Tablet, Rfl: 0    atorvastatin (LIPITOR) 80 MG tablet, Take 1 Tablet by mouth every day., Disp: 100 Tablet, Rfl: 3    Clobetasol Propionate 0.05 % Shampoo, WASH SCALP ONCE A DAY, USE UP TO 10 DAYS CONSECUTIVELY, THEN TAKE BREAK AND USE ONLY AS NEEDED, Disp: , Rfl:     celecoxib (CELEBREX) 200 MG Cap, TAKE 1 CAPSULE DAILY, Disp: 90 Capsule, Rfl: 0    HUMIRA PEN 40 MG/0.4ML Pen-injector Kit, INJECT 1 PEN UNDER THE SKIN EVERY 14 DAYS., Disp: 3 Each, Rfl: 0    omeprazole (PRILOSEC) 20 MG delayed-release capsule, Take 1 Capsule by mouth every evening., Disp: 100 Capsule, Rfl: 2    Magnesium 100 MG Cap, Take  by mouth., Disp: , Rfl:     ferrous sulfate 325 (65 Fe) MG tablet, Take 325 mg by mouth every day., Disp: , Rfl:     B Complex Vitamins (VITAMIN B COMPLEX PO), Take 1 Tablet by mouth every day., Disp: , Rfl:     Cholecalciferol (VITAMIN D3 PO), Take 1 Tablet by mouth every 7 days. OTC, Disp: , Rfl:     aspirin EC (ECOTRIN) 81 MG Tablet Delayed Response, Take 81 mg by mouth every bedtime., Disp: 30 Tab, Rfl: 0    Multiple Vitamins-Minerals (MELODY-DAY 1000 PO), Take 1 Tab by mouth 2 Times a Day., Disp: , Rfl:     Psychiatric Review of Systems  ROS is negative    MEDICAL REVIEW OF SYSTEMS   ROS negative      MEDICAL HISTORY  Past Medical History:   Diagnosis Date    Anxiety     Arthritis     osteo    Bowel habit changes 06/24/2021    Constipation    Cancer (HCC) 2007    bladder    Chickenpox     as a child    Depression     depression     High cholesterol     Hyperlipidemia     Hypertension     pt states  well controlled on meds    Kidney stone     Mumps     as a child     Obesity     Pneumothorax     Psoriatic arthritis (HCC)     Recurrent major depressive disorder, in partial remission (HCC) 09/21/2017    Restless leg syndrome     Sleep apnea     uses cpap    Tonsillitis        SURGICAL HISTORY  Past Surgical History:   Procedure Laterality Date    PB INCISE FINGER TENDON SHEATH Left 9/19/2023    Procedure: LEFT INDEX FINGER TRIGGER RELEASE;  Surgeon: Td Soliman M.D.;  Location: [facility];  Service: Orthopedics    PB INCISE FINGER TENDON SHEATH Left 8/30/2022    Procedure: LEFT MIDDLE FINGER TRIGGER RELEASE;  Surgeon: Td Soliman M.D.;  Location: [facility];  Service: Orthopedics    AR CYSTOURETHROSCOPY,BIOPSIES N/A 8/17/2022    Procedure: CYSTOSCOPY;  Surgeon: Jerzy Jasso M.D.;  Location: [facility];  Service: Urology    AR CYSTOURETHROSCOPY,BIOPSIES  7/6/2021    Procedure: BIOPSY, BLADDER WITH FULGERATION;  Surgeon: Bob Millan M.D.;  Location: [facility];  Service: Urology    BLADDER BIOPSY WITH CYSTOSCOPY  1/3/2018    Procedure: BLADDER BIOPSY WITH CYSTOSCOPY/WITH UPPER TRACT WASHING;  Surgeon: El Manuel M.D.;  Location: [facility];  Service: Urology    RETROGRADES Bilateral 1/3/2018    Procedure: RETROGRADES;  Surgeon: El Manuel M.D.;  Location: [facility];  Service: Urology    PYELOGRAM Bilateral 1/3/2018    Procedure: PYELOGRAM;  Surgeon: El Manuel M.D.;  Location: [facility];  Service: Urology    OTHER ORTHOPEDIC SURGERY  2015    shoulder replacement    ABDOMINAL EXPLORATION      ARTHROPLASTY      right shoulder    BLADDER BIOPSY WITH CYSTOSCOPY      EYE SURGERY      lasik     GASTRIC BYPASS LAPAROSCOPIC      HERNIA REPAIR      OPEN REDUCTION      OTHER Left     thumb joint    THORACOTOMY      TURP-VAPOR       "    FAMILY PSYCHIATRIC HISTORY  Family History   Problem Relation Age of Onset    Cancer Mother 80        lung cancer    Arthritis Mother     Diabetes Mother     Anxiety disorder Mother     Lung Disease Father 65    Cancer Father     Diabetes Father     Hypertension Father     Hyperlipidemia Father     Hyperlipidemia Brother     Arthritis Brother     Other Brother         Thyroid tumor    Sleep Apnea Brother     No Known Problems Maternal Grandmother     Cancer Maternal Grandfather     No Known Problems Paternal Grandmother     No Known Problems Paternal Grandfather     Heart Disease Brother 49        smoker, overweight    Hypertension Brother     Depression Other     Suicide Attempts Neg Hx     Bipolar disorder Neg Hx     Alcohol abuse Neg Hx     Drug abuse Neg Hx        SOCIAL HISTORY  Reviewed with patient and no changes.     SUBSTANCE USE HISTORY  Alcohol: daily use - wine with dinner (several hours before sleep/Belsomra)  Tobacco: Patient denies the use of tobacco products  Cannabis: intermittent use  Opioids: denies  Other prescription medications: denies  Other: denies    PSYCHIATRIC EXAMINATION   Vitals: There were no vitals taken for this visit.  Musculoskeletal: No abnormal movements noted  Abnormal Movements: none  Gait:within normal limits      General:   - Grooming and hygiene: Casual  - Apparent distress: NAD   - Behavior: Calm  - Eye Contact:  Good  - no psychomotor agitation or retardation   - Participation: Active verbal participation, Attentive, Engaged, and Open to feedback  Orientation:Alert and Fully Oriented to person, place and time  Mood: \"good\"  Affect: Flexible, Full range, Congruent with content, Congruent to stated mood, Bright, and Happy  Thought Process: Linear, Logical, and Goal-directed  Thought Content: Within normal limits  Perception: Denies auditory or visual hallucinations. No delusions noted Within normal limits  Attention span and concentration: Intact   Speech:Clear with " normal rate and rhythm  Language: Appropriate spontaneous, comprehends spoken commands, and fluent  Insight: Good  Judgment:  Good  Recent and remote memory: No gross evidence of memory deficits    LABS:  Hospital Outpatient Visit on 10/06/2023   Component Date Value    Eject.Frac. MOD BP 10/06/2023 58.29     Eject.Frac. MOD 4C 10/06/2023 59.93     Eject.Frac. MOD 2C 10/06/2023 55.47    Hospital Outpatient Visit on 09/08/2023   Component Date Value    Cholesterol,Tot 09/08/2023 178     Triglycerides 09/08/2023 81     HDL 09/08/2023 77     LDL 09/08/2023 85     Fasting Status 09/08/2023 Unknown    Hospital Outpatient Visit on 07/19/2023   Component Date Value    WBC 07/19/2023 5.7     RBC 07/19/2023 4.88     Hemoglobin 07/19/2023 15.5     Hematocrit 07/19/2023 47.4     MCV 07/19/2023 97.1     MCH 07/19/2023 31.8     MCHC 07/19/2023 32.7     RDW 07/19/2023 43.8     Platelet Count 07/19/2023 106 (L)     MPV 07/19/2023 11.1     Neutrophils-Polys 07/19/2023 50.50     Lymphocytes 07/19/2023 35.00     Monocytes 07/19/2023 10.60     Eosinophils 07/19/2023 2.80     Basophils 07/19/2023 0.70     Immature Granulocytes 07/19/2023 0.40     Nucleated RBC 07/19/2023 0.00     Neutrophils (Absolute) 07/19/2023 2.86     Lymphs (Absolute) 07/19/2023 1.98     Monos (Absolute) 07/19/2023 0.60     Eos (Absolute) 07/19/2023 0.16     Baso (Absolute) 07/19/2023 0.04     Immature Granulocytes (a* 07/19/2023 0.02     NRBC (Absolute) 07/19/2023 0.00     Sodium 07/19/2023 139     Potassium 07/19/2023 4.3     Chloride 07/19/2023 103     Co2 07/19/2023 25     Anion Gap 07/19/2023 11.0     Glucose 07/19/2023 95     Bun 07/19/2023 17     Creatinine 07/19/2023 1.16     Calcium 07/19/2023 10.1     AST(SGOT) 07/19/2023 31     ALT(SGPT) 07/19/2023 32     Alkaline Phosphatase 07/19/2023 81     Total Bilirubin 07/19/2023 0.5     Albumin 07/19/2023 4.4     Total Protein 07/19/2023 7.3     Globulin 07/19/2023 2.9     A-G Ratio 07/19/2023 1.5     Sed Rate  Westergren 07/19/2023 5     Hepatitis B Cors Ab,IgM 07/19/2023 Non-Reactive     Hepatitis B Surface Anti* 07/19/2023 Non-Reactive     Hepatitis C Antibody 07/19/2023 Non-Reactive     QFT NIL 07/19/2023 0.29     QFT TB1 - NIL 07/19/2023 -0.06     QFT TB2 - NIL 07/19/2023 -0.08     QFT Mitogen - NIL 07/19/2023 >10.00     QFT Gold Plus 07/19/2023 Negative     Uric Acid 07/19/2023 7.6     Correct Calcium 07/19/2023 9.8     GFR (CKD-EPI) 07/19/2023 67    Hospital Outpatient Visit on 08/25/2022   Component Date Value    Eject.Frac. MOD BP 08/25/2022 67.11     Eject.Frac. MOD 4C 08/25/2022 56.9     Eject.Frac. MOD 2C 08/25/2022 71.86     Left Ventrical Ejection * 08/25/2022 65    Pre-Admission Testing on 08/15/2022   Component Date Value    Sodium 08/15/2022 139     Potassium 08/15/2022 4.3     Chloride 08/15/2022 104     Co2 08/15/2022 25     Glucose 08/15/2022 107 (H)     Bun 08/15/2022 17     Creatinine 08/15/2022 1.11     Calcium 08/15/2022 9.6     Anion Gap 08/15/2022 10.0     WBC 08/15/2022 5.4     RBC 08/15/2022 4.35 (L)     Hemoglobin 08/15/2022 13.8 (L)     Hematocrit 08/15/2022 42.6     MCV 08/15/2022 97.9 (H)     MCH 08/15/2022 31.7     MCHC 08/15/2022 32.4 (L)     RDW 08/15/2022 47.4     Platelet Count 08/15/2022 139 (L)     MPV 08/15/2022 12.4     Neutrophils-Polys 08/15/2022 46.20     Lymphocytes 08/15/2022 37.20     Monocytes 08/15/2022 12.50     Eosinophils 08/15/2022 2.80     Basophils 08/15/2022 0.90     Immature Granulocytes 08/15/2022 0.40     Nucleated RBC 08/15/2022 0.00     Neutrophils (Absolute) 08/15/2022 2.48     Lymphs (Absolute) 08/15/2022 2.00     Monos (Absolute) 08/15/2022 0.67     Eos (Absolute) 08/15/2022 0.15     Baso (Absolute) 08/15/2022 0.05     Immature Granulocytes (a* 08/15/2022 0.02     NRBC (Absolute) 08/15/2022 0.00     PT 08/15/2022 13.1     INR 08/15/2022 1.00     APTT 08/15/2022 24.7     Color 08/15/2022 DK Yellow     Character 08/15/2022 Clear     Specific Venus 08/15/2022  1.022     Ph 08/15/2022 6.5     Glucose 08/15/2022 Negative     Ketones 08/15/2022 Negative     Protein 08/15/2022 Negative     Bilirubin 08/15/2022 Negative     Urobilinogen, Urine 08/15/2022 0.2     Nitrite 08/15/2022 Negative     Leukocyte Esterase 08/15/2022 Negative     Occult Blood 08/15/2022 Negative     Micro Urine Req 08/15/2022 see below     Significant Indicator 08/15/2022 NEG     Source 08/15/2022 UR     Site 08/15/2022 -     Culture Result 08/15/2022 No growth at 48 hours.     Report 08/15/2022                      Value:[facility]    Test Date:  2022-08-15  Pt Name:    ALBINO GARRISON                Department: [facility]  MRN:        [id number]                      Room:  Gender:     Male                         Technician: JOHNATHAN  :        1950                   Requested By:ERIN LOUISE  Order #:    [id number]                    Reading MD: Ariel Daniels MD    Measurements  Intervals                                Axis  Rate:       61                           P:          22  NJ:         152                          QRS:        57  QRSD:       90                           T:          43  QT:         436  QTc:        440    Interpretive Statements  SINUS BRADYCARDIA  VENTRICULAR PREMATURE COMPLEX  POSSIBLE PRIOR LATERAL INFARCT  Electronically Signed On 8- 11:24:18 PDT by Ariel Daniels MD      GFR (CKD-EPI) 08/15/2022 71    Hospital Outpatient Visit on 2022   Component Date Value    Sodium 2022 141     Potassium 2022 4.7     Chloride 2022 104     Co2 2022 27     Anion Gap 2022 10.0     Glucose 2022 105 (H)     Bun 2022 19     Creatinine 2022 1.31     Calcium 2022 10.0     AST(SGOT) 2022 31     ALT(SGPT) 2022 37     Alkaline Phosphatase 2022 78     Total Bilirubin 2022 0.3     Albumin 2022 4.5     Total Protein 2022 6.9     Globulin 2022 2.4     A-G Ratio 2022 1.9     WBC  04/27/2022 6.5     RBC 04/27/2022 4.65 (L)     Hemoglobin 04/27/2022 14.1     Hematocrit 04/27/2022 45.1     MCV 04/27/2022 97.0     MCH 04/27/2022 30.3     MCHC 04/27/2022 31.3 (L)     RDW 04/27/2022 50.0     Platelet Count 04/27/2022 178     MPV 04/27/2022 12.7     Neutrophils-Polys 04/27/2022 45.00     Lymphocytes 04/27/2022 37.80     Monocytes 04/27/2022 11.10     Eosinophils 04/27/2022 4.60     Basophils 04/27/2022 1.20     Immature Granulocytes 04/27/2022 0.30     Nucleated RBC 04/27/2022 0.00     Neutrophils (Absolute) 04/27/2022 2.92     Lymphs (Absolute) 04/27/2022 2.46     Monos (Absolute) 04/27/2022 0.72     Eos (Absolute) 04/27/2022 0.30     Baso (Absolute) 04/27/2022 0.08     Immature Granulocytes (a* 04/27/2022 0.02     NRBC (Absolute) 04/27/2022 0.00     Pre-Albumin 04/27/2022 34.1     Folate -Folic Acid 04/27/2022 32.6     Vitamin B12 -True Cobala* 04/27/2022 1230 (H)     Ferritin 04/27/2022 48.6     Cholesterol,Tot 04/27/2022 205 (H)     Triglycerides 04/27/2022 97     HDL 04/27/2022 85     LDL 04/27/2022 101 (H)     Fasting Status 04/27/2022 Fasting     Vitamin B1 04/27/2022 270 (H)     Vitamin B6 04/27/2022 132.0 (H)     Vitamin B2 (Riboflavin) 04/27/2022 17     Zinc Serum 04/27/2022 78.4     GFR (CKD-EPI) 04/27/2022 58 (A)    Hospital Outpatient Visit on 04/18/2022   Component Date Value    Significant Indicator 04/18/2022 NEG     Source 04/18/2022 UR     Site 04/18/2022 -     Culture Result 04/18/2022 Usual skin emi <10,000 cfu/mL    Hospital Outpatient Visit on 03/21/2022   Component Date Value    Stat C-Reactive Protein 03/21/2022 <0.30     Sed Rate Westergren 03/21/2022 12     WBC 03/21/2022 6.4     RBC 03/21/2022 4.43 (L)     Hemoglobin 03/21/2022 13.4 (L)     Hematocrit 03/21/2022 42.4     MCV 03/21/2022 95.7     MCH 03/21/2022 30.2     MCHC 03/21/2022 31.6 (L)     RDW 03/21/2022 43.4     Platelet Count 03/21/2022 161 (L)     MPV 03/21/2022 12.1     Neutrophils-Polys 03/21/2022 55.70      Lymphocytes 03/21/2022 30.90     Monocytes 03/21/2022 9.90     Eosinophils 03/21/2022 2.40     Basophils 03/21/2022 0.90     Immature Granulocytes 03/21/2022 0.20     Nucleated RBC 03/21/2022 0.00     Neutrophils (Absolute) 03/21/2022 3.55     Lymphs (Absolute) 03/21/2022 1.97     Monos (Absolute) 03/21/2022 0.63     Eos (Absolute) 03/21/2022 0.15     Baso (Absolute) 03/21/2022 0.06     Immature Granulocytes (a* 03/21/2022 0.01     NRBC (Absolute) 03/21/2022 0.00    Hospital Outpatient Visit on 12/16/2021   Component Date Value    QFT NIL 12/16/2021 0.07     QFT TB1 - NIL 12/16/2021 -0.01     QFT TB2 - NIL 12/16/2021 -0.01     QFT Mitogen - NIL 12/16/2021 >10.00     QFT Gold Plus 12/16/2021 Negative     Hepatitis C Antibody 12/16/2021 Non-Reactive     Hepatitis B Surface Anti* 12/16/2021 Non-Reactive           SAFETY ASSESSMENT - RISK TO SELF  Current suicide attempts or self harm: No  Past suicide attempts or self harm: No     SAFETY ASSESSMENT - RISK TO OTHERS  Current aggressive behavior or risk to others: No  Past aggressive behavior or risk to others: No       CURRENT RISK ASSESSMENT       Suicide: Low       Homicide: Low       Self-Harm: Low       Relapse: n/a       Crisis Safety Plan Reviewed Not Indicated    [place]  records   reviewed.  No concerns about misuse of controlled substance.    ASSESSMENT  Buster Medina is a 73 y.o. old male who presents today for follow up appointment to address Medication Management  At last appointment, Prozac was increased from 40mg to 60mg daily.  He also takes Wellbutrin  and has been on this medication for extended period without concerns or adverse effects.  His depression and anxiety have both completely resolved.  He is not having trouble sleeping and takes Belsomra.  He is interested in coming off of medications but we discussed giving more time to make changes in life and potentially try therapy to help maintain good mood.  He is open to starting therapy  at the residents' clinic at [facility].    DIAGNOSES/PLAN  1. Recurrent major depressive disorder, in partial remission (HCC)    2. Chronic insomnia    3. Complex sleep apnea syndrome    4. Anxiety, generalized    5. Moderate episode of recurrent major depressive disorder (HCC)  - buPROPion (WELLBUTRIN SR) 200 MG SR tablet; Take 2 Tablets by mouth every day for 90 days. Indications: Major Depressive Disorder  Dispense: 180 Tablet; Refill: 0  - FLUoxetine (PROZAC) 20 MG Cap; Take 1 Capsule by mouth every day for 90 days. Indications: Depression, Generalized Anxiety Disorder  Dispense: 90 Capsule; Refill: 0  - fluoxetine (PROZAC) 40 MG capsule; Take 1 Capsule by mouth every day for 90 days. Indications: Depression, Generalized Anxiety Disorder  Dispense: 90 Capsule; Refill: 0    6. Insomnia, unspecified type  - buPROPion (WELLBUTRIN SR) 200 MG SR tablet; Take 2 Tablets by mouth every day for 90 days. Indications: Major Depressive Disorder  Dispense: 180 Tablet; Refill: 0  - FLUoxetine (PROZAC) 20 MG Cap; Take 1 Capsule by mouth every day for 90 days. Indications: Depression, Generalized Anxiety Disorder  Dispense: 90 Capsule; Refill: 0  - fluoxetine (PROZAC) 40 MG capsule; Take 1 Capsule by mouth every day for 90 days. Indications: Depression, Generalized Anxiety Disorder  Dispense: 90 Capsule; Refill: 0    7. Psychosocial stressors  - buPROPion (WELLBUTRIN SR) 200 MG SR tablet; Take 2 Tablets by mouth every day for 90 days. Indications: Major Depressive Disorder  Dispense: 180 Tablet; Refill: 0  - FLUoxetine (PROZAC) 20 MG Cap; Take 1 Capsule by mouth every day for 90 days. Indications: Depression, Generalized Anxiety Disorder  Dispense: 90 Capsule; Refill: 0  - fluoxetine (PROZAC) 40 MG capsule; Take 1 Capsule by mouth every day for 90 days. Indications: Depression, Generalized Anxiety Disorder  Dispense: 90 Capsule; Refill: 0         Medication options, alternatives (including no medications) and medication  risks/benefits/side effects were discussed in detail.  The patient was advised to call, message clinician on [facility], or come in to the clinic if symptoms worsen or if questions/issues regarding their medications arise.  The patient verbalized understanding and agreement.    The patient was educated to call 911, call the suicide hotline, or go to the local ER if having thoughts of suicide or homicide.  The patient verbalized understanding and agreement.   The proposed treatment plan was discussed with the patient who was provided the opportunity to ask questions and make suggestions regarding alternative treatment. Patient verbalized understanding and expressed agreement with the plan.      Return to clinic in 12 weeks or sooner if symptoms worsen.    This appointment was not supervised by attending psychiatrist.    Luigi King M.D.

## 2023-12-04 ENCOUNTER — PATIENT MESSAGE (OUTPATIENT)
Dept: MEDICAL GROUP | Age: 73
End: 2023-12-04
Payer: MEDICARE

## 2023-12-05 RX ORDER — AMOXICILLIN 500 MG/1
500 CAPSULE ORAL 3 TIMES DAILY
Qty: 30 CAPSULE | Refills: 0 | OUTPATIENT
Start: 2023-12-05

## 2023-12-05 RX ORDER — MIRTAZAPINE 15 MG/1
15 TABLET, FILM COATED ORAL
Qty: 100 TABLET | Refills: 2 | Status: SHIPPED | OUTPATIENT
Start: 2023-12-05 | End: 2024-02-27 | Stop reason: SDUPTHER

## 2023-12-06 RX ORDER — OMEPRAZOLE 20 MG/1
20 CAPSULE, DELAYED RELEASE ORAL EVERY EVENING
Qty: 100 CAPSULE | Refills: 2 | Status: SHIPPED | OUTPATIENT
Start: 2023-12-06

## 2023-12-08 RX ORDER — AMOXICILLIN 500 MG/1
500 CAPSULE ORAL 3 TIMES DAILY
Qty: 21 CAPSULE | Refills: 0 | Status: SHIPPED | OUTPATIENT
Start: 2023-12-08 | End: 2023-12-15

## 2024-01-04 ENCOUNTER — TELEPHONE (OUTPATIENT)
Dept: RHEUMATOLOGY | Facility: MEDICAL CENTER | Age: 74
End: 2024-01-04

## 2024-01-04 NOTE — TELEPHONE ENCOUNTER
Please notify patient we have temporarily denied his Humira as patient needs to do labs that were ordered July 2023

## 2024-01-05 ENCOUNTER — APPOINTMENT (OUTPATIENT)
Dept: MEDICAL GROUP | Age: 74
End: 2024-01-05
Payer: MEDICARE

## 2024-01-09 ENCOUNTER — APPOINTMENT (OUTPATIENT)
Dept: MEDICAL GROUP | Age: 74
End: 2024-01-09
Payer: MEDICARE

## 2024-01-19 ENCOUNTER — PATIENT MESSAGE (OUTPATIENT)
Dept: SLEEP MEDICINE | Facility: MEDICAL CENTER | Age: 74
End: 2024-01-19
Payer: MEDICARE

## 2024-01-26 ENCOUNTER — OFFICE VISIT (OUTPATIENT)
Dept: MEDICAL GROUP | Age: 74
End: 2024-01-26
Payer: MEDICARE

## 2024-01-26 VITALS
WEIGHT: 183 LBS | OXYGEN SATURATION: 98 % | HEIGHT: 67 IN | HEART RATE: 71 BPM | BODY MASS INDEX: 28.72 KG/M2 | TEMPERATURE: 98.2 F | SYSTOLIC BLOOD PRESSURE: 144 MMHG | DIASTOLIC BLOOD PRESSURE: 84 MMHG

## 2024-01-26 DIAGNOSIS — F33.41 RECURRENT MAJOR DEPRESSIVE DISORDER, IN PARTIAL REMISSION (HCC): ICD-10-CM

## 2024-01-26 DIAGNOSIS — G25.0 ESSENTIAL TREMOR: ICD-10-CM

## 2024-01-26 DIAGNOSIS — M62.81 MUSCLE WEAKNESS: ICD-10-CM

## 2024-01-26 DIAGNOSIS — N40.0 BENIGN PROSTATIC HYPERPLASIA, UNSPECIFIED WHETHER LOWER URINARY TRACT SYMPTOMS PRESENT: ICD-10-CM

## 2024-01-26 DIAGNOSIS — Z12.11 SCREENING FOR COLON CANCER: ICD-10-CM

## 2024-01-26 DIAGNOSIS — E78.00 PURE HYPERCHOLESTEROLEMIA: ICD-10-CM

## 2024-01-26 DIAGNOSIS — L40.50 PSORIATIC ARTHRITIS (HCC): ICD-10-CM

## 2024-01-26 DIAGNOSIS — D69.6 THROMBOCYTOPENIA, UNSPECIFIED (HCC): ICD-10-CM

## 2024-01-26 DIAGNOSIS — C67.3 MALIGNANT NEOPLASM OF ANTERIOR WALL OF URINARY BLADDER (HCC): ICD-10-CM

## 2024-01-26 PROCEDURE — 3079F DIAST BP 80-89 MM HG: CPT | Performed by: FAMILY MEDICINE

## 2024-01-26 PROCEDURE — 3077F SYST BP >= 140 MM HG: CPT | Performed by: FAMILY MEDICINE

## 2024-01-26 PROCEDURE — 99214 OFFICE O/P EST MOD 30 MIN: CPT | Performed by: FAMILY MEDICINE

## 2024-01-26 ASSESSMENT — PATIENT HEALTH QUESTIONNAIRE - PHQ9: CLINICAL INTERPRETATION OF PHQ2 SCORE: 0

## 2024-01-26 ASSESSMENT — FIBROSIS 4 INDEX: FIB4 SCORE: 3.72

## 2024-01-26 NOTE — PROGRESS NOTES
This medical record contains text that has been entered with the assistance of computer voice recognition and dictation software.  Therefore, it may contain unintended errors in text, spelling, punctuation, or grammar      Chief Complaint   Patient presents with    Tremors     Hand tremors  been happening since 10 years ago. Happening more frequent.     Loss Of Balance     Right foot stumbles          Buster Medina is a 73 y.o. male here evaluation and management of: Tremors and loss of balance      HPI:     HCC Gap Form    Diagnosis to address: C67.3 - Malignant neoplasm of anterior wall of urinary bladder (HCC)  Assessment and plan: Chronic, stable. Continue with current defined treatment plan: . Follow-up at least annually.  Diagnosis: L40.50 - Psoriatic arthritis ([facility])  Assessment and plan: Chronic, stable. Continue with current defined treatment plan: . Follow-up at least annually.  Diagnosis: F33.41 - Recurrent major depressive disorder, in partial remission ([facility])  Assessment and plan: Chronic, stable. Continue with current defined treatment plan: . Follow-up at least annually.  Diagnosis: D69.6 - Thrombocytopenia, unspecified ([facility])  Assessment and plan: Chronic, stable. Continue with current defined treatment plan: . Follow-up at least annually.  Last edited 01/26/24 14:53 PST by Stu Blair M.D.           1. Essential tremor  2. Muscle weakness  NEW UNDIAGNOSED PROBLEM    Aaron is a very pleasant 73-year-old male who presents to clinic with a chief complaint of having his essential tremor which is worsening right greater than left.  He states that he is he happens when he is watching TV for a long time he states he does not have anything to do with fatigue or anxiety.  But is also noticed that after he walks a mile he will tend to fall forward he is not sure if his muscle weakness.  He is also noticed himself kind of off balance specifically walking forward he has not fallen has not hit his head.  He  denies any visual or auditory hallucinations.    3. Screening for colon cancer  Overdue for colon cancer screening      Current medicines (including changes today)  Current Outpatient Medications   Medication Sig Dispense Refill    NON SPECIFIED Ilumya injection every three months prescribed by dermatology      omeprazole (PRILOSEC) 20 MG delayed-release capsule Take 1 Capsule by mouth every evening. 100 Capsule 2    mirtazapine (REMERON) 15 MG Tab Take 1 Tablet by mouth at bedtime. 100 Tablet 2    lisinopril (PRINIVIL) 10 MG Tab TAKE 1 TABLET BY MOUTH EVERY DAY. (STOP 20 MG DOSE) 90 Tablet 0    atorvastatin (LIPITOR) 80 MG tablet Take 1 Tablet by mouth every day. 100 Tablet 3    buPROPion (WELLBUTRIN SR) 200 MG SR tablet Take 2 Tablets by mouth every day for 90 days. Indications: Major Depressive Disorder 180 Tablet 0    FLUoxetine (PROZAC) 20 MG Cap Take 1 Capsule by mouth every day for 90 days. Indications: Depression, Generalized Anxiety Disorder 90 Capsule 0    fluoxetine (PROZAC) 40 MG capsule Take 1 Capsule by mouth every day for 90 days. Indications: Depression, Generalized Anxiety Disorder 90 Capsule 0    Suvorexant (BELSOMRA) 20 MG Tab Take 1 Tablet by mouth at bedtime as needed (for insomnia) for up to 90 days. Take 1 tablet by mouth at bedtime as needed for insomnia. 90 Tablet 0    Clobetasol Propionate 0.05 % Shampoo WASH SCALP ONCE A DAY, USE UP TO 10 DAYS CONSECUTIVELY, THEN TAKE BREAK AND USE ONLY AS NEEDED      celecoxib (CELEBREX) 200 MG Cap TAKE 1 CAPSULE DAILY 90 Capsule 0    Magnesium 100 MG Cap Take  by mouth.      ferrous sulfate 325 (65 Fe) MG tablet Take 325 mg by mouth every day.      B Complex Vitamins (VITAMIN B COMPLEX PO) Take 1 Tablet by mouth every day.      Cholecalciferol (VITAMIN D3 PO) Take 1 Tablet by mouth every 7 days. OTC      aspirin EC (ECOTRIN) 81 MG Tablet Delayed Response Take 81 mg by mouth every bedtime. 30 Tab 0    Multiple Vitamins-Minerals (MELODY-DAY 1000 PO) Take 1  "Tab by mouth 2 Times a Day.      hydrOXYzine HCl (ATARAX) 50 MG Tab Take 1 Tablet by mouth 3 times a day as needed for Anxiety for up to 90 days. (Patient not taking: Reported on 1/26/2024) 270 Tablet 1    HUMIRA PEN 40 MG/0.4ML Pen-injector Kit INJECT 1 PEN UNDER THE SKIN EVERY 14 DAYS. 3 Each 0     No current facility-administered medications for this visit.     He  has a past medical history of Anxiety, Arthritis, Bowel habit changes (06/24/2021), Cancer ([facility]) (2007), Chickenpox, Depression, High cholesterol, Hyperlipidemia, Hypertension, Kidney stone, Mumps, Obesity, Pneumothorax, Psoriatic arthritis (HCC), Recurrent major depressive disorder, in partial remission ([facility]) (09/21/2017), Restless leg syndrome, Sleep apnea, and Tonsillitis.  He  has a past surgical history that includes bladder biopsy with cystoscopy; abdominal exploration; gastric bypass laparoscopic; eye surgery; hernia repair; arthroplasty; open reduction; other orthopedic surgery (2015); bladder biopsy with cystoscopy (1/3/2018); retrogrades (Bilateral, 1/3/2018); pyelogram (Bilateral, 1/3/2018); thoracotomy; other (Left); turp-vapor; pr cystourethroscopy,biopsies (7/6/2021); pr cystourethroscopy,biopsies (N/A, 8/17/2022); pb incise finger tendon sheath (Left, 8/30/2022); and pb incise finger tendon sheath (Left, 9/19/2023).  Social History     Tobacco Use    Smoking status: Never    Smokeless tobacco: Never   Vaping Use    Vaping Use: Never used   Substance Use Topics    Alcohol use: Not Currently     Alcohol/week: 8.4 oz     Types: 14 Glasses of wine per week     Comment: wine / daily    Drug use: Not Currently     Types: Marijuana, Oral     Comment: THC edible - \"once a month\".     Social History     Social History Narrative    Not on file     Family History   Problem Relation Age of Onset    Cancer Mother 80        lung cancer    Arthritis Mother     Diabetes Mother     Anxiety disorder Mother     Lung Disease Father 65    Cancer Father     " "Diabetes Father     Hypertension Father     Hyperlipidemia Father     Hyperlipidemia Brother     Arthritis Brother     Other Brother         Thyroid tumor    Sleep Apnea Brother     No Known Problems Maternal Grandmother     Cancer Maternal Grandfather     No Known Problems Paternal Grandmother     No Known Problems Paternal Grandfather     Heart Disease Brother 49        smoker, overweight    Hypertension Brother     Depression Other     Suicide Attempts Neg Hx     Bipolar disorder Neg Hx     Alcohol abuse Neg Hx     Drug abuse Neg Hx      Family Status   Relation Name Status    Mo      Fa      Bro  Alive    MGMo      MGFa      PGMo      PGFa      Bro  Alive    OTHER  (Not Specified)    Neg Hx  (Not Specified)         ROS    The pertinent  ROS findings can be seen in the HPI above.     All other systems reviewed and are negative     Objective:     BP (!) 144/84 (BP Location: Left arm, Patient Position: Sitting, BP Cuff Size: Adult)   Pulse 71   Temp 36.8 °C (98.2 °F) (Temporal)   Ht 1.702 m (5' 7\")   Wt 83 kg (183 lb)   SpO2 98%  Body mass index is 28.66 kg/m².      Physical Exam:    Constitutional: Alert, no distress.  Skin: No suspicious lesions  Eye: Equal, round and reactive, conjunctiva clear, lids normal.  ENMT: Lips without lesions, good dentition, oropharynx clear.  Neck: Trachea midline, no masses, no thyromegaly. No cervical or supraclavicular lymphadenopathy.  Respiratory: Unlabored respiratory effort, lungs clear to auscultation, no wheezes, no ronchi.  Cardiovascular: Normal S1, S2, no murmur, no edema  Abdomen: Soft, non-tender, no masses, no hepatosplenomegaly.  Neurological--intention tremor noted on right, dysmetria on finger-to-nose but he reaches target, more evident on the right side, bilateral rigidity noted upper extremities and lower extremities, gait was normal normal Romberg he is able to do tandem gait with difficulty      Assessment and " Plan:   The following treatment plan was discussed    All recent labs and provider notes reviewed    1. Essential tremor    Patient states he is not interested in a beta-blocker  I want him to be reevaluated neurology for idiopathic Parkinson's disease    - Referral to Neurology    2. Muscle weakness  - Sed Rate; Future  - CRP HIGH SENSITIVE  - SEAN REFLEXIVE PROFILE; Future  - ANTI-JO01 ABS; Future  - ALDOLASE; Future  - ANTI-DNA (DS); Future  - SMITH AB IGG; Future    3. Screening for colon cancer  - Cologuard Colon Cancer Screening    4. Pure hypercholesterolemia  - CBC WITH DIFFERENTIAL; Future  - Comp Metabolic Panel; Future  - Lipid Profile; Future  - TSH+FREE T4  - T3 FREE; Future    5. Benign prostatic hyperplasia, unspecified whether lower urinary tract symptoms present  - PROSTATE SPECIFIC AG DIAGNOSTIC; Future    Other orders  - NON SPECIFIED; Ilumya injection every three months prescribed by dermatology             Instructed to Follow up in clinic or ER for worsening symptoms, difficulty breathing, lack of expected recovery, or should new symptoms or problems arise.    Followup: Return in about 3 months (around 4/26/2024) for Reevaluation.

## 2024-01-30 ENCOUNTER — OFFICE VISIT (OUTPATIENT)
Dept: SLEEP MEDICINE | Facility: MEDICAL CENTER | Age: 74
End: 2024-01-30
Attending: NURSE PRACTITIONER
Payer: MEDICARE

## 2024-01-30 VITALS
WEIGHT: 142 LBS | RESPIRATION RATE: 12 BRPM | DIASTOLIC BLOOD PRESSURE: 70 MMHG | HEART RATE: 70 BPM | HEIGHT: 67 IN | OXYGEN SATURATION: 97 % | BODY MASS INDEX: 22.29 KG/M2 | SYSTOLIC BLOOD PRESSURE: 128 MMHG

## 2024-01-30 DIAGNOSIS — Z78.9 NONSMOKER: ICD-10-CM

## 2024-01-30 DIAGNOSIS — G47.31 COMPLEX SLEEP APNEA SYNDROME: ICD-10-CM

## 2024-01-30 DIAGNOSIS — F51.04 CHRONIC INSOMNIA: Chronic | ICD-10-CM

## 2024-01-30 PROCEDURE — 99213 OFFICE O/P EST LOW 20 MIN: CPT | Performed by: NURSE PRACTITIONER

## 2024-01-30 PROCEDURE — 3078F DIAST BP <80 MM HG: CPT | Performed by: NURSE PRACTITIONER

## 2024-01-30 PROCEDURE — 3074F SYST BP LT 130 MM HG: CPT | Performed by: NURSE PRACTITIONER

## 2024-01-30 ASSESSMENT — FIBROSIS 4 INDEX: FIB4 SCORE: 3.72

## 2024-01-30 NOTE — PROGRESS NOTES
Chief Complaint   Patient presents with    Follow-Up     Apnea // last seen 7/19/2023    Other     Pt wanted to keep appt as he is was having a hard time sleeping even with consistently using device  Pts spouse reports pt kicking a lot in his sleep // pt also stopped taking belsomra x 1 weeks ago       HPI:  Buster Medina is a 73 y.o. year old male here today for follow-up on complex sleep apnea and chronic insomnia.  Last OV 7/19/23     Currently using ASV @ max 25, EPAP15/6cm, PS15/2cm H20 nightly; RESPIRONICS; device obtained 2019.  Compliance report 12/31/2023 through 1/29/2024 indicates 100% compliance, average nightly use 8 hours 38 minutes, large leak of 8 minutes per night with overall AHI of 5.1/h.  Reviewed in detail with patient.    Interval events:  Patient notes having a tongue release in the last 6 months.    Patient established with psychiatry due to sleep issues and adjustments to medications including Wellbutrin and Prozac further improved mood.  History of chronic insomnia but now down to mirtazapine 7.5 mg nightly and patient recently stopped Belsomra 1 week ago.  He is no longer on lorazepam or Ambien CR.  He notes having improved sleep and going to bed and waking at a consistent time.  No significant daytime sleepiness or napping.  His spouse notes him to be kicking at night but he does not wake up with sheets disheveled or weakness/fatigue in legs.  He denies RLS symptoms.  He tolerates mask and pressure well.  He overall feels he is sleeping much better on therapy.  He is pending consultation by Dr. Mireles due to his history of chronic insomnia.      ROS: As per HPI and otherwise negative if not stated.    Past Medical History:   Diagnosis Date    Anxiety     Arthritis     osteo    Bowel habit changes 06/24/2021    Constipation    Cancer (HCC) 2007    bladder    Chickenpox     as a child    Depression     depression    High cholesterol     Hyperlipidemia     Hypertension     pt states   well controlled on meds    Kidney stone     Mumps     as a child     Obesity     Pneumothorax     Psoriatic arthritis (HCC)     Recurrent major depressive disorder, in partial remission (HCC) 09/21/2017    Restless leg syndrome     Sleep apnea     uses cpap    Tonsillitis        Past Surgical History:   Procedure Laterality Date    PB INCISE FINGER TENDON SHEATH Left 9/19/2023    Procedure: LEFT INDEX FINGER TRIGGER RELEASE;  Surgeon: Td Soliman M.D.;  Location: [facility];  Service: Orthopedics    PB INCISE FINGER TENDON SHEATH Left 8/30/2022    Procedure: LEFT MIDDLE FINGER TRIGGER RELEASE;  Surgeon: Td Soliman M.D.;  Location: [facility];  Service: Orthopedics    DE CYSTOURETHROSCOPY,BIOPSIES N/A 8/17/2022    Procedure: CYSTOSCOPY;  Surgeon: Jerzy Jasso M.D.;  Location: [facility];  Service: Urology    DE CYSTOURETHROSCOPY,BIOPSIES  7/6/2021    Procedure: BIOPSY, BLADDER WITH FULGERATION;  Surgeon: Bob Millan M.D.;  Location: [facility];  Service: Urology    BLADDER BIOPSY WITH CYSTOSCOPY  1/3/2018    Procedure: BLADDER BIOPSY WITH CYSTOSCOPY/WITH UPPER TRACT WASHING;  Surgeon: El Manuel M.D.;  Location: [facility];  Service: Urology    RETROGRADES Bilateral 1/3/2018    Procedure: RETROGRADES;  Surgeon: El Manuel M.D.;  Location: [facility];  Service: Urology    PYELOGRAM Bilateral 1/3/2018    Procedure: PYELOGRAM;  Surgeon: El Manuel M.D.;  Location: [facility];  Service: Urology    OTHER ORTHOPEDIC SURGERY  2015    shoulder replacement    ABDOMINAL EXPLORATION      ARTHROPLASTY      right shoulder    BLADDER BIOPSY WITH CYSTOSCOPY      EYE SURGERY      lasik     GASTRIC BYPASS LAPAROSCOPIC      HERNIA REPAIR      OPEN REDUCTION      OTHER Left     thumb joint    THORACOTOMY      TURP-VAPOR         Family History   Problem Relation Age of Onset    Cancer Mother 80        lung cancer  "   Arthritis Mother     Diabetes Mother     Anxiety disorder Mother     Lung Disease Father 65    Cancer Father     Diabetes Father     Hypertension Father     Hyperlipidemia Father     Hyperlipidemia Brother     Arthritis Brother     Other Brother         Thyroid tumor    Sleep Apnea Brother     No Known Problems Maternal Grandmother     Cancer Maternal Grandfather     No Known Problems Paternal Grandmother     No Known Problems Paternal Grandfather     Heart Disease Brother 49        smoker, overweight    Hypertension Brother     Depression Other     Suicide Attempts Neg Hx     Bipolar disorder Neg Hx     Alcohol abuse Neg Hx     Drug abuse Neg Hx        Social History     Socioeconomic History    Marital status: Single     Spouse name: Not on file    Number of children: Not on file    Years of education: Not on file    Highest education level: Master's degree (e.g., MA, MS, [place]ian[place], MEd, MSW, LUIS)   Occupational History    Not on file   Tobacco Use    Smoking status: Never    Smokeless tobacco: Never   Vaping Use    Vaping Use: Never used   Substance and Sexual Activity    Alcohol use: Not Currently     Alcohol/week: 8.4 oz     Types: 14 Glasses of wine per week     Comment: wine / daily    Drug use: Not Currently     Types: Marijuana, Oral     Comment: THC edible - \"once a month\".    Sexual activity: Not Currently   Other Topics Concern    Not on file   Social History Narrative    Not on file     Social Determinants of Health     Financial Resource Strain: Low Risk  (8/20/2022)    Overall Financial Resource Strain (CARDIA)     Difficulty of Paying Living Expenses: Not hard at all   Food Insecurity: No Food Insecurity (8/20/2022)    Hunger Vital Sign     Worried About Running Out of Food in the Last Year: Never true     Ran Out of Food in the Last Year: Never true   Transportation Needs: No Transportation Needs (8/20/2022)    PRAPARE - Transportation     Lack of Transportation (Medical): No     Lack of " "Transportation (Non-Medical): No   Physical Activity: Insufficiently Active (8/20/2022)    Exercise Vital Sign     Days of Exercise per Week: 7 days     Minutes of Exercise per Session: 20 min   Stress: Stress Concern Present (8/20/2022)    [employer] [place] [employer] - Occupational Stress Questionnaire     Feeling of Stress : To some extent   Social Connections: Moderately Isolated (8/20/2022)    Social Connection and Isolation Panel [NHANES]     Frequency of Communication with Friends and Family: Three times a week     Frequency of Social Gatherings with Friends and Family: Twice a week     Attends Mosque Services: Never     Active Member of Clubs or Organizations: No     Attends Club or Organization Meetings: Patient refused     Marital Status: Living with partner   Intimate Partner Violence: Not on file   Housing Stability: Low Risk  (8/20/2022)    Housing Stability Vital Sign     Unable to Pay for Housing in the Last Year: No     Number of Places Lived in the Last Year: 1     Unstable Housing in the Last Year: No       Allergies as of 01/30/2024    (No Known Allergies)        Vitals:  /70 (BP Location: Left arm, Patient Position: Sitting, BP Cuff Size: Adult)   Pulse 70   Resp 12   Ht 1.702 m (5' 7\")   Wt 64.4 kg (142 lb)   SpO2 97%     Current medications as of today   Current Outpatient Medications   Medication Sig Dispense Refill    NON SPECIFIED Ilumya injection every three months prescribed by dermatology      omeprazole (PRILOSEC) 20 MG delayed-release capsule Take 1 Capsule by mouth every evening. 100 Capsule 2    mirtazapine (REMERON) 15 MG Tab Take 1 Tablet by mouth at bedtime. 100 Tablet 2    lisinopril (PRINIVIL) 10 MG Tab TAKE 1 TABLET BY MOUTH EVERY DAY. (STOP 20 MG DOSE) 90 Tablet 0    atorvastatin (LIPITOR) 80 MG tablet Take 1 Tablet by mouth every day. 100 Tablet 3    buPROPion (WELLBUTRIN SR) 200 MG SR tablet Take 2 Tablets by mouth every day for 90 days. Indications: " Major Depressive Disorder 180 Tablet 0    FLUoxetine (PROZAC) 20 MG Cap Take 1 Capsule by mouth every day for 90 days. Indications: Depression, Generalized Anxiety Disorder 90 Capsule 0    fluoxetine (PROZAC) 40 MG capsule Take 1 Capsule by mouth every day for 90 days. Indications: Depression, Generalized Anxiety Disorder 90 Capsule 0    Clobetasol Propionate 0.05 % Shampoo WASH SCALP ONCE A DAY, USE UP TO 10 DAYS CONSECUTIVELY, THEN TAKE BREAK AND USE ONLY AS NEEDED      celecoxib (CELEBREX) 200 MG Cap TAKE 1 CAPSULE DAILY 90 Capsule 0    Magnesium 100 MG Cap Take  by mouth.      ferrous sulfate 325 (65 Fe) MG tablet Take 325 mg by mouth every day.      B Complex Vitamins (VITAMIN B COMPLEX PO) Take 1 Tablet by mouth every day.      Cholecalciferol (VITAMIN D3 PO) Take 1 Tablet by mouth every 7 days. OTC      aspirin EC (ECOTRIN) 81 MG Tablet Delayed Response Take 81 mg by mouth every bedtime. 30 Tab 0    Multiple Vitamins-Minerals (MELODY-DAY 1000 PO) Take 1 Tab by mouth 2 Times a Day.       No current facility-administered medications for this visit.         Physical Exam:   Gen:           Alert and oriented, No apparent distress. Mood and affect appropriate, normal interaction with examiner.  Eyes:          PERRL, EOM intact, sclere white, conjunctive moist.  Ears:          Not examined.   Hearing:     Grossly intact.  Nose:          Normal, no lesions or deformities.  Dentition:    Not examined.   Oropharynx:   Not examined.   Mallampati Classification: Not examined.   Neck:        Supple, trachea midline, no masses.  Respiratory Effort: No intercostal retractions or use of accessory muscles.   Lung Auscultation:      Clear to auscultation bilaterally; no rales, rhonchi or wheezing.  CV:            Regular rate and rhythm. No murmurs, rubs or gallops.  Abd:           Not examined.   Lymphadenopathy: Not examined.  Gait and Station: Normal.  Digits and Nails: No clubbing, cyanosis, petechiae, or nodes.   Cranial  Nerves: II-XII grossly intact.  Skin:        No rashes, lesions or ulcers noted.               Ext:           No cyanosis or edema.      Assessment:  1. Complex sleep apnea syndrome  DME Mask and Supplies      2. Chronic insomnia        3. BMI 22.0-22.9, adult        4. Nonsmoker            Immunizations:    Flu:10/2/23  Pneumovax 23:2020  Prevnar 13:2020  PCV 20: not due  COVID-19: 10/2/23    Plan:  Complex sleep apnea is well treated.  He will continue ASV on a nightly basis.   DME mask/supplies  Chronic insomnia significantly improved.  He will continue to follow psychiatry for history of anxiety/depression.  He will continue mirtazapine per primary care.  Because his chronic insomnia is improved, gave patient the option on whether he wanted to establish with Dr. Mireles for reassurance of his sleep habits or not.  Encourage healthy diet regular activity.  Follow-up in 1 year with compliance report, sooner if needed.    Please note that this dictation was created using voice recognition software. I have made every reasonable attempt to correct obvious errors, but it is possible there are errors of grammar and possibly content that I did not discover before finalizing the note.

## 2024-02-01 RX ORDER — CELECOXIB 200 MG/1
200 CAPSULE ORAL DAILY
Qty: 90 CAPSULE | Refills: 0 | Status: SHIPPED | OUTPATIENT
Start: 2024-02-01 | End: 2024-02-08

## 2024-02-02 ENCOUNTER — TELEPHONE (OUTPATIENT)
Dept: HEALTH INFORMATION MANAGEMENT | Facility: OTHER | Age: 74
End: 2024-02-02
Payer: MEDICARE

## 2024-02-08 ENCOUNTER — PATIENT MESSAGE (OUTPATIENT)
Dept: MEDICAL GROUP | Age: 74
End: 2024-02-08

## 2024-02-08 ENCOUNTER — OFFICE VISIT (OUTPATIENT)
Dept: NEUROLOGY | Facility: MEDICAL CENTER | Age: 74
End: 2024-02-08
Attending: INTERNAL MEDICINE
Payer: MEDICARE

## 2024-02-08 VITALS
RESPIRATION RATE: 12 BRPM | OXYGEN SATURATION: 99 % | HEIGHT: 67 IN | SYSTOLIC BLOOD PRESSURE: 150 MMHG | BODY MASS INDEX: 29.48 KG/M2 | DIASTOLIC BLOOD PRESSURE: 82 MMHG | TEMPERATURE: 97.6 F | WEIGHT: 187.83 LBS | HEART RATE: 87 BPM

## 2024-02-08 DIAGNOSIS — I95.1 ORTHOSTATIC HYPOTENSION: ICD-10-CM

## 2024-02-08 DIAGNOSIS — R26.89 BALANCE DISORDER: ICD-10-CM

## 2024-02-08 PROCEDURE — 99212 OFFICE O/P EST SF 10 MIN: CPT | Performed by: INTERNAL MEDICINE

## 2024-02-08 PROCEDURE — 99205 OFFICE O/P NEW HI 60 MIN: CPT | Performed by: INTERNAL MEDICINE

## 2024-02-08 RX ORDER — CELECOXIB 200 MG/1
200 CAPSULE ORAL DAILY
Qty: 90 CAPSULE | Refills: 2 | Status: SHIPPED | OUTPATIENT
Start: 2024-02-08

## 2024-02-08 ASSESSMENT — FIBROSIS 4 INDEX: FIB4 SCORE: 3.72

## 2024-02-08 NOTE — PROGRESS NOTES
[facility]  [address] [place] [address], [address]. [place] [address]  Phone: [phone] Patient Name: Buster Medina  : 1950  MRN: [id number]     ASSESSMENT / PLAN       Buster Medina is a 73 y.o. LHD male presenting for tremors and lightheadedness.     Orthostatic Hypotension  Notes that he gets more frequent episodes of lightheadedness going from sitting to standing in the afternoon.  Seems to be improved if he monitors his hydration.  No other evidence of autonomic dysfunction such as urinary retention or bowel dysfunction.  Discussed taking blood pressure and heart rate log to monitor if this is cardiogenic or neurogenic orthostatic hypotension.  If heart rate elevation is not seen with drop in blood pressure, plan to do confirmatory tilt table testing with cardiology.    - check blood pressure and heart rate:   Morning  Afternoon: sitting and standing  - send me a log of sitting and standing blood pressures in case we need to have the tilt table testing with cardiology      Gait instability  Having difficulty walking after prolonged walking more than a mile.  Feels like he is unable to stop.  No falls as a result of this.  Sometimes right foot would catch at the heel.  Seems unrelated to the lightheadedness as this can happen at anytime of day.    Decreased vibratory sensation in both feet, and difficulty with tandem stance and tandem gait.  Finger-nose and saccades without issues.    Under consideration is decrease endurance versus mild large fiber neuropathy.  Prior history of prediabetes previously to the gastric bypass.  He also has comorbid autoimmune disorders such as psoriatic arthritis.  He is monitored regularly for potential vitamin deficiencies due to the bypass.  Can consider nerve conduction study/EMG in future if this persist despite addressing the orthostatic hypotension.      Essential tremor and enhanced physiologic tremor  This is mildly bothersome to him, worse on the  right compared to left hand.  Exam shows postural and intention tremor that is worse on right versus left.  No bradykinesia or rigidity consistent with parkinsonism.  The tremor is likely a mix of essential tremor and enhanced physiologic tremor.  SSRI which she is taking can worsen physiologic tremor.    Discussed propranolol as treatment given he has also history of high blood pressure.  However given episodes of orthostatic hypotension, we will monitor that before initiating medication.      - Return in about 3 months (around 5/8/2024).    Miguel Wright DO  Neurology, Movement Disorders Specialist    BILLING DOCUMENTATION:   I spent 60 minutes reviewing the medical record, interviewing and examining the patient, discussing diagnosis and treatment, and coordinating care.          HISTORY OF PRESENT ILLNESS      Buster Medina is a 73 y.o. LHD male presenting for tremors and lightheadedness.     history of s/p bladder cancer about 2012 s/p surgical intervention only, hx of gastric bypass 2005 [place] [facility], HTN, HLD, sleep apnea on CPAP, Nonrheumatic mitral valve regurgitation, psoriatic arthritis, anxiety, depression, and insomnia.     Initial HPI 02/08/24    Tremor  Tremor in hands right greater than left. Onset 20+ years ago but milder then. Tremors are somewhat bothersome. Affects typing, fine motor dexterity. Handwriting worse.   No family history of tremors.  No anosmia.   Mirtazapine helps with sleep. Can sometimes kick in his sleep.     Gait instability  - prolonged walking, having trouble stopping. Onset approx 2 years ago. Even happens on treadmill. Right leg can catch on the heel. No leg weakness, numbness tinging in feet. No back or neck pain  - standing too fast, usually late in day, feels faint. Last week at a fall. Onset >1 year. No blood pressure med change. BP at home usually 130s/75. Takes meds at night. Only with position change sit to stand. No palpitations    No new bladder issues, gastric  bypass without issues, no constipation, no swelling in legs.           1/26/2024     2:00 PM 8/23/2022    11:00 AM 6/30/2022    12:40 PM 9/3/2021     2:00 PM 3/1/2019    10:00 AM   PHQ-9 Screening   Little interest or pleasure in doing things 0 - not at all 0 - not at all 0 - not at all 0 - not at all 1 - several days   Feeling down, depressed, or hopeless 0 - not at all 0 - not at all 0 - not at all 0 - not at all 1 - several days   Trouble falling or staying asleep, or sleeping too much     1 - several days   Feeling tired or having little energy     1 - several days   Poor appetite or overeating     1 - several days   Feeling bad about yourself - or that you are a failure or have let yourself or your family down     1 - several days   Trouble concentrating on things, such as reading the newspaper or watching television     1 - several days   Moving or speaking so slowly that other people could have noticed. Or the opposite - being so fidgety or restless that you have been moving around a lot more than usual     0 - not at all   Thoughts that you would be better off dead, or of hurting yourself in some way     0 - not at all   PHQ-2 Total Score 0 0 0 0 2   PHQ-9 Total Score     7       Interpretation of PHQ-9 Total Score   Score Severity   1-4 No Depression   5-9 Mild Depression   10-14 Moderate Depression   15-19 Moderately Severe Depression   20-27 Severe Depression     Past Medical History:   Diagnosis Date    Anxiety     Arthritis     osteo    Bowel habit changes 06/24/2021    Constipation    Cancer ([facility]) 2007    bladder    Chickenpox     as a child    Depression     depression    High cholesterol     Hyperlipidemia     Hypertension     pt states  well controlled on meds    Kidney stone     Mumps     as a child     Obesity     Pneumothorax     Psoriatic arthritis (HCC)     Recurrent major depressive disorder, in partial remission (HCC) 09/21/2017    Restless leg syndrome     Sleep apnea     uses cpap     Tonsillitis        Past Surgical History:   Procedure Laterality Date    PB INCISE FINGER TENDON SHEATH Left 9/19/2023    Procedure: LEFT INDEX FINGER TRIGGER RELEASE;  Surgeon: Td Soliman M.D.;  Location: [facility];  Service: Orthopedics    PB INCISE FINGER TENDON SHEATH Left 8/30/2022    Procedure: LEFT MIDDLE FINGER TRIGGER RELEASE;  Surgeon: Td Soliman M.D.;  Location: [facility];  Service: Orthopedics    AL CYSTOURETHROSCOPY,BIOPSIES N/A 8/17/2022    Procedure: CYSTOSCOPY;  Surgeon: eJrzy Jasso M.D.;  Location: [facility];  Service: Urology    AL CYSTOURETHROSCOPY,BIOPSIES  7/6/2021    Procedure: BIOPSY, BLADDER WITH FULGERATION;  Surgeon: Bob Millan M.D.;  Location: [facility];  Service: Urology    BLADDER BIOPSY WITH CYSTOSCOPY  1/3/2018    Procedure: BLADDER BIOPSY WITH CYSTOSCOPY/WITH UPPER TRACT WASHING;  Surgeon: El Manuel M.D.;  Location: [facility];  Service: Urology    RETROGRADES Bilateral 1/3/2018    Procedure: RETROGRADES;  Surgeon: El Manuel M.D.;  Location: [facility];  Service: Urology    PYELOGRAM Bilateral 1/3/2018    Procedure: PYELOGRAM;  Surgeon: El Manuel M.D.;  Location: [facility];  Service: Urology    OTHER ORTHOPEDIC SURGERY  2015    shoulder replacement    ABDOMINAL EXPLORATION      ARTHROPLASTY      right shoulder    BLADDER BIOPSY WITH CYSTOSCOPY      EYE SURGERY      lasik     GASTRIC BYPASS LAPAROSCOPIC      HERNIA REPAIR      OPEN REDUCTION      OTHER Left     thumb joint    THORACOTOMY      TURP-VAPOR         Family History   Problem Relation Age of Onset    Cancer Mother 80        lung cancer    Arthritis Mother     Diabetes Mother     Anxiety disorder Mother     Lung Disease Father 65    Cancer Father     Diabetes Father     Hypertension Father     Hyperlipidemia Father     Hyperlipidemia Brother     Arthritis Brother     Other Brother          "Thyroid tumor    Sleep Apnea Brother     No Known Problems Maternal Grandmother     Cancer Maternal Grandfather     No Known Problems Paternal Grandmother     No Known Problems Paternal Grandfather     Heart Disease Brother 49        smoker, overweight    Hypertension Brother     Depression Other     Suicide Attempts Neg Hx     Bipolar disorder Neg Hx     Alcohol abuse Neg Hx     Drug abuse Neg Hx        Social History     Socioeconomic History    Marital status: Single     Spouse name: Not on file    Number of children: Not on file    Years of education: Not on file    Highest education level: Master's degree (e.g., MA, MS, Rhian[place], MEd, MSW, LUIS)   Occupational History    Not on file   Tobacco Use    Smoking status: Never    Smokeless tobacco: Never   Vaping Use    Vaping Use: Never used   Substance and Sexual Activity    Alcohol use: Not Currently     Alcohol/week: 8.4 oz     Types: 14 Glasses of wine per week     Comment: wine / daily    Drug use: Not Currently     Types: Marijuana, Oral     Comment: THC edible - \"once a month\".    Sexual activity: Not Currently   Other Topics Concern    Not on file   Social History Narrative    Not on file     Social Determinants of Health     Financial Resource Strain: Low Risk  (8/20/2022)    Overall Financial Resource Strain (CARDIA)     Difficulty of Paying Living Expenses: Not hard at all   Food Insecurity: No Food Insecurity (8/20/2022)    Hunger Vital Sign     Worried About Running Out of Food in the Last Year: Never true     Ran Out of Food in the Last Year: Never true   Transportation Needs: No Transportation Needs (8/20/2022)    PRAPARE - Transportation     Lack of Transportation (Medical): No     Lack of Transportation (Non-Medical): No   Physical Activity: Insufficiently Active (8/20/2022)    Exercise Vital Sign     Days of Exercise per Week: 7 days     Minutes of Exercise per Session: 20 min   Stress: Stress Concern Present (8/20/2022)    [facility] " Occupational Health - Occupational Stress Questionnaire     Feeling of Stress : To some extent   Social Connections: Moderately Isolated (8/20/2022)    Social Connection and Isolation Panel [NHANES]     Frequency of Communication with Friends and Family: Three times a week     Frequency of Social Gatherings with Friends and Family: Twice a week     Attends [employer] Services: Never     Active Member of Clubs or Organizations: No     Attends Club or Organization Meetings: Patient refused     Marital Status: Living with partner   Intimate Partner Violence: Not on file   Housing Stability: Low Risk  (8/20/2022)    Housing Stability Vital Sign     Unable to Pay for Housing in the Last Year: No     Number of Places Lived in the Last Year: 1     Unstable Housing in the Last Year: No       Current Outpatient Medications   Medication    celecoxib (CELEBREX) 200 MG Cap    NON SPECIFIED    omeprazole (PRILOSEC) 20 MG delayed-release capsule    mirtazapine (REMERON) 15 MG Tab    lisinopril (PRINIVIL) 10 MG Tab    atorvastatin (LIPITOR) 80 MG tablet    buPROPion (WELLBUTRIN SR) 200 MG SR tablet    FLUoxetine (PROZAC) 20 MG Cap    fluoxetine (PROZAC) 40 MG capsule    Clobetasol Propionate 0.05 % Shampoo    Magnesium 100 MG Cap    ferrous sulfate 325 (65 Fe) MG tablet    B Complex Vitamins (VITAMIN B COMPLEX PO)    Cholecalciferol (VITAMIN D3 PO)    aspirin EC (ECOTRIN) 81 MG Tablet Delayed Response    Multiple Vitamins-Minerals (MELODY-DAY 1000 PO)     No current facility-administered medications for this visit.       No Known Allergies    DATA / RESULTS      25-Hydroxy   Vitamin D 25   Date Value Ref Range Status   09/26/2019 91 30 - 100 ng/mL Final     Comment:     Adult Ranges:   <20 ng/mL - Deficiency  20-29 ng/mL - Insufficiency   ng/mL - Sufficiency  The [employer] Vitamin D Assay is standardized to the  [employer] reference measurement procedures, a  reference method for the Vitamin D Standardization  "Program  (VDSP).  The VDSP aligns patient results among 25 (OH)  Vitamin D methods.       Vitamin B12 -True Cobalamin   Date Value Ref Range Status   04/27/2022 1230 (H) 211 - 911 pg/mL Final     LDL   Date Value Ref Range Status   09/08/2023 85 <100 mg/dL Final      MRI brain without contrast 10/2021, [facility]  Rare foci of T2 FLAIR hyperintense signal in periventricular and subcortical white matter.  There is mild diffuse cortical atrophy within limits for age.  Normal brain MRI for age.      OBJECTIVE      Vitals:    02/08/24 0853   BP: (!) 150/82   BP Location: Left arm   Patient Position: Sitting   BP Cuff Size: Adult   Pulse: 87   Resp: 12   Temp: 36.4 °C (97.6 °F)   TempSrc: Temporal   SpO2: 99%   Weight: 85.2 kg (187 lb 13.3 oz)   Height: 1.702 m (5' 7\")     Physical Exam     General: NAD, appears stated age.      Mental status: Speech clear and fluent without tremor. Fund of knowledge is good.      Cranial Nerves:  CN2: PERRL. Visual fields are full to finger confrontation.   CN3/4/6: EOMI. There is no nystagmus. Saccades are normal.   CN5: V1-V3 intact to light touch    CN7: Symmetric face.   CN8: Hearing grossly intact.   CN9/10/12: Soft palate and uvula rise symmetrically. Tongue midline.   CN11: Shoulder shrug intact bilaterally.     Strength  Right Left   Shoulder Abduction  5 5   Elbow Flexion 5 5   Elbow Extension  5 5   Wrist Extension  5 5   Finger Extension  5 5   Hip Flexion  5 5   Knee Extension 5 5   Ankle Dorsiflexion  5 5     Deep Tendon Reflexes: 2+ biceps, brachioradialis, patella and 1+ ankles. Plantar reflexes in flexion.     Abnormal movements:    UPDRS Right Left   Finger tapping 0 0   Hand Movement 0 0   Toe Tapping 0 0   Leg Agility 0 0   Rigidity 1 0   Rest Tremor 0 0   Postural Tremor 1cm 1cm   Kinetic Tremor 1-3cm 1cm      No dystonia, dyskinesias, tics, stereotypies, athetosis, akathisia, or chorea noted.      Sensory: normal to sharp, " temperature.    Quantitative tuning fork Right Left   Hands 8 8   Feet 4 4       Cerebellar: No dysmetria with FTN or heel-to-shin.    Gait:   Posture - normal   Base - narrow   Stride length - normal   Arm swing - normal   Speed - normal  Shuffling/freezing - none  U-Turn - normal   Romberg - normal. Difficulty with tandem stance.   Heel, toe normal. Difficulty with tandem >3 steps.       PROCEDURE     N/A

## 2024-02-08 NOTE — PATIENT INSTRUCTIONS
Hypotension  - check blood pressure and heart rate:   Morning  Afternoon: sitting and standing  - send me a log of sitting and standing blood pressures in case we need to have the tilt table testing with cardiology    Gait instability  Essential tremor and enhanced physiologic tremor

## 2024-02-13 DIAGNOSIS — G47.00 INSOMNIA, UNSPECIFIED TYPE: ICD-10-CM

## 2024-02-13 DIAGNOSIS — R55 POSTURAL DIZZINESS WITH PRESYNCOPE: ICD-10-CM

## 2024-02-13 DIAGNOSIS — R42 POSTURAL DIZZINESS WITH PRESYNCOPE: ICD-10-CM

## 2024-02-13 DIAGNOSIS — Z65.8 PSYCHOSOCIAL STRESSORS: ICD-10-CM

## 2024-02-13 DIAGNOSIS — F33.1 MODERATE EPISODE OF RECURRENT MAJOR DEPRESSIVE DISORDER (HCC): ICD-10-CM

## 2024-02-13 NOTE — TELEPHONE ENCOUNTER
Received request via: Patient    Was the patient seen in the last year in this department? Yes    Does the patient have an active prescription (recently filled or refills available) for medication(s) requested? No    [facility]     Does the patient have [facility]-way Plus and need 100 day supply (blood pressure, diabetes and cholesterol meds only)? Patient does not have SCP

## 2024-02-14 NOTE — TELEPHONE ENCOUNTER
Received request via: Pharmacy    Was the patient seen in the last year in this department? Yes    Does the patient have an active prescription (recently filled or refills available) for medication(s) requested? No    Pharmacy Name: [facility]    Does the patient have nursing home Plus and need 100 day supply (blood pressure, diabetes and cholesterol meds only)? Medication is not for cholesterol, blood pressure or diabetes and Patient does not have SCP

## 2024-02-15 ENCOUNTER — HOSPITAL ENCOUNTER (OUTPATIENT)
Dept: LAB | Facility: MEDICAL CENTER | Age: 74
End: 2024-02-15
Attending: FAMILY MEDICINE
Payer: MEDICARE

## 2024-02-15 ENCOUNTER — HOSPITAL ENCOUNTER (OUTPATIENT)
Dept: LAB | Facility: MEDICAL CENTER | Age: 74
End: 2024-02-15
Attending: INTERNAL MEDICINE
Payer: MEDICARE

## 2024-02-15 DIAGNOSIS — N40.0 BENIGN PROSTATIC HYPERPLASIA, UNSPECIFIED WHETHER LOWER URINARY TRACT SYMPTOMS PRESENT: ICD-10-CM

## 2024-02-15 DIAGNOSIS — I10 ESSENTIAL HYPERTENSION: ICD-10-CM

## 2024-02-15 DIAGNOSIS — L40.9 PSORIASIS: ICD-10-CM

## 2024-02-15 DIAGNOSIS — Z79.620 ADALIMUMAB (HUMIRA) LONG-TERM USE: ICD-10-CM

## 2024-02-15 DIAGNOSIS — M62.81 MUSCLE WEAKNESS: ICD-10-CM

## 2024-02-15 DIAGNOSIS — E78.00 PURE HYPERCHOLESTEROLEMIA: ICD-10-CM

## 2024-02-15 DIAGNOSIS — L40.50 PSORIATIC ARTHRITIS (HCC): ICD-10-CM

## 2024-02-15 DIAGNOSIS — M10.9 GOUT, UNSPECIFIED CAUSE, UNSPECIFIED CHRONICITY, UNSPECIFIED SITE: ICD-10-CM

## 2024-02-15 LAB
ALBUMIN SERPL BCP-MCNC: 4.6 G/DL (ref 3.2–4.9)
ALBUMIN SERPL BCP-MCNC: 4.6 G/DL (ref 3.2–4.9)
ALBUMIN/GLOB SERPL: 1.4 G/DL
ALBUMIN/GLOB SERPL: 1.4 G/DL
ALP SERPL-CCNC: 97 U/L (ref 30–99)
ALP SERPL-CCNC: 98 U/L (ref 30–99)
ALT SERPL-CCNC: 45 U/L (ref 2–50)
ALT SERPL-CCNC: 46 U/L (ref 2–50)
ANION GAP SERPL CALC-SCNC: 9 MMOL/L (ref 7–16)
ANION GAP SERPL CALC-SCNC: 9 MMOL/L (ref 7–16)
AST SERPL-CCNC: 37 U/L (ref 12–45)
AST SERPL-CCNC: 37 U/L (ref 12–45)
BASOPHILS # BLD AUTO: 0.8 % (ref 0–1.8)
BASOPHILS # BLD AUTO: 0.9 % (ref 0–1.8)
BASOPHILS # BLD: 0.05 K/UL (ref 0–0.12)
BASOPHILS # BLD: 0.05 K/UL (ref 0–0.12)
BILIRUB SERPL-MCNC: 0.5 MG/DL (ref 0.1–1.5)
BILIRUB SERPL-MCNC: 0.6 MG/DL (ref 0.1–1.5)
BUN SERPL-MCNC: 19 MG/DL (ref 8–22)
BUN SERPL-MCNC: 19 MG/DL (ref 8–22)
CALCIUM ALBUM COR SERPL-MCNC: 9.1 MG/DL (ref 8.5–10.5)
CALCIUM ALBUM COR SERPL-MCNC: 9.1 MG/DL (ref 8.5–10.5)
CALCIUM SERPL-MCNC: 9.6 MG/DL (ref 8.4–10.2)
CALCIUM SERPL-MCNC: 9.6 MG/DL (ref 8.4–10.2)
CHLORIDE SERPL-SCNC: 103 MMOL/L (ref 96–112)
CHLORIDE SERPL-SCNC: 104 MMOL/L (ref 96–112)
CHOLEST SERPL-MCNC: 207 MG/DL (ref 100–199)
CO2 SERPL-SCNC: 27 MMOL/L (ref 20–33)
CO2 SERPL-SCNC: 27 MMOL/L (ref 20–33)
CREAT SERPL-MCNC: 1.16 MG/DL (ref 0.5–1.4)
CREAT SERPL-MCNC: 1.19 MG/DL (ref 0.5–1.4)
CRP SERPL HS-MCNC: 0.2 MG/L (ref 0–3)
EOSINOPHIL # BLD AUTO: 0.19 K/UL (ref 0–0.51)
EOSINOPHIL # BLD AUTO: 0.24 K/UL (ref 0–0.51)
EOSINOPHIL NFR BLD: 3.3 % (ref 0–6.9)
EOSINOPHIL NFR BLD: 4 % (ref 0–6.9)
ERYTHROCYTE [DISTWIDTH] IN BLOOD BY AUTOMATED COUNT: 45 FL (ref 35.9–50)
ERYTHROCYTE [DISTWIDTH] IN BLOOD BY AUTOMATED COUNT: 45.2 FL (ref 35.9–50)
ERYTHROCYTE [SEDIMENTATION RATE] IN BLOOD BY WESTERGREN METHOD: 2 MM/HOUR (ref 0–20)
ERYTHROCYTE [SEDIMENTATION RATE] IN BLOOD BY WESTERGREN METHOD: 2 MM/HOUR (ref 0–20)
GFR SERPLBLD CREATININE-BSD FMLA CKD-EPI: 65 ML/MIN/1.73 M 2
GFR SERPLBLD CREATININE-BSD FMLA CKD-EPI: 67 ML/MIN/1.73 M 2
GLOBULIN SER CALC-MCNC: 3.2 G/DL (ref 1.9–3.5)
GLOBULIN SER CALC-MCNC: 3.3 G/DL (ref 1.9–3.5)
GLUCOSE SERPL-MCNC: 111 MG/DL (ref 65–99)
GLUCOSE SERPL-MCNC: 113 MG/DL (ref 65–99)
HCT VFR BLD AUTO: 46.4 % (ref 42–52)
HCT VFR BLD AUTO: 46.7 % (ref 42–52)
HDLC SERPL-MCNC: 91 MG/DL
HGB BLD-MCNC: 15.4 G/DL (ref 14–18)
HGB BLD-MCNC: 15.6 G/DL (ref 14–18)
IMM GRANULOCYTES # BLD AUTO: 0.03 K/UL (ref 0–0.11)
IMM GRANULOCYTES # BLD AUTO: 0.03 K/UL (ref 0–0.11)
IMM GRANULOCYTES NFR BLD AUTO: 0.5 % (ref 0–0.9)
IMM GRANULOCYTES NFR BLD AUTO: 0.5 % (ref 0–0.9)
LDLC SERPL CALC-MCNC: 97 MG/DL
LYMPHOCYTES # BLD AUTO: 1.94 K/UL (ref 1–4.8)
LYMPHOCYTES # BLD AUTO: 2.11 K/UL (ref 1–4.8)
LYMPHOCYTES NFR BLD: 34 % (ref 22–41)
LYMPHOCYTES NFR BLD: 34.8 % (ref 22–41)
MCH RBC QN AUTO: 32 PG (ref 27–33)
MCH RBC QN AUTO: 32.8 PG (ref 27–33)
MCHC RBC AUTO-ENTMCNC: 33 G/DL (ref 32.3–36.5)
MCHC RBC AUTO-ENTMCNC: 33.6 G/DL (ref 32.3–36.5)
MCV RBC AUTO: 97.1 FL (ref 81.4–97.8)
MCV RBC AUTO: 97.5 FL (ref 81.4–97.8)
MONOCYTES # BLD AUTO: 0.66 K/UL (ref 0–0.85)
MONOCYTES # BLD AUTO: 0.69 K/UL (ref 0–0.85)
MONOCYTES NFR BLD AUTO: 10.9 % (ref 0–13.4)
MONOCYTES NFR BLD AUTO: 12.1 % (ref 0–13.4)
NEUTROPHILS # BLD AUTO: 2.8 K/UL (ref 1.82–7.42)
NEUTROPHILS # BLD AUTO: 2.97 K/UL (ref 1.82–7.42)
NEUTROPHILS NFR BLD: 49 % (ref 44–72)
NEUTROPHILS NFR BLD: 49.2 % (ref 44–72)
NRBC # BLD AUTO: 0 K/UL
NRBC # BLD AUTO: 0 K/UL
NRBC BLD-RTO: 0 /100 WBC (ref 0–0.2)
NRBC BLD-RTO: 0 /100 WBC (ref 0–0.2)
PLATELET # BLD AUTO: 141 K/UL (ref 164–446)
PLATELET # BLD AUTO: 145 K/UL (ref 164–446)
PMV BLD AUTO: 11.3 FL (ref 9–12.9)
PMV BLD AUTO: 11.3 FL (ref 9–12.9)
POTASSIUM SERPL-SCNC: 4.1 MMOL/L (ref 3.6–5.5)
POTASSIUM SERPL-SCNC: 4.3 MMOL/L (ref 3.6–5.5)
PROT SERPL-MCNC: 7.8 G/DL (ref 6–8.2)
PROT SERPL-MCNC: 7.9 G/DL (ref 6–8.2)
PSA SERPL-MCNC: 0.97 NG/ML (ref 0–4)
RBC # BLD AUTO: 4.76 M/UL (ref 4.7–6.1)
RBC # BLD AUTO: 4.81 M/UL (ref 4.7–6.1)
SODIUM SERPL-SCNC: 139 MMOL/L (ref 135–145)
SODIUM SERPL-SCNC: 140 MMOL/L (ref 135–145)
T3FREE SERPL-MCNC: 3.04 PG/ML (ref 2–4.4)
T4 FREE SERPL-MCNC: 0.93 NG/DL (ref 0.93–1.7)
TRIGL SERPL-MCNC: 97 MG/DL (ref 0–149)
TSH SERPL DL<=0.005 MIU/L-ACNC: 2.96 UIU/ML (ref 0.38–5.33)
WBC # BLD AUTO: 5.7 K/UL (ref 4.8–10.8)
WBC # BLD AUTO: 6.1 K/UL (ref 4.8–10.8)

## 2024-02-15 PROCEDURE — 84153 ASSAY OF PSA TOTAL: CPT

## 2024-02-15 PROCEDURE — 84439 ASSAY OF FREE THYROXINE: CPT

## 2024-02-15 PROCEDURE — 80061 LIPID PANEL: CPT

## 2024-02-15 PROCEDURE — 85652 RBC SED RATE AUTOMATED: CPT | Mod: 91

## 2024-02-15 PROCEDURE — 36415 COLL VENOUS BLD VENIPUNCTURE: CPT

## 2024-02-15 PROCEDURE — 85025 COMPLETE CBC W/AUTO DIFF WBC: CPT | Mod: 91

## 2024-02-15 PROCEDURE — 84481 FREE ASSAY (FT-3): CPT

## 2024-02-15 PROCEDURE — 86039 ANTINUCLEAR ANTIBODIES (ANA): CPT

## 2024-02-15 PROCEDURE — 85652 RBC SED RATE AUTOMATED: CPT

## 2024-02-15 PROCEDURE — 82085 ASSAY OF ALDOLASE: CPT

## 2024-02-15 PROCEDURE — 86225 DNA ANTIBODY NATIVE: CPT

## 2024-02-15 PROCEDURE — 84443 ASSAY THYROID STIM HORMONE: CPT

## 2024-02-15 PROCEDURE — 86038 ANTINUCLEAR ANTIBODIES: CPT

## 2024-02-15 PROCEDURE — 80053 COMPREHEN METABOLIC PANEL: CPT

## 2024-02-15 PROCEDURE — 85025 COMPLETE CBC W/AUTO DIFF WBC: CPT

## 2024-02-15 PROCEDURE — 80053 COMPREHEN METABOLIC PANEL: CPT | Mod: 91

## 2024-02-15 PROCEDURE — 86141 C-REACTIVE PROTEIN HS: CPT

## 2024-02-15 PROCEDURE — 86235 NUCLEAR ANTIGEN ANTIBODY: CPT

## 2024-02-15 PROCEDURE — 86256 FLUORESCENT ANTIBODY TITER: CPT

## 2024-02-15 RX ORDER — LISINOPRIL 10 MG/1
TABLET ORAL
Qty: 90 TABLET | Refills: 2 | Status: SHIPPED | OUTPATIENT
Start: 2024-02-15

## 2024-02-16 LAB
ALDOLASE SERPL-CCNC: 6.1 U/L (ref 1.2–7.6)
DSDNA AB TITR SER CLIF: NORMAL {TITER}
NUCLEAR IGG SER QL IA: DETECTED

## 2024-02-17 LAB
ANA INTERPRETIVE COMMENT Q5143: ABNORMAL
ANA PATTERN Q5144: ABNORMAL
ANA TITER Q5145: ABNORMAL
ANTINUCLEAR ANTIBODY (ANA), HEP-2, IGG Q5142: DETECTED
ENA JO1 AB TITR SER: 0 AU/ML (ref 0–40)
ENA SM IGG SER-ACNC: 3 AU/ML (ref 0–40)

## 2024-02-18 LAB
DSDNA IGG TITR SER CLIF: NORMAL {TITER}
U1 SNRNP IGG SER QL: 3 UNITS (ref 0–19)

## 2024-02-20 DIAGNOSIS — Z65.8 PSYCHOSOCIAL STRESSORS: ICD-10-CM

## 2024-02-20 DIAGNOSIS — F33.1 MODERATE EPISODE OF RECURRENT MAJOR DEPRESSIVE DISORDER (HCC): ICD-10-CM

## 2024-02-20 DIAGNOSIS — G47.00 INSOMNIA, UNSPECIFIED TYPE: ICD-10-CM

## 2024-02-20 RX ORDER — FLUOXETINE HYDROCHLORIDE 20 MG/1
20 CAPSULE ORAL DAILY
Qty: 90 CAPSULE | Refills: 0 | Status: SHIPPED | OUTPATIENT
Start: 2024-02-20 | End: 2024-02-27 | Stop reason: SDUPTHER

## 2024-02-20 RX ORDER — BUPROPION HYDROCHLORIDE 200 MG/1
400 TABLET, EXTENDED RELEASE ORAL DAILY
Qty: 180 TABLET | Refills: 0 | Status: SHIPPED | OUTPATIENT
Start: 2024-02-20 | End: 2024-02-27 | Stop reason: SDUPTHER

## 2024-02-20 RX ORDER — FLUOXETINE HYDROCHLORIDE 20 MG/1
20 CAPSULE ORAL DAILY
Qty: 90 CAPSULE | Refills: 0 | Status: SHIPPED | OUTPATIENT
Start: 2024-02-20 | End: 2024-02-20 | Stop reason: SDUPTHER

## 2024-02-23 LAB
ENA SCL70 IGG SER QL: 0 AU/ML (ref 0–40)
ENA SS-B IGG SER IA-ACNC: 0 AU/ML (ref 0–40)
SSA52 R0ENA AB IGG Q0420: 2 AU/ML (ref 0–40)
SSA60 R0ENA AB IGG Q0419: 0 AU/ML (ref 0–40)

## 2024-02-26 DIAGNOSIS — G47.00 INSOMNIA, UNSPECIFIED TYPE: ICD-10-CM

## 2024-02-26 DIAGNOSIS — Z65.8 PSYCHOSOCIAL STRESSORS: ICD-10-CM

## 2024-02-26 DIAGNOSIS — F33.1 MODERATE EPISODE OF RECURRENT MAJOR DEPRESSIVE DISORDER (HCC): ICD-10-CM

## 2024-02-26 NOTE — TELEPHONE ENCOUNTER
Appointment 2/27/2024    Received request via: Pharmacy    Was the patient seen in the last year in this department? Yes    Does the patient have an active prescription (recently filled or refills available) for medication(s) requested? No    Pharmacy Name: [facility]    Does the patient have [facility] and need 100 day supply (blood pressure, diabetes and cholesterol meds only)? Medication is not for cholesterol, blood pressure or diabetes and Patient does not have SCP

## 2024-02-27 ENCOUNTER — OFFICE VISIT (OUTPATIENT)
Dept: BEHAVIORAL HEALTH | Facility: CLINIC | Age: 74
End: 2024-02-27
Payer: MEDICARE

## 2024-02-27 DIAGNOSIS — F41.1 ANXIETY, GENERALIZED: ICD-10-CM

## 2024-02-27 DIAGNOSIS — F33.42 RECURRENT MAJOR DEPRESSIVE DISORDER, IN FULL REMISSION (HCC): ICD-10-CM

## 2024-02-27 DIAGNOSIS — F51.04 CHRONIC INSOMNIA: Chronic | ICD-10-CM

## 2024-02-27 PROCEDURE — 99214 OFFICE O/P EST MOD 30 MIN: CPT | Performed by: PSYCHIATRY & NEUROLOGY

## 2024-02-27 RX ORDER — FLUOXETINE HYDROCHLORIDE 20 MG/1
CAPSULE ORAL
Qty: 30 CAPSULE | Refills: 1 | OUTPATIENT
Start: 2024-02-27

## 2024-02-27 RX ORDER — FLUOXETINE HYDROCHLORIDE 40 MG/1
40 CAPSULE ORAL DAILY
Qty: 90 CAPSULE | Refills: 1 | Status: SHIPPED | OUTPATIENT
Start: 2024-02-27 | End: 2024-03-15 | Stop reason: SDUPTHER

## 2024-02-27 RX ORDER — FLUOXETINE HYDROCHLORIDE 40 MG/1
40 CAPSULE ORAL DAILY
COMMUNITY
End: 2024-02-27 | Stop reason: SDUPTHER

## 2024-02-27 RX ORDER — FLUOXETINE HYDROCHLORIDE 20 MG/1
20 CAPSULE ORAL DAILY
Qty: 90 CAPSULE | Refills: 1 | Status: SHIPPED | OUTPATIENT
Start: 2024-02-27 | End: 2024-03-15 | Stop reason: SDUPTHER

## 2024-02-27 RX ORDER — BUPROPION HYDROCHLORIDE 200 MG/1
400 TABLET, EXTENDED RELEASE ORAL DAILY
Qty: 180 TABLET | Refills: 1 | Status: SHIPPED | OUTPATIENT
Start: 2024-02-27 | End: 2024-03-15 | Stop reason: SDUPTHER

## 2024-02-27 RX ORDER — MIRTAZAPINE 15 MG/1
7.5 TABLET, FILM COATED ORAL NIGHTLY
Qty: 45 TABLET | Refills: 1 | Status: SHIPPED | OUTPATIENT
Start: 2024-02-27 | End: 2024-03-15 | Stop reason: SDUPTHER

## 2024-03-03 NOTE — PROGRESS NOTES
Evaluation completed by: Luigi King M.D.   Date of Service: 02/27/2024  Appointment type: in-office appointment.    Information below was collected from: patient      CHIEF COMPLIANT:  Medication Management and Depression    HPI:   Buster Medina is a 73 y.o. old male who presents today for follow up appointment to address Medication Management and Depression    Patient reports that his depressive symptoms have completely resolved   -He is relieved after trouble finding effective medications in the past  -He denies adverse effects of current medications  -He has been enjoying activities and stays connected with social support  -He is wondering about timeline of current medications and whether he will need to take these indefinitely     CURRENT MEDICATIONS    Current Outpatient Medications:     buPROPion (WELLBUTRIN SR) 200 MG SR tablet, Take 2 Tablets by mouth every day for 180 days. Indications: Major Depressive Disorder, Disp: 180 Tablet, Rfl: 1    FLUoxetine (PROZAC) 20 MG Cap, Take 1 Capsule by mouth every day for 180 days. Take with 40 mg for total daily dose of 60 mg  Indications: Depression, Generalized Anxiety Disorder, Disp: 90 Capsule, Rfl: 1    fluoxetine (PROZAC) 40 MG capsule, Take 1 Capsule by mouth every day for 180 days. Taking with 20 mg for total daily dose of 60 mg, Disp: 90 Capsule, Rfl: 1    mirtazapine (REMERON) 15 MG Tab, Take 0.5 Tablets by mouth every evening for 180 days., Disp: 45 Tablet, Rfl: 1    lisinopril (PRINIVIL) 10 MG Tab, TAKE 1 TABLET BY MOUTH EVERY DAY. (STOP 20 MG DOSE), Disp: 90 Tablet, Rfl: 2    celecoxib (CELEBREX) 200 MG Cap, Take 1 Capsule by mouth every day., Disp: 90 Capsule, Rfl: 2    NON SPECIFIED, Ilumya injection every three months prescribed by dermatology, Disp: , Rfl:     omeprazole (PRILOSEC) 20 MG delayed-release capsule, Take 1 Capsule by mouth every evening., Disp: 100 Capsule, Rfl: 2    atorvastatin (LIPITOR) 80 MG tablet, Take 1 Tablet by mouth every  day., Disp: 100 Tablet, Rfl: 3    Clobetasol Propionate 0.05 % Shampoo, WASH SCALP ONCE A DAY, USE UP TO 10 DAYS CONSECUTIVELY, THEN TAKE BREAK AND USE ONLY AS NEEDED, Disp: , Rfl:     Magnesium 100 MG Cap, Take  by mouth., Disp: , Rfl:     ferrous sulfate 325 (65 Fe) MG tablet, Take 325 mg by mouth every day., Disp: , Rfl:     B Complex Vitamins (VITAMIN B COMPLEX PO), Take 1 Tablet by mouth every day., Disp: , Rfl:     Cholecalciferol (VITAMIN D3 PO), Take 1 Tablet by mouth every 7 days. OTC, Disp: , Rfl:     aspirin EC (ECOTRIN) 81 MG Tablet Delayed Response, Take 81 mg by mouth every bedtime., Disp: 30 Tab, Rfl: 0    Multiple Vitamins-Minerals (MELODY-DAY 1000 PO), Take 1 Tab by mouth 2 Times a Day., Disp: , Rfl:       MEDICAL HISTORY  Past Medical History:   Diagnosis Date    Anxiety     Anxiety, generalized 11/27/2023    Arthritis     osteo    Bowel habit changes 06/24/2021    Constipation    Cancer (HCC) 2007    bladder    Chickenpox     as a child    Depression     depression    High cholesterol     Hyperlipidemia     Hypertension     pt states  well controlled on meds    Kidney stone     Mumps     as a child     Obesity     Pneumothorax     Psoriatic arthritis (HCC)     Recurrent major depressive disorder, in partial remission (HCC) 09/21/2017    Restless leg syndrome     Sleep apnea     uses cpap    Tonsillitis      Allergies:   No Known Allergies      SURGICAL HISTORY  Past Surgical History:   Procedure Laterality Date    PB INCISE FINGER TENDON SHEATH Left 9/19/2023    Procedure: LEFT INDEX FINGER TRIGGER RELEASE;  Surgeon: Td Soliman M.D.;  Location: [facility];  Service: Orthopedics    PB INCISE FINGER TENDON SHEATH Left 8/30/2022    Procedure: LEFT MIDDLE FINGER TRIGGER RELEASE;  Surgeon: Td Soliman M.D.;  Location: [facility];  Service: Orthopedics    IN CYSTOURETHROSCOPY,BIOPSIES N/A 8/17/2022    Procedure: CYSTOSCOPY;  Surgeon: Jerzy Jasso M.D.;   Location: SURGERY [facility];  Service: Urology    UT CYSTOURETHROSCOPY,BIOPSIES  7/6/2021    Procedure: BIOPSY, BLADDER WITH FULGERATION;  Surgeon: Bob Millan M.D.;  Location: SURGERY [facility];  Service: Urology    BLADDER BIOPSY WITH CYSTOSCOPY  1/3/2018    Procedure: BLADDER BIOPSY WITH CYSTOSCOPY/WITH UPPER TRACT WASHING;  Surgeon: El Manuel M.D.;  Location: SURGERY [facility];  Service: Urology    RETROGRADES Bilateral 1/3/2018    Procedure: RETROGRADES;  Surgeon: El Manuel M.D.;  Location: [facility];  Service: Urology    PYELOGRAM Bilateral 1/3/2018    Procedure: PYELOGRAM;  Surgeon: El Manuel M.D.;  Location: SURGERY [facility];  Service: Urology    OTHER ORTHOPEDIC SURGERY  2015    shoulder replacement    ABDOMINAL EXPLORATION      ARTHROPLASTY      right shoulder    BLADDER BIOPSY WITH CYSTOSCOPY      EYE SURGERY      lasik     GASTRIC BYPASS LAPAROSCOPIC      HERNIA REPAIR      OPEN REDUCTION      OTHER Left     thumb joint    THORACOTOMY      TURP-VAPOR          FAMILY PSYCHIATRIC HISTORY  Family History   Problem Relation Age of Onset    Cancer Mother 80        lung cancer    Arthritis Mother     Diabetes Mother     Anxiety disorder Mother     Lung Disease Father 65    Cancer Father     Diabetes Father     Hypertension Father     Hyperlipidemia Father     Hyperlipidemia Brother     Arthritis Brother     Other Brother         Thyroid tumor    Sleep Apnea Brother     No Known Problems Maternal Grandmother     Cancer Maternal Grandfather     No Known Problems Paternal Grandmother     No Known Problems Paternal Grandfather     Heart Disease Brother 49        smoker, overweight    Hypertension Brother     Depression Other     Suicide Attempts Neg Hx     Bipolar disorder Neg Hx     Alcohol abuse Neg Hx     Drug abuse Neg Hx        SOCIAL HISTORY  Reviewed with patient and no changes.       PSYCHIATRIC EXAMINATION   Mental Status Exam    Appearance: Appropriate  dress and grooming  Sensorium: Alert and Oriented X 4  Behavior: Appropriate  Motor Activity: Unremarkable (Comment: Mild tremor in hands)  Eye Contact: Adequate  Speech: Normal  Mood: Euthymic  Affect: Congruent with normal range  Thought Flow: Linear  Thought Content: Unremarkable  Suicidality: Denies  Hallucinations: Denies  Cognition: Normal  Insight: Intact  Reliability: Apparently reliable  Judgement: Good         [place]  records   reviewed.  No concerns about misuse of controlled substance.    ASSESSMENT  Buster Medina is a 73 y.o. old male who presents today for follow up appointment to address Medication Management and Depression    Patient has had stable improvement on current medications and reports that his depression and anxiety have completely resolved.  He is sleeping well and enjoying activities.  He has recently noted an increase in his chronic essential tremor at a neurology appointment but denies functional changes and is not concerned by this.  Discussed plan to continue current medications for 1-2 years of stable improvement and then consider tapering down or off of the medications.     DIAGNOSES/PLAN  Encounter Diagnoses   Name Primary?    Recurrent major depressive disorder, in full remission (HCC)     Anxiety, generalized     Chronic insomnia        Continue current medications;  -Fluoxetine 60 mg po daily (20 mg and 40 mg taken together)  -Wellbutrin  mg po daily  -Remeron 7.5 mg po qhs        Medication options, alternatives (including no medications) and medication risks/benefits/side effects were discussed in detail.  The patient was advised to call, message clinician on [employer], or come in to the clinic if symptoms worsen or if questions/issues regarding their medications arise.  The patient verbalized understanding and agreement.    The patient was educated to call 911, call the suicide hotline, or go to the local ER if having thoughts of suicide or homicide.  The patient  verbalized understanding and agreement.   The proposed treatment plan was discussed with the patient who was provided the opportunity to ask questions and make suggestions regarding alternative treatment. Patient verbalized understanding and expressed agreement with the plan.      Return to clinic in 3 months or sooner if symptoms worsen.    This appointment was supervised by attending psychiatrist, who agrees with assessment and treatment plan.  See attending attestation for more details.     Luigi King M.D.

## 2024-03-04 ENCOUNTER — OFFICE VISIT (OUTPATIENT)
Dept: RHEUMATOLOGY | Facility: MEDICAL CENTER | Age: 74
End: 2024-03-04
Attending: INTERNAL MEDICINE
Payer: MEDICARE

## 2024-03-04 VITALS
BODY MASS INDEX: 29.13 KG/M2 | RESPIRATION RATE: 14 BRPM | DIASTOLIC BLOOD PRESSURE: 60 MMHG | TEMPERATURE: 97.4 F | HEART RATE: 73 BPM | OXYGEN SATURATION: 96 % | WEIGHT: 186 LBS | SYSTOLIC BLOOD PRESSURE: 130 MMHG

## 2024-03-04 DIAGNOSIS — Z98.84 H/O GASTRIC BYPASS: ICD-10-CM

## 2024-03-04 DIAGNOSIS — R76.8 ANA POSITIVE: ICD-10-CM

## 2024-03-04 DIAGNOSIS — Z51.81 ENCOUNTER FOR THERAPEUTIC DRUG MONITORING: ICD-10-CM

## 2024-03-04 DIAGNOSIS — R25.1 TREMOR: ICD-10-CM

## 2024-03-04 DIAGNOSIS — I10 ESSENTIAL HYPERTENSION: ICD-10-CM

## 2024-03-04 DIAGNOSIS — L40.9 PSORIASIS: ICD-10-CM

## 2024-03-04 DIAGNOSIS — L40.50 PSORIATIC ARTHRITIS (HCC): ICD-10-CM

## 2024-03-04 DIAGNOSIS — I34.1 MITRAL VALVE PROLAPSE: ICD-10-CM

## 2024-03-04 DIAGNOSIS — G47.30 SLEEP APNEA, UNSPECIFIED TYPE: ICD-10-CM

## 2024-03-04 DIAGNOSIS — M10.9 GOUT, UNSPECIFIED CAUSE, UNSPECIFIED CHRONICITY, UNSPECIFIED SITE: ICD-10-CM

## 2024-03-04 PROCEDURE — 99214 OFFICE O/P EST MOD 30 MIN: CPT | Performed by: INTERNAL MEDICINE

## 2024-03-04 PROCEDURE — 3078F DIAST BP <80 MM HG: CPT | Performed by: INTERNAL MEDICINE

## 2024-03-04 PROCEDURE — 3075F SYST BP GE 130 - 139MM HG: CPT | Performed by: INTERNAL MEDICINE

## 2024-03-04 PROCEDURE — 99212 OFFICE O/P EST SF 10 MIN: CPT | Performed by: INTERNAL MEDICINE

## 2024-03-04 ASSESSMENT — FIBROSIS 4 INDEX: FIB4 SCORE: 2.79

## 2024-03-04 NOTE — PROGRESS NOTES
Chief Complaint- joint pain     Subjective:   Buster Medina is a 73 y.o. male here today for follow up of rheumatological issues    This is a follow-up visit for this patient who we see in this clinic for psoriatic arthritis that was diagnosed about 2008 by a rheumatologist in [place], [place].  Since last visit patient was switched from Humira to Ilumna prescribed by dermatology.  Patient states that he feels the Ilumna is working quite well for him.  Patient denies any side effects from the medication, denies any unexplained weight loss, denies any fevers of unknown etiology, denies any GI upset, denies any rashes, denies any new joint swelling, denies recurrent infections.      We also follow patient for hyperuricemia, patient currently stable on no treatment.  Last uric acid level at 7.6 in July 2023.     Comorbidities include a history of malignant bladder lesion that was resected patient states 2008, patient states his urologist after reviewing records states about 2012,  Patient following up with urology     Additional Co morbidities include HTN, high cholesterol, hx left ahilles tendon rupture and repair, hx left knee arthroscopic surgery for meniscal tear, s/p left rotator cuff tear, s/p bladder cancer about 2012 no recurrence s/p surgical intervention only, hx of gastric bypass 2005 [facility] [facility], pt does f/u with Dr Gamez q year.  Patient also with sleep apnea on CPAP nightly as well as mitral valve prolapse.  Patient also with essential tremor diagnosed by neurology.         Right TSA  Right TKA     S/p topical treatments  S/p plaquenil-cause taste abnormalities  S/p Otezla-diarrhea     Uric acid 6.9 1/2021 (on no treatment)  Uric acid 4.1 5/2021 (on no treatment)  Uric acid 7.6 7/2023 (on no treatment)     HBsAg/HBcAb neg 7/2023  HCV neg 7/2023  Quantiferon Gold neg 7/2023     G6PD 10.9 adequate 9/2019  CCP neg 3/2018  RF neg 3/2018  SEAN neg 3/2018; SEAN 1:160 speckles 2/2024  SSA neg 2/2024  SSB  neg 2/2024  Scl-70 neg 2/2024  dsDNA neg 2/2024  REA-1 neg 2/2024     Hand x-rays 3/2018-indicates possible erosion of the fourth metacarpal on the left hand, OA of the right first CMC joint  Hand x-rays 8/2022-IMPRESSION:  New subtle DIP and carpus erosions could represent psoriasis or other inflammatory arthropathy. The pattern is not typical of rheumatoid arthritis  Right severe first CMC osteoarthritis, left trapeziectomy and suspensionplasty     Feet x-rays 3/2018-DJD     Feet x-rays 8/2022-IMPRESSION:  No specific findings to confirm inflammatory arthropathy  Mild right hallux valgus deformity and first metatarsal-phalangeal osteoarthritis as before  Interval left first second and third TMT fusion with no hardware fracture identified          Current Outpatient Medications   Medication Sig Dispense Refill    buPROPion (WELLBUTRIN SR) 200 MG SR tablet Take 2 Tablets by mouth every day for 180 days. Indications: Major Depressive Disorder 180 Tablet 1    FLUoxetine (PROZAC) 20 MG Cap Take 1 Capsule by mouth every day for 180 days. Take with 40 mg for total daily dose of 60 mg  Indications: Depression, Generalized Anxiety Disorder 90 Capsule 1    fluoxetine (PROZAC) 40 MG capsule Take 1 Capsule by mouth every day for 180 days. Taking with 20 mg for total daily dose of 60 mg 90 Capsule 1    mirtazapine (REMERON) 15 MG Tab Take 0.5 Tablets by mouth every evening for 180 days. 45 Tablet 1    lisinopril (PRINIVIL) 10 MG Tab TAKE 1 TABLET BY MOUTH EVERY DAY. (STOP 20 MG DOSE) 90 Tablet 2    celecoxib (CELEBREX) 200 MG Cap Take 1 Capsule by mouth every day. 90 Capsule 2    NON SPECIFIED Ilumya injection every three months prescribed by dermatology      omeprazole (PRILOSEC) 20 MG delayed-release capsule Take 1 Capsule by mouth every evening. 100 Capsule 2    atorvastatin (LIPITOR) 80 MG tablet Take 1 Tablet by mouth every day. 100 Tablet 3    Magnesium 100 MG Cap Take  by mouth.      ferrous sulfate 325 (65 Fe) MG tablet  Take 325 mg by mouth every day.      B Complex Vitamins (VITAMIN B COMPLEX PO) Take 1 Tablet by mouth every day.      Cholecalciferol (VITAMIN D3 PO) Take 1 Tablet by mouth every 7 days. OTC      aspirin EC (ECOTRIN) 81 MG Tablet Delayed Response Take 81 mg by mouth every bedtime. 30 Tab 0    Multiple Vitamins-Minerals (MELODY-DAY 1000 PO) Take 1 Tab by mouth 2 Times a Day.      Clobetasol Propionate 0.05 % Shampoo WASH SCALP ONCE A DAY, USE UP TO 10 DAYS CONSECUTIVELY, THEN TAKE BREAK AND USE ONLY AS NEEDED (Patient not taking: Reported on 3/4/2024)       No current facility-administered medications for this visit.     He  has a past medical history of Anxiety, Anxiety, generalized (11/27/2023), Arthritis, Bowel habit changes (06/24/2021), Cancer ([facility]) (2007), Chickenpox, Depression, High cholesterol, Hyperlipidemia, Hypertension, Kidney stone, Mumps, Obesity, Pneumothorax, Psoriatic arthritis ([facility]), Recurrent major depressive disorder, in partial remission ([facility]) (09/21/2017), Restless leg syndrome, Sleep apnea, and Tonsillitis.    ROS   Other than what is mentioned in HPI or physical exam, there is no history of headaches, double vision or blurred vision.  No trouble swallowing difficulties .  No chest complaints including chest pain, cough or sputum production. No GI complaints including nausea, vomiting, change in bowel habits, or past peptic ulcer disease. No history of blood in the stools. No urinary complaints including dysuria or frequency. No history of alopecia, photosensitivity     Objective:     /60   Pulse 73   Temp 36.3 °C (97.4 °F) (Temporal)   Resp 14   Wt 84.4 kg (186 lb)   SpO2 96%  Body mass index is 29.13 kg/m².   Physical Exam:    Constitutional: Alert and oriented X3, patient is talkative with good eye contact.Skin: Warm, dry, good turgor, positive psoriatic plaque on the extensor surface of the right knee eye: Equal, round and reactive, conjunctiva clear, lids normal EOM intactENMT:  Lips without lesions,  pinna without deformityNeck: Trachea midline, no masses, no thyromegaly.Lymph:  No cervical lymphadenopathy, no axillary lymphadenopathy, no supraclavicular lymphadenopathyRespiratory: Unlabored respiratory effort, lungs clear to auscultation, no wheezes, no ronchi.Cardiovascular: Normal S1, S2, regular rate and rhythm with a 2 out of 6 systolic murmur heard at the left sternal border.Abdomen: Soft, non-distended.Psych: Alert and oriented x3, normal affect and mood.Neuro: Cranial nerves 2-12 are grossly intact Ext:no joint laxity noted in bilateral arms, no joint laxity noted in bilateral legs, no synovitis no dactylitis no enthesitis, shoulders full range of motion without limitations, elbows without flexion contractures no nodules no tophi    Lab Results   Component Value Date/Time    QNTTBGOLD Negative 08/16/2018 12:13 PM     Lab Results   Component Value Date/Time    HEPBCORIGM Non-Reactive 07/19/2023 12:12 PM    HEPBSAG Non-Reactive 07/19/2023 12:12 PM     Lab Results   Component Value Date/Time    HEPCAB Non-Reactive 07/19/2023 12:12 PM     Lab Results   Component Value Date/Time    SODIUM 140 02/15/2024 08:49 AM    POTASSIUM 4.1 02/15/2024 08:49 AM    CHLORIDE 104 02/15/2024 08:49 AM    CO2 27 02/15/2024 08:49 AM    GLUCOSE 111 (H) 02/15/2024 08:49 AM    BUN 19 02/15/2024 08:49 AM    CREATININE 1.16 02/15/2024 08:49 AM      Lab Results   Component Value Date/Time    WBC 6.1 02/15/2024 08:49 AM    RBC 4.76 02/15/2024 08:49 AM    HEMOGLOBIN 15.6 02/15/2024 08:49 AM    HEMATOCRIT 46.4 02/15/2024 08:49 AM    MCV 97.5 02/15/2024 08:49 AM    MCH 32.8 02/15/2024 08:49 AM    MCHC 33.6 02/15/2024 08:49 AM    MPV 11.3 02/15/2024 08:49 AM    NEUTSPOLYS 49.00 02/15/2024 08:49 AM    LYMPHOCYTES 34.80 02/15/2024 08:49 AM    MONOCYTES 10.90 02/15/2024 08:49 AM    EOSINOPHILS 4.00 02/15/2024 08:49 AM    BASOPHILS 0.80 02/15/2024 08:49 AM      Lab Results   Component Value Date/Time    CALCIUM 9.6  02/15/2024 08:49 AM    ASTSGOT 37 02/15/2024 08:49 AM    ALTSGPT 46 02/15/2024 08:49 AM    ALKPHOSPHAT 98 02/15/2024 08:49 AM    TBILIRUBIN 0.6 02/15/2024 08:49 AM    ALBUMIN 4.6 02/15/2024 08:49 AM    TOTPROTEIN 7.8 02/15/2024 08:49 AM     Lab Results   Component Value Date/Time    URICACID 7.6 07/19/2023 12:12 PM    RHEUMFACTN <10 03/21/2018 02:19 PM    CCPANTIBODY 4 03/21/2018 02:20 PM    ANTINUCAB Detected (A) 02/15/2024 08:49 AM     Lab Results   Component Value Date/Time    ANTIDNADS SEE BELOW 02/15/2024 08:49 AM    RNPAB 3 02/15/2024 08:49 AM    SMITHAB 3 02/15/2024 08:49 AM    HQNZRCO41 0 02/15/2024 08:49 AM     Lab Results   Component Value Date/Time    ANTIDNADS SEE BELOW 02/15/2024 08:49 AM    DSDNA <1:10 02/15/2024 08:49 AM    JO1AB 0 02/15/2024 08:49 AM    RNPAB 3 02/15/2024 08:49 AM    ANTISSBSJ 0 02/15/2024 08:49 AM     Lab Results   Component Value Date/Time    SEDRATEWES 2 02/15/2024 08:49 AM     Lab Results   Component Value Date/Time    G6PD 10.9 09/17/2019 10:30 AM       Assessment and Plan:     1. Psoriatic arthritis (HCC)  Currently managed by dermatology, patient currently on Ilumna with benefit  - THYROID PEROXIDASE  (TPO) AB; Future  - ANTITHYROGLOBULIN AB; Future  - ANTI-SMOOTH MUSCLE ABS; Future  - MITOCHONDRIAL (M2) AB; Future  - URIC ACID, SERUM    2. Psoriasis  Managed by dermatology  - THYROID PEROXIDASE  (TPO) AB; Future  - ANTITHYROGLOBULIN AB; Future  - ANTI-SMOOTH MUSCLE ABS; Future  - MITOCHONDRIAL (M2) AB; Future  - URIC ACID, SERUM    3. Encounter for therapeutic drug monitoring  Currently on Ilumna managed by dermatology  - THYROID PEROXIDASE  (TPO) AB; Future  - ANTITHYROGLOBULIN AB; Future  - ANTI-SMOOTH MUSCLE ABS; Future  - MITOCHONDRIAL (M2) AB; Future  - URIC ACID, SERUM    4. Gout, unspecified cause, unspecified chronicity, unspecified site  Currently not on any medication, patient denies any flares, today check uric acid level    5. SEAN positive  Found since last visit  by PCP who is doing labs for evaluation of patient's essential tremor, thus far other serologies negative, today we will check for autoimmune thyroiditis and autoimmune hepatitis issues.  - THYROID PEROXIDASE  (TPO) AB; Future  - ANTITHYROGLOBULIN AB; Future  - ANTI-SMOOTH MUSCLE ABS; Future  - MITOCHONDRIAL (M2) AB; Future  - URIC ACID, SERUM    6. Mitral valve prolapse  Followed by cardiology    7. Essential hypertension  May impact the type of medications we can use for this patient's arthritis. We will have to keep this under advisement.    8. Sleep apnea, unspecified type  Uses CPAP nightly, followed by pulmonolog     9. H/O gastric bypass  Followed by Dr. Gamez    10. Tremor  Diagnosed with essential tremor by neurology    Followup: Return in about 1 year (around 3/4/2025). or sooner ankush Medina  was seen 30 minutes face-to-face of which more than 50% of the time was spent counseling the patient (excluding time for procedures)  regarding  rheumatological condition and care. Therapy was discussed in detail.      Please note that this dictation was created using voice recognition software. I have made every reasonable attempt to correct obvious errors, but I expect that there are errors of grammar and possibly content that I did not discover before finalizing the note.

## 2024-03-15 DIAGNOSIS — F33.42 RECURRENT MAJOR DEPRESSIVE DISORDER, IN FULL REMISSION (HCC): ICD-10-CM

## 2024-03-15 DIAGNOSIS — F41.1 ANXIETY, GENERALIZED: ICD-10-CM

## 2024-03-15 RX ORDER — FLUOXETINE HYDROCHLORIDE 20 MG/1
20 CAPSULE ORAL DAILY
Qty: 90 CAPSULE | Refills: 0 | Status: SHIPPED | OUTPATIENT
Start: 2024-03-15 | End: 2024-06-13

## 2024-03-15 RX ORDER — MIRTAZAPINE 15 MG/1
7.5 TABLET, FILM COATED ORAL NIGHTLY
Qty: 45 TABLET | Refills: 0 | Status: SHIPPED | OUTPATIENT
Start: 2024-03-15 | End: 2024-06-13

## 2024-03-15 RX ORDER — FLUOXETINE HYDROCHLORIDE 40 MG/1
40 CAPSULE ORAL DAILY
Qty: 90 CAPSULE | Refills: 0 | Status: SHIPPED | OUTPATIENT
Start: 2024-03-15 | End: 2024-06-13

## 2024-03-15 RX ORDER — BUPROPION HYDROCHLORIDE 200 MG/1
400 TABLET, EXTENDED RELEASE ORAL DAILY
Qty: 180 TABLET | Refills: 0 | Status: SHIPPED | OUTPATIENT
Start: 2024-03-15 | End: 2024-06-13

## 2024-03-15 NOTE — TELEPHONE ENCOUNTER
Pharmacy needs fluoxetine 40 mg prescription resent. They do not have it on file.    Received request via: Pharmacy    Was the patient seen in the last year in this department? Yes    Does the patient have an active prescription (recently filled or refills available) for medication(s) requested? No    Pharmacy Name: [facility]    Does the patient have [facility] Plus and need 100 day supply (blood pressure, diabetes and cholesterol meds only)? Medication is not for cholesterol, blood pressure or diabetes and Patient does not have SCP

## 2024-04-03 SDOH — ECONOMIC STABILITY: INCOME INSECURITY: IN THE LAST 12 MONTHS, WAS THERE A TIME WHEN YOU WERE NOT ABLE TO PAY THE MORTGAGE OR RENT ON TIME?: NO

## 2024-04-03 SDOH — HEALTH STABILITY: PHYSICAL HEALTH: ON AVERAGE, HOW MANY MINUTES DO YOU ENGAGE IN EXERCISE AT THIS LEVEL?: 20 MIN

## 2024-04-03 SDOH — ECONOMIC STABILITY: FOOD INSECURITY: WITHIN THE PAST 12 MONTHS, THE FOOD YOU BOUGHT JUST DIDN'T LAST AND YOU DIDN'T HAVE MONEY TO GET MORE.: NEVER TRUE

## 2024-04-03 SDOH — HEALTH STABILITY: MENTAL HEALTH
STRESS IS WHEN SOMEONE FEELS TENSE, NERVOUS, ANXIOUS, OR CAN'T SLEEP AT NIGHT BECAUSE THEIR MIND IS TROUBLED. HOW STRESSED ARE YOU?: NOT AT ALL

## 2024-04-03 SDOH — ECONOMIC STABILITY: HOUSING INSECURITY

## 2024-04-03 SDOH — ECONOMIC STABILITY: FOOD INSECURITY: WITHIN THE PAST 12 MONTHS, YOU WORRIED THAT YOUR FOOD WOULD RUN OUT BEFORE YOU GOT MONEY TO BUY MORE.: NEVER TRUE

## 2024-04-03 SDOH — HEALTH STABILITY: PHYSICAL HEALTH: ON AVERAGE, HOW MANY DAYS PER WEEK DO YOU ENGAGE IN MODERATE TO STRENUOUS EXERCISE (LIKE A BRISK WALK)?: 2 DAYS

## 2024-04-03 SDOH — ECONOMIC STABILITY: INCOME INSECURITY: HOW HARD IS IT FOR YOU TO PAY FOR THE VERY BASICS LIKE FOOD, HOUSING, MEDICAL CARE, AND HEATING?: NOT HARD AT ALL

## 2024-04-03 ASSESSMENT — SOCIAL DETERMINANTS OF HEALTH (SDOH)
ARE YOU MARRIED, WIDOWED, DIVORCED, SEPARATED, NEVER MARRIED, OR LIVING WITH A PARTNER?: LIVING WITH PARTNER
DO YOU BELONG TO ANY CLUBS OR ORGANIZATIONS SUCH AS CHURCH GROUPS UNIONS, FRATERNAL OR ATHLETIC GROUPS, OR SCHOOL GROUPS?: NO
HOW OFTEN DO YOU GET TOGETHER WITH FRIENDS OR RELATIVES?: ONCE A WEEK
IN A TYPICAL WEEK, HOW MANY TIMES DO YOU TALK ON THE PHONE WITH FAMILY, FRIENDS, OR NEIGHBORS?: TWICE A WEEK
WITHIN THE PAST 12 MONTHS, YOU WORRIED THAT YOUR FOOD WOULD RUN OUT BEFORE YOU GOT THE MONEY TO BUY MORE: NEVER TRUE
IN A TYPICAL WEEK, HOW MANY TIMES DO YOU TALK ON THE PHONE WITH FAMILY, FRIENDS, OR NEIGHBORS?: TWICE A WEEK
DO YOU BELONG TO ANY CLUBS OR ORGANIZATIONS SUCH AS CHURCH GROUPS UNIONS, FRATERNAL OR ATHLETIC GROUPS, OR SCHOOL GROUPS?: NO
ARE YOU MARRIED, WIDOWED, DIVORCED, SEPARATED, NEVER MARRIED, OR LIVING WITH A PARTNER?: LIVING WITH PARTNER
HOW OFTEN DO YOU ATTENT MEETINGS OF THE CLUB OR ORGANIZATION YOU BELONG TO?: PATIENT DECLINED
HOW OFTEN DO YOU ATTENT MEETINGS OF THE CLUB OR ORGANIZATION YOU BELONG TO?: PATIENT DECLINED
HOW OFTEN DO YOU GET TOGETHER WITH FRIENDS OR RELATIVES?: ONCE A WEEK
HOW HARD IS IT FOR YOU TO PAY FOR THE VERY BASICS LIKE FOOD, HOUSING, MEDICAL CARE, AND HEATING?: NOT HARD AT ALL
HOW OFTEN DO YOU HAVE SIX OR MORE DRINKS ON ONE OCCASION: LESS THAN MONTHLY
HOW MANY DRINKS CONTAINING ALCOHOL DO YOU HAVE ON A TYPICAL DAY WHEN YOU ARE DRINKING: 1 OR 2
HOW OFTEN DO YOU ATTEND CHURCH OR RELIGIOUS SERVICES?: NEVER
HOW OFTEN DO YOU ATTEND CHURCH OR RELIGIOUS SERVICES?: NEVER

## 2024-04-03 ASSESSMENT — LIFESTYLE VARIABLES
HOW MANY STANDARD DRINKS CONTAINING ALCOHOL DO YOU HAVE ON A TYPICAL DAY: 1 OR 2
HOW OFTEN DO YOU HAVE SIX OR MORE DRINKS ON ONE OCCASION: LESS THAN MONTHLY

## 2024-04-05 ENCOUNTER — OFFICE VISIT (OUTPATIENT)
Dept: MEDICAL GROUP | Age: 74
End: 2024-04-05
Payer: MEDICARE

## 2024-04-05 VITALS
OXYGEN SATURATION: 96 % | HEART RATE: 66 BPM | HEIGHT: 67 IN | BODY MASS INDEX: 29.03 KG/M2 | SYSTOLIC BLOOD PRESSURE: 130 MMHG | DIASTOLIC BLOOD PRESSURE: 76 MMHG | TEMPERATURE: 97.1 F | WEIGHT: 185 LBS

## 2024-04-05 DIAGNOSIS — E78.49 FAMILIAL HYPERLIPIDEMIA: ICD-10-CM

## 2024-04-05 DIAGNOSIS — L40.50 PSORIATIC ARTHRITIS (HCC): ICD-10-CM

## 2024-04-05 DIAGNOSIS — Z00.00 MEDICARE ANNUAL WELLNESS VISIT, INITIAL: ICD-10-CM

## 2024-04-05 DIAGNOSIS — F33.42 RECURRENT MAJOR DEPRESSIVE DISORDER, IN FULL REMISSION (HCC): ICD-10-CM

## 2024-04-05 DIAGNOSIS — K21.9 GASTROESOPHAGEAL REFLUX DISEASE WITHOUT ESOPHAGITIS: ICD-10-CM

## 2024-04-05 DIAGNOSIS — R26.89 BALANCE PROBLEM: ICD-10-CM

## 2024-04-05 DIAGNOSIS — G25.0 ESSENTIAL TREMOR: ICD-10-CM

## 2024-04-05 PROCEDURE — G0439 PPPS, SUBSEQ VISIT: HCPCS | Performed by: FAMILY MEDICINE

## 2024-04-05 PROCEDURE — 3075F SYST BP GE 130 - 139MM HG: CPT | Performed by: FAMILY MEDICINE

## 2024-04-05 PROCEDURE — 3078F DIAST BP <80 MM HG: CPT | Performed by: FAMILY MEDICINE

## 2024-04-05 ASSESSMENT — FIBROSIS 4 INDEX: FIB4 SCORE: 2.79

## 2024-04-05 ASSESSMENT — PATIENT HEALTH QUESTIONNAIRE - PHQ9: CLINICAL INTERPRETATION OF PHQ2 SCORE: 0

## 2024-04-05 ASSESSMENT — ENCOUNTER SYMPTOMS: GENERAL WELL-BEING: GOOD

## 2024-04-05 ASSESSMENT — ACTIVITIES OF DAILY LIVING (ADL): BATHING_REQUIRES_ASSISTANCE: 0

## 2024-04-05 NOTE — PROGRESS NOTES
MA COMPONENT         HPI:    Buster Medina is a 73 y.o. here today for a Medicare Annual Wellness Visit.       History of Present Illness  The patient is a 73-year-old male who presents for evaluation of balance issues.    The patient sought consultation with a neurologist due to balance issues, who ruled out Parkinson's disease. Despite this, he continues to express concern over potential falls when he leans forward. The neurologist has suggested that these issues are not related to Parksinsons. The patient denies experiencing lightheadedness. The neurologist has identified benign essential tremors and has recommended alcohol consumption as a treatment option. The patient acknowledges the need to monitor the occurrence of tremors, despite not consuming hard liquors.        Patient Active Problem List    Diagnosis Date Noted    Balance problem 04/05/2024    Muscle weakness 01/26/2024    Thrombocytopenia, unspecified (Formerly [facility]) 01/26/2024    Anxiety, generalized 11/27/2023    Nonrheumatic mitral valve regurgitation 08/22/2023    Trigger finger, left index finger 08/04/2023    Orthopedic aftercare 08/30/2022    Postural dizziness with presyncope 06/30/2022    Trigger finger, left middle finger 05/23/2022    Balance disorder 09/03/2021    Sun-damaged skin 09/03/2021    Hoarseness 03/30/2021    Functional diarrhea 07/16/2020    Tinnitus of both ears 04/09/2019    Family history of coronary artery disease in brother 09/21/2018    Familial hyperlipidemia 09/21/2018    Psoriatic arthritis (Formerly [facility]) 08/16/2018    Erectile dysfunction of nonorganic origin 03/05/2018    Hypokalemia 09/21/2017    Malignant neoplasm of anterior wall of urinary bladder (Formerly [facility]) 09/21/2017    Essential hypertension 09/21/2017    Recurrent major depressive disorder, in full remission (Formerly [facility]) 09/21/2017    Chronic insomnia 09/21/2017    Plantar fasciitis 09/21/2017    Gastroesophageal reflux disease without esophagitis 09/21/2017    Benign nodular prostatic  hyperplasia without lower urinary tract symptoms 09/21/2017    Vitamin D deficiency 09/21/2017    Periodic limb movement 07/31/2014    Essential tremor 06/25/2011    Status post bariatric surgery 03/08/2009    Obstructive sleep apnea (adult) (pediatric) 03/08/2009    Testicular hypofunction 05/26/2005            Current supplements as per medication list.         Allergies: Patient has no known allergies.         Current social contact/activities: STATIONARY BIKE 2-3 TIMES A WEEK         He  reports that he has never smoked. He has never used smokeless tobacco. He reports that he does not currently use alcohol after a past usage of about 8.4 oz of alcohol per week. He reports that he does not currently use drugs after having used the following drugs: Marijuana and Oral.         Lives with at home: PARTNER    Any caregivers: NO    Is the caregiver paid or unpaid: NO         ROS:     Gait: Uses no assistive device    Ostomy: No    Other tubes: No    Amputations: No    Chronic oxygen use: No    Last eye exam: 03/2024    Wears hearing aids: Yes    : Denies any urinary leakage during the last 6 months         Depression Screening  Little interest or pleasure in doing things?  0 - not at all  Feeling down, depressed , or hopeless? 0 - not at all  Trouble falling or staying asleep, or sleeping too much?     Feeling tired or having little energy?     Poor appetite or overeating?     Feeling bad about yourself - or that you are a failure or have let yourself or your family down?    Trouble concentrating on things, such as reading the newspaper or watching television?    Moving or speaking so slowly that other people could have noticed.  Or the opposite - being so fidgety or restless that you have been moving around a lot more than usual?     Thoughts that you would be better off dead, or of hurting yourself?     Patient Health Questionnaire Score:      If depressive symptoms identified deferred to follow up visit unless  specifically addressed in assessment and plan.    Interpretation of PHQ-9 Total Score   Score Severity   1-4 No Depression   5-9 Mild Depression   10-14 Moderate Depression   15-19 Moderately Severe Depression   20-27 Severe Depression    Screening for Cognitive Impairment  Do you or any of your friends or family members have any concern about your memory? No  Three Minute Recall (Leader, Season, Table) 2/3 SEASON WAS FORGOTTEN  Kaleb clock face with all 12 numbers and set the hands to show 10 minutes after 11.  Yes    Cognitive concerns identified deferred for follow up unless specifically addressed in assessment and plan.    Fall Risk Assessment  Has the patient had two or more falls in the last year or any fall with injury in the last year?  Yes    Safety Assessment  Do you always wear your seatbelt?  Yes  Any changes to home needed to function safely? No  Difficulty hearing.  Yes  Patient counseled about all safety risks that were identified.    Functional Assessment ADLs  Are there any barriers preventing you from cooking for yourself or meeting nutritional needs?  No.    Are there any barriers preventing you from driving safely or obtaining transportation?  No.    Are there any barriers preventing you from using a telephone or calling for help?  No    Are there any barriers preventing you from shopping?  No.    Are there any barriers preventing you from taking care of your own finances?  No    Are there any barriers preventing you from managing your medications?  No    Are there any barriers preventing you from showering, bathing or dressing yourself? No    Are there any barriers preventing you from doing housework or laundry? No    Are there any barriers preventing you from using the toilet?No    Are you currently engaging in any exercise or physical activity?  Yes.      Self-Assessment of Health  What is your perception of your health? Good    Do you sleep more than six hours a night? Yes    In the past 7  days, how much did pain keep you from doing your normal work? None    Do you spend quality time with family or friends (virtually or in person)? Yes    Do you usually eat a heart healthy diet that constists of a variety of fruits, vegetables, whole grains and fiber? Yes    Do you eat foods high in fat and/or Fast Food more than three times per week? No    How concerned are you that your medical conditions are not being well managed? Not at all    Are you worried that in the next 2 months, you may not have stable housing that you own, rent, or stay in as part of a household? No        Advance Care Planning  Do you have an Advance Directive, Living Will, Durable Power of , or POLST? Yes  Advance Directive       is on file      Health Maintenance Summary            Annual Wellness Visit (Yearly) Next due on 4/5/2025 04/05/2024  Visit Dx: Medicare annual wellness visit, initial    04/05/2024  Level of Service: ANNUAL WELLNESS VISIT-INCLUDES PPPS SUBSEQUE*    08/23/2022  Visit Dx: Medicare annual wellness visit, initial    09/03/2021  Visit Dx: Medicare annual wellness visit, initial              Colorectal Cancer Screening (Colon Cancer Screening Cologuard Stool (FIT DNA) - Preferred) Tentatively due on 2/12/2027 02/12/2024  COLOGUARD COLON CANCER SCREENING    02/12/2024  COLOGUARD COLON CANCER SCREENING    10/22/2019  COLOGUARD COLON CANCER SCREENING    10/22/2019  COLOGUARD COLON CANCER SCREENING              IMM DTaP/Tdap/Td Vaccine (3 - Td or Tdap) Next due on 9/22/2027 09/22/2017  Imm Admin: Tdap Vaccine    02/17/2011  Imm Admin: Tdap Vaccine    01/22/2004  Imm Admin: TD Vaccine              Hepatitis A Vaccine (Hep A) (Series Information) Aged Out      12/02/2011  Imm Admin: Hepatitis A Vaccine, Adult    02/17/2011  Imm Admin: Hepatitis A Vaccine, Adult              Zoster (Shingles) Vaccines (Series Information) Completed      07/27/2020  Imm Admin: Zoster Vaccine Recombinant (RZV)  (SHINGRIX)    02/17/2020  Imm Admin: Zoster Vaccine Recombinant (RZV) (SHINGRIX)    08/17/2016  Imm Admin: Zoster Vaccine Live (ZVL) (Zostavax) - HISTORICAL DATA    05/07/2012  Imm Admin: Zoster Vaccine Live (ZVL) (Zostavax) - HISTORICAL DATA    06/20/2011  Imm Admin: Zoster Vaccine Live (ZVL) (Zostavax) - HISTORICAL DATA    Only the first 5 history entries have been loaded, but more history exists.              Pneumococcal Vaccine: 65+ Years (Series Information) Completed      09/18/2020  Imm Admin: Pneumococcal polysaccharide vaccine (PPSV-23)    09/01/2020  Imm Admin: Pneumococcal Conjugate Vaccine (Prevnar/PCV-13)    09/01/2020  Imm Admin: Pneumococcal Conjugate Vaccine (Prevnar/PCV-13)    09/21/2017  Imm Admin: Pneumococcal Conjugate Vaccine (Prevnar/PCV-13)    02/14/2017  Imm Admin: Pneumococcal polysaccharide vaccine (PPSV-23)    Only the first 5 history entries have been loaded, but more history exists.              Hepatitis C Screening  Tentatively Complete      07/19/2023  Hepatitis C Antibody component of HEP C VIRUS ANTIBODY    12/16/2021  Hepatitis C Antibody component of HEP C VIRUS ANTIBODY    03/02/2020  Hepatitis C Antibody component of HEP C VIRUS ANTIBODY    08/16/2018  Hepatitis C Antibody component of HEP C VIRUS ANTIBODY              Influenza Vaccine (Series Information) Completed      10/02/2023  Imm Admin: Influenza Vaccine, Quadrivalent, Adjuvanted (Pf)    09/30/2022  Imm Admin: Influenza, Unspecified - HISTORICAL DATA    09/30/2022  Imm Admin: Influenza Vaccine Adult HD    10/12/2021  Imm Admin: Influenza Vaccine Adult HD    09/18/2020  Imm Admin: Influenza Vaccine Quad Inj (Pf)    Only the first 5 history entries have been loaded, but more history exists.              COVID-19 Vaccine (Series Information) Completed      10/02/2023  Imm Admin: Covid-19 Mrna (Spikevax) Moderna 12+ Years    04/27/2023  Imm Admin: PFIZER BIVALENT SARS-COV-2 VACCINE (12+)    10/06/2022  Imm Admin: PFIZER  "BIVALENT SARS-COV-2 VACCINE (12+)    04/01/2022  Imm Admin: MODERNA SARS-COV-2 VACCINE (12+)    09/25/2021  Imm Admin: MODERNA SARS-COV-2 VACCINE (12+)    Only the first 5 history entries have been loaded, but more history exists.              Hepatitis B Vaccine (Hep B) (Series Information) Aged Out      No completion history exists for this topic.              HPV Vaccines (Series Information) Aged Out      No completion history exists for this topic.              Polio Vaccine (Inactivated Polio) (Series Information) Aged Out      No completion history exists for this topic.              Meningococcal Immunization (Series Information) Aged Out      No completion history exists for this topic.                    Patient Care Team:  Stu Blair M.D. as PCP - General (Family Medicine)  Mira Mitchell M.D. as Consulting Physician (Rheumatology)  [facility] as Respiratory Therapist (DME Supplier)  Luigi King M.D. as Resident - David (Psychiatry & Neurology Psychiatry)           Patient Care Team:  Stu Blair M.D. as PCP - General (Family Medicine)  Mira Mitchell M.D. as Consulting Physician (Rheumatology)  [facility] as Respiratory Therapist (DME Supplier)  Luigi King M.D. as Resident - David (Psychiatry & Neurology Psychiatry)         Social History     Tobacco Use    Smoking status: Never    Smokeless tobacco: Never   Vaping Use    Vaping Use: Never used   Substance Use Topics    Alcohol use: Not Currently     Alcohol/week: 8.4 oz     Types: 14 Glasses of wine per week     Comment: wine / daily    Drug use: Not Currently     Types: Marijuana, Oral     Comment: THC edible - \"once a month\".            Family History   Problem Relation Age of Onset    Cancer Mother 80        lung cancer    Arthritis Mother     Diabetes Mother     Anxiety disorder Mother     Lung Disease Father 65    Cancer Father     Diabetes Father     Hypertension Father     Hyperlipidemia Father  "    Hyperlipidemia Brother     Arthritis Brother     Other Brother         Thyroid tumor    Sleep Apnea Brother     No Known Problems Maternal Grandmother     Cancer Maternal Grandfather     No Known Problems Paternal Grandmother     No Known Problems Paternal Grandfather     Heart Disease Brother 49        smoker, overweight    Hypertension Brother     Depression Other     Suicide Attempts Neg Hx     Bipolar disorder Neg Hx     Alcohol abuse Neg Hx     Drug abuse Neg Hx             Past Surgical History:   Procedure Laterality Date    PB INCISE FINGER TENDON SHEATH Left 9/19/2023    Procedure: LEFT INDEX FINGER TRIGGER RELEASE;  Surgeon: Td Soliman M.D.;  Location: [facility];  Service: Orthopedics    PB INCISE FINGER TENDON SHEATH Left 8/30/2022    Procedure: LEFT MIDDLE FINGER TRIGGER RELEASE;  Surgeon: Td Soliman M.D.;  Location: [facility];  Service: Orthopedics    MD CYSTOURETHROSCOPY,BIOPSIES N/A 8/17/2022    Procedure: CYSTOSCOPY;  Surgeon: Jerzy Jasso M.D.;  Location: [facility];  Service: Urology    MD CYSTOURETHROSCOPY,BIOPSIES  7/6/2021    Procedure: BIOPSY, BLADDER WITH FULGERATION;  Surgeon: Bob Millan M.D.;  Location: [facility];  Service: Urology    BLADDER BIOPSY WITH CYSTOSCOPY  1/3/2018    Procedure: BLADDER BIOPSY WITH CYSTOSCOPY/WITH UPPER TRACT WASHING;  Surgeon: El Manuel M.D.;  Location: [facility];  Service: Urology    RETROGRADES Bilateral 1/3/2018    Procedure: RETROGRADES;  Surgeon: El Manuel M.D.;  Location: [facility];  Service: Urology    PYELOGRAM Bilateral 1/3/2018    Procedure: PYELOGRAM;  Surgeon: El Manuel M.D.;  Location: [facility];  Service: Urology    OTHER ORTHOPEDIC SURGERY  2015    shoulder replacement    ABDOMINAL EXPLORATION      ARTHROPLASTY      right shoulder    BLADDER BIOPSY WITH CYSTOSCOPY      EYE SURGERY      lasik     GASTRIC BYPASS  "LAPAROSCOPIC      HERNIA REPAIR      OPEN REDUCTION      OTHER Left     thumb joint    THORACOTOMY      TURP-VAPOR              Whisper test - stand 3 feet  behind patient and whisper 3 numbers for each ear while covering other ear..    - Right 2/3, Left 2/3         Vision test - using Snellen eye chart test both eyes separately and together, with and without correction.     - Without correction:      OD  20/25, OS 20/20, OU 20/20    - With correction:             OD  20/25, OS 20/20, OU 20/15         Rise and walk test - Have patient stand, walk 10 feet and return to chair.    -  10 seconds         Stay independent checklist:    Yes (2) No (0) I have fallen in the past year. 0    Yes (2) No (0) I use or have been advised to use a cane or    walker to get around safely.0    Yes (1) No (0) Sometimes I feel unsteady when I am walking. 0    Yes (1) No (0) I steady myself by holding onto furniture    when walking at home.0    Yes (1) No (0) I am worried about falling. 0    Yes (1) No (0) I need to push with my hands to stand up    from a chair.0    Yes (1) No (0) I have some trouble stepping up onto a curb. 0    Yes (1) No (0) I often have to rush to the toilet. 0    Yes (1) No (0) I have lost some feeling in my feet. 0    Yes (1) No (0) I take medicine that sometimes makes me feel    light-headed or more tired than usual.0    Yes (1) No (0) I take medicine to help me sleep or improve    my mood. 0    Yes (1) No (0) I often feel sad or depressed. 0    Total Add up the number of points for each “yes” answer. 0         EXAM    /76 (BP Location: Left arm, Patient Position: Sitting, BP Cuff Size: Adult)   Pulse 66   Temp 36.2 °C (97.1 °F) (Temporal)   Ht 1.702 m (5' 7\")   Wt 83.9 kg (185 lb)   SpO2 96%  -          Hearing good.     Dentition good    Alert, oriented in no acute distress.    Eye contact is good, speech goal directed, affect calm              Health maintenance review: (catch them up on all care " gaps - vaccines, mammogram, colon cancer screen)    Health Maintenance Summary            Annual Wellness Visit (Yearly) Next due on 4/5/2025 04/05/2024  Visit Dx: Medicare annual wellness visit, initial    04/05/2024  Level of Service: ANNUAL WELLNESS VISIT-INCLUDES PPPS SUBSEQUE*    08/23/2022  Visit Dx: Medicare annual wellness visit, initial    09/03/2021  Visit Dx: Medicare annual wellness visit, initial              Colorectal Cancer Screening (Colon Cancer Screening Cologuard Stool (FIT DNA) - Preferred) Tentatively due on 2/12/2027 02/12/2024  COLOGUARD COLON CANCER SCREENING    02/12/2024  COLOGUARD COLON CANCER SCREENING    10/22/2019  COLOGUARD COLON CANCER SCREENING    10/22/2019  COLOGUARD COLON CANCER SCREENING              IMM DTaP/Tdap/Td Vaccine (3 - Td or Tdap) Next due on 9/22/2027 09/22/2017  Imm Admin: Tdap Vaccine    02/17/2011  Imm Admin: Tdap Vaccine    01/22/2004  Imm Admin: TD Vaccine              Hepatitis A Vaccine (Hep A) (Series Information) Aged Out      12/02/2011  Imm Admin: Hepatitis A Vaccine, Adult    02/17/2011  Imm Admin: Hepatitis A Vaccine, Adult              Zoster (Shingles) Vaccines (Series Information) Completed      07/27/2020  Imm Admin: Zoster Vaccine Recombinant (RZV) (SHINGRIX)    02/17/2020  Imm Admin: Zoster Vaccine Recombinant (RZV) (SHINGRIX)    08/17/2016  Imm Admin: Zoster Vaccine Live (ZVL) (Zostavax) - HISTORICAL DATA    05/07/2012  Imm Admin: Zoster Vaccine Live (ZVL) (Zostavax) - HISTORICAL DATA    06/20/2011  Imm Admin: Zoster Vaccine Live (ZVL) (Zostavax) - HISTORICAL DATA    Only the first 5 history entries have been loaded, but more history exists.              Pneumococcal Vaccine: 65+ Years (Series Information) Completed      09/18/2020  Imm Admin: Pneumococcal polysaccharide vaccine (PPSV-23)    09/01/2020  Imm Admin: Pneumococcal Conjugate Vaccine (Prevnar/PCV-13)    09/01/2020  Imm Admin: Pneumococcal Conjugate Vaccine  (Prevnar/PCV-13)    09/21/2017  Imm Admin: Pneumococcal Conjugate Vaccine (Prevnar/PCV-13)    02/14/2017  Imm Admin: Pneumococcal polysaccharide vaccine (PPSV-23)    Only the first 5 history entries have been loaded, but more history exists.              Hepatitis C Screening  Tentatively Complete      07/19/2023  Hepatitis C Antibody component of HEP C VIRUS ANTIBODY    12/16/2021  Hepatitis C Antibody component of HEP C VIRUS ANTIBODY    03/02/2020  Hepatitis C Antibody component of HEP C VIRUS ANTIBODY    08/16/2018  Hepatitis C Antibody component of HEP C VIRUS ANTIBODY              Influenza Vaccine (Series Information) Completed      10/02/2023  Imm Admin: Influenza Vaccine, Quadrivalent, Adjuvanted (Pf)    09/30/2022  Imm Admin: Influenza, Unspecified - HISTORICAL DATA    09/30/2022  Imm Admin: Influenza Vaccine Adult HD    10/12/2021  Imm Admin: Influenza Vaccine Adult HD    09/18/2020  Imm Admin: Influenza Vaccine Quad Inj (Pf)    Only the first 5 history entries have been loaded, but more history exists.              COVID-19 Vaccine (Series Information) Completed      10/02/2023  Imm Admin: Covid-19 Mrna (Spikevax) Moderna 12+ Years    04/27/2023  Imm Admin: PFIZER BIVALENT SARS-COV-2 VACCINE (12+)    10/06/2022  Imm Admin: PFIZER BIVALENT SARS-COV-2 VACCINE (12+)    04/01/2022  Imm Admin: MODERNA SARS-COV-2 VACCINE (12+)    09/25/2021  Imm Admin: MODERNA SARS-COV-2 VACCINE (12+)    Only the first 5 history entries have been loaded, but more history exists.              Hepatitis B Vaccine (Hep B) (Series Information) Aged Out      No completion history exists for this topic.              HPV Vaccines (Series Information) Aged Out      No completion history exists for this topic.              Polio Vaccine (Inactivated Polio) (Series Information) Aged Out      No completion history exists for this topic.              Meningococcal Immunization (Series Information) Aged Out      No completion history  exists for this topic.                         Medication review:    Current Outpatient Medications   Medication Sig Dispense Refill    fluoxetine (PROZAC) 40 MG capsule Take 1 Capsule by mouth every day for 90 days. Taking with 20 mg for total daily dose of 60 mg 90 Capsule 0    buPROPion (WELLBUTRIN SR) 200 MG SR tablet Take 2 Tablets by mouth every day for 90 days. Indications: Major Depressive Disorder 180 Tablet 0    FLUoxetine (PROZAC) 20 MG Cap Take 1 Capsule by mouth every day for 90 days. Take with 40 mg for total daily dose of 60 mg  Indications: Depression, Generalized Anxiety Disorder 90 Capsule 0    mirtazapine (REMERON) 15 MG Tab Take 0.5 Tablets by mouth every evening for 90 days. 45 Tablet 0    lisinopril (PRINIVIL) 10 MG Tab TAKE 1 TABLET BY MOUTH EVERY DAY. (STOP 20 MG DOSE) 90 Tablet 2    celecoxib (CELEBREX) 200 MG Cap Take 1 Capsule by mouth every day. 90 Capsule 2    NON SPECIFIED Ilumya injection every three months prescribed by dermatology      omeprazole (PRILOSEC) 20 MG delayed-release capsule Take 1 Capsule by mouth every evening. 100 Capsule 2    atorvastatin (LIPITOR) 80 MG tablet Take 1 Tablet by mouth every day. 100 Tablet 3    Clobetasol Propionate 0.05 % Shampoo       Magnesium 100 MG Cap Take  by mouth.      ferrous sulfate 325 (65 Fe) MG tablet Take 325 mg by mouth every day.      B Complex Vitamins (VITAMIN B COMPLEX PO) Take 1 Tablet by mouth every day.      Cholecalciferol (VITAMIN D3 PO) Take 1 Tablet by mouth every 7 days. OTC      aspirin EC (ECOTRIN) 81 MG Tablet Delayed Response Take 81 mg by mouth every bedtime. 30 Tab 0    Multiple Vitamins-Minerals (MELODY-DAY 1000 PO) Take 1 Tab by mouth 2 Times a Day.       No current facility-administered medications for this visit.            If opioid/benzo/sedative on med list discussion of reduction/elimination - referral         At this point document if pt refuses to consider changes off of controlled substance          Vital sign  review - unintentional >10 pound weight loss evaluation (BMI and Muscle Mass review)          Whisper test fail - audiology referral          Eye test fail - ophthalmology referral          Stay independent checklist - PT referral for a score of 4 or more          PHQ evaluation - consider visit for medication change/referral          Mini-cog evaluation - consider visit for further evaluation/referral          POLST on file- if not need to have on hand for patient to fill out - consider Palliative referral etc.         JULIO screen -         Audit-C Questionnaire         1. How often did you have a drink containing alcohol in the past year?     Never (0 points)  * If you answered Never, score questions 2 and 3 below as zero.      Monthly or less (1 point)     2 to 4 times a month (2 points)     2 or 3 times per week (3 points)     4 or more times a week (4 points)         3         2. How many drinks did you have on a typical day when you were drinking in the past year?     1 - 2 (0 points)     3 - 4 (1point)     5 - 6 (2 points)     7 - 9 (3 points)     10 or more (4 points)         0         3. How often did you have 6 or more drinks on one occasion in the past year?     Never (0 points)     Less than monthly (1 point)     Monthly (2 points)     Weekly (3 points)     Daily or almost daily (4 points)         1         Total: 4         Scorin or more for men and 3 or more for women is an indication for further evaluation for hazardous drinking.         Chronic pain screen:              PEG: A Three-Item Scale Assessing Pain Intensity and Interference    1. What number best describes your pain on average in the past week?     0       1         2              3                     4                          5              6               7             8           9            10    No pain                                                                                                            Pain as bad as you  "can imagine         4         2. What number best describes how, during the past week, pain has interfered    with your enjoyment of life?    0       1         2              3                     4                          5              6               7             8           9            10    No pain                                                                                                            Pain as bad as you can imagine         0    3. What number best describes how, during the past week, pain has interfered    with your general activity?     0       1         2              3                     4                          5              6               7             8           9            10    No pain                                                                                                            Pain as bad as you can imagine         2         Scoring: average of 3 scales: 6    Serves as a baseline measurement for chronic pain issues and quality of life and can be used to prompt pain clinic referral.                   Tobacco use - consider visit for discussion if user - discuss alternatives and quitting         Food insecurity screen - (The Hunger Vital Sign)    Within the past 12 months were you worried whether food would run out before you got money to buy more?    Often true          sometimes true          never true    NEVER         Within the past 12 months did the food you bought not last and you did not have money to buy more food?    Often true         sometimes  true          never true    NEVER         Scoring: answering \"often true\" or \"sometimes true\" to either question is a positive screen for food insecurity and should prompt a social work/ referral if needed         Review of Rise and walk test - PT referral for a score over 13 seconds         Clasp arms behind back and head test - have the patient try and clasp hands behind back and then over head, " failure to accomplish may indicate shoulder issues- PT/ortho/pmr referral               Assessment and Plan. The following treatment and monitoring plan is recommended:                  Services suggested: No services needed at this time    Health Care Screening: Age-appropriate preventive services recommended by USPTF and ACIP covered by Medicare were discussed today. Services ordered if indicated and agreed upon by the patient.    Referrals offered: Community-based lifestyle interventions to reduce health risks and promote self-management and wellness, fall prevention, nutrition, physical activity, tobacco-use cessation, weight loss, and mental health services as per orders if indicated.         Discussion today about general wellness and lifestyle habits:                 Prevent falls and reduce trip hazards; Cautioned about securing or removing rugs.             Have a working fire alarm and carbon monoxide detector;                 Engage in regular physical activity and social activities         1. Medicare annual wellness visit, initial    Completed    2. Balance problem  - Referral to Physical Therapy    3. Essential tremor    Patient has been stable with current management  We will make no changes for now      4. Gastroesophageal reflux disease without esophagitis    Patient has been stable with current management  We will make no changes for now      5. Psoriatic arthritis (HCC)    Patient has been stable with current management  We will make no changes for now    6. Familial hyperlipidemia    Patient has been stable with current management  We will make no changes for now    7. Recurrent major depressive disorder, in full remission (HCC)    Patient has been stable with current management  We will make no changes for now         Follow-up: Return in about 6 months (around 10/5/2024) for Reevaluation, labs.

## 2024-05-01 ENCOUNTER — APPOINTMENT (OUTPATIENT)
Dept: PHYSICAL THERAPY | Facility: MEDICAL CENTER | Age: 74
End: 2024-05-01
Attending: FAMILY MEDICINE
Payer: MEDICARE

## 2024-05-02 ENCOUNTER — PHYSICAL THERAPY (OUTPATIENT)
Dept: PHYSICAL THERAPY | Facility: MEDICAL CENTER | Age: 74
End: 2024-05-02
Attending: FAMILY MEDICINE
Payer: MEDICARE

## 2024-05-02 DIAGNOSIS — R26.89 LOSS OF BALANCE: ICD-10-CM

## 2024-05-02 DIAGNOSIS — R26.89 IMBALANCE: ICD-10-CM

## 2024-05-02 RX ORDER — OMEPRAZOLE 20 MG/1
20 CAPSULE, DELAYED RELEASE ORAL EVERY EVENING
Qty: 100 CAPSULE | Refills: 2 | Status: SHIPPED | OUTPATIENT
Start: 2024-05-02

## 2024-05-02 NOTE — OP THERAPY EVALUATION
"  Outpatient Physical Therapy  VESTIBULAR EVALUATION    [facility] Outpatient Physical Therapy  [address]  [place] [address]  Phone:  [phone]  Fax:  [phone]    Date of Evaluation: 05/02/2024    Patient: Aaron Medina  YOB: 1950  MRN: [id number]     Referring Provider: [place] [address]   Referring Diagnosis: Other abnormalities of gait and mobility [R26.89]     Time Calculation    Start time: 1015  Stop time: 1100 Time Calculation (min): 45 minutes           Chief Complaint: Loss Of Balance    Visit Diagnoses     ICD-10-CM   1. Loss of balance  R26.89   2. Imbalance  R26.89         History of Present Illness:     Mechanism of injury:    Aaron presents to PT for balance training as referred during his annual medicare screening. Today, Aaron reports frequently catching his R toe. Also notices a fwd listing/leaning sensation that occurs when he bonnie or walks longer than about 1 mile. Reports falling about once every other month, no major injuries. Trips daily, but typically recovers himself. Denies N/T. Admits to \"dizziness\" if he moves fluidly from supine to standing. This is described as lightheaded, \"like I may pass out\" and reports he has actually lost consciousness a few times (quick recovery). Does take lisinopril for HTN, stable, doesn't monitor at home. Does notice the dizziness seems to be better when he is better hydrated. Adds having a R TKA in 2022, not listed in surgical hx below.     Prior level of function:  Retired from marketing. Hobbies: skiing, golfing, motorcycle, gardening.    Headaches: no headaches      Ear problems:  Hearing loss and ringing (Chronic, does have hearing aids)    Sleep disturbance:  Not disrupted  Symptoms:     Progression:  Stable  Social Support:     Lives in:  One-story house  Treatments:     No prior treatments received    Patient goals:     Patient goals for therapy:  " Improved balance, increased strength, increased motion and return to sport/leisure activities      Past Medical History:   Diagnosis Date    Anxiety     Anxiety, generalized 11/27/2023    Arthritis     osteo    Bowel habit changes 06/24/2021    Constipation    Cancer ([facility]) 2007    bladder    Chickenpox     as a child    Depression     depression    High cholesterol     Hyperlipidemia     Hypertension     pt states  well controlled on meds    Kidney stone     Mumps     as a child     Obesity     Pneumothorax     Psoriatic arthritis ([facility])     Recurrent major depressive disorder, in partial remission ([facility]) 09/21/2017    Restless leg syndrome     Sleep apnea     uses cpap    Tonsillitis      Past Surgical History:   Procedure Laterality Date    PB INCISE FINGER TENDON SHEATH Left 9/19/2023    Procedure: LEFT INDEX FINGER TRIGGER RELEASE;  Surgeon: Td Sloiman M.D.;  Location: [facility];  Service: Orthopedics    PB INCISE FINGER TENDON SHEATH Left 8/30/2022    Procedure: LEFT MIDDLE FINGER TRIGGER RELEASE;  Surgeon: Td Soliman M.D.;  Location: [facility];  Service: Orthopedics    HI CYSTOURETHROSCOPY,BIOPSIES N/A 8/17/2022    Procedure: CYSTOSCOPY;  Surgeon: Jerzy Jasso M.D.;  Location: [facility];  Service: Urology    HI CYSTOURETHROSCOPY,BIOPSIES  7/6/2021    Procedure: BIOPSY, BLADDER WITH FULGERATION;  Surgeon: Bob Millan M.D.;  Location: [facility];  Service: Urology    BLADDER BIOPSY WITH CYSTOSCOPY  1/3/2018    Procedure: BLADDER BIOPSY WITH CYSTOSCOPY/WITH UPPER TRACT WASHING;  Surgeon: El Manuel M.D.;  Location: [facility];  Service: Urology    RETROGRADES Bilateral 1/3/2018    Procedure: RETROGRADES;  Surgeon: El Manuel M.D.;  Location: [facility];  Service: Urology    PYELOGRAM Bilateral 1/3/2018    Procedure: PYELOGRAM;  Surgeon: El Manuel M.D.;  Location: [facility];  Service:  "Urology    OTHER ORTHOPEDIC SURGERY  2015    shoulder replacement    ABDOMINAL EXPLORATION      ARTHROPLASTY      right shoulder    BLADDER BIOPSY WITH CYSTOSCOPY      EYE SURGERY      lasik     GASTRIC BYPASS LAPAROSCOPIC      HERNIA REPAIR      OPEN REDUCTION      OTHER Left     thumb joint    THORACOTOMY      TURP-VAPOR       Social History     Tobacco Use    Smoking status: Never    Smokeless tobacco: Never   Substance Use Topics    Alcohol use: Not Currently     Alcohol/week: 8.4 oz     Types: 14 Glasses of wine per week     Comment: wine / daily     Family and Occupational History     Socioeconomic History    Marital status: Single     Spouse name: Not on file    Number of children: Not on file    Years of education: Not on file    Highest education level: Master's degree (e.g., MA, MS, Rhiannon, MEd, MSW, LUIS)   Occupational History    Not on file       Objective:    Vital Signs:     Comments:   Seated: 148/93 mmHg  Supine 143/82 mmHg-> standing 142/84 mmHg  Vestibulospinal Exam:     Comments:   MiniBest:   STS= 2  Rise to toes= 2  Stand on 1 leg= 1 (R LE= 13 seconds, fights inversion)  Fwd stepping correction= 2  Backward stepping correction= 2  Lateral stepping correction= 2  EO, feet together, firm= 2  Foam EC, feet together= 1  Incline EC= 2  Change in gait speed= 2  Walk with HT= 1  Walk with pivot turn= 2  Step over obstacle= 1  TUG= 2 (6\"/9\")     Total= 24/28    Oculomotor Exam:         Details: No spontaneous nystagmus central gaze          Details: No spontaneous nystagmus eccentric gaze    No saccadic eye movements    Smooth pursuit present    Convergence:        Normal convergence    No skew  Active Range of Motion:     Within functional limits  Limb Ataxia Exam:     Finger-to-Nose:         Intention tremor: none    Dysdiadochokinesia: none.  Strength Exam:     Upper extremities within functional limits    Lower extremities within functional limits  Reflex Exam:     Lyle reflex: absent    Babinski " sign: absent    Sensation Tests:     Left Light Touch Sensation:         All left lumbar dermatomes intact      Right Light Touch Sensation:         All right lumbar dermatomes intact    Vestibulo-Ocular Exam:   Normal head thrust test  BPPV Exam:     Normal Jabier-Hallpike    Normal straight head-hanging test    Normal roll test        Therapeutic Treatments and Modalities:     1. Neuromuscular Re-education (CPT 85448), Education: discussed PT eval findings, balance sensory systems, goals of care. HEP: to begin SLS bilaterally, best effort x 2 min. Discussed plan to fatigue him via the TM next visit, then reassess stabiilty    Time-based treatments/modalities:    Physical Therapy Timed Treatment Charges  Neuromusc re-ed, balance, coor, post minutes (CPT 59381): 15 minutes        Assessment:     Functional impairments:  Decreased postural stability, gait abnormality/instability, decreased gaze stabilization, decreased utilization of VIS/vest/somato and decreased limits of stability    Assessment details:    Aaron is a 73 y.o male who presents to PT with complaint of dizziness and imbalance. He reports symptoms that are subjectively are consistent with orthostatic hypotension, though unable to reproduce in the clinic and vitals remained WNL. He does have reduced strength in the R LE due to history of TKA, R achilles rupture and tibia fx. This does impact his stability in SLS. MiniBEST indicates low risk of falls, though he is reporting frequent falls, which mainly occur under fatigue. Would benefit from reassessment after a prolonged walk to better determine the issue. Given the initial findings, skilled PT services are indicated to address the mentioned impairments to enhance balance and reduce fall rate.       Prognosis: good    Goals:     Short term goals:    - Improve R SLS to 20 seconds  - Pt is able to  10 objects from the floor without LOB    Short term goal time span:  1-2 weeks    Long term goals:    -  Improve ABC to 85% or better.   - Improve R HR to 10 reps to full height  - Improve miniBEST to 28/28  - Pt demonstrates independence with a HEP for continued management of this problem beyond discharge from therapy.       Long term goal time span:  6-8 weeks  Plan:     Therapy options:  Physical therapy treatment to continue    Planned therapy interventions:  Self Care ADL Training (CPT 29789), Gait Training (CPT 29274), Therapeutic Activities (CPT 56143), Therapeutic Exercise (CPT 36561), Manual Therapy (CPT 42890) and Neuromuscular Re-education (CPT 29502)    Frequency: 1-2x/week.    Duration in weeks:  8    Discussed with:  Patient      Functional Assessment Used    ABC= 72%      Referring provider co-signature:  I have reviewed this plan of care and my co-signature certifies the need for services.    Certification Period: 05/02/2024 to  06/27/24    Physician Signature: ________________________________ Date: ______________

## 2024-05-08 NOTE — OP THERAPY DAILY TREATMENT
Outpatient Physical Therapy  DAILY TREATMENT     [facility] Outpatient Physical Therapy  [address]  [place] [address]  Phone:  [phone]  Fax:  [phone]    Date: 05/09/2024    Patient: Aaron Medina  YOB: 1950  MRN: [id number]     Time Calculation    Start time: 0845  Stop time: 0930 Time Calculation (min): 45 minutes         Chief Complaint: Loss Of Balance    Visit #: 2    SUBJECTIVE:  Overall about the same. No falls since last visit. Forgot to start working on SLS.     OBJECTIVE:      Therapeutic Exercises (CPT 13495):     1. TM, 2.0-2.5 mph for 15 mins    2. LTR, x 20 ea    3. Supine HS stretch, x 1 min ea    4. bridge, x 20 standard,    5. ball bridge, x 2 min    6. marching bridge, x 20 ea, optimal level    7. RAIZA, 2 min    8. prone superman, hands at forehead      Therapeutic Exercise Summary:   Access Code: [email or web address]  URL: [email or web address]  Date: 05/09/2024  Prepared by:     Exercises  - Marching Bridge  - 5 x weekly - 2 sets - 15-20 reps  - Supine Lower Trunk Rotation  - 5 x weekly - 20 reps  - Superman on Table  - 5 x weekly - 2 sets - 30-60 seconds hold  - Standing Heel Raise  - 5 x weekly - 20 reps - 3 seconds hold  - Single Leg Stance  - 5 x weekly    Therapeutic Treatments and Modalities:     1. Neuromuscular Re-education (CPT 66304)    Therapeutic Treatment and Modalities Summary:   MiniBest:   STS= 2  Rise to toes= 1  Stand on 1 leg= 1 (L= 9, R= 13)  Fwd stepping correction= 2  Backward stepping correction= 2  Lateral stepping correction= 2  EC, feet together, firm= 2  Foam EC, feet together= 2  Incline EC= 2  Change in gait speed= 2  Walk with HT= 1 (LOB with R HT)  Walk with pivot turn= 2  Step over obstacle= 2  TUG= 2    Total= 25/28      Time-based treatments/modalities:    Physical Therapy Timed Treatment Charges  Neuromusc re-ed, balance, coor, post minutes (CPT 31892): 15 minutes  Therapeutic exercise  minutes (CPT 97606): 30 minutes    ASSESSMENT:   Response to treatment: Did start to list fwd after 13 mins of walking, but appeared to be reduced endurance in the posterior chain. HEP updated to improve strength in this area. Monitor response.     PLAN/RECOMMENDATIONS:   Plan for treatment: therapy treatment to continue next visit.  Planned interventions for next visit: continue with current treatment.

## 2024-05-09 ENCOUNTER — PHYSICAL THERAPY (OUTPATIENT)
Dept: PHYSICAL THERAPY | Facility: MEDICAL CENTER | Age: 74
End: 2024-05-09
Attending: FAMILY MEDICINE
Payer: MEDICARE

## 2024-05-09 DIAGNOSIS — R26.89 IMBALANCE: ICD-10-CM

## 2024-05-09 DIAGNOSIS — R26.89 LOSS OF BALANCE: ICD-10-CM

## 2024-05-14 NOTE — PROGRESS NOTES
Message sent via My Chart.    Stu Blair MD  [address] [address]  
Message sent via My Chart.    Stu Blair MD  [address] [address]  
Message sent via My Chart.    Stu Blair MD  [address] [address]  
2014

## 2024-05-20 ENCOUNTER — TELEPHONE (OUTPATIENT)
Dept: NEUROLOGY | Facility: MEDICAL CENTER | Age: 74
End: 2024-05-20
Payer: MEDICARE

## 2024-05-28 ENCOUNTER — APPOINTMENT (OUTPATIENT)
Dept: BEHAVIORAL HEALTH | Facility: CLINIC | Age: 74
End: 2024-05-28
Payer: MEDICARE

## 2024-05-28 ENCOUNTER — PHYSICAL THERAPY (OUTPATIENT)
Dept: PHYSICAL THERAPY | Facility: MEDICAL CENTER | Age: 74
End: 2024-05-28
Attending: FAMILY MEDICINE
Payer: MEDICARE

## 2024-05-28 DIAGNOSIS — R26.89 LOSS OF BALANCE: ICD-10-CM

## 2024-05-28 DIAGNOSIS — R26.89 IMBALANCE: ICD-10-CM

## 2024-05-28 NOTE — OP THERAPY DAILY TREATMENT
"  Outpatient Physical Therapy  DAILY TREATMENT     [facility] Outpatient Physical Therapy  [address]  [place] [address]  Phone:  [phone]  Fax:  [phone]    Date: 05/28/2024    Patient: Aaron Medina  YOB: 1950  MRN: [id number]     Time Calculation    Start time: 0757  Stop time: 0857 Time Calculation (min): 60 minutes         Chief Complaint: Difficulty Walking    Visit #: 3    SUBJECTIVE:  I do think my problem with my walking is associated with my back. Also notices dragging the R foot associated with R medial knee pain.     OBJECTIVE:      Therapeutic Exercises (CPT 42092):     1. TM, L4 incline, 2.0 mph x 5 min    2. Gastroc stretch, slant board x 1 min    3. Seated HS stretch, x 1 min ea    4. Prone quad stretch, x 1 min ea    5. prone HS curl, x 20, easy without weight. No R knee pain    6. prone swimmer, x 10 ea, challenging    7. prone hip ext with knee at 90 deg flexion, x 10 ea, challenging    8. KTOS, x 1 min ea    9. LTR with fig 4, x 10 ea    10. Marching bridge, x 10 ea, challenging      Therapeutic Exercise Summary:   Access Code: [email or web address]  URL: [email or web address]  Date: 05/09/2024  Prepared by:     Exercises  - Marching Bridge  - 5 x weekly - 2 sets - 15-20 reps  - Supine Lower Trunk Rotation  - 5 x weekly - 20 reps  - Superman on Table  - 5 x weekly - 2 sets - 30-60 seconds hold  - Standing Heel Raise  - 5 x weekly - 20 reps - 3 seconds hold  - Single Leg Stance  - 5 x weekly  - Seated Hamstring Stretch  - 5 x weekly - 60 seconds hold  - Prone Quadriceps Stretch with Strap  - 5 x weekly - 60 seconds hold  - Standing Gastroc Stretch  - 5 x weekly - 60 seconds hold    Therapeutic Treatments and Modalities:     1. Manual Therapy (CPT 68031), brief trial of manual belt txn. well tolerated    2. Mechanical Traction (CPT 14616), Lumbar: 90/60#, 60/20\" with MH x 15 min    Time-based treatments/modalities:    Physical " Therapy Timed Treatment Charges  Manual therapy minutes (CPT 76964): 5 minutes  Therapeutic exercise minutes (CPT 03667): 38 minutes    ASSESSMENT:   Response to treatment: Poor anterior chain mobility and posterior chain strength, likely relating to poor functional endurance for standing and walking. HEP appropriate for current needs. Anticipate txn may support quicker return. Pt prefers to monitor response, work on HEP and call in to schedule additional if he feels he needs skilled support.     PLAN/RECOMMENDATIONS:   Plan for treatment: therapy treatment to continue next visit.  Planned interventions for next visit: continue with current treatment.

## 2024-05-29 ENCOUNTER — APPOINTMENT (OUTPATIENT)
Dept: BEHAVIORAL HEALTH | Facility: CLINIC | Age: 74
End: 2024-05-29
Payer: MEDICARE

## 2024-05-29 ENCOUNTER — OFFICE VISIT (OUTPATIENT)
Dept: CARDIOLOGY | Facility: MEDICAL CENTER | Age: 74
End: 2024-05-29
Payer: MEDICARE

## 2024-05-29 VITALS
WEIGHT: 180 LBS | OXYGEN SATURATION: 96 % | DIASTOLIC BLOOD PRESSURE: 70 MMHG | HEART RATE: 69 BPM | HEIGHT: 67 IN | RESPIRATION RATE: 16 BRPM | SYSTOLIC BLOOD PRESSURE: 110 MMHG | BODY MASS INDEX: 28.25 KG/M2

## 2024-05-29 DIAGNOSIS — G47.33 OBSTRUCTIVE SLEEP APNEA (ADULT) (PEDIATRIC): ICD-10-CM

## 2024-05-29 DIAGNOSIS — I34.0 NONRHEUMATIC MITRAL VALVE REGURGITATION: ICD-10-CM

## 2024-05-29 DIAGNOSIS — I10 ESSENTIAL HYPERTENSION: ICD-10-CM

## 2024-05-29 DIAGNOSIS — Z82.49 FAMILY HISTORY OF CORONARY ARTERY DISEASE IN BROTHER: ICD-10-CM

## 2024-05-29 DIAGNOSIS — E78.49 FAMILIAL HYPERLIPIDEMIA: ICD-10-CM

## 2024-05-29 RX ORDER — EZETIMIBE 10 MG/1
10 TABLET ORAL DAILY
Qty: 100 TABLET | Refills: 3 | Status: SHIPPED | OUTPATIENT
Start: 2024-05-29

## 2024-05-29 ASSESSMENT — ENCOUNTER SYMPTOMS
SHORTNESS OF BREATH: 0
GASTROINTESTINAL NEGATIVE: 1
NERVOUS/ANXIOUS: 0
DEPRESSION: 0
MUSCULOSKELETAL NEGATIVE: 1
PALPITATIONS: 0
ORTHOPNEA: 0
EYES NEGATIVE: 1
NEUROLOGICAL NEGATIVE: 1
CONSTITUTIONAL NEGATIVE: 1
PND: 0

## 2024-05-29 ASSESSMENT — FIBROSIS 4 INDEX: FIB4 SCORE: 2.82

## 2024-05-29 NOTE — PROGRESS NOTES
"Chief Complaint   Patient presents with    Dyslipidemia    Hypertension       Subjective     Aaron Medina is a 74 y.o. male who presents today for follow up. They have a history of MR, HTN, familial HLD, ARNULFO     He has a family history of MI in his brother (smoker), uncle had 6 bipasses, Mother had cardiomyopathy, other brother has CAD     Seen by  Dr. Caraballo on 8/17/2022 found to have a holosystolic murmur, repeat echocardiogram showed mild to moderate MR with proapse of posterior leaflet.     Visit with myself on 8/22/2023:  he is feeling overall well, no significant symptoms     At visit with myself on 11/28/2023  he does report that he has not been walking as much (about a mile at a time) because around the mile gabby he starts to \"feel like he is falling forward\" and has to sit down to hold onto something to keep from falling.  He reports that this has been going on for about 2 years but that he has never previously mentioned it to any of his providers.  He denies chest pain, shortness of breath, palpitations, lower extremity edema    Today 5/29/2024 he reports that he has been feeling really good, he says that his balance issue from her last visit has also resolved with physical therapy.  He denies any acute CV symptoms.    Past Medical History:   Diagnosis Date    Anxiety     Anxiety, generalized 11/27/2023    Arthritis     osteo    Bowel habit changes 06/24/2021    Constipation    Cancer ([facility]) 2007    bladder    Chickenpox     as a child    Depression     depression    High cholesterol     Hyperlipidemia     Hypertension     pt states  well controlled on meds    Kidney stone     Mumps     as a child     Obesity     Pneumothorax     Psoriatic arthritis ([facility])     Recurrent major depressive disorder, in partial remission ([facility]) 09/21/2017    Restless leg syndrome     Sleep apnea     uses cpap    Tonsillitis      Past Surgical History:   Procedure Laterality Date    PB INCISE FINGER TENDON SHEATH Left " 9/19/2023    Procedure: LEFT INDEX FINGER TRIGGER RELEASE;  Surgeon: Td Soliman M.D.;  Location: [facility];  Service: Orthopedics    PB INCISE FINGER TENDON SHEATH Left 8/30/2022    Procedure: LEFT MIDDLE FINGER TRIGGER RELEASE;  Surgeon: Td Soliman M.D.;  Location: [facility];  Service: Orthopedics    [place] CYSTOURETHROSCOPY,BIOPSIES N/A 8/17/2022    Procedure: CYSTOSCOPY;  Surgeon: Jerzy Jasso M.D.;  Location: [facility];  Service: Urology    IL CYSTOURETHROSCOPY,BIOPSIES  7/6/2021    Procedure: BIOPSY, BLADDER WITH FULGERATION;  Surgeon: Bob Millan M.D.;  Location: [facility];  Service: Urology    BLADDER BIOPSY WITH CYSTOSCOPY  1/3/2018    Procedure: BLADDER BIOPSY WITH CYSTOSCOPY/WITH UPPER TRACT WASHING;  Surgeon: El Manuel M.D.;  Location: [facility];  Service: Urology    RETROGRADES Bilateral 1/3/2018    Procedure: RETROGRADES;  Surgeon: El Manuel M.D.;  Location: [facility];  Service: Urology    PYELOGRAM Bilateral 1/3/2018    Procedure: PYELOGRAM;  Surgeon: El Manuel M.D.;  Location: [facility];  Service: Urology    OTHER ORTHOPEDIC SURGERY  2015    shoulder replacement    ABDOMINAL EXPLORATION      ARTHROPLASTY      right shoulder    BLADDER BIOPSY WITH CYSTOSCOPY      EYE SURGERY      lasik     GASTRIC BYPASS LAPAROSCOPIC      HERNIA REPAIR      OPEN REDUCTION      OTHER Left     thumb joint    THORACOTOMY      TURP-VAPOR       Family History   Problem Relation Age of Onset    Cancer Mother 80        lung cancer    Arthritis Mother     Diabetes Mother     Anxiety disorder Mother     Lung Disease Father 65    Cancer Father     Diabetes Father     Hypertension Father     Hyperlipidemia Father     Hyperlipidemia Brother     Arthritis Brother     Other Brother         Thyroid tumor    Sleep Apnea Brother     No Known Problems Maternal Grandmother     Cancer Maternal Grandfather   "   No Known Problems Paternal Grandmother     No Known Problems Paternal Grandfather     Heart Disease Brother 49        smoker, overweight    Hypertension Brother     Depression Other     Suicide Attempts Neg Hx     Bipolar disorder Neg Hx     Alcohol abuse Neg Hx     Drug abuse Neg Hx      Social History     Socioeconomic History    Marital status: Single     Spouse name: Not on file    Number of children: Not on file    Years of education: Not on file    Highest education level: Master's degree (e.g., MA, MS, Rhiannon, MEd, MSW, LUIS)   Occupational History    Not on file   Tobacco Use    Smoking status: Never    Smokeless tobacco: Never   Vaping Use    Vaping status: Never Used   Substance and Sexual Activity    Alcohol use: Not Currently     Alcohol/week: 8.4 oz     Types: 14 Glasses of wine per week     Comment: wine / daily    Drug use: Not Currently     Types: Marijuana, Oral     Comment: THC edible - \"once a month\".    Sexual activity: Not Currently   Other Topics Concern    Not on file   Social History Narrative    Not on file     Social Determinants of Health     Financial Resource Strain: Low Risk  (4/3/2024)    Overall Financial Resource Strain (CARDIA)     Difficulty of Paying Living Expenses: Not hard at all   Food Insecurity: No Food Insecurity (4/3/2024)    Hunger Vital Sign     Worried About Running Out of Food in the Last Year: Never true     Ran Out of Food in the Last Year: Never true   Transportation Needs: No Transportation Needs (4/3/2024)    PRAPARE - Transportation     Lack of Transportation (Medical): No     Lack of Transportation (Non-Medical): No   Physical Activity: Insufficiently Active (4/3/2024)    Exercise Vital Sign     Days of Exercise per Week: 2 days     Minutes of Exercise per Session: 20 min   Stress: No Stress Concern Present (4/3/2024)    [employer] [place] [employer] - Occupational Stress Questionnaire     Feeling of Stress : Not at all   Social Connections: " Moderately Isolated (4/3/2024)    Social Connection and Isolation Panel [NHANES]     Frequency of Communication with Friends and Family: Twice a week     Frequency of Social Gatherings with Friends and Family: Once a week     Attends Buddhist Services: Never     Active Member of Clubs or Organizations: No     Attends Club or Organization Meetings: Patient declined     Marital Status: Living with partner   Intimate Partner Violence: Not on file   Housing Stability: Unknown (4/3/2024)    Housing Stability Vital Sign     Unable to Pay for Housing in the Last Year: No     Number of Places Lived in the Last Year: Not on file     Unstable Housing in the Last Year: No     No Known Allergies  Outpatient Encounter Medications as of 5/29/2024   Medication Sig Dispense Refill    ezetimibe (ZETIA) 10 MG Tab Take 1 Tablet by mouth every day. 100 Tablet 3    naltrexone (DEPADE) 50 MG Tab 1 tablet Orally twice daily for 30 days      omeprazole (PRILOSEC) 20 MG delayed-release capsule Take 1 Capsule by mouth every evening. 100 Capsule 2    fluoxetine (PROZAC) 40 MG capsule Take 1 Capsule by mouth every day for 90 days. Taking with 20 mg for total daily dose of 60 mg 90 Capsule 0    buPROPion (WELLBUTRIN SR) 200 MG SR tablet Take 2 Tablets by mouth every day for 90 days. Indications: Major Depressive Disorder 180 Tablet 0    FLUoxetine (PROZAC) 20 MG Cap Take 1 Capsule by mouth every day for 90 days. Take with 40 mg for total daily dose of 60 mg  Indications: Depression, Generalized Anxiety Disorder 90 Capsule 0    mirtazapine (REMERON) 15 MG Tab Take 0.5 Tablets by mouth every evening for 90 days. 45 Tablet 0    lisinopril (PRINIVIL) 10 MG Tab TAKE 1 TABLET BY MOUTH EVERY DAY. (STOP 20 MG DOSE) 90 Tablet 2    celecoxib (CELEBREX) 200 MG Cap Take 1 Capsule by mouth every day. 90 Capsule 2    atorvastatin (LIPITOR) 80 MG tablet Take 1 Tablet by mouth every day. 100 Tablet 3    Clobetasol Propionate 0.05 % Shampoo       Magnesium 100  "MG Cap Take  by mouth.      ferrous sulfate 325 (65 Fe) MG tablet Take 325 mg by mouth every day.      B Complex Vitamins (VITAMIN B COMPLEX PO) Take 1 Tablet by mouth every day.      Cholecalciferol (VITAMIN D3 PO) Take 1 Tablet by mouth every 7 days. OTC      aspirin EC (ECOTRIN) 81 MG Tablet Delayed Response Take 81 mg by mouth every bedtime. 30 Tab 0    Multiple Vitamins-Minerals (MELODY-DAY 1000 PO) Take 1 Tab by mouth 2 Times a Day.      NON SPECIFIED Ilumya injection every three months prescribed by dermatology       No facility-administered encounter medications on file as of 5/29/2024.     Review of Systems   Constitutional: Negative.    HENT: Negative.     Eyes: Negative.    Respiratory:  Negative for shortness of breath.    Cardiovascular:  Negative for chest pain, palpitations, orthopnea, leg swelling and PND.   Gastrointestinal: Negative.    Genitourinary: Negative.    Musculoskeletal: Negative.    Skin: Negative.    Neurological: Negative.    Endo/Heme/Allergies: Negative.    Psychiatric/Behavioral:  Negative for depression. The patient is not nervous/anxious.               Objective     /70 (BP Location: Left arm, Patient Position: Sitting, BP Cuff Size: Adult)   Pulse 69   Resp 16   Ht 1.702 m (5' 7\")   Wt 81.6 kg (180 lb)   SpO2 96%   BMI 28.19 kg/m²     Physical Exam  Constitutional:       Appearance: Normal appearance.   HENT:      Head: Normocephalic.   Neck:      Vascular: No JVD.   Cardiovascular:      Rate and Rhythm: Normal rate and regular rhythm.      Pulses: Normal pulses.      Heart sounds: Murmur heard.      Diastolic murmur is present with a grade of 3/4.      No friction rub.   Pulmonary:      Effort: Pulmonary effort is normal.      Breath sounds: Normal breath sounds.   Abdominal:      Palpations: Abdomen is soft.   Musculoskeletal:         General: Normal range of motion.      Right lower leg: No edema.      Left lower leg: No edema.   Skin:     General: Skin is warm and " dry.   Neurological:      General: No focal deficit present.      Mental Status: He is alert and oriented to person, place, and time.   Psychiatric:         Mood and Affect: Mood normal.         Behavior: Behavior normal.            Lab Results   Component Value Date/Time    CHOLSTRLTOT 207 (H) 02/15/2024 08:49 AM    LDL 97 02/15/2024 08:49 AM    HDL 91 02/15/2024 08:49 AM    TRIGLYCERIDE 97 02/15/2024 08:49 AM       Lab Results   Component Value Date/Time    SODIUM 140 02/15/2024 08:49 AM    POTASSIUM 4.1 02/15/2024 08:49 AM    CHLORIDE 104 02/15/2024 08:49 AM    CO2 27 02/15/2024 08:49 AM    GLUCOSE 111 (H) 02/15/2024 08:49 AM    BUN 19 02/15/2024 08:49 AM    CREATININE 1.16 02/15/2024 08:49 AM     Lab Results   Component Value Date/Time    ALKPHOSPHAT 98 02/15/2024 08:49 AM    ASTSGOT 37 02/15/2024 08:49 AM    ALTSGPT 46 02/15/2024 08:49 AM    TBILIRUBIN 0.6 02/15/2024 08:49 AM      Echocardiogram 8/25/2022  CONCLUSIONS  Normal left ventricular size, thickness, and systolic function.  Mild to moderate mitral regurgitation.  Mild mitral annular calcification Prolapse of the posterior mitral   valve leaflet.     Coronary calcium score 9/5/2023  FINDINGS:     Coronary calcification:  LMA - 108.9  LCX - 225.7  LAD - 754.9  RCA - 1379.6     Total Calcium Score: 2469.1     Percentile: Calcium score is above the 90th percentile for the patient's age and sex.     Echocardiogram 10/6/2023  CONCLUSIONS  Normal left ventricular systolic function. The ejection fraction is   measured to be  58% by Snowden's biplane.   The right ventricle is normal in size and systolic function.  Moderate mitral regurgitation. Anteriorly directed mitral regurgitation   jet.  Mild tricuspid regurgitation. Estimated right ventricular systolic   pressure is 25 mmHg (normal).   Compared to the prior study on 8/25/22, mitral regurgitation is now   moderate (previously mild to moderate).    Assessment & Plan     1. Familial hyperlipidemia  ezetimibe  (ZETIA) 10 MG Tab    Lipid Profile      2. Nonrheumatic mitral valve regurgitation  EC-ECHOCARDIOGRAM COMPLETE W/O CONT      3. Essential hypertension        4. Family history of coronary artery disease in brother        5. Obstructive sleep apnea (adult) (pediatric)            Medical Decision Making: Today's Assessment/Status/Plan:         Moderate Mitral regurgitation  -Asymptomatic  -Ordered annual echocardiogram  -Discussed with the patient along with warning signs of severe MR including but not limited to chest pain, dizziness, dyspnea, presyncope or syncopal events, excessive fatigue and unable to do activities with the same energy level.     Hypertension  -Good control, managed by PCP  - continue lisinopril 10 mg daily  - goal < 130/80     Hyperlipidemia  -Most recent LDL 97  -Continue atorvastatin 80 mg daily, add ezetimibe 10 mg daily  -Check lipid panel in 3 months  -Goal of less than 70, ideally less than 55    ARNULFO  -On CPAP   -last RVSP 25     Follow up with YOGI Zhao in 6 months with echocardiogram     This note was dictated using Dragon speech recognition software.

## 2024-05-30 ENCOUNTER — APPOINTMENT (OUTPATIENT)
Dept: LAB | Facility: MEDICAL CENTER | Age: 74
End: 2024-05-30
Attending: ORTHOPAEDIC SURGERY
Payer: MEDICARE

## 2024-05-30 ENCOUNTER — HOSPITAL ENCOUNTER (OUTPATIENT)
Dept: RADIOLOGY | Facility: MEDICAL CENTER | Age: 74
End: 2024-05-30
Attending: ORTHOPAEDIC SURGERY
Payer: MEDICARE

## 2024-05-30 DIAGNOSIS — M25.561 CHRONIC PAIN OF RIGHT KNEE: ICD-10-CM

## 2024-05-30 DIAGNOSIS — Z96.651 HISTORY OF TOTAL RIGHT KNEE REPLACEMENT: ICD-10-CM

## 2024-05-30 DIAGNOSIS — G89.29 CHRONIC PAIN OF RIGHT KNEE: ICD-10-CM

## 2024-05-30 LAB
BASOPHILS # BLD AUTO: 0.4 % (ref 0–1.8)
BASOPHILS # BLD: 0.03 K/UL (ref 0–0.12)
CRP SERPL HS-MCNC: <0.3 MG/DL (ref 0–0.75)
EOSINOPHIL # BLD AUTO: 0.08 K/UL (ref 0–0.51)
EOSINOPHIL NFR BLD: 1.2 % (ref 0–6.9)
ERYTHROCYTE [DISTWIDTH] IN BLOOD BY AUTOMATED COUNT: 45.2 FL (ref 35.9–50)
ERYTHROCYTE [SEDIMENTATION RATE] IN BLOOD BY WESTERGREN METHOD: 3 MM/HOUR (ref 0–20)
HCT VFR BLD AUTO: 41.8 % (ref 42–52)
HGB BLD-MCNC: 13.8 G/DL (ref 14–18)
IMM GRANULOCYTES # BLD AUTO: 0.01 K/UL (ref 0–0.11)
IMM GRANULOCYTES NFR BLD AUTO: 0.1 % (ref 0–0.9)
LYMPHOCYTES # BLD AUTO: 1.26 K/UL (ref 1–4.8)
LYMPHOCYTES NFR BLD: 18.3 % (ref 22–41)
MCH RBC QN AUTO: 31.8 PG (ref 27–33)
MCHC RBC AUTO-ENTMCNC: 33 G/DL (ref 32.3–36.5)
MCV RBC AUTO: 96.3 FL (ref 81.4–97.8)
MONOCYTES # BLD AUTO: 0.69 K/UL (ref 0–0.85)
MONOCYTES NFR BLD AUTO: 10 % (ref 0–13.4)
NEUTROPHILS # BLD AUTO: 4.83 K/UL (ref 1.82–7.42)
NEUTROPHILS NFR BLD: 70 % (ref 44–72)
NRBC # BLD AUTO: 0 K/UL
NRBC BLD-RTO: 0 /100 WBC (ref 0–0.2)
PLATELET # BLD AUTO: 133 K/UL (ref 164–446)
PMV BLD AUTO: 11.2 FL (ref 9–12.9)
RBC # BLD AUTO: 4.34 M/UL (ref 4.7–6.1)
WBC # BLD AUTO: 6.9 K/UL (ref 4.8–10.8)

## 2024-05-31 ENCOUNTER — TELEMEDICINE (OUTPATIENT)
Dept: BEHAVIORAL HEALTH | Facility: CLINIC | Age: 74
End: 2024-05-31
Payer: MEDICARE

## 2024-05-31 DIAGNOSIS — F51.04 CHRONIC INSOMNIA: Chronic | ICD-10-CM

## 2024-05-31 DIAGNOSIS — F33.42 RECURRENT MAJOR DEPRESSIVE DISORDER, IN FULL REMISSION (HCC): ICD-10-CM

## 2024-05-31 DIAGNOSIS — F41.1 ANXIETY, GENERALIZED: ICD-10-CM

## 2024-05-31 RX ORDER — FLUOXETINE HYDROCHLORIDE 20 MG/1
20 CAPSULE ORAL DAILY
Qty: 90 CAPSULE | Refills: 1 | Status: SHIPPED | OUTPATIENT
Start: 2024-05-31 | End: 2024-11-27

## 2024-05-31 RX ORDER — MIRTAZAPINE 15 MG/1
7.5 TABLET, FILM COATED ORAL NIGHTLY
Qty: 45 TABLET | Refills: 1 | Status: SHIPPED | OUTPATIENT
Start: 2024-05-31 | End: 2024-11-27

## 2024-05-31 RX ORDER — FLUOXETINE HYDROCHLORIDE 40 MG/1
40 CAPSULE ORAL DAILY
Qty: 90 CAPSULE | Refills: 1 | Status: SHIPPED | OUTPATIENT
Start: 2024-05-31 | End: 2024-11-27

## 2024-05-31 RX ORDER — BUPROPION HYDROCHLORIDE 200 MG/1
400 TABLET, EXTENDED RELEASE ORAL DAILY
Qty: 180 TABLET | Refills: 1 | Status: SHIPPED | OUTPATIENT
Start: 2024-05-31 | End: 2024-11-27

## 2024-05-31 NOTE — PROGRESS NOTES
Evaluation completed by: Luigi King M.D.   Date of Service: 05/31/2024  Appointment type: in-office appointment.    Information below was collected from: patient      CHIEF COMPLIANT:  Medication Management    HPI:   Buster Medina is a 73 y.o. old male who presents today for follow up appointment to address Medication Management    Patient reports that his depressive symptoms have completely resolved   -He continues to be asymptomatic  -He denies adverse effects of current medications  -He has been enjoying activities and stays connected with social support (gardening, golfing, etc)  -Discussed timeline for trail of tapering doses but ok continuing for now    CURRENT MEDICATIONS    Current Outpatient Medications:     buPROPion (WELLBUTRIN SR) 200 MG SR tablet, Take 2 Tablets by mouth every day for 180 days. Indications: Major Depressive Disorder, Disp: 180 Tablet, Rfl: 1    FLUoxetine (PROZAC) 20 MG Cap, Take 1 Capsule by mouth every day for 180 days. Take with 40 mg for total daily dose of 60 mg  Indications: Depression, Generalized Anxiety Disorder, Disp: 90 Capsule, Rfl: 1    fluoxetine (PROZAC) 40 MG capsule, Take 1 Capsule by mouth every day for 180 days. Taking with 20 mg for total daily dose of 60 mg, Disp: 90 Capsule, Rfl: 1    mirtazapine (REMERON) 15 MG Tab, Take 0.5 Tablets by mouth every evening for 180 days., Disp: 45 Tablet, Rfl: 1    ezetimibe (ZETIA) 10 MG Tab, Take 1 Tablet by mouth every day., Disp: 100 Tablet, Rfl: 3    naltrexone (DEPADE) 50 MG Tab, 1 tablet Orally twice daily for 30 days, Disp: , Rfl:     omeprazole (PRILOSEC) 20 MG delayed-release capsule, Take 1 Capsule by mouth every evening., Disp: 100 Capsule, Rfl: 2    lisinopril (PRINIVIL) 10 MG Tab, TAKE 1 TABLET BY MOUTH EVERY DAY. (STOP 20 MG DOSE), Disp: 90 Tablet, Rfl: 2    celecoxib (CELEBREX) 200 MG Cap, Take 1 Capsule by mouth every day., Disp: 90 Capsule, Rfl: 2    NON SPECIFIED, Ilumya injection every three months  prescribed by dermatology, Disp: , Rfl:     atorvastatin (LIPITOR) 80 MG tablet, Take 1 Tablet by mouth every day., Disp: 100 Tablet, Rfl: 3    Clobetasol Propionate 0.05 % Shampoo, , Disp: , Rfl:     Magnesium 100 MG Cap, Take  by mouth., Disp: , Rfl:     ferrous sulfate 325 (65 Fe) MG tablet, Take 325 mg by mouth every day., Disp: , Rfl:     B Complex Vitamins (VITAMIN B COMPLEX PO), Take 1 Tablet by mouth every day., Disp: , Rfl:     Cholecalciferol (VITAMIN D3 PO), Take 1 Tablet by mouth every 7 days. OTC, Disp: , Rfl:     aspirin EC (ECOTRIN) 81 MG Tablet Delayed Response, Take 81 mg by mouth every bedtime., Disp: 30 Tab, Rfl: 0    Multiple Vitamins-Minerals (MELODY-DAY 1000 PO), Take 1 Tab by mouth 2 Times a Day., Disp: , Rfl:       MEDICAL HISTORY  Past Medical History:   Diagnosis Date    Anxiety     Anxiety, generalized 11/27/2023    Arthritis     osteo    Bowel habit changes 06/24/2021    Constipation    Cancer ([facility]) 2007    bladder    Chickenpox     as a child    Depression     depression    High cholesterol     Hyperlipidemia     Hypertension     pt states  well controlled on meds    Kidney stone     Mumps     as a child     Obesity     Pneumothorax     Psoriatic arthritis (HCC)     Recurrent major depressive disorder, in partial remission (HCC) 09/21/2017    Restless leg syndrome     Sleep apnea     uses cpap    Tonsillitis      Allergies:   No Known Allergies      SURGICAL HISTORY  Past Surgical History:   Procedure Laterality Date    PB INCISE FINGER TENDON SHEATH Left 9/19/2023    Procedure: LEFT INDEX FINGER TRIGGER RELEASE;  Surgeon: Td Soliman M.D.;  Location: [facility];  Service: Orthopedics    PB INCISE FINGER TENDON SHEATH Left 8/30/2022    Procedure: LEFT MIDDLE FINGER TRIGGER RELEASE;  Surgeon: Td Soliman M.D.;  Location: [facility];  Service: Orthopedics    [place] CYSTOURETHROSCOPY,BIOPSIES N/A 8/17/2022    Procedure: CYSTOSCOPY;  Surgeon: Jerzy FUENTES  JIM Jasso;  Location: [facility];  Service: Urology    MD CYSTOURETHROSCOPY,BIOPSIES  7/6/2021    Procedure: BIOPSY, BLADDER WITH FULGERATION;  Surgeon: Bob Millan M.D.;  Location: [facility];  Service: Urology    BLADDER BIOPSY WITH CYSTOSCOPY  1/3/2018    Procedure: BLADDER BIOPSY WITH CYSTOSCOPY/WITH UPPER TRACT WASHING;  Surgeon: El Manuel M.D.;  Location: [facility];  Service: Urology    RETROGRADES Bilateral 1/3/2018    Procedure: RETROGRADES;  Surgeon: El Manuel M.D.;  Location: [facility];  Service: Urology    PYELOGRAM Bilateral 1/3/2018    Procedure: PYELOGRAM;  Surgeon: El Manuel M.D.;  Location: [facility];  Service: Urology    OTHER ORTHOPEDIC SURGERY  2015    shoulder replacement    ABDOMINAL EXPLORATION      ARTHROPLASTY      right shoulder    BLADDER BIOPSY WITH CYSTOSCOPY      EYE SURGERY      lasik     GASTRIC BYPASS LAPAROSCOPIC      HERNIA REPAIR      OPEN REDUCTION      OTHER Left     thumb joint    THORACOTOMY      TURP-VAPOR          FAMILY PSYCHIATRIC HISTORY  Family History   Problem Relation Age of Onset    Cancer Mother 80        lung cancer    Arthritis Mother     Diabetes Mother     Anxiety disorder Mother     Lung Disease Father 65    Cancer Father     Diabetes Father     Hypertension Father     Hyperlipidemia Father     Hyperlipidemia Brother     Arthritis Brother     Other Brother         Thyroid tumor    Sleep Apnea Brother     No Known Problems Maternal Grandmother     Cancer Maternal Grandfather     No Known Problems Paternal Grandmother     No Known Problems Paternal Grandfather     Heart Disease Brother 49        smoker, overweight    Hypertension Brother     Depression Other     Suicide Attempts Neg Hx     Bipolar disorder Neg Hx     Alcohol abuse Neg Hx     Drug abuse Neg Hx        SOCIAL HISTORY  Reviewed with patient and no changes.       PSYCHIATRIC EXAMINATION   Mental Status Exam     Appearance: Appropriate dress and grooming  Sensorium: Alert and Oriented X 4  Behavior: Appropriate  Motor Activity: Unremarkable (Comment: Mild tremor in hands)  Eye Contact: Adequate  Speech: Normal  Mood: Euthymic  Affect: Congruent with normal range  Thought Flow: Linear  Thought Content: Unremarkable  Suicidality: Denies  Hallucinations: Denies  Cognition: Normal  Insight: Intact  Reliability: Apparently reliable  Judgement: Good         [place]  records   reviewed.  No concerns about misuse of controlled substance.    ASSESSMENT  Buster Medina is a 73 y.o. old male who presents today for follow up appointment to address Medication Management    Patient has had stable improvement on current medications and reports that his depression and anxiety have completely resolved.  He is sleeping well and enjoying activities.  Discussed plan to continue current medications for 1-2 years of stable improvement and then consider tapering down or off of the medications. Discussed transition to new resident in July and recommended that he follow up in approximately 3 months.  Also discussed option of asking PCP to take over refills if he prefers.      DIAGNOSES/PLAN  Encounter Diagnoses   Name Primary?    Anxiety, generalized     Recurrent major depressive disorder, in full remission (HCC)     Chronic insomnia        Continue current medications;  -Fluoxetine 60 mg po daily (20 mg and 40 mg taken together)  -Wellbutrin  mg po daily  -Remeron 7.5 mg po qhs        Medication options, alternatives (including no medications) and medication risks/benefits/side effects were discussed in detail.  The patient was advised to call, message clinician on [facility], or come in to the clinic if symptoms worsen or if questions/issues regarding their medications arise.  The patient verbalized understanding and agreement.    The patient was educated to call 911, call the suicide hotline, or go to the local ER if having thoughts  of suicide or homicide.  The patient verbalized understanding and agreement.   The proposed treatment plan was discussed with the patient who was provided the opportunity to ask questions and make suggestions regarding alternative treatment. Patient verbalized understanding and expressed agreement with the plan.      Return to clinic in 3 months with new resident psychiatrist or sooner if symptoms worsen.    This appointment was supervised by attending psychiatrist, who agrees with assessment and treatment plan.  See attending attestation for more details.     Luigi King M.D.

## 2024-07-15 ENCOUNTER — PATIENT MESSAGE (OUTPATIENT)
Dept: MEDICAL GROUP | Age: 74
End: 2024-07-15
Payer: MEDICARE

## 2024-07-18 RX ORDER — OMEPRAZOLE 20 MG/1
20 CAPSULE, DELAYED RELEASE ORAL DAILY
Qty: 180 CAPSULE | Refills: 2 | Status: SHIPPED | OUTPATIENT
Start: 2024-07-18

## 2024-08-23 ENCOUNTER — OFFICE VISIT (OUTPATIENT)
Dept: BEHAVIORAL HEALTH | Facility: CLINIC | Age: 74
End: 2024-08-23
Payer: MEDICARE

## 2024-08-23 DIAGNOSIS — F41.1 ANXIETY, GENERALIZED: ICD-10-CM

## 2024-08-23 DIAGNOSIS — F33.42 RECURRENT MAJOR DEPRESSIVE DISORDER, IN FULL REMISSION (HCC): ICD-10-CM

## 2024-08-23 PROCEDURE — 90792 PSYCH DIAG EVAL W/MED SRVCS: CPT | Performed by: PSYCHIATRY & NEUROLOGY

## 2024-08-23 RX ORDER — BUPROPION HYDROCHLORIDE 200 MG/1
400 TABLET, EXTENDED RELEASE ORAL DAILY
Qty: 180 TABLET | Refills: 1 | Status: SHIPPED | OUTPATIENT
Start: 2024-08-23 | End: 2025-02-19

## 2024-08-23 RX ORDER — MIRTAZAPINE 15 MG/1
7.5 TABLET, FILM COATED ORAL NIGHTLY PRN
Qty: 15 TABLET | Refills: 2 | Status: SHIPPED | OUTPATIENT
Start: 2024-08-23 | End: 2024-11-21

## 2024-08-23 RX ORDER — FLUOXETINE 40 MG/1
40 CAPSULE ORAL DAILY
Qty: 90 CAPSULE | Refills: 1 | Status: SHIPPED | OUTPATIENT
Start: 2024-08-23 | End: 2025-02-19

## 2024-08-23 ASSESSMENT — ENCOUNTER SYMPTOMS
HEADACHES: 0
BLOOD IN STOOL: 0
TREMORS: 1
WEAKNESS: 0
EYE DISCHARGE: 0
TINGLING: 0
CHILLS: 0
FALLS: 0
LOSS OF CONSCIOUSNESS: 0
WHEEZING: 0
SEIZURES: 0
FEVER: 0
DIZZINESS: 0
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
## 2024-09-13 ENCOUNTER — TELEPHONE (OUTPATIENT)
Dept: PHYSICAL THERAPY | Facility: MEDICAL CENTER | Age: 74
End: 2024-09-13
Payer: MEDICARE

## 2024-09-13 NOTE — OP THERAPY DISCHARGE SUMMARY
Outpatient Physical Therapy  DISCHARGE SUMMARY NOTE      [facility] Outpatient Physical Therapy  [address]  [place] [address]  Phone:  [phone]  Fax:  [phone]    Date of Visit: 2024    Patient: Aaron Medina  YOB: 1950  MRN: [id number]     Referring Provider: [place] [address]   Referring Diagnosis Other abnormalities of gait and mobility [R26.89]          Functional Assessment Used        Your patient is being discharged from Physical Therapy with the following comments:   other    Comments:  Aaron had been seen for 3 PT sessions supporting the management of imbalance. Formal therapy was placed on hold 24, as he was showing good independence with self management via HEP. He has not followed up in clinic for greater than 30 days. Will be discharged due to referral being . Requires a new Rx to re-establish if needed.       Sho Ashford, PT, DPT    Date: 2024

## 2024-10-08 ENCOUNTER — APPOINTMENT (OUTPATIENT)
Dept: URGENT CARE | Facility: CLINIC | Age: 74
End: 2024-10-08
Payer: MEDICARE

## 2024-10-08 ENCOUNTER — OFFICE VISIT (OUTPATIENT)
Dept: MEDICAL GROUP | Age: 74
End: 2024-10-08
Payer: MEDICARE

## 2024-10-08 VITALS
BODY MASS INDEX: 27.31 KG/M2 | TEMPERATURE: 97.6 F | DIASTOLIC BLOOD PRESSURE: 82 MMHG | HEIGHT: 67 IN | OXYGEN SATURATION: 100 % | HEART RATE: 65 BPM | WEIGHT: 174 LBS | SYSTOLIC BLOOD PRESSURE: 124 MMHG

## 2024-10-08 DIAGNOSIS — M10.071 ACUTE IDIOPATHIC GOUT INVOLVING TOE OF RIGHT FOOT: ICD-10-CM

## 2024-10-08 DIAGNOSIS — I10 ESSENTIAL HYPERTENSION: ICD-10-CM

## 2024-10-08 PROCEDURE — 3079F DIAST BP 80-89 MM HG: CPT | Performed by: STUDENT IN AN ORGANIZED HEALTH CARE EDUCATION/TRAINING PROGRAM

## 2024-10-08 PROCEDURE — 3074F SYST BP LT 130 MM HG: CPT | Performed by: STUDENT IN AN ORGANIZED HEALTH CARE EDUCATION/TRAINING PROGRAM

## 2024-10-08 PROCEDURE — 99214 OFFICE O/P EST MOD 30 MIN: CPT | Performed by: STUDENT IN AN ORGANIZED HEALTH CARE EDUCATION/TRAINING PROGRAM

## 2024-10-08 RX ORDER — PREDNISONE 20 MG/1
20 TABLET ORAL DAILY
Qty: 10 TABLET | Refills: 0 | Status: SHIPPED | OUTPATIENT
Start: 2024-10-08

## 2024-10-08 ASSESSMENT — ENCOUNTER SYMPTOMS
WEAKNESS: 0
DEPRESSION: 0
BLURRED VISION: 0
ORTHOPNEA: 0
SHORTNESS OF BREATH: 0
COUGH: 0
DIZZINESS: 0
BACK PAIN: 0
DOUBLE VISION: 0
HEADACHES: 0
CHILLS: 0
FEVER: 0
PALPITATIONS: 0
BLOOD IN STOOL: 0

## 2024-10-08 ASSESSMENT — FIBROSIS 4 INDEX: FIB4 SCORE: 2.99

## 2024-10-12 ENCOUNTER — HOSPITAL ENCOUNTER (OUTPATIENT)
Dept: LAB | Facility: MEDICAL CENTER | Age: 74
End: 2024-10-12
Payer: MEDICARE

## 2024-10-12 ENCOUNTER — APPOINTMENT (OUTPATIENT)
Dept: LAB | Facility: MEDICAL CENTER | Age: 74
End: 2024-10-12
Payer: MEDICARE

## 2024-10-12 DIAGNOSIS — Z51.81 ENCOUNTER FOR THERAPEUTIC DRUG MONITORING: ICD-10-CM

## 2024-10-12 DIAGNOSIS — E78.49 FAMILIAL HYPERLIPIDEMIA: ICD-10-CM

## 2024-10-12 DIAGNOSIS — L40.50 PSORIATIC ARTHRITIS (HCC): ICD-10-CM

## 2024-10-12 DIAGNOSIS — R76.8 ANA POSITIVE: ICD-10-CM

## 2024-10-12 DIAGNOSIS — L40.9 PSORIASIS: ICD-10-CM

## 2024-10-12 LAB
CHOLEST SERPL-MCNC: 181 MG/DL (ref 100–199)
FASTING STATUS PATIENT QL REPORTED: NORMAL
HDLC SERPL-MCNC: 95 MG/DL
LDLC SERPL CALC-MCNC: 68 MG/DL
TRIGL SERPL-MCNC: 90 MG/DL (ref 0–149)
URATE SERPL-MCNC: 6.6 MG/DL (ref 2.5–8.3)
URATE SERPL-MCNC: 6.7 MG/DL (ref 2.5–8.3)

## 2024-10-12 PROCEDURE — 84550 ASSAY OF BLOOD/URIC ACID: CPT

## 2024-10-12 PROCEDURE — 36415 COLL VENOUS BLD VENIPUNCTURE: CPT

## 2024-10-12 PROCEDURE — 80061 LIPID PANEL: CPT

## 2024-10-12 PROCEDURE — 86015 ACTIN ANTIBODY EACH: CPT

## 2024-10-12 PROCEDURE — 86376 MICROSOMAL ANTIBODY EACH: CPT

## 2024-10-12 PROCEDURE — 84550 ASSAY OF BLOOD/URIC ACID: CPT | Mod: 91

## 2024-10-12 PROCEDURE — 86800 THYROGLOBULIN ANTIBODY: CPT

## 2024-10-12 PROCEDURE — 86381 MITOCHONDRIAL ANTIBODY EACH: CPT

## 2024-10-14 LAB
MITOCHONDRIA M2 IGG SER-ACNC: 3.1 UNITS (ref 0–24.9)
SMA IGG SER-ACNC: 6 UNITS (ref 0–19)
THYROGLOB AB SERPL-ACNC: <0.9 IU/ML (ref 0–4)
THYROPEROXIDASE AB SERPL-ACNC: 12 IU/ML (ref 0–9)

## 2024-10-15 ENCOUNTER — HOSPITAL ENCOUNTER (OUTPATIENT)
Dept: CARDIOLOGY | Facility: MEDICAL CENTER | Age: 74
End: 2024-10-15
Payer: MEDICARE

## 2024-10-15 DIAGNOSIS — I34.0 NONRHEUMATIC MITRAL VALVE REGURGITATION: ICD-10-CM

## 2024-10-15 LAB
LV EJECT FRACT  99904: 65
LV EJECT FRACT MOD 4C 99902: 72.13

## 2024-10-15 PROCEDURE — 93306 TTE W/DOPPLER COMPLETE: CPT | Mod: 26 | Performed by: INTERNAL MEDICINE

## 2024-10-15 PROCEDURE — 93306 TTE W/DOPPLER COMPLETE: CPT

## 2024-10-18 ENCOUNTER — APPOINTMENT (OUTPATIENT)
Dept: CARDIOLOGY | Facility: MEDICAL CENTER | Age: 74
End: 2024-10-18
Payer: MEDICARE

## 2024-11-05 ENCOUNTER — APPOINTMENT (OUTPATIENT)
Dept: RHEUMATOLOGY | Facility: MEDICAL CENTER | Age: 74
End: 2024-11-05
Attending: INTERNAL MEDICINE
Payer: MEDICARE

## 2024-11-08 ENCOUNTER — OFFICE VISIT (OUTPATIENT)
Dept: BEHAVIORAL HEALTH | Facility: CLINIC | Age: 74
End: 2024-11-08
Payer: MEDICARE

## 2024-11-08 DIAGNOSIS — Z86.59 HISTORY OF ANXIETY: ICD-10-CM

## 2024-11-08 DIAGNOSIS — F33.42 RECURRENT MAJOR DEPRESSIVE DISORDER, IN FULL REMISSION (HCC): ICD-10-CM

## 2024-11-08 PROCEDURE — 99214 OFFICE O/P EST MOD 30 MIN: CPT | Performed by: PSYCHIATRY & NEUROLOGY

## 2024-11-08 RX ORDER — MIRTAZAPINE 7.5 MG/1
7.5 TABLET, FILM COATED ORAL NIGHTLY PRN
Qty: 90 TABLET | Refills: 0 | Status: SHIPPED | OUTPATIENT
Start: 2024-11-08 | End: 2025-02-06

## 2024-11-08 RX ORDER — BUPROPION HYDROCHLORIDE 200 MG/1
400 TABLET, EXTENDED RELEASE ORAL DAILY
Qty: 180 TABLET | Refills: 1 | Status: SHIPPED | OUTPATIENT
Start: 2024-11-08 | End: 2025-05-07

## 2024-11-08 RX ORDER — FLUOXETINE 40 MG/1
40 CAPSULE ORAL DAILY
Qty: 90 CAPSULE | Refills: 1 | Status: SHIPPED | OUTPATIENT
Start: 2024-11-08 | End: 2025-05-07

## 2024-11-08 ASSESSMENT — ENCOUNTER SYMPTOMS
BLURRED VISION: 0
FALLS: 0
POLYDIPSIA: 0
FEVER: 0
BLOOD IN STOOL: 0
LOSS OF CONSCIOUSNESS: 0
WHEEZING: 0

## 2024-11-08 ASSESSMENT — LIFESTYLE VARIABLES: SUBSTANCE_ABUSE: 0
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
## 2024-11-19 RX ORDER — CELECOXIB 200 MG/1
200 CAPSULE ORAL DAILY
Qty: 90 CAPSULE | Refills: 2 | Status: SHIPPED | OUTPATIENT
Start: 2024-11-19

## 2024-11-19 NOTE — TELEPHONE ENCOUNTER
Received request via: Pharmacy    Was the patient seen in the last year in this department? Yes    Does the patient have an active prescription (recently filled or refills available) for medication(s) requested? No    [facility]     Does the patient have group home Plus and need 100-day supply? (This applies to ALL medications) Patient does not have SCP

## 2024-11-30 DIAGNOSIS — E78.5 DYSLIPIDEMIA: ICD-10-CM

## 2024-12-03 ENCOUNTER — APPOINTMENT (OUTPATIENT)
Dept: URBAN - METROPOLITAN AREA CLINIC 35 | Facility: CLINIC | Age: 74
Setting detail: DERMATOLOGY
End: 2024-12-03

## 2024-12-03 DIAGNOSIS — L82.1 OTHER SEBORRHEIC KERATOSIS: ICD-10-CM

## 2024-12-03 DIAGNOSIS — D22 MELANOCYTIC NEVI: ICD-10-CM

## 2024-12-03 DIAGNOSIS — L81.0 POSTINFLAMMATORY HYPERPIGMENTATION: ICD-10-CM

## 2024-12-03 DIAGNOSIS — L21.8 OTHER SEBORRHEIC DERMATITIS: ICD-10-CM

## 2024-12-03 DIAGNOSIS — Z71.89 OTHER SPECIFIED COUNSELING: ICD-10-CM

## 2024-12-03 DIAGNOSIS — L40.0 PSORIASIS VULGARIS: ICD-10-CM

## 2024-12-03 DIAGNOSIS — D18.0 HEMANGIOMA: ICD-10-CM

## 2024-12-03 DIAGNOSIS — L81.4 OTHER MELANIN HYPERPIGMENTATION: ICD-10-CM

## 2024-12-03 DIAGNOSIS — L30.9 DERMATITIS, UNSPECIFIED: ICD-10-CM | Status: INADEQUATELY CONTROLLED

## 2024-12-03 PROBLEM — D18.01 HEMANGIOMA OF SKIN AND SUBCUTANEOUS TISSUE: Status: ACTIVE | Noted: 2024-12-03

## 2024-12-03 PROBLEM — D22.61 MELANOCYTIC NEVI OF RIGHT UPPER LIMB, INCLUDING SHOULDER: Status: ACTIVE | Noted: 2024-12-03

## 2024-12-03 PROCEDURE — ? PRESCRIPTION

## 2024-12-03 PROCEDURE — ? COUNSELING

## 2024-12-03 PROCEDURE — 99214 OFFICE O/P EST MOD 30 MIN: CPT

## 2024-12-03 PROCEDURE — ? ADDITIONAL NOTES

## 2024-12-03 PROCEDURE — ? SUNSCREEN RECOMMENDATIONS

## 2024-12-03 PROCEDURE — ? TREATMENT REGIMEN

## 2024-12-03 RX ORDER — ATORVASTATIN CALCIUM 80 MG/1
80 TABLET, FILM COATED ORAL
Qty: 90 TABLET | Refills: 1 | Status: SHIPPED | OUTPATIENT
Start: 2024-12-03

## 2024-12-03 RX ADMIN — TILDRAKIZUMAB-ASMN: 100 INJECTION, SOLUTION SUBCUTANEOUS at 00:00

## 2024-12-03 ASSESSMENT — LOCATION SIMPLE DESCRIPTION DERM
LOCATION SIMPLE: RIGHT KNEE
LOCATION SIMPLE: LEFT POSTERIOR THIGH
LOCATION SIMPLE: RIGHT UPPER ARM
LOCATION SIMPLE: LEFT UPPER ARM
LOCATION SIMPLE: RIGHT SHOULDER
LOCATION SIMPLE: RIGHT UPPER BACK
LOCATION SIMPLE: SCALP
LOCATION SIMPLE: RIGHT POSTERIOR THIGH
LOCATION SIMPLE: RIGHT ELBOW
LOCATION SIMPLE: LEFT SCALP
LOCATION SIMPLE: LEFT KNEE
LOCATION SIMPLE: RIGHT FOREARM
LOCATION SIMPLE: LEFT FOREARM
LOCATION SIMPLE: LEFT CALF

## 2024-12-03 ASSESSMENT — BSA RASH: BSA RASH: 25

## 2024-12-03 ASSESSMENT — LOCATION DETAILED DESCRIPTION DERM
LOCATION DETAILED: LEFT VENTRAL PROXIMAL FOREARM
LOCATION DETAILED: RIGHT INFERIOR UPPER BACK
LOCATION DETAILED: RIGHT LATERAL UPPER BACK
LOCATION DETAILED: LEFT PROXIMAL DORSAL FOREARM
LOCATION DETAILED: LEFT KNEE
LOCATION DETAILED: LEFT PROXIMAL POSTERIOR UPPER ARM
LOCATION DETAILED: RIGHT CENTRAL PARIETAL SCALP
LOCATION DETAILED: RIGHT KNEE
LOCATION DETAILED: RIGHT POSTERIOR SHOULDER
LOCATION DETAILED: RIGHT ELBOW
LOCATION DETAILED: RIGHT VENTRAL PROXIMAL FOREARM
LOCATION DETAILED: LEFT PROXIMAL CALF
LOCATION DETAILED: RIGHT PROXIMAL POSTERIOR UPPER ARM
LOCATION DETAILED: LEFT DISTAL POSTERIOR THIGH
LOCATION DETAILED: LEFT CENTRAL FRONTAL SCALP
LOCATION DETAILED: RIGHT PROXIMAL POSTERIOR THIGH

## 2024-12-03 ASSESSMENT — ITCH NUMERIC RATING SCALE: HOW SEVERE IS YOUR ITCHING?: 5

## 2024-12-03 ASSESSMENT — LOCATION ZONE DERM
LOCATION ZONE: SCALP
LOCATION ZONE: ARM
LOCATION ZONE: LEG
LOCATION ZONE: TRUNK

## 2024-12-03 NOTE — PROCEDURE: TREATMENT REGIMEN
Detail Level: Zone
Continue Regimen: -Clobetasol 0.05 % shampoo Day Wash scalp once a day, use up to 10 days consecutively, then take break and use only as needed
Action 3: Continue
Continue Regimen: - humira pen\\n\\n- ILuyma (Given at Infusion Center)
Plan: - 12/3/24 plan to do shave bx on torso and back at next appt on 1/8/24.

## 2024-12-03 NOTE — PROCEDURE: ADDITIONAL NOTES
Render Risk Assessment In Note?: yes
Detail Level: Detailed
Additional Notes: - 12/3/24 pt reports being previously bx in 1970s. Will be doing more bx with DS with next appointment in 1 month.
Additional Notes: - pt originally diagnosed with psoriatic arthritis as it was the only way to get ILuyma. Pt denies symptoms of PSA, but reports hx of psoriasis.

## 2024-12-03 NOTE — PROCEDURE: COUNSELING
Detail Level: Simple
Detail Level: Zone
Patient Specific Counseling (Will Not Stick From Patient To Patient): Being treated with humira by RA Dr. Amisha Johnson
Detail Level: Detailed
Detail Level: Generalized

## 2024-12-03 NOTE — PROCEDURE: MIPS QUALITY
Quality 130: Documentation Of Current Medications In The Medical Record: Current Medications Documented
Quality 47: Advance Care Plan: Advance Care Planning discussed and documented; advance care plan or surrogate decision maker documented in the medical record.
Quality 226: Preventive Care And Screening: Tobacco Use: Screening And Cessation Intervention: Patient screened for tobacco use and is an ex/non-smoker
Detail Level: Detailed
Quality 485: Psoriasis - Improvement In Patient-Reported Itch Severity: Itch severity assessment score is reduced by 3 or more points from the initial (index) assessment score to the follow-up visit score

## 2024-12-03 NOTE — TELEPHONE ENCOUNTER
Is the patient due for a refill? Yes    Was the patient seen the past year? Yes    Date of last office visit: 5/29/2024    Does the patient have an upcoming appointment?  Yes   If yes, When? 12/13/2024    Provider to refill:LH    Does the patient have [facility] and need 100-day supply? (This applies to ALL medications) Patient does not have SCP

## 2024-12-04 RX ORDER — TILDRAKIZUMAB-ASMN 100 MG/ML
INJECTION, SOLUTION SUBCUTANEOUS
Qty: 1 | Refills: 3 | Status: ERX | COMMUNITY
Start: 2024-12-03

## 2024-12-13 ENCOUNTER — OFFICE VISIT (OUTPATIENT)
Dept: CARDIOLOGY | Facility: MEDICAL CENTER | Age: 74
End: 2024-12-13
Payer: MEDICARE

## 2024-12-13 VITALS
SYSTOLIC BLOOD PRESSURE: 120 MMHG | OXYGEN SATURATION: 97 % | BODY MASS INDEX: 28.09 KG/M2 | HEIGHT: 67 IN | DIASTOLIC BLOOD PRESSURE: 68 MMHG | HEART RATE: 64 BPM | WEIGHT: 179 LBS | RESPIRATION RATE: 16 BRPM

## 2024-12-13 DIAGNOSIS — R26.89 BALANCE PROBLEM: ICD-10-CM

## 2024-12-13 DIAGNOSIS — I34.0 NONRHEUMATIC MITRAL VALVE REGURGITATION: ICD-10-CM

## 2024-12-13 DIAGNOSIS — G47.33 OBSTRUCTIVE SLEEP APNEA (ADULT) (PEDIATRIC): ICD-10-CM

## 2024-12-13 DIAGNOSIS — I25.10 CORONARY ARTERY DISEASE INVOLVING NATIVE CORONARY ARTERY OF NATIVE HEART WITHOUT ANGINA PECTORIS: ICD-10-CM

## 2024-12-13 DIAGNOSIS — E78.49 FAMILIAL HYPERLIPIDEMIA: ICD-10-CM

## 2024-12-13 DIAGNOSIS — I10 ESSENTIAL HYPERTENSION: ICD-10-CM

## 2024-12-13 DIAGNOSIS — R93.1 AGATSTON CORONARY ARTERY CALCIUM SCORE GREATER THAN 400: ICD-10-CM

## 2024-12-13 PROCEDURE — 99213 OFFICE O/P EST LOW 20 MIN: CPT

## 2024-12-13 PROCEDURE — 3078F DIAST BP <80 MM HG: CPT

## 2024-12-13 PROCEDURE — 99214 OFFICE O/P EST MOD 30 MIN: CPT

## 2024-12-13 PROCEDURE — 3074F SYST BP LT 130 MM HG: CPT

## 2024-12-13 ASSESSMENT — ENCOUNTER SYMPTOMS
SHORTNESS OF BREATH: 1
DEPRESSION: 0
PALPITATIONS: 0
ORTHOPNEA: 0
PND: 0
MUSCULOSKELETAL NEGATIVE: 1
NEUROLOGICAL NEGATIVE: 1
GASTROINTESTINAL NEGATIVE: 1
NERVOUS/ANXIOUS: 0
EYES NEGATIVE: 1

## 2024-12-13 ASSESSMENT — FIBROSIS 4 INDEX: FIB4 SCORE: 2.99

## 2024-12-13 NOTE — PROGRESS NOTES
"Chief Complaint   Patient presents with    Hypertension     FV DX: Familial hyperlipidemia    Hyperlipidemia     FV Dx: Familial hyperlipidemia    Coronary Artery Disease     FV DX: Familial hyperlipidemia       Subjective     Aaron Medina is a 74 y.o. male who presents today for follow up. They have a history of MR, HTN, familial HLD, ARNULFO     He has a family history of MI in his brother (smoker), uncle had 6 bipasses, Mother had cardiomyopathy, other brother has CAD     Seen by  Dr. Caraballo on 8/17/2022 found to have a holosystolic murmur, repeat echocardiogram showed mild to moderate MR with proapse of posterior leaflet.     Visit with myself on 8/22/2023:  he is feeling overall well, no significant symptoms     At visit with myself on 11/28/2023  he does report that he has not been walking as much (about a mile at a time) because around the mile gabby he starts to \"feel like he is falling forward\" and has to sit down to hold onto something to keep from falling.  He reports that this has been going on for about 2 years but that he has never previously mentioned it to any of his providers.  He denies chest pain, shortness of breath, palpitations, lower extremity edema      5/29/2024 he reports that he has been feeling really good, he says that his balance issue from her last visit has also resolved with physical therapy.  He denies any acute CV symptoms.    12/13/2024 he has been doing a lot better regarding his balance issues.  He does report that he has been having some fatigue and decreased exercise tolerance.  Occasional shortness of breath.  No chest pain or lower extremity edema, no palpitations.  NYHA class II       Past Medical History:   Diagnosis Date    Anxiety     Anxiety, generalized 11/27/2023    Arthritis     osteo    Bowel habit changes 06/24/2021    Constipation    Cancer (HCC) 2007    bladder    Chickenpox     as a child    Depression     depression    High cholesterol     Hyperlipidemia     " Hypertension     pt states  well controlled on meds    Kidney stone     Mumps     as a child     Obesity     Pneumothorax     Psoriatic arthritis (HCC)     Recurrent major depressive disorder, in partial remission (HCC) 09/21/2017    Restless leg syndrome     Sleep apnea     uses cpap    Tonsillitis      Past Surgical History:   Procedure Laterality Date    PB INCISE FINGER TENDON SHEATH Left 9/19/2023    Procedure: LEFT INDEX FINGER TRIGGER RELEASE;  Surgeon: Td Soliman M.D.;  Location: [facility];  Service: Orthopedics    PB INCISE FINGER TENDON SHEATH Left 8/30/2022    Procedure: LEFT MIDDLE FINGER TRIGGER RELEASE;  Surgeon: Td Soliman M.D.;  Location: [facility];  Service: Orthopedics    MS CYSTOURETHROSCOPY,BIOPSIES N/A 8/17/2022    Procedure: CYSTOSCOPY;  Surgeon: Jerzy Jasso M.D.;  Location: [facility];  Service: Urology    MS CYSTOURETHROSCOPY,BIOPSIES  7/6/2021    Procedure: BIOPSY, BLADDER WITH FULGERATION;  Surgeon: Bob Millan M.D.;  Location: [facility];  Service: Urology    BLADDER BIOPSY WITH CYSTOSCOPY  1/3/2018    Procedure: BLADDER BIOPSY WITH CYSTOSCOPY/WITH UPPER TRACT WASHING;  Surgeon: El Manuel M.D.;  Location: [facility];  Service: Urology    RETROGRADES Bilateral 1/3/2018    Procedure: RETROGRADES;  Surgeon: El Manuel M.D.;  Location: [facility];  Service: Urology    PYELOGRAM Bilateral 1/3/2018    Procedure: PYELOGRAM;  Surgeon: El Manuel M.D.;  Location: [facility];  Service: Urology    OTHER ORTHOPEDIC SURGERY  2015    shoulder replacement    ABDOMINAL EXPLORATION      ARTHROPLASTY      right shoulder    BLADDER BIOPSY WITH CYSTOSCOPY      EYE SURGERY      lasik     GASTRIC BYPASS LAPAROSCOPIC      HERNIA REPAIR      OPEN REDUCTION      OTHER Left     thumb joint    THORACOTOMY      TURP-VAPOR       Family History   Problem Relation Age of Onset    Cancer  "Mother 80        lung cancer    Arthritis Mother     Diabetes Mother     Anxiety disorder Mother     Lung Disease Father 65    Cancer Father     Diabetes Father     Hypertension Father     Hyperlipidemia Father     Hyperlipidemia Brother     Arthritis Brother     Other Brother         Thyroid tumor    Sleep Apnea Brother     No Known Problems Maternal Grandmother     Cancer Maternal Grandfather     No Known Problems Paternal Grandmother     No Known Problems Paternal Grandfather     Heart Disease Brother 49        smoker, overweight    Hypertension Brother     Depression Other     Suicide Attempts Neg Hx     Bipolar disorder Neg Hx     Alcohol abuse Neg Hx     Drug abuse Neg Hx      Social History     Socioeconomic History    Marital status: Single     Spouse name: Not on file    Number of children: Not on file    Years of education: Not on file    Highest education level: Master's degree (e.g., MA, MS, Rhiannon, MEd, MSW, LUIS)   Occupational History    Not on file   Tobacco Use    Smoking status: Never    Smokeless tobacco: Never   Vaping Use    Vaping status: Never Used   Substance and Sexual Activity    Alcohol use: Yes     Alcohol/week: 8.4 oz     Types: 14 Glasses of wine per week     Comment: wine / daily    Drug use: Not Currently     Types: Marijuana, Oral     Comment: THC edible - \"once a month\".    Sexual activity: Not Currently   Other Topics Concern    Not on file   Social History Narrative    Not on file     Social Drivers of Health     Financial Resource Strain: Low Risk  (4/3/2024)    Overall Financial Resource Strain (CARDIA)     Difficulty of Paying Living Expenses: Not hard at all   Food Insecurity: No Food Insecurity (4/3/2024)    Hunger Vital Sign     Worried About Running Out of Food in the Last Year: Never true     Ran Out of Food in the Last Year: Never true   Transportation Needs: No Transportation Needs (4/3/2024)    PRAPARE - Transportation     Lack of Transportation (Medical): No     Lack of " Transportation (Non-Medical): No   Physical Activity: Insufficiently Active (4/3/2024)    Exercise Vital Sign     Days of Exercise per Week: 2 days     Minutes of Exercise per Session: 20 min   Stress: No Stress Concern Present (4/3/2024)    [employer] - Occupational Stress Questionnaire     Feeling of Stress : Not at all   Social Connections: Moderately Isolated (4/3/2024)    Social Connection and Isolation Panel [NHANES]     Frequency of Communication with Friends and Family: Twice a week     Frequency of Social Gatherings with Friends and Family: Once a week     Attends [employer] Services: Never     Active Member of Clubs or Organizations: No     Attends Club or Organization Meetings: Patient declined     Marital Status: Living with partner   Intimate Partner Violence: Not on file   Housing Stability: Unknown (4/3/2024)    Housing Stability Vital Sign     Unable to Pay for Housing in the Last Year: No     Number of Places Lived in the Last Year: Not on file     Unstable Housing in the Last Year: No     No Known Allergies  Outpatient Encounter Medications as of 12/13/2024   Medication Sig Dispense Refill    atorvastatin (LIPITOR) 80 MG tablet TAKE ONE TABLET BY MOUTH EVERY DAY 90 Tablet 1    celecoxib (CELEBREX) 200 MG Cap Take 1 Capsule by mouth every day. 90 Capsule 2    ezetimibe (ZETIA) 10 MG Tab Take 1 Tablet by mouth every day. 90 Tablet 1    mirtazapine (REMERON) 7.5 MG tablet Take 1 Tablet by mouth at bedtime as needed (For sleep) for up to 90 days. 90 Tablet 0    buPROPion (WELLBUTRIN SR) 200 MG SR tablet Take 2 Tablets by mouth every day for 180 days. Indications: Major Depressive Disorder 180 Tablet 1    FLUoxetine (PROZAC) 20 MG Cap Take 1 Capsule by mouth every day for 180 days. Take with 40 mg for total daily dose of 60 mg  Indications: Depression, Generalized Anxiety Disorder 90 Capsule 1    fluoxetine (PROZAC) 40 MG capsule Take 1 Capsule by mouth every day for 180  "days. Taking with 20 mg for total daily dose of 60 mg 90 Capsule 1    predniSONE (DELTASONE) 20 MG Tab Take 1 Tablet by mouth every day. (Patient not taking: Reported on 12/13/2024) 10 Tablet 0    omeprazole (PRILOSEC) 20 MG delayed-release capsule Take 1 Capsule by mouth every day. 180 Capsule 2    naltrexone (DEPADE) 50 MG Tab Taking 4 tablets per day      omeprazole (PRILOSEC) 20 MG delayed-release capsule Take 1 Capsule by mouth every evening. 100 Capsule 2    lisinopril (PRINIVIL) 10 MG Tab TAKE 1 TABLET BY MOUTH EVERY DAY. (STOP 20 MG DOSE) 90 Tablet 2    NON SPECIFIED Ilumya injection every three months prescribed by dermatology      Clobetasol Propionate 0.05 % Shampoo       Magnesium 100 MG Cap Take  by mouth.      ferrous sulfate 325 (65 Fe) MG tablet Take 325 mg by mouth every day.      B Complex Vitamins (VITAMIN B COMPLEX PO) Take 1 Tablet by mouth every day.      Cholecalciferol (VITAMIN D3 PO) Take 1 Tablet by mouth every day. OTC      aspirin EC (ECOTRIN) 81 MG Tablet Delayed Response Take 81 mg by mouth every bedtime. 30 Tab 0    Multiple Vitamins-Minerals (MELODY-DAY 1000 PO) Take 1 Tab by mouth 2 Times a Day.       No facility-administered encounter medications on file as of 12/13/2024.     Review of Systems   Constitutional:  Positive for malaise/fatigue.   HENT: Negative.     Eyes: Negative.    Respiratory:  Positive for shortness of breath.    Cardiovascular:  Negative for chest pain, palpitations, orthopnea, leg swelling and PND.   Gastrointestinal: Negative.    Genitourinary: Negative.    Musculoskeletal: Negative.    Skin: Negative.    Neurological: Negative.    Endo/Heme/Allergies: Negative.    Psychiatric/Behavioral:  Negative for depression. The patient is not nervous/anxious.               Objective     /68 (BP Location: Left arm, Patient Position: Sitting, BP Cuff Size: Adult)   Pulse 64   Resp 16   Ht 1.702 m (5' 7\")   Wt 81.2 kg (179 lb)   SpO2 97%   BMI 28.04 kg/m² "     Physical Exam  Constitutional:       Appearance: Normal appearance.   HENT:      Head: Normocephalic.   Neck:      Vascular: No JVD.   Cardiovascular:      Rate and Rhythm: Normal rate and regular rhythm.      Pulses: Normal pulses.      Heart sounds: Murmur heard.      Systolic murmur is present with a grade of 4/6.      No friction rub.   Pulmonary:      Effort: Pulmonary effort is normal.      Breath sounds: Normal breath sounds.   Abdominal:      Palpations: Abdomen is soft.   Musculoskeletal:         General: Normal range of motion.      Right lower leg: No edema.      Left lower leg: No edema.   Skin:     General: Skin is warm and dry.   Neurological:      General: No focal deficit present.      Mental Status: He is alert and oriented to person, place, and time.   Psychiatric:         Mood and Affect: Mood normal.         Behavior: Behavior normal.            Lab Results   Component Value Date/Time    WBC 6.9 05/30/2024 02:41 PM    RBC 4.34 (L) 05/30/2024 02:41 PM    HEMOGLOBIN 13.8 (L) 05/30/2024 02:41 PM    HEMATOCRIT 41.8 (L) 05/30/2024 02:41 PM    MCV 96.3 05/30/2024 02:41 PM    MCH 31.8 05/30/2024 02:41 PM    MCHC 33.0 05/30/2024 02:41 PM    MPV 11.2 05/30/2024 02:41 PM    NEUTSPOLYS 70.00 05/30/2024 02:41 PM    LYMPHOCYTES 18.30 (L) 05/30/2024 02:41 PM    MONOCYTES 10.00 05/30/2024 02:41 PM    EOSINOPHILS 1.20 05/30/2024 02:41 PM    BASOPHILS 0.40 05/30/2024 02:41 PM      Lab Results   Component Value Date/Time    CHOLSTRLTOT 181 10/12/2024 11:00 AM    LDL 68 10/12/2024 11:00 AM    HDL 95 10/12/2024 11:00 AM    TRIGLYCERIDE 90 10/12/2024 11:00 AM       Lab Results   Component Value Date/Time    SODIUM 140 02/15/2024 08:49 AM    POTASSIUM 4.1 02/15/2024 08:49 AM    CHLORIDE 104 02/15/2024 08:49 AM    CO2 27 02/15/2024 08:49 AM    GLUCOSE 111 (H) 02/15/2024 08:49 AM    BUN 19 02/15/2024 08:49 AM    CREATININE 1.16 02/15/2024 08:49 AM     Lab Results   Component Value Date/Time    ALKPHOSPHAT 98  02/15/2024 08:49 AM    ASTSGOT 37 02/15/2024 08:49 AM    ALTSGPT 46 02/15/2024 08:49 AM    TBILIRUBIN 0.6 02/15/2024 08:49 AM      Echocardiogram 8/25/2022  CONCLUSIONS  Normal left ventricular size, thickness, and systolic function.  Mild to moderate mitral regurgitation.  Mild mitral annular calcification Prolapse of the posterior mitral   valve leaflet.     Coronary calcium score 9/5/2023  FINDINGS:     Coronary calcification:  LMA - 108.9  LCX - 225.7  LAD - 754.9  RCA - 1379.6     Total Calcium Score: 2469.1     Percentile: Calcium score is above the 90th percentile for the patient's age and sex.     Echocardiogram 10/6/2023  CONCLUSIONS  Normal left ventricular systolic function. The ejection fraction is   measured to be  58% by Snowden's biplane.   The right ventricle is normal in size and systolic function.  Moderate mitral regurgitation. Anteriorly directed mitral regurgitation   jet.  Mild tricuspid regurgitation. Estimated right ventricular systolic   pressure is 25 mmHg (normal).   Compared to the prior study on 8/25/22, mitral regurgitation is now   moderate (previously mild to moderate).    Echocardiogram 10/15/2024  CONCLUSIONS  Asymmetric septal hypertrophy.  Normal left ventricular systolic function.  The left ventricular ejection fraction is visually estimated to be 65%.  Mild mitral annular calcification.  Severe mitral regurgitation. Regurgitant jet is eccentric and   anteriorly directed.     Compared to the prior study on 10/6/2023. LV systolic function remains   preserved. Mitral regurgitation is eccentric and severe.    Assessment & Plan     1. Nonrheumatic mitral valve regurgitation        2. Essential hypertension        3. Familial hyperlipidemia        4. Agatston coronary artery calcium score greater than 400        5. Coronary artery disease involving native coronary artery of native heart without angina pectoris        6. Balance problem        7. Obstructive sleep apnea (adult)  (pediatric)            Medical Decision Making: Today's Assessment/Status/Plan:        Severe mitral regurgitation   -NYHA class II symptoms, fatigue and exercise intolerance  -Echocardiogram from October shows severe eccentric MR  -Check CHUY and follow-up with Dr. Caraballo at next available  -We did discuss the various treatment options including ALIDA versus surgical mitral valve replacement, patient voices a strong preference for ALIDA  -I encouraged patient to keep an open mind and wait until he has his discussion with Dr. Caraballo before making a final decision on which treatment pathway he would prefer     Coronary Artery Disease coronary calcium score 2469  HTN, HLD  - continue aspirin 81 mg  - continue lisinopril 10 mg daily  - goal < 130/80  -Most recent LDL 68, goal less than 70, continue atorvastatin 80 mg daily Zetia 10 mg daily  We addressed the management of atherosclerotic artery disease.  He is on proper antiplatelet, cholesterol management and beta-blockers as appropriate.  We addressed the potential side effects and laboratory follow-up for these medications.     ARNULFO  -On CPAP   -last RVSP 25     Follow up with CHUY and Dr. Caraballo at next available     This note was dictated using Dragon speech recognition software.

## 2024-12-16 ENCOUNTER — OFFICE VISIT (OUTPATIENT)
Dept: RHEUMATOLOGY | Facility: MEDICAL CENTER | Age: 74
End: 2024-12-16
Attending: INTERNAL MEDICINE
Payer: MEDICARE

## 2024-12-16 VITALS
BODY MASS INDEX: 27.57 KG/M2 | SYSTOLIC BLOOD PRESSURE: 130 MMHG | WEIGHT: 176 LBS | OXYGEN SATURATION: 94 % | RESPIRATION RATE: 14 BRPM | HEART RATE: 72 BPM | TEMPERATURE: 97.6 F | DIASTOLIC BLOOD PRESSURE: 62 MMHG

## 2024-12-16 DIAGNOSIS — R76.8 ANA POSITIVE: ICD-10-CM

## 2024-12-16 DIAGNOSIS — I34.1 MITRAL VALVE PROLAPSE: ICD-10-CM

## 2024-12-16 DIAGNOSIS — L40.50 PSORIATIC ARTHRITIS (HCC): ICD-10-CM

## 2024-12-16 DIAGNOSIS — Z51.81 ENCOUNTER FOR THERAPEUTIC DRUG MONITORING: ICD-10-CM

## 2024-12-16 DIAGNOSIS — Z98.84 H/O GASTRIC BYPASS: ICD-10-CM

## 2024-12-16 DIAGNOSIS — G47.30 SLEEP APNEA, UNSPECIFIED TYPE: ICD-10-CM

## 2024-12-16 DIAGNOSIS — L40.9 PSORIASIS: ICD-10-CM

## 2024-12-16 DIAGNOSIS — I10 ESSENTIAL HYPERTENSION: ICD-10-CM

## 2024-12-16 DIAGNOSIS — M10.9 GOUT, UNSPECIFIED CAUSE, UNSPECIFIED CHRONICITY, UNSPECIFIED SITE: ICD-10-CM

## 2024-12-16 PROCEDURE — 3078F DIAST BP <80 MM HG: CPT | Performed by: INTERNAL MEDICINE

## 2024-12-16 PROCEDURE — 99214 OFFICE O/P EST MOD 30 MIN: CPT | Performed by: INTERNAL MEDICINE

## 2024-12-16 PROCEDURE — 3075F SYST BP GE 130 - 139MM HG: CPT | Performed by: INTERNAL MEDICINE

## 2024-12-16 PROCEDURE — 99212 OFFICE O/P EST SF 10 MIN: CPT | Performed by: INTERNAL MEDICINE

## 2024-12-16 ASSESSMENT — FIBROSIS 4 INDEX: FIB4 SCORE: 2.99

## 2024-12-16 NOTE — PROGRESS NOTES
Chief Complaint- joint pain     Subjective:   Buster Medina is a 74 y.o. male here today for follow up of rheumatological issues    This is a follow-up visit for this patient who we see in this clinic for psoriatic arthritis that was diagnosed about 2008 by rheumatologist in [place], [place].  Patient continues to be on Ilumna prescribed by dermatology with great benefit.  Patient states his joints are stable and his skin is doing well.    We also follow patient for hyperuricemia, patient states he did have 1 gout attack about 2 months ago in the right great toe MTP joint, lasted for about 2 days, was given prednisone by his PCP, patient not interested in starting any long-term medications as he feels that he only gets gout attacks about once every 2 to 3 years.      Comorbidities include a history of malignant bladder lesion that was resected patient states 2008, patient states his urologist after reviewing records states about 2012,  Patient following up with urology     Additional Co morbidities include HTN, high cholesterol, hx left ahilles tendon rupture and repair, hx left knee arthroscopic surgery for meniscal tear, s/p left rotator cuff tear, s/p bladder cancer about 2012 no recurrence s/p surgical intervention only, hx of gastric bypass 2005 [facility], pt does f/u with Dr Gamez q year.  Patient also with sleep apnea on CPAP nightly as well as mitral valve prolapse.  Patient also with essential tremor diagnosed by neurology.         Right TSA  Right TKA     S/p topical treatments  S/p plaquenil-cause taste abnormalities  S/p Otezla-diarrhea     Uric acid 6.9 1/2021 (on no treatment)  Uric acid 4.1 5/2021 (on no treatment)  Uric acid 7.6 7/2023 (on no treatment)  Uric acid 6.6 10/2024 (on no treatment)     HBsAg/HBcAb neg 7/2023  HCV neg 7/2023  Quantiferon Gold neg 7/2023     TPO 12.0 elevated 10/2024  F-Actin neg 10/2024  AMA neg 10/2024  Thyroglobulin neg 10/2024  G6PD 10.9 adequate 9/2019  CCP  neg 3/2018  RF neg 3/2018  SEAN neg 3/2018; SEAN 1:160 speckles 2/2024  SSA neg 2/2024  SSB neg 2/2024  Scl-70 neg 2/2024  dsDNA neg 2/2024  REA-1 neg 2/2024     Hand x-rays 3/2018-indicates possible erosion of the fourth metacarpal on the left hand, OA of the right first CMC joint  Hand x-rays 8/2022-IMPRESSION:  New subtle DIP and carpus erosions could represent psoriasis or other inflammatory arthropathy. The pattern is not typical of rheumatoid arthritis  Right severe first CMC osteoarthritis, left trapeziectomy and suspensionplasty     Feet x-rays 3/2018-DJD     Feet x-rays 8/2022-IMPRESSION:  No specific findings to confirm inflammatory arthropathy  Mild right hallux valgus deformity and first metatarsal-phalangeal osteoarthritis as before  Interval left first second and third TMT fusion with no hardware fracture identified     Current Outpatient Medications   Medication Sig Dispense Refill    atorvastatin (LIPITOR) 80 MG tablet TAKE ONE TABLET BY MOUTH EVERY DAY 90 Tablet 1    celecoxib (CELEBREX) 200 MG Cap Take 1 Capsule by mouth every day. 90 Capsule 2    ezetimibe (ZETIA) 10 MG Tab Take 1 Tablet by mouth every day. 90 Tablet 1    mirtazapine (REMERON) 7.5 MG tablet Take 1 Tablet by mouth at bedtime as needed (For sleep) for up to 90 days. 90 Tablet 0    buPROPion (WELLBUTRIN SR) 200 MG SR tablet Take 2 Tablets by mouth every day for 180 days. Indications: Major Depressive Disorder 180 Tablet 1    FLUoxetine (PROZAC) 20 MG Cap Take 1 Capsule by mouth every day for 180 days. Take with 40 mg for total daily dose of 60 mg  Indications: Depression, Generalized Anxiety Disorder 90 Capsule 1    fluoxetine (PROZAC) 40 MG capsule Take 1 Capsule by mouth every day for 180 days. Taking with 20 mg for total daily dose of 60 mg 90 Capsule 1    omeprazole (PRILOSEC) 20 MG delayed-release capsule Take 1 Capsule by mouth every day. 180 Capsule 2    naltrexone (DEPADE) 50 MG Tab Taking 4 tablets per day      omeprazole  (PRILOSEC) 20 MG delayed-release capsule Take 1 Capsule by mouth every evening. 100 Capsule 2    lisinopril (PRINIVIL) 10 MG Tab TAKE 1 TABLET BY MOUTH EVERY DAY. (STOP 20 MG DOSE) 90 Tablet 2    NON SPECIFIED Ilumya injection every three months prescribed by dermatology      Clobetasol Propionate 0.05 % Shampoo       Magnesium 100 MG Cap Take  by mouth.      ferrous sulfate 325 (65 Fe) MG tablet Take 325 mg by mouth every day.      B Complex Vitamins (VITAMIN B COMPLEX PO) Take 1 Tablet by mouth every day.      Cholecalciferol (VITAMIN D3 PO) Take 1 Tablet by mouth every day. OTC      aspirin EC (ECOTRIN) 81 MG Tablet Delayed Response Take 81 mg by mouth every bedtime. 30 Tab 0    Multiple Vitamins-Minerals (MELODY-DAY 1000 PO) Take 1 Tab by mouth 2 Times a Day.      predniSONE (DELTASONE) 20 MG Tab Take 1 Tablet by mouth every day. (Patient not taking: Reported on 12/16/2024) 10 Tablet 0     No current facility-administered medications for this visit.     He  has a past medical history of Anxiety, Anxiety, generalized (11/27/2023), Arthritis, Bowel habit changes (06/24/2021), Cancer ([facility]) (2007), Chickenpox, Depression, High cholesterol, Hyperlipidemia, Hypertension, Kidney stone, Mumps, Obesity, Pneumothorax, Psoriatic arthritis ([facility]), Recurrent major depressive disorder, in partial remission ([facility]) (09/21/2017), Restless leg syndrome, Sleep apnea, and Tonsillitis.    ROS   Other than what is mentioned in HPI or physical exam, there is no history of headaches, double vision or blurred vision.  No trouble swallowing difficulties .  No chest complaints including chest pain, cough or sputum production. No GI complaints including nausea, vomiting, change in bowel habits, or past peptic ulcer disease. No history of blood in the stools. No urinary complaints including dysuria or frequency. No history of alopecia, photosensitivity     Objective:     /62   Pulse 72   Temp 36.4 °C (97.6 °F) (Temporal)   Resp 14   Wt  79.8 kg (176 lb)   SpO2 94%  Body mass index is 27.57 kg/m².   Physical Exam:    Constitutional: Alert and oriented X3, patient is talkative with good eye contact.Skin: Warm, dry, good turgor, no rashes in visible areas, no psoriatic plaques seen.Eye: Equal, round and reactive, conjunctiva clear, lids normal EOM intactENMT: Lips without lesions,  pinna without deformityNeck: Trachea midline, no masses, no thyromegaly.Lymph:  No cervical lymphadenopathy, no axillary lymphadenopathy, no supraclavicular lymphadenopathyRespiratory: Unlabored respiratory effort, lungs clear to auscultation, no wheezes, no ronchi.Cardiovascular: Normal S1, S2, Regular rate and rhythm, no murmurs rubs or gallops   .Abdomen: Soft, non-distended.Psych: Alert and oriented x3, normal affect and mood.Neuro: Cranial nerves 2-12 are grossly intact Ext:no joint laxity noted in bilateral arms, no joint laxity noted in bilateral legs, no sausage digits, no dactylitis, no flexion contractures, no nodules no tophi    Lab Results   Component Value Date/Time    QNTTBGOLD Negative 08/16/2018 12:13 PM     Lab Results   Component Value Date/Time    HEPBCORIGM Non-Reactive 07/19/2023 12:12 PM    HEPBSAG Non-Reactive 07/19/2023 12:12 PM     Lab Results   Component Value Date/Time    HEPCAB Non-Reactive 07/19/2023 12:12 PM     Lab Results   Component Value Date/Time    SODIUM 140 02/15/2024 08:49 AM    POTASSIUM 4.1 02/15/2024 08:49 AM    CHLORIDE 104 02/15/2024 08:49 AM    CO2 27 02/15/2024 08:49 AM    GLUCOSE 111 (H) 02/15/2024 08:49 AM    BUN 19 02/15/2024 08:49 AM    CREATININE 1.16 02/15/2024 08:49 AM      Lab Results   Component Value Date/Time    WBC 6.9 05/30/2024 02:41 PM    RBC 4.34 (L) 05/30/2024 02:41 PM    HEMOGLOBIN 13.8 (L) 05/30/2024 02:41 PM    HEMATOCRIT 41.8 (L) 05/30/2024 02:41 PM    MCV 96.3 05/30/2024 02:41 PM    MCH 31.8 05/30/2024 02:41 PM    MCHC 33.0 05/30/2024 02:41 PM    MPV 11.2 05/30/2024 02:41 PM    NEUTSPOLYS 70.00 05/30/2024  02:41 PM    LYMPHOCYTES 18.30 (L) 05/30/2024 02:41 PM    MONOCYTES 10.00 05/30/2024 02:41 PM    EOSINOPHILS 1.20 05/30/2024 02:41 PM    BASOPHILS 0.40 05/30/2024 02:41 PM      Lab Results   Component Value Date/Time    CALCIUM 9.6 02/15/2024 08:49 AM    ASTSGOT 37 02/15/2024 08:49 AM    ALTSGPT 46 02/15/2024 08:49 AM    ALKPHOSPHAT 98 02/15/2024 08:49 AM    TBILIRUBIN 0.6 02/15/2024 08:49 AM    ALBUMIN 4.6 02/15/2024 08:49 AM    TOTPROTEIN 7.8 02/15/2024 08:49 AM     Lab Results   Component Value Date/Time    URICACID 6.7 10/12/2024 11:03 AM    RHEUMFACTN <10 03/21/2018 02:19 PM    CCPANTIBODY 4 03/21/2018 02:20 PM    ANTINUCAB Detected (A) 02/15/2024 08:49 AM     Lab Results   Component Value Date/Time    ANTIDNADS SEE BELOW 02/15/2024 08:49 AM    RNPAB 3 02/15/2024 08:49 AM    SMITHAB 3 02/15/2024 08:49 AM    QLDBHMI12 0 02/15/2024 08:49 AM     Lab Results   Component Value Date/Time    ANTIDNADS SEE BELOW 02/15/2024 08:49 AM    DSDNA <1:10 02/15/2024 08:49 AM    JO1AB 0 02/15/2024 08:49 AM    RNPAB 3 02/15/2024 08:49 AM    ANTISSBSJ 0 02/15/2024 08:49 AM     Lab Results   Component Value Date/Time    SEDRATEWES 3 05/30/2024 02:41 PM     Lab Results   Component Value Date/Time    G6PD 10.9 09/17/2019 10:30 AM     Lab Results   Component Value Date/Time    MICROSOMALA 12.0 (H) 10/12/2024 10:55 AM    ANTITHYROGL <0.9 10/12/2024 10:55 AM     Lab Results   Component Value Date/Time    ANTIMITOCHO 3.1 10/12/2024 10:55 AM    FACTIN 6 10/12/2024 10:55 AM     Assessment and Plan:     1. Psoriatic arthritis (HCC)  Clinically doing quite well on Ilumna prescribed by patient's dermatologist, will follow peripherally    2. Psoriasis  Clinically doing quite well on Ilumna prescribed by patient's dermatologist, will follow peripherally    3. Encounter for therapeutic drug monitoring  Currently on Ilumna prescribed and managed by dermatology    4. Gout, unspecified cause, unspecified chronicity, unspecified site  Not on any  medications, patient states he gets gout attacks about every 2 to 3 years, last gout attack about 2 months ago in the right great toe MTP joint, resolved with 2 days of prednisone.  Patient not interested in starting of any long-term medications for hyperuricemia/gout    5. SEAN positive  With a positive anti-TPO, continue to monitor    6. Mitral valve prolapse  Followed by cardiology    7. Essential hypertension  May impact the type of medications we can use for this patient's arthritis. We will have to keep this under advisement.    8. Sleep apnea, unspecified type  Uses CPAP nightly, followed by pulmonology    9. H/O gastric bypass  Followed by Dr. Gamez    Followup: Return in about 6 months (around 6/16/2025). or sooner prn      Please note that this dictation was created using voice recognition software. I have made every reasonable attempt to correct obvious errors, but I expect that there are errors of grammar and possibly content that I did not discover before finalizing the note.

## 2024-12-17 ENCOUNTER — TELEPHONE (OUTPATIENT)
Dept: CARDIOLOGY | Facility: MEDICAL CENTER | Age: 74
End: 2024-12-17
Payer: MEDICARE

## 2024-12-17 NOTE — TELEPHONE ENCOUNTER
Per Bryanna CALIX, patient to complete CHUY and follow up with Dr. Caraballo to discuss.     Patient scheduled for CHUY on 12/23/2024.    Called and spoke to patient. Consultation with Dr. Caraballo scheduled for 1/15/2024. All questions answered.

## 2024-12-17 NOTE — TELEPHONE ENCOUNTER
Patient is scheduled for a CHUY without anesthesia on 12- with Dr. Mckeon. Patient   has been instructed to check in at 6:00 am for 8:00 procedure. No meds to hold. Patient has been advised they will need a  home and with them after since they are sedated. Message sent to authorizations. Sent [employer] message to pt with instructions.  FYI sent to Red

## 2024-12-20 ENCOUNTER — PRE-ADMISSION TESTING (OUTPATIENT)
Dept: ADMISSIONS | Facility: MEDICAL CENTER | Age: 74
End: 2024-12-20
Attending: INTERNAL MEDICINE
Payer: MEDICARE

## 2024-12-20 ENCOUNTER — PATIENT MESSAGE (OUTPATIENT)
Dept: SLEEP MEDICINE | Facility: MEDICAL CENTER | Age: 74
End: 2024-12-20

## 2024-12-23 ENCOUNTER — HOSPITAL ENCOUNTER (OUTPATIENT)
Facility: MEDICAL CENTER | Age: 74
End: 2024-12-23
Attending: INTERNAL MEDICINE | Admitting: INTERNAL MEDICINE
Payer: MEDICARE

## 2024-12-23 ENCOUNTER — PATIENT MESSAGE (OUTPATIENT)
Dept: CARDIOLOGY | Facility: MEDICAL CENTER | Age: 74
End: 2024-12-23

## 2024-12-23 ENCOUNTER — APPOINTMENT (OUTPATIENT)
Dept: CARDIOLOGY | Facility: MEDICAL CENTER | Age: 74
End: 2024-12-23
Attending: INTERNAL MEDICINE
Payer: MEDICARE

## 2024-12-23 VITALS
BODY MASS INDEX: 28.55 KG/M2 | HEART RATE: 52 BPM | OXYGEN SATURATION: 93 % | HEIGHT: 67 IN | TEMPERATURE: 97.2 F | WEIGHT: 181.88 LBS | DIASTOLIC BLOOD PRESSURE: 60 MMHG | RESPIRATION RATE: 16 BRPM | SYSTOLIC BLOOD PRESSURE: 113 MMHG

## 2024-12-23 DIAGNOSIS — I34.0 NONRHEUMATIC MITRAL VALVE REGURGITATION: ICD-10-CM

## 2024-12-23 PROCEDURE — 99153 MOD SED SAME PHYS/QHP EA: CPT

## 2024-12-23 PROCEDURE — 160046 HCHG PACU - 1ST 60 MINS PHASE II

## 2024-12-23 PROCEDURE — 93312 ECHO TRANSESOPHAGEAL: CPT | Performed by: INTERNAL MEDICINE

## 2024-12-23 PROCEDURE — 700111 HCHG RX REV CODE 636 W/ 250 OVERRIDE (IP): Performed by: INTERNAL MEDICINE

## 2024-12-23 PROCEDURE — 160035 HCHG PACU - 1ST 60 MINS PHASE I

## 2024-12-23 PROCEDURE — 93312 ECHO TRANSESOPHAGEAL: CPT | Mod: 26 | Performed by: INTERNAL MEDICINE

## 2024-12-23 PROCEDURE — 160002 HCHG RECOVERY MINUTES (STAT)

## 2024-12-23 PROCEDURE — 99153 MOD SED SAME PHYS/QHP EA: CPT | Performed by: INTERNAL MEDICINE

## 2024-12-23 PROCEDURE — 99152 MOD SED SAME PHYS/QHP 5/>YRS: CPT | Performed by: INTERNAL MEDICINE

## 2024-12-23 PROCEDURE — 93325 DOPPLER ECHO COLOR FLOW MAPG: CPT | Mod: 26 | Performed by: INTERNAL MEDICINE

## 2024-12-23 RX ORDER — SODIUM CHLORIDE 9 MG/ML
500 INJECTION, SOLUTION INTRAVENOUS
Status: DISCONTINUED | OUTPATIENT
Start: 2024-12-23 | End: 2024-12-23 | Stop reason: HOSPADM

## 2024-12-23 RX ORDER — MIDAZOLAM HYDROCHLORIDE 1 MG/ML
1 INJECTION INTRAMUSCULAR; INTRAVENOUS ONCE
Status: COMPLETED | OUTPATIENT
Start: 2024-12-23 | End: 2024-12-23

## 2024-12-23 RX ORDER — MIDAZOLAM HYDROCHLORIDE 1 MG/ML
.5-2 INJECTION INTRAMUSCULAR; INTRAVENOUS PRN
Status: DISCONTINUED | OUTPATIENT
Start: 2024-12-23 | End: 2024-12-23 | Stop reason: HOSPADM

## 2024-12-23 RX ADMIN — FENTANYL CITRATE 25 MCG: 50 INJECTION, SOLUTION INTRAMUSCULAR; INTRAVENOUS at 08:20

## 2024-12-23 RX ADMIN — MIDAZOLAM HYDROCHLORIDE 1 MG: 1 INJECTION, SOLUTION INTRAMUSCULAR; INTRAVENOUS at 08:20

## 2024-12-23 RX ADMIN — MIDAZOLAM HYDROCHLORIDE 1 MG: 1 INJECTION, SOLUTION INTRAMUSCULAR; INTRAVENOUS at 08:30

## 2024-12-23 RX ADMIN — FENTANYL CITRATE 25 MCG: 50 INJECTION, SOLUTION INTRAMUSCULAR; INTRAVENOUS at 08:38

## 2024-12-23 RX ADMIN — MIDAZOLAM HYDROCHLORIDE 1 MG: 1 INJECTION, SOLUTION INTRAMUSCULAR; INTRAVENOUS at 08:38

## 2024-12-23 RX ADMIN — MIDAZOLAM HYDROCHLORIDE 1 MG: 1 INJECTION, SOLUTION INTRAMUSCULAR; INTRAVENOUS at 08:36

## 2024-12-23 RX ADMIN — MIDAZOLAM HYDROCHLORIDE 1 MG: 1 INJECTION, SOLUTION INTRAMUSCULAR; INTRAVENOUS at 08:26

## 2024-12-23 RX ADMIN — MIDAZOLAM HYDROCHLORIDE 1 MG: 1 INJECTION, SOLUTION INTRAMUSCULAR; INTRAVENOUS at 08:39

## 2024-12-23 RX ADMIN — MIDAZOLAM HYDROCHLORIDE 1 MG: 1 INJECTION, SOLUTION INTRAMUSCULAR; INTRAVENOUS at 08:43

## 2024-12-23 RX ADMIN — FENTANYL CITRATE 25 MCG: 50 INJECTION, SOLUTION INTRAMUSCULAR; INTRAVENOUS at 08:23

## 2024-12-23 RX ADMIN — FENTANYL CITRATE 25 MCG: 50 INJECTION, SOLUTION INTRAMUSCULAR; INTRAVENOUS at 08:27

## 2024-12-23 RX ADMIN — MIDAZOLAM HYDROCHLORIDE 1 MG: 1 INJECTION, SOLUTION INTRAMUSCULAR; INTRAVENOUS at 08:31

## 2024-12-23 RX ADMIN — FENTANYL CITRATE 25 MCG: 50 INJECTION, SOLUTION INTRAMUSCULAR; INTRAVENOUS at 08:33

## 2024-12-23 RX ADMIN — MIDAZOLAM HYDROCHLORIDE 1 MG: 1 INJECTION, SOLUTION INTRAMUSCULAR; INTRAVENOUS at 08:33

## 2024-12-23 RX ADMIN — FENTANYL CITRATE 25 MCG: 50 INJECTION, SOLUTION INTRAMUSCULAR; INTRAVENOUS at 08:36

## 2024-12-23 RX ADMIN — MIDAZOLAM HYDROCHLORIDE 1 MG: 1 INJECTION, SOLUTION INTRAMUSCULAR; INTRAVENOUS at 08:41

## 2024-12-23 RX ADMIN — MIDAZOLAM HYDROCHLORIDE 1 MG: 1 INJECTION, SOLUTION INTRAMUSCULAR; INTRAVENOUS at 08:27

## 2024-12-23 RX ADMIN — MIDAZOLAM HYDROCHLORIDE 1 MG: 1 INJECTION, SOLUTION INTRAMUSCULAR; INTRAVENOUS at 08:23

## 2024-12-23 RX ADMIN — FENTANYL CITRATE 25 MCG: 50 INJECTION, SOLUTION INTRAMUSCULAR; INTRAVENOUS at 08:30

## 2024-12-23 RX ADMIN — FENTANYL CITRATE 25 MCG: 50 INJECTION, SOLUTION INTRAMUSCULAR; INTRAVENOUS at 08:26

## 2024-12-23 ASSESSMENT — FIBROSIS 4 INDEX: FIB4 SCORE: 2.99

## 2024-12-23 NOTE — PROGRESS NOTES
0858- Patient arrived to PACU. Report received. Attached to monitors. Verified parameters and alarms. Belongings on rney with patient    0905- Update provided to significant other, Roz. All questions answered     0935- Patient tolerated PO fluids     0955- Discharge instructions provided to patient and relative. Discussed follow up appointments, diet, medications and activity. Patient states understanding. IV was removed. Copy of discharge provided to the patient. A signed copy of discharge is in patient's chart. All personal belongings are in patients possession. All questions have been answered. Patient wheeled off floor by RN

## 2024-12-23 NOTE — DISCHARGE INSTRUCTIONS
Discharge Instructions:  Transesophageal Echocardiogram (CHUY)    CHUY is a test that uses sound waves to take pictures of your heart. CHUY is done by passing a small probe attached to a flexible tube down the part of the body that moves food from your mouth to your stomach (esophagus).    The pictures give clear images of your heart. This can help your doctor see if there are problems with your heart.   General instructions    Follow instructions from your doctor about what you cannot eat or drink.   You will take out any dentures or dental retainers.   Plan to have a responsible adult take you home from the hospital or clinic.   Plan to have a responsible adult care for you for the time you are told after you leave the hospital or clinic. This is important.  What can I expect after the procedure?    You will be monitored until you leave the hospital or clinic. This includes checking your blood pressure, heart rate, breathing rate, and blood oxygen level.   Your throat may feel sore and numb. This will get better over time. You will not be allowed to eat or drink until the numbness has gone away.   It is common to have a sore throat for a day or two.   It is up to you to get the results of your procedure. Ask how to get your results when they are ready.   Pink tinge sputum is common after your procedure  Follow these instructions at home:    If you were given a sedative during your procedure, do not drive or use machines until your doctor says that it is safe.   Return to your normal activities when your doctor says that it is safe.   Keep all follow-up visits.  Go to ER / call 911:   If you cough up bright red blood or vomit blood   If you have severe pain in throat/stomach.   If you have difficulty breathing that does not go away with rest  Summary    CHUY is a test that uses sound waves to take pictures of your heart.   You will be given a medicine to help you relax.   Pink tinge sputum is common after your  procedure

## 2024-12-23 NOTE — PROCEDURES
Transesophageal echocardiogram    Indication for procedure 74-year-old male being evaluated for severe eccentric mitral valve regurgitation.    Preprocedural diagnosis: Severe eccentric mitral valve regurgitation    Postprocedural diagnosis: Severe eccentric mitral valve regurgitation    Procedural details: Patient identified in the patient holding area.  Procedure alternatives and questions were answered to the best the patient aspect informed verbal written consent was obtained.  Patient was then transported in stable nonsedated condition to the cardiac procedural suite.  A cardiopulmonary exam was performed and a brief timeout was performed.  After patient utilized topical viscous lidocaine for oropharyngeal anesthesia a combination of Versed and fentanyl was utilized for sedation.  CHUY probe was placed easily into the midesophagus and the epigastrium.  2D color-flow imaging was obtained.  Patient had difficulty remaining being sedated and had required a significant amount of sedatives for analgesia.  Procedure was stopped prematurely secondary to concern about overall patient's safety.    Findings  1.  Preserved LV systolic function with visually estimated ejection fraction of 60 to 65%  2.  Severe eccentric with a posterior directed jet and a scalloping/prolapse of the A3 segment  3.  Trileaflet aortic valve    Conclusions  1.  Limited echocardiographic study secondary to concern about patient's safety regarding medication administration  2.  Severe eccentric with a posterior directed jet and a scalloping/prolapse of the A3 segment    Medications utilized during procedure: Fentanyl 200 mcg IV and Versed 12 mg IV    Procedural start time 8:17 AM and procedural stop time 8:45 AM on December 23, 2024    Recommendations  1.  Follow-up in the cardiology department as scheduled  2.  Resume home medications  3.  Call with questions

## 2024-12-24 NOTE — PATIENT COMMUNICATION
To AK, please see pt mychart message and advise. Pt had CHUY done yesterday but was not able to complete due to pt not able to be fully sedated. ~thank you

## 2024-12-24 NOTE — TELEPHONE ENCOUNTER
Davi Caraballo M.D.  You16 minutes ago (10:06 AM)       It is fine for now. We can decide after the clinic visit.

## 2025-01-08 ENCOUNTER — APPOINTMENT (OUTPATIENT)
Dept: URBAN - METROPOLITAN AREA CLINIC 35 | Facility: CLINIC | Age: 75
Setting detail: DERMATOLOGY
End: 2025-01-08

## 2025-01-08 DIAGNOSIS — Z71.89 OTHER SPECIFIED COUNSELING: ICD-10-CM

## 2025-01-08 DIAGNOSIS — L30.9 DERMATITIS, UNSPECIFIED: ICD-10-CM

## 2025-01-08 DIAGNOSIS — D485 NEOPLASM OF UNCERTAIN BEHAVIOR OF SKIN: ICD-10-CM | Status: INADEQUATELY CONTROLLED

## 2025-01-08 PROBLEM — D48.5 NEOPLASM OF UNCERTAIN BEHAVIOR OF SKIN: Status: ACTIVE | Noted: 2025-01-08

## 2025-01-08 PROCEDURE — ? COUNSELING

## 2025-01-08 PROCEDURE — 99214 OFFICE O/P EST MOD 30 MIN: CPT | Mod: 25

## 2025-01-08 PROCEDURE — ? ADDITIONAL NOTES

## 2025-01-08 PROCEDURE — ? TREATMENT REGIMEN

## 2025-01-08 PROCEDURE — ? SEPARATE AND IDENTIFIABLE DOCUMENTATION

## 2025-01-08 PROCEDURE — 11102 TANGNTL BX SKIN SINGLE LES: CPT

## 2025-01-08 PROCEDURE — ? SUNSCREEN RECOMMENDATIONS

## 2025-01-08 PROCEDURE — 11103 TANGNTL BX SKIN EA SEP/ADDL: CPT

## 2025-01-08 PROCEDURE — ? SURGICAL DECISION MAKING

## 2025-01-08 PROCEDURE — ? BIOPSY BY SHAVE METHOD

## 2025-01-08 ASSESSMENT — LOCATION DETAILED DESCRIPTION DERM
LOCATION DETAILED: LEFT PROXIMAL CALF
LOCATION DETAILED: RIGHT PROXIMAL POSTERIOR UPPER ARM
LOCATION DETAILED: LEFT LATERAL ABDOMEN
LOCATION DETAILED: RIGHT INFERIOR MEDIAL MIDBACK
LOCATION DETAILED: RIGHT INFERIOR UPPER BACK
LOCATION DETAILED: LEFT PROXIMAL POSTERIOR UPPER ARM

## 2025-01-08 ASSESSMENT — LOCATION SIMPLE DESCRIPTION DERM
LOCATION SIMPLE: RIGHT UPPER ARM
LOCATION SIMPLE: ABDOMEN
LOCATION SIMPLE: RIGHT LOWER BACK
LOCATION SIMPLE: RIGHT UPPER BACK
LOCATION SIMPLE: LEFT CALF
LOCATION SIMPLE: LEFT UPPER ARM

## 2025-01-08 ASSESSMENT — BSA RASH: BSA RASH: 15

## 2025-01-08 ASSESSMENT — LOCATION ZONE DERM
LOCATION ZONE: ARM
LOCATION ZONE: TRUNK
LOCATION ZONE: LEG

## 2025-01-08 NOTE — PROCEDURE: ADDITIONAL NOTES
Render Risk Assessment In Note?: yes
Detail Level: Detailed
Additional Notes: - 1/8/25 pt noticed rash on abdomen and back starting in high school, thought it was bruising at that time. \\n\\n- 12/3/24 pt reports being previously bx in 1970s. Will be doing more bx with DS with next appointment in 1 month.

## 2025-01-08 NOTE — PROCEDURE: SURGICAL DECISION MAKING
Risk Assessment Explanation (Does Not Render In The Note): Clinical determination of the probability and/or consequences of an event, such as surgery. Clinical assessment of the level of risk is affected by the nature of the event under consideration for the patient. Modifier 57 is used to indicate an Evaluation and Management (E/M) service resulted in the initial decision to perform surgery either the day before a major surgery (90 day global) or the day of a major surgery.
Identified Risk Factors Documented?: yes
Date Of Surgery - Today Or Tomorrow?: today
Complexity (Necessary For Coding; Major - 90 Day Global With Some Exceptions; Minor - 10 Day Global): minor
Discussion: We discussed not only the risks of the procedure but also the likely tapia that further treatment will be required to treat lesion. This could involve another visit here to destroy or excise  lesion, a visit to a Mohs or general or plastic surgeon, scarring, limited  activity, cosmetic imperfections.

## 2025-01-08 NOTE — PROCEDURE: MIPS QUALITY
Quality 130: Documentation Of Current Medications In The Medical Record: Current Medications Documented
Quality 47: Advance Care Plan: Advance Care Planning discussed and documented; advance care plan or surrogate decision maker documented in the medical record.
Quality 226: Preventive Care And Screening: Tobacco Use: Screening And Cessation Intervention: Patient screened for tobacco use and is an ex/non-smoker
Detail Level: Detailed
Quality 486: Dermatitis - Improvement In Patient-Reported Itch Severity: Itch severity assessment score was not reduced by at least 3 points from the initial (index) score to the follow-up visit score or assessment was not completed during the follow-up encounter
Quality 485: Psoriasis - Improvement In Patient-Reported Itch Severity: Itch severity assessment score is reduced by 3 or more points from the initial (index) assessment score to the follow-up visit score

## 2025-01-15 ENCOUNTER — TELEPHONE (OUTPATIENT)
Dept: CARDIOLOGY | Facility: MEDICAL CENTER | Age: 75
End: 2025-01-15

## 2025-01-15 ENCOUNTER — DOCUMENTATION (OUTPATIENT)
Dept: CARDIOLOGY | Facility: MEDICAL CENTER | Age: 75
End: 2025-01-15

## 2025-01-15 ENCOUNTER — OFFICE VISIT (OUTPATIENT)
Dept: CARDIOLOGY | Facility: MEDICAL CENTER | Age: 75
End: 2025-01-15
Attending: INTERNAL MEDICINE
Payer: MEDICARE

## 2025-01-15 VITALS
HEIGHT: 67 IN | BODY MASS INDEX: 28.25 KG/M2 | SYSTOLIC BLOOD PRESSURE: 116 MMHG | HEART RATE: 72 BPM | WEIGHT: 180 LBS | DIASTOLIC BLOOD PRESSURE: 70 MMHG | RESPIRATION RATE: 16 BRPM | OXYGEN SATURATION: 97 %

## 2025-01-15 DIAGNOSIS — I34.0 NONRHEUMATIC MITRAL VALVE REGURGITATION: ICD-10-CM

## 2025-01-15 DIAGNOSIS — E78.49 FAMILIAL HYPERLIPIDEMIA: ICD-10-CM

## 2025-01-15 DIAGNOSIS — I10 ESSENTIAL HYPERTENSION: ICD-10-CM

## 2025-01-15 DIAGNOSIS — R93.1 AGATSTON CORONARY ARTERY CALCIUM SCORE GREATER THAN 400: ICD-10-CM

## 2025-01-15 PROCEDURE — 99215 OFFICE O/P EST HI 40 MIN: CPT | Performed by: INTERNAL MEDICINE

## 2025-01-15 PROCEDURE — 3078F DIAST BP <80 MM HG: CPT | Performed by: INTERNAL MEDICINE

## 2025-01-15 PROCEDURE — 93005 ELECTROCARDIOGRAM TRACING: CPT | Mod: TC | Performed by: INTERNAL MEDICINE

## 2025-01-15 PROCEDURE — 99213 OFFICE O/P EST LOW 20 MIN: CPT | Performed by: INTERNAL MEDICINE

## 2025-01-15 PROCEDURE — 3074F SYST BP LT 130 MM HG: CPT | Performed by: INTERNAL MEDICINE

## 2025-01-15 ASSESSMENT — FIBROSIS 4 INDEX: FIB4 SCORE: 2.99

## 2025-01-15 NOTE — PROGRESS NOTES
Abbott Mitral ALIDA Review:    Suitable for Implant: Yes  Any Additional Comments: PMR. Bi-leaflet prolapse. A2/P2 lesion based on limited images provided. Leaflet length adequate to grasp. Mitral valve leaflets appear clipable. Thickened leaflets. Prior surgical repair and/or MAC. Small valve. Recommend 1 17mm NTW/NT clip. Discuss clip selection with implant team after baseline procedural CHUY.

## 2025-01-16 NOTE — TELEPHONE ENCOUNTER
Called patient to schedule, he is at another MD appt. I will call him later today to schedule this procedure

## 2025-01-16 NOTE — TELEPHONE ENCOUNTER
Called patient to schedule - he would like to schedule this procedure ASAP with any MD that's available    Patient scheduled for C w/poss on 1-24-25 with Dr. Daniels. Patient has been instructed to check in at 6:30 for 8:30 case time. Message sent to authorizations. JIMI to CADE and AK

## 2025-01-16 NOTE — PROGRESS NOTES
Interventional cardiology Follow-up Consultation Note        CC: Mitral regurgitation    Patient ID/HPI:   74 year old male patient here for mitral regurgitation follow up. Since last evaluated by me, he feels fatigue, SOB.        Past Medical History:   Diagnosis Date    Anxiety     Anxiety, generalized 11/27/2023    Arthritis     osteo    Bowel habit changes 06/24/2021    Constipation    Cancer ([facility]) 2007    bladder    Chickenpox     as a child    Depression     depression    Glaucoma 12/2024    Heart valve disease 10/2024    Mitral regurgatation    High cholesterol     Hyperlipidemia     Hypertension     pt states  well controlled on meds    Kidney stone     Mumps     as a child     Obesity     Pneumothorax     Psoriatic arthritis ([facility])     Recurrent major depressive disorder, in partial remission ([facility]) 09/21/2017    Restless leg syndrome     Sleep apnea     uses cpap    Tonsillitis          Current Outpatient Medications   Medication Sig Dispense Refill    omeprazole (PRILOSEC) 20 MG delayed-release capsule Take 1 Capsule by mouth every day. 90 Capsule 0    atorvastatin (LIPITOR) 80 MG tablet TAKE ONE TABLET BY MOUTH EVERY DAY 90 Tablet 1    celecoxib (CELEBREX) 200 MG Cap Take 1 Capsule by mouth every day. 90 Capsule 2    ezetimibe (ZETIA) 10 MG Tab Take 1 Tablet by mouth every day. 90 Tablet 1    mirtazapine (REMERON) 7.5 MG tablet Take 1 Tablet by mouth at bedtime as needed (For sleep) for up to 90 days. 90 Tablet 0    buPROPion (WELLBUTRIN SR) 200 MG SR tablet Take 2 Tablets by mouth every day for 180 days. Indications: Major Depressive Disorder 180 Tablet 1    FLUoxetine (PROZAC) 20 MG Cap Take 1 Capsule by mouth every day for 180 days. Take with 40 mg for total daily dose of 60 mg  Indications: Depression, Generalized Anxiety Disorder 90 Capsule 1    naltrexone (DEPADE) 50 MG Tab Taking 4 tablets per day      NON SPECIFIED Ilumya injection every three months prescribed by dermatology      Magnesium  "100 MG Cap Take  by mouth.      ferrous sulfate 325 (65 Fe) MG tablet Take 325 mg by mouth every day.      B Complex Vitamins (VITAMIN B COMPLEX PO) Take 1 Tablet by mouth every day.      Cholecalciferol (VITAMIN D3 PO) Take 1 Tablet by mouth every day. OTC      aspirin EC (ECOTRIN) 81 MG Tablet Delayed Response Take 81 mg by mouth every bedtime. 30 Tab 0    Multiple Vitamins-Minerals (MELODY-DAY 1000 PO) Take 1 Tab by mouth 2 Times a Day.       No current facility-administered medications for this visit.         Physical Exam:  Ambulatory Vitals  /70 (BP Location: Left arm, Patient Position: Sitting, BP Cuff Size: Adult)   Pulse 72   Resp 16   Ht 1.702 m (5' 7\")   Wt 81.6 kg (180 lb)   SpO2 97%    Oxygen Therapy:  Pulse Oximetry: 97 %  BP Readings from Last 4 Encounters:   01/15/25 116/70   12/23/24 113/60   12/16/24 130/62   12/13/24 120/68       Weight/BMI: Body mass index is 28.19 kg/m².  Wt Readings from Last 4 Encounters:   01/15/25 81.6 kg (180 lb)   12/23/24 82.5 kg (181 lb 14.1 oz)   12/16/24 79.8 kg (176 lb)   12/13/24 81.2 kg (179 lb)       General: Well appearing and in no apparent distress  Neck: JVP absent  Lungs: respiratory sounds  normal  Heart: Regular rhythm,   No palpable thrills on palpation, murmurs present, no rubs,   Lower extremity edema absent.       CHUY 12/23/24  Severe eccentric mitral valve regurgitation with prolapse of the   anterior A3 segment  Preserved LVEF of 55-60%      Medical Decision Making:  Problem List Items Addressed This Visit       Essential hypertension    Familial hyperlipidemia    Nonrheumatic mitral valve regurgitation    Relevant Orders    EKG (Completed)    CL-LEFT HEART CATHETERIZATION WITH POSSIBLE INTERVENTION    Agatston coronary artery calcium score greater than 400     Today's encounter addressed an illness with threat to life/bodily function severe symptomatic mitral regurgitation NYHA class II stage C   recommend coronary angiogram.  Discussed " treatment options, surgical mitral valve repair might be a better option for him if his surgical risk is low.  We will have him consult with  for evaluation.        Davi NUGENT  Interventional cardiologist  [facility]  [address] [address]  Phone: [phone]  Fax: [phone]

## 2025-01-17 ENCOUNTER — OFFICE VISIT (OUTPATIENT)
Dept: BEHAVIORAL HEALTH | Facility: CLINIC | Age: 75
End: 2025-01-17
Payer: MEDICARE

## 2025-01-17 DIAGNOSIS — F33.42 RECURRENT MAJOR DEPRESSIVE DISORDER, IN FULL REMISSION (HCC): ICD-10-CM

## 2025-01-17 DIAGNOSIS — Z86.59 HISTORY OF ANXIETY: ICD-10-CM

## 2025-01-17 PROCEDURE — 99999 PR NO CHARGE: CPT | Performed by: PSYCHIATRY & NEUROLOGY

## 2025-01-17 RX ORDER — BUPROPION HYDROCHLORIDE 200 MG/1
400 TABLET, EXTENDED RELEASE ORAL DAILY
Qty: 180 TABLET | Refills: 1 | Status: SHIPPED | OUTPATIENT
Start: 2025-01-17 | End: 2025-07-16

## 2025-01-17 RX ORDER — MIRTAZAPINE 7.5 MG/1
7.5 TABLET, FILM COATED ORAL NIGHTLY PRN
Qty: 90 TABLET | Refills: 0 | Status: SHIPPED | OUTPATIENT
Start: 2025-01-17 | End: 2025-04-17

## 2025-01-17 RX ORDER — FLUOXETINE 40 MG/1
40 CAPSULE ORAL DAILY
Qty: 30 CAPSULE | Refills: 2 | Status: SHIPPED | OUTPATIENT
Start: 2025-01-17 | End: 2025-04-17

## 2025-01-17 ASSESSMENT — ENCOUNTER SYMPTOMS
SEIZURES: 0
WHEEZING: 0
DEPRESSION: 0
BLOOD IN STOOL: 0
BLURRED VISION: 0
PALPITATIONS: 0
FALLS: 0
FEVER: 0

## 2025-01-17 ASSESSMENT — LIFESTYLE VARIABLES: SUBSTANCE_ABUSE: 0

## 2025-01-17 NOTE — PROGRESS NOTES
"[facility] Outpatient Psychiatric Follow Up Note  Evaluation completed by: Buster Shetty M.D.   Date of Service: 01/17/2025  Appointment type: in-office appointment.  Attending: Dr. Wallace Babb M.D.  Information below was collected from: Patient    HPI:   Buster Medina is a 74 y.o. old male who presents today for regularly scheduled follow up for assessment. He describes some stressors recently, with his son being in [place] and that he needs mitral value repair for mitrial valve regurgitation. He reports he is overall doing \"good\" though. He reports he it taking mirtazapine every night, reports willingness to try not taking it some nights. He reports he has extra amount of hydroxyzine leftover from a previous prescription he reports was from Dr. King and takes this sometimes if he can't sleep. He reports no side effects from medications. He reports no stress is in proportion to what's going on and not accompanied by physical changes. He reports he drinks around 2 glasses of wine a night, reports no issues from this. He reports no recreational substance use. We discussed willingness to try going from Fluoxetine 60mg to 40mg to reduce long term risks of side effects from medication.     PSYCHIATRIC REVIEW OF SYSTEMS: current symptoms as reported by pt.  Sleep: Patient does not describe or suggest recent changes in sleep as a clinically relevant concern   Appetite: Patient does not describe or suggest recent changes in appetite as a clinically relevant concern   Depression: Described above in the HPI  Anxiety/Panic Attacks: Described above in the HPI  Malathi: Patient does not communicate signs or symptoms indicative of current or past malathi, hypomania, or bipolar disorder.   Psychosis: Patient does not report signs or symptoms indicative of psychosis.     CURRENT MEDICATIONS    Current Outpatient Medications:     mirtazapine (REMERON) 7.5 MG tablet, Take 1 Tablet by mouth at bedtime as needed (For " sleep) for up to 90 days., Disp: 90 Tablet, Rfl: 0    buPROPion (WELLBUTRIN SR) 200 MG SR tablet, Take 2 Tablets by mouth every day for 180 days. Indications: Major Depressive Disorder, Disp: 180 Tablet, Rfl: 1    fluoxetine (PROZAC) 40 MG capsule, Take 1 Capsule by mouth every day for 90 days., Disp: 30 Capsule, Rfl: 2    omeprazole (PRILOSEC) 20 MG delayed-release capsule, Take 1 Capsule by mouth every day., Disp: 90 Capsule, Rfl: 0    atorvastatin (LIPITOR) 80 MG tablet, TAKE ONE TABLET BY MOUTH EVERY DAY, Disp: 90 Tablet, Rfl: 1    celecoxib (CELEBREX) 200 MG Cap, Take 1 Capsule by mouth every day., Disp: 90 Capsule, Rfl: 2    ezetimibe (ZETIA) 10 MG Tab, Take 1 Tablet by mouth every day., Disp: 90 Tablet, Rfl: 1    naltrexone (DEPADE) 50 MG Tab, Taking 4 tablets per day, Disp: , Rfl:     NON SPECIFIED, Ilumya injection every three months prescribed by dermatology, Disp: , Rfl:     Magnesium 100 MG Cap, Take  by mouth., Disp: , Rfl:     ferrous sulfate 325 (65 Fe) MG tablet, Take 325 mg by mouth every day., Disp: , Rfl:     B Complex Vitamins (VITAMIN B COMPLEX PO), Take 1 Tablet by mouth every day., Disp: , Rfl:     Cholecalciferol (VITAMIN D3 PO), Take 1 Tablet by mouth every day. OTC, Disp: , Rfl:     aspirin EC (ECOTRIN) 81 MG Tablet Delayed Response, Take 81 mg by mouth every bedtime., Disp: 30 Tab, Rfl: 0    Multiple Vitamins-Minerals (MELODY-DAY 1000 PO), Take 1 Tab by mouth 2 Times a Day., Disp: , Rfl:      REVIEW OF SYSTEMS   Review of Systems   Constitutional:  Negative for fever.   HENT:  Negative for hearing loss.    Eyes:  Negative for blurred vision.   Respiratory:  Negative for wheezing.    Cardiovascular:  Negative for chest pain and palpitations.   Gastrointestinal:  Negative for blood in stool.   Genitourinary:  Negative for hematuria.   Musculoskeletal:  Negative for falls.   Skin:  Negative for rash.   Neurological:  Negative for seizures.   Psychiatric/Behavioral:  Negative for depression,  substance abuse and suicidal ideas.    All other systems reviewed and are negative.    PAST MEDICAL HISTORY  Past Medical History:   Diagnosis Date    Anxiety     Anxiety, generalized 11/27/2023    Arthritis     osteo    Bowel habit changes 06/24/2021    Constipation    Cancer ([facility]) 2007    bladder    Chickenpox     as a child    Depression     depression    Glaucoma 12/2024    Heart valve disease 10/2024    Mitral regurgatation    High cholesterol     Hyperlipidemia     Hypertension     pt states  well controlled on meds    Kidney stone     Mumps     as a child     Obesity     Pneumothorax     Psoriatic arthritis ([facility])     Recurrent major depressive disorder, in partial remission ([facility]) 09/21/2017    Restless leg syndrome     Sleep apnea     uses cpap    Tonsillitis      No Known Allergies  Past Surgical History:   Procedure Laterality Date    PB INCISE FINGER TENDON SHEATH Left 09/19/2023    Procedure: LEFT INDEX FINGER TRIGGER RELEASE;  Surgeon: Td Soliman M.D.;  Location: [facility];  Service: Orthopedics    PB INCISE FINGER TENDON SHEATH Left 08/30/2022    Procedure: LEFT MIDDLE FINGER TRIGGER RELEASE;  Surgeon: Td Soliman M.D.;  Location: [facility];  Service: Orthopedics    ME CYSTOURETHROSCOPY,BIOPSIES N/A 08/17/2022    Procedure: CYSTOSCOPY;  Surgeon: Jerzy Jasso M.D.;  Location: [facility];  Service: Urology    ME CYSTOURETHROSCOPY,BIOPSIES  07/06/2021    Procedure: BIOPSY, BLADDER WITH FULGERATION;  Surgeon: Bob Millan M.D.;  Location: [facility];  Service: Urology    KNEE ARTHROPLASTY TOTAL Right 2021    BLADDER BIOPSY WITH CYSTOSCOPY  01/03/2018    Procedure: BLADDER BIOPSY WITH CYSTOSCOPY/WITH UPPER TRACT WASHING;  Surgeon: El Manuel M.D.;  Location: [facility];  Service: Urology    RETROGRADES Bilateral 01/03/2018    Procedure: RETROGRADES;  Surgeon: El Manuel M.D.;  Location: [facility];   "Service: Urology    PYELOGRAM Bilateral 01/03/2018    Procedure: PYELOGRAM;  Surgeon: El Manuel M.D.;  Location: SURGERY [facility];  Service: Urology    OTHER ORTHOPEDIC SURGERY  2015    shoulder replacement    ABDOMINAL EXPLORATION      ARTHROPLASTY      right shoulder    BLADDER BIOPSY WITH CYSTOSCOPY      EYE SURGERY      lasik     GASTRIC BYPASS LAPAROSCOPIC      HERNIA REPAIR      OPEN REDUCTION      OTHER Left     thumb joint    THORACOTOMY      TURP-VAPOR        Family History   Problem Relation Age of Onset    Cancer Mother 80        lung cancer    Arthritis Mother     Diabetes Mother         Type 2    Anxiety disorder Mother     Lung Disease Father 65    Cancer Father     Diabetes Father         Type 2    Hypertension Father     Hyperlipidemia Father     Hyperlipidemia Brother     Arthritis Brother     Other Brother         Thyroid tumor    Sleep Apnea Brother     No Known Problems Maternal Grandmother     Cancer Maternal Grandfather     No Known Problems Paternal Grandmother     No Known Problems Paternal Grandfather     Heart Disease Brother 49        smoker, overweight    Hypertension Brother     Depression Other     Suicide Attempts Neg Hx     Bipolar disorder Neg Hx     Alcohol abuse Neg Hx     Drug abuse Neg Hx      Social History     Socioeconomic History    Marital status: Single    Highest education level: Master's degree (e.g., MA, MS, [place], MEd, MSW, LUIS)   Tobacco Use    Smoking status: Never    Smokeless tobacco: Never   Vaping Use    Vaping status: Never Used   Substance and Sexual Activity    Alcohol use: Yes     Alcohol/week: 9.6 oz     Types: 14 Glasses of wine, 2 Cans of beer per week     Comment: wine / daily    Drug use: Not Currently     Types: Inhaled     Comment: THC edible - \"once a month\".    Sexual activity: Not Currently     Social Drivers of Health     Financial Resource Strain: Low Risk  (4/3/2024)    Overall Financial Resource Strain (CARDIA)     Difficulty of Paying " Living Expenses: Not hard at all   Food Insecurity: No Food Insecurity (4/3/2024)    Hunger Vital Sign     Worried About Running Out of Food in the Last Year: Never true     Ran Out of Food in the Last Year: Never true   Transportation Needs: No Transportation Needs (4/3/2024)    PRAPARE - Transportation     Lack of Transportation (Medical): No     Lack of Transportation (Non-Medical): No   Physical Activity: Insufficiently Active (4/3/2024)    Exercise Vital Sign     Days of Exercise per Week: 2 days     Minutes of Exercise per Session: 20 min   Stress: No Stress Concern Present (4/3/2024)    [employer] [employer] - Occupational Stress Questionnaire     Feeling of Stress : Not at all   Social Connections: Moderately Isolated (4/3/2024)    Social Connection and Isolation Panel [NHANES]     Frequency of Communication with Friends and Family: Twice a week     Frequency of Social Gatherings with Friends and Family: Once a week     Attends Roman [employer] Services: Never     Active Member of Clubs or Organizations: No     Attends Club or Organization Meetings: Patient declined     Marital Status: Living with partner   Housing Stability: Unknown (4/3/2024)    Housing Stability Vital Sign     Unable to Pay for Housing in the Last Year: No     Unstable Housing in the Last Year: No     Past Surgical History:   Procedure Laterality Date    PB INCISE FINGER TENDON SHEATH Left 09/19/2023    Procedure: LEFT INDEX FINGER TRIGGER RELEASE;  Surgeon: Td Soliman M.D.;  Location: [facility] [place];  Service: Orthopedics    PB INCISE FINGER TENDON SHEATH Left 08/30/2022    Procedure: LEFT MIDDLE FINGER TRIGGER RELEASE;  Surgeon: Td Soliman M.D.;  Location: [facility] [place];  Service: Orthopedics    UT CYSTOURETHROSCOPY,BIOPSIES N/A 08/17/2022    Procedure: CYSTOSCOPY;  Surgeon: Jerzy Jasso M.D.;  Location: SURGERY [facility];  Service: Urology    UT CYSTOURETHROSCOPY,BIOPSIES   "07/06/2021    Procedure: BIOPSY, BLADDER WITH FULGERATION;  Surgeon: Bob Millan M.D.;  Location: SURGERY [facility];  Service: Urology    KNEE ARTHROPLASTY TOTAL Right 2021    BLADDER BIOPSY WITH CYSTOSCOPY  01/03/2018    Procedure: BLADDER BIOPSY WITH CYSTOSCOPY/WITH UPPER TRACT WASHING;  Surgeon: El Manuel M.D.;  Location: SURGERY [facility];  Service: Urology    RETROGRADES Bilateral 01/03/2018    Procedure: RETROGRADES;  Surgeon: El Manuel M.D.;  Location: SURGERY [facility];  Service: Urology    PYELOGRAM Bilateral 01/03/2018    Procedure: PYELOGRAM;  Surgeon: El Manuel M.D.;  Location: SURGERY [facility];  Service: Urology    OTHER ORTHOPEDIC SURGERY  2015    shoulder replacement    ABDOMINAL EXPLORATION      ARTHROPLASTY      right shoulder    BLADDER BIOPSY WITH CYSTOSCOPY      EYE SURGERY      lasik     GASTRIC BYPASS LAPAROSCOPIC      HERNIA REPAIR      OPEN REDUCTION      OTHER Left     thumb joint    THORACOTOMY      TURP-VAPOR         PSYCHIATRIC EXAMINATION   Musculoskeletal: No movement abnormalities noted during interview; grossly within normal limits   Appearance: Patient appeared calm and cooperative with interview   Thought Process: Grossly linear, logical, and goal-oriented   Abnormal or Psychotic Thoughts: No psychotic thoughts or communication consistent with psychosis noted during interview   Speech: Regular rate, rhythm, tone, and volume   Mood: \"ok\"   Affect: Grossly neutral and regular in keeping with typical expectations of conversation during clinical interview   SI/HI: Denies SI, no evidence of HI  Orientation: No gross evidence of disorientation; alert and conversational   Recent and Remote Memory: No gross evidence of abnormalities in recent or remote memory noted during interview  Attention Span and Concentration: Sufficient for interview  Insight/Judgement into symptoms: Grossly fair  Neurological Testing (MSSE Score and/or clock drawing): Not " performed during this interview     SCREENINGS:      11/30/2023    10:41 PM 1/26/2024     2:00 PM 4/5/2024     9:20 AM   Depression Screen (PHQ-2/PHQ-9)   PHQ-2 Total Score 0     PHQ-2 Total Score  0 0   PHQ-9 Total Score 0            [place]  records  PDMP Reviewed. No evidence indicating misuse of a controlled substance noted.    CURRENT RISK ASSESSMENT:        Suicide: Low       Homicide: Low       Self-Harm: Low       Relapse: Not Applicable       Crisis Safety Plan Reviewed: Not Indicated     ASSESSMENT/DIAGNOSES/PLAN  Buster Medina is a 74 y.o. old male who presents today for regularly scheduled follow up for assessment. He is doing well, and continues to be in remission from historical episode of major depression. Stress is typically in proportion to stressors and well managed, and no new medications are merited at this time. In order to reduce long term risk of medication side effects, we will reduce Prozac dose from 60mg to 40mg.       Problem List Items Addressed This Visit       Recurrent major depressive disorder, in full remission (HCC)    Relevant Medications    mirtazapine (REMERON) 7.5 MG tablet    buPROPion (WELLBUTRIN SR) 200 MG SR tablet    fluoxetine (PROZAC) 40 MG capsule     Other Visit Diagnoses       History of anxiety            - Continue Wellbutrin 200mg SR twice per day for prevention of depression and anxiety  - Reduce Prozac from 60mg per day to 40mg per day  - Continue mirtzapine 7.5mg per night as needed for sleep    Medication options, alternatives (including no medications) and medication risks/benefits/side effects were discussed in detail.  The patient was advised to call, message clinician on [facility], or come in to the clinic if symptoms worsen or if questions/issues regarding their medications arise.  The patient verbalized understanding and agreement.    The patient was educated to call 911, call the suicide hotline, or go to the local ER if having thoughts of suicide or  homicide.  The patient verbalized understanding and agreement.   The proposed treatment plan was discussed with the patient who was provided the opportunity to ask questions and make suggestions regarding alternative treatment. Patient verbalized understanding and expressed agreement with the plan.      Follow up in approx.  6-8 weeks    This appointment was supervised by attending psychiatrist, Dr. Wallace Babb M.D., who agrees with assessment and treatment plan.  See attending attestation for more details.

## 2025-01-22 ENCOUNTER — PRE-ADMISSION TESTING (OUTPATIENT)
Dept: ADMISSIONS | Facility: MEDICAL CENTER | Age: 75
End: 2025-01-22
Attending: INTERNAL MEDICINE
Payer: MEDICARE

## 2025-01-22 DIAGNOSIS — Z01.810 PRE-OPERATIVE CARDIOVASCULAR EXAMINATION: ICD-10-CM

## 2025-01-22 DIAGNOSIS — Z01.812 PRE-OPERATIVE LABORATORY EXAMINATION: ICD-10-CM

## 2025-01-22 LAB
ALBUMIN SERPL BCP-MCNC: 4.4 G/DL (ref 3.2–4.9)
ALBUMIN/GLOB SERPL: 1.8 G/DL
ALP SERPL-CCNC: 82 U/L (ref 30–99)
ALT SERPL-CCNC: 57 U/L (ref 2–50)
ANION GAP SERPL CALC-SCNC: 12 MMOL/L (ref 7–16)
APTT PPP: 24.2 SEC (ref 24.7–36)
AST SERPL-CCNC: 38 U/L (ref 12–45)
BILIRUB SERPL-MCNC: 0.3 MG/DL (ref 0.1–1.5)
BUN SERPL-MCNC: 21 MG/DL (ref 8–22)
CALCIUM ALBUM COR SERPL-MCNC: 9.4 MG/DL (ref 8.5–10.5)
CALCIUM SERPL-MCNC: 9.7 MG/DL (ref 8.5–10.5)
CHLORIDE SERPL-SCNC: 108 MMOL/L (ref 96–112)
CO2 SERPL-SCNC: 25 MMOL/L (ref 20–33)
CREAT SERPL-MCNC: 1.34 MG/DL (ref 0.5–1.4)
EKG IMPRESSION: NORMAL
ERYTHROCYTE [DISTWIDTH] IN BLOOD BY AUTOMATED COUNT: 46 FL (ref 35.9–50)
GFR SERPLBLD CREATININE-BSD FMLA CKD-EPI: 56 ML/MIN/1.73 M 2
GLOBULIN SER CALC-MCNC: 2.4 G/DL (ref 1.9–3.5)
GLUCOSE SERPL-MCNC: 72 MG/DL (ref 65–99)
HCT VFR BLD AUTO: 44.2 % (ref 42–52)
HGB BLD-MCNC: 14.8 G/DL (ref 14–18)
INR PPP: 1.03 (ref 0.87–1.13)
MCH RBC QN AUTO: 31.8 PG (ref 27–33)
MCHC RBC AUTO-ENTMCNC: 33.5 G/DL (ref 32.3–36.5)
MCV RBC AUTO: 94.8 FL (ref 81.4–97.8)
PLATELET # BLD AUTO: 165 K/UL (ref 164–446)
PMV BLD AUTO: 10.7 FL (ref 9–12.9)
POTASSIUM SERPL-SCNC: 5.6 MMOL/L (ref 3.6–5.5)
PROT SERPL-MCNC: 6.8 G/DL (ref 6–8.2)
PROTHROMBIN TIME: 13.6 SEC (ref 12–14.6)
RBC # BLD AUTO: 4.66 M/UL (ref 4.7–6.1)
SODIUM SERPL-SCNC: 145 MMOL/L (ref 135–145)
WBC # BLD AUTO: 8.4 K/UL (ref 4.8–10.8)

## 2025-01-22 PROCEDURE — 36415 COLL VENOUS BLD VENIPUNCTURE: CPT

## 2025-01-22 PROCEDURE — 85027 COMPLETE CBC AUTOMATED: CPT

## 2025-01-22 PROCEDURE — 85610 PROTHROMBIN TIME: CPT

## 2025-01-22 PROCEDURE — 93005 ELECTROCARDIOGRAM TRACING: CPT | Mod: TC

## 2025-01-22 PROCEDURE — 93010 ELECTROCARDIOGRAM REPORT: CPT | Performed by: INTERNAL MEDICINE

## 2025-01-22 PROCEDURE — 85730 THROMBOPLASTIN TIME PARTIAL: CPT

## 2025-01-22 PROCEDURE — 80053 COMPREHEN METABOLIC PANEL: CPT

## 2025-01-22 NOTE — OR NURSING
Phone call x2 attempts. Phone rings once and goes straight to . [facility] with call back number to complete PAT.      [id number]-pt called back and PAT completed

## 2025-01-22 NOTE — PROGRESS NOTES
REFERRING PHYSICIAN: Davi Caraballo MD    CONSULTING PHYSICIAN: Leif Watts MD, FACS    CHIEF COMPLAINT: Shortness of breath    HISTORY OF PRESENT ILLNESS: The patient is a 74 y.o. male with past medical history of insomnia, anxiety, depression, GERD, BPH, bladder cancer, psoriatic arthritis, ARNULFO, hypertension, hyperlipidemia, coronary artery disease and severe mitral regurgitation. Today, he states he has been having increasing shortness of breath over the last year. He has occasional dizziness with position changes. He has not been able to do much activity. He can walk about a block before having to stop and rest.    PAST MEDICAL HISTORY:   Active Ambulatory Problems     Diagnosis Date Noted    Hypokalemia 09/21/2017    Malignant neoplasm of anterior wall of urinary bladder (HCC) 09/21/2017    Essential hypertension 09/21/2017    Recurrent major depressive disorder, in full remission ([facility]) 09/21/2017    Chronic insomnia 09/21/2017    Plantar fasciitis 09/21/2017    Gastroesophageal reflux disease without esophagitis 09/21/2017    Benign nodular prostatic hyperplasia without lower urinary tract symptoms 09/21/2017    Vitamin D deficiency 09/21/2017    Erectile dysfunction of nonorganic origin 03/05/2018    Psoriatic arthritis (HCC) 08/16/2018    Family history of coronary artery disease in brother 09/21/2018    Familial hyperlipidemia 09/21/2018    Tinnitus of both ears 04/09/2019    Functional diarrhea 07/16/2020    Hoarseness 03/30/2021    Balance disorder 09/03/2021    Sun-damaged skin 09/03/2021    Status post bariatric surgery 03/08/2009    Periodic limb movement 07/31/2014    Essential tremor 06/25/2011    Testicular hypofunction 05/26/2005    Obstructive sleep apnea (adult) (pediatric) 03/08/2009    Trigger finger, left middle finger 05/23/2022    Postural dizziness with presyncope 06/30/2022    Orthopedic aftercare 08/30/2022    Trigger finger, left index finger 08/04/2023    Nonrheumatic mitral  valve regurgitation 08/22/2023    Anxiety, generalized 11/27/2023    Muscle weakness 01/26/2024    Thrombocytopenia, unspecified ([facility]) 01/26/2024    Balance problem 04/05/2024    Agatston coronary artery calcium score greater than 400 12/13/2024    Coronary artery disease involving native coronary artery of native heart without angina pectoris 12/13/2024     Resolved Ambulatory Problems     Diagnosis Date Noted    Preventative health care 09/21/2017    Need for vaccination 09/21/2017    Neoplasm of uncertain behavior 11/03/2017    Visit for suture removal 11/21/2017    Dysthymic disorder 03/05/2018    Dyslipidemia 09/21/2018    Complex sleep apnea syndrome 12/06/2018    Bruising 04/09/2019    History of gastric bypass 10/04/2019    Major depressive disorder, single episode, mild ([facility]) 08/23/2022    Body mass index 37.0-37.9, adult 08/23/2022     Past Medical History:   Diagnosis Date    Anxiety     Arthritis     Bowel habit changes 06/24/2021    Cancer ([facility]) 2007    Cataract 12/2024    Chickenpox     Depression     Glaucoma 12/2024    Hard to intubate     Heart valve disease 10/2024    High cholesterol     Hyperlipidemia     Hypertension     Kidney stone     Mumps     Obesity     Pneumothorax     Recurrent major depressive disorder, in partial remission ([facility]) 09/21/2017    Restless leg syndrome     Sleep apnea     Tonsillitis      PAST SURGICAL HISTORY:   Past Surgical History:   Procedure Laterality Date    PB INCISE FINGER TENDON SHEATH Left 09/19/2023    Procedure: LEFT INDEX FINGER TRIGGER RELEASE;  Surgeon: Td Soliman M.D.;  Location: [facility];  Service: Orthopedics    PB INCISE FINGER TENDON SHEATH Left 08/30/2022    Procedure: LEFT MIDDLE FINGER TRIGGER RELEASE;  Surgeon: Td Soliman M.D.;  Location: [facility] [place];  Service: Orthopedics    VT CYSTOURETHROSCOPY,BIOPSIES N/A 08/17/2022    Procedure: CYSTOSCOPY;  Surgeon: Jerzy Jasso M.D.;  Location: SURGERY  [facility];  Service: Urology    MA CYSTOURETHROSCOPY,BIOPSIES  07/06/2021    Procedure: BIOPSY, BLADDER WITH FULGERATION;  Surgeon: Bob Millan M.D.;  Location: SURGERY [facility];  Service: Urology    KNEE ARTHROPLASTY TOTAL Right 2021    BLADDER BIOPSY WITH CYSTOSCOPY  01/03/2018    Procedure: BLADDER BIOPSY WITH CYSTOSCOPY/WITH UPPER TRACT WASHING;  Surgeon: El Manuel M.D.;  Location: SURGERY [facility];  Service: Urology    RETROGRADES Bilateral 01/03/2018    Procedure: RETROGRADES;  Surgeon: El Manuel M.D.;  Location: SURGERY [facility];  Service: Urology    PYELOGRAM Bilateral 01/03/2018    Procedure: PYELOGRAM;  Surgeon: El Manuel M.D.;  Location: SURGERY [facility];  Service: Urology    OTHER ORTHOPEDIC SURGERY  2015    shoulder replacement    ABDOMINAL EXPLORATION      ARTHROPLASTY      right shoulder    BLADDER BIOPSY WITH CYSTOSCOPY      EYE SURGERY      lasik     GASTRIC BYPASS LAPAROSCOPIC      HERNIA REPAIR      OPEN REDUCTION      OTHER Left     thumb joint    OTHER ABDOMINAL SURGERY  2002    gastric bypass    THORACOTOMY      TURP-VAPOR       ALLERGIES: No Known Allergies     CURRENT MEDICATIONS:   Current Outpatient Medications:     mirtazapine (REMERON) 7.5 MG tablet, Take 1 Tablet by mouth at bedtime as needed (For sleep) for up to 90 days., Disp: 90 Tablet, Rfl: 0    buPROPion (WELLBUTRIN SR) 200 MG SR tablet, Take 2 Tablets by mouth every day for 180 days. Indications: Major Depressive Disorder, Disp: 180 Tablet, Rfl: 1    fluoxetine (PROZAC) 40 MG capsule, Take 1 Capsule by mouth every day for 90 days., Disp: 30 Capsule, Rfl: 2    omeprazole (PRILOSEC) 20 MG delayed-release capsule, Take 1 Capsule by mouth every day., Disp: 90 Capsule, Rfl: 0    atorvastatin (LIPITOR) 80 MG tablet, TAKE ONE TABLET BY MOUTH EVERY DAY, Disp: 90 Tablet, Rfl: 1    celecoxib (CELEBREX) 200 MG Cap, Take 1 Capsule by mouth every day., Disp: 90 Capsule, Rfl: 2    ezetimibe (ZETIA)  10 MG Tab, Take 1 Tablet by mouth every day., Disp: 90 Tablet, Rfl: 1    naltrexone (DEPADE) 50 MG Tab, Taking 4 tablets per day (Patient not taking: Reported on 1/22/2025), Disp: , Rfl:     NON SPECIFIED, Ilumya injection every three months prescribed by dermatology, Disp: , Rfl:     Magnesium 100 MG Cap, Take  by mouth., Disp: , Rfl:     ferrous sulfate 325 (65 Fe) MG tablet, Take 325 mg by mouth every day., Disp: , Rfl:     B Complex Vitamins (VITAMIN B COMPLEX PO), Take 1 Tablet by mouth every day., Disp: , Rfl:     Cholecalciferol (VITAMIN D3 PO), Take 1 Tablet by mouth every day. OTC, Disp: , Rfl:     aspirin EC (ECOTRIN) 81 MG Tablet Delayed Response, Take 81 mg by mouth every bedtime., Disp: 30 Tab, Rfl: 0    Multiple Vitamins-Minerals (MELODY-DAY 1000 PO), Take 1 Tab by mouth 2 Times a Day., Disp: , Rfl:     FAMILY HISTORY:   Family History   Problem Relation Age of Onset    Cancer Mother 80        lung cancer    Arthritis Mother     Diabetes Mother         Type 2    Anxiety disorder Mother     Lung Disease Father 65    Cancer Father     Diabetes Father         Type 2    Hypertension Father     Hyperlipidemia Father     Hyperlipidemia Brother     Arthritis Brother     Other Brother         Thyroid tumor    Sleep Apnea Brother     No Known Problems Maternal Grandmother     Cancer Maternal Grandfather     No Known Problems Paternal Grandmother     No Known Problems Paternal Grandfather     Heart Disease Brother 49        smoker, overweight    Hypertension Brother     Depression Other     Suicide Attempts Neg Hx     Bipolar disorder Neg Hx     Alcohol abuse Neg Hx     Drug abuse Neg Hx       SOCIAL HISTORY:   Social History     Socioeconomic History    Marital status: Single     Spouse name: Not on file    Number of children: Not on file    Years of education: Not on file    Highest education level: Master's degree (e.g., MA, MS, Rhiannon, MEd, MSW, LUIS)   Occupational History    Not on file   Tobacco Use    Smoking  "status: Never    Smokeless tobacco: Never   Vaping Use    Vaping status: Never Used   Substance and Sexual Activity    Alcohol use: Yes     Alcohol/week: 9.6 oz     Types: 14 Glasses of wine, 2 Cans of beer per week     Comment: wine / daily    Drug use: Not Currently     Comment: THC edible - \"once a month\".    Sexual activity: Not Currently   Other Topics Concern    Not on file   Social History Narrative    Not on file     Social Drivers of Health     Financial Resource Strain: Low Risk  (4/3/2024)    Overall Financial Resource Strain (CARDIA)     Difficulty of Paying Living Expenses: Not hard at all   Food Insecurity: No Food Insecurity (4/3/2024)    Hunger Vital Sign     Worried About Running Out of Food in the Last Year: Never true     Ran Out of Food in the Last Year: Never true   Transportation Needs: No Transportation Needs (4/3/2024)    PRAPARE - Transportation     Lack of Transportation (Medical): No     Lack of Transportation (Non-Medical): No   Physical Activity: Insufficiently Active (4/3/2024)    Exercise Vital Sign     Days of Exercise per Week: 2 days     Minutes of Exercise per Session: 20 min   Stress: No Stress Concern Present (4/3/2024)    [employer] [place] [employer] - Occupational Stress Questionnaire     Feeling of Stress : Not at all   Social Connections: Moderately Isolated (4/3/2024)    Social Connection and Isolation Panel [NHANES]     Frequency of Communication with Friends and Family: Twice a week     Frequency of Social Gatherings with Friends and Family: Once a week     Attends Jehovah's witness Services: Never     Active Member of Clubs or Organizations: No     Attends Club or Organization Meetings: Patient declined     Marital Status: Living with partner   Intimate Partner Violence: Not on file   Housing Stability: Unknown (4/3/2024)    Housing Stability Vital Sign     Unable to Pay for Housing in the Last Year: No     Number of Places Lived in the Last Year: Not on file     " "Unstable Housing in the Last Year: No     REVIEW OF SYSTEMS:  Review of Systems   Constitutional:  Positive for malaise/fatigue.   HENT: Negative.     Eyes: Negative.    Respiratory:  Positive for shortness of breath.    Cardiovascular: Negative.    Gastrointestinal: Negative.    Genitourinary: Negative.    Musculoskeletal:  Positive for myalgias.   Skin: Negative.    Neurological:  Positive for dizziness.   Endo/Heme/Allergies: Negative.    Psychiatric/Behavioral:  Positive for depression. The patient is nervous/anxious.      PHYSICAL EXAMINATION:    BP (!) 148/72 (BP Location: Left arm, Patient Position: Sitting, BP Cuff Size: Adult)   Pulse 72   Temp 35.9 °C (96.6 °F) (Temporal)   Ht 1.702 m (5' 7\")   Wt 83 kg (183 lb)   SpO2 96%   BMI 28.66 kg/m²    Physical Exam  Constitutional:       General: He is not in acute distress.  HENT:      Head: Normocephalic.   Eyes:      Pupils: Pupils are equal, round, and reactive to light.   Cardiovascular:      Rate and Rhythm: Normal rate and regular rhythm.      Heart sounds: Murmur heard.      Systolic murmur is present with a grade of 3/6.      No gallop.   Pulmonary:      Effort: Pulmonary effort is normal. No respiratory distress.      Breath sounds: Normal breath sounds. No wheezing or rales.   Abdominal:      General: Bowel sounds are normal. There is no distension.      Palpations: Abdomen is soft.      Tenderness: There is no abdominal tenderness.   Musculoskeletal:         General: Normal range of motion.      Cervical back: Neck supple.   Skin:     General: Skin is warm and dry.   Neurological:      Mental Status: He is alert and oriented to person, place, and time.   Psychiatric:         Mood and Affect: Mood and affect normal.         Cognition and Memory: Memory normal.         Judgment: Judgment normal.     LABS REVIEWED:  Lab Results   Component Value Date/Time    SODIUM 145 01/22/2025 11:14 AM    POTASSIUM 5.6 (H) 01/22/2025 11:14 AM    CHLORIDE 108 " 01/22/2025 11:14 AM    CO2 25 01/22/2025 11:14 AM    GLUCOSE 72 01/22/2025 11:14 AM    BUN 21 01/22/2025 11:14 AM    CREATININE 1.34 01/22/2025 11:14 AM      Lab Results   Component Value Date/Time    PROTHROMBTM 13.6 01/22/2025 11:14 AM    INR 1.03 01/22/2025 11:14 AM      Lab Results   Component Value Date/Time    WBC 8.4 01/22/2025 11:14 AM    RBC 4.66 (L) 01/22/2025 11:14 AM    HEMOGLOBIN 14.8 01/22/2025 11:14 AM    HEMATOCRIT 44.2 01/22/2025 11:14 AM    MCV 94.8 01/22/2025 11:14 AM    MCH 31.8 01/22/2025 11:14 AM    MCHC 33.5 01/22/2025 11:14 AM    MPV 10.7 01/22/2025 11:14 AM    NEUTSPOLYS 70.00 05/30/2024 02:41 PM    LYMPHOCYTES 18.30 (L) 05/30/2024 02:41 PM    MONOCYTES 10.00 05/30/2024 02:41 PM    EOSINOPHILS 1.20 05/30/2024 02:41 PM    BASOPHILS 0.40 05/30/2024 02:41 PM      IMAGING REVIEWED AND INTERPRETED:    TRANSESOPHAGEAL ECHOCARDIOGRAM CHUY 12/24/24 [facility]:  Severe eccentric mitral valve regurgitation with prolapse of the   anterior A3 segment  Preserved LVEF of 55-60%    CARDIAC CATHETERIZATION [facility] [place] [facility] 1/24/2025:  The left main coronary artery has luminal disease and trifurcates into the left anterior descending, ramus intermedius, and left circumflex coronary arteries.  The left anterior descending coronary artery is a moderate to large, transapical vessel with calcific proximal 30% disease followed by luminal irregularities.  It supplies a small first diagonal branch and a moderate second diagonal branch with luminal irregularities.  The ramus intermedius branch is a large vessel with luminal irregularities.  The left circumflex coronary artery is a large, nondominant vessel with luminal irregularities in the AV groove segment.  It supplies a small first obtuse marginal branch, a large second obtuse marginal branch, and a moderate posterolateral branch with luminal irregularities.  The right coronary artery is a large, dominant vessel with calcified proximal to mid 30% disease and focal, calcified distal  80% disease.  Distally, it bifurcates into a moderate posterior descending coronary and a small posterolateral branch with ostial 30% disease.    CT SCAN CHEST - CARDIAC SCORING 9/5/23 [facility]:  Coronary calcification:  LMA - 108.9  LCX - 225.7  LAD - 754.9  RCA - 1379.6  Total Calcium Score: 2469.1       IMPRESSION:  Severe symptomatic mitral regurgitation, coronary artery disease, GERD, BPH, bladder cancer, psoriatic arthritis, ARNULFO, hypertension, hyperlipidemia    PLAN:  I am reluctant to recommend mitral valve replacement and coronary artery bypass grafting.  The operative risk is in the high range of greater than 5% and the mitral valve will likely have to be replaced with a bovine pericardial valve.  It does not appear repairable due to posterior annular and partial leaflet calcifications.  It would be preferable, if the patient could undergo PCI followed by transcatheter [id number] mitral valve repair (ALIDA, MitraClip).  The procedure, its risks, benefits, potential complications and alternative treatments were discussed with the patient in detail. The STS mortality risk score is 2.84% and the morbidity and mortality risk score is 11.1% for Mitral vini repair/CABG. The STS mortality risk score is 4.5% and the morbidity and mortality risk score is 19% for MVR/CABG.The scores were discussed with patient.    Findings and recommendations have been discussed with the patient’s cardiologist, Davi Caraballo MD.  Thank you for this very challenging consultation and participation in the patient’s care.  I will keep you apprised of all future developments.    Sincerely,    Leif Watts MD, FACS

## 2025-01-23 LAB — EKG IMPRESSION: NORMAL

## 2025-01-23 PROCEDURE — 93010 ELECTROCARDIOGRAM REPORT: CPT | Performed by: INTERNAL MEDICINE

## 2025-01-24 ENCOUNTER — APPOINTMENT (OUTPATIENT)
Dept: CARDIOLOGY | Facility: MEDICAL CENTER | Age: 75
End: 2025-01-24
Attending: INTERNAL MEDICINE
Payer: MEDICARE

## 2025-01-24 ENCOUNTER — HOSPITAL ENCOUNTER (OUTPATIENT)
Facility: MEDICAL CENTER | Age: 75
End: 2025-01-24
Attending: INTERNAL MEDICINE | Admitting: INTERNAL MEDICINE
Payer: MEDICARE

## 2025-01-24 VITALS
HEART RATE: 53 BPM | SYSTOLIC BLOOD PRESSURE: 128 MMHG | RESPIRATION RATE: 16 BRPM | OXYGEN SATURATION: 96 % | BODY MASS INDEX: 28.51 KG/M2 | DIASTOLIC BLOOD PRESSURE: 70 MMHG | WEIGHT: 181.66 LBS | HEIGHT: 67 IN | TEMPERATURE: 97.4 F

## 2025-01-24 DIAGNOSIS — I34.0 NONRHEUMATIC MITRAL VALVE REGURGITATION: ICD-10-CM

## 2025-01-24 PROCEDURE — 700111 HCHG RX REV CODE 636 W/ 250 OVERRIDE (IP)

## 2025-01-24 PROCEDURE — 99152 MOD SED SAME PHYS/QHP 5/>YRS: CPT

## 2025-01-24 PROCEDURE — 700117 HCHG RX CONTRAST REV CODE 255: Performed by: INTERNAL MEDICINE

## 2025-01-24 PROCEDURE — 160046 HCHG PACU - 1ST 60 MINS PHASE II

## 2025-01-24 PROCEDURE — 160035 HCHG PACU - 1ST 60 MINS PHASE I

## 2025-01-24 PROCEDURE — 93567 NJX CAR CTH SPRVLV AORTGRPHY: CPT | Mod: 26 | Performed by: INTERNAL MEDICINE

## 2025-01-24 PROCEDURE — 160002 HCHG RECOVERY MINUTES (STAT)

## 2025-01-24 PROCEDURE — 700101 HCHG RX REV CODE 250

## 2025-01-24 PROCEDURE — 93454 CORONARY ARTERY ANGIO S&I: CPT | Performed by: INTERNAL MEDICINE

## 2025-01-24 PROCEDURE — 99152 MOD SED SAME PHYS/QHP 5/>YRS: CPT | Performed by: INTERNAL MEDICINE

## 2025-01-24 PROCEDURE — 160036 HCHG PACU - EA ADDL 30 MINS PHASE I

## 2025-01-24 RX ORDER — VERAPAMIL HYDROCHLORIDE 2.5 MG/ML
INJECTION, SOLUTION INTRAVENOUS
Status: COMPLETED
Start: 2025-01-24 | End: 2025-01-24

## 2025-01-24 RX ORDER — HEPARIN SODIUM 200 [USP'U]/100ML
INJECTION, SOLUTION INTRAVENOUS
Status: COMPLETED
Start: 2025-01-24 | End: 2025-01-24

## 2025-01-24 RX ORDER — MIDAZOLAM HYDROCHLORIDE 1 MG/ML
INJECTION INTRAMUSCULAR; INTRAVENOUS
Status: COMPLETED
Start: 2025-01-24 | End: 2025-01-24

## 2025-01-24 RX ORDER — HEPARIN SODIUM 1000 [USP'U]/ML
INJECTION, SOLUTION INTRAVENOUS; SUBCUTANEOUS
Status: COMPLETED
Start: 2025-01-24 | End: 2025-01-24

## 2025-01-24 RX ORDER — LIDOCAINE HYDROCHLORIDE 20 MG/ML
INJECTION, SOLUTION INFILTRATION; PERINEURAL
Status: COMPLETED
Start: 2025-01-24 | End: 2025-01-24

## 2025-01-24 RX ADMIN — HEPARIN SODIUM 5000 UNITS: 1000 INJECTION, SOLUTION INTRAVENOUS; SUBCUTANEOUS at 08:50

## 2025-01-24 RX ADMIN — HEPARIN SODIUM 2000 UNITS: 200 INJECTION, SOLUTION INTRAVENOUS at 08:47

## 2025-01-24 RX ADMIN — NITROGLYCERIN 10 ML: 20 INJECTION INTRAVENOUS at 08:47

## 2025-01-24 RX ADMIN — IOHEXOL 50 ML: 350 INJECTION, SOLUTION INTRAVENOUS at 08:54

## 2025-01-24 RX ADMIN — LIDOCAINE HYDROCHLORIDE: 20 INJECTION, SOLUTION INFILTRATION; PERINEURAL at 08:47

## 2025-01-24 RX ADMIN — VERAPAMIL HYDROCHLORIDE 2.5 MG: 2.5 INJECTION, SOLUTION INTRAVENOUS at 08:47

## 2025-01-24 RX ADMIN — MIDAZOLAM HYDROCHLORIDE 2 MG: 1 INJECTION, SOLUTION INTRAMUSCULAR; INTRAVENOUS at 08:50

## 2025-01-24 RX ADMIN — FENTANYL CITRATE 100 MCG: 50 INJECTION, SOLUTION INTRAMUSCULAR; INTRAVENOUS at 08:50

## 2025-01-24 ASSESSMENT — FIBROSIS 4 INDEX: FIB4 SCORE: 2.23

## 2025-01-24 NOTE — PROCEDURES
Cardiac Catheterization Laboratory Procedure Note    DATE: 1/24/2025    : Ariel Daniels MD    PROCEDURES PERFORMED:  Coronary angiography  Ascending aortography  Moderate conscious sedation    INDICATIONS:  The patient is a 74-year-old gentleman referred for cardiac catheterization as part of evaluation for management of heart severe mitral regurgitation.    CONSENT:  The complete alternatives, risks, and benefits of the procedure were explained to the patient.  Signed informed consent was obtained and placed in the chart prior to the procedure.  A timeout was performed prior to beginning the procedure.    MEDICATIONS:  Lidocaine  Fentanyl  Midazolam  Nitroglycerin  Verapamil  Heparin    MODERATE CONSCIOUS SEDATION:  I personally supervised the administration of moderate conscious sedation by the nursing staff for 17 minutes.  Sedation start time: 8:41 AM  Sedation end time: 8:54 AM    CONTRAST: Omnipaque 50 cc    ACCESS: 6-Citizen of the [place] Glidesheath in the right radial artery.    ESTIMATED BLOOD LOSS: 10 cc    COMPLICATIONS: None    DESCRIPTION OF PROCEDURE:  The patient was brought to the cardiac catheterization laboratory in the fasting state.  The skin over the right wrist was prepped and draped in the usual sterile fashion.  Lidocaine infiltration was used to anesthetize the tissue over the right radial artery.  Using the micropuncture technique, a 6-Citizen of [place] Glidesheath was inserted in the right radial artery.  A 5-Citizen [place] Campos catheter was then advanced over a standard J-wire into the aortic root.  This catheter was then used to engage the ostium of the right coronary artery and cineangiograms were obtained in multiple projections for complete evaluation of the right coronary system.  This catheter was exchanged for a 6-Citizen of [place] JL-3.5 diagnostic catheter, which was then used to engage the ostium of the left main coronary artery and cineangiograms were obtained in multiple projections for complete evaluation of  the left coronary system.  Following completion of coronary angiography, the JL catheter was exchanged for a 6-[place] pigtail catheter, which was used to perform an ascending aortogram for procedural planning purposes.  At the completion of the case all wires, catheters, and sheaths were removed.  A TR band was placed using the patent hemostasis technique.    HEMODYNAMICS:   Aortic pressure: 115/62 mmHg    CORONARY ANGIOGRAPHY:  The left main coronary artery has luminal disease and trifurcates into the left anterior descending, ramus intermedius, and left circumflex coronary arteries.  The left anterior descending coronary artery is a moderate to large, transapical vessel with calcific proximal 30% disease followed by luminal irregularities.  It supplies a small first diagonal branch and a moderate second diagonal branch with luminal irregularities.  The ramus intermedius branch is a large vessel with luminal irregularities.  The left circumflex coronary artery is a large, nondominant vessel with luminal irregularities in the AV groove segment.  It supplies a small first obtuse marginal branch, a large second obtuse marginal branch, and a moderate posterolateral branch with luminal irregularities.  The right coronary artery is a large, dominant vessel with calcified proximal to mid 30% disease and focal, calcified distal 80% disease.  Distally, it bifurcates into a moderate posterior descending coronary and a small posterolateral branch with ostial 30% disease.    ASCENDING AORTOGRAPHY:  Mildly dilated aortic root at 4.2 cm.  Normal ascending aorta diameter at 3.5 mm  No significant aortic regurgitation.    IMPRESSION:  Severe obstructive one-vessel coronary artery disease involving the distal right coronary.  Mildly dilated aortic root.  Normal left heart filling pressures.    RECOMMENDATIONS:  Recover in postprocedure unit.  TR band release per protocol.  Continue evaluation for mitral valve  repair/replacement.  Focal distal right coronary artery disease is amenable to surgical or percutaneous revascularization.

## 2025-01-24 NOTE — DISCHARGE INSTRUCTIONS
POST ANGIOGRAM  General Care Instructions  Maintain a bandage over the incision site for 24 hours.  It's normal to find a small bruise or dime-sized lump at the insertion site. This should disappear within a few weeks.  Do not apply lotions or powders to the site.  Do not immerse the catheter insertion site in water (bathtub/swimming) for five days. It is ok to shower 24 hours after the procedure.  You may resume your normal diet immediately; on the day of your procedure, drink 6-10 glasses of water to help flush the contrast liquid out of your system.  If the doctor inserted the catheter in your arm:  For 5 days, DO NOT lift anything heavier than 10 pounds (approximately a gallon of milk). DO NOT do any heavy pushing, pulling, or twisting.    Medications  If your current medications need to be changed, you will be provided with an updated list of your medications prior to discharge.  If you take warfarin (Coumadin), resume taking your usual dose the evening after the procedure.  DO NOT STOP taking prescribed blood thinning (anti-platelet) medications unless instructed by your cardiologist.  These medications include:  Aspirin, Clopidogrel (Plavix), Ticagrelor (Brilinta), or Prasugrel (Effient)   If you take one of the following anticoagulants, RESUME 24 HOURS after your procedure:  Apixiban (Eliquis), Rivaroxaban (Xarelto), Dabigatran (Pradaxa), Edoxaban (Savaysa)  If you take metformin (Glucophage), RESUME 48 HOURS after your procedure.    When to call your healthcare provider  Call your cardiologist right away at [phone] if you have any of the following:   Problems/Concerns taking any of your prescribed heart medicines.   The insertion site has increasing pain, swelling, redness, bleeding, or drainage.   Your arm or leg below where the insertion site changes color, is cool, or is numb.   You have chest pain or shortness of breath that does not go away with rest.   Your pulse feels irregular -- very slow  (less than 60 beats/minute) or very fast (over 100 beats/minute).   You have dizziness, fainting, or you are very tired.   You are coughing up blood or yellow or green mucus.   You have chills or a fever over 101°F (38.3°C).    If there is bleeding at the catheter insertion site, apply pressure for 10 minutes.  If bleeding persists, call 911, and continue to hold pressure until advanced medical support arrives.    What to Expect Post Sedation    Rest and take it easy for the first 24 hours.  A responsible adult is recommended to remain with you during that time.  It is normal to feel sleepy.  We encourage you to not do anything that requires balance, judgment or coordination.    FOR 24 HOURS DO NOT:  Drive, operate machinery or run household appliances.  Drink beer or alcoholic beverages.  Make important decisions or sign legal documents.    To avoid nausea, slowly advance diet as tolerated, avoiding spicy or greasy foods for the first day.  Add more substantial food to your diet according to your provider's instructions. INCREASE FLUIDS AND FIBER TO AVOID CONSTIPATION.    MILD FLU-LIKE SYMPTOMS ARE NORMAL.  YOU MAY EXPERIENCE GENERALIZED MUSCLE ACHES, THROAT IRRITATION, HEADACHE AND/OR SOME NAUSEA.    If any questions arise, call your provider.  If your provider is not available, please feel free to call the Surgical Center at [phone].    MEDICATIONS: Resume taking daily medication.  Take prescribed pain medication with food.  If no medication is prescribed, you may take non-aspirin pain medication if needed.  PAIN MEDICATION CAN BE VERY CONSTIPATING.  Take a stool softener or laxative such as senokot, pericolace, or milk of magnesia if needed.    Remove dressing 11 am on 1/25. No shower for 24 hours, no submerging site for 7 days.

## 2025-01-24 NOTE — OR NURSING
0908 Patient arrived to PACU from cath lab.  Report from RN.  Patient is awake.  TR band to right wrist is CDI soft, patient denies numbness or tingling to right hand.  Educated on wrist precautions.  Patient updated on plan of care.    0918 Roz called and updated on patient status.    0918 Belongings returned to patient.    1010 2 ml air removed from TR band, no bleeding to site.     1030 3 ml air removed from TR band, no bleeding to site.     1045 3 ml air removed from TR band, no bleeding to site.     1100 remaining ml air removed from TR band, no bleeding to site.     1108 TR band removed, no bleeding to site, dressing applied with gauze and tegaderm    1120 Patient up to edge of bed, denies discomfort.  Access site CDI, soft.  All lines and monitors discontinued. Reviewed discharge paperwork with pt and wife. Discussed diet, activity, medications, follow up care and worsening symptoms. No questions at this time.

## 2025-01-29 ENCOUNTER — TELEPHONE (OUTPATIENT)
Dept: CARDIOLOGY | Facility: MEDICAL CENTER | Age: 75
End: 2025-01-29

## 2025-01-29 ENCOUNTER — OFFICE VISIT (OUTPATIENT)
Dept: CARDIOTHORACIC SURGERY | Facility: MEDICAL CENTER | Age: 75
End: 2025-01-29
Payer: MEDICARE

## 2025-01-29 VITALS
OXYGEN SATURATION: 96 % | HEIGHT: 67 IN | HEART RATE: 72 BPM | SYSTOLIC BLOOD PRESSURE: 148 MMHG | BODY MASS INDEX: 28.72 KG/M2 | DIASTOLIC BLOOD PRESSURE: 72 MMHG | WEIGHT: 183 LBS | TEMPERATURE: 96.6 F

## 2025-01-29 DIAGNOSIS — I34.0 MITRAL VALVE INSUFFICIENCY, UNSPECIFIED ETIOLOGY: ICD-10-CM

## 2025-01-29 DIAGNOSIS — I25.10 CORONARY ARTERY DISEASE INVOLVING NATIVE CORONARY ARTERY OF NATIVE HEART WITHOUT ANGINA PECTORIS: ICD-10-CM

## 2025-01-29 DIAGNOSIS — I34.0 NONRHEUMATIC MITRAL VALVE REGURGITATION: ICD-10-CM

## 2025-01-29 DIAGNOSIS — I34.0 SEVERE MITRAL REGURGITATION: ICD-10-CM

## 2025-01-29 DIAGNOSIS — Z00.6 EXAMINATION OF PARTICIPANT IN CLINICAL TRIAL: ICD-10-CM

## 2025-01-29 PROCEDURE — 3077F SYST BP >= 140 MM HG: CPT | Performed by: THORACIC SURGERY (CARDIOTHORACIC VASCULAR SURGERY)

## 2025-01-29 PROCEDURE — 3078F DIAST BP <80 MM HG: CPT | Performed by: THORACIC SURGERY (CARDIOTHORACIC VASCULAR SURGERY)

## 2025-01-29 PROCEDURE — 99205 OFFICE O/P NEW HI 60 MIN: CPT | Performed by: THORACIC SURGERY (CARDIOTHORACIC VASCULAR SURGERY)

## 2025-01-29 ASSESSMENT — ENCOUNTER SYMPTOMS
SHORTNESS OF BREATH: 1
EYES NEGATIVE: 1
MYALGIAS: 1
CARDIOVASCULAR NEGATIVE: 1
DIZZINESS: 1
DEPRESSION: 1
NERVOUS/ANXIOUS: 1
GASTROINTESTINAL NEGATIVE: 1

## 2025-01-29 ASSESSMENT — FIBROSIS 4 INDEX: FIB4 SCORE: 2.23

## 2025-01-29 NOTE — TELEPHONE ENCOUNTER
Received call back from patient. Discussed proceeding with Mitral ALIDA on 2/4/2025. Patient in agreement with plan.    Non-provider visit scheduled for tomorrow 1/30 at 1000. Advised RN would review check in instructions at that time. Patient verbalizes understanding.

## 2025-01-29 NOTE — TELEPHONE ENCOUNTER
Per Dr. Caraballo, patient not a candidate for surgical mitral valve and should proceed with Mitral ALIDA.     Called and left message for patient requesting call back to discuss proceeding with ALIDA on 2/4/2025.

## 2025-01-30 ENCOUNTER — DOCUMENTATION (OUTPATIENT)
Dept: CARDIOLOGY | Facility: MEDICAL CENTER | Age: 75
End: 2025-01-30

## 2025-01-30 ENCOUNTER — APPOINTMENT (OUTPATIENT)
Dept: ADMISSIONS | Facility: MEDICAL CENTER | Age: 75
DRG: 267 | End: 2025-01-30
Attending: INTERNAL MEDICINE
Payer: MEDICARE

## 2025-01-30 ENCOUNTER — NON-PROVIDER VISIT (OUTPATIENT)
Dept: CARDIOLOGY | Facility: MEDICAL CENTER | Age: 75
End: 2025-01-30
Attending: INTERNAL MEDICINE
Payer: MEDICARE

## 2025-01-30 DIAGNOSIS — I34.0 NONRHEUMATIC MITRAL VALVE REGURGITATION: ICD-10-CM

## 2025-01-30 DIAGNOSIS — I10 ESSENTIAL HYPERTENSION, BENIGN: ICD-10-CM

## 2025-01-30 ASSESSMENT — PATIENT HEALTH QUESTIONNAIRE - PHQ9: CLINICAL INTERPRETATION OF PHQ2 SCORE: 0

## 2025-01-30 NOTE — PROGRESS NOTES
Valve Program Functional Assessment:     KCCQ12   1a) Showering/bathing: 3  1b) Walking 1 block on ground: 1  1c) Hurrying or joggin  2) Swellin  3) Fatigue: 1  4) Shortness of breath: 1  5) Sleep sitting up: 4  6) Limited enjoyment of life: 1  7) Spend the rest of your life with HF: 1  8a) Hobbies, recreational activities:1  8b) Working or doing household chores:2  8c) Visiting family or friends: 3     Strength   1) _34_____ kg  2) _34_____ kg  3) _30_____ kg  AVG:__32.6____    ZARAGOZA ADLs  Patient independently preforms...   - Bathing: Yes   - Dressing: Yes   - Toileting: Yes   - Transferring: Yes   - Continence: Yes   - Feeding: Yes   Total Score: _6_/6    Living Situation  Patient lives:  with spouse    Mobility Aids   Patient uses: none      FRAILTY SCORE: _0_/ 3

## 2025-01-30 NOTE — PROGRESS NOTES
Thank you for the opportunity to participate in your patient's care.     Your patient is scheduled for mitral valve transcatheter [id number] repair (Mitral ALIDA) on Tuesday, February 4, 2025.    Sincerely,    [facility]    Melany Katz, HECTORN, RN, Structural Heart Program Nurse Coordinator ([phone])  ALLISON Espinoza, RN, Structural Heart Program Nurse Coordinator ([phone])

## 2025-01-30 NOTE — PROGRESS NOTES
"Valve Program Consultation: 1/30/2025 for Mitral ALIDA 2/4/2025    Mental Status Assessment:  Appearance: normal  Behavior: normal  Mood/Affect: normal  Thought process/content: normal  Cognition: normal  Functional ability: normal  Dental Concerns: natural teeth; patient denies s/s of active oral infection/irritation  Further mental assessment needed: no    Post-op Plan of Care:  Support systems: patient alone at consult today; partner  Patient understands discharge plan: yes  DME: hearing aids, CPAP (advised to bring to procedure)  Home health warranted prior to procedure: no  Social concerns for discharge: patient denies drug/ETOH abuse  PCP alerted of social concerns: no  POLST: none  Advance directive: DPOA-HC on file dated 8/21/2023  Post-op goal: \"be able to walk 5 blocks vs the 1 I am walking now; ski again\"  Medication instructions: hold all vitamins and supplements 7 days prior, hold Celebrex 5 days prior, hold Zetia 1 day prior to procedure    Concerns prior to procedure: none    Met with patient during New Mitral ALIDA consult.     All patient's questions and concerns were addressed during this visit. They understood pre-operative and post-operative plan of care.    Reviewed patient Mitral ALIDA education packet explaining that information is provided regarding preparation for Mitral ALIDA the night prior, what to expect during the hospital stay, average LOS, and what to look out for post Mitral ALIDA discharge. Explained that patient will require SBE prophylactic antibiotic prior to any dental treatment post Mitral ALIDA. Advised patient not to receive any new vaccinations within 1 week pre- and 1 week post-procedure.     Explained that patient should not eat or drink anything past midnight the day of the procedure. Encouraged patient to wear something clean and comfortable, easy to get on/off to check in Tuesday morning, time TBD. Patient may have friends and family in the pre-operational area until patient " is taken to the operating room, at which time family and friends will be asked to wait on floor 1 [facility] surgical waiting area. On completion of Mitral ALIDA, heart team will update family. Once update is given, there is some time before family/friends may visit patient in their assigned room. Length of stay on average is one night, however patient may stay longer depending on specific needs at that time. Patient given printed instructions sheet with all above stated instructions. Patient states understanding of all material and education presented today and has no further questions at this time. Encouraged patient again, to contact me with any questions or concerns during this work-up process. Patient states understanding.

## 2025-01-31 ENCOUNTER — PRE-ADMISSION TESTING (OUTPATIENT)
Dept: ADMISSIONS | Facility: MEDICAL CENTER | Age: 75
End: 2025-01-31
Attending: INTERNAL MEDICINE
Payer: MEDICARE

## 2025-01-31 NOTE — PREPROCEDURE INSTRUCTIONS
Medication instructions and fasting guidelines given per cardiology-valve nurse navigator. Patient verbalized understanding of instructions.     Patient scheduled for labs/EKG on 02/03/2025 at 1030 per Dr. Caraballo    Patient has no further questions at this time.

## 2025-02-03 ENCOUNTER — PRE-ADMISSION TESTING (OUTPATIENT)
Dept: ADMISSIONS | Facility: MEDICAL CENTER | Age: 75
DRG: 267 | End: 2025-02-03
Attending: INTERNAL MEDICINE
Payer: MEDICARE

## 2025-02-03 DIAGNOSIS — Z01.812 PRE-OPERATIVE LABORATORY EXAMINATION: ICD-10-CM

## 2025-02-03 LAB
ABO GROUP BLD: NORMAL
ALBUMIN SERPL BCP-MCNC: 4.3 G/DL (ref 3.2–4.9)
ALBUMIN/GLOB SERPL: 1.4 G/DL
ALP SERPL-CCNC: 83 U/L (ref 30–99)
ALT SERPL-CCNC: 52 U/L (ref 2–50)
ANION GAP SERPL CALC-SCNC: 11 MMOL/L (ref 7–16)
APTT PPP: 24.1 SEC (ref 24.7–36)
AST SERPL-CCNC: 38 U/L (ref 12–45)
BASOPHILS # BLD AUTO: 0.7 % (ref 0–1.8)
BASOPHILS # BLD: 0.05 K/UL (ref 0–0.12)
BILIRUB SERPL-MCNC: 0.4 MG/DL (ref 0.1–1.5)
BLD GP AB SCN SERPL QL: NORMAL
BUN SERPL-MCNC: 22 MG/DL (ref 8–22)
CALCIUM ALBUM COR SERPL-MCNC: 9.5 MG/DL (ref 8.5–10.5)
CALCIUM SERPL-MCNC: 9.7 MG/DL (ref 8.5–10.5)
CHLORIDE SERPL-SCNC: 108 MMOL/L (ref 96–112)
CO2 SERPL-SCNC: 24 MMOL/L (ref 20–33)
CREAT SERPL-MCNC: 1.46 MG/DL (ref 0.5–1.4)
EOSINOPHIL # BLD AUTO: 0.25 K/UL (ref 0–0.51)
EOSINOPHIL NFR BLD: 3.3 % (ref 0–6.9)
ERYTHROCYTE [DISTWIDTH] IN BLOOD BY AUTOMATED COUNT: 46.5 FL (ref 35.9–50)
GFR SERPLBLD CREATININE-BSD FMLA CKD-EPI: 50 ML/MIN/1.73 M 2
GLOBULIN SER CALC-MCNC: 3 G/DL (ref 1.9–3.5)
GLUCOSE SERPL-MCNC: 87 MG/DL (ref 65–99)
HCT VFR BLD AUTO: 46.3 % (ref 42–52)
HGB BLD-MCNC: 15.2 G/DL (ref 14–18)
IMM GRANULOCYTES # BLD AUTO: 0.03 K/UL (ref 0–0.11)
IMM GRANULOCYTES NFR BLD AUTO: 0.4 % (ref 0–0.9)
INR PPP: 1.09 (ref 0.87–1.13)
LYMPHOCYTES # BLD AUTO: 1.98 K/UL (ref 1–4.8)
LYMPHOCYTES NFR BLD: 26.1 % (ref 22–41)
MCH RBC QN AUTO: 31.7 PG (ref 27–33)
MCHC RBC AUTO-ENTMCNC: 32.8 G/DL (ref 32.3–36.5)
MCV RBC AUTO: 96.7 FL (ref 81.4–97.8)
MONOCYTES # BLD AUTO: 0.71 K/UL (ref 0–0.85)
MONOCYTES NFR BLD AUTO: 9.3 % (ref 0–13.4)
NEUTROPHILS # BLD AUTO: 4.58 K/UL (ref 1.82–7.42)
NEUTROPHILS NFR BLD: 60.2 % (ref 44–72)
NRBC # BLD AUTO: 0 K/UL
NRBC BLD-RTO: 0 /100 WBC (ref 0–0.2)
NT-PROBNP SERPL IA-MCNC: 103 PG/ML (ref 0–125)
PLATELET # BLD AUTO: 169 K/UL (ref 164–446)
PMV BLD AUTO: 10.7 FL (ref 9–12.9)
POTASSIUM SERPL-SCNC: 4.1 MMOL/L (ref 3.6–5.5)
PROT SERPL-MCNC: 7.3 G/DL (ref 6–8.2)
PROTHROMBIN TIME: 14.1 SEC (ref 12–14.6)
RBC # BLD AUTO: 4.79 M/UL (ref 4.7–6.1)
RH BLD: NORMAL
SODIUM SERPL-SCNC: 143 MMOL/L (ref 135–145)
WBC # BLD AUTO: 7.6 K/UL (ref 4.8–10.8)

## 2025-02-03 PROCEDURE — 85025 COMPLETE CBC W/AUTO DIFF WBC: CPT

## 2025-02-03 PROCEDURE — 36415 COLL VENOUS BLD VENIPUNCTURE: CPT

## 2025-02-03 PROCEDURE — 85730 THROMBOPLASTIN TIME PARTIAL: CPT

## 2025-02-03 PROCEDURE — 86901 BLOOD TYPING SEROLOGIC RH(D): CPT

## 2025-02-03 PROCEDURE — 86850 RBC ANTIBODY SCREEN: CPT

## 2025-02-03 PROCEDURE — 85610 PROTHROMBIN TIME: CPT

## 2025-02-03 PROCEDURE — 83880 ASSAY OF NATRIURETIC PEPTIDE: CPT

## 2025-02-03 PROCEDURE — 86900 BLOOD TYPING SEROLOGIC ABO: CPT

## 2025-02-03 PROCEDURE — 80053 COMPREHEN METABOLIC PANEL: CPT

## 2025-02-04 ENCOUNTER — ANESTHESIA EVENT (OUTPATIENT)
Dept: SURGERY | Facility: MEDICAL CENTER | Age: 75
DRG: 267 | End: 2025-02-04
Payer: MEDICARE

## 2025-02-04 ENCOUNTER — HOSPITAL ENCOUNTER (INPATIENT)
Facility: MEDICAL CENTER | Age: 75
LOS: 1 days | DRG: 267 | End: 2025-02-05
Attending: INTERNAL MEDICINE | Admitting: INTERNAL MEDICINE
Payer: MEDICARE

## 2025-02-04 ENCOUNTER — APPOINTMENT (OUTPATIENT)
Dept: RADIOLOGY | Facility: MEDICAL CENTER | Age: 75
DRG: 267 | End: 2025-02-04
Attending: NURSE PRACTITIONER
Payer: MEDICARE

## 2025-02-04 ENCOUNTER — APPOINTMENT (OUTPATIENT)
Dept: CARDIOLOGY | Facility: MEDICAL CENTER | Age: 75
DRG: 267 | End: 2025-02-04
Attending: ANESTHESIOLOGY
Payer: MEDICARE

## 2025-02-04 ENCOUNTER — ANESTHESIA (OUTPATIENT)
Dept: SURGERY | Facility: MEDICAL CENTER | Age: 75
DRG: 267 | End: 2025-02-04
Payer: MEDICARE

## 2025-02-04 DIAGNOSIS — Z98.890 S/P MITRAL VALVE CLIP IMPLANTATION: ICD-10-CM

## 2025-02-04 DIAGNOSIS — I25.10 CORONARY ARTERY DISEASE INVOLVING NATIVE CORONARY ARTERY OF NATIVE HEART WITHOUT ANGINA PECTORIS: ICD-10-CM

## 2025-02-04 DIAGNOSIS — R79.89 ABNORMAL LFTS: ICD-10-CM

## 2025-02-04 DIAGNOSIS — Z95.818 S/P MITRAL VALVE CLIP IMPLANTATION: ICD-10-CM

## 2025-02-04 PROBLEM — R93.1 AGATSTON CORONARY ARTERY CALCIUM SCORE GREATER THAN 400: Status: RESOLVED | Noted: 2024-12-13 | Resolved: 2025-02-04

## 2025-02-04 PROBLEM — I34.0 NONRHEUMATIC MITRAL VALVE REGURGITATION: Status: RESOLVED | Noted: 2023-08-22 | Resolved: 2025-02-04

## 2025-02-04 PROBLEM — R26.89 BALANCE PROBLEM: Status: RESOLVED | Noted: 2024-04-05 | Resolved: 2025-02-04

## 2025-02-04 PROBLEM — R42 POSTURAL DIZZINESS WITH PRESYNCOPE: Status: RESOLVED | Noted: 2022-06-30 | Resolved: 2025-02-04

## 2025-02-04 PROBLEM — Z47.89 ORTHOPEDIC AFTERCARE: Status: RESOLVED | Noted: 2022-08-30 | Resolved: 2025-02-04

## 2025-02-04 PROBLEM — R55 POSTURAL DIZZINESS WITH PRESYNCOPE: Status: RESOLVED | Noted: 2022-06-30 | Resolved: 2025-02-04

## 2025-02-04 PROBLEM — E87.6 HYPOKALEMIA: Status: RESOLVED | Noted: 2017-09-21 | Resolved: 2025-02-04

## 2025-02-04 PROBLEM — Z82.49 FAMILY HISTORY OF CORONARY ARTERY DISEASE IN BROTHER: Status: RESOLVED | Noted: 2018-09-21 | Resolved: 2025-02-04

## 2025-02-04 LAB
ABO + RH BLD: NORMAL
ALBUMIN SERPL BCP-MCNC: 3.8 G/DL (ref 3.2–4.9)
ALBUMIN/GLOB SERPL: 1.6 G/DL
ALP SERPL-CCNC: 73 U/L (ref 30–99)
ALT SERPL-CCNC: 68 U/L (ref 2–50)
ANION GAP SERPL CALC-SCNC: 12 MMOL/L (ref 7–16)
AST SERPL-CCNC: 52 U/L (ref 12–45)
BILIRUB SERPL-MCNC: 0.3 MG/DL (ref 0.1–1.5)
BUN SERPL-MCNC: 17 MG/DL (ref 8–22)
CALCIUM ALBUM COR SERPL-MCNC: 9.3 MG/DL (ref 8.5–10.5)
CALCIUM SERPL-MCNC: 9.1 MG/DL (ref 8.5–10.5)
CHLORIDE SERPL-SCNC: 108 MMOL/L (ref 96–112)
CO2 SERPL-SCNC: 21 MMOL/L (ref 20–33)
CREAT SERPL-MCNC: 1.17 MG/DL (ref 0.5–1.4)
EKG IMPRESSION: NORMAL
ERYTHROCYTE [DISTWIDTH] IN BLOOD BY AUTOMATED COUNT: 45.7 FL (ref 35.9–50)
GFR SERPLBLD CREATININE-BSD FMLA CKD-EPI: 66 ML/MIN/1.73 M 2
GLOBULIN SER CALC-MCNC: 2.4 G/DL (ref 1.9–3.5)
GLUCOSE SERPL-MCNC: 142 MG/DL (ref 65–99)
HCT VFR BLD AUTO: 40.1 % (ref 42–52)
HGB BLD-MCNC: 13.5 G/DL (ref 14–18)
MCH RBC QN AUTO: 32.2 PG (ref 27–33)
MCHC RBC AUTO-ENTMCNC: 33.7 G/DL (ref 32.3–36.5)
MCV RBC AUTO: 95.7 FL (ref 81.4–97.8)
NT-PROBNP SERPL IA-MCNC: 89 PG/ML (ref 0–125)
PLATELET # BLD AUTO: 152 K/UL (ref 164–446)
PMV BLD AUTO: 11.2 FL (ref 9–12.9)
POTASSIUM SERPL-SCNC: 4.6 MMOL/L (ref 3.6–5.5)
PROT SERPL-MCNC: 6.2 G/DL (ref 6–8.2)
RBC # BLD AUTO: 4.19 M/UL (ref 4.7–6.1)
SODIUM SERPL-SCNC: 141 MMOL/L (ref 135–145)
WBC # BLD AUTO: 8.1 K/UL (ref 4.8–10.8)

## 2025-02-04 PROCEDURE — 160042 HCHG SURGERY MINUTES - EA ADDL 1 MIN LEVEL 5: Performed by: INTERNAL MEDICINE

## 2025-02-04 PROCEDURE — 700111 HCHG RX REV CODE 636 W/ 250 OVERRIDE (IP): Performed by: INTERNAL MEDICINE

## 2025-02-04 PROCEDURE — 160048 HCHG OR STATISTICAL LEVEL 1-5: Performed by: INTERNAL MEDICINE

## 2025-02-04 PROCEDURE — 160009 HCHG ANES TIME/MIN: Performed by: INTERNAL MEDICINE

## 2025-02-04 PROCEDURE — 700105 HCHG RX REV CODE 258: Performed by: NURSE PRACTITIONER

## 2025-02-04 PROCEDURE — 700105 HCHG RX REV CODE 258: Performed by: INTERNAL MEDICINE

## 2025-02-04 PROCEDURE — C1760 CLOSURE DEV, VASC: HCPCS | Performed by: INTERNAL MEDICINE

## 2025-02-04 PROCEDURE — 93005 ELECTROCARDIOGRAM TRACING: CPT | Mod: TC | Performed by: NURSE PRACTITIONER

## 2025-02-04 PROCEDURE — 86900 BLOOD TYPING SEROLOGIC ABO: CPT

## 2025-02-04 PROCEDURE — 85347 COAGULATION TIME ACTIVATED: CPT

## 2025-02-04 PROCEDURE — 83880 ASSAY OF NATRIURETIC PEPTIDE: CPT

## 2025-02-04 PROCEDURE — 36415 COLL VENOUS BLD VENIPUNCTURE: CPT

## 2025-02-04 PROCEDURE — C1751 CATH, INF, PER/CENT/MIDLINE: HCPCS | Performed by: INTERNAL MEDICINE

## 2025-02-04 PROCEDURE — 86901 BLOOD TYPING SEROLOGIC RH(D): CPT

## 2025-02-04 PROCEDURE — 160035 HCHG PACU - 1ST 60 MINS PHASE I: Performed by: INTERNAL MEDICINE

## 2025-02-04 PROCEDURE — 71045 X-RAY EXAM CHEST 1 VIEW: CPT

## 2025-02-04 PROCEDURE — 33418 REPAIR TCAT MITRAL VALVE: CPT | Mod: Q0 | Performed by: INTERNAL MEDICINE

## 2025-02-04 PROCEDURE — 160002 HCHG RECOVERY MINUTES (STAT): Performed by: INTERNAL MEDICINE

## 2025-02-04 PROCEDURE — C1887 CATHETER, GUIDING: HCPCS | Performed by: INTERNAL MEDICINE

## 2025-02-04 PROCEDURE — 33419 REPAIR TCAT MITRAL VALVE: CPT | Mod: Q0 | Performed by: INTERNAL MEDICINE

## 2025-02-04 PROCEDURE — 770020 HCHG ROOM/CARE - TELE (206)

## 2025-02-04 PROCEDURE — 700102 HCHG RX REV CODE 250 W/ 637 OVERRIDE(OP): Performed by: NURSE PRACTITIONER

## 2025-02-04 PROCEDURE — A9270 NON-COVERED ITEM OR SERVICE: HCPCS | Performed by: NURSE PRACTITIONER

## 2025-02-04 PROCEDURE — 700101 HCHG RX REV CODE 250: Performed by: ANESTHESIOLOGY

## 2025-02-04 PROCEDURE — 85027 COMPLETE CBC AUTOMATED: CPT

## 2025-02-04 PROCEDURE — 160031 HCHG SURGERY MINUTES - 1ST 30 MINS LEVEL 5: Performed by: INTERNAL MEDICINE

## 2025-02-04 PROCEDURE — C1894 INTRO/SHEATH, NON-LASER: HCPCS | Performed by: INTERNAL MEDICINE

## 2025-02-04 PROCEDURE — 700101 HCHG RX REV CODE 250: Performed by: INTERNAL MEDICINE

## 2025-02-04 PROCEDURE — 93319 3D ECHO IMG CGEN CAR ANOMAL: CPT

## 2025-02-04 PROCEDURE — 93010 ELECTROCARDIOGRAM REPORT: CPT | Performed by: INTERNAL MEDICINE

## 2025-02-04 PROCEDURE — 700111 HCHG RX REV CODE 636 W/ 250 OVERRIDE (IP): Performed by: ANESTHESIOLOGY

## 2025-02-04 PROCEDURE — 80053 COMPREHEN METABOLIC PANEL: CPT

## 2025-02-04 PROCEDURE — 02UG3JZ SUPPLEMENT MITRAL VALVE WITH SYNTHETIC SUBSTITUTE, PERCUTANEOUS APPROACH: ICD-10-PCS | Performed by: INTERNAL MEDICINE

## 2025-02-04 DEVICE — KIT MITRACLIP G4 SYSTEM CATHETER SGC0701 (1/EA): Type: IMPLANTABLE DEVICE | Status: FUNCTIONAL

## 2025-02-04 RX ORDER — BUPROPION HYDROCHLORIDE 100 MG/1
400 TABLET, EXTENDED RELEASE ORAL DAILY
Status: DISCONTINUED | OUTPATIENT
Start: 2025-02-05 | End: 2025-02-05 | Stop reason: HOSPADM

## 2025-02-04 RX ORDER — HYDRALAZINE HYDROCHLORIDE 20 MG/ML
INJECTION INTRAMUSCULAR; INTRAVENOUS PRN
Status: DISCONTINUED | OUTPATIENT
Start: 2025-02-04 | End: 2025-02-04 | Stop reason: SURG

## 2025-02-04 RX ORDER — CEFAZOLIN SODIUM 1 G/3ML
INJECTION, POWDER, FOR SOLUTION INTRAMUSCULAR; INTRAVENOUS PRN
Status: DISCONTINUED | OUTPATIENT
Start: 2025-02-04 | End: 2025-02-04 | Stop reason: SURG

## 2025-02-04 RX ORDER — LORAZEPAM 2 MG/1
2 TABLET ORAL
COMMUNITY

## 2025-02-04 RX ORDER — EZETIMIBE 10 MG/1
10 TABLET ORAL EVERY EVENING
Status: DISCONTINUED | OUTPATIENT
Start: 2025-02-04 | End: 2025-02-05 | Stop reason: HOSPADM

## 2025-02-04 RX ORDER — IBUPROFEN 200 MG
400-600 TABLET ORAL EVERY 6 HOURS PRN
COMMUNITY
End: 2025-02-11

## 2025-02-04 RX ORDER — OXYCODONE HCL 5 MG/5 ML
5 SOLUTION, ORAL ORAL
Status: DISCONTINUED | OUTPATIENT
Start: 2025-02-04 | End: 2025-02-04 | Stop reason: HOSPADM

## 2025-02-04 RX ORDER — AMOXICILLIN 250 MG
1 CAPSULE ORAL
Status: DISCONTINUED | OUTPATIENT
Start: 2025-02-04 | End: 2025-02-05 | Stop reason: HOSPADM

## 2025-02-04 RX ORDER — SODIUM CHLORIDE, SODIUM LACTATE, POTASSIUM CHLORIDE, CALCIUM CHLORIDE 600; 310; 30; 20 MG/100ML; MG/100ML; MG/100ML; MG/100ML
INJECTION, SOLUTION INTRAVENOUS CONTINUOUS
Status: DISCONTINUED | OUTPATIENT
Start: 2025-02-04 | End: 2025-02-04 | Stop reason: HOSPADM

## 2025-02-04 RX ORDER — DEXAMETHASONE SODIUM PHOSPHATE 4 MG/ML
INJECTION, SOLUTION INTRA-ARTICULAR; INTRALESIONAL; INTRAMUSCULAR; INTRAVENOUS; SOFT TISSUE PRN
Status: DISCONTINUED | OUTPATIENT
Start: 2025-02-04 | End: 2025-02-04 | Stop reason: SURG

## 2025-02-04 RX ORDER — LIDOCAINE HYDROCHLORIDE 20 MG/ML
INJECTION, SOLUTION INFILTRATION; PERINEURAL
Status: DISCONTINUED | OUTPATIENT
Start: 2025-02-04 | End: 2025-02-04 | Stop reason: HOSPADM

## 2025-02-04 RX ORDER — AMOXICILLIN 250 MG
1 CAPSULE ORAL NIGHTLY
Status: DISCONTINUED | OUTPATIENT
Start: 2025-02-04 | End: 2025-02-05 | Stop reason: HOSPADM

## 2025-02-04 RX ORDER — LIDOCAINE HYDROCHLORIDE 40 MG/ML
SOLUTION TOPICAL PRN
Status: DISCONTINUED | OUTPATIENT
Start: 2025-02-04 | End: 2025-02-04 | Stop reason: SURG

## 2025-02-04 RX ORDER — ATORVASTATIN CALCIUM 80 MG/1
80 TABLET, FILM COATED ORAL EVERY EVENING
Status: DISCONTINUED | OUTPATIENT
Start: 2025-02-04 | End: 2025-02-05 | Stop reason: HOSPADM

## 2025-02-04 RX ORDER — ALBUTEROL SULFATE 5 MG/ML
2.5 SOLUTION RESPIRATORY (INHALATION)
Status: DISCONTINUED | OUTPATIENT
Start: 2025-02-04 | End: 2025-02-04 | Stop reason: HOSPADM

## 2025-02-04 RX ORDER — SODIUM CHLORIDE 9 MG/ML
INJECTION, SOLUTION INTRAVENOUS CONTINUOUS
Status: ACTIVE | OUTPATIENT
Start: 2025-02-04 | End: 2025-02-04

## 2025-02-04 RX ORDER — OXYCODONE HCL 5 MG/5 ML
10 SOLUTION, ORAL ORAL
Status: DISCONTINUED | OUTPATIENT
Start: 2025-02-04 | End: 2025-02-04 | Stop reason: HOSPADM

## 2025-02-04 RX ORDER — POLYETHYLENE GLYCOL 3350 17 G/17G
1 POWDER, FOR SOLUTION ORAL 2 TIMES DAILY PRN
Status: DISCONTINUED | OUTPATIENT
Start: 2025-02-04 | End: 2025-02-05 | Stop reason: HOSPADM

## 2025-02-04 RX ORDER — SODIUM PHOSPHATE,MONO-DIBASIC 19G-7G/118
1 ENEMA (ML) RECTAL
Status: DISCONTINUED | OUTPATIENT
Start: 2025-02-04 | End: 2025-02-05 | Stop reason: HOSPADM

## 2025-02-04 RX ORDER — LORAZEPAM 2 MG/1
2 TABLET ORAL
Status: DISCONTINUED | OUTPATIENT
Start: 2025-02-04 | End: 2025-02-05 | Stop reason: HOSPADM

## 2025-02-04 RX ORDER — DIPHENHYDRAMINE HCL 25 MG
25 TABLET ORAL NIGHTLY PRN
Status: DISCONTINUED | OUTPATIENT
Start: 2025-02-04 | End: 2025-02-05 | Stop reason: HOSPADM

## 2025-02-04 RX ORDER — NALTREXONE HYDROCHLORIDE 50 MG/1
100 TABLET, FILM COATED ORAL 2 TIMES DAILY
Status: DISCONTINUED | OUTPATIENT
Start: 2025-02-04 | End: 2025-02-05 | Stop reason: HOSPADM

## 2025-02-04 RX ORDER — ACETAMINOPHEN 325 MG/1
650 TABLET ORAL EVERY 6 HOURS PRN
Status: DISCONTINUED | OUTPATIENT
Start: 2025-02-04 | End: 2025-02-05 | Stop reason: HOSPADM

## 2025-02-04 RX ORDER — ONDANSETRON 2 MG/ML
4 INJECTION INTRAMUSCULAR; INTRAVENOUS EVERY 4 HOURS PRN
Status: DISCONTINUED | OUTPATIENT
Start: 2025-02-04 | End: 2025-02-05 | Stop reason: HOSPADM

## 2025-02-04 RX ORDER — BISACODYL 10 MG
10 SUPPOSITORY, RECTAL RECTAL
Status: DISCONTINUED | OUTPATIENT
Start: 2025-02-04 | End: 2025-02-05 | Stop reason: HOSPADM

## 2025-02-04 RX ORDER — FERROUS SULFATE 325(65) MG
325 TABLET ORAL DAILY
Status: DISCONTINUED | OUTPATIENT
Start: 2025-02-05 | End: 2025-02-05 | Stop reason: HOSPADM

## 2025-02-04 RX ORDER — ASPIRIN 81 MG/1
81 TABLET ORAL
Status: DISCONTINUED | OUTPATIENT
Start: 2025-02-04 | End: 2025-02-05 | Stop reason: HOSPADM

## 2025-02-04 RX ORDER — MIRTAZAPINE 15 MG/1
7.5 TABLET, FILM COATED ORAL NIGHTLY PRN
Status: DISCONTINUED | OUTPATIENT
Start: 2025-02-04 | End: 2025-02-05 | Stop reason: HOSPADM

## 2025-02-04 RX ORDER — SODIUM CHLORIDE, SODIUM LACTATE, POTASSIUM CHLORIDE, CALCIUM CHLORIDE 600; 310; 30; 20 MG/100ML; MG/100ML; MG/100ML; MG/100ML
INJECTION, SOLUTION INTRAVENOUS CONTINUOUS
Status: ACTIVE | OUTPATIENT
Start: 2025-02-04 | End: 2025-02-04

## 2025-02-04 RX ORDER — OMEPRAZOLE 20 MG/1
20 CAPSULE, DELAYED RELEASE ORAL DAILY
Status: DISCONTINUED | OUTPATIENT
Start: 2025-02-04 | End: 2025-02-05 | Stop reason: HOSPADM

## 2025-02-04 RX ORDER — HYDRALAZINE HYDROCHLORIDE 20 MG/ML
10 INJECTION INTRAMUSCULAR; INTRAVENOUS
Status: DISCONTINUED | OUTPATIENT
Start: 2025-02-04 | End: 2025-02-05 | Stop reason: HOSPADM

## 2025-02-04 RX ORDER — DIPHENHYDRAMINE HYDROCHLORIDE 50 MG/ML
12.5 INJECTION INTRAMUSCULAR; INTRAVENOUS
Status: DISCONTINUED | OUTPATIENT
Start: 2025-02-04 | End: 2025-02-04 | Stop reason: HOSPADM

## 2025-02-04 RX ORDER — HEPARIN SODIUM,PORCINE 1000/ML
VIAL (ML) INJECTION PRN
Status: DISCONTINUED | OUTPATIENT
Start: 2025-02-04 | End: 2025-02-04 | Stop reason: SURG

## 2025-02-04 RX ORDER — CELECOXIB 200 MG/1
200 CAPSULE ORAL DAILY
Status: DISCONTINUED | OUTPATIENT
Start: 2025-02-05 | End: 2025-02-05 | Stop reason: HOSPADM

## 2025-02-04 RX ORDER — METOPROLOL TARTRATE 1 MG/ML
INJECTION, SOLUTION INTRAVENOUS PRN
Status: DISCONTINUED | OUTPATIENT
Start: 2025-02-04 | End: 2025-02-04 | Stop reason: SURG

## 2025-02-04 RX ORDER — ONDANSETRON 2 MG/ML
4 INJECTION INTRAMUSCULAR; INTRAVENOUS
Status: DISCONTINUED | OUTPATIENT
Start: 2025-02-04 | End: 2025-02-04 | Stop reason: HOSPADM

## 2025-02-04 RX ORDER — DOCUSATE SODIUM 100 MG/1
100 CAPSULE, LIQUID FILLED ORAL 2 TIMES DAILY
Status: DISCONTINUED | OUTPATIENT
Start: 2025-02-04 | End: 2025-02-05 | Stop reason: HOSPADM

## 2025-02-04 RX ORDER — BUPIVACAINE HYDROCHLORIDE 2.5 MG/ML
INJECTION, SOLUTION EPIDURAL; INFILTRATION; INTRACAUDAL
Status: DISCONTINUED | OUTPATIENT
Start: 2025-02-04 | End: 2025-02-04 | Stop reason: HOSPADM

## 2025-02-04 RX ORDER — DIPHENHYDRAMINE HYDROCHLORIDE 50 MG/ML
25 INJECTION INTRAMUSCULAR; INTRAVENOUS EVERY 6 HOURS PRN
Status: DISCONTINUED | OUTPATIENT
Start: 2025-02-04 | End: 2025-02-05 | Stop reason: HOSPADM

## 2025-02-04 RX ADMIN — ASPIRIN 81 MG: 81 TABLET, COATED ORAL at 21:50

## 2025-02-04 RX ADMIN — METOPROLOL TARTRATE 2 MG: 5 INJECTION INTRAVENOUS at 12:20

## 2025-02-04 RX ADMIN — LIDOCAINE HYDROCHLORIDE 4 ML: 40 SOLUTION TOPICAL at 11:35

## 2025-02-04 RX ADMIN — EZETIMIBE 10 MG: 10 TABLET ORAL at 17:20

## 2025-02-04 RX ADMIN — ATORVASTATIN CALCIUM 80 MG: 80 TABLET, FILM COATED ORAL at 17:21

## 2025-02-04 RX ADMIN — SODIUM CHLORIDE: 9 INJECTION, SOLUTION INTRAVENOUS at 17:25

## 2025-02-04 RX ADMIN — HEPARIN SODIUM 2000 UNITS: 1000 INJECTION, SOLUTION INTRAVENOUS; SUBCUTANEOUS at 12:31

## 2025-02-04 RX ADMIN — SODIUM CHLORIDE, POTASSIUM CHLORIDE, SODIUM LACTATE AND CALCIUM CHLORIDE: 600; 310; 30; 20 INJECTION, SOLUTION INTRAVENOUS at 10:04

## 2025-02-04 RX ADMIN — ROCURONIUM BROMIDE 30 MG: 10 INJECTION, SOLUTION INTRAVENOUS at 12:49

## 2025-02-04 RX ADMIN — LORAZEPAM 2 MG: 2 TABLET ORAL at 21:50

## 2025-02-04 RX ADMIN — DOCUSATE SODIUM 100 MG: 100 CAPSULE, LIQUID FILLED ORAL at 17:21

## 2025-02-04 RX ADMIN — HYDRALAZINE HYDROCHLORIDE 10 MG: 20 INJECTION, SOLUTION INTRAMUSCULAR; INTRAVENOUS at 11:57

## 2025-02-04 RX ADMIN — CEFAZOLIN 2 G: 1 INJECTION, POWDER, FOR SOLUTION INTRAMUSCULAR; INTRAVENOUS at 11:38

## 2025-02-04 RX ADMIN — OMEPRAZOLE 20 MG: 20 CAPSULE, DELAYED RELEASE ORAL at 17:21

## 2025-02-04 RX ADMIN — DEXAMETHASONE SODIUM PHOSPHATE 4 MG: 4 INJECTION INTRA-ARTICULAR; INTRALESIONAL; INTRAMUSCULAR; INTRAVENOUS; SOFT TISSUE at 11:33

## 2025-02-04 RX ADMIN — ROCURONIUM BROMIDE 20 MG: 10 INJECTION, SOLUTION INTRAVENOUS at 12:18

## 2025-02-04 RX ADMIN — HEPARIN SODIUM 12000 UNITS: 1000 INJECTION, SOLUTION INTRAVENOUS; SUBCUTANEOUS at 11:53

## 2025-02-04 RX ADMIN — ROCURONIUM BROMIDE 50 MG: 10 INJECTION, SOLUTION INTRAVENOUS at 11:33

## 2025-02-04 RX ADMIN — ACETAMINOPHEN 650 MG: 325 TABLET ORAL at 17:20

## 2025-02-04 RX ADMIN — LIDOCAINE HYDROCHLORIDE 0.5 ML: 10 INJECTION, SOLUTION EPIDURAL; INFILTRATION; INTRACAUDAL; PERINEURAL at 10:04

## 2025-02-04 RX ADMIN — PROPOFOL 200 MG: 10 INJECTION, EMULSION INTRAVENOUS at 11:33

## 2025-02-04 RX ADMIN — SUGAMMADEX 200 MG: 100 INJECTION, SOLUTION INTRAVENOUS at 13:12

## 2025-02-04 ASSESSMENT — PAIN DESCRIPTION - PAIN TYPE
TYPE: ACUTE PAIN
TYPE: ACUTE PAIN;CHRONIC PAIN
TYPE: ACUTE PAIN

## 2025-02-04 ASSESSMENT — FIBROSIS 4 INDEX: FIB4 SCORE: 2.28

## 2025-02-04 NOTE — ANESTHESIA PROCEDURE NOTES
CHUY    Date/Time: 2/4/2025 11:48 AM    Performed by: Buster Wilson M.D.  Authorized by: Buster Wilson M.D.    Start Time:2/4/2025 11:48 AM  Preanesthetic Checklist: patient identified, IV checked, site marked, risks and benefits discussed, surgical consent, monitors and equipment checked, pre-op evaluation and timeout performed    Indication for CHUY: diagnostic   Patient Location: OR  Intubated: Yes  Bite Block: Yes  Heart Visualized: Yes  Insertion: atraumatic    **See FULL CHUY report in patient's chart via CV Synapse**

## 2025-02-04 NOTE — OR NURSING
1459 Spouse and friend at bedside. Prolonged wait for room. Pt doing well. .  1520 pt reports urge to void. Unable to void laying sown. Bedrest over at 1720. Bladder scan shows 530 cc.      1612 report to Ester PAULA     1625 2 attempts to straight cath. Unsuccessful. Advised pt to  void after bedrest completed .

## 2025-02-04 NOTE — PROGRESS NOTES
Medication history reviewed with PT at bedside    Med rec is complete per PT reporting    Allergies reviewed.     Patient denies any outpatient antibiotics in the last 30 days.     Patient is not taking anticoagulants.    Preferred pharmacy for this visit - [facility] ([phone])

## 2025-02-04 NOTE — ANESTHESIA PROCEDURE NOTES
Arterial Line    Performed by: Philipp Cruz M.D.  Authorized by: Philipp Cruz M.D.    Start Time:  2/4/2025 11:45 AM  End Time:  2/4/2025 11:46 AM  Localization: surface landmarks    Patient Location:  OR  Indication: continuous blood pressure monitoring        Catheter Size:  20 G  Seldinger Technique?: Yes    Laterality:  Right  Site:  Radial artery  Line Secured:  Antimicrobial disc, tape and transparent dressing  Events: patient tolerated procedure well with no complications

## 2025-02-04 NOTE — PROCEDURES
DATE OF PROCEDURE: 2/4/25    REFERRING PHYSICIAN: RobertaHoma    PROCEDURES:  1. Transcatheter mitral valve edge to edge repair (ALIDA or Mitraclip)    PRE PROCEDURAL DIAGNOSIS:  Severe symptomatic mitral regurgitation NYHA III C , due to degenerative mitral valve disease and high surgical risk.    POST PROCEDURAL DIAGNOSIS:  Successful transcatheter mitral valve edge to edge repair using XTW and XT clip, reduction of mitral regurgitation from 4+ to 1+    Surgeons: Dr. Caraballo (interventional cardiologist)     Anaesthesiologist:     Transesophageal echo , intra procedural imaging performed by     DESCRIPTION OF PROCEDURE:  After informed consent was signed by the   patient, the patient was brought into the OR 19.  Bilateral groin was prepped and draped in   usual sterile manner.  The initial timeout was performed.     A 6-Macedonian venous sheath was placed in the right femoral vein by Modified Seldinger   technique under ultrasound guidance. A PerClose devices was delivered after dilatation of skin track.  An 8.5-Macedonian TorFlex venous sheath was advanced. Half dose IV heparin was given. A [place]   needle was inserted into the catheter and both the needle and the sheath were placed   in the right atrium. Under transesophageal echocardiogram guidance, the [place] needle   was used to puncture the septum and the catheter was advanced into the left atrium.   Rest of the half dose Heparin was given. A Protrack pigtail wire was positioned into the left   atrium. The sheath was removed and the femoral access site was dilated with   multiple dialators. The 22/24-Macedonian steerable guide catheter handle was inserted into  the left atrium under transesophageal echocardiogram guidance and the dilator was   removed.  A XTW Clip attached to the delivery catheter handle was inserted in to   the left atrium. Again, under transesophageal echocardiogram guidance, the clip was   opened, advanced into the left ventricle  and pulled up to the mitral valve leaflets at the   A2/P2 position. The leaflets were grasped and the clip was partially closed. The severity of   mitral regurgitation and the mitral valve gradient were measured. The clip was then closed   fully and released.The delivery catheter was removed.    Due to residual mitral regurgitation a second clip was deployed lateral to the first clip at  lateral A2/P2 position.    The mitral regurgitation was reduced from 4+ to 1+, the mean gradient was 3 mmHg.    The patient tolerated the procedure well. At the end of procedure hemodynamics were   again obtained. The steerable guide catheter was removed and the right femoral venous   access site was closed via PerClose closure.  The patient was then extubated and transferred to PACU in stable condition.      RECOMMENDATION: Guideline directed medical therapy.

## 2025-02-04 NOTE — H&P
HPI:  74 y.o.-year-old patient here for elective mitral valve repair using mitraclip    Medications / Drug list prior to admission:  No current facility-administered medications on file prior to encounter.     Current Outpatient Medications on File Prior to Encounter   Medication Sig Dispense Refill    ibuprofen (MOTRIN) 200 MG Tab Take 400-600 mg by mouth every 6 hours as needed for Mild Pain.      LORazepam (ATIVAN) 2 MG tablet Take 2 mg by mouth at bedtime.      mirtazapine (REMERON) 7.5 MG tablet Take 1 Tablet by mouth at bedtime as needed (For sleep) for up to 90 days. 90 Tablet 0    buPROPion (WELLBUTRIN SR) 200 MG SR tablet Take 2 Tablets by mouth every day for 180 days. Indications: Major Depressive Disorder (Patient taking differently: Take 400 mg by mouth every day. 2 tabs = 400mg  Indications: Major Depressive Disorder) 180 Tablet 1    fluoxetine (PROZAC) 40 MG capsule Take 1 Capsule by mouth every day for 90 days. 30 Capsule 2    omeprazole (PRILOSEC) 20 MG delayed-release capsule Take 1 Capsule by mouth every day. 90 Capsule 0    atorvastatin (LIPITOR) 80 MG tablet TAKE ONE TABLET BY MOUTH EVERY DAY 90 Tablet 1    Magnesium 100 MG Cap Take 100 mg by mouth every evening.      ferrous sulfate 325 (65 Fe) MG tablet Take 325 mg by mouth every day.      B Complex Vitamins (VITAMIN B COMPLEX PO) Take 1 Tablet by mouth every day.      Cholecalciferol (VITAMIN D3 PO) Take 1 Tablet by mouth every day. OTC      aspirin EC (ECOTRIN) 81 MG Tablet Delayed Response Take 81 mg by mouth every bedtime. 30 Tab 0    Multiple Vitamins-Minerals (MELODY-DAY 1000 PO) Take 1 Tab by mouth 2 Times a Day.      celecoxib (CELEBREX) 200 MG Cap Take 1 Capsule by mouth every day. 90 Capsule 2    ezetimibe (ZETIA) 10 MG Tab Take 1 Tablet by mouth every day. 90 Tablet 1    naltrexone (DEPADE) 50 MG Tab Take 100 mg by mouth 2 times a day.      NON SPECIFIED 1 Dose by Subcutaneous Infusion route see administration instructions. Jessie  injection every three months prescribed by dermatology         Current list of administered Medications:    Current Facility-Administered Medications:     lidocaine (Xylocaine) 1 % injection 0.5 mL, 0.5 mL, Intradermal, Once PRN, Davi Caraballo M.D., 0.5 mL at 02/04/25 1004    lactated ringers infusion, , Intravenous, Continuous, Davi Caraballo M.D., Last Rate: 10 mL/hr at 02/04/25 1004, New Bag at 02/04/25 1004    Past Medical History:   Diagnosis Date    Anxiety     Anxiety, generalized 11/27/2023    Arthritis     osteo    Awareness under anesthesia     Bowel habit changes 06/24/2021    Constipation    Cancer (HCC) 2007    bladder    Cataract 12/2024    repair completed 01/07/2025    Chickenpox     as a child    Depression     depression    Glaucoma 12/2024    Hard to intubate     Heart valve disease 10/2024    Mitral regurgatation    High cholesterol     Hyperlipidemia     Hypertension     pt states  well controlled on meds    Kidney stone     Mumps     as a child     Obesity     Pneumothorax     Psoriatic arthritis ([facility])     Recurrent major depressive disorder, in partial remission (HCC) 09/21/2017    Restless leg syndrome     Sleep apnea     uses cpap    Tonsillitis        Past Surgical History:   Procedure Laterality Date    PB INCISE FINGER TENDON SHEATH Left 09/19/2023    Procedure: LEFT INDEX FINGER TRIGGER RELEASE;  Surgeon: Td Soliman M.D.;  Location: [facility] [place];  Service: Orthopedics    PB INCISE FINGER TENDON SHEATH Left 08/30/2022    Procedure: LEFT MIDDLE FINGER TRIGGER RELEASE;  Surgeon: Td Soliman M.D.;  Location: [facility];  Service: Orthopedics    AR CYSTOURETHROSCOPY,BIOPSIES N/A 08/17/2022    Procedure: CYSTOSCOPY;  Surgeon: Jerzy Jasso M.D.;  Location: SURGERY [facility];  Service: Urology    AR CYSTOURETHROSCOPY,BIOPSIES  07/06/2021    Procedure: BIOPSY, BLADDER WITH FULGERATION;  Surgeon: Bob Millan M.D.;  Location:  SURGERY [facility];  Service: Urology    KNEE ARTHROPLASTY TOTAL Right 2021    BLADDER BIOPSY WITH CYSTOSCOPY  01/03/2018    Procedure: BLADDER BIOPSY WITH CYSTOSCOPY/WITH UPPER TRACT WASHING;  Surgeon: El Manuel M.D.;  Location: SURGERY [facility];  Service: Urology    RETROGRADES Bilateral 01/03/2018    Procedure: RETROGRADES;  Surgeon: El Manuel M.D.;  Location: SURGERY [facility];  Service: Urology    PYELOGRAM Bilateral 01/03/2018    Procedure: PYELOGRAM;  Surgeon: El Manuel M.D.;  Location: [facility];  Service: Urology    OTHER ORTHOPEDIC SURGERY  2015    shoulder replacement    ABDOMINAL EXPLORATION      ARTHROPLASTY      right shoulder    BLADDER BIOPSY WITH CYSTOSCOPY      EYE SURGERY      lasik     GASTRIC BYPASS LAPAROSCOPIC      HERNIA REPAIR      OPEN REDUCTION      OTHER Left     thumb joint    OTHER ABDOMINAL SURGERY  2002    gastric bypass    THORACOTOMY      TURP-VAPOR         Family History   Problem Relation Age of Onset    Cancer Mother 80        lung cancer    Arthritis Mother     Diabetes Mother         Type 2    Anxiety disorder Mother     Lung Disease Father 65    Cancer Father     Diabetes Father         Type 2    Hypertension Father     Hyperlipidemia Father     Hyperlipidemia Brother     Arthritis Brother     Other Brother         Thyroid tumor    Sleep Apnea Brother     No Known Problems Maternal Grandmother     Cancer Maternal Grandfather     No Known Problems Paternal Grandmother     No Known Problems Paternal Grandfather     Heart Disease Brother 49        smoker, overweight    Hypertension Brother     Depression Other     Suicide Attempts Neg Hx     Bipolar disorder Neg Hx     Alcohol abuse Neg Hx     Drug abuse Neg Hx      Patient family history was personally reviewed, no pertinent family history to current presentation    Social History     Tobacco Use    Smoking status: Never    Smokeless tobacco: Never   Vaping Use    Vaping status: Never  "Used   Substance Use Topics    Alcohol use: Yes     Alcohol/week: 9.6 oz     Types: 14 Glasses of wine, 2 Cans of beer per week     Comment: wine /daily    Drug use: Not Currently     Types: Inhaled     Comment: THC edible - \"once a month\".       ALLERGIES:  No Known Allergies    Review of systems:  A complete review of symptoms was reviewed with patient. This is reviewed in H&P and PMH. ALL OTHERS reviewed and negative    Physical exam:  Patient Vitals for the past 24 hrs:   BP Temp Temp src Pulse Resp SpO2 Height Weight   02/04/25 0825 139/86 -- -- -- -- -- -- --   02/04/25 0823 (!) 141/78 36.1 °C (97 °F) Temporal 61 18 96 % 1.702 m (5' 7\") 82.9 kg (182 lb 12.2 oz)     General: No acute distress.   EYES: no jaundice  HEENT: OP clear   Neck:  No JVD.   CVS:  Regular  Resp: Normal respiratory effort, CTAB. No wheezing or crackles/rhonchi.        Data:  Laboratory studies personally reviewed by me:  Recent Results (from the past 24 hours)   ABO Rh Confirm    Collection Time: 02/04/25  9:45 AM   Result Value Ref Range    ABO Rh Confirm B POS        Imaging:  EC-CHUY W/O CONT    (Results Pending)         Pertinent cardiac testing, EKG, echocardiogram, prior angiogram films if any reviewed    All pertinent features of laboratory and imaging reviewed including primary images where applicable      Active Problems:    * No active hospital problems. *  Resolved Problems:    * No resolved hospital problems. *      Assessment / Plan:  74 y.o.-year-old patient here for elective mitral valve repair using mitraclip  Risk benefits of the procedure discussed in detail, patient agrees to proceed        I personally discussed his case with  Dr Davi Caraballo M.D.    Future Appointments   Date Time Provider Department Center   2/11/2025  1:15 PM JOSEPH Goode None   2/24/2025  1:00 PM Buster Shetty M.D. OP [address]   2/26/2025 11:00 AM BENNY Barnard None   3/7/2025  2:15 PM [facility] EXAM " 12 ECHO [facility]   3/10/2025 10:45 AM JOSEPH Burkett None   12/15/2025  4:15 PM [facility] EXAM 10 ECHO [facility]   12/18/2025  3:45 PM JOSEPH Burkett None       It is my pleasure to participate in the care of Mr. Medina.  Please do not hesitate to contact me with questions or concerns.    Davi Caraballo M.D.    2/4/2025

## 2025-02-04 NOTE — OR NURSING
1324 arrive to pacu. Reports rec;d. Right groin CDI/SOFT.      1339 spouse updated. Pt doing well. Will call when room assigned.

## 2025-02-04 NOTE — ANESTHESIA PROCEDURE NOTES
Airway    Date/Time: 2/4/2025 11:35 AM    Performed by: Philipp Cruz M.D.  Authorized by: Philipp Cruz M.D.    Location:  OR  Urgency:  Elective  Difficult Airway: No    Indications for Airway Management:  Anesthesia      Spontaneous Ventilation: absent    Sedation Level:  Deep  Preoxygenated: Yes    Patient Position:  Sniffing  Mask Difficulty Assessment:  1 - vent by mask  Final Airway Type:  Endotracheal airway  Final Endotracheal Airway:  ETT  Cuffed: Yes    Technique Used for Successful ETT Placement:  Direct laryngoscopy    Insertion Site:  Oral  Blade Type:  Leighann  Laryngoscope Blade/Videolaryngoscope Blade Size:  3  ETT Size (mm):  7.0  Measured from:  Teeth  ETT to Teeth (cm):  22  Placement Verified by: auscultation and capnometry    Cormack-Lehane Classification:  Grade I - full view of glottis  Number of Attempts at Approach:  1

## 2025-02-04 NOTE — ANESTHESIA POSTPROCEDURE EVALUATION
Patient: Buster Medina    Procedure Summary       Date: 02/04/25 Room / Location: [place] [id number] / SURGERY [facility]    Anesthesia Start: 1130 Anesthesia Stop: 1328    Procedures:       TRANSCATHETER MITRAL VALVE PROCEDURE (Right: Groin)      ECHOCARDIOGRAM, TRANSESOPHAGEAL, INTRAOPERATIVE (Throat) Diagnosis: (SEVERE MITRAL REGURGITATION)    Surgeons: Davi Caraballo M.D. Responsible Provider: Philipp Cruz M.D.    Anesthesia Type: general ASA Status: 4            Final Anesthesia Type: general  Last vitals  BP   Blood Pressure : 139/86    Temp   36.1 °C (97 °F)    Pulse   61   Resp   18    SpO2   96 %      Anesthesia Post Evaluation    Patient location during evaluation: PACU  Patient participation: complete - patient participated  Level of consciousness: awake and alert    Airway patency: patent  Anesthetic complications: no  Cardiovascular status: hemodynamically stable  Respiratory status: face mask    PONV: none          There were no known notable events for this encounter.     Nurse Pain Score: 0 (NPRS)

## 2025-02-04 NOTE — ANESTHESIA PREPROCEDURE EVALUATION
" Case: [id number] Date/Time: 02/04/25 1115    Procedures:       TRANSCATHETER MITRAL VALVE PROCEDURE (Right: Groin)      REPAIR, ATRIAL SEPTAL DEFECT (Throat)    Anesthesia type: General    Pre-op diagnosis: SEVERE MITRAL REGURGITATION    Location: [facility] OR 19 / SURGERY [facility]    Surgeons: Davi Caraballo M.D.            Relevant Problems   ANESTHESIA   (positive) Obstructive sleep apnea (adult) (pediatric)      CARDIAC   (positive) Agatston coronary artery calcium score greater than 400   (positive) Coronary artery disease involving native coronary artery of native heart without angina pectoris   (positive) Essential hypertension   (positive) Nonrheumatic mitral valve regurgitation      GI   (positive) Gastroesophageal reflux disease without esophagitis      Other   (positive) Psoriatic arthritis (HCC)     /86   Pulse 61   Temp 36.1 °C (97 °F) (Temporal)   Resp 18   Ht 1.702 m (5' 7\")   Wt 82.9 kg (182 lb 12.2 oz)   SpO2 96%   BMI 28.62 kg/m²       Physical Exam    Airway   Mallampati: II  TM distance: >3 FB  Neck ROM: full       Cardiovascular - normal exam  Rhythm: regular  Rate: normal  (-) murmur     Dental - normal exam           Pulmonary - normal exam  Breath sounds clear to auscultation     Abdominal    Neurological - normal exam                   Anesthesia Plan    ASA 4       Plan - general       Airway plan will be ETT  CHUY Planned        Induction: intravenous    Postoperative Plan: Postoperative administration of opioids is intended.    Pertinent diagnostic labs and testing reviewed    Informed Consent:    Anesthetic plan and risks discussed with patient.    Use of blood products discussed with: patient whom consented to blood products.           "

## 2025-02-04 NOTE — ANESTHESIA TIME REPORT
Anesthesia Start and Stop Event Times       Date Time Event    2/4/2025 1112 Ready for Procedure     1130 Anesthesia Start     1328 Anesthesia Stop          Responsible Staff  02/04/25      Name Role Begin End    Philipp Cruz M.D. Anesth 1130 1328          Overtime Reason:  no overtime (within assigned shift)    Comments:

## 2025-02-05 ENCOUNTER — APPOINTMENT (OUTPATIENT)
Dept: RADIOLOGY | Facility: MEDICAL CENTER | Age: 75
DRG: 267 | End: 2025-02-05
Attending: NURSE PRACTITIONER
Payer: MEDICARE

## 2025-02-05 VITALS
BODY MASS INDEX: 28.69 KG/M2 | HEART RATE: 62 BPM | WEIGHT: 182.76 LBS | OXYGEN SATURATION: 94 % | RESPIRATION RATE: 16 BRPM | SYSTOLIC BLOOD PRESSURE: 116 MMHG | TEMPERATURE: 98.2 F | HEIGHT: 67 IN | DIASTOLIC BLOOD PRESSURE: 68 MMHG

## 2025-02-05 LAB
ALBUMIN SERPL BCP-MCNC: 3.7 G/DL (ref 3.2–4.9)
ALBUMIN/GLOB SERPL: 1.5 G/DL
ALP SERPL-CCNC: 71 U/L (ref 30–99)
ALT SERPL-CCNC: 61 U/L (ref 2–50)
ANION GAP SERPL CALC-SCNC: 11 MMOL/L (ref 7–16)
AST SERPL-CCNC: 48 U/L (ref 12–45)
BILIRUB SERPL-MCNC: 0.4 MG/DL (ref 0.1–1.5)
BUN SERPL-MCNC: 20 MG/DL (ref 8–22)
CALCIUM ALBUM COR SERPL-MCNC: 9.5 MG/DL (ref 8.5–10.5)
CALCIUM SERPL-MCNC: 9.3 MG/DL (ref 8.5–10.5)
CHLORIDE SERPL-SCNC: 108 MMOL/L (ref 96–112)
CO2 SERPL-SCNC: 21 MMOL/L (ref 20–33)
CREAT SERPL-MCNC: 1.23 MG/DL (ref 0.5–1.4)
EKG IMPRESSION: NORMAL
ERYTHROCYTE [DISTWIDTH] IN BLOOD BY AUTOMATED COUNT: 45.6 FL (ref 35.9–50)
GFR SERPLBLD CREATININE-BSD FMLA CKD-EPI: 62 ML/MIN/1.73 M 2
GLOBULIN SER CALC-MCNC: 2.4 G/DL (ref 1.9–3.5)
GLUCOSE SERPL-MCNC: 126 MG/DL (ref 65–99)
HCT VFR BLD AUTO: 39 % (ref 42–52)
HGB BLD-MCNC: 13.1 G/DL (ref 14–18)
LV EJECT FRACT  99904: 65
MCH RBC QN AUTO: 32 PG (ref 27–33)
MCHC RBC AUTO-ENTMCNC: 33.6 G/DL (ref 32.3–36.5)
MCV RBC AUTO: 95.4 FL (ref 81.4–97.8)
NT-PROBNP SERPL IA-MCNC: 192 PG/ML (ref 0–125)
PLATELET # BLD AUTO: 143 K/UL (ref 164–446)
PMV BLD AUTO: 11.4 FL (ref 9–12.9)
POTASSIUM SERPL-SCNC: 4.6 MMOL/L (ref 3.6–5.5)
PROT SERPL-MCNC: 6.1 G/DL (ref 6–8.2)
RBC # BLD AUTO: 4.09 M/UL (ref 4.7–6.1)
SODIUM SERPL-SCNC: 140 MMOL/L (ref 135–145)
WBC # BLD AUTO: 9.9 K/UL (ref 4.8–10.8)

## 2025-02-05 PROCEDURE — 80053 COMPREHEN METABOLIC PANEL: CPT

## 2025-02-05 PROCEDURE — 71045 X-RAY EXAM CHEST 1 VIEW: CPT

## 2025-02-05 PROCEDURE — A9270 NON-COVERED ITEM OR SERVICE: HCPCS | Performed by: NURSE PRACTITIONER

## 2025-02-05 PROCEDURE — 83880 ASSAY OF NATRIURETIC PEPTIDE: CPT

## 2025-02-05 PROCEDURE — 97535 SELF CARE MNGMENT TRAINING: CPT

## 2025-02-05 PROCEDURE — 700102 HCHG RX REV CODE 250 W/ 637 OVERRIDE(OP): Performed by: NURSE PRACTITIONER

## 2025-02-05 PROCEDURE — 93010 ELECTROCARDIOGRAM REPORT: CPT | Performed by: INTERNAL MEDICINE

## 2025-02-05 PROCEDURE — 85027 COMPLETE CBC AUTOMATED: CPT

## 2025-02-05 PROCEDURE — 93005 ELECTROCARDIOGRAM TRACING: CPT | Mod: TC | Performed by: NURSE PRACTITIONER

## 2025-02-05 PROCEDURE — 36415 COLL VENOUS BLD VENIPUNCTURE: CPT

## 2025-02-05 RX ADMIN — FLUOXETINE HYDROCHLORIDE 40 MG: 20 CAPSULE ORAL at 06:05

## 2025-02-05 RX ADMIN — CELECOXIB 200 MG: 200 CAPSULE ORAL at 06:06

## 2025-02-05 RX ADMIN — FERROUS SULFATE TAB 325 MG (65 MG ELEMENTAL FE) 325 MG: 325 (65 FE) TAB at 06:06

## 2025-02-05 RX ADMIN — OMEPRAZOLE 20 MG: 20 CAPSULE, DELAYED RELEASE ORAL at 06:06

## 2025-02-05 RX ADMIN — BUPROPION HYDROCHLORIDE 400 MG: 100 TABLET, FILM COATED, EXTENDED RELEASE ORAL at 06:06

## 2025-02-05 ASSESSMENT — FIBROSIS 4 INDEX: FIB4 SCORE: 3.03

## 2025-02-05 NOTE — PROGRESS NOTES
Report received from night shift RN, assumed care of pt. Pt A&Ox4. Plan of care discussed with pt, labs and chart reviewed. All needs met at this time. Tele box on. On room air. Call light within reach, bed locked and in lowest position. All fall precautions and hourly rounding in place. Care is ongoing.

## 2025-02-05 NOTE — PROGRESS NOTES
4 Eyes Skin Assessment Completed by CHAI Beard and CHAI Rust.    Head WDL  Ears WDL  Nose WDL  Mouth WDL  Neck WDL  Breast/Chest WDL  Shoulder Blades WDL  Spine WDL  (R) Arm/Elbow/Hand WDL  (L) Arm/Elbow/Hand WDL  Abdomen WDL  Groin Incision Right groin procedure site   Scrotum/Coccyx/Buttocks Redness and Blanching  (R) Leg WDL  (L) Leg WDL  (R) Heel/Foot/Toe WDL  (L) Heel/Foot/Toe WDL          Devices In Places Tele Box, Blood Pressure Cuff, and Pulse Ox      Interventions In Place Pillows, Low Air Loss Mattress, and Pressure Redistribution Mattress    Possible Skin Injury No    Pictures Uploaded Into Epic N/A  Wound Consult Placed N/A  RN Wound Prevention Protocol Ordered No

## 2025-02-05 NOTE — THERAPY
Physical Therapy   Initial Evaluation     Patient Name: Buster Medina  Age:  74 y.o., Sex:  male  Medical Record #: [id number]  Today's Date: 2/5/2025     Precautions  Precautions: Cardiac Precautions (See Comments)  Comments: ALIDA with MitraClip    Assessment  Patient is 74 y.o. male successful ALIDA with MitraClip.  Hx of severe symptomatic mitral regurgitation, HLD with CAD with RCA disease, HTN, ARNULFO (not treating), and anxiety/depression.     Pt given handout. Education included:  Home Walking program with exercise to begin with 5 minutes.   Progression of home walking program  Progression of aerobic tolerance and how to self monitor including the Talk Test and RPE scale  Outpatient Cardiac Rehab Phase 2  Signs and symptoms when to call 911  Pt receptive to education.      Pt stated good understanding of activity tolerance with talk test and recovery time from having to need to do those prior to admission. No further PT needs at this time. Pt to DC shortly.      Plan    Physical Therapy Initial Treatment Plan   Duration: Evaluation only       Discharge Recommendations: Other - (Cardiac Rehab phase II)       Subjective    Pt agreed to PT education.      Objective       02/05/25 0857   Time In/Time Out   Therapy Start Time 0848   Therapy End Time 0857   Total Therapy Time 9   Charge Group   PT Self Care / Home Evaluation (Units) 1   Total Time Spent   PT Self Care/Home Evaluation Time Spent (Mins) 9   PT Total Time Spent (Calculated) 9   Initial Contact Note    Initial Contact Note Order Received and Verified, Evaluation Only - Patient Does Not Require Further Acute Physical Therapy at this Time.  However, May Benefit from Post Acute Therapy for Higher Level Functional Deficits.   Precautions   Precautions Cardiac Precautions (See Comments)   Comments ALIDA with MitraClip   Prior Level of Functional Mobility   Comments independent, would get short of breathe with activity prior to sx   Cognition    Cognition /  Consciousness WDL   Level of Consciousness Alert   Comments pleasant, cooperative   Gait Analysis   Comments pt stated he did not feel the need to walk with therapy   Patient / Family Goals    Patient / Family Goal #1 To return to skiing   Education Group   Education Provided Cardiac Precautions;Role of Physical Therapist   Cardiac Precautions Patient Response Patient;Acceptance;Explanation;Verbal Demonstration;Handout   Role of Physical Therapist Patient Response Patient;Acceptance;Explanation;Verbal Demonstration   Physical Therapy Initial Treatment Plan    Duration Evaluation only   Anticipated Discharge Equipment and Recommendations   Discharge Recommendations Other -  (Cardiac Rehab phase II)   Interdisciplinary Plan of Care Collaboration   IDT Collaboration with  Nursing   Patient Position at End of Therapy In Bed;Call Light within Reach;Tray Table within Reach;Phone within Reach   Session Information   Date / Session Number  2/5 self care only

## 2025-02-05 NOTE — CARE PLAN
The patient is Stable - Low risk of patient condition declining or worsening    Shift Goals  Clinical Goals: rest, pain control  Patient Goals: rest, sleep    Progress made toward(s) clinical / shift goals:      Problem: Pain - Standard  Goal: Alleviation of pain or a reduction in pain to the patient’s comfort goal  Outcome: Progressing     Problem: Hemodynamics  Goal: Patient's hemodynamics, fluid balance and neurologic status will be stable or improve  Outcome: Progressing     Problem: Respiratory  Goal: Patient will achieve/maintain optimum respiratory ventilation and gas exchange  Outcome: Progressing       Patient is not progressing towards the following goals:

## 2025-02-05 NOTE — PROGRESS NOTES
Discharge instructions given to patient at bedside, verbalizes understanding and states plans for follow-up with PCP. New and home medication review, post-discharge activity level and worsening of symptoms needing follow-up care discussed. Telemetry monitor/IV cathlon removed. All belongings accounted for, all questions answered at this time. Patient walked out with discharge nurse to family member's car.

## 2025-02-05 NOTE — DISCHARGE SUMMARY
PRIMARY DISCHARGE DIAGNOSIS: Status post MitraClip X 2 XTW and XT clip    PROCEDURES:    1. Successful ALIDA with MitraClip X2 XTW and XT clip , transfemoral approach under general anesthesia on 2/4/25  2. Intraoperative transesophageal echocardiogram showing results pending  3. CXR on 2/5/25 showing   1.  The left lower lobe infiltrates  2.  Trace left pleural effusion  4. EKG on 2/5/25 showing SR rate of 60 bpm  5. Labs on 2/5/25 showing   Recent Labs     02/03/25  1021 02/04/25  1333 02/05/25  0148   WBC 7.6 8.1 9.9   RBC 4.79 4.19* 4.09*   HEMOGLOBIN 15.2 13.5* 13.1*   HEMATOCRIT 46.3 40.1* 39.0*   MCV 96.7 95.7 95.4   MCH 31.7 32.2 32.0   RDW 46.5 45.7 45.6   PLATELETCT 169 152* 143*   MPV 10.7 11.2 11.4   NEUTSPOLYS 60.20  --   --    LYMPHOCYTES 26.10  --   --    MONOCYTES 9.30  --   --    EOSINOPHILS 3.30  --   --    BASOPHILS 0.70  --   --      Recent Labs     02/03/25  1021 02/04/25  1333 02/05/25  0148   SODIUM 143 141 140   POTASSIUM 4.1 4.6 4.6   CHLORIDE 108 108 108   CO2 24 21 21   GLUCOSE 87 142* 126*   BUN 22 17 20     INR   Date Value Ref Range Status   02/03/2025 1.09 0.87 - 1.13 Final     Comment:     INR - Non-therapeutic Reference Range: 0.87-1.13  INR - Therapeutic Reference Range: 2.0-4.0       HOSPITAL COURSE: The patient is a pleasant 74 year old male with severe symptomatic mitral regurgitation, HLD with CAD with RCA disease (follow outpatient), HTN, ARNULFO (not treating), and anxiety/depression. Due to the patient's symptoms, the patient underwent successful MitraClip described as above. Post-procedure, the patient did well. They didn't require IV diuresis during their stay. They were able to ambulate without difficulty. No further events were noted during their stay. They are now off oxygen and are to be discharged to home with his wife.    DISCHARGE MEDICATIONS:      Medication List        CHANGE how you take these medications        Instructions   buPROPion 200 MG SR tablet  What changed:  additional instructions  Commonly known as: Wellbutrin SR   Take 2 Tablets by mouth every day for 180 days. Indications: Major Depressive Disorder  Dose: 400 mg            CONTINUE taking these medications        Instructions   aspirin EC 81 MG Tbec  Commonly known as: Ecotrin   Take 81 mg by mouth every bedtime.  Dose: 81 mg     atorvastatin 80 MG tablet  Commonly known as: Lipitor   TAKE ONE TABLET BY MOUTH EVERY DAY  Dose: 80 mg     MELODY-DAY 1000 PO   Take 1 Tab by mouth 2 Times a Day.  Dose: 1 Tablet     celecoxib 200 MG Caps  Commonly known as: CeleBREX   Take 1 Capsule by mouth every day.  Dose: 200 mg     ezetimibe 10 MG Tabs  Commonly known as: Zetia   Take 1 Tablet by mouth every day.  Dose: 10 mg     ferrous sulfate 325 (65 Fe) MG tablet   Take 325 mg by mouth every day.  Dose: 325 mg     fluoxetine 40 MG capsule  Commonly known as: PROzac   Take 1 Capsule by mouth every day for 90 days.  Dose: 40 mg     ibuprofen 200 MG Tabs  Commonly known as: Motrin   Take 400-600 mg by mouth every 6 hours as needed for Mild Pain.  Dose: 400-600 mg     LORazepam 2 MG tablet  Commonly known as: Ativan   Take 2 mg by mouth at bedtime.  Dose: 2 mg     Magnesium 100 MG Caps   Take 100 mg by mouth every evening.  Dose: 100 mg     mirtazapine 7.5 MG tablet  Commonly known as: Remeron   Doctor's comments: May split in half and talk half a tablet if needing less that day  Take 1 Tablet by mouth at bedtime as needed (For sleep) for up to 90 days.  Dose: 7.5 mg     naltrexone 50 MG Tabs  Commonly known as: Depade   Take 100 mg by mouth 2 times a day.  Dose: 100 mg     NON SPECIFIED   1 Dose by Subcutaneous Infusion route see administration instructions. Ilumya injection every three months prescribed by dermatology  Dose: 1 Dose     omeprazole 20 MG delayed-release capsule  Commonly known as: PriLOSEC   Take 1 Capsule by mouth every day.  Dose: 20 mg     VITAMIN B COMPLEX PO   Take 1 Tablet by mouth every day.  Dose: 1 Tablet      VITAMIN D3 PO   Take 1 Tablet by mouth every day. OTC  Dose: 1 Tablet            DISCHARGE INSTRUCTIONS: They are given discharge instructions on potential post-operative complications and symptoms to watch out for. Their groin sites were checked and were clean, dry, and intact. Patient or family to notify us for any complications noted on the discharge instructions. They will follow up with myself, Rocio Landaverde DNP, YOG[facility], on Tuesday in our cardiology office. They will get a repeat CMP for liver function abnormality before their follow up appointment. They will then follow up with a repeat echocardiogram in one month for post MitraClip assessment.     Future Appointments   Date Time Provider Department Center   2/11/2025  1:15 PM JOSEPH Goode None   2/24/2025  1:00 PM Buster hSetty M.D. [facility]    2/26/2025 11:00 AM BENNY Barnard None   3/7/2025  2:15 PM [facility] 12 [facility]   3/10/2025 10:45 AM JOSEPH Burkett None   12/15/2025  4:15 PM [facility] EXAM 10 [facility]   12/18/2025  3:45 PM JOSEPH Burkett None     Discharge time spent with patient was 30 minutes.

## 2025-02-05 NOTE — PROGRESS NOTES
Monitor Summary  Rhythm: SR, SB  Rate: 56-78  Ectopy: (R)PVC, (R)PVC  Measurements: 0.15/0.07/0.42      ---12 hr Chart Review---

## 2025-02-06 ENCOUNTER — HOSPITAL ENCOUNTER (OUTPATIENT)
Dept: LAB | Facility: MEDICAL CENTER | Age: 75
End: 2025-02-06
Payer: MEDICARE

## 2025-02-06 ENCOUNTER — HOSPITAL ENCOUNTER (OUTPATIENT)
Dept: LAB | Facility: MEDICAL CENTER | Age: 75
End: 2025-02-06
Attending: NURSE PRACTITIONER
Payer: MEDICARE

## 2025-02-06 DIAGNOSIS — R79.89 ABNORMAL LFTS: ICD-10-CM

## 2025-02-06 DIAGNOSIS — F33.42 RECURRENT MAJOR DEPRESSIVE DISORDER, IN FULL REMISSION (HCC): ICD-10-CM

## 2025-02-06 LAB
ACT BLD: 274 SEC (ref 74–137)
ACT BLD: 291 SEC (ref 74–137)
ACT BLD: 308 SEC (ref 74–137)
ALBUMIN SERPL BCP-MCNC: 4 G/DL (ref 3.2–4.9)
ALBUMIN SERPL BCP-MCNC: 4 G/DL (ref 3.2–4.9)
ALBUMIN/GLOB SERPL: 1.5 G/DL
ALBUMIN/GLOB SERPL: 1.5 G/DL
ALP SERPL-CCNC: 80 U/L (ref 30–99)
ALP SERPL-CCNC: 81 U/L (ref 30–99)
ALT SERPL-CCNC: 54 U/L (ref 2–50)
ALT SERPL-CCNC: 58 U/L (ref 2–50)
ANION GAP SERPL CALC-SCNC: 10 MMOL/L (ref 7–16)
ANION GAP SERPL CALC-SCNC: 13 MMOL/L (ref 7–16)
AST SERPL-CCNC: 48 U/L (ref 12–45)
AST SERPL-CCNC: 48 U/L (ref 12–45)
BILIRUB SERPL-MCNC: 0.4 MG/DL (ref 0.1–1.5)
BILIRUB SERPL-MCNC: 0.4 MG/DL (ref 0.1–1.5)
BUN SERPL-MCNC: 20 MG/DL (ref 8–22)
BUN SERPL-MCNC: 20 MG/DL (ref 8–22)
CALCIUM ALBUM COR SERPL-MCNC: 9.3 MG/DL (ref 8.5–10.5)
CALCIUM ALBUM COR SERPL-MCNC: 9.3 MG/DL (ref 8.5–10.5)
CALCIUM SERPL-MCNC: 9.3 MG/DL (ref 8.5–10.5)
CALCIUM SERPL-MCNC: 9.3 MG/DL (ref 8.5–10.5)
CHLORIDE SERPL-SCNC: 106 MMOL/L (ref 96–112)
CHLORIDE SERPL-SCNC: 106 MMOL/L (ref 96–112)
CO2 SERPL-SCNC: 24 MMOL/L (ref 20–33)
CO2 SERPL-SCNC: 26 MMOL/L (ref 20–33)
CREAT SERPL-MCNC: 1.18 MG/DL (ref 0.5–1.4)
CREAT SERPL-MCNC: 1.2 MG/DL (ref 0.5–1.4)
GFR SERPLBLD CREATININE-BSD FMLA CKD-EPI: 64 ML/MIN/1.73 M 2
GFR SERPLBLD CREATININE-BSD FMLA CKD-EPI: 65 ML/MIN/1.73 M 2
GLOBULIN SER CALC-MCNC: 2.7 G/DL (ref 1.9–3.5)
GLOBULIN SER CALC-MCNC: 2.7 G/DL (ref 1.9–3.5)
GLUCOSE SERPL-MCNC: 91 MG/DL (ref 65–99)
GLUCOSE SERPL-MCNC: 92 MG/DL (ref 65–99)
POTASSIUM SERPL-SCNC: 3.8 MMOL/L (ref 3.6–5.5)
POTASSIUM SERPL-SCNC: 3.9 MMOL/L (ref 3.6–5.5)
PROT SERPL-MCNC: 6.7 G/DL (ref 6–8.2)
PROT SERPL-MCNC: 6.7 G/DL (ref 6–8.2)
SODIUM SERPL-SCNC: 142 MMOL/L (ref 135–145)
SODIUM SERPL-SCNC: 143 MMOL/L (ref 135–145)

## 2025-02-06 PROCEDURE — 80053 COMPREHEN METABOLIC PANEL: CPT | Mod: 91

## 2025-02-06 PROCEDURE — 80053 COMPREHEN METABOLIC PANEL: CPT

## 2025-02-06 PROCEDURE — 36415 COLL VENOUS BLD VENIPUNCTURE: CPT

## 2025-02-11 ENCOUNTER — TELEPHONE (OUTPATIENT)
Dept: CARDIOLOGY | Facility: MEDICAL CENTER | Age: 75
End: 2025-02-11

## 2025-02-11 ENCOUNTER — OFFICE VISIT (OUTPATIENT)
Dept: CARDIOLOGY | Facility: MEDICAL CENTER | Age: 75
End: 2025-02-11
Payer: MEDICARE

## 2025-02-11 VITALS
DIASTOLIC BLOOD PRESSURE: 66 MMHG | OXYGEN SATURATION: 94 % | HEART RATE: 74 BPM | SYSTOLIC BLOOD PRESSURE: 130 MMHG | RESPIRATION RATE: 18 BRPM | WEIGHT: 183 LBS | BODY MASS INDEX: 28.72 KG/M2 | HEIGHT: 67 IN

## 2025-02-11 DIAGNOSIS — Z98.890 S/P MITRAL VALVE CLIP IMPLANTATION: ICD-10-CM

## 2025-02-11 DIAGNOSIS — I25.10 CORONARY ARTERY DISEASE INVOLVING NATIVE CORONARY ARTERY OF NATIVE HEART WITHOUT ANGINA PECTORIS: ICD-10-CM

## 2025-02-11 DIAGNOSIS — I10 ESSENTIAL HYPERTENSION: ICD-10-CM

## 2025-02-11 DIAGNOSIS — E78.49 FAMILIAL HYPERLIPIDEMIA: ICD-10-CM

## 2025-02-11 DIAGNOSIS — Z95.818 S/P MITRAL VALVE CLIP IMPLANTATION: ICD-10-CM

## 2025-02-11 PROCEDURE — 3078F DIAST BP <80 MM HG: CPT

## 2025-02-11 PROCEDURE — 3075F SYST BP GE 130 - 139MM HG: CPT

## 2025-02-11 PROCEDURE — 99214 OFFICE O/P EST MOD 30 MIN: CPT

## 2025-02-11 RX ORDER — LISINOPRIL 10 MG/1
10 TABLET ORAL DAILY
Qty: 100 TABLET | Refills: 3 | Status: ON HOLD | OUTPATIENT
Start: 2025-02-11 | End: 2025-02-27

## 2025-02-11 ASSESSMENT — ENCOUNTER SYMPTOMS
GASTROINTESTINAL NEGATIVE: 1
MUSCULOSKELETAL NEGATIVE: 1
NEUROLOGICAL NEGATIVE: 1
SHORTNESS OF BREATH: 1
PND: 0
ORTHOPNEA: 0
EYES NEGATIVE: 1
NERVOUS/ANXIOUS: 0
DEPRESSION: 0
PALPITATIONS: 0

## 2025-02-11 ASSESSMENT — FIBROSIS 4 INDEX: FIB4 SCORE: 3.22

## 2025-02-11 NOTE — PROGRESS NOTES
Chief Complaint   Patient presents with    Dyslipidemia    Mitral Stenosis/Insufficiency     Nonrheumatic mitral valve regurgitation       Subjective     Aaron Medina is a 74 y.o. male who presents today for his 1 week post MitraClip follow-up.  He has a history of severe symptomatic mitral digitation status post MitraClip x 2 XT W and XT clip on 2/4/2025,  HLD with CAD with RCA disease (follow outpatient), HTN, ARNULFO (not treating), and anxiety/depression     2/11/2025 he has been feeling ok since his ALIDA, able to walk 1-2 blocks at a time.  He has not noticed a significant change in his symptoms.  NYHA class I/II    Past Medical History:   Diagnosis Date    Anxiety     Anxiety, generalized 11/27/2023    Arthritis     osteo    Awareness under anesthesia     Bowel habit changes 06/24/2021    Constipation    Cancer ([facility]) 2007    bladder    Cataract 12/2024    repair completed 01/07/2025    Chickenpox     as a child    Depression     depression    Glaucoma 12/2024    Heart valve disease 10/2024    Mitral regurgatation    High cholesterol     Hyperlipidemia     Hypertension     pt states  well controlled on meds    Kidney stone     Mumps     as a child     Obesity     Pneumothorax     Psoriatic arthritis ([facility])     Recurrent major depressive disorder, in partial remission ([facility]) 09/21/2017    Restless leg syndrome     Sleep apnea     uses cpap    Tonsillitis      Past Surgical History:   Procedure Laterality Date    TRANSCATHETER MITRAL VALVE REPAIR Right 2/4/2025    Procedure: TRANSCATHETER MITRAL VALVE PROCEDURE;  Surgeon: Davi Caraballo M.D.;  Location: SURGERY [facility];  Service: Cardiac    ECHOCARDIOGRAM, TRANSESOPHAGEAL, INTRAOPERATIVE N/A 2/4/2025    Procedure: ECHOCARDIOGRAM, TRANSESOPHAGEAL, INTRAOPERATIVE;  Surgeon: Davi Caraballo M.D.;  Location: SURGERY [facility];  Service: Cardiac    PB INCISE FINGER TENDON SHEATH Left 09/19/2023    Procedure: LEFT INDEX FINGER TRIGGER RELEASE;  Surgeon:  Td Soliman M.D.;  Location: [facility];  Service: Orthopedics    PB INCISE FINGER TENDON SHEATH Left 08/30/2022    Procedure: LEFT MIDDLE FINGER TRIGGER RELEASE;  Surgeon: Td Soliman M.D.;  Location: [facility];  Service: Orthopedics    TN CYSTOURETHROSCOPY,BIOPSIES N/A 08/17/2022    Procedure: CYSTOSCOPY;  Surgeon: Jerzy Jasso M.D.;  Location: SURGERY [facility];  Service: Urology    TN CYSTOURETHROSCOPY,BIOPSIES  07/06/2021    Procedure: BIOPSY, BLADDER WITH FULGERATION;  Surgeon: Bob Millan M.D.;  Location: [facility];  Service: Urology    KNEE ARTHROPLASTY TOTAL Right 2021    BLADDER BIOPSY WITH CYSTOSCOPY  01/03/2018    Procedure: BLADDER BIOPSY WITH CYSTOSCOPY/WITH UPPER TRACT WASHING;  Surgeon: El Manuel M.D.;  Location: [facility];  Service: Urology    RETROGRADES Bilateral 01/03/2018    Procedure: RETROGRADES;  Surgeon: El Manuel M.D.;  Location: [facility];  Service: Urology    PYELOGRAM Bilateral 01/03/2018    Procedure: PYELOGRAM;  Surgeon: El Manuel M.D.;  Location: [facility];  Service: Urology    OTHER ORTHOPEDIC SURGERY  2015    shoulder replacement    ABDOMINAL EXPLORATION      ARTHROPLASTY      right shoulder    BLADDER BIOPSY WITH CYSTOSCOPY      EYE SURGERY      lasik     GASTRIC BYPASS LAPAROSCOPIC      HERNIA REPAIR      OPEN REDUCTION      OTHER Left     thumb joint    OTHER ABDOMINAL SURGERY  2002    gastric bypass    THORACOTOMY      TURP-VAPOR       Family History   Problem Relation Age of Onset    Cancer Mother 80        lung cancer    Arthritis Mother     Diabetes Mother         Type 2    Anxiety disorder Mother     Lung Disease Father 65    Cancer Father     Diabetes Father         Type 2    Hypertension Father     Hyperlipidemia Father     Hyperlipidemia Brother     Arthritis Brother     Other Brother         Thyroid tumor    Sleep Apnea Brother     No Known  "Problems Maternal Grandmother     Cancer Maternal Grandfather     No Known Problems Paternal Grandmother     No Known Problems Paternal Grandfather     Heart Disease Brother 49        smoker, overweight    Hypertension Brother     Depression Other     Suicide Attempts Neg Hx     Bipolar disorder Neg Hx     Alcohol abuse Neg Hx     Drug abuse Neg Hx      Social History     Socioeconomic History    Marital status: Single     Spouse name: Not on file    Number of children: Not on file    Years of education: Not on file    Highest education level: Master's degree (e.g., MA, MS, Rhiannon, MEd, MSW, LUIS)   Occupational History    Not on file   Tobacco Use    Smoking status: Never    Smokeless tobacco: Never   Vaping Use    Vaping status: Never Used   Substance and Sexual Activity    Alcohol use: Yes     Alcohol/week: 9.6 oz     Types: 14 Glasses of wine, 2 Cans of beer per week     Comment: wine /daily    Drug use: Not Currently     Types: Inhaled     Comment: THC edible - \"once a month\".    Sexual activity: Not Currently   Other Topics Concern    Not on file   Social History Narrative    Not on file     Social Drivers of Health     Financial Resource Strain: Low Risk  (4/3/2024)    Overall Financial Resource Strain (CARDIA)     Difficulty of Paying Living Expenses: Not hard at all   Food Insecurity: No Food Insecurity (4/3/2024)    Hunger Vital Sign     Worried About Running Out of Food in the Last Year: Never true     Ran Out of Food in the Last Year: Never true   Transportation Needs: No Transportation Needs (4/3/2024)    PRAPARE - Transportation     Lack of Transportation (Medical): No     Lack of Transportation (Non-Medical): No   Physical Activity: Insufficiently Active (4/3/2024)    Exercise Vital Sign     Days of Exercise per Week: 2 days     Minutes of Exercise per Session: 20 min   Stress: No Stress Concern Present (4/3/2024)    [place] [employer] - Occupational Stress Questionnaire     Feeling " of Stress : Not at all   Social Connections: Moderately Isolated (4/3/2024)    Social Connection and Isolation Panel [NHANES]     Frequency of Communication with Friends and Family: Twice a week     Frequency of Social Gatherings with Friends and Family: Once a week     Attends Congregational Services: Never     Active Member of Clubs or Organizations: No     Attends Club or Organization Meetings: Patient declined     Marital Status: Living with partner   Intimate Partner Violence: Not on file   Housing Stability: Unknown (4/3/2024)    Housing Stability Vital Sign     Unable to Pay for Housing in the Last Year: No     Number of Places Lived in the Last Year: Not on file     Unstable Housing in the Last Year: No     No Known Allergies  Outpatient Encounter Medications as of 2/11/2025   Medication Sig Dispense Refill    ibuprofen (MOTRIN) 200 MG Tab Take 400-600 mg by mouth every 6 hours as needed for Mild Pain.      LORazepam (ATIVAN) 2 MG tablet Take 2 mg by mouth at bedtime.      mirtazapine (REMERON) 7.5 MG tablet Take 1 Tablet by mouth at bedtime as needed (For sleep) for up to 90 days. 90 Tablet 0    buPROPion (WELLBUTRIN SR) 200 MG SR tablet Take 2 Tablets by mouth every day for 180 days. Indications: Major Depressive Disorder 180 Tablet 1    fluoxetine (PROZAC) 40 MG capsule Take 1 Capsule by mouth every day for 90 days. 30 Capsule 2    omeprazole (PRILOSEC) 20 MG delayed-release capsule Take 1 Capsule by mouth every day. 90 Capsule 0    atorvastatin (LIPITOR) 80 MG tablet TAKE ONE TABLET BY MOUTH EVERY DAY 90 Tablet 1    celecoxib (CELEBREX) 200 MG Cap Take 1 Capsule by mouth every day. 90 Capsule 2    ezetimibe (ZETIA) 10 MG Tab Take 1 Tablet by mouth every day. 90 Tablet 1    naltrexone (DEPADE) 50 MG Tab Take 100 mg by mouth 2 times a day.      NON SPECIFIED 1 Dose by Subcutaneous Infusion route see administration instructions. Ilumya injection every three months prescribed by dermatology      Magnesium 100 MG  "Cap Take 100 mg by mouth every evening.      ferrous sulfate 325 (65 Fe) MG tablet Take 325 mg by mouth every day.      B Complex Vitamins (VITAMIN B COMPLEX PO) Take 1 Tablet by mouth every day.      Cholecalciferol (VITAMIN D3 PO) Take 1 Tablet by mouth every day. OTC      aspirin EC (ECOTRIN) 81 MG Tablet Delayed Response Take 81 mg by mouth every bedtime. 30 Tab 0    Multiple Vitamins-Minerals (MELODY-DAY 1000 PO) Take 1 Tab by mouth 2 Times a Day.       No facility-administered encounter medications on file as of 2/11/2025.     Review of Systems   Constitutional:  Positive for malaise/fatigue.   HENT: Negative.     Eyes: Negative.    Respiratory:  Positive for shortness of breath.    Cardiovascular:  Negative for chest pain, palpitations, orthopnea, leg swelling and PND.   Gastrointestinal: Negative.    Genitourinary: Negative.    Musculoskeletal: Negative.    Skin: Negative.    Neurological: Negative.    Endo/Heme/Allergies: Negative.    Psychiatric/Behavioral:  Negative for depression. The patient is not nervous/anxious.               Objective     /66 (BP Location: Left arm, Patient Position: Sitting, BP Cuff Size: Adult)   Pulse 74   Resp 18   Ht 1.702 m (5' 7\")   Wt 83 kg (183 lb)   SpO2 94%   BMI 28.66 kg/m²     Physical Exam  Constitutional:       Appearance: Normal appearance.   HENT:      Head: Normocephalic.   Neck:      Vascular: No JVD.   Cardiovascular:      Rate and Rhythm: Normal rate and regular rhythm.      Pulses: Normal pulses.      Heart sounds: Normal heart sounds. No murmur heard.     No friction rub.      Comments: Groin site with moderate amount of bruising and small nodule  Pulmonary:      Effort: Pulmonary effort is normal.      Breath sounds: Normal breath sounds.   Abdominal:      Palpations: Abdomen is soft.   Musculoskeletal:         General: Normal range of motion.      Right lower leg: No edema.      Left lower leg: No edema.   Skin:     General: Skin is warm and dry. "   Neurological:      General: No focal deficit present.      Mental Status: He is alert and oriented to person, place, and time.   Psychiatric:         Mood and Affect: Mood normal.         Behavior: Behavior normal.            Lab Results   Component Value Date/Time    WBC 9.9 02/05/2025 01:48 AM    RBC 4.09 (L) 02/05/2025 01:48 AM    HEMOGLOBIN 13.1 (L) 02/05/2025 01:48 AM    HEMATOCRIT 39.0 (L) 02/05/2025 01:48 AM    MCV 95.4 02/05/2025 01:48 AM    MCH 32.0 02/05/2025 01:48 AM    MCHC 33.6 02/05/2025 01:48 AM    MPV 11.4 02/05/2025 01:48 AM    NEUTSPOLYS 60.20 02/03/2025 10:21 AM    LYMPHOCYTES 26.10 02/03/2025 10:21 AM    MONOCYTES 9.30 02/03/2025 10:21 AM    EOSINOPHILS 3.30 02/03/2025 10:21 AM    BASOPHILS 0.70 02/03/2025 10:21 AM      Lab Results   Component Value Date/Time    CHOLSTRLTOT 181 10/12/2024 11:00 AM    LDL 68 10/12/2024 11:00 AM    HDL 95 10/12/2024 11:00 AM    TRIGLYCERIDE 90 10/12/2024 11:00 AM       Lab Results   Component Value Date/Time    SODIUM 142 02/06/2025 04:42 PM    SODIUM 143 02/06/2025 04:42 PM    POTASSIUM 3.8 02/06/2025 04:42 PM    POTASSIUM 3.9 02/06/2025 04:42 PM    CHLORIDE 106 02/06/2025 04:42 PM    CHLORIDE 106 02/06/2025 04:42 PM    CO2 26 02/06/2025 04:42 PM    CO2 24 02/06/2025 04:42 PM    GLUCOSE 92 02/06/2025 04:42 PM    GLUCOSE 91 02/06/2025 04:42 PM    BUN 20 02/06/2025 04:42 PM    BUN 20 02/06/2025 04:42 PM    CREATININE 1.18 02/06/2025 04:42 PM    CREATININE 1.20 02/06/2025 04:42 PM     Lab Results   Component Value Date/Time    ALKPHOSPHAT 80 02/06/2025 04:42 PM    ALKPHOSPHAT 81 02/06/2025 04:42 PM    ASTSGOT 48 (H) 02/06/2025 04:42 PM    ASTSGOT 48 (H) 02/06/2025 04:42 PM    ALTSGPT 54 (H) 02/06/2025 04:42 PM    ALTSGPT 58 (H) 02/06/2025 04:42 PM    TBILIRUBIN 0.4 02/06/2025 04:42 PM    TBILIRUBIN 0.4 02/06/2025 04:42 PM      Angiogram 1/24/2025  CORONARY ANGIOGRAPHY:  The left main coronary artery has luminal disease and trifurcates into the left anterior  descending, ramus intermedius, and left circumflex coronary arteries.  The left anterior descending coronary artery is a moderate to large, transapical vessel with calcific proximal 30% disease followed by luminal irregularities.  It supplies a small first diagonal branch and a moderate second diagonal branch with luminal irregularities.  The ramus intermedius branch is a large vessel with luminal irregularities.  The left circumflex coronary artery is a large, nondominant vessel with luminal irregularities in the AV groove segment.  It supplies a small first obtuse marginal branch, a large second obtuse marginal branch, and a moderate posterolateral branch with luminal irregularities.  The right coronary artery is a large, dominant vessel with calcified proximal to mid 30% disease and focal, calcified distal 80% disease.  Distally, it bifurcates into a moderate posterior descending coronary and a small posterolateral branch with ostial 30% disease.    IMPRESSION:  Severe obstructive one-vessel coronary artery disease involving the distal right coronary.  Mildly dilated aortic root.  Normal left heart filling pressures.     RECOMMENDATIONS:  Recover in postprocedure unit.  TR band release per protocol.  Continue evaluation for mitral valve repair/replacement.  Focal distal right coronary artery disease is amenable to surgical or percutaneous revascularization.    Assessment & Plan     1. S/P mitral valve clip implantation        2. Coronary artery disease involving native coronary artery of native heart without angina pectoris        3. Essential hypertension        4. Familial hyperlipidemia            Medical Decision Making: Today's Assessment/Status/Plan:        S/P MitraCLip x 2  -doing well post-op  -cont asa lifelong  -groins CDI with moderate amount of bruising and small nodule   -SBE prophylaxis understands  -echo 1 month with structural heart follow up  -reviewed hospital imaging, labs, and EKG  -cardiac  rehab referral placed    Coronary Artery Disease  HTN, HLD  -Cholesterol is well-controlled on atorvastatin  -BP slightly elevated, restart lisinopril 10 mg daily  -Obstructive disease noted on pre-TAVR PCI, plan was for repeat angiogram and intervention post TAVR  The risks, benefits, and alternatives to coronary angiography with IV sedation were discussed in great detail. Specific risks mentioned include bleeding, infection, kidney damage, allergic reaction, cardiac perforation with possible tamponade requiring pericardiocentesis or possibly open heart surgery. In addition, we discussed that 10% of patients will experience small to moderate bruising at the site of the arterial puncture. Lastly, the risks of heart attack, stroke and death were discussed; the risk of major complications such as heart attack or stroke caused by the angiogram is approximately 1%; the risk of death is approximately 1 in 1000. The patient verbalized understanding of the potential complications and wishes to proceed with this procedure.    Follow up with echocardiogram on 3/7, YOGI Mo on 3/10    This note was dictated using Dragon speech recognition software.

## 2025-02-11 NOTE — TELEPHONE ENCOUNTER
----- Message from Nurse Practitioner JOSEPH Knowles sent at 2/11/2025  1:33 PM PST -----  Maryellen Khanna,    This patient had an angiogram pre-TAVR that showed one-vessel obstruction, now needs staged PCI.  Dr. Daniels did his initial angiogram.  If he is not available Dr. Caraballo would be a good choice as well

## 2025-02-11 NOTE — TELEPHONE ENCOUNTER
Schedule [facility] w/staged PCI with  or AK per Bryanna Luther. Emailed Dr. Frey to [facility] to have ADD .

## 2025-02-12 NOTE — TELEPHONE ENCOUNTER
Per patients request, patient is scheduled on 3-14-25 for a LHC w/PCI with . No meds to stop and patient to check in at 6:30 for an 8:30 procedure. Updated H&P to be done on admit by NP. Pre admit to call patient.

## 2025-02-13 ENCOUNTER — TELEPHONE (OUTPATIENT)
Dept: SLEEP MEDICINE | Facility: MEDICAL CENTER | Age: 75
End: 2025-02-13
Payer: MEDICARE

## 2025-02-13 NOTE — TELEPHONE ENCOUNTER
Patient called back and rescheduled to 2-26-25 for LHC w/PCI with . No meds to stop and patient to check in at 7:30 for an 9:30 procedure. H&P was done on 2-11-25 by Bryanna Luther. Pre admit to call patient.

## 2025-02-14 ENCOUNTER — OFFICE VISIT (OUTPATIENT)
Dept: SLEEP MEDICINE | Facility: MEDICAL CENTER | Age: 75
End: 2025-02-14
Attending: PHYSICIAN ASSISTANT
Payer: MEDICARE

## 2025-02-14 VITALS
SYSTOLIC BLOOD PRESSURE: 132 MMHG | DIASTOLIC BLOOD PRESSURE: 72 MMHG | WEIGHT: 185 LBS | OXYGEN SATURATION: 95 % | HEIGHT: 67 IN | RESPIRATION RATE: 14 BRPM | BODY MASS INDEX: 29.03 KG/M2 | HEART RATE: 72 BPM

## 2025-02-14 DIAGNOSIS — G47.39 COMPLEX SLEEP APNEA SYNDROME: ICD-10-CM

## 2025-02-14 PROCEDURE — 99213 OFFICE O/P EST LOW 20 MIN: CPT | Performed by: PHYSICIAN ASSISTANT

## 2025-02-14 PROCEDURE — 3078F DIAST BP <80 MM HG: CPT | Performed by: PHYSICIAN ASSISTANT

## 2025-02-14 PROCEDURE — 3075F SYST BP GE 130 - 139MM HG: CPT | Performed by: PHYSICIAN ASSISTANT

## 2025-02-14 ASSESSMENT — ENCOUNTER SYMPTOMS
DIZZINESS: 0
FEVER: 0
WEIGHT LOSS: 0
ORTHOPNEA: 0
WHEEZING: 0
TREMORS: 1
SORE THROAT: 0
CHILLS: 0
SPUTUM PRODUCTION: 0
ROS GI COMMENTS: NO DENTURES, NO MISSING TEETH OR SWALLOWING ISSUES
SINUS PAIN: 0
SHORTNESS OF BREATH: 1
COUGH: 0
INSOMNIA: 1
PALPITATIONS: 0
HEADACHES: 0
HEARTBURN: 0

## 2025-02-14 ASSESSMENT — FIBROSIS 4 INDEX: FIB4 SCORE: 3.22

## 2025-02-14 NOTE — PROGRESS NOTES
"Chief Complaint   Patient presents with    Apnea     Last Office Visit 1/30/24 with DANIEL Blunt.    PAP/O2/OAT: ASV @ max 25, EPAP15/6cm, PS15/2cm H20 nightly       HPI:  Buster Medina is a 74 y.o. year old male here today for follow-up on obstructive sleep apnea and central sleep apnea.  Patient last seen in clinic by YOGI Galeas on 1/30/2024.  Previously evaluated by Dr. Cyndy Mancuso on 2/9/2021.      Past Medical History: Sleep apnea, chronic insomnia, bladder cancer, recurrent depressive disorder, plantar fasciitis, GERD, BPH, vitamin D deficiency, history of gastric bypass, essential tremor, generalized anxiety, mitral valve clip implant, hypertension, tongue release.    Vitals:  /72 (BP Location: Left arm, Patient Position: Sitting, BP Cuff Size: Adult)   Pulse 72   Resp 14   Ht 1.702 m (5' 7\")   Wt 83.9 kg (185 lb)   SpO2 95% BMI of 28.98 kg/m².    Recent Imaging: Chest x-ray obtained 2/4/2025 demonstrated left lower lobe infiltrate with trace left pleural effusion.    Transesophageal echocardiogram obtained 2/4/2025 demonstrated normal left ventricular size and systolic function, LVEF estimated at 65%.  Normal right ventricular size and systolic function.  Left atrium appears enlarged, moderate mitral annular calcification with severe mitral regurgitation with P2 leaflet prolapse.  Moderate tricuspid regurgitation.  Testing obtained while mechanically ventilated.    Currently using  Soshowise RespirEnhanCV  Bi-PAP ASV @IPAP 25, EPAP 6-15, pressure support 2-15 cm H20 pressure; compliance reviewed for 12/16/2024 through 2/13/2025, days used 59/60, average daily usage 7 hours 12 minutes, 92 % of days greater than or equal to 4 hours, mask leak at 3 minutes 54 seconds, AHI 5.1 per hour.  See media for full report.    Device obtained 2/11/2019   DME provider Preferred  Mask interface nasal pillows     Polysomnogram obtained 12/18/2018 demonstrating findings consistent with " severe obstructive sleep apnea with overall AHI of 38.5 events per hour, low O2 sat of 87% with sats less than or equal to 88 for 0.5 minutes of recorded time.  Patient met split-night protocol and was trialed on CPAP BiPAP and ASV with recommendation for ASV therapy to adequately manage his complex sleep apnea at EPAP 5-15, pressure support 2-20 cm H2O pressure.    Dedicated titration study obtained 12/27/2018 demonstrated successful ASV therapy titration with recommendation for EPAP 5-15, pressure support 2-15 cm H2O pressure.    Sleep schedule goes to bed 930-10 PM, wakens 630-7:30 AM , and gets up during the night bathroom x 1   Symptoms denies day time somnolence and denies morning headache    Amory Sleepiness Scale reported as 4/24 on 12/6/2018      Review of Systems   Constitutional:  Negative for chills, fever, malaise/fatigue and weight loss.   HENT:  Positive for hearing loss (hearing aids) and tinnitus. Negative for congestion, nosebleeds, sinus pain and sore throat.    Respiratory:  Positive for shortness of breath (due to mitral valve issues). Negative for cough, sputum production and wheezing.    Cardiovascular:  Negative for chest pain, palpitations, orthopnea and leg swelling.        Stents pending    Gastrointestinal:  Negative for heartburn.        No dentures, no missing teeth or swallowing issues    Neurological:  Positive for tremors. Negative for dizziness and headaches.   Psychiatric/Behavioral:  The patient has insomnia (occasional).        Past Medical History:   Diagnosis Date    Anxiety     Anxiety, generalized 11/27/2023    Arthritis     osteo    Awareness under anesthesia     Bowel habit changes 06/24/2021    Constipation    Cancer (HCC) 2007    bladder    Cataract 12/2024    repair completed 01/07/2025    Chickenpox     as a child    Depression     depression    Glaucoma 12/2024    Heart valve disease 10/2024    Mitral regurgatation    High cholesterol     Hyperlipidemia      Hypertension     pt states  well controlled on meds    Kidney stone     Mumps     as a child     Obesity     Pneumothorax     Psoriatic arthritis (HCC)     Recurrent major depressive disorder, in partial remission (HCC) 09/21/2017    Restless leg syndrome     Sleep apnea     uses cpap    Tonsillitis        Past Surgical History:   Procedure Laterality Date    TRANSCATHETER MITRAL VALVE REPAIR Right 2/4/2025    Procedure: TRANSCATHETER MITRAL VALVE PROCEDURE;  Surgeon: Davi Caraballo M.D.;  Location: [facility];  Service: Cardiac    ECHOCARDIOGRAM, TRANSESOPHAGEAL, INTRAOPERATIVE N/A 2/4/2025    Procedure: ECHOCARDIOGRAM, TRANSESOPHAGEAL, INTRAOPERATIVE;  Surgeon: Davi Caraballo M.D.;  Location: [facility];  Service: Cardiac    PB INCISE FINGER TENDON SHEATH Left 09/19/2023    Procedure: LEFT INDEX FINGER TRIGGER RELEASE;  Surgeon: Td Soliman M.D.;  Location: [facility];  Service: Orthopedics    PB INCISE FINGER TENDON SHEATH Left 08/30/2022    Procedure: LEFT MIDDLE FINGER TRIGGER RELEASE;  Surgeon: Td Soliman M.D.;  Location: [facility];  Service: Orthopedics    LA CYSTOURETHROSCOPY,BIOPSIES N/A 08/17/2022    Procedure: CYSTOSCOPY;  Surgeon: Jerzy Jasso M.D.;  Location: [facility];  Service: Urology    LA CYSTOURETHROSCOPY,BIOPSIES  07/06/2021    Procedure: BIOPSY, BLADDER WITH FULGERATION;  Surgeon: Bob Millan M.D.;  Location: [facility];  Service: Urology    KNEE ARTHROPLASTY TOTAL Right 2021    BLADDER BIOPSY WITH CYSTOSCOPY  01/03/2018    Procedure: BLADDER BIOPSY WITH CYSTOSCOPY/WITH UPPER TRACT WASHING;  Surgeon: El Manuel M.D.;  Location: [facility];  Service: Urology    RETROGRADES Bilateral 01/03/2018    Procedure: RETROGRADES;  Surgeon: El Manuel M.D.;  Location: [facility];  Service: Urology    PYELOGRAM Bilateral 01/03/2018    Procedure: PYELOGRAM;  Surgeon:  "El Manuel M.D.;  Location: [facility];  Service: Urology    OTHER ORTHOPEDIC SURGERY  2015    shoulder replacement    ABDOMINAL EXPLORATION      ARTHROPLASTY      right shoulder    BLADDER BIOPSY WITH CYSTOSCOPY      EYE SURGERY      lasik     GASTRIC BYPASS LAPAROSCOPIC      HERNIA REPAIR      OPEN REDUCTION      OTHER Left     thumb joint    OTHER ABDOMINAL SURGERY  2002    gastric bypass    THORACOTOMY      TURP-VAPOR         Family History   Problem Relation Age of Onset    Cancer Mother 80        lung cancer    Arthritis Mother     Diabetes Mother         Type 2    Anxiety disorder Mother     Lung Disease Father 65    Cancer Father     Diabetes Father         Type 2    Hypertension Father     Hyperlipidemia Father     Hyperlipidemia Brother     Arthritis Brother     Other Brother         Thyroid tumor    Sleep Apnea Brother     No Known Problems Maternal Grandmother     Cancer Maternal Grandfather     No Known Problems Paternal Grandmother     No Known Problems Paternal Grandfather     Heart Disease Brother 49        smoker, overweight    Hypertension Brother     Depression Other     Suicide Attempts Neg Hx     Bipolar disorder Neg Hx     Alcohol abuse Neg Hx     Drug abuse Neg Hx        Social History     Socioeconomic History    Marital status: Single     Spouse name: Not on file    Number of children: Not on file    Years of education: Not on file    Highest education level: Master's degree (e.g., MA, MS, [place]ian[place], MEd, MSW, LUIS)   Occupational History    Not on file   Tobacco Use    Smoking status: Never    Smokeless tobacco: Never   Vaping Use    Vaping status: Never Used   Substance and Sexual Activity    Alcohol use: Yes     Alcohol/week: 9.6 oz     Types: 14 Glasses of wine, 2 Cans of beer per week     Comment: wine /daily    Drug use: Not Currently     Types: Inhaled     Comment: THC edible - \"once a month\".    Sexual activity: Not Currently   Other Topics Concern    Not on file   Social " History Narrative    Not on file     Social Drivers of Health     Financial Resource Strain: Low Risk  (4/3/2024)    Overall Financial Resource Strain (CARDIA)     Difficulty of Paying Living Expenses: Not hard at all   Food Insecurity: No Food Insecurity (4/3/2024)    Hunger Vital Sign     Worried About Running Out of Food in the Last Year: Never true     Ran Out of Food in the Last Year: Never true   Transportation Needs: No Transportation Needs (4/3/2024)    PRAPARE - Transportation     Lack of Transportation (Medical): No     Lack of Transportation (Non-Medical): No   Physical Activity: Insufficiently Active (4/3/2024)    Exercise Vital Sign     Days of Exercise per Week: 2 days     Minutes of Exercise per Session: 20 min   Stress: No Stress Concern Present (4/3/2024)    [place] citizen [place] of Occupational Health - Occupational Stress Questionnaire     Feeling of Stress : Not at all   Social Connections: Moderately Isolated (4/3/2024)    Social Connection and Isolation Panel [NHANES]     Frequency of Communication with Friends and Family: Twice a week     Frequency of Social Gatherings with Friends and Family: Once a week     Attends Gnosticism Services: Never     Active Member of Clubs or Organizations: No     Attends Club or Organization Meetings: Patient declined     Marital Status: Living with partner   Intimate Partner Violence: Not on file   Housing Stability: Unknown (4/3/2024)    Housing Stability Vital Sign     Unable to Pay for Housing in the Last Year: No     Number of Places Lived in the Last Year: Not on file     Unstable Housing in the Last Year: No       Allergies as of 02/14/2025    (No Known Allergies)          Current medications as of today   Current Outpatient Medications   Medication Sig Dispense Refill    lisinopril (PRINIVIL) 10 MG Tab Take 1 Tablet by mouth every day. 100 Tablet 3    LORazepam (ATIVAN) 2 MG tablet Take 2 mg by mouth at bedtime.      mirtazapine (REMERON) 7.5 MG tablet Take 1  Tablet by mouth at bedtime as needed (For sleep) for up to 90 days. 90 Tablet 0    buPROPion (WELLBUTRIN SR) 200 MG SR tablet Take 2 Tablets by mouth every day for 180 days. Indications: Major Depressive Disorder 180 Tablet 1    fluoxetine (PROZAC) 40 MG capsule Take 1 Capsule by mouth every day for 90 days. 30 Capsule 2    omeprazole (PRILOSEC) 20 MG delayed-release capsule Take 1 Capsule by mouth every day. 90 Capsule 0    atorvastatin (LIPITOR) 80 MG tablet TAKE ONE TABLET BY MOUTH EVERY DAY 90 Tablet 1    celecoxib (CELEBREX) 200 MG Cap Take 1 Capsule by mouth every day. 90 Capsule 2    ezetimibe (ZETIA) 10 MG Tab Take 1 Tablet by mouth every day. 90 Tablet 1    naltrexone (DEPADE) 50 MG Tab Take 100 mg by mouth 2 times a day.      NON SPECIFIED 1 Dose by Subcutaneous Infusion route see administration instructions. Ilumya injection every three months prescribed by dermatology      Magnesium 100 MG Cap Take 100 mg by mouth every evening.      ferrous sulfate 325 (65 Fe) MG tablet Take 325 mg by mouth every day.      B Complex Vitamins (VITAMIN B COMPLEX PO) Take 1 Tablet by mouth every day.      Cholecalciferol (VITAMIN D3 PO) Take 1 Tablet by mouth every day. OTC      aspirin EC (ECOTRIN) 81 MG Tablet Delayed Response Take 81 mg by mouth every bedtime. 30 Tab 0    Multiple Vitamins-Minerals (MELODY-DAY 1000 PO) Take 1 Tab by mouth 2 Times a Day.       No current facility-administered medications for this visit.         Physical Exam:   Gen:           Alert and oriented, No apparent distress. Mood and affect appropriate, normal interaction with examiner.   Hearing:     Grossly intact.  Nose:          Normal, no lesions or deformities.  Dentition:    fair dentition.   Oropharynx:   Tongue normal, posterior pharynx without erythema or exudate.  Mallampati Classification: II  Neck:        Supple, trachea midline, no masses.  Respiratory Effort: No intercostal retractions or use of accessory muscles.   Gait and  Station: Normal.  Digits and Nails: No clubbing, cyanosis, petechiae, or nodes.   Skin:        No rashes, lesions or ulcers noted.               Ext:           No cyanosis or edema.      Immunizations:  Flu: 9/3/2024  Pneumovax 23: 9/18/2020  Prevnar 13: 9/21/2017  Pfizer booster SARS-CoV-2 vaccine: 4/27/2023, 10/6/2022   Moderna SARS CoV2 Vaccine: 4/1/2022, 9/25/2021, 3/25/2021, 2/25/2021  COVID-19 mRNA vaccine: 9/3/2024, 6/6/2024, 10/2/2023    Assessment / Plan:  1. Complex sleep apnea syndrome  - DME Mask and Supplies    Reviewed compliance which is excellent, demonstrating use and benefit.  Send updated order for mask and supplies.  Patient was reminded to use distilled water only in humidifier chamber with fresh fill daily.  Occasional excess water in CPAP tubing.  Advised device should be 6 inches below mattress height, caution when filling not to overfill.  Use distilled water only in humidifier chamber.  Reviewed equipment cleaning as well as equipment replacement schedule.  Follow-up in 1 year, sooner if needed.      Follow-up:   Return in about 1 year (around 2/14/2026) for Return with Michelle Enamorado PA-C.    Please note that this dictation was created using voice recognition software. I have made every reasonable attempt to correct obvious errors, but it is possible there are errors of grammar and possibly content that I did not discover before finalizing the note.

## 2025-02-14 NOTE — PATIENT INSTRUCTIONS
1-reviewed compliance which is excellent  2-demonstrating use and benefit  3-send updated script for mask and supplies  4-As a reminder use distilled water only in humidifier chamber.  Fresh fill daily\  5-device should be 6 in below mattress height and do not overfill  6-Today we reviewed equipment cleaning  once weekly minimum  mask, tubing and water chamber  use dedicated container  use mild soap and water  SoClean or other ozone  are not recommended  white vinegar and water solution is no longer recommended  hang tubing to dry  mask sanitizing wipes are an option for use   7-Equipment replacement schedule : Nasal pillows 2 times per month, Head gear every 6 months, Tubing every 3 months, Ultra-fine filters 2 times per month, Foam filter every 6 months, Humidifier chamber every 6 months   8-follow up in one year, sooner if needed

## 2025-02-21 ENCOUNTER — DOCUMENTATION (OUTPATIENT)
Dept: CARDIOLOGY | Facility: MEDICAL CENTER | Age: 75
End: 2025-02-21
Payer: MEDICARE

## 2025-02-22 NOTE — PROGRESS NOTES
On behalf of [facility]'s Structural Heart Program, we would like to thank you for allowing us to participate in the care of your patient, Mr. Medina.     He underwent a successful mitral valve transcatheter [id number] repair (Mitral ALIDA) on Tuesday, February 4, 2025.     Your patient is scheduled to follow up with our Structural Heart Program at one month and one year post procedure.     Again, thank you for allowing us to participate in the care of your patient. If you have any questions, please do not hesitate to contact our Structural Heart team.     Sincerely,    [facility] Structural [facility] Team    ALLISON Martinez, RN, Structural Heart Program Nurse Coordinator ([phone])  ALLISON Espinoza, RN, Structural Heart Program Nurse Coordinator ([phone])

## 2025-02-24 ENCOUNTER — OFFICE VISIT (OUTPATIENT)
Dept: BEHAVIORAL HEALTH | Facility: CLINIC | Age: 75
End: 2025-02-24
Payer: MEDICARE

## 2025-02-24 DIAGNOSIS — F33.42 RECURRENT MAJOR DEPRESSIVE DISORDER, IN FULL REMISSION (HCC): ICD-10-CM

## 2025-02-24 DIAGNOSIS — Z86.59 HISTORY OF ANXIETY: ICD-10-CM

## 2025-02-24 RX ORDER — FLUOXETINE HYDROCHLORIDE 40 MG/1
40 CAPSULE ORAL DAILY
Qty: 30 CAPSULE | Refills: 2 | Status: SHIPPED | OUTPATIENT
Start: 2025-02-24 | End: 2025-05-25

## 2025-02-24 RX ORDER — BUPROPION HYDROCHLORIDE 200 MG/1
400 TABLET, EXTENDED RELEASE ORAL DAILY
Qty: 180 TABLET | Refills: 1 | Status: SHIPPED | OUTPATIENT
Start: 2025-02-24 | End: 2025-08-23

## 2025-02-24 RX ORDER — MIRTAZAPINE 7.5 MG/1
7.5 TABLET, FILM COATED ORAL NIGHTLY PRN
Qty: 90 TABLET | Refills: 0 | Status: SHIPPED | OUTPATIENT
Start: 2025-02-24 | End: 2025-05-25

## 2025-02-24 ASSESSMENT — LIFESTYLE VARIABLES: SUBSTANCE_ABUSE: 0

## 2025-02-24 ASSESSMENT — ENCOUNTER SYMPTOMS
FEVER: 0
HALLUCINATIONS: 0
FALLS: 0
BLOOD IN STOOL: 0
PALPITATIONS: 0
WHEEZING: 0
BLURRED VISION: 0
SEIZURES: 0

## 2025-02-24 NOTE — PROGRESS NOTES
"[facility] Outpatient Psychiatric Follow Up Note  Evaluation completed by: Buster Shetty M.D.   Date of Service: 02/24/2025  Appointment type: in-office appointment.  Attending:  Yuliana Guerra M.D.   Information below was collected from: Patient    HPI:   Buster Medina is a 74 y.o. old male who presents today for regularly scheduled follow up for assessment. He reports no suicidal thoughts. He reports he feels a little bit less enjoyment than usual from doing thing she would normally enjoy doing. He reports no feelings of anxiety. He does not feel depressed most of the time, but feels some sporadic depression. He describes his daily activities haven't changed since the last appointment when his dose of Prozac was lowered.     PSYCHIATRIC REVIEW OF SYSTEMS: current symptoms as reported by pt.  Sleep: Patient does not describe or suggest recent changes in sleep as a clinically relevant concern, reports it's been \"pretty good\" and that he's getting more rest and feels well-rested most of the day   Appetite: Patient does not describe or suggest recent changes in appetite as a clinically relevant concern, reports it's been \"too good\" and he's gaining weight but not stress eating.    Depression: Described above in the HPI  Anxiety/Panic Attacks: Described above in the HPI  Malathi: Patient does not communicate signs or symptoms indicative of current or past malathi, hypomania, or bipolar disorder.   Psychosis: Patient does not report signs or symptoms indicative of psychosis.   ADHD: Described above in the HPI    CURRENT MEDICATIONS    Current Outpatient Medications:     buPROPion (WELLBUTRIN SR) 200 MG SR tablet, Take 2 Tablets by mouth every day for 180 days. Indications: Major Depressive Disorder, Disp: 180 Tablet, Rfl: 1    fluoxetine (PROZAC) 40 MG capsule, Take 1 Capsule by mouth every day for 90 days., Disp: 30 Capsule, Rfl: 2    mirtazapine (REMERON) 7.5 MG tablet, Take 1 Tablet by mouth at bedtime as " needed (For sleep) for up to 90 days., Disp: 90 Tablet, Rfl: 0    lisinopril (PRINIVIL) 10 MG Tab, Take 1 Tablet by mouth every day., Disp: 100 Tablet, Rfl: 3    LORazepam (ATIVAN) 2 MG tablet, Take 2 mg by mouth at bedtime., Disp: , Rfl:     omeprazole (PRILOSEC) 20 MG delayed-release capsule, Take 1 Capsule by mouth every day., Disp: 90 Capsule, Rfl: 0    atorvastatin (LIPITOR) 80 MG tablet, TAKE ONE TABLET BY MOUTH EVERY DAY, Disp: 90 Tablet, Rfl: 1    celecoxib (CELEBREX) 200 MG Cap, Take 1 Capsule by mouth every day., Disp: 90 Capsule, Rfl: 2    ezetimibe (ZETIA) 10 MG Tab, Take 1 Tablet by mouth every day., Disp: 90 Tablet, Rfl: 1    naltrexone (DEPADE) 50 MG Tab, Take 100 mg by mouth 2 times a day., Disp: , Rfl:     NON SPECIFIED, 1 Dose by Subcutaneous Infusion route see administration instructions. Ilumya injection every three months prescribed by dermatology, Disp: , Rfl:     Magnesium 100 MG Cap, Take 100 mg by mouth every evening., Disp: , Rfl:     ferrous sulfate 325 (65 Fe) MG tablet, Take 325 mg by mouth every day., Disp: , Rfl:     B Complex Vitamins (VITAMIN B COMPLEX PO), Take 1 Tablet by mouth every day., Disp: , Rfl:     Cholecalciferol (VITAMIN D3 PO), Take 1 Tablet by mouth every day. OTC, Disp: , Rfl:     aspirin EC (ECOTRIN) 81 MG Tablet Delayed Response, Take 81 mg by mouth every bedtime., Disp: 30 Tab, Rfl: 0    Multiple Vitamins-Minerals (MELODY-DAY 1000 PO), Take 1 Tab by mouth 2 Times a Day., Disp: , Rfl:      REVIEW OF SYSTEMS   Review of Systems   Constitutional:  Negative for fever.   Eyes:  Negative for blurred vision.   Respiratory:  Negative for wheezing.    Cardiovascular:  Negative for chest pain and palpitations.   Gastrointestinal:  Negative for blood in stool.   Genitourinary:  Negative for hematuria.   Musculoskeletal:  Negative for falls.   Skin:  Negative for rash.   Neurological:  Negative for seizures.   Psychiatric/Behavioral:  Negative for hallucinations, substance abuse  and suicidal ideas.        PAST MEDICAL HISTORY  Past Medical History:   Diagnosis Date    Anxiety     Anxiety, generalized 11/27/2023    Arthritis     osteo    Awareness under anesthesia     Bowel habit changes 06/24/2021    Constipation    Cancer ([facility]) 2007    bladder    Cataract 12/2024    repair completed 01/07/2025    Chickenpox     as a child    Depression     depression    Glaucoma 12/2024    Heart valve disease 10/2024    Mitral regurgatation    High cholesterol     Hyperlipidemia     Hypertension     pt states  well controlled on meds    Kidney stone     Mumps     as a child     Obesity     Pneumothorax     Psoriatic arthritis ([facility])     Recurrent major depressive disorder, in partial remission ([facility]) 09/21/2017    Restless leg syndrome     Sleep apnea     uses cpap    Tonsillitis      No Known Allergies  Past Surgical History:   Procedure Laterality Date    TRANSCATHETER MITRAL VALVE REPAIR Right 2/4/2025    Procedure: TRANSCATHETER MITRAL VALVE PROCEDURE;  Surgeon: Davi Caraballo M.D.;  Location: [facility];  Service: Cardiac    ECHOCARDIOGRAM, TRANSESOPHAGEAL, INTRAOPERATIVE N/A 2/4/2025    Procedure: ECHOCARDIOGRAM, TRANSESOPHAGEAL, INTRAOPERATIVE;  Surgeon: Davi Caraballo M.D.;  Location: [facility];  Service: Cardiac    PB INCISE FINGER TENDON SHEATH Left 09/19/2023    Procedure: LEFT INDEX FINGER TRIGGER RELEASE;  Surgeon: Td Soliman M.D.;  Location: [facility];  Service: Orthopedics    PB INCISE FINGER TENDON SHEATH Left 08/30/2022    Procedure: LEFT MIDDLE FINGER TRIGGER RELEASE;  Surgeon: Td Soliman M.D.;  Location: [facility];  Service: Orthopedics    NH CYSTOURETHROSCOPY,BIOPSIES N/A 08/17/2022    Procedure: CYSTOSCOPY;  Surgeon: Jerzy Jasso M.D.;  Location: [facility];  Service: Urology    NH CYSTOURETHROSCOPY,BIOPSIES  07/06/2021    Procedure: BIOPSY, BLADDER WITH FULGERATION;  Surgeon: Bob Millan,  M.D.;  Location: [facility]ERY [facility];  Service: Urology    KNEE ARTHROPLASTY TOTAL Right 2021    BLADDER BIOPSY WITH CYSTOSCOPY  01/03/2018    Procedure: BLADDER BIOPSY WITH CYSTOSCOPY/WITH UPPER TRACT WASHING;  Surgeon: El Manuel M.D.;  Location: SURGERY [facility];  Service: Urology    RETROGRADES Bilateral 01/03/2018    Procedure: RETROGRADES;  Surgeon: El Manuel M.D.;  Location: SURGERY [facility];  Service: Urology    PYELOGRAM Bilateral 01/03/2018    Procedure: PYELOGRAM;  Surgeon: El Manuel M.D.;  Location: SURGERY [facility];  Service: Urology    OTHER ORTHOPEDIC SURGERY  2015    shoulder replacement    ABDOMINAL EXPLORATION      ARTHROPLASTY      right shoulder    BLADDER BIOPSY WITH CYSTOSCOPY      EYE SURGERY      lasik     GASTRIC BYPASS LAPAROSCOPIC      HERNIA REPAIR      OPEN REDUCTION      OTHER Left     thumb joint    OTHER ABDOMINAL SURGERY  2002    gastric bypass    THORACOTOMY      TURP-VAPOR        Family History   Problem Relation Age of Onset    Cancer Mother 80        lung cancer    Arthritis Mother     Diabetes Mother         Type 2    Anxiety disorder Mother     Lung Disease Father 65    Cancer Father     Diabetes Father         Type 2    Hypertension Father     Hyperlipidemia Father     Hyperlipidemia Brother     Arthritis Brother     Other Brother         Thyroid tumor    Sleep Apnea Brother     No Known Problems Maternal Grandmother     Cancer Maternal Grandfather     No Known Problems Paternal Grandmother     No Known Problems Paternal Grandfather     Heart Disease Brother 49        smoker, overweight    Hypertension Brother     Depression Other     Suicide Attempts Neg Hx     Bipolar disorder Neg Hx     Alcohol abuse Neg Hx     Drug abuse Neg Hx      Social History     Socioeconomic History    Marital status: Single    Highest education level: Master's degree (e.g., MA, MS, [place], MEd, MSW, LUIS)   Tobacco Use    Smoking status: Never    Smokeless tobacco:  "Never   Vaping Use    Vaping status: Never Used   Substance and Sexual Activity    Alcohol use: Yes     Alcohol/week: 9.6 oz     Types: 14 Glasses of wine, 2 Cans of beer per week     Comment: wine /daily    Drug use: Not Currently     Types: Inhaled     Comment: THC edible - \"once a month\".    Sexual activity: Not Currently     Social Drivers of Health     Financial Resource Strain: Low Risk  (4/3/2024)    Overall Financial Resource Strain (CARDIA)     Difficulty of Paying Living Expenses: Not hard at all   Food Insecurity: No Food Insecurity (4/3/2024)    Hunger Vital Sign     Worried About Running Out of Food in the Last Year: Never true     Ran Out of Food in the Last Year: Never true   Transportation Needs: No Transportation Needs (4/3/2024)    PRAPARE - Transportation     Lack of Transportation (Medical): No     Lack of Transportation (Non-Medical): No   Physical Activity: Insufficiently Active (4/3/2024)    Exercise Vital Sign     Days of Exercise per Week: 2 days     Minutes of Exercise per Session: 20 min   Stress: No Stress Concern Present (4/3/2024)    [employer] [place] [employer] - Occupational Stress Questionnaire     Feeling of Stress : Not at all   Social Connections: Moderately Isolated (4/3/2024)    Social Connection and Isolation Panel [NHANES]     Frequency of Communication with Friends and Family: Twice a week     Frequency of Social Gatherings with Friends and Family: Once a week     Attends Confucianism Services: Never     Active Member of Clubs or Organizations: No     Attends Club or Organization Meetings: Patient declined     Marital Status: Living with partner   Housing Stability: Unknown (4/3/2024)    Housing Stability Vital Sign     Unable to Pay for Housing in the Last Year: No     Unstable Housing in the Last Year: No     Past Surgical History:   Procedure Laterality Date    TRANSCATHETER MITRAL VALVE REPAIR Right 2/4/2025    Procedure: TRANSCATHETER MITRAL VALVE PROCEDURE;  " Surgeon: Davi Caraballo M.D.;  Location: [facility];  Service: Cardiac    ECHOCARDIOGRAM, TRANSESOPHAGEAL, INTRAOPERATIVE N/A 2/4/2025    Procedure: ECHOCARDIOGRAM, TRANSESOPHAGEAL, INTRAOPERATIVE;  Surgeon: Davi Caraballo M.D.;  Location: [facility];  Service: Cardiac    PB INCISE FINGER TENDON SHEATH Left 09/19/2023    Procedure: LEFT INDEX FINGER TRIGGER RELEASE;  Surgeon: Td Soliman M.D.;  Location: [facility];  Service: Orthopedics    PB INCISE FINGER TENDON SHEATH Left 08/30/2022    Procedure: LEFT MIDDLE FINGER TRIGGER RELEASE;  Surgeon: Td Soliman M.D.;  Location: [facility];  Service: Orthopedics    MO CYSTOURETHROSCOPY,BIOPSIES N/A 08/17/2022    Procedure: CYSTOSCOPY;  Surgeon: Jerzy Jasso M.D.;  Location: [facility];  Service: Urology    MO CYSTOURETHROSCOPY,BIOPSIES  07/06/2021    Procedure: BIOPSY, BLADDER WITH FULGERATION;  Surgeon: Bob Millan M.D.;  Location: [facility];  Service: Urology    KNEE ARTHROPLASTY TOTAL Right 2021    BLADDER BIOPSY WITH CYSTOSCOPY  01/03/2018    Procedure: BLADDER BIOPSY WITH CYSTOSCOPY/WITH UPPER TRACT WASHING;  Surgeon: El Manuel M.D.;  Location: [facility];  Service: Urology    RETROGRADES Bilateral 01/03/2018    Procedure: RETROGRADES;  Surgeon: El Manuel M.D.;  Location: [facility];  Service: Urology    PYELOGRAM Bilateral 01/03/2018    Procedure: PYELOGRAM;  Surgeon: El Manuel M.D.;  Location: [facility];  Service: Urology    OTHER ORTHOPEDIC SURGERY  2015    shoulder replacement    ABDOMINAL EXPLORATION      ARTHROPLASTY      right shoulder    BLADDER BIOPSY WITH CYSTOSCOPY      EYE SURGERY      lasik     GASTRIC BYPASS LAPAROSCOPIC      HERNIA REPAIR      OPEN REDUCTION      OTHER Left     thumb joint    OTHER ABDOMINAL SURGERY  2002    gastric bypass    THORACOTOMY      TURP-VAPOR         PSYCHIATRIC  "EXAMINATION   Musculoskeletal: No movement abnormalities noted during interview; grossly within normal limits   Appearance: Patient appeared calm and cooperative with interview   Thought Process: Grossly linear, logical, and goal-oriented   Abnormal or Psychotic Thoughts: No psychotic thoughts or communication consistent with psychosis noted during interview   Speech: Regular rate, rhythm, tone, and volume   Mood: \"ok\"   Affect: Grossly neutral and regular in keeping with typical expectations of conversation during clinical interview   SI/HI: Denies SI, no evidence of HI  Orientation: No gross evidence of disorientation; alert and conversational   Recent and Remote Memory: No gross evidence of abnormalities in recent or remote memory noted during interview  Attention Span and Concentration: Sufficient for interview  Insight/Judgement into symptoms: Grossly fair  Neurological Testing (MSSE Score and/or clock drawing): Not performed during this interview     SCREENINGS:      1/26/2024     2:00 PM 4/5/2024     9:20 AM 1/30/2025    10:00 AM   Depression Screen (PHQ-2/PHQ-9)   PHQ-2 Total Score 0 0 0          [place]  records  PDMP Reviewed. No evidence indicating misuse of a controlled substance noted.    CURRENT RISK ASSESSMENT:        Suicide: Low       Homicide: Low       Self-Harm: Low       Relapse: Not Applicable       Crisis Safety Plan Reviewed: Not Indicated     ASSESSMENT/DIAGNOSES/PLAN  Buster Medina is a 74 y.o. old male who presents today for regularly scheduled follow up for assessment. He appears to be feeling a bit down at times since his prozac dose was lowered to 40mg, although he does not appear to be in a current depressive episode. Continuation of current medications is merited at this time to allow for further time to adjust to lower dose and reduce the risk of long-term side effects.    Problem List Items Addressed This Visit       Recurrent major depressive disorder, in full remission (HCC)    " Relevant Medications    buPROPion (WELLBUTRIN SR) 200 MG SR tablet    fluoxetine (PROZAC) 40 MG capsule    mirtazapine (REMERON) 7.5 MG tablet     Other Visit Diagnoses         History of anxiety             - Continue Wellbutrin 200mg SR twice per day for prevention of depression and anxiety  - Continue Prozac 40mg per day for prevention of depression and anxiety  - Continue mirtzapine 7.5mg per night as needed for sleep    Medication options, alternatives (including no medications) and medication risks/benefits/side effects were discussed in detail.  The patient was advised to call, message clinician on [employer], or come in to the clinic if symptoms worsen or if questions/issues regarding their medications arise.  The patient verbalized understanding and agreement.    The patient was educated to call 911, call the suicide hotline, or go to the local ER if having thoughts of suicide or homicide.  The patient verbalized understanding and agreement.   The proposed treatment plan was discussed with the patient who was provided the opportunity to ask questions and make suggestions regarding alternative treatment. Patient verbalized understanding and expressed agreement with the plan.      Follow up in approx. 1 month    This appointment was supervised by attending psychiatrist, Yuliana Guerra M.D. , who agrees with assessment and treatment plan.  See attending attestation for more details.

## 2025-02-25 ENCOUNTER — PRE-ADMISSION TESTING (OUTPATIENT)
Dept: ADMISSIONS | Facility: MEDICAL CENTER | Age: 75
End: 2025-02-25
Payer: MEDICARE

## 2025-02-26 ENCOUNTER — PATIENT MESSAGE (OUTPATIENT)
Dept: MEDICAL GROUP | Age: 75
End: 2025-02-26

## 2025-02-26 ENCOUNTER — HOSPITAL ENCOUNTER (OUTPATIENT)
Facility: MEDICAL CENTER | Age: 75
End: 2025-02-27
Attending: INTERNAL MEDICINE | Admitting: INTERNAL MEDICINE
Payer: MEDICARE

## 2025-02-26 ENCOUNTER — APPOINTMENT (OUTPATIENT)
Dept: CARDIOLOGY | Facility: MEDICAL CENTER | Age: 75
End: 2025-02-26
Payer: MEDICARE

## 2025-02-26 ENCOUNTER — APPOINTMENT (OUTPATIENT)
Dept: CARDIOLOGY | Facility: MEDICAL CENTER | Age: 75
End: 2025-02-26
Attending: INTERNAL MEDICINE
Payer: MEDICARE

## 2025-02-26 DIAGNOSIS — E78.5 DYSLIPIDEMIA: ICD-10-CM

## 2025-02-26 DIAGNOSIS — I10 ESSENTIAL HYPERTENSION: ICD-10-CM

## 2025-02-26 DIAGNOSIS — I25.10 CORONARY ARTERY DISEASE INVOLVING NATIVE CORONARY ARTERY OF NATIVE HEART WITHOUT ANGINA PECTORIS: ICD-10-CM

## 2025-02-26 DIAGNOSIS — Z95.5 HISTORY OF CORONARY ARTERY STENT PLACEMENT: ICD-10-CM

## 2025-02-26 DIAGNOSIS — B00.9 HERPES: ICD-10-CM

## 2025-02-26 DIAGNOSIS — E78.49 FAMILIAL HYPERLIPIDEMIA: ICD-10-CM

## 2025-02-26 PROBLEM — I20.9 ANGINA PECTORIS (HCC): Status: ACTIVE | Noted: 2025-02-26

## 2025-02-26 LAB
ACT BLD: 268 SEC (ref 74–137)
ACT BLD: 285 SEC (ref 74–137)
ACT BLD: 297 SEC (ref 74–137)
ACT BLD: 337 SEC (ref 74–137)
ALBUMIN SERPL BCP-MCNC: 4.1 G/DL (ref 3.2–4.9)
ALBUMIN/GLOB SERPL: 1.5 G/DL
ALP SERPL-CCNC: 81 U/L (ref 30–99)
ALT SERPL-CCNC: 44 U/L (ref 2–50)
ANION GAP SERPL CALC-SCNC: 12 MMOL/L (ref 7–16)
APTT PPP: 24.3 SEC (ref 24.7–36)
AST SERPL-CCNC: 35 U/L (ref 12–45)
BILIRUB SERPL-MCNC: 0.4 MG/DL (ref 0.1–1.5)
BUN SERPL-MCNC: 19 MG/DL (ref 8–22)
CALCIUM ALBUM COR SERPL-MCNC: 9.2 MG/DL (ref 8.5–10.5)
CALCIUM SERPL-MCNC: 9.3 MG/DL (ref 8.5–10.5)
CHLORIDE SERPL-SCNC: 104 MMOL/L (ref 96–112)
CO2 SERPL-SCNC: 23 MMOL/L (ref 20–33)
CREAT SERPL-MCNC: 1.28 MG/DL (ref 0.5–1.4)
EKG IMPRESSION: NORMAL
EKG IMPRESSION: NORMAL
ERYTHROCYTE [DISTWIDTH] IN BLOOD BY AUTOMATED COUNT: 42.6 FL (ref 35.9–50)
GFR SERPLBLD CREATININE-BSD FMLA CKD-EPI: 59 ML/MIN/1.73 M 2
GLOBULIN SER CALC-MCNC: 2.7 G/DL (ref 1.9–3.5)
GLUCOSE SERPL-MCNC: 110 MG/DL (ref 65–99)
HCT VFR BLD AUTO: 43.6 % (ref 42–52)
HGB BLD-MCNC: 14.7 G/DL (ref 14–18)
INR PPP: 1.01 (ref 0.87–1.13)
MCH RBC QN AUTO: 31.8 PG (ref 27–33)
MCHC RBC AUTO-ENTMCNC: 33.7 G/DL (ref 32.3–36.5)
MCV RBC AUTO: 94.4 FL (ref 81.4–97.8)
PLATELET # BLD AUTO: 146 K/UL (ref 164–446)
PMV BLD AUTO: 11.6 FL (ref 9–12.9)
POTASSIUM SERPL-SCNC: 4.1 MMOL/L (ref 3.6–5.5)
PROT SERPL-MCNC: 6.8 G/DL (ref 6–8.2)
PROTHROMBIN TIME: 13.3 SEC (ref 12–14.6)
RBC # BLD AUTO: 4.62 M/UL (ref 4.7–6.1)
SODIUM SERPL-SCNC: 139 MMOL/L (ref 135–145)
WBC # BLD AUTO: 6.3 K/UL (ref 4.8–10.8)

## 2025-02-26 PROCEDURE — 85347 COAGULATION TIME ACTIVATED: CPT | Mod: 91

## 2025-02-26 PROCEDURE — 160015 HCHG STAT PREOP MINUTES

## 2025-02-26 PROCEDURE — 80053 COMPREHEN METABOLIC PANEL: CPT

## 2025-02-26 PROCEDURE — 99152 MOD SED SAME PHYS/QHP 5/>YRS: CPT | Performed by: INTERNAL MEDICINE

## 2025-02-26 PROCEDURE — 85610 PROTHROMBIN TIME: CPT

## 2025-02-26 PROCEDURE — 93325 DOPPLER ECHO COLOR FLOW MAPG: CPT | Mod: 26 | Performed by: INTERNAL MEDICINE

## 2025-02-26 PROCEDURE — 75894 X-RAYS TRANSCATH THERAPY: CPT | Mod: 26 | Performed by: INTERNAL MEDICINE

## 2025-02-26 PROCEDURE — A9270 NON-COVERED ITEM OR SERVICE: HCPCS | Performed by: INTERNAL MEDICINE

## 2025-02-26 PROCEDURE — 93321 DOPPLER ECHO F-UP/LMTD STD: CPT | Mod: 26 | Performed by: INTERNAL MEDICINE

## 2025-02-26 PROCEDURE — 92978 ENDOLUMINL IVUS OCT C 1ST: CPT | Mod: 26,RC | Performed by: INTERNAL MEDICINE

## 2025-02-26 PROCEDURE — 93005 ELECTROCARDIOGRAM TRACING: CPT | Mod: TC | Performed by: INTERNAL MEDICINE

## 2025-02-26 PROCEDURE — 700102 HCHG RX REV CODE 250 W/ 637 OVERRIDE(OP): Performed by: INTERNAL MEDICINE

## 2025-02-26 PROCEDURE — C1887 CATHETER, GUIDING: HCPCS

## 2025-02-26 PROCEDURE — 93452 LEFT HRT CATH W/VENTRCLGRPHY: CPT | Mod: 26 | Performed by: INTERNAL MEDICINE

## 2025-02-26 PROCEDURE — 85027 COMPLETE CBC AUTOMATED: CPT

## 2025-02-26 PROCEDURE — 93308 TTE F-UP OR LMTD: CPT | Mod: 26,59 | Performed by: INTERNAL MEDICINE

## 2025-02-26 PROCEDURE — 700102 HCHG RX REV CODE 250 W/ 637 OVERRIDE(OP): Performed by: NURSE PRACTITIONER

## 2025-02-26 PROCEDURE — 160002 HCHG RECOVERY MINUTES (STAT)

## 2025-02-26 PROCEDURE — 93010 ELECTROCARDIOGRAM REPORT: CPT | Mod: 59 | Performed by: INTERNAL MEDICINE

## 2025-02-26 PROCEDURE — 93308 TTE F-UP OR LMTD: CPT

## 2025-02-26 PROCEDURE — 700111 HCHG RX REV CODE 636 W/ 250 OVERRIDE (IP)

## 2025-02-26 PROCEDURE — G0378 HOSPITAL OBSERVATION PER HR: HCPCS

## 2025-02-26 PROCEDURE — 93325 DOPPLER ECHO COLOR FLOW MAPG: CPT

## 2025-02-26 PROCEDURE — 93308 TTE F-UP OR LMTD: CPT | Mod: 26 | Performed by: INTERNAL MEDICINE

## 2025-02-26 PROCEDURE — A9270 NON-COVERED ITEM OR SERVICE: HCPCS | Performed by: NURSE PRACTITIONER

## 2025-02-26 PROCEDURE — 92972 PERQ TRLUML CORONRY LITHOTRP: CPT | Mod: RC | Performed by: INTERNAL MEDICINE

## 2025-02-26 PROCEDURE — 700101 HCHG RX REV CODE 250

## 2025-02-26 PROCEDURE — 700105 HCHG RX REV CODE 258: Performed by: INTERNAL MEDICINE

## 2025-02-26 PROCEDURE — 700117 HCHG RX CONTRAST REV CODE 255: Performed by: INTERNAL MEDICINE

## 2025-02-26 PROCEDURE — 85730 THROMBOPLASTIN TIME PARTIAL: CPT

## 2025-02-26 PROCEDURE — RXMED WILLOW AMBULATORY MEDICATION CHARGE

## 2025-02-26 PROCEDURE — 92933 PRQ TRLML C ATHRC ST ANGIOP1: CPT | Mod: RC | Performed by: INTERNAL MEDICINE

## 2025-02-26 PROCEDURE — 160035 HCHG PACU - 1ST 60 MINS PHASE I

## 2025-02-26 RX ORDER — BUPROPION HYDROCHLORIDE 100 MG/1
400 TABLET, EXTENDED RELEASE ORAL EVERY MORNING
Status: DISCONTINUED | OUTPATIENT
Start: 2025-02-26 | End: 2025-02-27 | Stop reason: HOSPADM

## 2025-02-26 RX ORDER — MIDAZOLAM HYDROCHLORIDE 1 MG/ML
INJECTION INTRAMUSCULAR; INTRAVENOUS
Status: COMPLETED
Start: 2025-02-26 | End: 2025-02-26

## 2025-02-26 RX ORDER — LIDOCAINE HYDROCHLORIDE 20 MG/ML
INJECTION, SOLUTION INFILTRATION; PERINEURAL
Status: COMPLETED
Start: 2025-02-26 | End: 2025-02-26

## 2025-02-26 RX ORDER — DIPHENHYDRAMINE HYDROCHLORIDE 50 MG/ML
INJECTION, SOLUTION INTRAMUSCULAR; INTRAVENOUS
Status: COMPLETED
Start: 2025-02-26 | End: 2025-02-26

## 2025-02-26 RX ORDER — SODIUM CHLORIDE 9 MG/ML
5 INJECTION, SOLUTION INTRAVENOUS CONTINUOUS
Status: ACTIVE | OUTPATIENT
Start: 2025-02-26 | End: 2025-02-26

## 2025-02-26 RX ORDER — CLOPIDOGREL 300 MG/1
600 TABLET, FILM COATED ORAL ONCE
Status: COMPLETED | OUTPATIENT
Start: 2025-02-26 | End: 2025-02-26

## 2025-02-26 RX ORDER — VALACYCLOVIR HYDROCHLORIDE 1 G/1
TABLET, FILM COATED ORAL
Qty: 24 TABLET | Refills: 5 | Status: SHIPPED | OUTPATIENT
Start: 2025-02-26 | End: 2025-02-27

## 2025-02-26 RX ORDER — CLOPIDOGREL BISULFATE 75 MG/1
75 TABLET ORAL DAILY
Qty: 90 TABLET | Refills: 3 | Status: SHIPPED | OUTPATIENT
Start: 2025-02-27

## 2025-02-26 RX ORDER — EPINEPHRINE 0.1 MG/ML
SYRINGE (ML) INJECTION
Status: COMPLETED
Start: 2025-02-26 | End: 2025-02-26

## 2025-02-26 RX ORDER — NOREPINEPHRINE BITARTRATE 0.03 MG/ML
INJECTION, SOLUTION INTRAVENOUS
Status: COMPLETED
Start: 2025-02-26 | End: 2025-02-26

## 2025-02-26 RX ORDER — ASPIRIN 81 MG/1
81 TABLET ORAL DAILY
Status: DISCONTINUED | OUTPATIENT
Start: 2025-02-26 | End: 2025-02-27 | Stop reason: HOSPADM

## 2025-02-26 RX ORDER — HEPARIN SODIUM 200 [USP'U]/100ML
INJECTION, SOLUTION INTRAVENOUS
Status: COMPLETED
Start: 2025-02-26 | End: 2025-02-26

## 2025-02-26 RX ORDER — PHENYLEPHRINE HCL IN 0.9% NACL 1 MG/10 ML
SYRINGE (ML) INTRAVENOUS
Status: COMPLETED
Start: 2025-02-26 | End: 2025-02-26

## 2025-02-26 RX ORDER — VERAPAMIL HYDROCHLORIDE 2.5 MG/ML
INJECTION, SOLUTION INTRAVENOUS
Status: COMPLETED
Start: 2025-02-26 | End: 2025-02-26

## 2025-02-26 RX ORDER — ATORVASTATIN CALCIUM 20 MG/1
80 TABLET, FILM COATED ORAL
Status: DISCONTINUED | OUTPATIENT
Start: 2025-02-26 | End: 2025-02-27 | Stop reason: HOSPADM

## 2025-02-26 RX ORDER — LORAZEPAM 1 MG/1
2 TABLET ORAL
Status: DISCONTINUED | OUTPATIENT
Start: 2025-02-26 | End: 2025-02-26

## 2025-02-26 RX ORDER — LISINOPRIL 20 MG/1
10 TABLET ORAL
Status: DISCONTINUED | OUTPATIENT
Start: 2025-02-26 | End: 2025-02-27 | Stop reason: HOSPADM

## 2025-02-26 RX ORDER — AMOXICILLIN 250 MG
2 CAPSULE ORAL EVERY EVENING
Status: DISCONTINUED | OUTPATIENT
Start: 2025-02-26 | End: 2025-02-27 | Stop reason: HOSPADM

## 2025-02-26 RX ORDER — CLOPIDOGREL BISULFATE 75 MG/1
75 TABLET ORAL DAILY
Status: DISCONTINUED | OUTPATIENT
Start: 2025-02-27 | End: 2025-02-27 | Stop reason: HOSPADM

## 2025-02-26 RX ORDER — FERROUS SULFATE 325(65) MG
325 TABLET ORAL DAILY
Status: DISCONTINUED | OUTPATIENT
Start: 2025-02-26 | End: 2025-02-27 | Stop reason: HOSPADM

## 2025-02-26 RX ORDER — HEPARIN SODIUM 1000 [USP'U]/ML
INJECTION, SOLUTION INTRAVENOUS; SUBCUTANEOUS
Status: COMPLETED
Start: 2025-02-26 | End: 2025-02-26

## 2025-02-26 RX ORDER — MIDAZOLAM HYDROCHLORIDE 1 MG/ML
1 INJECTION INTRAMUSCULAR; INTRAVENOUS ONCE
Status: COMPLETED | OUTPATIENT
Start: 2025-02-26 | End: 2025-02-26

## 2025-02-26 RX ORDER — AMINOPHYLLINE 25 MG/ML
INJECTION, SOLUTION INTRAVENOUS
Status: COMPLETED
Start: 2025-02-26 | End: 2025-02-26

## 2025-02-26 RX ORDER — HYDRALAZINE HYDROCHLORIDE 20 MG/ML
10 INJECTION INTRAMUSCULAR; INTRAVENOUS EVERY 4 HOURS PRN
Status: DISCONTINUED | OUTPATIENT
Start: 2025-02-26 | End: 2025-02-27 | Stop reason: HOSPADM

## 2025-02-26 RX ORDER — ACETAMINOPHEN 325 MG/1
650 TABLET ORAL EVERY 6 HOURS PRN
Status: DISCONTINUED | OUTPATIENT
Start: 2025-02-26 | End: 2025-02-27 | Stop reason: HOSPADM

## 2025-02-26 RX ORDER — PROTAMINE SULFATE 10 MG/ML
INJECTION, SOLUTION INTRAVENOUS
Status: COMPLETED
Start: 2025-02-26 | End: 2025-02-26

## 2025-02-26 RX ORDER — POLYETHYLENE GLYCOL 3350 17 G/17G
1 POWDER, FOR SOLUTION ORAL
Status: DISCONTINUED | OUTPATIENT
Start: 2025-02-26 | End: 2025-02-27 | Stop reason: HOSPADM

## 2025-02-26 RX ORDER — EZETIMIBE 10 MG/1
10 TABLET ORAL
Status: DISCONTINUED | OUTPATIENT
Start: 2025-02-26 | End: 2025-02-27 | Stop reason: HOSPADM

## 2025-02-26 RX ORDER — MIRTAZAPINE 7.5 MG/1
7.5 TABLET, FILM COATED ORAL NIGHTLY PRN
Status: DISCONTINUED | OUTPATIENT
Start: 2025-02-26 | End: 2025-02-27 | Stop reason: HOSPADM

## 2025-02-26 RX ADMIN — FENTANYL CITRATE 100 MCG: 50 INJECTION, SOLUTION INTRAMUSCULAR; INTRAVENOUS at 11:39

## 2025-02-26 RX ADMIN — FENTANYL CITRATE 100 MCG: 50 INJECTION, SOLUTION INTRAMUSCULAR; INTRAVENOUS at 10:53

## 2025-02-26 RX ADMIN — MIDAZOLAM HYDROCHLORIDE 2 MG: 1 INJECTION, SOLUTION INTRAMUSCULAR; INTRAVENOUS at 11:34

## 2025-02-26 RX ADMIN — MIDAZOLAM HYDROCHLORIDE 2 MG: 1 INJECTION, SOLUTION INTRAMUSCULAR; INTRAVENOUS at 12:15

## 2025-02-26 RX ADMIN — HEPARIN SODIUM 7000 UNITS: 1000 INJECTION, SOLUTION INTRAVENOUS; SUBCUTANEOUS at 10:08

## 2025-02-26 RX ADMIN — FENTANYL CITRATE 100 MCG: 50 INJECTION, SOLUTION INTRAMUSCULAR; INTRAVENOUS at 11:09

## 2025-02-26 RX ADMIN — FERROUS SULFATE TAB 325 MG (65 MG ELEMENTAL FE) 325 MG: 325 (65 FE) TAB at 14:43

## 2025-02-26 RX ADMIN — VERAPAMIL HYDROCHLORIDE 2.5 MG: 2.5 INJECTION, SOLUTION INTRAVENOUS at 10:08

## 2025-02-26 RX ADMIN — NITROGLYCERIN 10 ML: 20 INJECTION INTRAVENOUS at 10:08

## 2025-02-26 RX ADMIN — HEPARIN SODIUM 2000 UNITS: 1000 INJECTION, SOLUTION INTRAVENOUS; SUBCUTANEOUS at 10:44

## 2025-02-26 RX ADMIN — AMINOPHYLLINE 250 MG: 25 INJECTION, SOLUTION INTRAVENOUS at 10:50

## 2025-02-26 RX ADMIN — FENTANYL CITRATE 100 MCG: 50 INJECTION, SOLUTION INTRAMUSCULAR; INTRAVENOUS at 12:09

## 2025-02-26 RX ADMIN — MIDAZOLAM HYDROCHLORIDE 2 MG: 1 INJECTION, SOLUTION INTRAMUSCULAR; INTRAVENOUS at 10:17

## 2025-02-26 RX ADMIN — MIDAZOLAM HYDROCHLORIDE 2 MG: 1 INJECTION, SOLUTION INTRAMUSCULAR; INTRAVENOUS at 11:54

## 2025-02-26 RX ADMIN — HEPARIN SODIUM 2000 UNITS: 200 INJECTION, SOLUTION INTRAVENOUS at 09:49

## 2025-02-26 RX ADMIN — ATORVASTATIN CALCIUM 80 MG: 20 TABLET, FILM COATED ORAL at 22:51

## 2025-02-26 RX ADMIN — FENTANYL CITRATE 100 MCG: 50 INJECTION, SOLUTION INTRAMUSCULAR; INTRAVENOUS at 10:04

## 2025-02-26 RX ADMIN — MIDAZOLAM HYDROCHLORIDE 1 MG: 1 INJECTION, SOLUTION INTRAMUSCULAR; INTRAVENOUS at 12:31

## 2025-02-26 RX ADMIN — FENTANYL CITRATE 100 MCG: 50 INJECTION, SOLUTION INTRAMUSCULAR; INTRAVENOUS at 12:07

## 2025-02-26 RX ADMIN — HEPARIN SODIUM 2000 UNITS: 1000 INJECTION, SOLUTION INTRAVENOUS; SUBCUTANEOUS at 11:01

## 2025-02-26 RX ADMIN — HEPARIN SODIUM 1000 UNITS: 1000 INJECTION, SOLUTION INTRAVENOUS; SUBCUTANEOUS at 10:21

## 2025-02-26 RX ADMIN — MIDAZOLAM HYDROCHLORIDE 2 MG: 1 INJECTION, SOLUTION INTRAMUSCULAR; INTRAVENOUS at 11:42

## 2025-02-26 RX ADMIN — IOHEXOL 150 ML: 350 INJECTION, SOLUTION INTRAVENOUS at 12:20

## 2025-02-26 RX ADMIN — LIDOCAINE HYDROCHLORIDE: 20 INJECTION, SOLUTION INFILTRATION; PERINEURAL at 09:49

## 2025-02-26 RX ADMIN — EZETIMIBE 10 MG: 10 TABLET ORAL at 22:51

## 2025-02-26 RX ADMIN — MIDAZOLAM HYDROCHLORIDE 2 MG: 1 INJECTION, SOLUTION INTRAMUSCULAR; INTRAVENOUS at 09:59

## 2025-02-26 RX ADMIN — CLOPIDOGREL BISULFATE 600 MG: 300 TABLET, FILM COATED ORAL at 09:08

## 2025-02-26 RX ADMIN — NITROGLYCERIN 20 ML: 20 INJECTION INTRAVENOUS at 10:49

## 2025-02-26 RX ADMIN — SODIUM CHLORIDE 5 ML/KG/HR: 9 INJECTION, SOLUTION INTRAVENOUS at 13:13

## 2025-02-26 RX ADMIN — ACETAMINOPHEN 650 MG: 325 TABLET ORAL at 14:43

## 2025-02-26 RX ADMIN — ASPIRIN 81 MG: 81 TABLET, COATED ORAL at 13:15

## 2025-02-26 RX ADMIN — BUPROPION HYDROCHLORIDE 400 MG: 100 TABLET, FILM COATED, EXTENDED RELEASE ORAL at 14:43

## 2025-02-26 RX ADMIN — LISINOPRIL 10 MG: 20 TABLET ORAL at 22:51

## 2025-02-26 RX ADMIN — DIPHENHYDRAMINE HYDROCHLORIDE 25 MG: 50 INJECTION, SOLUTION INTRAMUSCULAR; INTRAVENOUS at 12:09

## 2025-02-26 RX ADMIN — MIRTAZAPINE 7.5 MG: 7.5 TABLET, FILM COATED ORAL at 22:51

## 2025-02-26 RX ADMIN — FLUOXETINE HYDROCHLORIDE 40 MG: 20 CAPSULE ORAL at 14:44

## 2025-02-26 RX ADMIN — MIDAZOLAM HYDROCHLORIDE 1 MG: 1 INJECTION INTRAMUSCULAR; INTRAVENOUS at 12:31

## 2025-02-26 RX ADMIN — HEPARIN SODIUM 10000 UNITS: 1000 INJECTION, SOLUTION INTRAVENOUS; SUBCUTANEOUS at 10:50

## 2025-02-26 RX ADMIN — VERAPAMIL HYDROCHLORIDE 5 MG: 2.5 INJECTION, SOLUTION INTRAVENOUS at 10:49

## 2025-02-26 RX ADMIN — PROTAMINE SULFATE 50 MG: 10 INJECTION, SOLUTION INTRAVENOUS at 12:20

## 2025-02-26 ASSESSMENT — COGNITIVE AND FUNCTIONAL STATUS - GENERAL
SUGGESTED CMS G CODE MODIFIER DAILY ACTIVITY: CH
MOBILITY SCORE: 24
DAILY ACTIVITIY SCORE: 24
SUGGESTED CMS G CODE MODIFIER MOBILITY: CH

## 2025-02-26 ASSESSMENT — LIFESTYLE VARIABLES
CONSUMPTION TOTAL: NEGATIVE
TOTAL SCORE: 0
HAVE YOU EVER FELT YOU SHOULD CUT DOWN ON YOUR DRINKING: NO
AVERAGE NUMBER OF DAYS PER WEEK YOU HAVE A DRINK CONTAINING ALCOHOL: 7
EVER FELT BAD OR GUILTY ABOUT YOUR DRINKING: NO
HAVE PEOPLE ANNOYED YOU BY CRITICIZING YOUR DRINKING: NO
EVER HAD A DRINK FIRST THING IN THE MORNING TO STEADY YOUR NERVES TO GET RID OF A HANGOVER: NO
ON A TYPICAL DAY WHEN YOU DRINK ALCOHOL HOW MANY DRINKS DO YOU HAVE: 2
TOTAL SCORE: 0
TOTAL SCORE: 0
HOW MANY TIMES IN THE PAST YEAR HAVE YOU HAD 5 OR MORE DRINKS IN A DAY: 0
DOES PATIENT WANT TO STOP DRINKING: NO
ALCOHOL_USE: YES

## 2025-02-26 ASSESSMENT — PATIENT HEALTH QUESTIONNAIRE - PHQ9
SUM OF ALL RESPONSES TO PHQ9 QUESTIONS 1 AND 2: 0
1. LITTLE INTEREST OR PLEASURE IN DOING THINGS: NOT AT ALL
2. FEELING DOWN, DEPRESSED, IRRITABLE, OR HOPELESS: NOT AT ALL

## 2025-02-26 ASSESSMENT — FIBROSIS 4 INDEX
FIB4 SCORE: 3.22
FIB4 SCORE: 2.638224265055431698

## 2025-02-26 ASSESSMENT — SOCIAL DETERMINANTS OF HEALTH (SDOH)
IN THE PAST 12 MONTHS, HAS THE ELECTRIC, GAS, OIL, OR WATER COMPANY THREATENED TO SHUT OFF SERVICE IN YOUR HOME?: NO
WITHIN THE PAST 12 MONTHS, YOU WORRIED THAT YOUR FOOD WOULD RUN OUT BEFORE YOU GOT THE MONEY TO BUY MORE: NEVER TRUE
WITHIN THE PAST 12 MONTHS, THE FOOD YOU BOUGHT JUST DIDN'T LAST AND YOU DIDN'T HAVE MONEY TO GET MORE: NEVER TRUE
WITHIN THE LAST YEAR, HAVE TO BEEN RAPED OR FORCED TO HAVE ANY KIND OF SEXUAL ACTIVITY BY YOUR PARTNER OR EX-PARTNER?: NO
WITHIN THE LAST YEAR, HAVE YOU BEEN KICKED, HIT, SLAPPED, OR OTHERWISE PHYSICALLY HURT BY YOUR PARTNER OR EX-PARTNER?: NO
WITHIN THE LAST YEAR, HAVE YOU BEEN AFRAID OF YOUR PARTNER OR EX-PARTNER?: NO
WITHIN THE LAST YEAR, HAVE YOU BEEN HUMILIATED OR EMOTIONALLY ABUSED IN OTHER WAYS BY YOUR PARTNER OR EX-PARTNER?: NO

## 2025-02-26 ASSESSMENT — PAIN DESCRIPTION - PAIN TYPE: TYPE: ACUTE PAIN

## 2025-02-26 NOTE — OR NURSING
1238 Patient arrived to PACU from cath lab.  Report from RN.  Patient is awake.  TR band to right wrist is CDI soft, patient denies numbness or tingling to right hand.  Educated on wrist precautions.  Patient updated on plan of care.    1245 patient up to bathroom, steady on feet.      1255 Dr. Dnaiels at bedside.    1257 s/o called and updated on patient status.    1303 ECHO at bedside.     1330 EKG at bedside    1340 APRN at bedside, ECHO reviewed.     1350 2 ml air removed from TR band, no bleeding to site    1404 Report called to Wyatt PAULA.      1410 Patient transferred to t725, VSS, TR band no bleeding to site. All belongings with patient. .

## 2025-02-26 NOTE — PROGRESS NOTES
4 Eyes Skin Assessment Completed by CHAI Schneider and CHAI Traylor.    Head WDL  Ears WDL  Nose WDL  Mouth WDL  Neck WDL  Breast/Chest WDL  Shoulder Blades WDL  Spine WDL  (R) Arm/Elbow/Hand radial incision  (L) Arm/Elbow/Hand WDL  Abdomen WDL  Groin WDL  Scrotum/Coccyx/Buttocks Discoloration  (R) Leg discoloration  (L) Leg discoloration  (R) Heel/Foot/Toe WDL  (L) Heel/Foot/Toe WDL          Devices In Places Tele Box and Pulse Ox      Interventions In Place Heel Float Boots, Pillows, and Pressure Redistribution Mattress    Possible Skin Injury No    Pictures Uploaded Into Epic N/A  Wound Consult Placed N/A  RN Wound Prevention Protocol Ordered No

## 2025-02-26 NOTE — CARE PLAN
Problem: Knowledge Deficit - Standard  Goal: Patient and family/care givers will demonstrate understanding of plan of care, disease process/condition, diagnostic tests and medications  Outcome: Progressing   Pt educated on POC, all questions answered in regards to disease process, treatment and diet. Pt verbalized understanding and voiced no further questions at this time.    Problem: Fall Risk  Goal: Patient will remain free from falls  Outcome: Progressing  Fall risk assessed, fall precautions in place, bed alarm on, pt verbalizes understanding of fall risk.     The patient is Stable - Low risk of patient condition declining or worsening    Shift Goals  Clinical Goals: education, safety, VSS  Patient Goals: rest

## 2025-02-26 NOTE — PROCEDURES
Cardiac Catheterization Procedure Note    DATE: 2/26/2025    : Ariel Daniels MD    PROCEDURES PERFORMED:  Left heart catheterization  Limited coronary angiography  Intravascular ultrasound of the right coronary artery  Rotational atherectomy, intracoronary lithotripsy, and percutaneous coronary intervention to the severely calcified distal right coronary artery  Coil embolization of distal wire perforation  Moderate conscious sedation    INDICATIONS:  The patient is a 74-year-old gentleman who presents for planned staged percutaneous coronary intervention to the known severely calcified distal right coronary artery with persistent symptoms of limiting dyspnea on exertion.    CONSENT:  The complete alternatives, risks, and benefits of the procedure were explained to the patient.  We specifically discussed that percutaneous coronary intervention for lesion was primarily for improvement in symptoms and not a mortality benefit.  His signed informed consent was obtained and placed in the chart prior to the procedure.  A timeout was performed prior to beginning the procedure.    MEDICATIONS:  Lidocaine  Fentanyl  Midazolam  Nitroglycerin  Verapamil  Heparin  Protamine    MODERATE CONSCIOUS SEDATION:  I personally supervised the administration of moderate conscious sedation by the nursing staff for 152 minutes.  Sedation start time: 9:40 AM  Sedation end time: 12:20 PM    CONTRAST: Omnipaque 150 cc    ACCESS: 7-Polish Glidesheath in the right radial artery.    ESTIMATED BLOOD LOSS: 100 cc    COMPLICATIONS: Small distal wire perforation in the upper limb of a bifid posterior descending coronary artery managed with coil embolization.    PROCEDURE IN DETAIL:  The patient was brought to the cardiac catheterization laboratory in the fasting state.  The skin over the right wrist was prepped and draped in the usual sterile fashion. Lidocaine infiltration was used to anesthetize the tissue over the right radial artery.   Using the micropuncture technique, a 7-[place] Glidesheath was inserted in the right radial artery.  A 7-[place] AL-0.75 guide catheter was then advanced over a standard J-wire into the left ventricular cavity where it was gently aspirated, flushed, and then withdrawn across the aortic valve with sequential pressures measured.  This catheter was then used to engage the ostium of the right coronary artery and planning cineangiograms were obtained.    A Run-through wire was advanced across the lesion in the distal RCA and was placed in a the upper branch of a bifid PDA.  Initial predilation was then performed with a 2.5 x 15 mm compliant balloon without adequate lesion yield.  We attempted to evaluate the lesion by IVUS, however, the IVUS catheter could not cross the severely calcified lesion.  Proximal to the lesion, there was mild eccentric plaque with a luminal diameter of approximately 3.5 mm.  Further predilation was then performed using a 3.0 x 15 mm noncompliant balloon, also without adequate lesion yield.  Therefore, we attempted intracoronary lithotripsy, however, a 3.5 mm Shockwave balloon could not cross the lesion.  A 3.0 mm B[place]the cutting balloon also could not cross the lesion even with guide catheter extension support.  Therefore, we proceeded with atherectomy.    A Rotafloppy wire was advanced into the PDA branch and the Run-through wire was removed.  Rotational atherectomy was then performed using a 1.5 mm bur platformed at 150,000 RPM with an excellent result.  Despite atherectomy, adequate lesion yield was not achieved using the 3.0 x 15 mm noncompliant balloon.  Therefore, intracoronary lithotripsy was performed using the 3.5 mm shockwave balloon with a delivery of 60 pulses in total, which resulted in complete lesion yield.  A 3.5 x 28 mm Synergy drug-eluting stent was then deployed across the lesion at a maximum pressure of 14 fabián.  The stent was evaluated by IVUS, which demonstrated that the  stent edges had landed in the intended zones and achieved a luminal diameter of approximate 3.9 mm.  The stent was then postdilated using a 3.75 x 15 mm noncompliant balloon at a maximum pressure of 20 fabián.    Following stent postdilation, we were completing final cineangiograms when it was noted that there appeared to be a distal wire perforation in the PDA branch.  The patient was hemodynamically stable and asymptomatic.  Balloon occlusion was then performed using a 3.0 x 12 mm compliant balloon.  However, after 15 minutes of balloon occlusion, the distal wire perforation had not sealed.  An echocardiogram was obtained which demonstrated a small amount of posterior fluid not amenable to pericardiocentesis.  Given that balloon occlusion had not sealed the lesion and there was evidence of fluid accumulation, we proceeded with coil embolization.  We were unable to deliver a microcatheter with the occlusion balloon in place, which had to be removed as to the guide extension catheter.  We were then able to deliver a Brittany LP 2.0 mm x 1 cm coil, which did not significantly reduce the degree of extravasation.  A 3 cm Packing Coil LP was then delivered, which did reduce the degree of extravasation, although there was some persistent leakage.  It was felt that this leakage was minimal and that removing interventional gear and reversing heparin would result in coil thrombosis and sealing of the perforation.  Therefore, 50 mg of protamine was administered and all interventional gear was removed.  A TR band was placed for hemostasis and the patient was taken to the recovery area in stable condition.    HEMODYNAMICS:   Aortic pressure: 85/52 mmHg  Pre A-wave pressure: 7 mmHg  No significant aortic gradient on pullback    LIMITED CORONARY ANGIOGRAPHY:  Limited coronary artery redemonstrated a focal, severe, heavily calcified lesion in the distal right coronary artery.    INTRAVASCULAR ULTRASOUND:  See IVUS findings in procedure  details above.    PERCUTANEOUS CORONARY INTERVENTION:  Lesion location: Distal right coronary artery  Lesion type: C  Lesion length: 10 mm  Pre-intervention JUVENAL flow: 3  Post-intervention JUVENAL flow: 3  Pre-intervention stenosis: 80%  Post-intervention stenosis: 0%  Stent: 3.5 x 28 mm Synergy drug-eluting stent    IMPRESSION:  Successful rotational atherectomy, intracoronary lithotripsy, and percutaneous coronary intervention to the severely calcified distal right coronary artery with one 3.5 x 28 mm Synergy drug-eluting stent.  Small distal wire perforation resulting in a small, nonhemodynamically significant pericardial effusion managed with coil embolization.  Normal left heart filling pressures.    RECOMMENDATIONS:  Recover in postprocedure unit.  Repeat limited echocardiogram if patient arrives in post procedure unit to evaluate for rapid accumulation of pericardial fluid.  TR band release per protocol.  Post procedure fluids for renal protection.  Continue dual antiplatelet therapy with aspirin and clopidogrel.  Continue optimal medical management of cardiovascular risk factors.  Observation overnight in the setting of small wire perforation.  Referral to cardiac rehabilitation on discharge.    NOTIFICATION:  The patient's family was called and notified of the results of his cardiac catheterization.

## 2025-02-26 NOTE — PROGRESS NOTES
Report received from CHAI Ibarra, assumed care of pt. Patient transported to room with all belongings. Pt A&Ox4. Plan of care discussed with pt, labs and chart reviewed. All needs met at this time. Tele box on. On room air. Call light within reach, bed locked and in lowest position. All fall precautions and hourly rounding in place. Care is ongoing.

## 2025-02-27 ENCOUNTER — OFFICE VISIT (OUTPATIENT)
Dept: MEDICAL GROUP | Age: 75
End: 2025-02-27
Payer: MEDICARE

## 2025-02-27 ENCOUNTER — PHARMACY VISIT (OUTPATIENT)
Dept: PHARMACY | Facility: MEDICAL CENTER | Age: 75
End: 2025-02-27
Payer: COMMERCIAL

## 2025-02-27 ENCOUNTER — HOSPITAL ENCOUNTER (OUTPATIENT)
Dept: LAB | Facility: MEDICAL CENTER | Age: 75
End: 2025-02-27
Attending: INTERNAL MEDICINE | Admitting: INTERNAL MEDICINE
Payer: MEDICARE

## 2025-02-27 VITALS
HEIGHT: 67 IN | WEIGHT: 185 LBS | TEMPERATURE: 97.8 F | BODY MASS INDEX: 29.03 KG/M2 | DIASTOLIC BLOOD PRESSURE: 70 MMHG | HEART RATE: 72 BPM | SYSTOLIC BLOOD PRESSURE: 120 MMHG | OXYGEN SATURATION: 98 %

## 2025-02-27 VITALS
HEIGHT: 67 IN | TEMPERATURE: 97.7 F | WEIGHT: 184.75 LBS | SYSTOLIC BLOOD PRESSURE: 126 MMHG | HEART RATE: 64 BPM | RESPIRATION RATE: 17 BRPM | BODY MASS INDEX: 29 KG/M2 | OXYGEN SATURATION: 97 % | DIASTOLIC BLOOD PRESSURE: 80 MMHG

## 2025-02-27 DIAGNOSIS — Z09 HOSPITAL DISCHARGE FOLLOW-UP: ICD-10-CM

## 2025-02-27 DIAGNOSIS — I10 ESSENTIAL HYPERTENSION: ICD-10-CM

## 2025-02-27 DIAGNOSIS — I25.10 CORONARY ARTERY DISEASE INVOLVING NATIVE CORONARY ARTERY OF NATIVE HEART WITHOUT ANGINA PECTORIS: ICD-10-CM

## 2025-02-27 LAB
ANION GAP SERPL CALC-SCNC: 11 MMOL/L (ref 7–16)
ANION GAP SERPL CALC-SCNC: 11 MMOL/L (ref 7–16)
BUN SERPL-MCNC: 11 MG/DL (ref 8–22)
BUN SERPL-MCNC: 12 MG/DL (ref 8–22)
CALCIUM SERPL-MCNC: 9 MG/DL (ref 8.5–10.5)
CALCIUM SERPL-MCNC: 9.4 MG/DL (ref 8.5–10.5)
CHLORIDE SERPL-SCNC: 104 MMOL/L (ref 96–112)
CHLORIDE SERPL-SCNC: 107 MMOL/L (ref 96–112)
CO2 SERPL-SCNC: 22 MMOL/L (ref 20–33)
CO2 SERPL-SCNC: 23 MMOL/L (ref 20–33)
CREAT SERPL-MCNC: 1.16 MG/DL (ref 0.5–1.4)
CREAT SERPL-MCNC: 1.21 MG/DL (ref 0.5–1.4)
ERYTHROCYTE [DISTWIDTH] IN BLOOD BY AUTOMATED COUNT: 44.1 FL (ref 35.9–50)
GFR SERPLBLD CREATININE-BSD FMLA CKD-EPI: 63 ML/MIN/1.73 M 2
GFR SERPLBLD CREATININE-BSD FMLA CKD-EPI: 66 ML/MIN/1.73 M 2
GLUCOSE SERPL-MCNC: 93 MG/DL (ref 65–99)
GLUCOSE SERPL-MCNC: 97 MG/DL (ref 65–99)
HCT VFR BLD AUTO: 38.8 % (ref 42–52)
HGB BLD-MCNC: 12.5 G/DL (ref 14–18)
MCH RBC QN AUTO: 31.1 PG (ref 27–33)
MCHC RBC AUTO-ENTMCNC: 32.2 G/DL (ref 32.3–36.5)
MCV RBC AUTO: 96.5 FL (ref 81.4–97.8)
PLATELET # BLD AUTO: 117 K/UL (ref 164–446)
PMV BLD AUTO: 11.2 FL (ref 9–12.9)
POTASSIUM SERPL-SCNC: 4.2 MMOL/L (ref 3.6–5.5)
POTASSIUM SERPL-SCNC: 4.2 MMOL/L (ref 3.6–5.5)
RBC # BLD AUTO: 4.02 M/UL (ref 4.7–6.1)
SODIUM SERPL-SCNC: 138 MMOL/L (ref 135–145)
SODIUM SERPL-SCNC: 140 MMOL/L (ref 135–145)
WBC # BLD AUTO: 7.5 K/UL (ref 4.8–10.8)

## 2025-02-27 PROCEDURE — 80048 BASIC METABOLIC PNL TOTAL CA: CPT | Mod: 91

## 2025-02-27 PROCEDURE — G0378 HOSPITAL OBSERVATION PER HR: HCPCS

## 2025-02-27 PROCEDURE — 80048 BASIC METABOLIC PNL TOTAL CA: CPT

## 2025-02-27 PROCEDURE — A9270 NON-COVERED ITEM OR SERVICE: HCPCS | Performed by: INTERNAL MEDICINE

## 2025-02-27 PROCEDURE — 36415 COLL VENOUS BLD VENIPUNCTURE: CPT

## 2025-02-27 PROCEDURE — 85027 COMPLETE CBC AUTOMATED: CPT

## 2025-02-27 PROCEDURE — 700102 HCHG RX REV CODE 250 W/ 637 OVERRIDE(OP): Performed by: NURSE PRACTITIONER

## 2025-02-27 PROCEDURE — 700102 HCHG RX REV CODE 250 W/ 637 OVERRIDE(OP): Performed by: INTERNAL MEDICINE

## 2025-02-27 PROCEDURE — A9270 NON-COVERED ITEM OR SERVICE: HCPCS | Performed by: NURSE PRACTITIONER

## 2025-02-27 RX ORDER — VALACYCLOVIR HYDROCHLORIDE 1 G/1
TABLET, FILM COATED ORAL
Qty: 24 TABLET | Refills: 5 | Status: ACTIVE
Start: 2025-02-27

## 2025-02-27 RX ORDER — LISINOPRIL 10 MG/1
10 TABLET ORAL EVERY EVENING
Qty: 90 TABLET | Refills: 1 | Status: SHIPPED | OUTPATIENT
Start: 2025-02-27

## 2025-02-27 RX ADMIN — CLOPIDOGREL BISULFATE 75 MG: 75 TABLET ORAL at 06:05

## 2025-02-27 RX ADMIN — FLUOXETINE HYDROCHLORIDE 40 MG: 20 CAPSULE ORAL at 06:05

## 2025-02-27 RX ADMIN — FERROUS SULFATE TAB 325 MG (65 MG ELEMENTAL FE) 325 MG: 325 (65 FE) TAB at 06:05

## 2025-02-27 RX ADMIN — BUPROPION HYDROCHLORIDE 400 MG: 100 TABLET, FILM COATED, EXTENDED RELEASE ORAL at 06:05

## 2025-02-27 RX ADMIN — ASPIRIN 81 MG: 81 TABLET, COATED ORAL at 06:05

## 2025-02-27 ASSESSMENT — FIBROSIS 4 INDEX: FIB4 SCORE: 3.29

## 2025-02-27 NOTE — PROGRESS NOTES
Report received from night shift RN, assumed care of pt. Pt A&Ox4. Plan of care discussed with pt, labs and chart reviewed. All needs met at this time. Tele box on. On room air. Call light within reach, bed locked and in lowest position. All fall precautions and hourly rounding in place.Care is ongoing.

## 2025-02-27 NOTE — CARE PLAN
The patient is Stable - Low risk of patient condition declining or worsening    Shift Goals  Clinical Goals: safety, VSS, education  Patient Goals: rest, comfort  Family Goals: becky      Problem: Pain - Standard  Goal: Alleviation of pain or a reduction in pain to the patient’s comfort goal  Outcome: Progressing     Problem: Knowledge Deficit - Standard  Goal: Patient and family/care givers will demonstrate understanding of plan of care, disease process/condition, diagnostic tests and medications  Outcome: Progressing     Problem: Fall Risk  Goal: Patient will remain free from falls  Outcome: Progressing

## 2025-02-27 NOTE — PROGRESS NOTES
Handoff report received from day shift nurse and pt care assumed. Pt is currently resting in bed, A&Ox4 with no complaints of pain, on RA, and VSS. Tele box on and monitors notified. Call light and belongings within reach, bed in lowest and locked position, fall precautions in place. Pt educated on use of call light. Hourly rounding in place.

## 2025-02-27 NOTE — PROGRESS NOTES
This medical record contains text that has been entered with the assistance of computer voice recognition and dictation software.  Therefore, it may contain unintended errors in text, spelling, punctuation, or grammar      Verbal consent was acquired by the patient to use [employer] ambient listening note generation during this visit Yes       Chief Complaint   Patient presents with    Transitional Care Management Hospital Follow-up     POST OP STENT         Buster Medina is a 74 y.o. male here evaluation and management of: Hospital follow-up      HPI:           1. Hospital discharge follow-up      2. Coronary artery disease involving native coronary artery of native heart without angina pectoris  History of Present Illness  The patient is a 74-year-old male who presents for evaluation status post mitral valve regurgitation repair and stent placement.    He underwent a mitral valve regurgitation repair procedure, during which a blockage was identified, necessitating right heart catheterization and 1 stent placement.. Prior to the procedure, he was asymptomatic, with the exception of symptoms related to the mitral valve. Post-procedure, he reports no improvement in his breathing, a condition that was anticipated to resolve within 2 to 4 months as per his surgeon's advice. He has been advised to discontinue Celebrex, a medication he has been on for 25 years for the management of osteoarthritis and psoriatic arthritis. He reports no pain at the surgical site.    SOCIAL HISTORY  He does not smoke cigarettes.            Current medicines (including changes today)  Current Outpatient Medications   Medication Sig Dispense Refill    lisinopril (PRINIVIL) 10 MG Tab Take 1 Tablet by mouth every evening. 90 Tablet 1    valacyclovir (VALTREX) 1 GM Tab TAKE 1 TABLET TWICE A DAY  FOR 7 DAYS AS NEEDED FOR   OUTBREAK 24 Tablet 5    clopidogrel (PLAVIX) 75 MG Tab Take 1 Tablet by mouth every day. 90 Tablet 3    buPROPion  (WELLBUTRIN SR) 200 MG SR tablet Take 2 Tablets by mouth every day for 180 days. Indications: Major Depressive Disorder 180 Tablet 1    fluoxetine (PROZAC) 40 MG capsule Take 1 Capsule by mouth every day for 90 days. 30 Capsule 2    mirtazapine (REMERON) 7.5 MG tablet Take 1 Tablet by mouth at bedtime as needed (For sleep) for up to 90 days. 90 Tablet 0    LORazepam (ATIVAN) 2 MG tablet Take 2 mg by mouth at bedtime.      omeprazole (PRILOSEC) 20 MG delayed-release capsule Take 1 Capsule by mouth every day. 90 Capsule 0    atorvastatin (LIPITOR) 80 MG tablet TAKE ONE TABLET BY MOUTH EVERY DAY 90 Tablet 1    ezetimibe (ZETIA) 10 MG Tab Take 1 Tablet by mouth every day. 90 Tablet 1    NON SPECIFIED 1 Dose by Subcutaneous Infusion route see administration instructions. Ilumya injection every three months prescribed by dermatology      Magnesium 100 MG Cap Take 100 mg by mouth every evening.      ferrous sulfate 325 (65 Fe) MG tablet Take 325 mg by mouth every day.      B Complex Vitamins (VITAMIN B COMPLEX PO) Take 1 Tablet by mouth every day.      Cholecalciferol (VITAMIN D3 PO) Take 1 Tablet by mouth every day. OTC      aspirin EC (ECOTRIN) 81 MG Tablet Delayed Response Take 81 mg by mouth every bedtime. 30 Tab 0    Multiple Vitamins-Minerals (MELODY-DAY 1000 PO) Take 1 Tab by mouth 2 Times a Day.       No current facility-administered medications for this visit.     He  has a past medical history of Anxiety, Anxiety, generalized (11/27/2023), Arthritis, Awareness under anesthesia, Bowel habit changes (06/24/2021), Cancer (Formerly [facility]) (2007), Cataract (12/2024), Chickenpox, Depression, Glaucoma (12/2024), Heart valve disease (10/2024), High cholesterol, Hyperlipidemia, Hypertension, Kidney stone, Mumps, Obesity, Pneumothorax, Psoriatic arthritis (Formerly [facility]), Recurrent major depressive disorder, in partial remission ([facility]ly [facility]) (09/21/2017), Restless leg syndrome, Sleep apnea, and Tonsillitis.  He  has a past surgical history that includes  "bladder biopsy with cystoscopy; abdominal exploration; gastric bypass laparoscopic; eye surgery; hernia repair; arthroplasty; open reduction; other orthopedic surgery (2015); bladder biopsy with cystoscopy (01/03/2018); retrogrades (Bilateral, 01/03/2018); pyelogram (Bilateral, 01/03/2018); thoracotomy; other (Left); turp-vapor; pr cystourethroscopy,biopsies (07/06/2021); pr cystourethroscopy,biopsies (N/A, 08/17/2022); pb incise finger tendon sheath (Left, 08/30/2022); pb incise finger tendon sheath (Left, 09/19/2023); knee arthroplasty total (Right, 2021); other abdominal surgery (2002); transcatheter mitral valve repair (Right, 2/4/2025); and echocardiogram, transesophageal, intraoperative (N/A, 2/4/2025).  Social History     Tobacco Use    Smoking status: Never    Smokeless tobacco: Never   Vaping Use    Vaping status: Never Used   Substance Use Topics    Alcohol use: Yes     Alcohol/week: 9.6 oz     Types: 14 Glasses of wine, 2 Cans of beer per week     Comment: wine /daily    Drug use: Not Currently     Types: Inhaled     Comment: THC edible - \"once a month\".     Social History     Social History Narrative    Not on file     Family History   Problem Relation Age of Onset    Cancer Mother 80        lung cancer    Arthritis Mother     Diabetes Mother         Type 2    Anxiety disorder Mother     Lung Disease Father 65    Cancer Father     Diabetes Father         Type 2    Hypertension Father     Hyperlipidemia Father     Hyperlipidemia Brother     Arthritis Brother     Other Brother         Thyroid tumor    Sleep Apnea Brother     No Known Problems Maternal Grandmother     Cancer Maternal Grandfather     No Known Problems Paternal Grandmother     No Known Problems Paternal Grandfather     Heart Disease Brother 49        smoker, overweight    Hypertension Brother     Depression Other     Suicide Attempts Neg Hx     Bipolar disorder Neg Hx     Alcohol abuse Neg Hx     Drug abuse Neg Hx      Family Status " "  Relation Name Status    Vance Barriga     Jonnie Hogan     Nitin Epps Alive    MGMo      MGJonnie Harmon     PGMo      PGFkisha      Nitin Lozano Alive    OTHER  (Not Specified)    Neg Hx  (Not Specified)   No partnership data on file         ROS    The pertinent  ROS findings can be seen in the HPI above.     All other systems reviewed and are negative     Objective:     /70 (BP Location: Left arm, Patient Position: Sitting, BP Cuff Size: Adult)   Pulse 72   Temp 36.6 °C (97.8 °F) (Temporal)   Ht 1.702 m (5' 7\")   Wt 83.9 kg (185 lb)   SpO2 98%  Body mass index is 28.98 kg/m².      Physical Exam:    Constitutional: Alert, no distress.  Skin: No suspicious lesions  Eye: Equal, round and reactive, conjunctiva clear, lids normal.  ENMT: Lips without lesions, good dentition, oropharynx clear.  Neck: Trachea midline, no masses, no thyromegaly. No cervical or supraclavicular lymphadenopathy.  Respiratory: Unlabored respiratory effort, lungs clear to auscultation, no wheezes, no ronchi.  Cardiovascular: Normal S1, S2, no murmur, no edema  Abdomen: Soft, non-tender, no masses, no hepatosplenomegaly.        Assessment and Plan:   The following treatment plan was discussed    All recent labs and provider notes reviewed    Assessment & Plan  1. Status post mitral valve regurgitation repair and stent placement.  His blood pressure readings are within the normal range, which is crucial for his current health status. He has been advised to discontinue Celebrex due to its potential to cause bleeding when combined with Plavix and aspirin. For pain management, Tylenol is recommended, with the option of narcotic medications if necessary in the future. He is encouraged to maintain regular exercise, avoid fatty and fried foods, and abstain from smoking. He is currently on a regimen of Plavix, aspirin, atorvastatin, ACE-inhibitor, and Zetia, which he should continue taking as " prescribed.    PROCEDURE  The patient underwent a mitral valve regurgitation repair procedure with stent placement.        1. Hospital discharge follow-up    2. Coronary artery disease involving native coronary artery of native heart without angina pectoris             Instructed to Follow up in clinic or ER for worsening symptoms, difficulty breathing, lack of expected recovery, or should new symptoms or problems arise.    Followup: Return in about 6 months (around 8/27/2025) for Reevaluation, labs.

## 2025-02-27 NOTE — PROGRESS NOTES
Monitor Summary   SR 56-66  Ectopy: (R) PAC, (R) PVC, (R) Big, (R) Coup  .17/.07/.40

## 2025-02-27 NOTE — DISCHARGE SUMMARY
Discharge Summary      CHIEF COMPLAINT ON ADMISSION  Post Elective Cardiac Catheterization     CODE STATUS  Full    HPI & HOSPITAL COURSE    Buster Medina is 74 y.o. male is a patient of Dr. Caraballo with significant history of MR, status post MitraClip x 2 XT W and XT clip on 2/4/2025, HLD, CAD with RCA disease, HTN, ARNULFO (not treating), and anxiety/depression . They are here for elective cardiac catheterization for the obstructive disease noted on pre-TAVR PCI.    He underwent the rotational atherectomy, intracoronary lithotripsy, and percutaneous coronary intervention to the severely calcified distal right coronary artery with one 3.5 x 28 mm LULA. Small distal wire perforation resulted in a small, nonhemodynamically significant pericardial effusion managed with coil embolization.  Post intervention, echocardiogram x 2 revealed a trivial pericardial effusion.   He was admitted overnight for observation.     Their radial site was clean, dry, and intact with no signs of hematoma, bleeding, or infection. The patient understands discharge instructions related to lifting precautions with  site for one week. They were able to ambulate the halls without any exertional angina. Their vitals and laboratory workup were unremarkable. The patient was given thorough discussion of his discharge instructions per nursing and myself. DAPT and statin  adherence were discussed in detail. Patient was discharged to home with family with no further questions/concerns, their outpatient follow up has been made by our office.    For radial cardiac cath  - Do not participate in strenuous activities for 2 days after the procedure. This includes most sports - jogging, golfing, play tennis, and bowling.  - Gradually increase your activities until you reach your normal activity level within two days after the procedure.  - Avoid heavy lifting (more than 10 pounds) and pushing or pulling heavy objects for the first 5 to 7 days after the  procedure.        MEDICATIONS ON DISCHARGE  The patient will start the new medications Clopidogrel 75 mg daily. They will continue their home medications of aspirin.       PROCEDURES:    LEFT CARDIAC CATH on 2/26/2025:  HEMODYNAMICS:   Aortic pressure: 85/52 mmHg  Pre A-wave pressure: 7 mmHg  No significant aortic gradient on pullback     LIMITED CORONARY ANGIOGRAPHY:  Limited coronary artery redemonstrated a focal, severe, heavily calcified lesion in the distal right coronary artery.     IMPRESSION:  Successful rotational atherectomy, intracoronary lithotripsy, and percutaneous coronary intervention to the severely calcified distal right coronary artery with one 3.5 x 28 mm Synergy drug-eluting stent.  Small distal wire perforation resulting in a small, nonhemodynamically significant pericardial effusion managed with coil embolization.  Normal left heart filling pressures.      LABORATORY  Lab Results   Component Value Date/Time    SODIUM 136 02/14/2018 03:29 AM    POTASSIUM 4.2 02/14/2018 03:29 AM    CHLORIDE 106 02/14/2018 03:29 AM    CO2 22 02/14/2018 03:29 AM    GLUCOSE 95 02/14/2018 03:29 AM    BUN 16 02/14/2018 03:29 AM    CREATININE 1.02 02/14/2018 03:29 AM        Lab Results   Component Value Date/Time    WBC 8.0 02/14/2018 03:29 AM    HEMOGLOBIN 15.0 02/14/2018 03:29 AM    HEMATOCRIT 43.1 02/14/2018 03:29 AM    PLATELETCT 247 02/14/2018 03:29 AM         Medication List        START taking these medications        Instructions   clopidogrel 75 MG Tabs  Commonly known as: Plavix   Doctor's comments: Meds to beds  Take 1 Tablet by mouth every day.  Dose: 75 mg     valacyclovir 1 GM Tabs  Commonly known as: Valtrex   TAKE 1 TABLET TWICE A DAY  FOR 7 DAYS AS NEEDED FOR   OUTBREAK            CHANGE how you take these medications        Instructions   lisinopril 10 MG Tabs  What changed: when to take this  Commonly known as: Prinivil   Take 1 Tablet by mouth every evening.  Dose: 10 mg            CONTINUE taking  these medications        Instructions   aspirin EC 81 MG Tbec  Commonly known as: Ecotrin   Take 81 mg by mouth every bedtime.  Dose: 81 mg     atorvastatin 80 MG tablet  Commonly known as: Lipitor   TAKE ONE TABLET BY MOUTH EVERY DAY  Dose: 80 mg     buPROPion 200 MG SR tablet  Commonly known as: Wellbutrin SR   Take 2 Tablets by mouth every day for 180 days. Indications: Major Depressive Disorder  Dose: 400 mg     MELODY-DAY 1000 PO   Take 1 Tab by mouth 2 Times a Day.  Dose: 1 Tablet     ezetimibe 10 MG Tabs  Commonly known as: Zetia   Take 1 Tablet by mouth every day.  Dose: 10 mg     ferrous sulfate 325 (65 Fe) MG tablet   Take 325 mg by mouth every day.  Dose: 325 mg     fluoxetine 40 MG capsule  Commonly known as: PROzac   Take 1 Capsule by mouth every day for 90 days.  Dose: 40 mg     LORazepam 2 MG tablet  Commonly known as: Ativan   Take 2 mg by mouth at bedtime.  Dose: 2 mg     Magnesium 100 MG Caps   Take 100 mg by mouth every evening.  Dose: 100 mg     mirtazapine 7.5 MG tablet  Commonly known as: Remeron   Doctor's comments: May split in half and talk half a tablet if needing less that day  Take 1 Tablet by mouth at bedtime as needed (For sleep) for up to 90 days.  Dose: 7.5 mg     naltrexone 50 MG Tabs  Commonly known as: Depade   Take 100 mg by mouth 2 times a day.  Dose: 100 mg     NON SPECIFIED   1 Dose by Subcutaneous Infusion route see administration instructions. Ilumya injection every three months prescribed by dermatology  Dose: 1 Dose     omeprazole 20 MG delayed-release capsule  Commonly known as: PriLOSEC   Take 1 Capsule by mouth every day.  Dose: 20 mg     VITAMIN B COMPLEX PO   Take 1 Tablet by mouth every day.  Dose: 1 Tablet     VITAMIN D3 PO   Take 1 Tablet by mouth every day. OTC  Dose: 1 Tablet            STOP taking these medications      celecoxib 200 MG Caps  Commonly known as: CeleBREX               Future Appointments   Date Time Provider Department Center   2/27/2025  9:45 AM LAB  [facility] AGUEROE Dandy   2/28/2025 11:00 AM MAIN LAB [facility] SHANIKA Mendez   2/28/2025 11:30 AM Td Soilman M.D. [facility] [address]   3/7/2025  2:15 PM [facility] EXAM 12 ECHO [facility]   3/10/2025 10:45 AM JOSEPH Burkett CARCMCKENZIE None   4/21/2025 11:00 AM Buster Shetty M.D. OP [address] [facility] AV   8/7/2025  8:30 AM Leticia Duke D.O. [facility] None   12/15/2025  4:15 PM [facility] EXAM 10 ECHO [facility]   12/18/2025  3:45 PM JOSEPH Burkett CARCMCKENZIE None         Please note this dictation was created using voice recognition software.  I have made every reasonable attempt to correct obvious errors, but there may be errors of grammar and possibly content that I did not discover before finalizing the note.    ALFREDO [email or web address].  [facility]  [phone]

## 2025-02-27 NOTE — PROGRESS NOTES
Brief Cardiology Update     Patient admitted to overnight observational status per-routine following elective PCI. Patient recovering well; education provided. Right radial site soft, TR band in place.      Questions answered; nursing and patient declines further needs/concerns at this time. For any questions or issues after 5PM please contact the listed ON CALL cardiologist not the admitting provider or interventional backup doctor.

## 2025-02-28 ENCOUNTER — RESULTS FOLLOW-UP (OUTPATIENT)
Dept: CARDIOLOGY | Facility: MEDICAL CENTER | Age: 75
End: 2025-02-28

## 2025-03-04 ENCOUNTER — PATIENT MESSAGE (OUTPATIENT)
Dept: CARDIOLOGY | Facility: MEDICAL CENTER | Age: 75
End: 2025-03-04
Payer: MEDICARE

## 2025-03-04 DIAGNOSIS — Z95.818 S/P MITRAL VALVE CLIP IMPLANTATION: ICD-10-CM

## 2025-03-04 DIAGNOSIS — Z98.890 S/P MITRAL VALVE CLIP IMPLANTATION: ICD-10-CM

## 2025-03-04 PROBLEM — I25.10 CAD IN NATIVE ARTERY: Status: RESOLVED | Noted: 2025-02-26 | Resolved: 2025-03-04

## 2025-03-04 PROBLEM — Z95.5 HISTORY OF CORONARY ARTERY STENT PLACEMENT: Status: RESOLVED | Noted: 2025-02-26 | Resolved: 2025-03-04

## 2025-03-04 PROBLEM — I20.9 ANGINA PECTORIS (HCC): Status: RESOLVED | Noted: 2025-02-26 | Resolved: 2025-03-04

## 2025-03-05 NOTE — PATIENT COMMUNICATION
Previous referral to [facility] closed on 2/6/25        Reordered referral to [facility].

## 2025-03-07 ENCOUNTER — HOSPITAL ENCOUNTER (OUTPATIENT)
Dept: CARDIOLOGY | Facility: MEDICAL CENTER | Age: 75
End: 2025-03-07
Attending: INTERNAL MEDICINE
Payer: MEDICARE

## 2025-03-07 DIAGNOSIS — I34.0 NONRHEUMATIC MITRAL VALVE REGURGITATION: ICD-10-CM

## 2025-03-07 PROCEDURE — 93306 TTE W/DOPPLER COMPLETE: CPT

## 2025-03-10 ENCOUNTER — OFFICE VISIT (OUTPATIENT)
Dept: CARDIOLOGY | Facility: MEDICAL CENTER | Age: 75
End: 2025-03-10
Attending: NURSE PRACTITIONER
Payer: MEDICARE

## 2025-03-10 ENCOUNTER — PATIENT MESSAGE (OUTPATIENT)
Dept: MEDICAL GROUP | Age: 75
End: 2025-03-10

## 2025-03-10 ENCOUNTER — DOCUMENTATION (OUTPATIENT)
Dept: CARDIOLOGY | Facility: MEDICAL CENTER | Age: 75
End: 2025-03-10
Payer: MEDICARE

## 2025-03-10 VITALS
SYSTOLIC BLOOD PRESSURE: 116 MMHG | OXYGEN SATURATION: 96 % | HEART RATE: 66 BPM | WEIGHT: 184 LBS | HEIGHT: 67 IN | DIASTOLIC BLOOD PRESSURE: 58 MMHG | RESPIRATION RATE: 18 BRPM | BODY MASS INDEX: 28.88 KG/M2

## 2025-03-10 DIAGNOSIS — K21.9 GASTROESOPHAGEAL REFLUX DISEASE WITHOUT ESOPHAGITIS: ICD-10-CM

## 2025-03-10 DIAGNOSIS — E78.49 FAMILIAL HYPERLIPIDEMIA: ICD-10-CM

## 2025-03-10 DIAGNOSIS — I10 ESSENTIAL HYPERTENSION: ICD-10-CM

## 2025-03-10 DIAGNOSIS — Z98.890 S/P MITRAL VALVE CLIP IMPLANTATION: ICD-10-CM

## 2025-03-10 DIAGNOSIS — Z95.818 S/P MITRAL VALVE CLIP IMPLANTATION: ICD-10-CM

## 2025-03-10 DIAGNOSIS — G47.33 OBSTRUCTIVE SLEEP APNEA (ADULT) (PEDIATRIC): ICD-10-CM

## 2025-03-10 DIAGNOSIS — I25.10 CORONARY ARTERY DISEASE INVOLVING NATIVE CORONARY ARTERY OF NATIVE HEART WITHOUT ANGINA PECTORIS: ICD-10-CM

## 2025-03-10 LAB
LV EJECT FRACT  99904: 60
LV EJECT FRACT MOD 2C 99903: 58.85
LV EJECT FRACT MOD 4C 99902: 56.8
LV EJECT FRACT MOD BP 99901: 55.79

## 2025-03-10 PROCEDURE — 93306 TTE W/DOPPLER COMPLETE: CPT | Mod: 26 | Performed by: INTERNAL MEDICINE

## 2025-03-10 PROCEDURE — 99213 OFFICE O/P EST LOW 20 MIN: CPT | Performed by: NURSE PRACTITIONER

## 2025-03-10 PROCEDURE — 3078F DIAST BP <80 MM HG: CPT | Performed by: NURSE PRACTITIONER

## 2025-03-10 PROCEDURE — 3074F SYST BP LT 130 MM HG: CPT | Performed by: NURSE PRACTITIONER

## 2025-03-10 PROCEDURE — 99214 OFFICE O/P EST MOD 30 MIN: CPT | Performed by: NURSE PRACTITIONER

## 2025-03-10 ASSESSMENT — ENCOUNTER SYMPTOMS
MYALGIAS: 0
DIZZINESS: 0
CLAUDICATION: 0
FEVER: 0
ABDOMINAL PAIN: 0
ORTHOPNEA: 0
PALPITATIONS: 0
PND: 0
COUGH: 0
SHORTNESS OF BREATH: 0

## 2025-03-10 ASSESSMENT — FIBROSIS 4 INDEX: FIB4 SCORE: 3.29

## 2025-03-10 NOTE — PROGRESS NOTES
Valve Program Functional Assessment:     KCCQ12   1a) Showering/bathin  1b) Walking 1 block on ground: 3  1c) Hurrying or joggin  2) Swellin  3) Fatigue: 5  4) Shortness of breath: 1  5) Sleep sitting up: 5  6) Limited enjoyment of life: 3  7) Spend the rest of your life with HF: 1  8a) Hobbies, recreational activities:1  8b) Working or doing household chores:2  8c) Visiting family or friends: 4

## 2025-03-10 NOTE — PROGRESS NOTES
Chief Complaint   Patient presents with    Follow-Up     S/P mitral valve clip implantation     Subjective     Aaron Medina is a 74 y.o. male who presents today for 1 month post ALIDA follow up alongside recent coronary arthrectomy to RCA.    He is a patient of Dr. Caraballo. Hx of severe symptomatic mitral regurgitation now S/P ALIDA (cindy clip), HLD with CAD with recent arthrectomy with PCI to RCA (2/2025), HTN, ARNULFO (not treating), chronic anemia, and anxiety/depression.    He presents today alone. He is overall doing well. He admits to not much symptomatic improvement in his exertional fatigue which is frustrating to him.    Unfortunately, his echocardiogram is not finalized yet. I will send a message to reading physician to review and my chart patient about results later on today or tomorrow.    He has no other symptoms to report.    He has no chest pain, edema, dizziness/lightheadedness, or palpitations.    Past Medical History:   Diagnosis Date    Anxiety     Anxiety, generalized 11/27/2023    Arthritis     osteo    Awareness under anesthesia     Bowel habit changes 06/24/2021    Constipation    Cancer ([facility]) 2007    bladder    Cataract 12/2024    repair completed 01/07/2025    Chickenpox     as a child    Depression     depression    Glaucoma 12/2024    Heart valve disease 10/2024    Mitral regurgatation    High cholesterol     Hyperlipidemia     Hypertension     pt states  well controlled on meds    Kidney stone     Mumps     as a child     Obesity     Pneumothorax     Psoriatic arthritis ([facility])     Recurrent major depressive disorder, in partial remission ([facility]) 09/21/2017    Restless leg syndrome     Sleep apnea     uses cpap    Tonsillitis      Past Surgical History:   Procedure Laterality Date    TRANSCATHETER MITRAL VALVE REPAIR Right 2/4/2025    Procedure: TRANSCATHETER MITRAL VALVE PROCEDURE;  Surgeon: Davi Caraballo M.D.;  Location: SURGERY [facility];  Service: Cardiac    ECHOCARDIOGRAM,  TRANSESOPHAGEAL, INTRAOPERATIVE N/A 2/4/2025    Procedure: ECHOCARDIOGRAM, TRANSESOPHAGEAL, INTRAOPERATIVE;  Surgeon: Davi Caraballo M.D.;  Location: [facility];  Service: Cardiac    PB INCISE FINGER TENDON SHEATH Left 09/19/2023    Procedure: LEFT INDEX FINGER TRIGGER RELEASE;  Surgeon: Td Soliman M.D.;  Location: [facility];  Service: Orthopedics    PB INCISE FINGER TENDON SHEATH Left 08/30/2022    Procedure: LEFT MIDDLE FINGER TRIGGER RELEASE;  Surgeon: Td Soliman M.D.;  Location: [facility];  Service: Orthopedics    DC CYSTOURETHROSCOPY,BIOPSIES N/A 08/17/2022    Procedure: CYSTOSCOPY;  Surgeon: Jerzy Jasso M.D.;  Location: [facility];  Service: Urology    DC CYSTOURETHROSCOPY,BIOPSIES  07/06/2021    Procedure: BIOPSY, BLADDER WITH FULGERATION;  Surgeon: Bob Millan M.D.;  Location: [facility];  Service: Urology    KNEE ARTHROPLASTY TOTAL Right 2021    BLADDER BIOPSY WITH CYSTOSCOPY  01/03/2018    Procedure: BLADDER BIOPSY WITH CYSTOSCOPY/WITH UPPER TRACT WASHING;  Surgeon: El Manuel M.D.;  Location: [facility];  Service: Urology    RETROGRADES Bilateral 01/03/2018    Procedure: RETROGRADES;  Surgeon: El Manuel M.D.;  Location: [facility];  Service: Urology    PYELOGRAM Bilateral 01/03/2018    Procedure: PYELOGRAM;  Surgeon: El Manuel M.D.;  Location: [facility];  Service: Urology    OTHER ORTHOPEDIC SURGERY  2015    shoulder replacement    ABDOMINAL EXPLORATION      ARTHROPLASTY      right shoulder    BLADDER BIOPSY WITH CYSTOSCOPY      EYE SURGERY      lasik     GASTRIC BYPASS LAPAROSCOPIC      HERNIA REPAIR      OPEN REDUCTION      OTHER Left     thumb joint    OTHER ABDOMINAL SURGERY  2002    gastric bypass    THORACOTOMY      TURP-VAPOR       Family History   Problem Relation Age of Onset    Cancer Mother 80        lung cancer    Arthritis Mother     Diabetes  "Mother         Type 2    Anxiety disorder Mother     Lung Disease Father 65    Cancer Father     Diabetes Father         Type 2    Hypertension Father     Hyperlipidemia Father     Hyperlipidemia Brother     Arthritis Brother     Other Brother         Thyroid tumor    Sleep Apnea Brother     No Known Problems Maternal Grandmother     Cancer Maternal Grandfather     No Known Problems Paternal Grandmother     No Known Problems Paternal Grandfather     Heart Disease Brother 49        smoker, overweight    Hypertension Brother     Depression Other     Suicide Attempts Neg Hx     Bipolar disorder Neg Hx     Alcohol abuse Neg Hx     Drug abuse Neg Hx      Social History     Socioeconomic History    Marital status: Single     Spouse name: Not on file    Number of children: Not on file    Years of education: Not on file    Highest education level: Master's degree (e.g., MA, MS, Rhiannon, MEd, MSW, LUIS)   Occupational History    Not on file   Tobacco Use    Smoking status: Never    Smokeless tobacco: Never   Vaping Use    Vaping status: Never Used   Substance and Sexual Activity    Alcohol use: Yes     Alcohol/week: 9.6 oz     Types: 14 Glasses of wine, 2 Cans of beer per week     Comment: wine /daily    Drug use: Not Currently     Types: Inhaled     Comment: THC edible - \"once a month\".    Sexual activity: Not Currently   Other Topics Concern    Not on file   Social History Narrative    Not on file     Social Drivers of Health     Financial Resource Strain: Low Risk  (4/3/2024)    Overall Financial Resource Strain (CARDIA)     Difficulty of Paying Living Expenses: Not hard at all   Food Insecurity: No Food Insecurity (2/26/2025)    Hunger Vital Sign     Worried About Running Out of Food in the Last Year: Never true     Ran Out of Food in the Last Year: Never true   Transportation Needs: No Transportation Needs (2/26/2025)    PRAPARE - Transportation     Lack of Transportation (Medical): No     Lack of Transportation " (Non-Medical): No   Physical Activity: Insufficiently Active (4/3/2024)    Exercise Vital Sign     Days of Exercise per Week: 2 days     Minutes of Exercise per Session: 20 min   Stress: No Stress Concern Present (4/3/2024)    [place] [employer] - Occupational Stress Questionnaire     Feeling of Stress : Not at all   Social Connections: Moderately Isolated (4/3/2024)    Social Connection and Isolation Panel [NHANES]     Frequency of Communication with Friends and Family: Twice a week     Frequency of Social Gatherings with Friends and Family: Once a week     Attends Mu-ism Services: Never     Active Member of Clubs or Organizations: No     Attends Club or Organization Meetings: Patient declined     Marital Status: Living with partner   Intimate Partner Violence: Not At Risk (2/26/2025)    Humiliation, Afraid, Rape, and Kick questionnaire     Fear of Current or Ex-Partner: No     Emotionally Abused: No     Physically Abused: No     Sexually Abused: No   Housing Stability: Low Risk  (2/26/2025)    Housing Stability Vital Sign     Unable to Pay for Housing in the Last Year: No     Number of Times Moved in the Last Year: 1     Homeless in the Last Year: No     No Known Allergies  Outpatient Encounter Medications as of 3/10/2025   Medication Sig Dispense Refill    lisinopril (PRINIVIL) 10 MG Tab Take 1 Tablet by mouth every evening. 90 Tablet 1    valacyclovir (VALTREX) 1 GM Tab TAKE 1 TABLET TWICE A DAY  FOR 7 DAYS AS NEEDED FOR   OUTBREAK 24 Tablet 5    clopidogrel (PLAVIX) 75 MG Tab Take 1 Tablet by mouth every day. 90 Tablet 3    buPROPion (WELLBUTRIN SR) 200 MG SR tablet Take 2 Tablets by mouth every day for 180 days. Indications: Major Depressive Disorder 180 Tablet 1    fluoxetine (PROZAC) 40 MG capsule Take 1 Capsule by mouth every day for 90 days. 30 Capsule 2    mirtazapine (REMERON) 7.5 MG tablet Take 1 Tablet by mouth at bedtime as needed (For sleep) for up to 90 days. 90 Tablet 0     "LORazepam (ATIVAN) 2 MG tablet Take 2 mg by mouth at bedtime.      omeprazole (PRILOSEC) 20 MG delayed-release capsule Take 1 Capsule by mouth every day. 90 Capsule 0    atorvastatin (LIPITOR) 80 MG tablet TAKE ONE TABLET BY MOUTH EVERY DAY 90 Tablet 1    ezetimibe (ZETIA) 10 MG Tab Take 1 Tablet by mouth every day. 90 Tablet 1    NON SPECIFIED 1 Dose by Subcutaneous Infusion route see administration instructions. Ilumya injection every three months prescribed by dermatology      Magnesium 100 MG Cap Take 100 mg by mouth every evening.      ferrous sulfate 325 (65 Fe) MG tablet Take 325 mg by mouth every day.      B Complex Vitamins (VITAMIN B COMPLEX PO) Take 1 Tablet by mouth every day.      Cholecalciferol (VITAMIN D3 PO) Take 1 Tablet by mouth every day. OTC      aspirin EC (ECOTRIN) 81 MG Tablet Delayed Response Take 81 mg by mouth every bedtime. 30 Tab 0    Multiple Vitamins-Minerals (MELODY-DAY 1000 PO) Take 1 Tab by mouth 2 Times a Day.       No facility-administered encounter medications on file as of 3/10/2025.     Review of Systems   Constitutional:  Negative for fever and malaise/fatigue.   Respiratory:  Negative for cough and shortness of breath.    Cardiovascular:  Negative for chest pain, palpitations, orthopnea, claudication, leg swelling and PND.   Gastrointestinal:  Negative for abdominal pain.   Musculoskeletal:  Negative for myalgias.   Neurological:  Negative for dizziness.              Objective     Ht 1.702 m (5' 7\")   Wt 83.5 kg (184 lb)   BMI 28.82 kg/m²     Physical Exam  Vitals and nursing note reviewed.   Constitutional:       Appearance: Normal appearance. He is well-developed and normal weight.   HENT:      Head: Normocephalic and atraumatic.   Neck:      Vascular: No JVD.   Cardiovascular:      Rate and Rhythm: Normal rate and regular rhythm.      Pulses: Normal pulses.      Heart sounds: Normal heart sounds.   Pulmonary:      Effort: Pulmonary effort is normal.      Breath sounds: " Normal breath sounds.   Musculoskeletal:         General: Normal range of motion.   Skin:     General: Skin is warm and dry.      Capillary Refill: Capillary refill takes less than 2 seconds.   Neurological:      General: No focal deficit present.      Mental Status: He is alert and oriented to person, place, and time. Mental status is at baseline.   Psychiatric:         Mood and Affect: Mood normal.         Behavior: Behavior normal.         Thought Content: Thought content normal.         Judgment: Judgment normal.                Assessment & Plan     1. S/P mitral valve clip implantation        2. Coronary artery disease involving native coronary artery of native heart without angina pectoris        3. Essential hypertension        4. Familial hyperlipidemia        5. Obstructive sleep apnea (adult) (pediatric)          Medical Decision Making: Today's Assessment/Status/Plan:      1. S/P ALIDA, NYHA II  -doing well post-op  -cont asa lifelong  -SBE prophylaxis understands lifelong  -echo 1 month unavailable, message to physician to read today, will reach out to patient on results after  -he would like to do cardiac rehab but hasn't heard back from them yet, 4 messages to their office, I have personally sent message to our structural heart supervisor to check on this and reach out to him  -follow up in 1 year with echo    2. HLD with CAD with PCI to RCA with atherectomy (2/2025)  -no angina but CROSS remains  -site healed  -cont asa, statin lifelong  -cont plavix 1 year post PCI, consider lifelong plavix next year  -LDL goal <70, follow labs    3. HTN  -good control  -cont lisinopril 10 mg QD  -BP goal <130/80    4. ARNULFO   -follow up with PCP    5. Anemia  -work up with PCP for potential cause of chronic shortness of breath and fatigue    Patient is to follow up with Rocio CALIX in December with echo.

## 2025-03-11 ENCOUNTER — RESULTS FOLLOW-UP (OUTPATIENT)
Dept: CARDIOLOGY | Facility: PHYSICIAN GROUP | Age: 75
End: 2025-03-11
Payer: MEDICARE

## 2025-03-11 DIAGNOSIS — E78.2 MIXED HYPERLIPIDEMIA: ICD-10-CM

## 2025-03-11 DIAGNOSIS — D50.8 OTHER IRON DEFICIENCY ANEMIA: ICD-10-CM

## 2025-03-11 DIAGNOSIS — N40.1 BENIGN PROSTATIC HYPERPLASIA WITH LOWER URINARY TRACT SYMPTOMS, SYMPTOM DETAILS UNSPECIFIED: ICD-10-CM

## 2025-03-11 RX ORDER — OMEPRAZOLE 20 MG/1
20 CAPSULE, DELAYED RELEASE ORAL DAILY
Qty: 180 CAPSULE | Refills: 3 | Status: SHIPPED | OUTPATIENT
Start: 2025-03-11

## 2025-03-14 DIAGNOSIS — E78.5 DYSLIPIDEMIA: ICD-10-CM

## 2025-03-17 RX ORDER — ATORVASTATIN CALCIUM 80 MG/1
80 TABLET, FILM COATED ORAL
Qty: 90 TABLET | Refills: 3 | Status: SHIPPED | OUTPATIENT
Start: 2025-03-17

## 2025-03-17 NOTE — TELEPHONE ENCOUNTER
Is the patient due for a refill? No pharmacy change     Was the patient seen the last 15 months? Yes    Date of last office visit: 3/10/2025    Does the patient have an upcoming appointment?  No       Provider to refill:SC    Does the patient have group home Plus and need 100-day supply? (This applies to ALL medications) Patient does not have SCP

## 2025-03-19 ENCOUNTER — OFFICE VISIT (OUTPATIENT)
Dept: RHEUMATOLOGY | Facility: MEDICAL CENTER | Age: 75
End: 2025-03-19
Attending: STUDENT IN AN ORGANIZED HEALTH CARE EDUCATION/TRAINING PROGRAM
Payer: MEDICARE

## 2025-03-19 VITALS
BODY MASS INDEX: 27.94 KG/M2 | TEMPERATURE: 97.8 F | RESPIRATION RATE: 14 BRPM | DIASTOLIC BLOOD PRESSURE: 70 MMHG | HEART RATE: 77 BPM | WEIGHT: 178 LBS | HEIGHT: 67 IN | SYSTOLIC BLOOD PRESSURE: 130 MMHG | OXYGEN SATURATION: 94 %

## 2025-03-19 DIAGNOSIS — M15.9 OSTEOARTHRITIS INVOLVING MULTIPLE JOINTS ON BOTH SIDES OF BODY: ICD-10-CM

## 2025-03-19 DIAGNOSIS — D84.9 IMMUNOSUPPRESSED STATUS (HCC): ICD-10-CM

## 2025-03-19 DIAGNOSIS — L40.9 PSORIASIS: Primary | ICD-10-CM

## 2025-03-19 DIAGNOSIS — C67.3 MALIGNANT NEOPLASM OF ANTERIOR WALL OF URINARY BLADDER (HCC): ICD-10-CM

## 2025-03-19 PROBLEM — L40.50 PSORIATIC ARTHRITIS (HCC): Status: RESOLVED | Noted: 2018-08-16 | Resolved: 2025-03-19

## 2025-03-19 PROBLEM — M72.2 PLANTAR FASCIITIS: Status: RESOLVED | Noted: 2017-09-21 | Resolved: 2025-03-19

## 2025-03-19 PROCEDURE — 99214 OFFICE O/P EST MOD 30 MIN: CPT | Performed by: STUDENT IN AN ORGANIZED HEALTH CARE EDUCATION/TRAINING PROGRAM

## 2025-03-19 PROCEDURE — 3075F SYST BP GE 130 - 139MM HG: CPT | Performed by: STUDENT IN AN ORGANIZED HEALTH CARE EDUCATION/TRAINING PROGRAM

## 2025-03-19 PROCEDURE — 3078F DIAST BP <80 MM HG: CPT | Performed by: STUDENT IN AN ORGANIZED HEALTH CARE EDUCATION/TRAINING PROGRAM

## 2025-03-19 PROCEDURE — 99212 OFFICE O/P EST SF 10 MIN: CPT | Performed by: STUDENT IN AN ORGANIZED HEALTH CARE EDUCATION/TRAINING PROGRAM

## 2025-03-19 ASSESSMENT — FIBROSIS 4 INDEX: FIB4 SCORE: 3.29

## 2025-03-19 NOTE — PROGRESS NOTES
[facility] RHEUMATOLOGY  [address], [address], [place] [address]  Phone: [phone] ? Fax: [phone]  [facility]/Health-Services/Rheumatology    NEW PATIENT VISIT NOTE      DATE OF SERVICE: 03/19/2025         Subjective     REFERRING PRACTITIONER:  Stu Blair M.D.  [address] [place] [address]    PATIENT IDENTIFICATION:  Buster Medina  [address]  [place] [address]    YOB: 1950    MEDICAL RECORD NUMBER: [id number]         CHIEF COMPLAINT:   Chief Complaint   Patient presents with    New Patient     Psoriatic arthritis (HCC)       HISTORY OF PRESENT ILLNESS:  Buster Medina is a 74 y.o. male with pertinent history notable for MR s/p post MitraClip, CAD, ARNULFO, HLD, HTN, malignant neoplasm of the bladder (2012, treated with excision, no chemoradiation), gastric bypass, OA s/p R TKA (2021), left hand CMC arthroplasty, and R TSA (2015), and gout, who presents to establish care for management of psoriatic arthritis.    Patient was diagnosed with psoriasis in the late 1990's. Lesions have historically been on the elbows and knees. Reports that the diagnosis of psoriatic arthritic was given some time later solely to get adalimumab for his psoriasis as other agents were ineffective. The only joint pains he had at the time was in the shoulders (R > L). The pain was sharp, continuous and aggravated by activity. Recalled that sometimes he had to use his left arm to move his R arm due to severe shoulder pain. He rarely had pain in the hands and certainly not in the feet. He does not recall any significant  morning stiffness. He has had chronic intermittent low back pain that started when he was a Freshman in High School playing sports. He is established with dermatology at [facility] in [place] and was switched to tildrakizumab (llumya) from adalimumab in 1/2024 due to health insurance change. He is followed annually. Reports that this medication is very effective in  controlling his skin disease. Denies history of low back morning stiffness, nocturnal low back pain, exacerbation of joint pains with rest, alternating buttock pain, episodes of red painful photophobic eyes or history of inflammatory eye disease, dactylitis, enthesopathy along the Achilles or plantar fascia (remote history of L Achilles tendon rupture that was trauma induced when he fell off of a ladder), or IBD (normal colonoscopies in the past). No history of Raynaud's, photosensitive rashes, sicca symptoms, mucosal ulcerations, pleuritic chest pains, or dyspnea on exertion.  Denies history of stroke, DVT/PE, demyelinating disease, lymphoma/leukemia, melanoma, other skin cancers, or diverticulitis.  Reports a family history of psoriasis in mother and maternal aunt.  He is a non-smoker, drinks ~2 glasses of wine a night on average.  Mom    Regarding history of gout, 3 years ago had an episode of joint pains, swelling, and tenderness of right big toe.  Based on the appearance, was diagnosed with gout.  No history of arthrocentesis for crystal analysis.  He was treated with oral steroids.  He has episodes that occur less than once a year.  There is no family history of gout.    He was recently hospitalized in 2025 for cardiac catheterization for obstructive disease noted on pre-TAVR PCI.    Reports other aspects of symptoms and medical history as noted on the questionnaire below or scanned under media tab.    Pertinent treatments:  Hydroxychloroquine: doesn't recall this medication  Apremilast: DC'd due to diarrhea  Adalimumab: effective but switched to tildrakizumab due to cost  Tildrakizumab: switched to this bc of cost  Pertinent positive labs: 10/2024, SEAN (1: 160, speckled pattern), anti-TPO (12)  Pertinent negative labs: 10/2024, anti-F-actin, antimitochondrial, anti-TG; 3/2018, RF/anti-CCP, SEAN  Pertinent imagin/2024 right knee CT: Intact prosthesis.  Trace joint fluid.  3/2024 left shoulder XR:  End-stage OA of the GH joint with essentially bone-on-bone arthritis.  No evidence of high riding humerus.  6/2023 left hand XR: Previous hardware and a CMC arthroplasty  8/2022 bilateral hand XR: New subtle DIP and carpus erosions could represent psoriasis or other inflammatory arthropathy. The pattern is not typical of rheumatoid arthritis. Right severe first CMC osteoarthritis, left trapeziectomy and suspensionplasty.  Mild left wrist chondrocalcinosis most likely in the fibrocartilage.  8/2022 feet XR: No evidence of inflammatory arthropathy  8/2022 C-spine XR: Moderate mid cervical spondylosis.  Trace degenerative C3/C4 anterolisthesis.    REVIEW OF SYSTEMS:  Except as noted in the history above, relevant review of systems with emphasis on autoimmune rheumatic diseases was otherwise negative.      ACTIVE PROBLEM LIST:  Patient Active Problem List    Diagnosis Date Noted    S/P mitral valve clip implantation 02/04/2025    Coronary artery disease involving native coronary artery of native heart without angina pectoris 12/13/2024    Muscle weakness 01/26/2024    Thrombocytopenia, unspecified ([facility]) 01/26/2024    Anxiety, generalized 11/27/2023    Trigger finger, left index finger 08/04/2023    Trigger finger, left middle finger 05/23/2022    Balance disorder 09/03/2021    Sun-damaged skin 09/03/2021    Hoarseness 03/30/2021    Functional diarrhea 07/16/2020    Tinnitus of both ears 04/09/2019    Familial hyperlipidemia 09/21/2018    Erectile dysfunction of nonorganic origin 03/05/2018    Malignant neoplasm of anterior wall of urinary bladder ([facility]) 09/21/2017    Essential hypertension 09/21/2017    Recurrent major depressive disorder, in full remission ([facility]ly [facility]) 09/21/2017    Chronic insomnia 09/21/2017    Gastroesophageal reflux disease without esophagitis 09/21/2017    Benign nodular prostatic hyperplasia without lower urinary tract symptoms 09/21/2017    Vitamin D deficiency 09/21/2017    Essential tremor 06/25/2011     Status post bariatric surgery 03/08/2009    Testicular hypofunction 05/26/2005       PAST MEDICAL HISTORY:  Past Medical History:   Diagnosis Date    Anxiety     Anxiety, generalized 11/27/2023    Arthritis     osteo    Awareness under anesthesia     Bowel habit changes 06/24/2021    Constipation    Cancer ([facility]) 2007    bladder    Cataract 12/2024    repair completed 01/07/2025    Chickenpox     as a child    Depression     depression    Glaucoma 12/2024    Heart valve disease 10/2024    Mitral regurgatation    High cholesterol     Hyperlipidemia     Hypertension     pt states  well controlled on meds    Kidney stone     Mumps     as a child     Obesity     Pneumothorax     Psoriatic arthritis ([facility])     Recurrent major depressive disorder, in partial remission ([facility]) 09/21/2017    Restless leg syndrome     Sleep apnea     uses cpap    Tonsillitis        PAST SURGICAL HISTORY:  Past Surgical History:   Procedure Laterality Date    TRANSCATHETER MITRAL VALVE REPAIR Right 2/4/2025    Procedure: TRANSCATHETER MITRAL VALVE PROCEDURE;  Surgeon: Davi Caraballo M.D.;  Location: [facility];  Service: Cardiac    ECHOCARDIOGRAM, TRANSESOPHAGEAL, INTRAOPERATIVE N/A 2/4/2025    Procedure: ECHOCARDIOGRAM, TRANSESOPHAGEAL, INTRAOPERATIVE;  Surgeon: Davi Caraballo M.D.;  Location: [facility];  Service: Cardiac    PB INCISE FINGER TENDON SHEATH Left 09/19/2023    Procedure: LEFT INDEX FINGER TRIGGER RELEASE;  Surgeon: Td Soliman M.D.;  Location: [facility];  Service: Orthopedics    PB INCISE FINGER TENDON SHEATH Left 08/30/2022    Procedure: LEFT MIDDLE FINGER TRIGGER RELEASE;  Surgeon: Td Soliman M.D.;  Location: [facility];  Service: Orthopedics    CT CYSTOURETHROSCOPY,BIOPSIES N/A 08/17/2022    Procedure: CYSTOSCOPY;  Surgeon: Jerzy Jasso M.D.;  Location: [facility];  Service: Urology    CT CYSTOURETHROSCOPY,BIOPSIES  07/06/2021     Procedure: BIOPSY, BLADDER WITH FULGERATION;  Surgeon: Bob Millan M.D.;  Location: SURGERY [facility];  Service: Urology    KNEE ARTHROPLASTY TOTAL Right 2021    BLADDER BIOPSY WITH CYSTOSCOPY  01/03/2018    Procedure: BLADDER BIOPSY WITH CYSTOSCOPY/WITH UPPER TRACT WASHING;  Surgeon: El Manuel M.D.;  Location: SURGERY [facility];  Service: Urology    RETROGRADES Bilateral 01/03/2018    Procedure: RETROGRADES;  Surgeon: El Manuel M.D.;  Location: SURGERY [facility];  Service: Urology    PYELOGRAM Bilateral 01/03/2018    Procedure: PYELOGRAM;  Surgeon: El Manuel M.D.;  Location: SURGERY [facility];  Service: Urology    OTHER ORTHOPEDIC SURGERY  2015    shoulder replacement    ABDOMINAL EXPLORATION      ARTHROPLASTY      right shoulder    BLADDER BIOPSY WITH CYSTOSCOPY      EYE SURGERY      lasik     GASTRIC BYPASS LAPAROSCOPIC      HERNIA REPAIR      OPEN REDUCTION      OTHER Left     thumb joint    OTHER ABDOMINAL SURGERY  2002    gastric bypass    THORACOTOMY      TURP-VAPOR         MEDICATIONS:  Current Outpatient Medications   Medication Sig    atorvastatin (LIPITOR) 80 MG tablet Take 1 Tablet by mouth every day.    omeprazole (PRILOSEC) 20 MG delayed-release capsule Take 1 Capsule by mouth every day. (Patient taking differently: Take 20 mg by mouth 2 times a day.)    lisinopril (PRINIVIL) 10 MG Tab Take 1 Tablet by mouth every evening.    valacyclovir (VALTREX) 1 GM Tab TAKE 1 TABLET TWICE A DAY  FOR 7 DAYS AS NEEDED FOR   OUTBREAK    clopidogrel (PLAVIX) 75 MG Tab Take 1 Tablet by mouth every day.    buPROPion (WELLBUTRIN SR) 200 MG SR tablet Take 2 Tablets by mouth every day for 180 days. Indications: Major Depressive Disorder    fluoxetine (PROZAC) 40 MG capsule Take 1 Capsule by mouth every day for 90 days.    mirtazapine (REMERON) 7.5 MG tablet Take 1 Tablet by mouth at bedtime as needed (For sleep) for up to 90 days.    LORazepam (ATIVAN) 2 MG tablet Take 2 mg by mouth  at bedtime.    ezetimibe (ZETIA) 10 MG Tab Take 1 Tablet by mouth every day.    NON SPECIFIED 1 Dose by Subcutaneous Infusion route see administration instructions. Ilumya injection every three months prescribed by dermatology    Magnesium 100 MG Cap Take 100 mg by mouth every evening.    ferrous sulfate 325 (65 Fe) MG tablet Take 325 mg by mouth every day.    B Complex Vitamins (VITAMIN B COMPLEX PO) Take 1 Tablet by mouth every day.    Cholecalciferol (VITAMIN D3 PO) Take 1 Tablet by mouth every day. OTC    aspirin EC (ECOTRIN) 81 MG Tablet Delayed Response Take 81 mg by mouth every bedtime.    Multiple Vitamins-Minerals (MELODY-DAY 1000 PO) Take 1 Tab by mouth 2 Times a Day.       ALLERGIES:   No Known Allergies    IMMUNIZATIONS:   Immunization History   Administered Date(s) Administered    COVID-19, mRNA, LNP-S, PF, kp-sucrose, 30 mcg/0.3 mL 09/03/2024    Covid-19 Mrna (Spikevax) Moderna 12+ Years 10/02/2023, 06/06/2024    Hepatitis A Vaccine, Adult 02/17/2011, 12/02/2011    Influenza Seasonal Injectable - Historical Data 12/12/1996, 12/17/2003, 10/23/2007, 11/11/2008, 01/13/2010    Influenza Vaccine Adult HD 11/18/2016, 09/21/2017, 09/18/2018, 09/15/2019, 10/12/2021, 09/30/2022    Influenza Vaccine Quad Inj (Pf) 11/05/2013, 10/09/2014, 09/25/2015, 09/18/2020    Influenza Vaccine, Quadrivalent, Adjuvanted (Pf) 10/02/2023    Influenza high-dose trivalent (PF) 09/03/2024    Influenza, unspecified formulation 10/28/2011, 10/30/2012, 09/30/2022    MODERNA SARS-COV-2 VACCINE (12+) 02/25/2021, 03/25/2021, 09/25/2021, 04/01/2022    PFIZER BIVALENT SARS-COV-2 VACCINE (12+) 10/06/2022, 04/27/2023    Pneumococcal Conjugate Vaccine (Prevnar/PCV-13) 11/18/2016, 09/21/2017, 09/01/2020, 09/01/2020    Pneumococcal Conjugate, unspecified formulation 09/01/2020    Pneumococcal polysaccharide vaccine (PPSV-23) 10/23/2007, 02/14/2017, 09/18/2020    RSV AREXVY VACCINE 10/02/2023, 06/06/2024    TD Vaccine 01/22/2004    Tdap Vaccine  "02/17/2011, 09/22/2017    Zoster Vaccine Live (ZVL) (Zostavax) - HISTORICAL DATA 06/20/2011, 05/07/2012, 08/17/2016    Zoster Vaccine Recombinant (RZV) (SHINGRIX) 02/17/2020, 07/27/2020       SOCIAL HISTORY:   Social History     Socioeconomic History    Marital status: Single    Highest education level: Master's degree (e.g., MA, MS, [place], MEd, MSW, LUIS)   Tobacco Use    Smoking status: Never    Smokeless tobacco: Never   Vaping Use    Vaping status: Never Used   Substance and Sexual Activity    Alcohol use: Yes     Alcohol/week: 9.6 oz     Types: 14 Glasses of wine, 2 Cans of beer per week     Comment: wine /daily    Drug use: Not Currently     Types: Inhaled     Comment: THC edible - \"once a month\".    Sexual activity: Not Currently     Social Drivers of Health     Financial Resource Strain: Low Risk  (4/3/2024)    Overall Financial Resource Strain (CARDIA)     Difficulty of Paying Living Expenses: Not hard at all   Food Insecurity: No Food Insecurity (2/26/2025)    Hunger Vital Sign     Worried About Running Out of Food in the Last Year: Never true     Ran Out of Food in the Last Year: Never true   Transportation Needs: No Transportation Needs (2/26/2025)    PRAPARE - Transportation     Lack of Transportation (Medical): No     Lack of Transportation (Non-Medical): No   Physical Activity: Insufficiently Active (4/3/2024)    Exercise Vital Sign     Days of Exercise per Week: 2 days     Minutes of Exercise per Session: 20 min   Stress: No Stress Concern Present (4/3/2024)    [facility] [place] [employer] - Occupational Stress Questionnaire     Feeling of Stress : Not at all   Social Connections: Moderately Isolated (4/3/2024)    Social Connection and Isolation Panel [NHANES]     Frequency of Communication with Friends and Family: Twice a week     Frequency of Social Gatherings with Friends and Family: Once a week     Attends Jain Services: Never     Active Member of Clubs or Organizations: No     " "Attends Club or Organization Meetings: Patient declined     Marital Status: Living with partner   Intimate Partner Violence: Not At Risk (2/26/2025)    Humiliation, Afraid, Rape, and Kick questionnaire     Fear of Current or Ex-Partner: No     Emotionally Abused: No     Physically Abused: No     Sexually Abused: No   Housing Stability: Low Risk  (2/26/2025)    Housing Stability Vital Sign     Unable to Pay for Housing in the Last Year: No     Number of Times Moved in the Last Year: 1     Homeless in the Last Year: No       FAMILY HISTORY:  Family History   Problem Relation Age of Onset    Cancer Mother 80        lung cancer    Arthritis Mother     Diabetes Mother         Type 2    Anxiety disorder Mother     Lung Disease Father 65    Cancer Father     Diabetes Father         Type 2    Hypertension Father     Hyperlipidemia Father     Hyperlipidemia Brother     Arthritis Brother     Other Brother         Thyroid tumor    Sleep Apnea Brother     No Known Problems Maternal Grandmother     Cancer Maternal Grandfather     No Known Problems Paternal Grandmother     No Known Problems Paternal Grandfather     Heart Disease Brother 49        smoker, overweight    Hypertension Brother     Depression Other     Suicide Attempts Neg Hx     Bipolar disorder Neg Hx     Alcohol abuse Neg Hx     Drug abuse Neg Hx             Objective     Vital Signs: /70   Pulse 77   Temp 36.6 °C (97.8 °F) (Temporal)   Resp 14   Ht 1.702 m (5' 7\")   Wt 80.7 kg (178 lb)   SpO2 94% Body mass index is 27.88 kg/m².    General: Appears well and comfortable  Eyes: No scleral or conjunctival lesions  ENT: No apparent oral or nasal lesions  Head/Neck: No apparent scalp or neck lesions  Cardiovascular: Regular rate and rhythm; no pericardial rubs  Respiratory: Breathing quiet and unlabored; no rales or pleural rubs  Gastrointestinal: No apparent organomegaly or abdominal masses  Integumentary: No active psoriasis  Musculoskeletal: No " significant joint tenderness, periarticular soft tissue swelling, warmth, erythema, or overt synovitis; no significant restriction in range of motion of joints examined  Neurologic: No focal sensory or motor deficits  Psychiatric: Mood and affect appropriate      LABORATORY RESULTS REVIEWED AND INTERPRETED BY ME:  Lab Results   Component Value Date/Time    CREACTPROT <0.30 05/30/2024 02:41 PM    SEDRATEWES 3 05/30/2024 02:41 PM    URICACID 6.7 10/12/2024 11:03 AM     Lab Results   Component Value Date/Time    RHEUMFACTN <10 03/21/2018 02:19 PM    CCPANTIBODY 4 03/21/2018 02:20 PM     Lab Results   Component Value Date/Time    ANTINUCAB Detected (A) 02/15/2024 08:49 AM     Lab Results   Component Value Date/Time    ANTIDNADS SEE BELOW 02/15/2024 08:49 AM    SMITHAB 3 02/15/2024 08:49 AM    RNPAB 3 02/15/2024 08:49 AM    VVRAKAO49 0 02/15/2024 08:49 AM    SSA60 0 02/15/2024 08:49 AM    SSA52 2 02/15/2024 08:49 AM    ANTISSBSJ 0 02/15/2024 08:49 AM    JO1AB 0 02/15/2024 08:49 AM     Lab Results   Component Value Date/Time    ANTIMITOCHO 3.1 10/12/2024 10:55 AM    FACTIN 6 10/12/2024 10:55 AM     Lab Results   Component Value Date/Time    MICROSOMALA 12.0 (H) 10/12/2024 10:55 AM    ANTITHYROGL <0.9 10/12/2024 10:55 AM    TSHULTRASEN 2.960 02/15/2024 08:49 AM    FREET4 0.93 02/15/2024 08:49 AM     Lab Results   Component Value Date/Time    ALDOLASE 6.1 02/15/2024 08:49 AM     Lab Results   Component Value Date/Time    25HYDROXY 91 09/26/2019 10:06 AM     Lab Results   Component Value Date/Time    FERRITIN 48.6 04/27/2022 09:48 AM    IRON 116 09/26/2019 10:11 AM    TRANSFERRIN 246 09/26/2019 10:06 AM    FOLATE 32.6 04/27/2022 09:48 AM    G6PD 10.9 09/17/2019 10:30 AM    PROTHROMBTM 13.3 02/26/2025 08:05 AM    INR 1.01 02/26/2025 08:05 AM     Lab Results   Component Value Date/Time    WBC 7.5 02/27/2025 04:46 AM    RBC 4.02 (L) 02/27/2025 04:46 AM    HEMOGLOBIN 12.5 (L) 02/27/2025 04:46 AM    HEMATOCRIT 38.8 (L) 02/27/2025  04:46 AM    MCV 96.5 02/27/2025 04:46 AM    MCH 31.1 02/27/2025 04:46 AM    MCHC 32.2 (L) 02/27/2025 04:46 AM    RDW 44.1 02/27/2025 04:46 AM    PLATELETCT 117 (L) 02/27/2025 04:46 AM    MPV 11.2 02/27/2025 04:46 AM    NEUTS 4.58 02/03/2025 10:21 AM    LYMPHOCYTES 26.10 02/03/2025 10:21 AM    MONOCYTES 9.30 02/03/2025 10:21 AM    EOSINOPHILS 3.30 02/03/2025 10:21 AM    BASOPHILS 0.70 02/03/2025 10:21 AM     Lab Results   Component Value Date/Time    ASTSGOT 35 02/26/2025 08:05 AM    ALTSGPT 44 02/26/2025 08:05 AM    ALKPHOSPHAT 81 02/26/2025 08:05 AM    TBILIRUBIN 0.4 02/26/2025 08:05 AM    TOTPROTEIN 6.8 02/26/2025 08:05 AM    ALBUMIN 4.1 02/26/2025 08:05 AM     Lab Results   Component Value Date/Time    SODIUM 138 02/27/2025 09:55 AM    POTASSIUM 4.2 02/27/2025 09:55 AM    CHLORIDE 104 02/27/2025 09:55 AM    CO2 23 02/27/2025 09:55 AM    GLUCOSE 97 02/27/2025 09:55 AM    BUN 11 02/27/2025 09:55 AM    CREATININE 1.21 02/27/2025 09:55 AM    CALCIUM 9.4 02/27/2025 09:55 AM     Lab Results   Component Value Date/Time    COLORURINE DK Yellow 08/15/2022 10:31 AM    SPECGRAVITY 1.022 08/15/2022 10:31 AM    PHURINE 6.5 08/15/2022 10:31 AM    GLUCOSEUR Negative 08/15/2022 10:31 AM    KETONES Negative 08/15/2022 10:31 AM    PROTEINURIN Negative 08/15/2022 10:31 AM     Lab Results   Component Value Date/Time    QNTTBGOLD Negative 08/16/2018 12:13 PM     Lab Results   Component Value Date/Time    HEPBSAG Non-Reactive 07/19/2023 12:12 PM    HEPBCORIGM Non-Reactive 07/19/2023 12:12 PM    HEPCAB Non-Reactive 07/19/2023 12:12 PM     Lab Results   Component Value Date/Time    CHOLSTRLTOT 181 10/12/2024 11:00 AM    LDL 68 10/12/2024 11:00 AM    HDL 95 10/12/2024 11:00 AM    TRIGLYCERIDE 90 10/12/2024 11:00 AM       RADIOLOGY RESULTS REVIEWED AND INTERPRETED BY ME: See above      All relevant laboratory and imaging results reported on this note were reviewed and interpreted by me.         Assessment & Plan     Buster Medina is  a 74 y.o. male with history as noted above whose presentation merits the following clinical impressions and recommendations:    1. Psoriasis  Diagnosed with psoriasis in the late 1990's however, the diagnosis of psoriatic arthritic was given some time later solely to get adalimumab for his psoriasis as other agents were ineffective per patient.  It does not appear that he had inflammatory arthritis at that time or currently.  In review of several imaging studies, there are questionable possible erosions versus cystic changes on x-rays, so reasonable to obtain 2 view x-rays to better evaluate for this.  He is established with dermatology and is followed annually.  Presently with no concerns for inflammatory arthritis by history or exam that would suggest psoriatic arthritis.  - Continue tildrakizumab per dermatology  - DX-HAND 2- RIGHT; Future  - DX-FOOT-2- RIGHT; Future  - DX-HAND 2- LEFT; Future  - DX-FOOT-2- LEFT; Future    2. Immunosuppressed status (HCC)  Presently with no history, physical, or laboratory evidence to suggest significant adverse drug effects or opportunistic infections.    3. Osteoarthritis involving multiple joints on both sides of body  S/p arthroplasties of several joints including the shoulders, R knee, and left wrist.  - Consider trial of Voltaren 1% gel 3-4 times daily as needed, topical lidocaine or capsaicin  - Extra strength Tylenol or oral NSAIDs prn     4. Malignant neoplasm of anterior wall of urinary bladder (HCC)  Remote history of this, treated with excision.  No history of chemoradiation.    The above assessment and plan were discussed with the patient who acknowledged understanding of the plan.    FOLLOW-UP: Return TBD.         Thank you for the opportunity to participate in the care of Buster Medina.    Leticia Duke D.O.  Rheumatologist, [facility], [facility]

## 2025-03-19 NOTE — PATIENT INSTRUCTIONS
Thank you for visiting our clinic today.     Summary of your visit:    Follow up in TBD.    [facility] RHEUMATOLOGY AFTER VISIT GUIDE  Below are important guidelines to help you navigate your health care needs and assist us in caring for you safely and  effectively. We encourage you to carefully read and understand this information and adhere to them accordingly.    REQQI Messaging and Phone Calls:   Diagnosis and Treatment - For a detailed explanation of your condition and treatment plan from today’s visit, refer  to the visit note on REQQI via the following steps:  o Log in to REQQI and click on “Visits” at the top.  o Scroll down to “Past Visits” under Appointments.  o Click on “View Notes” under the appropriate visit date.   Questions or Concerns - Clicks for a [facility] messaging is for non-urgent matters that do not require immediate attention and  should be brief with no more than two questions or concerns. If you have multiple questions or concerns, we ask that  you schedule an appointment to have them properly addressed.   Response to Messages - REQQI messages are addressed throughout the week depending on clinical availability,  so we ask that you allow up to one week for a response.   Phone Calls and Voicemails - Phone calls and voicemail messages are reserved for time-sensitive matters that  cannot wait to be addressed via REQQI. We ask that you refrain from calling the office multiple times or leaving  multiple voicemails regarding the same issue as doing so may lead to delays in response time.   Urgent Issues - For urgent medical matters or medical emergencies that cannot wait, you are advised to go to your  nearest Urgent Care or Emergency Department for immediate attention.    Laboratory Tests and Imaging Studies:   Future Lab and Imaging Orders - We ask that you get your lab tests and imaging studies done no later than one  week before your follow-up visit unless instructed otherwise.   Results Communication  - You may see some test results marked as “abnormal” that are not necessarily significant  or concerning. If there are significant abnormalities on your test results that warrant further action, you will be notified  via MyChart or phone call, otherwise they will be addressed at your follow-up visit.    Prescriptions and Refill Requests:   General Prescriptions (e.g. prednisone, hydroxychloroquine, leflunomide, methotrexate, etc.) - These are sent  to Retail Pharmacies, so all refill requests of these medications should be directed to your local pharmacy.   Specialty Prescriptions (e.g. Enbrel, Humira, Cosentyx, Xeljanz, etc.) - These are sent to Specialty Pharmacies,  so all refill requests of these medications should be directed to your designated specialty pharmacy.   Infusion Prescriptions (e.g. Remicade, Simponi Aria, Rituxan, Saphnelo, etc.) - These are sent to Outpatient  Infusion Centers, so all scheduling requests of these medications should be directed to your local infusion center.    Medication Risks and Adverse Effects:   Immunosuppressed Status - Steroids and antirheumatic drugs are immunosuppressants, so they increase the risk  of infections and can have side effects on various organ systems in your body, though most of them are uncommon.   Potential Side Effects - Be sure to read the drug package inserts to learn about the potential side effects of your  medications before you start taking them and take them exactly as prescribed unless instructed otherwise.   In Case of Side Effects - If you experience any significant side effects, stop taking the medication immediately and  promptly notify the prescriber. Depending on the severity of the side effects, consider going to an Urgent Care or  Emergency Department for immediate attention.    Immunizations and Health Screening:   Vaccinations - If you are on immunosuppressive therapy, it is important that you are up to date on  age-appropriate  immunizations, particularly shingles and pneumonia vaccines, which you can request from your primary care provider  or from us at your next appointment.   Screening Tests - It is also important that you are up to date on age-appropriate screening tests, such as pap  smear, mammography, and colonoscopy, which you can request from your primary care provider.    Educational and Supportive Resources:   [facility] ([email or web address]) - Visit our website to learn more about  your condition and other rheumatic diseases, and gain access to many helpful resources for them.   Disposal of Old Medications (www.[place].gov/everyday-takeback-day) - Visit the Drug Enforcement Administration  website to find a nearby location where you can properly dispose of old medications you no longer need.   Disposal of Used [place] (www.safeneedledisposal.org) - Visit the Safe Needle Disposal Organization website to  find a nearby location where you can properly dispose of used needles from your injectable medications.  Revised 6/14/2024

## 2025-03-25 ENCOUNTER — APPOINTMENT (OUTPATIENT)
Dept: RADIOLOGY | Facility: MEDICAL CENTER | Age: 75
End: 2025-03-25
Attending: STUDENT IN AN ORGANIZED HEALTH CARE EDUCATION/TRAINING PROGRAM
Payer: MEDICARE

## 2025-03-25 DIAGNOSIS — L40.9 PSORIASIS: ICD-10-CM

## 2025-03-25 PROCEDURE — 73620 X-RAY EXAM OF FOOT: CPT | Mod: RT

## 2025-03-25 PROCEDURE — 73120 X-RAY EXAM OF HAND: CPT | Mod: RT

## 2025-03-25 PROCEDURE — 73120 X-RAY EXAM OF HAND: CPT | Mod: LT

## 2025-03-25 PROCEDURE — 73620 X-RAY EXAM OF FOOT: CPT | Mod: LT

## 2025-03-26 ENCOUNTER — RESULTS FOLLOW-UP (OUTPATIENT)
Dept: MEDICAL GROUP | Age: 75
End: 2025-03-26

## 2025-03-26 ENCOUNTER — HOSPITAL ENCOUNTER (OUTPATIENT)
Facility: MEDICAL CENTER | Age: 75
End: 2025-03-26
Attending: FAMILY MEDICINE
Payer: MEDICARE

## 2025-03-26 DIAGNOSIS — D50.8 OTHER IRON DEFICIENCY ANEMIA: ICD-10-CM

## 2025-03-26 DIAGNOSIS — E78.2 MIXED HYPERLIPIDEMIA: ICD-10-CM

## 2025-03-26 DIAGNOSIS — N40.1 BENIGN PROSTATIC HYPERPLASIA WITH LOWER URINARY TRACT SYMPTOMS, SYMPTOM DETAILS UNSPECIFIED: ICD-10-CM

## 2025-03-26 LAB
25(OH)D3 SERPL-MCNC: 115 NG/ML (ref 30–100)
ALBUMIN SERPL BCP-MCNC: 4.3 G/DL (ref 3.2–4.9)
ALBUMIN/GLOB SERPL: 1.7 G/DL
ALP SERPL-CCNC: 83 U/L (ref 30–99)
ALT SERPL-CCNC: 37 U/L (ref 2–50)
ANION GAP SERPL CALC-SCNC: 9 MMOL/L (ref 7–16)
AST SERPL-CCNC: 30 U/L (ref 12–45)
BASOPHILS # BLD AUTO: 0.6 % (ref 0–1.8)
BASOPHILS # BLD: 0.05 K/UL (ref 0–0.12)
BILIRUB SERPL-MCNC: 0.4 MG/DL (ref 0.1–1.5)
BUN SERPL-MCNC: 21 MG/DL (ref 8–22)
CALCIUM ALBUM COR SERPL-MCNC: 9.5 MG/DL (ref 8.5–10.5)
CALCIUM SERPL-MCNC: 9.7 MG/DL (ref 8.4–10.2)
CHLORIDE SERPL-SCNC: 103 MMOL/L (ref 96–112)
CHOLEST SERPL-MCNC: 158 MG/DL (ref 100–199)
CO2 SERPL-SCNC: 27 MMOL/L (ref 20–33)
CREAT SERPL-MCNC: 1.38 MG/DL (ref 0.5–1.4)
EOSINOPHIL # BLD AUTO: 0.15 K/UL (ref 0–0.51)
EOSINOPHIL NFR BLD: 1.9 % (ref 0–6.9)
ERYTHROCYTE [DISTWIDTH] IN BLOOD BY AUTOMATED COUNT: 43.2 FL (ref 35.9–50)
FASTING STATUS PATIENT QL REPORTED: NORMAL
FERRITIN SERPL-MCNC: 124 NG/ML (ref 22–322)
GFR SERPLBLD CREATININE-BSD FMLA CKD-EPI: 54 ML/MIN/1.73 M 2
GLOBULIN SER CALC-MCNC: 2.6 G/DL (ref 1.9–3.5)
GLUCOSE SERPL-MCNC: 106 MG/DL (ref 65–99)
HCT VFR BLD AUTO: 44.7 % (ref 42–52)
HDLC SERPL-MCNC: 85 MG/DL
HGB BLD-MCNC: 14.7 G/DL (ref 14–18)
IMM GRANULOCYTES # BLD AUTO: 0.03 K/UL (ref 0–0.11)
IMM GRANULOCYTES NFR BLD AUTO: 0.4 % (ref 0–0.9)
IRON SATN MFR SERPL: 34 % (ref 15–55)
IRON SERPL-MCNC: 106 UG/DL (ref 50–180)
LDLC SERPL CALC-MCNC: 55 MG/DL
LYMPHOCYTES # BLD AUTO: 2.23 K/UL (ref 1–4.8)
LYMPHOCYTES NFR BLD: 28.8 % (ref 22–41)
MCH RBC QN AUTO: 32.2 PG (ref 27–33)
MCHC RBC AUTO-ENTMCNC: 32.9 G/DL (ref 32.3–36.5)
MCV RBC AUTO: 98 FL (ref 81.4–97.8)
MONOCYTES # BLD AUTO: 0.73 K/UL (ref 0–0.85)
MONOCYTES NFR BLD AUTO: 9.4 % (ref 0–13.4)
NEUTROPHILS # BLD AUTO: 4.55 K/UL (ref 1.82–7.42)
NEUTROPHILS NFR BLD: 58.9 % (ref 44–72)
NRBC # BLD AUTO: 0 K/UL
NRBC BLD-RTO: 0 /100 WBC (ref 0–0.2)
PLATELET # BLD AUTO: 150 K/UL (ref 164–446)
PMV BLD AUTO: 11.6 FL (ref 9–12.9)
POTASSIUM SERPL-SCNC: 4.3 MMOL/L (ref 3.6–5.5)
PROT SERPL-MCNC: 6.9 G/DL (ref 6–8.2)
PSA SERPL-MCNC: 0.91 NG/ML (ref 0–4)
RBC # BLD AUTO: 4.56 M/UL (ref 4.7–6.1)
SODIUM SERPL-SCNC: 139 MMOL/L (ref 135–145)
T3FREE SERPL-MCNC: 2.34 PG/ML (ref 2–4.4)
T4 FREE SERPL-MCNC: 0.77 NG/DL (ref 0.93–1.7)
TIBC SERPL-MCNC: 310 UG/DL (ref 250–450)
TRIGL SERPL-MCNC: 92 MG/DL (ref 0–149)
TSH SERPL DL<=0.005 MIU/L-ACNC: 1.96 UIU/ML (ref 0.38–5.33)
UIBC SERPL-MCNC: 204 UG/DL (ref 110–370)
WBC # BLD AUTO: 7.7 K/UL (ref 4.8–10.8)

## 2025-03-26 PROCEDURE — 80061 LIPID PANEL: CPT

## 2025-03-26 PROCEDURE — 83550 IRON BINDING TEST: CPT

## 2025-03-26 PROCEDURE — 80053 COMPREHEN METABOLIC PANEL: CPT

## 2025-03-26 PROCEDURE — 84153 ASSAY OF PSA TOTAL: CPT

## 2025-03-26 PROCEDURE — 84439 ASSAY OF FREE THYROXINE: CPT

## 2025-03-26 PROCEDURE — 83540 ASSAY OF IRON: CPT

## 2025-03-26 PROCEDURE — 82306 VITAMIN D 25 HYDROXY: CPT | Mod: GA

## 2025-03-26 PROCEDURE — 84443 ASSAY THYROID STIM HORMONE: CPT

## 2025-03-26 PROCEDURE — 36415 COLL VENOUS BLD VENIPUNCTURE: CPT

## 2025-03-26 PROCEDURE — 85025 COMPLETE CBC W/AUTO DIFF WBC: CPT

## 2025-03-26 PROCEDURE — 82728 ASSAY OF FERRITIN: CPT

## 2025-03-26 PROCEDURE — 84481 FREE ASSAY (FT-3): CPT

## 2025-03-28 ENCOUNTER — OFFICE VISIT (OUTPATIENT)
Dept: URGENT CARE | Facility: CLINIC | Age: 75
End: 2025-03-28
Payer: MEDICARE

## 2025-03-28 ENCOUNTER — PATIENT MESSAGE (OUTPATIENT)
Dept: CARDIOLOGY | Facility: MEDICAL CENTER | Age: 75
End: 2025-03-28

## 2025-03-28 VITALS
RESPIRATION RATE: 16 BRPM | HEART RATE: 66 BPM | SYSTOLIC BLOOD PRESSURE: 124 MMHG | TEMPERATURE: 98.2 F | DIASTOLIC BLOOD PRESSURE: 72 MMHG | WEIGHT: 183.3 LBS | BODY MASS INDEX: 28.77 KG/M2 | OXYGEN SATURATION: 97 % | HEIGHT: 67 IN

## 2025-03-28 DIAGNOSIS — M10.9 EXACERBATION OF GOUT: ICD-10-CM

## 2025-03-28 PROCEDURE — 3074F SYST BP LT 130 MM HG: CPT

## 2025-03-28 PROCEDURE — 3078F DIAST BP <80 MM HG: CPT

## 2025-03-28 PROCEDURE — 99213 OFFICE O/P EST LOW 20 MIN: CPT

## 2025-03-28 RX ORDER — PREDNISONE 20 MG/1
40 TABLET ORAL DAILY
Qty: 10 TABLET | Refills: 0 | Status: SHIPPED | OUTPATIENT
Start: 2025-03-28 | End: 2025-04-02

## 2025-03-28 ASSESSMENT — FIBROSIS 4 INDEX: FIB4 SCORE: 2.4

## 2025-03-28 ASSESSMENT — ENCOUNTER SYMPTOMS
CARDIOVASCULAR NEGATIVE: 1
RESPIRATORY NEGATIVE: 1
NEUROLOGICAL NEGATIVE: 1
CONSTITUTIONAL NEGATIVE: 1

## 2025-03-28 NOTE — PROGRESS NOTES
Subjective:   Chief Complaint  Buster Medina is a 74 y.o. male who presents for Gout (X 6 days, possible gout flare up of Rt foot.)      History of Present Illness  Patient presents with complaints of a gout flareup along the greater toe of his right foot for the past 6 days.  He reports an achy pain along his greater toe he states that he has had multiple gout flareups and he will typically take prednisone which helps clears it up.  He states that he has had his blood work done recently and it looks good.  He denies any lifestyle changes that could aid in gout exacerbation.  Denies any body aches, fever, chills.        Review of Systems  Review of Systems   Constitutional: Negative.    HENT: Negative.     Respiratory: Negative.     Cardiovascular: Negative.    Skin:         Greater toe redness     Neurological: Negative.        Past Medical History  Past Medical History:   Diagnosis Date    Anxiety     Anxiety, generalized 11/27/2023    Arthritis     osteo    Awareness under anesthesia     Bowel habit changes 06/24/2021    Constipation    Cancer ([facility]) 2007    bladder    Cataract 12/2024    repair completed 01/07/2025    Chickenpox     as a child    Depression     depression    Glaucoma 12/2024    Heart valve disease 10/2024    Mitral regurgatation    High cholesterol     Hyperlipidemia     Hypertension     pt states  well controlled on meds    Kidney stone     Mumps     as a child     Obesity     Pneumothorax     Psoriatic arthritis ([facility])     Recurrent major depressive disorder, in partial remission ([facility]) 09/21/2017    Restless leg syndrome     Sleep apnea     uses cpap    Tonsillitis        Family History  Family History   Problem Relation Age of Onset    Cancer Mother 80        lung cancer    Arthritis Mother     Diabetes Mother         Type 2    Anxiety disorder Mother     Lung Disease Father 65    Cancer Father     Diabetes Father         Type 2    Hypertension Father     Hyperlipidemia Father      "Hyperlipidemia Brother     Arthritis Brother     Other Brother         Thyroid tumor    Sleep Apnea Brother     No Known Problems Maternal Grandmother     Cancer Maternal Grandfather     No Known Problems Paternal Grandmother     No Known Problems Paternal Grandfather     Heart Disease Brother 49        smoker, overweight    Hypertension Brother     Depression Other     Suicide Attempts Neg Hx     Bipolar disorder Neg Hx     Alcohol abuse Neg Hx     Drug abuse Neg Hx        Social History  Social History     Socioeconomic History    Marital status: Single    Highest education level: Master's degree (e.g., MA, MS, [place], MEd, MSW, LUIS)   Tobacco Use    Smoking status: Never    Smokeless tobacco: Never   Vaping Use    Vaping status: Never Used   Substance and Sexual Activity    Alcohol use: Yes     Alcohol/week: 9.6 oz     Types: 14 Glasses of wine, 2 Cans of beer per week     Comment: wine /daily    Drug use: Not Currently     Types: Inhaled     Comment: THC edible - \"once a month\".    Sexual activity: Not Currently     Social Drivers of Health     Financial Resource Strain: Low Risk  (4/3/2024)    Overall Financial Resource Strain (CARDIA)     Difficulty of Paying Living Expenses: Not hard at all   Food Insecurity: No Food Insecurity (2/26/2025)    Hunger Vital Sign     Worried About Running Out of Food in the Last Year: Never true     Ran Out of Food in the Last Year: Never true   Transportation Needs: No Transportation Needs (2/26/2025)    PRAPARE - Transportation     Lack of Transportation (Medical): No     Lack of Transportation (Non-Medical): No   Physical Activity: Insufficiently Active (4/3/2024)    Exercise Vital Sign     Days of Exercise per Week: 2 days     Minutes of Exercise per Session: 20 min   Stress: No Stress Concern Present (4/3/2024)    [place] [employer] - Occupational Stress Questionnaire     Feeling of Stress : Not at all   Social Connections: Moderately Isolated (4/3/2024) "    Social Connection and Isolation Panel [NHANES]     Frequency of Communication with Friends and Family: Twice a week     Frequency of Social Gatherings with Friends and Family: Once a week     Attends Yarsanism Services: Never     Active Member of Clubs or Organizations: No     Attends Club or Organization Meetings: Patient declined     Marital Status: Living with partner   Intimate Partner Violence: Not At Risk (2/26/2025)    Humiliation, Afraid, Rape, and Kick questionnaire     Fear of Current or Ex-Partner: No     Emotionally Abused: No     Physically Abused: No     Sexually Abused: No   Housing Stability: Low Risk  (2/26/2025)    Housing Stability Vital Sign     Unable to Pay for Housing in the Last Year: No     Number of Times Moved in the Last Year: 1     Homeless in the Last Year: No       Surgical History  Past Surgical History:   Procedure Laterality Date    TRANSCATHETER MITRAL VALVE REPAIR Right 2/4/2025    Procedure: TRANSCATHETER MITRAL VALVE PROCEDURE;  Surgeon: Davi Caraballo M.D.;  Location: [facility];  Service: Cardiac    ECHOCARDIOGRAM, TRANSESOPHAGEAL, INTRAOPERATIVE N/A 2/4/2025    Procedure: ECHOCARDIOGRAM, TRANSESOPHAGEAL, INTRAOPERATIVE;  Surgeon: Davi Caraballo M.D.;  Location: [facility];  Service: Cardiac    PB INCISE FINGER TENDON SHEATH Left 09/19/2023    Procedure: LEFT INDEX FINGER TRIGGER RELEASE;  Surgeon: Td Soliman M.D.;  Location: [facility];  Service: Orthopedics    PB INCISE FINGER TENDON SHEATH Left 08/30/2022    Procedure: LEFT MIDDLE FINGER TRIGGER RELEASE;  Surgeon: Td Soliman M.D.;  Location: [facility];  Service: Orthopedics    AL CYSTOURETHROSCOPY,BIOPSIES N/A 08/17/2022    Procedure: CYSTOSCOPY;  Surgeon: Jerzy Jasso M.D.;  Location: [facility];  Service: Urology    AL CYSTOURETHROSCOPY,BIOPSIES  07/06/2021    Procedure: BIOPSY, BLADDER WITH FULGERATION;  Surgeon: Bob FIERRO  JIM Millan;  Location: SURGERY [facility];  Service: Urology    KNEE ARTHROPLASTY TOTAL Right 2021    BLADDER BIOPSY WITH CYSTOSCOPY  01/03/2018    Procedure: BLADDER BIOPSY WITH CYSTOSCOPY/WITH UPPER TRACT WASHING;  Surgeon: El Manuel M.D.;  Location: SURGERY [facility];  Service: Urology    RETROGRADES Bilateral 01/03/2018    Procedure: RETROGRADES;  Surgeon: El Manuel M.D.;  Location: SURGERY [facility];  Service: Urology    PYELOGRAM Bilateral 01/03/2018    Procedure: PYELOGRAM;  Surgeon: El Manuel M.D.;  Location: SURGERY [facility];  Service: Urology    OTHER ORTHOPEDIC SURGERY  2015    shoulder replacement    ABDOMINAL EXPLORATION      ARTHROPLASTY      right shoulder    BLADDER BIOPSY WITH CYSTOSCOPY      EYE SURGERY      lasik     GASTRIC BYPASS LAPAROSCOPIC      HERNIA REPAIR      OPEN REDUCTION      OTHER Left     thumb joint    OTHER ABDOMINAL SURGERY  2002    gastric bypass    THORACOTOMY      TURP-VAPOR         Current Medications  Home Medications       Reviewed by Alphonse Goldsmith Ass't (Medical Assistant) on 03/28/25 at 1606  Med List Status: <None>     Medication Last Dose Status   aspirin EC (ECOTRIN) 81 MG Tablet Delayed Response Taking Active   atorvastatin (LIPITOR) 80 MG tablet Taking Active   B Complex Vitamins (VITAMIN B COMPLEX PO) Taking Active   buPROPion (WELLBUTRIN SR) 200 MG SR tablet Taking Active   Cholecalciferol (VITAMIN D3 PO) Taking Active   clopidogrel (PLAVIX) 75 MG Tab Taking Active   ezetimibe (ZETIA) 10 MG Tab Taking Active   ferrous sulfate 325 (65 Fe) MG tablet Taking Active   fluoxetine (PROZAC) 40 MG capsule Taking Active   lisinopril (PRINIVIL) 10 MG Tab Taking Active   LORazepam (ATIVAN) 2 MG tablet Taking Active   Magnesium 100 MG Cap Taking Active   mirtazapine (REMERON) 7.5 MG tablet Taking Active   Multiple Vitamins-Minerals (MELODY-DAY 1000 PO) Taking Active   NON SPECIFIED Taking Active   omeprazole (PRILOSEC) 20 MG  "delayed-release capsule Taking Active   valacyclovir (VALTREX) 1 GM Tab Taking Active                    Allergies  No Known Allergies       Objective:     /72   Pulse 66   Temp 36.8 °C (98.2 °F) (Temporal)   Resp 16   Ht 1.702 m (5' 7\")   Wt 83.1 kg (183 lb 4.8 oz)   SpO2 97%     Physical Exam  Constitutional:       Appearance: Normal appearance.   HENT:      Head: Normocephalic and atraumatic.      Right Ear: Tympanic membrane, ear canal and external ear normal.      Left Ear: Tympanic membrane, ear canal and external ear normal.      Nose: Nose normal.      Mouth/Throat:      Mouth: Mucous membranes are moist.      Pharynx: Oropharynx is clear.   Eyes:      Conjunctiva/sclera: Conjunctivae normal.      Pupils: Pupils are equal, round, and reactive to light.   Cardiovascular:      Rate and Rhythm: Normal rate and regular rhythm.      Pulses: Normal pulses.      Heart sounds: Normal heart sounds.   Pulmonary:      Effort: Pulmonary effort is normal.      Breath sounds: Normal breath sounds.   Abdominal:      General: Bowel sounds are normal.   Musculoskeletal:        Feet:    Feet:      Right foot:      Skin integrity: Erythema and warmth present.   Skin:     General: Skin is warm and dry.      Capillary Refill: Capillary refill takes less than 2 seconds.      Findings: Erythema present.          Neurological:      General: No focal deficit present.      Mental Status: He is alert and oriented to person, place, and time.   Psychiatric:         Mood and Affect: Mood normal.         Behavior: Behavior normal.         Assessment/Plan:     Diagnosis  1. Exacerbation of gout  - predniSONE (DELTASONE) 20 MG Tab; Take 2 Tablets by mouth every day for 5 days.  Dispense: 10 Tablet; Refill: 0        MDM/Plan/Discussion  I suspect that the patient is currently having a gout flareup.  He reports previous attacks, there is joint redness and the first metatarsophalangeal joint is involved.  The patient will be " prescribed a burst therapy of prednisone.  He states that prednisone is typically the medication he will take for flareups and that it works well for him.        Differential diagnosis, natural history, supportive care, and indications for immediate follow-up discussed.    Advised the patient to follow-up with the primary care physician for recheck, reevaluation, and consideration of further management.    Advised the patient to return or seek immediate medical attention if symptoms worsen or new symptoms develop    Please note that this dictation was created using voice recognition software. I have made a reasonable attempt to correct obvious errors, but I expect that there are errors of grammar and possibly content that I did not discover before finalizing the note.    This note was electronically signed by JOSEPH Baird

## 2025-03-30 ENCOUNTER — RESULTS FOLLOW-UP (OUTPATIENT)
Dept: RHEUMATOLOGY | Facility: MEDICAL CENTER | Age: 75
End: 2025-03-30

## 2025-03-31 ENCOUNTER — PATIENT MESSAGE (OUTPATIENT)
Dept: CARDIOLOGY | Facility: MEDICAL CENTER | Age: 75
End: 2025-03-31
Payer: MEDICARE

## 2025-04-01 NOTE — TELEPHONE ENCOUNTER
Rocio Landaverde, ALFREDO.P.RRobertaN. to Me       3/31/25  5:00 PM  No lower dose. Unfortunately, we don't take him off for bruising. He won't be on this long term though. [place]

## 2025-04-02 ENCOUNTER — APPOINTMENT (OUTPATIENT)
Dept: MEDICAL GROUP | Age: 75
End: 2025-04-02
Payer: MEDICARE

## 2025-04-02 VITALS
DIASTOLIC BLOOD PRESSURE: 70 MMHG | WEIGHT: 183 LBS | HEIGHT: 67 IN | HEART RATE: 65 BPM | SYSTOLIC BLOOD PRESSURE: 110 MMHG | TEMPERATURE: 96.4 F | OXYGEN SATURATION: 98 % | BODY MASS INDEX: 28.72 KG/M2

## 2025-04-02 DIAGNOSIS — M10.09 ACUTE IDIOPATHIC GOUT OF MULTIPLE SITES: ICD-10-CM

## 2025-04-02 PROBLEM — M10.9 ACUTE GOUT OF MULTIPLE SITES: Status: ACTIVE | Noted: 2025-04-02

## 2025-04-02 PROCEDURE — 99214 OFFICE O/P EST MOD 30 MIN: CPT | Performed by: FAMILY MEDICINE

## 2025-04-02 PROCEDURE — 3074F SYST BP LT 130 MM HG: CPT | Performed by: FAMILY MEDICINE

## 2025-04-02 PROCEDURE — 3078F DIAST BP <80 MM HG: CPT | Performed by: FAMILY MEDICINE

## 2025-04-02 RX ORDER — PREDNISONE 10 MG/1
TABLET ORAL
Qty: 12 TABLET | Refills: 0 | Status: SHIPPED | OUTPATIENT
Start: 2025-04-02 | End: 2025-04-17 | Stop reason: SDUPTHER

## 2025-04-02 RX ORDER — HYDROCODONE BITARTRATE AND ACETAMINOPHEN 7.5; 325 MG/1; MG/1
1 TABLET ORAL
Qty: 30 TABLET | Refills: 0 | Status: SHIPPED | OUTPATIENT
Start: 2025-04-02 | End: 2025-05-02

## 2025-04-02 ASSESSMENT — FIBROSIS 4 INDEX: FIB4 SCORE: 2.4

## 2025-04-02 NOTE — PROGRESS NOTES
This medical record contains text that has been entered with the assistance of computer voice recognition and dictation software.  Therefore, it may contain unintended errors in text, spelling, punctuation, or grammar      Verbal consent was acquired by the patient to use [employer] ambient listening note generation during this visit Yes       Chief Complaint   Patient presents with    Lab Results     Lab review     Gout     Began feeling symptoms since last Wednesday and began Prednisone since Friday          Buster Medina is a 74 y.o. male here evaluation and management of: gout      HPI:           1. Acute idiopathic gout of multiple sites  History of Present Illness  The patient is a 74-year-old male who presents for evaluation of a gout attack.    He reports experiencing a gout attack, localized to his right big toe, which continues to cause discomfort. He was previously prescribed prednisone, which provided relief during his last episode, but has not been effective in managing his current symptoms. He has been on a regimen of steroids.  He was not given colchicine because he has a history of renal insufficiency they wanted to avoid NSAIDs.    He also mentions a history of low red blood cell count and platelet count, although not significantly low.         Latest Reference Range & Units 03/26/25 09:22   Creatinine 0.50 - 1.40 mg/dL 1.38   GFR (CKD-EPI) >60 mL/min/1.73 m 2 54 !   Calcium 8.4 - 10.2 mg/dL 9.7   !: Data is abnormal    Current medicines (including changes today)  Current Outpatient Medications   Medication Sig Dispense Refill    HYDROcodone-acetaminophen (NORCO) 7.5-325 MG tab Take 1 Tablet by mouth every 24 hours as needed for Moderate Pain for up to 30 days. 30 Tablet 0    predniSONE (DELTASONE) 10 MG Tab Take 3 tabs for 2 days, then 2 tabs for 2 days, then 1 tab for 2 days, 12 Tablet 0    predniSONE (DELTASONE) 20 MG Tab Take 2 Tablets by mouth every day for 5 days. 10 Tablet 0     atorvastatin (LIPITOR) 80 MG tablet Take 1 Tablet by mouth every day. 90 Tablet 3    omeprazole (PRILOSEC) 20 MG delayed-release capsule Take 1 Capsule by mouth every day. (Patient taking differently: Take 20 mg by mouth 2 times a day.) 180 Capsule 3    lisinopril (PRINIVIL) 10 MG Tab Take 1 Tablet by mouth every evening. 90 Tablet 1    valacyclovir (VALTREX) 1 GM Tab TAKE 1 TABLET TWICE A DAY  FOR 7 DAYS AS NEEDED FOR   OUTBREAK 24 Tablet 5    clopidogrel (PLAVIX) 75 MG Tab Take 1 Tablet by mouth every day. 90 Tablet 3    buPROPion (WELLBUTRIN SR) 200 MG SR tablet Take 2 Tablets by mouth every day for 180 days. Indications: Major Depressive Disorder 180 Tablet 1    fluoxetine (PROZAC) 40 MG capsule Take 1 Capsule by mouth every day for 90 days. 30 Capsule 2    mirtazapine (REMERON) 7.5 MG tablet Take 1 Tablet by mouth at bedtime as needed (For sleep) for up to 90 days. 90 Tablet 0    LORazepam (ATIVAN) 2 MG tablet Take 2 mg by mouth at bedtime.      ezetimibe (ZETIA) 10 MG Tab Take 1 Tablet by mouth every day. 90 Tablet 1    NON SPECIFIED 1 Dose by Subcutaneous Infusion route see administration instructions. Ilumya injection every three months prescribed by dermatology      Magnesium 100 MG Cap Take 100 mg by mouth every evening.      ferrous sulfate 325 (65 Fe) MG tablet Take 325 mg by mouth every day.      B Complex Vitamins (VITAMIN B COMPLEX PO) Take 1 Tablet by mouth every day.      Cholecalciferol (VITAMIN D3 PO) Take 1 Tablet by mouth every day. OTC      aspirin EC (ECOTRIN) 81 MG Tablet Delayed Response Take 81 mg by mouth every bedtime. 30 Tab 0    Multiple Vitamins-Minerals (MELODY-DAY 1000 PO) Take 1 Tab by mouth 2 Times a Day.       No current facility-administered medications for this visit.     He  has a past medical history of Anxiety, Anxiety, generalized (11/27/2023), Arthritis, Awareness under anesthesia, Bowel habit changes (06/24/2021), Cancer (HCC) (2007), Cataract (12/2024), Chickenpox,  "Depression, Glaucoma (12/2024), Heart valve disease (10/2024), High cholesterol, Hyperlipidemia, Hypertension, Kidney stone, Mumps, Obesity, Pneumothorax, Psoriatic arthritis (HCC), Recurrent major depressive disorder, in partial remission (HCC) (09/21/2017), Restless leg syndrome, Sleep apnea, and Tonsillitis.  He  has a past surgical history that includes bladder biopsy with cystoscopy; abdominal exploration; gastric bypass laparoscopic; eye surgery; hernia repair; arthroplasty; open reduction; other orthopedic surgery (2015); bladder biopsy with cystoscopy (01/03/2018); retrogrades (Bilateral, 01/03/2018); pyelogram (Bilateral, 01/03/2018); thoracotomy; other (Left); turp-vapor; pr cystourethroscopy,biopsies (07/06/2021); pr cystourethroscopy,biopsies (N/A, 08/17/2022); pb incise finger tendon sheath (Left, 08/30/2022); pb incise finger tendon sheath (Left, 09/19/2023); knee arthroplasty total (Right, 2021); other abdominal surgery (2002); transcatheter mitral valve repair (Right, 2/4/2025); and echocardiogram, transesophageal, intraoperative (N/A, 2/4/2025).  Social History     Tobacco Use    Smoking status: Never    Smokeless tobacco: Never   Vaping Use    Vaping status: Never Used   Substance Use Topics    Alcohol use: Yes     Alcohol/week: 9.6 oz     Types: 14 Glasses of wine, 2 Cans of beer per week     Comment: wine /daily    Drug use: Not Currently     Types: Inhaled     Comment: THC edible - \"once a month\".     Social History     Social History Narrative    Not on file     Family History   Problem Relation Age of Onset    Cancer Mother 80        lung cancer    Arthritis Mother     Diabetes Mother         Type 2    Anxiety disorder Mother     Lung Disease Father 65    Cancer Father     Diabetes Father         Type 2    Hypertension Father     Hyperlipidemia Father     Hyperlipidemia Brother     Arthritis Brother     Other Brother         Thyroid tumor    Sleep Apnea Brother     No Known Problems Maternal " "Grandmother     Cancer Maternal Grandfather     No Known Problems Paternal Grandmother     No Known Problems Paternal Grandfather     Heart Disease Brother 49        smoker, overweight    Hypertension Brother     Depression Other     Suicide Attempts Neg Hx     Bipolar disorder Neg Hx     Alcohol abuse Neg Hx     Drug abuse Neg Hx      Family Status   Relation Name Status    Vance Barriga     Jonnie Hogan     Nitin Epps Alive    MGMo      MGJonnie Harmon     PGMo      Nano      Nitin Lozano Alive    OTHER  (Not Specified)    Neg Hx  (Not Specified)   No partnership data on file         ROS    The pertinent  ROS findings can be seen in the HPI above.     All other systems reviewed and are negative     Objective:     /70 (BP Location: Left arm, Patient Position: Sitting, BP Cuff Size: Large adult)   Pulse 65   Temp (!) 35.8 °C (96.4 °F) (Temporal)   Ht 1.702 m (5' 7\")   Wt 83 kg (183 lb)   SpO2 98%  Body mass index is 28.66 kg/m².      Physical Exam:    Constitutional: Alert, no distress.  Skin: No suspicious lesions  Eye: Equal, round and reactive, conjunctiva clear, lids normal.  ENMT: Lips without lesions, good dentition, oropharynx clear.  Neck: Trachea midline, no masses, no thyromegaly. No cervical or supraclavicular lymphadenopathy.  Respiratory: Unlabored respiratory effort, lungs clear to auscultation, no wheezes, no ronchi.  Cardiovascular: Normal S1, S2, no murmur, no edema  Abdomen: Soft, non-tender, no masses, no hepatosplenomegaly.        Assessment and Plan:   The following treatment plan was discussed    All recent labs and provider notes reviewed    Assessment & Plan  1. Gout.  He experienced a gout attack in his right big toe. His kidney function is almost normal. He is currently on Plavix and aspirin, which precludes the use of NSAIDs due to the risk of bleeding. A prescription for hydrocodone ([place]) has been provided to manage the pain until it " subsides. Additionally, a refill of prednisone has been given. If the pain persists, a cortisone injection into the joint may be considered.    2. Low red blood cell count and platelet count.  His hemoglobin levels are within the normal range. His platelet count has shown improvement, increasing from 117 to 150. This is not concerning unless it drops below 25.        1. Acute idiopathic gout of multiple sites  - HYDROcodone-acetaminophen (NORCO) 7.5-325 MG tab; Take 1 Tablet by mouth every 24 hours as needed for Moderate Pain for up to 30 days.  Dispense: 30 Tablet; Refill: 0  - predniSONE (DELTASONE) 10 MG Tab; Take 3 tabs for 2 days, then 2 tabs for 2 days, then 1 tab for 2 days,  Dispense: 12 Tablet; Refill: 0    Other orders  - Consent for Opiate Prescription             Instructed to Follow up in clinic or ER for worsening symptoms, difficulty breathing, lack of expected recovery, or should new symptoms or problems arise.    Followup: Return in about 6 months (around 10/2/2025) for Reevaluation.

## 2025-04-16 ASSESSMENT — RHEUMATOLOGY FOLLOW-UP QUESTIONNAIRE
ALLEVIATING_FACTORS: PREDNISONE
RATE_1TO10: 6
DURATION: <30 MINS
JOINT PAIN: WORSE WITH ACTIVITY
CHARACTERISTIC: SAME WITH ACTIVITY
HAIR SHEDDING: Y
MARK ALL THE AREAS OF PAIN: 118062794

## 2025-04-17 ENCOUNTER — OFFICE VISIT (OUTPATIENT)
Dept: RHEUMATOLOGY | Facility: MEDICAL CENTER | Age: 75
End: 2025-04-17
Attending: STUDENT IN AN ORGANIZED HEALTH CARE EDUCATION/TRAINING PROGRAM
Payer: MEDICARE

## 2025-04-17 VITALS
TEMPERATURE: 97.6 F | SYSTOLIC BLOOD PRESSURE: 120 MMHG | DIASTOLIC BLOOD PRESSURE: 70 MMHG | HEART RATE: 72 BPM | WEIGHT: 183 LBS | OXYGEN SATURATION: 97 % | BODY MASS INDEX: 28.72 KG/M2 | RESPIRATION RATE: 16 BRPM | HEIGHT: 67 IN

## 2025-04-17 DIAGNOSIS — C67.3 MALIGNANT NEOPLASM OF ANTERIOR WALL OF URINARY BLADDER (HCC): ICD-10-CM

## 2025-04-17 DIAGNOSIS — M15.9 OSTEOARTHRITIS INVOLVING MULTIPLE JOINTS ON BOTH SIDES OF BODY: ICD-10-CM

## 2025-04-17 DIAGNOSIS — L40.9 PSORIASIS: ICD-10-CM

## 2025-04-17 DIAGNOSIS — M10.071 ACUTE IDIOPATHIC GOUT INVOLVING TOE OF RIGHT FOOT: Primary | ICD-10-CM

## 2025-04-17 DIAGNOSIS — D84.9 IMMUNOSUPPRESSED STATUS (HCC): ICD-10-CM

## 2025-04-17 PROCEDURE — 3078F DIAST BP <80 MM HG: CPT | Performed by: STUDENT IN AN ORGANIZED HEALTH CARE EDUCATION/TRAINING PROGRAM

## 2025-04-17 PROCEDURE — 99212 OFFICE O/P EST SF 10 MIN: CPT | Performed by: STUDENT IN AN ORGANIZED HEALTH CARE EDUCATION/TRAINING PROGRAM

## 2025-04-17 PROCEDURE — 3074F SYST BP LT 130 MM HG: CPT | Performed by: STUDENT IN AN ORGANIZED HEALTH CARE EDUCATION/TRAINING PROGRAM

## 2025-04-17 PROCEDURE — 99214 OFFICE O/P EST MOD 30 MIN: CPT | Performed by: STUDENT IN AN ORGANIZED HEALTH CARE EDUCATION/TRAINING PROGRAM

## 2025-04-17 RX ORDER — PREDNISONE 10 MG/1
TABLET ORAL
Qty: 20 TABLET | Refills: 0 | Status: SHIPPED | OUTPATIENT
Start: 2025-04-17 | End: 2025-04-29

## 2025-04-17 RX ORDER — PREDNISONE 10 MG/1
TABLET ORAL
Qty: 20 TABLET | Refills: 0 | Status: SHIPPED | OUTPATIENT
Start: 2025-04-17 | End: 2025-04-17

## 2025-04-17 ASSESSMENT — FIBROSIS 4 INDEX: FIB4 SCORE: 2.4

## 2025-04-17 NOTE — PROGRESS NOTES
[facility] RHEUMATOLOGY  [address] [facility] [address], [address], [place] [address]  Phone: [phone] ? Fax: [phone]  [facility]/Health-Services/Rheumatology    FOLLOW-UP VISIT NOTE      DATE OF SERVICE: 04/17/2025         Subjective     PRIMARY CARE PRACTITIONER:  Stu Blair M.D.  [address] [facility] [address] [facility] [address]   [place] [address]    PATIENT IDENTIFICATION:  Buster Medina  [address]  [place] [address]    YOB: 1950    MEDICAL RECORD NUMBER: [id number]          CHIEF COMPLAINT:   Chief Complaint   Patient presents with    Follow-Up     Psoriasis/ gout       RHEUMATOLOGIC HISTORY:  Buster Medina is a 74 y.o. male with pertinent history notable for MR s/p post MitraClip, CAD, ARNULFO, HLD, HTN, malignant neoplasm of the bladder (2012, treated with excision, no chemoradiation), gastric bypass, OA s/p R TKA (2021), left hand CMC arthroplasty, and R TSA (2015), and gout, who presents for follow up.     Initial consult 3/2025:  Dx with psoriasis in the late 1990's. Lesions have historically been on the elbows and knees. Reported that the diagnosis of PsA was given some time later solely to get adalimumab for his psoriasis as other agents were ineffective. The only joint pains he had at the time was in the shoulders (R > L). The pain was sharp, continuous and aggravated by activity. Recalled that sometimes he had to use his left arm to move his R arm due to severe shoulder pain. He rarely had pain in the hands and certainly not in the feet. Did not recall any significant  morning stiffness. He had chronic intermittent low back pain that started when he was a Freshman in High School playing sports. Established with dermatology at [facility] in [place] and was switched to tildrakizumab (llumya) from adalimumab in 1/2024 due to health insurance change, followed annually. Reported that this medication was very effective in controlling his skin disease. Denied hx of low back morning stiffness, nocturnal low  back pain, exacerbation of joint pains with rest, alternating buttock pain, episodes of red painful photophobic eyes or history of inflammatory eye disease, dactylitis, enthesopathy along the Achilles or plantar fascia (remote history of L Achilles tendon rupture that was trauma induced when he fell off of a ladder), or IBD (normal colonoscopies in the past). No hx of RP, photosensitive rashes, sicca symptoms, mucosal ulcerations, pleuritic CP, or CROSS.  Denied hx of stroke, DVT/PE, demyelinating disease, lymphoma/leukemia, melanoma, other skin cancers, or diverticulitis. Reported a family hx of psoriasis in mother and maternal aunt.  Non-smoker, drank ~2 glasses of wine a night on average.   Regarding hx of gout, 3 years prior had an episode of joint pains, swelling, and tenderness of rR big toe.  Based on the appearance of the joint, was dx with gout.  No hx of arthrocentesis for crystal analysis. Treated with oral steroids.  Reported that episodes occurred once a year.  No family history of gout.  S/p hospitalization in 2025 for cardiac catheterization due to obstructive disease noted on pre-TAVR PCI.  Pertinent treatments:  Hydroxychloroquine: doesn't recall this medication  Apremilast: DC'd due to diarrhea  Adalimumab: effective but switched to tildrakizumab due to cost  Tildrakizumab: current  Pertinent positive labs: 10/2024, SEAN (1: 160, speckled pattern), anti-TPO (12)  Pertinent negative labs: 10/2024, anti-F-actin, antimitochondrial, anti-TG; 3/2018, RF/anti-CCP, SEAN  Pertinent imagin/2024 right knee CT: Intact prosthesis.  Trace joint fluid.  3/2024 left shoulder XR: End-stage OA of the GH joint with essentially bone-on-bone arthritis.  No evidence of high riding humerus.  2023 left hand XR: Previous hardware and a CMC arthroplasty  2022 bilateral hand XR: New subtle DIP and carpus erosions could represent psoriasis or other inflammatory arthropathy. The pattern is not typical of rheumatoid  arthritis. Right severe first CMC osteoarthritis, left trapeziectomy and suspensionplasty.  Mild left wrist chondrocalcinosis most likely in the fibrocartilage.  8/2022 feet XR: No evidence of inflammatory arthropathy  8/2022 C-spine XR: Moderate mid cervical spondylosis.  Trace degenerative C3/C4 anterolisthesis.      INTERVAL HISTORY:  Reports interval history as noted on the questionnaire below or scanned under media tab.    Last week of March, he woke up with pain, swelling, and stiffness in the R big toe. He went to  on 3/28/25 after 6 days of symptoms and was prescribed prednisone 40 mg x 5 days which improved symptoms. Prior to the onset of joint symptoms, had a beer. Few days later saw his PCP for improved but continuous symptoms so was given  prednisone 30 mg with a taper q 2 days (he was not taking as prescribed) in addition to Norco. He visited his daughter last week and had beer again. While on his way back, had another flare in the same joint. Presents today with pain, redness, swelling and reduced ROM of the R big toe. Historically, gout flares have occurred in the same joint. He had one flare either in 10/2024 or 11/2024. This is his first flare in 2025.      [facility] Established Patient History Form    4/16/2025  3:53 PM PDT - Filed by Patient   MAIN REASON FOR VISIT gout right toe   INTERVAL HISTORY OF ILLNESS   Date of worsening onset: 3/28/2025   Preceding incident/ailment:    Describe/list your symptoms:    Exacerbating factors:    Alleviating factors: prednisone   Helpful medications:    Ineffective medications:    Severity of pain (scale of 1-10): 6   Personal/emotional stressors:    Darnell All The Areas Of Pain    REVIEW OF SYMPTOMS    General   Fevers    Chills    Night sweats    Malaise    Fatigue    Unintentional weight loss    Musculoskeletal   Joint pain Worse with activity   Morning stiffness duration <30 mins   Morning stiffness characteristic Same with activity   Joint swelling     Joint instability    Tendon pain    Muscle pain    Body aches    Dermatologic   Hair loss with bald spots    Hair shedding Yes   Skin thickening    Skin plaques    Sunlight-induced skin rash    Cold-induced color changes (white, purple, red on rewarming)    Neurologic/Psychiatric   Weakness    Spasms    Tingling    Burning    Numbness    Insomnia    Anxiety    Depression    Head/Eyes   Headaches    Temple pain    Dizziness    Dry eyes    Eye pain    Eye redness    Blurry vision    Vision loss    Ears/Nose   Ear pain    Ringing in ears    Vertigo    Hearing loss    Nasal ulcers    Nosebleeds    Sinus pain    Nasal congestion    Snoring    Mouth/Throat   Oral ulcers    Bleeding gums    Dry mouth    Cavities    Sore throat    Sticking in throat    Difficulty speaking    Neck/Lymphatics   Thyroid pain    Thyroid swelling    Lymph node swelling    Cardiac/Respiratory   Chest pain with breathing    Dry cough    Cough with bloody phlegm    Shortness of breath    Fast heartbeats    Irregular heartbeats    Gastrointestinal   Nausea    Vomiting    Difficulty swallowing    Heartburn    Abdominal pain    Bloody stool    Mucus stool    Genitourinary   Pelvic pain    Genital ulcers    Abnormal discharge    Burning urination    Frothy urine    Blood in urine        REVIEW OF SYSTEMS:  Except as noted in the history above, relevant review of systems with emphasis on autoimmune rheumatic diseases was otherwise negative.      ACTIVE PROBLEM LIST:  Patient Active Problem List    Diagnosis Date Noted    Acute gout of multiple sites 04/02/2025    S/P mitral valve clip implantation 02/04/2025    Coronary artery disease involving native coronary artery of native heart without angina pectoris 12/13/2024    Muscle weakness 01/26/2024    Thrombocytopenia, unspecified (HCC) 01/26/2024    Anxiety, generalized 11/27/2023    Trigger finger, left index finger 08/04/2023    Trigger finger, left middle finger 05/23/2022    Balance disorder  09/03/2021    Sun-damaged skin 09/03/2021    Hoarseness 03/30/2021    Functional diarrhea 07/16/2020    Tinnitus of both ears 04/09/2019    Familial hyperlipidemia 09/21/2018    Erectile dysfunction of nonorganic origin 03/05/2018    Malignant neoplasm of anterior wall of urinary bladder (HCC) 09/21/2017    Essential hypertension 09/21/2017    Recurrent major depressive disorder, in full remission (HCC) 09/21/2017    Chronic insomnia 09/21/2017    Gastroesophageal reflux disease without esophagitis 09/21/2017    Benign nodular prostatic hyperplasia without lower urinary tract symptoms 09/21/2017    Vitamin D deficiency 09/21/2017    Essential tremor 06/25/2011    Status post bariatric surgery 03/08/2009    Testicular hypofunction 05/26/2005       PAST MEDICAL HISTORY:  Past Medical History:   Diagnosis Date    Anxiety     Anxiety, generalized 11/27/2023    Arthritis     osteo    Awareness under anesthesia     Bowel habit changes 06/24/2021    Constipation    Cancer ([facility]) 2007    bladder    Cataract 12/2024    repair completed 01/07/2025    Chickenpox     as a child    Depression     depression    Glaucoma 12/2024    Heart valve disease 10/2024    Mitral regurgatation    High cholesterol     Hyperlipidemia     Hypertension     pt states  well controlled on meds    Kidney stone     Mumps     as a child     Obesity     Pneumothorax     Psoriatic arthritis (HCC)     Recurrent major depressive disorder, in partial remission (HCC) 09/21/2017    Restless leg syndrome     Sleep apnea     uses cpap    Tonsillitis        PAST SURGICAL HISTORY:  Past Surgical History:   Procedure Laterality Date    TRANSCATHETER MITRAL VALVE REPAIR Right 2/4/2025    Procedure: TRANSCATHETER MITRAL VALVE PROCEDURE;  Surgeon: Davi Caraballo M.D.;  Location: SUR[facility]ERY [facility];  Service: Cardiac    ECHOCARDIOGRAM, TRANSESOPHAGEAL, INTRAOPERATIVE N/A 2/4/2025    Procedure: ECHOCARDIOGRAM, TRANSESOPHAGEAL, INTRAOPERATIVE;  Surgeon: Davi  JIM Caraballo;  Location: [facility];  Service: Cardiac    PB INCISE FINGER TENDON SHEATH Left 09/19/2023    Procedure: LEFT INDEX FINGER TRIGGER RELEASE;  Surgeon: Td Soliman M.D.;  Location: [facility];  Service: Orthopedics    PB INCISE FINGER TENDON SHEATH Left 08/30/2022    Procedure: LEFT MIDDLE FINGER TRIGGER RELEASE;  Surgeon: Td Solmian M.D.;  Location: [facility];  Service: Orthopedics    MD CYSTOURETHROSCOPY,BIOPSIES N/A 08/17/2022    Procedure: CYSTOSCOPY;  Surgeon: Jerzy Jasso M.D.;  Location: [facility];  Service: Urology    MD CYSTOURETHROSCOPY,BIOPSIES  07/06/2021    Procedure: BIOPSY, BLADDER WITH FULGERATION;  Surgeon: Bob Millan M.D.;  Location: [facility];  Service: Urology    KNEE ARTHROPLASTY TOTAL Right 2021    BLADDER BIOPSY WITH CYSTOSCOPY  01/03/2018    Procedure: BLADDER BIOPSY WITH CYSTOSCOPY/WITH UPPER TRACT WASHING;  Surgeon: El Manuel M.D.;  Location: [facility];  Service: Urology    RETROGRADES Bilateral 01/03/2018    Procedure: RETROGRADES;  Surgeon: El Manuel M.D.;  Location: [facility];  Service: Urology    PYELOGRAM Bilateral 01/03/2018    Procedure: PYELOGRAM;  Surgeon: El Manuel M.D.;  Location: [facility];  Service: Urology    OTHER ORTHOPEDIC SURGERY  2015    shoulder replacement    ABDOMINAL EXPLORATION      ARTHROPLASTY      right shoulder    BLADDER BIOPSY WITH CYSTOSCOPY      EYE SURGERY      lasik     GASTRIC BYPASS LAPAROSCOPIC      HERNIA REPAIR      OPEN REDUCTION      OTHER Left     thumb joint    OTHER ABDOMINAL SURGERY  2002    gastric bypass    THORACOTOMY      TURP-VAPOR         MEDICATIONS:  Current Outpatient Medications   Medication Sig    predniSONE (DELTASONE) 10 MG Tab Take 3 Tablets by mouth every day for 3 days, THEN 2 Tablets every day for 3 days, THEN 1 Tablet every day for 3 days, THEN 0.5 Tablets every day for  3 days.    HYDROcodone-acetaminophen (NORCO) 7.5-325 MG tab Take 1 Tablet by mouth every 24 hours as needed for Moderate Pain for up to 30 days.    atorvastatin (LIPITOR) 80 MG tablet Take 1 Tablet by mouth every day.    omeprazole (PRILOSEC) 20 MG delayed-release capsule Take 1 Capsule by mouth every day. (Patient taking differently: Take 20 mg by mouth 2 times a day.)    lisinopril (PRINIVIL) 10 MG Tab Take 1 Tablet by mouth every evening.    valacyclovir (VALTREX) 1 GM Tab TAKE 1 TABLET TWICE A DAY  FOR 7 DAYS AS NEEDED FOR   OUTBREAK    clopidogrel (PLAVIX) 75 MG Tab Take 1 Tablet by mouth every day.    buPROPion (WELLBUTRIN SR) 200 MG SR tablet Take 2 Tablets by mouth every day for 180 days. Indications: Major Depressive Disorder    fluoxetine (PROZAC) 40 MG capsule Take 1 Capsule by mouth every day for 90 days.    mirtazapine (REMERON) 7.5 MG tablet Take 1 Tablet by mouth at bedtime as needed (For sleep) for up to 90 days.    LORazepam (ATIVAN) 2 MG tablet Take 2 mg by mouth at bedtime.    ezetimibe (ZETIA) 10 MG Tab Take 1 Tablet by mouth every day.    NON SPECIFIED 1 Dose by Subcutaneous Infusion route see administration instructions. Ilumya injection every three months prescribed by dermatology    Magnesium 100 MG Cap Take 100 mg by mouth every evening.    ferrous sulfate 325 (65 Fe) MG tablet Take 325 mg by mouth every day.    B Complex Vitamins (VITAMIN B COMPLEX PO) Take 1 Tablet by mouth every day.    Cholecalciferol (VITAMIN D3 PO) Take 1 Tablet by mouth every day. OTC    aspirin EC (ECOTRIN) 81 MG Tablet Delayed Response Take 81 mg by mouth every bedtime.    Multiple Vitamins-Minerals (MELODY-DAY 1000 PO) Take 1 Tab by mouth 2 Times a Day.       ALLERGIES:   No Known Allergies    IMMUNIZATIONS:   Immunization History   Administered Date(s) Administered    COVID-19, mRNA, LNP-S, PF, kp-sucrose, 30 mcg/0.3 mL 09/03/2024    Covid-19 Mrna (Spikevax) Moderna 12+ Years 10/02/2023, 06/06/2024    Hepatitis A  "Vaccine, Adult 02/17/2011, 12/02/2011    Influenza Seasonal Injectable - Historical Data 12/12/1996, 12/17/2003, 10/23/2007, 11/11/2008, 01/13/2010    Influenza Vaccine Adult HD 11/18/2016, 09/21/2017, 09/18/2018, 09/15/2019, 10/12/2021, 09/30/2022    Influenza Vaccine Quad Inj (Pf) 11/05/2013, 10/09/2014, 09/25/2015, 09/18/2020    Influenza Vaccine, Quadrivalent, Adjuvanted (Pf) 10/02/2023    Influenza high-dose trivalent (PF) 09/03/2024    Influenza, unspecified formulation 10/28/2011, 10/30/2012, 09/30/2022    MODERNA SARS-COV-2 VACCINE (12+) 02/25/2021, 03/25/2021, 09/25/2021, 04/01/2022    PFIZER BIVALENT SARS-COV-2 VACCINE (12+) 10/06/2022, 04/27/2023    Pneumococcal Conjugate Vaccine (Prevnar/PCV-13) 11/18/2016, 09/21/2017, 09/01/2020, 09/01/2020    Pneumococcal Conjugate, unspecified formulation 09/01/2020    Pneumococcal polysaccharide vaccine (PPSV-23) 10/23/2007, 02/14/2017, 09/18/2020    RSV AREXVY VACCINE 10/02/2023, 06/06/2024    TD Vaccine 01/22/2004    Tdap Vaccine 02/17/2011, 09/22/2017    Zoster Vaccine Live (ZVL) (Zostavax) - HISTORICAL DATA 06/20/2011, 05/07/2012, 08/17/2016    Zoster Vaccine Recombinant (RZV) (SHINGRIX) 02/17/2020, 07/27/2020       SOCIAL HISTORY:   Social History     Socioeconomic History    Marital status: Single    Highest education level: Master's degree (e.g., MA, MS, [place]ian[place], MEd, MSW, LUIS)   Tobacco Use    Smoking status: Never    Smokeless tobacco: Never   Vaping Use    Vaping status: Never Used   Substance and Sexual Activity    Alcohol use: Yes     Alcohol/week: 9.6 oz     Types: 14 Glasses of wine, 2 Cans of beer per week     Comment: wine /daily    Drug use: Not Currently     Types: Inhaled     Comment: THC edible - \"once a month\".    Sexual activity: Not Currently     Social Drivers of Health     Financial Resource Strain: Low Risk  (4/3/2024)    Overall Financial Resource Strain (CARDIA)     Difficulty of Paying Living Expenses: Not hard at all   Food Insecurity: No " Food Insecurity (2/26/2025)    Hunger Vital Sign     Worried About Running Out of Food in the Last Year: Never true     Ran Out of Food in the Last Year: Never true   Transportation Needs: No Transportation Needs (2/26/2025)    PRAPARE - Transportation     Lack of Transportation (Medical): No     Lack of Transportation (Non-Medical): No   Physical Activity: Insufficiently Active (4/3/2024)    Exercise Vital Sign     Days of Exercise per Week: 2 days     Minutes of Exercise per Session: 20 min   Stress: No Stress Concern Present (4/3/2024)    [employer] [place] [employer] - Occupational Stress Questionnaire     Feeling of Stress : Not at all   Social Connections: Moderately Isolated (4/3/2024)    Social Connection and Isolation Panel [NHANES]     Frequency of Communication with Friends and Family: Twice a week     Frequency of Social Gatherings with Friends and Family: Once a week     Attends Druze Services: Never     Active Member of Clubs or Organizations: No     Attends Club or Organization Meetings: Patient declined     Marital Status: Living with partner   Intimate Partner Violence: Not At Risk (2/26/2025)    Humiliation, Afraid, Rape, and Kick questionnaire     Fear of Current or Ex-Partner: No     Emotionally Abused: No     Physically Abused: No     Sexually Abused: No   Housing Stability: Low Risk  (2/26/2025)    Housing Stability Vital Sign     Unable to Pay for Housing in the Last Year: No     Number of Times Moved in the Last Year: 1     Homeless in the Last Year: No       FAMILY HISTORY:  Family History   Problem Relation Age of Onset    Cancer Mother 80        lung cancer    Arthritis Mother     Diabetes Mother         Type 2    Anxiety disorder Mother     Lung Disease Father 65    Cancer Father     Diabetes Father         Type 2    Hypertension Father     Hyperlipidemia Father     Hyperlipidemia Brother     Arthritis Brother     Other Brother         Thyroid tumor    Sleep Apnea Brother   "   No Known Problems Maternal Grandmother     Cancer Maternal Grandfather     No Known Problems Paternal Grandmother     No Known Problems Paternal Grandfather     Heart Disease Brother 49        smoker, overweight    Hypertension Brother     Depression Other     Suicide Attempts Neg Hx     Bipolar disorder Neg Hx     Alcohol abuse Neg Hx     Drug abuse Neg Hx             Objective     Vital Signs: /70   Pulse 72   Temp 36.4 °C (97.6 °F) (Temporal)   Resp 16   Ht 1.702 m (5' 7\")   Wt 83 kg (183 lb)   SpO2 97% Body mass index is 28.66 kg/m².    General: Appears well and comfortable  Eyes: No scleral or conjunctival lesions  Respiratory: Breathing quiet and unlabored  Integumentary: No tophi  Musculoskeletal:   R first toe digit with erythema, swelling, tenderness, warmth and reduced ROM secondary to pain  No significant swelling, tenderness, warmth or limitations of motion at other joints examined  Neurologic: Grossly non-focal  Psychiatric: Mood and affect appropriate      LABORATORY RESULTS REVIEWED AND INTERPRETED BY ME:  Lab Results   Component Value Date/Time    CREACTPROT <0.30 05/30/2024 02:41 PM    SEDRATEWES 3 05/30/2024 02:41 PM    URICACID 6.7 10/12/2024 11:03 AM     Lab Results   Component Value Date/Time    RHEUMFACTN <10 03/21/2018 02:19 PM    CCPANTIBODY 4 03/21/2018 02:20 PM     Lab Results   Component Value Date/Time    ANTINUCAB Detected (A) 02/15/2024 08:49 AM     Lab Results   Component Value Date/Time    ANTIDNADS SEE BELOW 02/15/2024 08:49 AM    SMITHAB 3 02/15/2024 08:49 AM    RNPAB 3 02/15/2024 08:49 AM    MHHNNTZ01 0 02/15/2024 08:49 AM    SSA60 0 02/15/2024 08:49 AM    SSA52 2 02/15/2024 08:49 AM    ANTISSBSJ 0 02/15/2024 08:49 AM    JO1AB 0 02/15/2024 08:49 AM     Lab Results   Component Value Date/Time    ANTIMITOCHO 3.1 10/12/2024 10:55 AM    FACTIN 6 10/12/2024 10:55 AM     Lab Results   Component Value Date/Time    MICROSOMALA 12.0 (H) 10/12/2024 10:55 AM    ANTITHYROGL " <0.9 10/12/2024 10:55 AM    TSHULTRASEN 1.960 03/26/2025 09:22 AM    FREET4 0.77 (L) 03/26/2025 09:22 AM     Lab Results   Component Value Date/Time    ALDOLASE 6.1 02/15/2024 08:49 AM     Lab Results   Component Value Date/Time    25HYDROXY 115 (H) 03/26/2025 09:22 AM     Lab Results   Component Value Date/Time    FERRITIN 124.0 03/26/2025 09:22 AM    IRON 106 03/26/2025 09:22 AM    TRANSFERRIN 246 09/26/2019 10:06 AM    FOLATE 32.6 04/27/2022 09:48 AM    G6PD 10.9 09/17/2019 10:30 AM    PROTHROMBTM 13.3 02/26/2025 08:05 AM    INR 1.01 02/26/2025 08:05 AM     Lab Results   Component Value Date/Time    WBC 7.7 03/26/2025 09:22 AM    RBC 4.56 (L) 03/26/2025 09:22 AM    HEMOGLOBIN 14.7 03/26/2025 09:22 AM    HEMATOCRIT 44.7 03/26/2025 09:22 AM    MCV 98.0 (H) 03/26/2025 09:22 AM    MCH 32.2 03/26/2025 09:22 AM    MCHC 32.9 03/26/2025 09:22 AM    RDW 43.2 03/26/2025 09:22 AM    PLATELETCT 150 (L) 03/26/2025 09:22 AM    MPV 11.6 03/26/2025 09:22 AM    NEUTS 4.55 03/26/2025 09:22 AM    LYMPHOCYTES 28.80 03/26/2025 09:22 AM    MONOCYTES 9.40 03/26/2025 09:22 AM    EOSINOPHILS 1.90 03/26/2025 09:22 AM    BASOPHILS 0.60 03/26/2025 09:22 AM     Lab Results   Component Value Date/Time    ASTSGOT 30 03/26/2025 09:22 AM    ALTSGPT 37 03/26/2025 09:22 AM    ALKPHOSPHAT 83 03/26/2025 09:22 AM    TBILIRUBIN 0.4 03/26/2025 09:22 AM    TOTPROTEIN 6.9 03/26/2025 09:22 AM    ALBUMIN 4.3 03/26/2025 09:22 AM     Lab Results   Component Value Date/Time    SODIUM 139 03/26/2025 09:22 AM    POTASSIUM 4.3 03/26/2025 09:22 AM    CHLORIDE 103 03/26/2025 09:22 AM    CO2 27 03/26/2025 09:22 AM    GLUCOSE 106 (H) 03/26/2025 09:22 AM    BUN 21 03/26/2025 09:22 AM    CREATININE 1.38 03/26/2025 09:22 AM    CALCIUM 9.7 03/26/2025 09:22 AM     Lab Results   Component Value Date/Time    COLORURINE DK Yellow 08/15/2022 10:31 AM    SPECGRAVITY 1.022 08/15/2022 10:31 AM    PHURINE 6.5 08/15/2022 10:31 AM    GLUCOSEUR Negative 08/15/2022 10:31 AM     KETONES Negative 08/15/2022 10:31 AM    PROTEINURIN Negative 08/15/2022 10:31 AM     Lab Results   Component Value Date/Time    QNTTBGOLD Negative 08/16/2018 12:13 PM     Lab Results   Component Value Date/Time    HEPBSAG Non-Reactive 07/19/2023 12:12 PM    HEPBCORIGM Non-Reactive 07/19/2023 12:12 PM    HEPCAB Non-Reactive 07/19/2023 12:12 PM     Lab Results   Component Value Date/Time    CHOLSTRLTOT 158 03/26/2025 09:22 AM    LDL 55 03/26/2025 09:22 AM    HDL 85 03/26/2025 09:22 AM    TRIGLYCERIDE 92 03/26/2025 09:22 AM       RADIOLOGY RESULTS REVIEWED AND INTERPRETED BY ME: See above      All relevant laboratory and imaging results reported on this note were reviewed and interpreted by me.         Assessment & Plan     Buster Medina is a 74 y.o. male with history as noted above whose presentation merits the following clinical impressions and recommendations:    1. Acute idiopathic gout involving toe of right foot  History of non-crystal proven gout diagnosed ~ 2022 when he presented with podagra of the R first toe digit. He was treated with prednisone with resolution of symptoms. Since then, have had one flare a year. Presents today with first gout flare of this year. At this time, will hold off on starting urate lowering therapy until indications are met which includes frequent flares defined as > 2-3 flares over 1 year, renal stones (urate or calcium), tophaceous gout, or moderate to severe CKD. Short course of prednisone to address current flare.   - predniSONE (DELTASONE) 10 MG Tab; Take 3 Tablets by mouth every day for 3 days, THEN 2 Tablets every day for 3 days, THEN 1 Tablet every day for 3 days, THEN 0.5 Tablets every day for 3 days.  Dispense: 20 Tablet; Refill: 0    2. Psoriasis  Diagnosed with psoriasis in the late 1990's however, the diagnosis of psoriatic arthritic was given some time later solely to get adalimumab for his psoriasis as other agents were ineffective per patient.  It does not  appear that he had inflammatory arthritis at that time or currently.  In review of several imaging studies, there are questionable possible erosions versus cystic changes on x-rays, so reasonable to obtain 2 view x-rays to better evaluate for this.  He is established with dermatology and is followed annually.  Presently with no concerns for inflammatory arthritis by history or exam that would suggest psoriatic arthritis.  - Continue tildrakizumab per dermatology    3. Immunosuppressed status (HCC)  Presently with no history, physical, or laboratory evidence to suggest significant adverse drug effects or opportunistic infections.    4. Osteoarthritis involving multiple joints on both sides of body  S/p arthroplasties of several joints including the shoulders, R knee, and left wrist.  - Consider trial of Voltaren 1% gel 3-4 times daily as needed, topical lidocaine or capsaicin  - Extra strength Tylenol or oral NSAIDs prn     5. Malignant neoplasm of anterior wall of urinary bladder (HCC)  Remote history of this, treated with excision.  No history of chemoradiation.    The above assessment and plan were discussed with the patient who acknowledged understanding of the plan.    FOLLOW-UP: Return TBD.         Thank you for the opportunity to participate in the care of Buster Medina.    Leticia Duke D.O.  Rheumatologist, [facility], [facility]

## 2025-04-17 NOTE — PATIENT INSTRUCTIONS
Thank you for visiting our clinic today.     Summary of your visit:      Follow up TBD    [facility] AFTER VISIT GUIDE  Below are important guidelines to help you navigate your health care needs and assist us in caring for you safely and  effectively. We encourage you to carefully read and understand this information and adhere to them accordingly.    [facility] Messaging and Phone Calls:   Diagnosis and Treatment - For a detailed explanation of your condition and treatment plan from today’s visit, refer  to the visit note on [facility] via the following steps:  o Log in to [facility] and click on “Visits” at the top.  o Scroll down to “Past Visits” under Appointments.  o Click on “View Notes” under the appropriate visit date.   Questions or Concerns - MyChart messaging is for non-urgent matters that do not require immediate attention and  should be brief with no more than two questions or concerns. If you have multiple questions or concerns, we ask that  you schedule an appointment to have them properly addressed.   Response to Messages - [facility] messages are addressed throughout the week depending on clinical availability,  so we ask that you allow up to one week for a response.   Phone Calls and Voicemails - Phone calls and voicemail messages are reserved for time-sensitive matters that  cannot wait to be addressed via [facility]. We ask that you refrain from calling the office multiple times or leaving  multiple voicemails regarding the same issue as doing so may lead to delays in response time.   Urgent Issues - For urgent medical matters or medical emergencies that cannot wait, you are advised to go to your  nearest Urgent Care or Emergency Department for immediate attention.    Laboratory Tests and Imaging Studies:   Future Lab and Imaging Orders - We ask that you get your lab tests and imaging studies done no later than one  week before your follow-up visit unless instructed otherwise.   Results Communication -  You may see some test results marked as “abnormal” that are not necessarily significant  or concerning. If there are significant abnormalities on your test results that warrant further action, you will be notified  via MyChart or phone call, otherwise they will be addressed at your follow-up visit.    Prescriptions and Refill Requests:   General Prescriptions (e.g. prednisone, hydroxychloroquine, leflunomide, methotrexate, etc.) - These are sent  to Retail Pharmacies, so all refill requests of these medications should be directed to your local pharmacy.   Specialty Prescriptions (e.g. Enbrel, Humira, Cosentyx, Xeljanz, etc.) - These are sent to Specialty Pharmacies,  so all refill requests of these medications should be directed to your designated specialty pharmacy.   Infusion Prescriptions (e.g. Remicade, Simponi Aria, Rituxan, Saphnelo, etc.) - These are sent to Outpatient  Infusion Centers, so all scheduling requests of these medications should be directed to your local infusion center.    Medication Risks and Adverse Effects:   Immunosuppressed Status - Steroids and antirheumatic drugs are immunosuppressants, so they increase the risk  of infections and can have side effects on various organ systems in your body, though most of them are uncommon.   Potential Side Effects - Be sure to read the drug package inserts to learn about the potential side effects of your  medications before you start taking them and take them exactly as prescribed unless instructed otherwise.   In Case of Side Effects - If you experience any significant side effects, stop taking the medication immediately and  promptly notify the prescriber. Depending on the severity of the side effects, consider going to an Urgent Care or  Emergency Department for immediate attention.    Immunizations and Health Screening:   Vaccinations - If you are on immunosuppressive therapy, it is important that you are up to date on  age-appropriate  immunizations, particularly shingles and pneumonia vaccines, which you can request from your primary care provider  or from us at your next appointment.   Screening Tests - It is also important that you are up to date on age-appropriate screening tests, such as pap  smear, mammography, and colonoscopy, which you can request from your primary care provider.    Educational and Supportive Resources:   [facility] ([email or web address]/Health-[email or web address]) - Visit our website to learn more about  your condition and other rheumatic diseases, and gain access to many helpful resources for them.   Disposal of Old Medications (www.zheng.gov/everyday-takeback-day) - Visit the Drug Enforcement Administration  website to find a nearby location where you can properly dispose of old medications you no longer need.   Disposal of Used [place] (www.safeneedledisposal.org) - Visit the Safe Needle Disposal Organization website to  find a nearby location where you can properly dispose of used needles from your injectable medications.  Revised 6/14/2024

## 2025-04-18 RX ORDER — MIRTAZAPINE 7.5 MG/1
TABLET, FILM COATED ORAL
Qty: 90 TABLET | Refills: 0 | Status: SHIPPED | OUTPATIENT
Start: 2025-04-18 | End: 2025-04-21 | Stop reason: SDUPTHER

## 2025-04-21 ENCOUNTER — OFFICE VISIT (OUTPATIENT)
Dept: BEHAVIORAL HEALTH | Facility: CLINIC | Age: 75
End: 2025-04-21
Payer: MEDICARE

## 2025-04-21 DIAGNOSIS — F33.42 RECURRENT MAJOR DEPRESSIVE DISORDER, IN FULL REMISSION (HCC): ICD-10-CM

## 2025-04-21 PROCEDURE — 99213 OFFICE O/P EST LOW 20 MIN: CPT | Mod: GC | Performed by: PSYCHIATRY & NEUROLOGY

## 2025-04-21 RX ORDER — MIRTAZAPINE 7.5 MG/1
7.5 TABLET, FILM COATED ORAL NIGHTLY
Qty: 30 TABLET | Refills: 2 | Status: SHIPPED | OUTPATIENT
Start: 2025-04-21 | End: 2025-07-20

## 2025-04-21 RX ORDER — BUPROPION HYDROCHLORIDE 200 MG/1
400 TABLET, EXTENDED RELEASE ORAL DAILY
Qty: 180 TABLET | Refills: 1 | Status: SHIPPED | OUTPATIENT
Start: 2025-04-21 | End: 2025-10-18

## 2025-04-21 RX ORDER — FLUOXETINE HYDROCHLORIDE 40 MG/1
40 CAPSULE ORAL DAILY
Qty: 30 CAPSULE | Refills: 2 | Status: SHIPPED | OUTPATIENT
Start: 2025-04-21 | End: 2025-07-20

## 2025-04-21 ASSESSMENT — LIFESTYLE VARIABLES: SUBSTANCE_ABUSE: 0

## 2025-04-21 ASSESSMENT — ENCOUNTER SYMPTOMS
FALLS: 1
SEIZURES: 0
ABDOMINAL PAIN: 0
WHEEZING: 0
PALPITATIONS: 0
BLURRED VISION: 0
FEVER: 0

## 2025-04-21 NOTE — PROGRESS NOTES
"[facility] Outpatient Psychiatric Follow Up Note  Evaluation completed by: Buster Shetty M.D.   Date of Service: 04/21/2025  Appointment type: in-office appointment.  Attending: Dr. Susu Robbins M.D.  Information below was collected from: Patient    HPI:   Buster Medina is a 74 y.o. old male who presents today for regularly scheduled follow up for assessment. He reports he was feeling down and went back to taking 60mg of Prozac. He describes feeling \"blah\", \"blue\" and \"a little bit down\" with \"more negative thoughts\" when he was taking 40mg, and this was more prolonged. He feels his life is going \"terrific\" now. He reports when he was on 40mg of Prozac he was having suicidal thoughts \"a little bit\", with no plans or intentions of harming himself, and that this went back to 60mg. He describes no new life stressors. He recently saw his daughter. He reports within a couple of days after going back to 60mg.  He is not feeling any depression or anxiety now.  He likes riding his motorcycle in his free time when the weather's good.He reports he is taking all of his psychiatric medications every day with no side effects, and wants to continue them at this time.     PSYCHIATRIC REVIEW OF SYSTEMS: current symptoms as reported by pt.  Sleep: Patient does not describe or suggest recent changes in sleep as a clinically relevant concern, reports it's \"good\"   Appetite: Patient does not describe or suggest recent changes in appetite as a clinically relevant concern   Depression: Described above in the HPI  Anxiety/Panic Attacks: Described above in the HPI  Malathi: Patient does not communicate signs or symptoms indicative of current or past malathi, hypomania, or bipolar disorder.   Psychosis: Patient does not report signs or symptoms indicative of psychosis.     CURRENT MEDICATIONS    Current Outpatient Medications:     buPROPion (WELLBUTRIN SR) 200 MG SR tablet, Take 2 Tablets by mouth every day for 180 days. " Indications: Major Depressive Disorder, Disp: 180 Tablet, Rfl: 1    fluoxetine (PROZAC) 40 MG capsule, Take 1 Capsule by mouth every day for 90 days., Disp: 30 Capsule, Rfl: 2    mirtazapine (REMERON) 7.5 MG tablet, Take 1 Tablet by mouth every evening for 90 days., Disp: 30 Tablet, Rfl: 2    FLUoxetine (PROZAC) 20 MG Cap, Take 1 Capsule by mouth every morning for 90 days., Disp: 30 Capsule, Rfl: 2    predniSONE (DELTASONE) 10 MG Tab, Take 3 Tablets by mouth every day for 3 days, THEN 2 Tablets every day for 3 days, THEN 1 Tablet every day for 3 days, THEN 0.5 Tablets every day for 3 days., Disp: 20 Tablet, Rfl: 0    HYDROcodone-acetaminophen (NORCO) 7.5-325 MG tab, Take 1 Tablet by mouth every 24 hours as needed for Moderate Pain for up to 30 days., Disp: 30 Tablet, Rfl: 0    atorvastatin (LIPITOR) 80 MG tablet, Take 1 Tablet by mouth every day., Disp: 90 Tablet, Rfl: 3    omeprazole (PRILOSEC) 20 MG delayed-release capsule, Take 1 Capsule by mouth every day. (Patient taking differently: Take 20 mg by mouth 2 times a day.), Disp: 180 Capsule, Rfl: 3    lisinopril (PRINIVIL) 10 MG Tab, Take 1 Tablet by mouth every evening., Disp: 90 Tablet, Rfl: 1    valacyclovir (VALTREX) 1 GM Tab, TAKE 1 TABLET TWICE A DAY  FOR 7 DAYS AS NEEDED FOR   OUTBREAK, Disp: 24 Tablet, Rfl: 5    clopidogrel (PLAVIX) 75 MG Tab, Take 1 Tablet by mouth every day., Disp: 90 Tablet, Rfl: 3    LORazepam (ATIVAN) 2 MG tablet, Take 2 mg by mouth at bedtime., Disp: , Rfl:     ezetimibe (ZETIA) 10 MG Tab, Take 1 Tablet by mouth every day., Disp: 90 Tablet, Rfl: 1    NON SPECIFIED, 1 Dose by Subcutaneous Infusion route see administration instructions. Ilumya injection every three months prescribed by dermatology, Disp: , Rfl:     Magnesium 100 MG Cap, Take 100 mg by mouth every evening., Disp: , Rfl:     ferrous sulfate 325 (65 Fe) MG tablet, Take 325 mg by mouth every day., Disp: , Rfl:     B Complex Vitamins (VITAMIN B COMPLEX PO), Take 1 Tablet by  mouth every day., Disp: , Rfl:     Cholecalciferol (VITAMIN D3 PO), Take 1 Tablet by mouth every day. OTC, Disp: , Rfl:     aspirin EC (ECOTRIN) 81 MG Tablet Delayed Response, Take 81 mg by mouth every bedtime., Disp: 30 Tab, Rfl: 0    Multiple Vitamins-Minerals (MELODY-DAY 1000 PO), Take 1 Tab by mouth 2 Times a Day., Disp: , Rfl:      REVIEW OF SYSTEMS   Review of Systems   Constitutional:  Negative for fever.   HENT:  Negative for hearing loss.    Eyes:  Negative for blurred vision.   Respiratory:  Negative for wheezing.    Cardiovascular:  Negative for chest pain and palpitations.   Gastrointestinal:  Negative for abdominal pain.   Genitourinary:  Positive for urgency. Negative for frequency.   Musculoskeletal:  Positive for falls.   Skin:  Negative for rash.   Neurological:  Negative for seizures.   Psychiatric/Behavioral:  Negative for substance abuse and suicidal ideas.    He reports he tripped when he was visiting his daughter recently, that he did not have any injuries from that and did not faint. He reports he's been tripping around once a month, falling over, has not gotten any serious injuries from this; he reports this has been happening for a couple of years. He reports he sometimes gets dizzy when standing, can catch himself until it passes. He reports he's discussed this with primary care physician, agrees to keep checking in with primary care doctor about this.   He reports urinary urgency has been happening 3-4 months, previously diagnosed with enlarged prostate and had surgery, we discussed he can talk with primary care doctor or see urologist for this.    PAST MEDICAL HISTORY  Past Medical History:   Diagnosis Date    Anxiety     Anxiety, generalized 11/27/2023    Arthritis     osteo    Awareness under anesthesia     Bowel habit changes 06/24/2021    Constipation    Cancer (HCC) 2007    bladder    Cataract 12/2024    repair completed 01/07/2025    Chickenpox     as a child    Depression      depression    Glaucoma 12/2024    Heart valve disease 10/2024    Mitral regurgatation    High cholesterol     Hyperlipidemia     Hypertension     pt states  well controlled on meds    Kidney stone     Mumps     as a child     Obesity     Pneumothorax     Psoriatic arthritis (HCC)     Recurrent major depressive disorder, in partial remission (HCC) 09/21/2017    Restless leg syndrome     Sleep apnea     uses cpap    Tonsillitis      No Known Allergies  Past Surgical History:   Procedure Laterality Date    TRANSCATHETER MITRAL VALVE REPAIR Right 2/4/2025    Procedure: TRANSCATHETER MITRAL VALVE PROCEDURE;  Surgeon: Davi Caraballo M.D.;  Location: [facility];  Service: Cardiac    ECHOCARDIOGRAM, TRANSESOPHAGEAL, INTRAOPERATIVE N/A 2/4/2025    Procedure: ECHOCARDIOGRAM, TRANSESOPHAGEAL, INTRAOPERATIVE;  Surgeon: Davi Caraballo M.D.;  Location: [facility];  Service: Cardiac    PB INCISE FINGER TENDON SHEATH Left 09/19/2023    Procedure: LEFT INDEX FINGER TRIGGER RELEASE;  Surgeon: Td Soliman M.D.;  Location: [facility];  Service: Orthopedics    PB INCISE FINGER TENDON SHEATH Left 08/30/2022    Procedure: LEFT MIDDLE FINGER TRIGGER RELEASE;  Surgeon: Td Soliman M.D.;  Location: [facility];  Service: Orthopedics    ND CYSTOURETHROSCOPY,BIOPSIES N/A 08/17/2022    Procedure: CYSTOSCOPY;  Surgeon: Jerzy Jasso M.D.;  Location: [facility];  Service: Urology    ND CYSTOURETHROSCOPY,BIOPSIES  07/06/2021    Procedure: BIOPSY, BLADDER WITH FULGERATION;  Surgeon: Bob Millan M.D.;  Location: [facility];  Service: Urology    KNEE ARTHROPLASTY TOTAL Right 2021    BLADDER BIOPSY WITH CYSTOSCOPY  01/03/2018    Procedure: BLADDER BIOPSY WITH CYSTOSCOPY/WITH UPPER TRACT WASHING;  Surgeon: El Manuel M.D.;  Location: [facility];  Service: Urology    RETROGRADES Bilateral 01/03/2018    Procedure: RETROGRADES;  Surgeon:  "El Manuel M.D.;  Location: [facility];  Service: Urology    PYELOGRAM Bilateral 01/03/2018    Procedure: PYELOGRAM;  Surgeon: El Manuel M.D.;  Location: [facility];  Service: Urology    OTHER ORTHOPEDIC SURGERY  2015    shoulder replacement    ABDOMINAL EXPLORATION      ARTHROPLASTY      right shoulder    BLADDER BIOPSY WITH CYSTOSCOPY      EYE SURGERY      lasik     GASTRIC BYPASS LAPAROSCOPIC      HERNIA REPAIR      OPEN REDUCTION      OTHER Left     thumb joint    OTHER ABDOMINAL SURGERY  2002    gastric bypass    THORACOTOMY      TURP-VAPOR        Family History   Problem Relation Age of Onset    Cancer Mother 80        lung cancer    Arthritis Mother     Diabetes Mother         Type 2    Anxiety disorder Mother     Lung Disease Father 65    Cancer Father     Diabetes Father         Type 2    Hypertension Father     Hyperlipidemia Father     Hyperlipidemia Brother     Arthritis Brother     Other Brother         Thyroid tumor    Sleep Apnea Brother     No Known Problems Maternal Grandmother     Cancer Maternal Grandfather     No Known Problems Paternal Grandmother     No Known Problems Paternal Grandfather     Heart Disease Brother 49        smoker, overweight    Hypertension Brother     Depression Other     Suicide Attempts Neg Hx     Bipolar disorder Neg Hx     Alcohol abuse Neg Hx     Drug abuse Neg Hx      Social History     Socioeconomic History    Marital status: Single    Highest education level: Master's degree (e.g., MA, MS, [place], MEd, MSW, LUIS)   Tobacco Use    Smoking status: Never    Smokeless tobacco: Never   Vaping Use    Vaping status: Never Used   Substance and Sexual Activity    Alcohol use: Yes     Alcohol/week: 9.6 oz     Types: 14 Glasses of wine, 2 Cans of beer per week     Comment: wine /daily    Drug use: Not Currently     Types: Inhaled     Comment: THC edible - \"once a month\".    Sexual activity: Not Currently     Social Drivers of Health     Financial " Resource Strain: Low Risk  (4/3/2024)    Overall Financial Resource Strain (CARDIA)     Difficulty of Paying Living Expenses: Not hard at all   Food Insecurity: No Food Insecurity (2/26/2025)    Hunger Vital Sign     Worried About Running Out of Food in the Last Year: Never true     Ran Out of Food in the Last Year: Never true   Transportation Needs: No Transportation Needs (2/26/2025)    PRAPARE - Transportation     Lack of Transportation (Medical): No     Lack of Transportation (Non-Medical): No   Physical Activity: Insufficiently Active (4/3/2024)    Exercise Vital Sign     Days of Exercise per Week: 2 days     Minutes of Exercise per Session: 20 min   Stress: No Stress Concern Present (4/3/2024)    Citizen of [place] of Occupational Health - Occupational Stress Questionnaire     Feeling of Stress : Not at all   Social Connections: Moderately Isolated (4/3/2024)    Social Connection and Isolation Panel [NHANES]     Frequency of Communication with Friends and Family: Twice a week     Frequency of Social Gatherings with Friends and Family: Once a week     Attends Mandaeism Services: Never     Active Member of Clubs or Organizations: No     Attends Club or Organization Meetings: Patient declined     Marital Status: Living with partner   Intimate Partner Violence: Not At Risk (2/26/2025)    Humiliation, Afraid, Rape, and Kick questionnaire     Fear of Current or Ex-Partner: No     Emotionally Abused: No     Physically Abused: No     Sexually Abused: No   Housing Stability: Low Risk  (2/26/2025)    Housing Stability Vital Sign     Unable to Pay for Housing in the Last Year: No     Number of Times Moved in the Last Year: 1     Homeless in the Last Year: No     Past Surgical History:   Procedure Laterality Date    TRANSCATHETER MITRAL VALVE REPAIR Right 2/4/2025    Procedure: TRANSCATHETER MITRAL VALVE PROCEDURE;  Surgeon: Davi Caraballo M.D.;  Location: SURGERY [facility];  Service: Cardiac    ECHOCARDIOGRAM,  TRANSESOPHAGEAL, INTRAOPERATIVE N/A 2/4/2025    Procedure: ECHOCARDIOGRAM, TRANSESOPHAGEAL, INTRAOPERATIVE;  Surgeon: Davi Caraballo M.D.;  Location: [facility];  Service: Cardiac    PB INCISE FINGER TENDON SHEATH Left 09/19/2023    Procedure: LEFT INDEX FINGER TRIGGER RELEASE;  Surgeon: Td Soliman M.D.;  Location: [facility];  Service: Orthopedics    PB INCISE FINGER TENDON SHEATH Left 08/30/2022    Procedure: LEFT MIDDLE FINGER TRIGGER RELEASE;  Surgeon: Td Soliman M.D.;  Location: [facility];  Service: Orthopedics    IL CYSTOURETHROSCOPY,BIOPSIES N/A 08/17/2022    Procedure: CYSTOSCOPY;  Surgeon: Jerzy Jasso M.D.;  Location: [facility];  Service: Urology    IL CYSTOURETHROSCOPY,BIOPSIES  07/06/2021    Procedure: BIOPSY, BLADDER WITH FULGERATION;  Surgeon: Bob Millan M.D.;  Location: [facility];  Service: Urology    KNEE ARTHROPLASTY TOTAL Right 2021    BLADDER BIOPSY WITH CYSTOSCOPY  01/03/2018    Procedure: BLADDER BIOPSY WITH CYSTOSCOPY/WITH UPPER TRACT WASHING;  Surgeon: El Manuel M.D.;  Location: [facility];  Service: Urology    RETROGRADES Bilateral 01/03/2018    Procedure: RETROGRADES;  Surgeon: El Manuel M.D.;  Location: [facility];  Service: Urology    PYELOGRAM Bilateral 01/03/2018    Procedure: PYELOGRAM;  Surgeon: El Manuel M.D.;  Location: [facility];  Service: Urology    OTHER ORTHOPEDIC SURGERY  2015    shoulder replacement    ABDOMINAL EXPLORATION      ARTHROPLASTY      right shoulder    BLADDER BIOPSY WITH CYSTOSCOPY      EYE SURGERY      lasik     GASTRIC BYPASS LAPAROSCOPIC      HERNIA REPAIR      OPEN REDUCTION      OTHER Left     thumb joint    OTHER ABDOMINAL SURGERY  2002    gastric bypass    THORACOTOMY      TURP-VAPOR         PSYCHIATRIC EXAMINATION   Musculoskeletal: No movement abnormalities noted during interview; grossly within normal limits  "  Appearance: Patient appeared calm and cooperative with interview   Thought Process: Grossly linear, logical, and goal-oriented   Abnormal or Psychotic Thoughts: No psychotic thoughts or communication consistent with psychosis noted during interview   Speech: Regular rate, rhythm, tone, and volume   Mood: \"ok\"   Affect: Grossly neutral and regular in keeping with typical expectations of conversation during clinical interview   SI/HI: Denies SI, no evidence of HI  Orientation: No gross evidence of disorientation; alert and conversational   Recent and Remote Memory: No gross evidence of abnormalities in recent or remote memory noted during interview  Attention Span and Concentration: Sufficient for interview  Insight/Judgement into symptoms: Grossly fair  Neurological Testing (MSSE Score and/or clock drawing): Not performed during this interview     SCREENINGS:      4/5/2024     9:20 AM 1/30/2025    10:00 AM 2/26/2025     3:12 PM   Depression Screen (PHQ-2/PHQ-9)   PHQ-2 Total Score   0   PHQ-2 Total Score 0 0         [place]  records  PDMP Reviewed. No evidence indicating misuse of a controlled substance noted.    CURRENT RISK ASSESSMENT:        Suicide: Low       Homicide: Low       Self-Harm: Low       Relapse: Not Applicable       Crisis Safety Plan Reviewed: Not Indicated     ASSESSMENT/DIAGNOSES/PLAN  Buster Medina is a 74 y.o. old male who presents today for regularly scheduled follow up for assessment. He self-increased Prozac back to previous dose of 60mg after trial of decrease to 40mg due to starting to feel persistnetly down on 40mg, and continuation at 60mg is merited at this time; since he has returned to his previous dose he reports he has not been feeling depressed or anxious. Continuation of other psychiatric medications is merited at this time.    Problem List Items Addressed This Visit       Recurrent major depressive disorder, in full remission (HCC)    Relevant Medications    buPROPion " (WELLBUTRIN SR) 200 MG SR tablet    fluoxetine (PROZAC) 40 MG capsule    mirtazapine (REMERON) 7.5 MG tablet    FLUoxetine (PROZAC) 20 MG Cap   - Continue Wellbutrin 200mg SR twice per day for prevention of depression and anxiety  - Increase Prozac to 60mg per day from 40mg per day for prevention of depression and anxiety  - Continue mirtzapine 7.5mg per night as needed for sleep    Medication options, alternatives (including no medications) and medication risks/benefits/side effects were discussed in detail.  The patient was advised to call, message clinician on [employer], or come in to the clinic if symptoms worsen or if questions/issues regarding their medications arise.  The patient verbalized understanding and agreement.    The patient was educated to call 911, call the suicide hotline, or go to the local ER if having thoughts of suicide or homicide.  The patient verbalized understanding and agreement.   The proposed treatment plan was discussed with the patient who was provided the opportunity to ask questions and make suggestions regarding alternative treatment. Patient verbalized understanding and expressed agreement with the plan.      Follow up in approx. 3 months    This appointment was supervised by attending psychiatrist, Dr. Susu Robbins M.D., who agrees with assessment and treatment plan.  See attending attestation for more details.

## 2025-04-22 ENCOUNTER — HOSPITAL ENCOUNTER (OUTPATIENT)
Facility: MEDICAL CENTER | Age: 75
End: 2025-04-22
Attending: PHYSICIAN ASSISTANT
Payer: MEDICARE

## 2025-04-22 ENCOUNTER — OFFICE VISIT (OUTPATIENT)
Dept: URGENT CARE | Facility: CLINIC | Age: 75
End: 2025-04-22
Payer: MEDICARE

## 2025-04-22 ENCOUNTER — RESULTS FOLLOW-UP (OUTPATIENT)
Dept: URGENT CARE | Facility: CLINIC | Age: 75
End: 2025-04-22

## 2025-04-22 VITALS
HEIGHT: 67 IN | BODY MASS INDEX: 28.25 KG/M2 | WEIGHT: 180 LBS | RESPIRATION RATE: 16 BRPM | OXYGEN SATURATION: 97 % | SYSTOLIC BLOOD PRESSURE: 112 MMHG | DIASTOLIC BLOOD PRESSURE: 70 MMHG | TEMPERATURE: 97.9 F | HEART RATE: 76 BPM

## 2025-04-22 DIAGNOSIS — J37.0 LARYNGITIS, CHRONIC: ICD-10-CM

## 2025-04-22 DIAGNOSIS — J02.9 SORE THROAT: ICD-10-CM

## 2025-04-22 LAB — S PYO DNA SPEC NAA+PROBE: NOT DETECTED

## 2025-04-22 PROCEDURE — 87651 STREP A DNA AMP PROBE: CPT | Performed by: PHYSICIAN ASSISTANT

## 2025-04-22 PROCEDURE — 3074F SYST BP LT 130 MM HG: CPT | Performed by: PHYSICIAN ASSISTANT

## 2025-04-22 PROCEDURE — 3078F DIAST BP <80 MM HG: CPT | Performed by: PHYSICIAN ASSISTANT

## 2025-04-22 PROCEDURE — 99214 OFFICE O/P EST MOD 30 MIN: CPT | Performed by: PHYSICIAN ASSISTANT

## 2025-04-22 PROCEDURE — 87070 CULTURE OTHR SPECIMN AEROBIC: CPT

## 2025-04-22 RX ORDER — DEXAMETHASONE SODIUM PHOSPHATE 10 MG/ML
10 INJECTION, SOLUTION INTRA-ARTICULAR; INTRALESIONAL; INTRAMUSCULAR; INTRAVENOUS; SOFT TISSUE ONCE
Status: COMPLETED | OUTPATIENT
Start: 2025-04-22 | End: 2025-04-22

## 2025-04-22 RX ADMIN — DEXAMETHASONE SODIUM PHOSPHATE 10 MG: 10 INJECTION, SOLUTION INTRA-ARTICULAR; INTRALESIONAL; INTRAMUSCULAR; INTRAVENOUS; SOFT TISSUE at 13:21

## 2025-04-22 ASSESSMENT — ENCOUNTER SYMPTOMS
FEVER: 0
SORE THROAT: 1
CHILLS: 0
COUGH: 0

## 2025-04-22 ASSESSMENT — FIBROSIS 4 INDEX: FIB4 SCORE: 2.4

## 2025-04-22 NOTE — PROGRESS NOTES
Subjective:   Buster Medina is a 74 y.o. male who presents today with   Chief Complaint   Patient presents with    Sore Throat     Getting worse, loss his voice x a couple weeks       Pharyngitis   This is a recurrent problem. The current episode started more than 1 month ago. The problem has been gradually worsening. There has been no fever. The pain is mild. Pertinent negatives include no congestion or coughing. He has tried nothing for the symptoms. The treatment provided no relief.     Patient states he was seen by ENT about a year ago and they told him to double his dose of omeprazole because they thought it may have been acid reflux causing his hoarse voice and he states it comes and goes intermittently.  He states it has been worse over the last couple weeks.    PMH:  has a past medical history of Anxiety, Anxiety, generalized (11/27/2023), Arthritis, Awareness under anesthesia, Bowel habit changes (06/24/2021), Cancer ([facility]) (2007), Cataract (12/2024), Chickenpox, Depression, Glaucoma (12/2024), Heart valve disease (10/2024), High cholesterol, Hyperlipidemia, Hypertension, Kidney stone, Mumps, Obesity, Pneumothorax, Psoriatic arthritis ([facility]), Recurrent major depressive disorder, in partial remission ([facility]) (09/21/2017), Restless leg syndrome, Sleep apnea, and Tonsillitis.  MEDS:   Current Outpatient Medications:     buPROPion (WELLBUTRIN SR) 200 MG SR tablet, Take 2 Tablets by mouth every day for 180 days. Indications: Major Depressive Disorder, Disp: 180 Tablet, Rfl: 1    fluoxetine (PROZAC) 40 MG capsule, Take 1 Capsule by mouth every day for 90 days., Disp: 30 Capsule, Rfl: 2    mirtazapine (REMERON) 7.5 MG tablet, Take 1 Tablet by mouth every evening for 90 days., Disp: 30 Tablet, Rfl: 2    FLUoxetine (PROZAC) 20 MG Cap, Take 1 Capsule by mouth every morning for 90 days., Disp: 30 Capsule, Rfl: 2    predniSONE (DELTASONE) 10 MG Tab, Take 3 Tablets by mouth every day for 3 days, THEN 2 Tablets every  day for 3 days, THEN 1 Tablet every day for 3 days, THEN 0.5 Tablets every day for 3 days., Disp: 20 Tablet, Rfl: 0    HYDROcodone-acetaminophen (NORCO) 7.5-325 MG tab, Take 1 Tablet by mouth every 24 hours as needed for Moderate Pain for up to 30 days., Disp: 30 Tablet, Rfl: 0    atorvastatin (LIPITOR) 80 MG tablet, Take 1 Tablet by mouth every day., Disp: 90 Tablet, Rfl: 3    omeprazole (PRILOSEC) 20 MG delayed-release capsule, Take 1 Capsule by mouth every day. (Patient taking differently: Take 20 mg by mouth 2 times a day.), Disp: 180 Capsule, Rfl: 3    lisinopril (PRINIVIL) 10 MG Tab, Take 1 Tablet by mouth every evening., Disp: 90 Tablet, Rfl: 1    valacyclovir (VALTREX) 1 GM Tab, TAKE 1 TABLET TWICE A DAY  FOR 7 DAYS AS NEEDED FOR   OUTBREAK, Disp: 24 Tablet, Rfl: 5    clopidogrel (PLAVIX) 75 MG Tab, Take 1 Tablet by mouth every day., Disp: 90 Tablet, Rfl: 3    LORazepam (ATIVAN) 2 MG tablet, Take 2 mg by mouth at bedtime., Disp: , Rfl:     ezetimibe (ZETIA) 10 MG Tab, Take 1 Tablet by mouth every day., Disp: 90 Tablet, Rfl: 1    NON SPECIFIED, 1 Dose by Subcutaneous Infusion route see administration instructions. Ilumya injection every three months prescribed by dermatology, Disp: , Rfl:     Magnesium 100 MG Cap, Take 100 mg by mouth every evening., Disp: , Rfl:     ferrous sulfate 325 (65 Fe) MG tablet, Take 325 mg by mouth every day., Disp: , Rfl:     B Complex Vitamins (VITAMIN B COMPLEX PO), Take 1 Tablet by mouth every day., Disp: , Rfl:     Cholecalciferol (VITAMIN D3 PO), Take 1 Tablet by mouth every day. OTC, Disp: , Rfl:     aspirin EC (ECOTRIN) 81 MG Tablet Delayed Response, Take 81 mg by mouth every bedtime., Disp: 30 Tab, Rfl: 0    Multiple Vitamins-Minerals (MELODY-DAY 1000 PO), Take 1 Tab by mouth 2 Times a Day., Disp: , Rfl:   ALLERGIES: No Known Allergies  SURGHX:   Past Surgical History:   Procedure Laterality Date    TRANSCATHETER MITRAL VALVE REPAIR Right 2/4/2025    Procedure: TRANSCATHETER  MITRAL VALVE PROCEDURE;  Surgeon: Davi Caraballo M.D.;  Location: SURGERY [facility];  Service: Cardiac    ECHOCARDIOGRAM, TRANSESOPHAGEAL, INTRAOPERATIVE N/A 2/4/2025    Procedure: ECHOCARDIOGRAM, TRANSESOPHAGEAL, INTRAOPERATIVE;  Surgeon: Davi Caraballo M.D.;  Location: [facility];  Service: Cardiac    PB INCISE FINGER TENDON SHEATH Left 09/19/2023    Procedure: LEFT INDEX FINGER TRIGGER RELEASE;  Surgeon: Td Soliman M.D.;  Location: [facility];  Service: Orthopedics    PB INCISE FINGER TENDON SHEATH Left 08/30/2022    Procedure: LEFT MIDDLE FINGER TRIGGER RELEASE;  Surgeon: Td Soliman M.D.;  Location: [facility];  Service: Orthopedics    TN CYSTOURETHROSCOPY,BIOPSIES N/A 08/17/2022    Procedure: CYSTOSCOPY;  Surgeon: Jerzy Jasso M.D.;  Location: [facility];  Service: Urology    TN CYSTOURETHROSCOPY,BIOPSIES  07/06/2021    Procedure: BIOPSY, BLADDER WITH FULGERATION;  Surgeon: Bob Millan M.D.;  Location: [facility];  Service: Urology    KNEE ARTHROPLASTY TOTAL Right 2021    BLADDER BIOPSY WITH CYSTOSCOPY  01/03/2018    Procedure: BLADDER BIOPSY WITH CYSTOSCOPY/WITH UPPER TRACT WASHING;  Surgeon: El Manuel M.D.;  Location: [facility];  Service: Urology    RETROGRADES Bilateral 01/03/2018    Procedure: RETROGRADES;  Surgeon: El Manuel M.D.;  Location: [facility];  Service: Urology    PYELOGRAM Bilateral 01/03/2018    Procedure: PYELOGRAM;  Surgeon: El Manuel M.D.;  Location: [facility];  Service: Urology    OTHER ORTHOPEDIC SURGERY  2015    shoulder replacement    ABDOMINAL EXPLORATION      ARTHROPLASTY      right shoulder    BLADDER BIOPSY WITH CYSTOSCOPY      EYE SURGERY      lasik     GASTRIC BYPASS LAPAROSCOPIC      HERNIA REPAIR      OPEN REDUCTION      OTHER Left     thumb joint    OTHER ABDOMINAL SURGERY  2002    gastric bypass    THORACOTOMY      TURP-VAPOR  "      SOCHX:  reports that he has never smoked. He has never used smokeless tobacco. He reports current alcohol use of about 9.6 oz of alcohol per week. He reports that he does not currently use drugs after having used the following drugs: Inhaled.  FH: Reviewed with patient, not pertinent to this visit.     Review of Systems   Constitutional:  Negative for chills, fever and malaise/fatigue.   HENT:  Positive for sore throat. Negative for congestion.         Hoarse voice   Respiratory:  Negative for cough.       Objective:   /70   Pulse 76   Temp 36.6 °C (97.9 °F)   Resp 16   Ht 1.702 m (5' 7\")   Wt 81.6 kg (180 lb)   SpO2 97%   BMI 28.19 kg/m²   Physical Exam  Vitals and nursing note reviewed.   Constitutional:       General: He is not in acute distress.     Appearance: Normal appearance. He is well-developed. He is not ill-appearing or toxic-appearing.      Comments: Hoarse voice   HENT:      Head: Normocephalic and atraumatic.      Ears:      Comments: Hearing aids intact bilaterally     Mouth/Throat:      Mouth: Mucous membranes are moist.      Pharynx: Uvula midline. Posterior oropharyngeal erythema present. No oropharyngeal exudate or uvula swelling.      Tonsils: No tonsillar exudate or tonsillar abscesses.   Cardiovascular:      Rate and Rhythm: Normal rate.   Pulmonary:      Effort: Pulmonary effort is normal.   Musculoskeletal:      Comments: Normal movement in all 4 extremities   Lymphadenopathy:      Cervical: No cervical adenopathy.   Skin:     General: Skin is warm and dry.   Neurological:      Mental Status: He is alert and oriented to person, place, and time.      GCS: GCS eye subscore is 4. GCS verbal subscore is 5. GCS motor subscore is 6.      Sensory: Sensation is intact.      Motor: Motor function is intact.      Coordination: Coordination is intact. Coordination normal.   Psychiatric:         Mood and Affect: Mood normal.       STREP A -    Assessment/Plan:   Assessment    1. " Laryngitis, chronic  - dexamethasone (Decadron) injection (check route below) 10 mg    2. Sore throat  - POCT GROUP A STREP, PCR  - dexamethasone (Decadron) injection (check route below) 10 mg  - CULTURE THROAT; Future    Consistent with chronic laryngitis.  Recommend salt water gargles, lozenges.  Recommend following up with ENT who he states he has an appointment with in June.  He will call and see if he can get in sooner.  May benefit from endoscopy at that time or further evaluation.  No acute concerning findings or neurodeficit on exam today.  We will follow-up and adjust treatment accordingly if needed based on throat culture.     Differential diagnosis, natural history, supportive care, and indications for immediate follow-up discussed.   Patient given instructions and understanding of medications and treatment.    If not improving in 3-5 days, F/U with PCP or return to  if symptoms worsen.    Patient agreeable to plan.    Please note that this dictation was created using voice recognition software. I have made every reasonable attempt to correct obvious errors, but I expect that there are errors of grammar and possibly content that I did not discover before finalizing the note.    Bunny Gonzalez PA-C

## 2025-04-23 ENCOUNTER — APPOINTMENT (OUTPATIENT)
Dept: RADIOLOGY | Facility: MEDICAL CENTER | Age: 75
End: 2025-04-23
Attending: EMERGENCY MEDICINE
Payer: MEDICARE

## 2025-04-23 ENCOUNTER — APPOINTMENT (OUTPATIENT)
Dept: RADIOLOGY | Facility: MEDICAL CENTER | Age: 75
End: 2025-04-23
Attending: STUDENT IN AN ORGANIZED HEALTH CARE EDUCATION/TRAINING PROGRAM
Payer: MEDICARE

## 2025-04-23 ENCOUNTER — APPOINTMENT (OUTPATIENT)
Dept: RADIOLOGY | Facility: MEDICAL CENTER | Age: 75
End: 2025-04-23
Payer: MEDICARE

## 2025-04-23 ENCOUNTER — HOSPITAL ENCOUNTER (INPATIENT)
Facility: MEDICAL CENTER | Age: 75
LOS: 6 days | End: 2025-04-29
Attending: EMERGENCY MEDICINE | Admitting: STUDENT IN AN ORGANIZED HEALTH CARE EDUCATION/TRAINING PROGRAM
Payer: MEDICARE

## 2025-04-23 PROBLEM — S32.810A MULTIPLE CLOSED PELVIC FRACTURES WITH DISRUPTION OF PELVIC CIRCLE, INITIAL ENCOUNTER (HCC): Status: ACTIVE | Noted: 2025-04-23

## 2025-04-23 PROBLEM — Z53.09 CONTRAINDICATION TO DEEP VEIN THROMBOSIS (DVT) PROPHYLAXIS: Status: ACTIVE | Noted: 2025-04-23

## 2025-04-23 PROBLEM — E86.1 HYPOTENSION DUE TO HYPOVOLEMIA: Status: ACTIVE | Noted: 2025-04-23

## 2025-04-23 PROBLEM — S32.009A LUMBAR TRANSVERSE PROCESS FRACTURE, CLOSED, INITIAL ENCOUNTER (HCC): Status: ACTIVE | Noted: 2025-04-23

## 2025-04-23 PROBLEM — T14.90XA TRAUMA: Status: ACTIVE | Noted: 2025-04-23

## 2025-04-23 PROBLEM — T79.4XXA TRAUMATIC HEMORRHAGIC SHOCK (HCC): Status: ACTIVE | Noted: 2025-04-23

## 2025-04-23 PROBLEM — I51.9 CARDIAC DISEASE: Status: ACTIVE | Noted: 2025-04-23

## 2025-04-23 PROBLEM — S02.85XB: Status: ACTIVE | Noted: 2025-04-23

## 2025-04-23 PROBLEM — S22.39XA RIB FRACTURE: Status: ACTIVE | Noted: 2025-04-23

## 2025-04-23 PROBLEM — S37.29XA TRAUMATIC RUPTURE OF BLADDER: Status: ACTIVE | Noted: 2025-04-23

## 2025-04-23 PROBLEM — F32.A DEPRESSION: Status: ACTIVE | Noted: 2025-04-23

## 2025-04-23 PROBLEM — R33.8 ACUTE RETENTION OF URINE: Status: ACTIVE | Noted: 2025-04-23

## 2025-04-23 PROBLEM — S02.85XA ORBITAL FRACTURE, CLOSED, INITIAL ENCOUNTER (HCC): Status: ACTIVE | Noted: 2025-04-23

## 2025-04-23 PROBLEM — G47.00 INSOMNIA: Status: ACTIVE | Noted: 2025-04-23

## 2025-04-23 PROBLEM — Z79.02 PLATELET INHIBITION DUE TO PLAVIX: Status: ACTIVE | Noted: 2025-04-23

## 2025-04-23 PROBLEM — I10 HYPERTENSION: Status: ACTIVE | Noted: 2025-04-23

## 2025-04-23 PROCEDURE — 72125 CT NECK SPINE W/O DYE: CPT

## 2025-04-23 PROCEDURE — 72131 CT LUMBAR SPINE W/O DYE: CPT

## 2025-04-23 PROCEDURE — 70498 CT ANGIOGRAPHY NECK: CPT

## 2025-04-23 PROCEDURE — 73552 X-RAY EXAM OF FEMUR 2/>: CPT | Mod: RT

## 2025-04-23 PROCEDURE — 76705 ECHO EXAM OF ABDOMEN: CPT

## 2025-04-23 PROCEDURE — 71260 CT THORAX DX C+: CPT

## 2025-04-23 PROCEDURE — 72192 CT PELVIS W/O DYE: CPT

## 2025-04-23 PROCEDURE — 72170 X-RAY EXAM OF PELVIS: CPT

## 2025-04-23 PROCEDURE — 70450 CT HEAD/BRAIN W/O DYE: CPT

## 2025-04-23 PROCEDURE — 72128 CT CHEST SPINE W/O DYE: CPT

## 2025-04-23 PROCEDURE — 70486 CT MAXILLOFACIAL W/O DYE: CPT

## 2025-04-23 PROCEDURE — 74450 X-RAY URETHRA/BLADDER: CPT

## 2025-04-23 PROCEDURE — 71045 X-RAY EXAM CHEST 1 VIEW: CPT

## 2025-04-23 ASSESSMENT — PAIN DESCRIPTION - PAIN TYPE
TYPE: ACUTE PAIN
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
## 2025-04-23 NOTE — ED NOTES
Patient to MAIN ED CT. Patient to CT with himanshu in tow. Red box with patient, 1 unit platelets and 2 units whole blood still remaining for administration. ICU RN en route with transport monitor in order to view arterial line tracing during imaging.
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
## 2025-04-23 NOTE — ED NOTES
"Unit \"Y\" whole blood (second unit) pulled from trauma fridge per the order of Dr. Garcia. Started via TIN rapid infuser. First unit whole blood still infusing via pressure bag. Blood bank notified. Orders for red box on standby, OK to cancel existing platelet order in favor of using platelets present in red box - also notified blood bank of this. Dr. Field at bedside to place arterial line.   "

## 2025-04-23 NOTE — ASSESSMENT & PLAN NOTE
ASA and Plavix for drug eluting stent placed 2/2025.  Initial TEG - PM  with AA 94.5% / ADP 91.6% - 1 unit platelets given in trauma  Repeat TEG-PM shows  AA inhibition 54%, but ADP inhibition, transfused 1unit of platelets  4/24 TEG/PM AA inhibition 87%  4/26 Plavix initiated  4/27 ASA resumed

## 2025-04-23 NOTE — ED NOTES
Red box to trauma room. 1st unit Whole Blood from Red Box (3rd unit total) infusing via himanshu. Second unit whole blood from blood fridge is complete.

## 2025-04-23 NOTE — ED NOTES
First unit of whole blood from red box complete via [place], second unit whole blood from red box (4th in total) started via himanshu.
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
## 2025-04-23 NOTE — ED NOTES
"Patient is on Plavix - order for platelets received.   Automatic BP 78/58 - Whole blood (1 unit) removed from trauma fridge per Dr. Garcia. Unit \"X\" infusing via pressure bag. [place] is primed. Blood bank notified and dual verification of blood performed by 2-RN's.  Open book pelvic fx appreciated via XR at 1559. Repositioning binder now.   "

## 2025-04-24 ENCOUNTER — APPOINTMENT (OUTPATIENT)
Dept: RADIOLOGY | Facility: MEDICAL CENTER | Age: 75
End: 2025-04-24
Attending: ORTHOPAEDIC SURGERY
Payer: MEDICARE

## 2025-04-24 ENCOUNTER — SURGERY (OUTPATIENT)
Age: 75
End: 2025-04-24
Payer: MEDICARE

## 2025-04-24 ENCOUNTER — APPOINTMENT (OUTPATIENT)
Dept: RADIOLOGY | Facility: MEDICAL CENTER | Age: 75
End: 2025-04-24
Attending: STUDENT IN AN ORGANIZED HEALTH CARE EDUCATION/TRAINING PROGRAM
Payer: MEDICARE

## 2025-04-24 ENCOUNTER — ANESTHESIA EVENT (OUTPATIENT)
Dept: SURGERY | Facility: MEDICAL CENTER | Age: 75
End: 2025-04-24
Payer: MEDICARE

## 2025-04-24 ENCOUNTER — RESULTS FOLLOW-UP (OUTPATIENT)
Dept: URGENT CARE | Facility: PHYSICIAN GROUP | Age: 75
End: 2025-04-24

## 2025-04-24 ENCOUNTER — ANESTHESIA (OUTPATIENT)
Dept: SURGERY | Facility: MEDICAL CENTER | Age: 75
End: 2025-04-24
Payer: MEDICARE

## 2025-04-24 PROBLEM — N32.89 EXTRAPERITONEAL RUPTURE OF BLADDER: Status: ACTIVE | Noted: 2025-04-23

## 2025-04-24 PROBLEM — Z95.5 STENTED CORONARY ARTERY: Status: ACTIVE | Noted: 2025-04-24

## 2025-04-24 PROBLEM — K21.9 GASTROESOPHAGEAL REFLUX DISEASE: Status: ACTIVE | Noted: 2025-04-24

## 2025-04-24 PROBLEM — N17.9 ACUTE RENAL FAILURE (HCC): Status: ACTIVE | Noted: 2025-04-24

## 2025-04-24 PROBLEM — L40.50 PSORIATIC ARTHRITIS (HCC): Status: ACTIVE | Noted: 2025-04-24

## 2025-04-24 PROBLEM — G47.30 SLEEP APNEA: Status: ACTIVE | Noted: 2025-04-24

## 2025-04-24 PROBLEM — S05.8X1A: Status: ACTIVE | Noted: 2025-04-24

## 2025-04-24 LAB
BACTERIA SPEC RESP CULT: NORMAL
SIGNIFICANT IND 70042: NORMAL
SITE SITE: NORMAL
SOURCE SOURCE: NORMAL

## 2025-04-24 PROCEDURE — 700101 HCHG RX REV CODE 250: Performed by: STUDENT IN AN ORGANIZED HEALTH CARE EDUCATION/TRAINING PROGRAM

## 2025-04-24 PROCEDURE — 72190 X-RAY EXAM OF PELVIS: CPT

## 2025-04-24 PROCEDURE — 76870 US EXAM SCROTUM: CPT

## 2025-04-24 PROCEDURE — 700111 HCHG RX REV CODE 636 W/ 250 OVERRIDE (IP): Mod: JZ | Performed by: STUDENT IN AN ORGANIZED HEALTH CARE EDUCATION/TRAINING PROGRAM

## 2025-04-24 PROCEDURE — 71045 X-RAY EXAM CHEST 1 VIEW: CPT

## 2025-04-24 PROCEDURE — 700105 HCHG RX REV CODE 258: Performed by: STUDENT IN AN ORGANIZED HEALTH CARE EDUCATION/TRAINING PROGRAM

## 2025-04-24 RX ORDER — PHENYLEPHRINE HCL IN 0.9% NACL 1 MG/10 ML
SYRINGE (ML) INTRAVENOUS PRN
Status: DISCONTINUED | OUTPATIENT
Start: 2025-04-24 | End: 2025-04-24 | Stop reason: SURG

## 2025-04-24 RX ORDER — EPHEDRINE SULFATE 50 MG/ML
INJECTION, SOLUTION INTRAVENOUS PRN
Status: DISCONTINUED | OUTPATIENT
Start: 2025-04-24 | End: 2025-04-24 | Stop reason: SURG

## 2025-04-24 RX ORDER — SODIUM CHLORIDE, SODIUM LACTATE, POTASSIUM CHLORIDE, CALCIUM CHLORIDE 600; 310; 30; 20 MG/100ML; MG/100ML; MG/100ML; MG/100ML
INJECTION, SOLUTION INTRAVENOUS
Status: DISCONTINUED | OUTPATIENT
Start: 2025-04-24 | End: 2025-04-24 | Stop reason: SURG

## 2025-04-24 RX ORDER — DEXMEDETOMIDINE HYDROCHLORIDE 100 UG/ML
INJECTION, SOLUTION INTRAVENOUS PRN
Status: DISCONTINUED | OUTPATIENT
Start: 2025-04-24 | End: 2025-04-24 | Stop reason: SURG

## 2025-04-24 RX ORDER — DEXAMETHASONE SODIUM PHOSPHATE 4 MG/ML
INJECTION, SOLUTION INTRA-ARTICULAR; INTRALESIONAL; INTRAMUSCULAR; INTRAVENOUS; SOFT TISSUE PRN
Status: DISCONTINUED | OUTPATIENT
Start: 2025-04-24 | End: 2025-04-24 | Stop reason: SURG

## 2025-04-24 RX ORDER — CEFAZOLIN SODIUM 1 G/3ML
INJECTION, POWDER, FOR SOLUTION INTRAMUSCULAR; INTRAVENOUS PRN
Status: DISCONTINUED | OUTPATIENT
Start: 2025-04-24 | End: 2025-04-24 | Stop reason: SURG

## 2025-04-24 RX ORDER — ONDANSETRON 2 MG/ML
INJECTION INTRAMUSCULAR; INTRAVENOUS PRN
Status: DISCONTINUED | OUTPATIENT
Start: 2025-04-24 | End: 2025-04-24 | Stop reason: SURG

## 2025-04-24 RX ORDER — LIDOCAINE HYDROCHLORIDE 20 MG/ML
INJECTION, SOLUTION EPIDURAL; INFILTRATION; INTRACAUDAL; PERINEURAL PRN
Status: DISCONTINUED | OUTPATIENT
Start: 2025-04-24 | End: 2025-04-24 | Stop reason: SURG

## 2025-04-24 RX ORDER — HYDROMORPHONE HYDROCHLORIDE 2 MG/ML
INJECTION, SOLUTION INTRAMUSCULAR; INTRAVENOUS; SUBCUTANEOUS PRN
Status: DISCONTINUED | OUTPATIENT
Start: 2025-04-24 | End: 2025-04-24 | Stop reason: SURG

## 2025-04-24 RX ORDER — LIDOCAINE HYDROCHLORIDE 40 MG/ML
SOLUTION TOPICAL PRN
Status: DISCONTINUED | OUTPATIENT
Start: 2025-04-24 | End: 2025-04-24 | Stop reason: SURG

## 2025-04-24 RX ORDER — SODIUM CHLORIDE, SODIUM GLUCONATE, SODIUM ACETATE, POTASSIUM CHLORIDE AND MAGNESIUM CHLORIDE 526; 502; 368; 37; 30 MG/100ML; MG/100ML; MG/100ML; MG/100ML; MG/100ML
INJECTION, SOLUTION INTRAVENOUS
Status: DISCONTINUED | OUTPATIENT
Start: 2025-04-24 | End: 2025-04-24 | Stop reason: SURG

## 2025-04-24 RX ADMIN — FENTANYL CITRATE 50 MCG: 50 INJECTION, SOLUTION INTRAMUSCULAR; INTRAVENOUS at 14:32

## 2025-04-24 RX ADMIN — DEXMEDETOMIDINE 4 MCG: 100 INJECTION, SOLUTION INTRAVENOUS at 14:25

## 2025-04-24 RX ADMIN — Medication 50 MCG: at 12:39

## 2025-04-24 RX ADMIN — PROPOFOL 160 MG: 10 INJECTION, EMULSION INTRAVENOUS at 12:15

## 2025-04-24 RX ADMIN — HYDROMORPHONE HYDROCHLORIDE 1 MG: 2 INJECTION INTRAMUSCULAR; INTRAVENOUS; SUBCUTANEOUS at 12:15

## 2025-04-24 RX ADMIN — DEXMEDETOMIDINE 4 MCG: 100 INJECTION, SOLUTION INTRAVENOUS at 13:49

## 2025-04-24 RX ADMIN — ONDANSETRON 4 MG: 2 INJECTION INTRAMUSCULAR; INTRAVENOUS at 14:30

## 2025-04-24 RX ADMIN — HYDROMORPHONE HYDROCHLORIDE 0.2 MG: 2 INJECTION INTRAMUSCULAR; INTRAVENOUS; SUBCUTANEOUS at 12:42

## 2025-04-24 RX ADMIN — Medication 50 MCG: at 14:19

## 2025-04-24 RX ADMIN — HYDROMORPHONE HYDROCHLORIDE 0.2 MG: 2 INJECTION INTRAMUSCULAR; INTRAVENOUS; SUBCUTANEOUS at 13:05

## 2025-04-24 RX ADMIN — LIDOCAINE HYDROCHLORIDE 4 ML: 40 SOLUTION TOPICAL at 12:16

## 2025-04-24 RX ADMIN — HYDROMORPHONE HYDROCHLORIDE 0.2 MG: 2 INJECTION INTRAMUSCULAR; INTRAVENOUS; SUBCUTANEOUS at 13:59

## 2025-04-24 RX ADMIN — LIDOCAINE HYDROCHLORIDE 60 MG: 20 INJECTION, SOLUTION EPIDURAL; INFILTRATION; INTRACAUDAL; PERINEURAL at 12:15

## 2025-04-24 RX ADMIN — DEXMEDETOMIDINE 4 MCG: 100 INJECTION, SOLUTION INTRAVENOUS at 14:32

## 2025-04-24 RX ADMIN — SODIUM CHLORIDE, SODIUM GLUCONATE, SODIUM ACETATE, POTASSIUM CHLORIDE AND MAGNESIUM CHLORIDE: 526; 502; 368; 37; 30 INJECTION, SOLUTION INTRAVENOUS at 13:49

## 2025-04-24 RX ADMIN — Medication 100 MCG: at 14:10

## 2025-04-24 RX ADMIN — PROPOFOL 50 MG: 10 INJECTION, EMULSION INTRAVENOUS at 12:46

## 2025-04-24 RX ADMIN — EPHEDRINE SULFATE 5 MG: 50 INJECTION, SOLUTION INTRAVENOUS at 13:27

## 2025-04-24 RX ADMIN — CEFAZOLIN 2 G: 1 INJECTION, POWDER, FOR SOLUTION INTRAMUSCULAR; INTRAVENOUS at 12:30

## 2025-04-24 RX ADMIN — ROCURONIUM BROMIDE 20 MG: 10 INJECTION, SOLUTION INTRAVENOUS at 13:36

## 2025-04-24 RX ADMIN — HYDROMORPHONE HYDROCHLORIDE 0.2 MG: 2 INJECTION INTRAMUSCULAR; INTRAVENOUS; SUBCUTANEOUS at 12:47

## 2025-04-24 RX ADMIN — SODIUM CHLORIDE, POTASSIUM CHLORIDE, SODIUM LACTATE AND CALCIUM CHLORIDE: 600; 310; 30; 20 INJECTION, SOLUTION INTRAVENOUS at 12:11

## 2025-04-24 RX ADMIN — DEXAMETHASONE SODIUM PHOSPHATE 4 MG: 4 INJECTION INTRA-ARTICULAR; INTRALESIONAL; INTRAMUSCULAR; INTRAVENOUS; SOFT TISSUE at 12:26

## 2025-04-24 RX ADMIN — DEXMEDETOMIDINE 4 MCG: 100 INJECTION, SOLUTION INTRAVENOUS at 13:46

## 2025-04-24 RX ADMIN — DEXMEDETOMIDINE 4 MCG: 100 INJECTION, SOLUTION INTRAVENOUS at 13:57

## 2025-04-24 RX ADMIN — HYDROMORPHONE HYDROCHLORIDE 0.2 MG: 2 INJECTION INTRAMUSCULAR; INTRAVENOUS; SUBCUTANEOUS at 13:49

## 2025-04-24 RX ADMIN — SUGAMMADEX 200 MG: 100 INJECTION, SOLUTION INTRAVENOUS at 14:39

## 2025-04-24 RX ADMIN — Medication 100 MCG: at 12:15

## 2025-04-24 RX ADMIN — ROCURONIUM BROMIDE 70 MG: 10 INJECTION, SOLUTION INTRAVENOUS at 12:15

## 2025-04-24 ASSESSMENT — ENCOUNTER SYMPTOMS
NEUROLOGICAL NEGATIVE: 1
NAUSEA: 0
COUGH: 0
CHILLS: 0
ABDOMINAL PAIN: 1
FOCAL WEAKNESS: 0
MYALGIAS: 1
HEADACHES: 0
PSYCHIATRIC NEGATIVE: 1
FEVER: 0
SHORTNESS OF BREATH: 0
RESPIRATORY NEGATIVE: 1
SENSORY CHANGE: 0
VOMITING: 0

## 2025-04-24 ASSESSMENT — PAIN DESCRIPTION - PAIN TYPE
TYPE: ACUTE PAIN;SURGICAL PAIN
TYPE: ACUTE PAIN
TYPE: SURGICAL PAIN
TYPE: ACUTE PAIN;SURGICAL PAIN
TYPE: SURGICAL PAIN
TYPE: ACUTE PAIN

## 2025-04-24 ASSESSMENT — COPD QUESTIONNAIRES
HAVE YOU SMOKED AT LEAST 100 CIGARETTES IN YOUR ENTIRE LIFE: NO/DON'T KNOW
DO YOU EVER COUGH UP ANY MUCUS OR PHLEGM?: NO/ONLY WITH OCCASIONAL COLDS OR INFECTIONS
COPD SCREENING SCORE: 2
DURING THE PAST 4 WEEKS HOW MUCH DID YOU FEEL SHORT OF BREATH: NONE/LITTLE OF THE TIME

## 2025-04-24 ASSESSMENT — FIBROSIS 4 INDEX: FIB4 SCORE: 5.76

## 2025-04-24 NOTE — ASSESSMENT & PLAN NOTE
CT cysto confirms contained extraperitoneal bladder rupture.  History of TCC, resected in 2007, no tumor on interval imaging and cystoscopy  Maintain tineo catheter.    4/24 Anterior cystorrhaphy with pelvic drain.  Wean CBI.  Continue pelvic drain, remove prior to discharge   Will need cystogram in 2 weeks prior to catheter removal ~ 5/8  Vineet Sanchez / Abhishek Hernandez MD [facility] [place]

## 2025-04-24 NOTE — PROGRESS NOTES
Pt arrived to [facility] via trauma bed, accompanied by IR RN x2.  On 4L OM. Monitored.

## 2025-04-24 NOTE — ANESTHESIA PROCEDURE NOTES
Airway    Date/Time: 4/24/2025 12:16 PM    Performed by: Kaitlin Becker M.D.  Authorized by: Kaitlin Becker M.D.    Location:  OR  Urgency:  Elective  Indications for Airway Management:  Anesthesia      Spontaneous Ventilation: absent    Sedation Level:  Deep  Preoxygenated: Yes    Patient Position:  Sniffing  Mask Difficulty Assessment:  0 - not attempted  Final Airway Type:  Endotracheal airway  Final Endotracheal Airway:  ETT  Cuffed: Yes    Technique Used for Successful ETT Placement:  Direct laryngoscopy  Devices/Methods Used in Placement:  Intubating stylet and anterior pressure/BURP    Insertion Site:  Oral  Blade Type:  [place]  Laryngoscope Blade/Videolaryngoscope Blade Size:  3  ETT Size (mm):  7.0  Measured from:  Teeth  ETT to Teeth (cm):  23  Placement Verified by: auscultation and capnometry    Cormack-Lehane Classification:  Grade IIa - partial view of glottis  Number of Attempts at Approach:  1
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
## 2025-04-24 NOTE — ASSESSMENT & PLAN NOTE
VTE prophylaxis initially contraindicated secondary to elevated bleeding risk.  4/25 Prophylactic dose enoxaparin 30 mg BID initiated.

## 2025-04-24 NOTE — OR SURGEON
Immediate Post- Operative Note        Findings: ACTIVE BLEED/PSEUDOANEURYSM SMALL BR OF LEFT ANTERIOR DIVISION HYPOGASTRIC. SELECTIVE GELFOAM EMBO WITH SATISFACTORY OCCLUSION.       Procedure(s): R CFA PUNCTURE WITH US    PELVIC AORTOGRTAM    SELECTIVE LEFT INTERNAL ILIAC, EXTERNAL ILIAC    GELFOAM EMBO LEFT HYPOGASTRIC ANTERIOR DIVISION BR     CONTROL ANGIO POST EMBO WITH SUCCESSFUL OCCLUSION    RIGHT INTERNAL ILIAC AND EXTERNAL ILIAC SELECTIVE ANGIO. NO BLEED OR INJURING VESSEL. NO EMBO PERFORMED ON THE RIGHT    6F ANGIOSEAL      Estimated Blood Loss: Less than 15 ml (FLUSHES)        Complications: None            4/23/2025     6:31 PM     Vineet Alatorre M.D.
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
## 2025-04-24 NOTE — ASSESSMENT & PLAN NOTE
Chronic condition treated with Wellbutrin, mirtazapine, and fluoxetine.  Remeron resumed on admission.  Wellbutrin and fluoxetine resumed.

## 2025-04-24 NOTE — THERAPY
Occupational Therapy Contact Note    Patient Name: Kiersten Sixty-One  Age:  74 y.o., Sex:  male  Medical Record #: [id number]  Today's Date: 4/24/2025 04/24/25 0727   Initial Contact Note    Initial Contact Note Order Received and Verified, Occupational Therapy Evaluation in Progress with Full Report to Follow.   Interdisciplinary Plan of Care Collaboration   Collaboration Comments OT eval held pending sx today will follow up as medically indicated   Session Information   Date / Session Number  4/24 HOLD sx needs eval
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
## 2025-04-24 NOTE — CARE PLAN
The patient is Watcher - Medium risk of patient condition declining or worsening    Shift Goals  Clinical Goals: Hemodynamic stability, Q6hr Hgb, Fields  Patient Goals: Pain control, rest  Family Goals: Updates    Progress made toward(s) clinical / shift goals:      Problem: Pain - Standard  Goal: Alleviation of pain or a reduction in pain to the patient’s comfort goal  Outcome: Progressing     Problem: Knowledge Deficit - Standard  Goal: Patient and family/care givers will demonstrate understanding of plan of care, disease process/condition, diagnostic tests and medications  Outcome: Progressing     Problem: Skin Integrity  Goal: Skin integrity is maintained or improved  Outcome: Progressing     Problem: Fall Risk  Goal: Patient will remain free from falls  Outcome: Progressing

## 2025-04-24 NOTE — OR SURGEON
Immediate Post OP Note    PreOp Diagnosis: Unstable anterior and posterior pelvic ring disruption      PostOp Diagnosis: same      Procedure(s):  ORIF, PELVIS - Wound Class: Clean  ANTERIOR CYSTORRHAPHY - Wound Class: Clean Contaminated    Surgeon(s):  SYD Cho M.D.    Anesthesiologist/Type of Anesthesia:  Anesthesiologist: Kaitlin Becker M.D./General    Surgical Staff:  Circulator: Marilyn Moody R.N.  Relief Circulator: Erica Colmenares R.N.  Scrub Person: Domenica Robert; Trae Monson  Radiology Technologist: Wil Mclaughlin    Specimens removed if any:  * No specimens in log *    Estimated Blood Loss: 300cc    Findings: see dictation    Complications: none known    PLAN:  --readmit postop  --NWB BLE, okay to pivot on RLE for transfers only  --prophylactic IV abx x 48hrs postop and PO for approx 1 week given bladder injury with adjacent ORIF  --PT/OT for mobilization ASAP  --tineo and drain per urology  --okay to resume anticoagulation tomorrow if otherwise clinically appropriate  --fu 2 weeks postop         4/24/2025 2:51 PM Ori Alvarez M.D.

## 2025-04-24 NOTE — ED NOTES
Med rec complete per [facility] [phone].  [facility] has NKDA.  No antibiotics in past 30 days.

## 2025-04-24 NOTE — PROGRESS NOTES
IR Nursing Note      Visceral Angiogram with Possible Embolization by MD Alatorre assisted by RT Mcgovern, Right Femoral Artery access site.    Access site sealed with 6F Angioseal, covered with gauze/tegaderm, C/D/I.    Report given to JAYASHREE Ponce.  Patient transported to [facility] via IR RN x2 monitored then transferred care to report RN.      Embolization to Branch of Left Hypogastric Artery:  Speakermixcal EmboCube, REF# [id number], LOT# [id number], Exp. Date 12/911/2027      Angioseal VIP Vascular Closure Device, REF# [id number], LOT# [id number], Exp. Date 10/30/2025

## 2025-04-24 NOTE — ASSESSMENT & PLAN NOTE
Age-indeterminate nondisplaced right anterior seventh rib fracture near the costochondral junction. There is also mild cortical irregularity of left anterior ribs which suggests age-indeterminate nondisplaced fractures, particularly left anterior sixth, seventh ribs.  Non tender.

## 2025-04-24 NOTE — PROGRESS NOTES
Pt returned to T925 from PACU.    4 Eyes Skin Assessment Completed by Ju RN and JAYASHREE Hernandez.    Head Bruising and Swelling eyes, R more than L, saturated dressing on R  Ears WDL  Nose WDL  Mouth WDL  Neck WDL  Breast/Chest Bruising  Shoulder Blades WDL  Spine WDL  (R) Arm/Elbow/Hand Scar, R shoulder, bruising  (L) Arm/Elbow/Hand Bruising  Abdomen Scar midline, incision lower abdomen, bruising  Groin Bruising  Scrotum/Coccyx/Buttocks bruising  (R) Leg Scar knee  (L) Leg Bruising and Abrasion, L hip bruising  (R) Heel/Foot/Toe Redness and Blanching  (L) Heel/Foot/Toe Redness, Blanching, and Bruising      Devices In Places ECG, Blood Pressure Cuff, Pulse Ox, Fields, SCD's, and Nasal Cannula      Interventions In Place Gray Ear Foams, Sacral Mepilex, TAP System, Pillows, Q2 Turns, Low Air Loss Mattress, and Heels Loaded W/Pillows    Possible Skin Injury No    Pictures Uploaded Into Epic N/A  Wound Consult Placed N/A  RN Wound Prevention Protocol Ordered Yes
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
## 2025-04-24 NOTE — PROGRESS NOTES
Patient arrived to IR via TRN and ICU RN. Red box with remaining unit whole blood present at bedside. Patient transitioned to IR bed without incident.     Binder down on arrival. BP stable on and after removal.

## 2025-04-24 NOTE — THERAPY
Physical Therapy Contact Note    Patient Name: Kiersten Sixty-One  Age:  74 y.o., Sex:  male  Medical Record #: [id number]  Today's Date: 4/24/2025 04/24/25 0825   Initial Contact Note    Initial Contact Note Order Received and Verified, Physical Therapy Evaluation in Progress with Full Report to Follow.   Interdisciplinary Plan of Care Collaboration   Collaboration Comments Chart reviewed. Pt to go to OR today for open bladder rupture repair and ortho repair of unstable pelvic ring injury. Will assess when appropriate.
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
## 2025-04-24 NOTE — ASSESSMENT & PLAN NOTE
Right medial orbital wall blowout fracture.   Unasyn x 1 dose.  Non-operative management.  Abhishek Whipple DDS, MD.  Oral and maxillofacial surgeon. [facility].

## 2025-04-24 NOTE — PROGRESS NOTES
Red box returned to blood bank with remaining ONE unit whole blood inside. 3 u WB and 1 u plts used from box.

## 2025-04-24 NOTE — ASSESSMENT & PLAN NOTE
Difficult tineo placement.  Retrograde urethrogram without urethral injury.  Cystogram with evidence of retroperitoneal bladder injury.  Vineet Sanchez MD [facility] [place].

## 2025-04-24 NOTE — ASSESSMENT & PLAN NOTE
Chronic condition treated with lisinopril.  Holding maintenance medication during acute traumatic illness.

## 2025-04-25 ENCOUNTER — APPOINTMENT (OUTPATIENT)
Dept: RADIOLOGY | Facility: MEDICAL CENTER | Age: 75
End: 2025-04-25
Attending: STUDENT IN AN ORGANIZED HEALTH CARE EDUCATION/TRAINING PROGRAM
Payer: MEDICARE

## 2025-04-25 PROBLEM — D69.6 THROMBOCYTOPENIA (HCC): Status: ACTIVE | Noted: 2025-04-25

## 2025-04-25 PROBLEM — I25.10 CAD (CORONARY ARTERY DISEASE): Status: ACTIVE | Noted: 2025-04-25

## 2025-04-25 PROBLEM — D62 ANEMIA ASSOCIATED WITH ACUTE BLOOD LOSS: Status: ACTIVE | Noted: 2025-04-25

## 2025-04-25 PROBLEM — E78.5 HYPERLIPIDEMIA: Status: ACTIVE | Noted: 2025-04-25

## 2025-04-25 PROBLEM — H91.90 HEARING LOSS: Status: ACTIVE | Noted: 2025-04-25

## 2025-04-25 PROBLEM — Z98.890 S/P MITRAL VALVE CLIP IMPLANTATION: Status: ACTIVE | Noted: 2025-04-25

## 2025-04-25 PROBLEM — Z95.818 S/P MITRAL VALVE CLIP IMPLANTATION: Status: ACTIVE | Noted: 2025-04-25

## 2025-04-25 PROBLEM — Z75.8 DISCHARGE PLANNING ISSUES: Status: ACTIVE | Noted: 2025-04-25

## 2025-04-25 PROBLEM — N17.9 AKI (ACUTE KIDNEY INJURY) (HCC): Status: ACTIVE | Noted: 2025-04-25

## 2025-04-25 PROBLEM — Z01.89 ENCOUNTER FOR GERIATRIC ASSESSMENT: Status: ACTIVE | Noted: 2025-04-25

## 2025-04-25 PROCEDURE — 71045 X-RAY EXAM CHEST 1 VIEW: CPT

## 2025-04-25 ASSESSMENT — COGNITIVE AND FUNCTIONAL STATUS - GENERAL
PERSONAL GROOMING: A LITTLE
TOILETING: TOTAL
MOVING TO AND FROM BED TO CHAIR: A LOT
TURNING FROM BACK TO SIDE WHILE IN FLAT BAD: A LOT
SUGGESTED CMS G CODE MODIFIER DAILY ACTIVITY: CL
DRESSING REGULAR UPPER BODY CLOTHING: A LOT
WALKING IN HOSPITAL ROOM: TOTAL
MOBILITY SCORE: 10
STANDING UP FROM CHAIR USING ARMS: A LOT
EATING MEALS: A LITTLE
DAILY ACTIVITIY SCORE: 12
MOVING FROM LYING ON BACK TO SITTING ON SIDE OF FLAT BED: A LOT
DRESSING REGULAR LOWER BODY CLOTHING: TOTAL
CLIMB 3 TO 5 STEPS WITH RAILING: TOTAL
SUGGESTED CMS G CODE MODIFIER MOBILITY: CL
HELP NEEDED FOR BATHING: A LOT

## 2025-04-25 ASSESSMENT — ENCOUNTER SYMPTOMS
ABDOMINAL PAIN: 1
NAUSEA: 0
SHORTNESS OF BREATH: 0
PSYCHIATRIC NEGATIVE: 1
VOMITING: 0
DIZZINESS: 0
SPEECH CHANGE: 0
DIARRHEA: 0
FLANK PAIN: 0
FEVER: 0
MYALGIAS: 0
WEAKNESS: 1
FOCAL WEAKNESS: 0
BLURRED VISION: 1
COUGH: 0
NERVOUS/ANXIOUS: 0
PALPITATIONS: 0
NECK PAIN: 0
DIAPHORESIS: 0
MYALGIAS: 1
SENSORY CHANGE: 0
WHEEZING: 0
BRUISES/BLEEDS EASILY: 1
HEADACHES: 0
BACK PAIN: 0
SORE THROAT: 0
NEUROLOGICAL NEGATIVE: 1
RESPIRATORY NEGATIVE: 1
HEARTBURN: 0
CHILLS: 0

## 2025-04-25 ASSESSMENT — PAIN DESCRIPTION - PAIN TYPE
TYPE: ACUTE PAIN

## 2025-04-25 ASSESSMENT — ACTIVITIES OF DAILY LIVING (ADL): TOILETING: INDEPENDENT

## 2025-04-25 ASSESSMENT — GAIT ASSESSMENTS: GAIT LEVEL OF ASSIST: UNABLE TO PARTICIPATE

## 2025-04-25 ASSESSMENT — FIBROSIS 4 INDEX: FIB4 SCORE: 8.82
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
## 2025-04-25 NOTE — ASSESSMENT & PLAN NOTE
Right pupil dilated ~ 5 mm / blurry vision  Likely lens detachment vs traumatic mydriasis   If any changes - Call Dr. Nielson immediately - [phone]  Otherwise follow up on discharge - call for appointment - will see same day or next  Pipo Nielson MD, Opthalmology
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
## 2025-04-25 NOTE — CARE PLAN
The patient is Watcher - Medium risk of patient condition declining or worsening    Shift Goals  Clinical Goals: Hemoglobin >7, Fields stability  Patient Goals: Pain control, rest  Family Goals: Updates    Progress made toward(s) clinical / shift goals:        Problem: Pain - Standard  Goal: Alleviation of pain or a reduction in pain to the patient’s comfort goal  Outcome: Progressing     Problem: Knowledge Deficit - Standard  Goal: Patient and family/care givers will demonstrate understanding of plan of care, disease process/condition, diagnostic tests and medications  Outcome: Progressing     Problem: Skin Integrity  Goal: Skin integrity is maintained or improved  Outcome: Progressing     Problem: Fall Risk  Goal: Patient will remain free from falls  Outcome: Progressing       Patient is not progressing towards the following goals:

## 2025-04-25 NOTE — ADDENDUM NOTE
Addendum  created 04/24/25 1718 by Kaitlin Becker M.D.    Flowsheet accepted, Intraprocedure Meds edited

## 2025-04-25 NOTE — PROGRESS NOTES
Dr. Field and Awilda DON at bedside to assess R. Eye.  MD removed dressing and evaluated.  Bloody sclera.  R pupil > L. Pupil. Patient states R. Eye vision is blurry.
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
## 2025-04-25 NOTE — ASSESSMENT & PLAN NOTE
4/25 The patient is 65-74 years old and meets the following two or more criteria: hospitalization during the past 6 months and 3 or more medications. A geriatrics consult is indicated  Edwar Alegre MD, Geriatric Hospitalist.
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
## 2025-04-25 NOTE — CARE PLAN
The patient is Watcher - Medium risk of patient condition declining or worsening    Shift Goals  Clinical Goals: Hemodynamic stability, pain control  Patient Goals: sleep comfortably  Family Goals: Updates    Progress made toward(s) clinical / shift goals:    Problem: Pain - Standard  Goal: Alleviation of pain or a reduction in pain to the patient’s comfort goal  Outcome: Progressing     Problem: Knowledge Deficit - Standard  Goal: Patient and family/care givers will demonstrate understanding of plan of care, disease process/condition, diagnostic tests and medications  Outcome: Progressing     Problem: Skin Integrity  Goal: Skin integrity is maintained or improved  Outcome: Progressing     Problem: Fall Risk  Goal: Patient will remain free from falls  Outcome: Progressing

## 2025-04-25 NOTE — CARE PLAN
The patient is Stable - Low risk of patient condition declining or worsening    Shift Goals  Clinical Goals: hemodynamic stability, CBI, pain management  Patient Goals: pain control  Family Goals: none present    Progress made toward(s) clinical / shift goals:    Problem: Pain - Standard  Goal: Alleviation of pain or a reduction in pain to the patient’s comfort goal  Outcome: Progressing     Problem: Knowledge Deficit - Standard  Goal: Patient and family/care givers will demonstrate understanding of plan of care, disease process/condition, diagnostic tests and medications  Outcome: Progressing       Patient is not progressing towards the following goals:

## 2025-04-25 NOTE — PROGRESS NOTES
"      Orthopaedic Progress Note    Interval changes:  Patient doing well post op  Pelvic dressings are CDI, HARMAN in place to be managed by urology   Cleared for DC to rehab by ortho pending trauma clearance    ROS - Patient denies any new issues.  Pain well controlled.    /69   Pulse 66   Temp 36.7 °C (98 °F) (Temporal)   Resp 19   Ht 1.702 m (5' 7\")   Wt 91.5 kg (201 lb 11.5 oz)   SpO2 97%     Patient seen and examined  No acute distress  Breathing non labored  RRR  Pelvic dressings are CDI, HARMAN in place, DNVI, moves all toes, cap refill <2 sec.     Recent Labs     04/24/25  0030 04/24/25  0545 04/24/25  1110 04/24/25  1625 04/25/25  0515   WBC 12.4*  --   --  9.6 7.5   RBC 3.87*  --   --  3.17* 2.69*   HEMOGLOBIN 11.7*   < > 10.3* 9.4* 7.9*   HEMATOCRIT 33.6*  --   --  28.2* 23.9*   MCV 86.8  --   --  89.0 88.8   MCH 30.2  --   --  29.7 29.4   MCHC 34.8  --   --  33.3 33.1   RDW 56.9*  --   --  58.4* 56.8*   PLATELETCT 124*  --   --  119* 89*   MPV 10.9  --   --  10.3 10.5    < > = values in this interval not displayed.       Active Hospital Problems    Diagnosis     Blunt injury of eye, right, initial encounter [S05.8X1A]      Priority: High    Multiple closed pelvic fractures with disruption of pelvic [place], initial encounter (HCC) [S32.810A]      Priority: High    Platelet inhibition due to Plavix [Z79.02]      Priority: High    Traumatic rupture of bladder [S37.29XA]      Priority: High    Acute renal failure (HCC) [N17.9]      Priority: Medium    Traumatic hemorrhagic shock (HCC) [T79.4XXA]      Priority: Medium    Orbital fracture, open, initial encounter ([facility]) [S02.85XB]      Priority: Medium    Cardiac disease [I51.9]      Priority: Medium    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]      Priority: Medium    Discharge planning issues [Z75.8]      Priority: Low    Encounter for geriatric assessment [Z01.89]      Priority: Low    Stented coronary artery [Z95.5]      Priority: Low    " Gastroesophageal reflux disease [K21.9]      Priority: Low    Sleep apnea [G47.30]      Priority: Low    Trauma [T14.90XA]      Priority: Low    Contraindication to deep vein thrombosis (DVT) prophylaxis [Z53.09]      Priority: Low    Rib fracture [S22.39XA]      Priority: Low    Depression [F32.A]      Priority: Low    Insomnia [G47.00]      Priority: Low    Hypertension [I10]      Priority: Low    Lumbar transverse process fracture, closed, initial encounter ([facility]) [S32.009A]      Priority: Low    CAD (coronary artery disease) [I25.10]     S/P mitral valve clip implantation [Z98.890, Z95.818]     Hyperlipidemia [E78.5]     Anemia associated with acute blood loss [D62]     Thrombocytopenia ([facility]) [D69.6]     CHRISTOPHER (acute kidney injury) ([facility]) [N17.9]     Hearing loss [H91.90]        Assessment/Plan:  Patient doing well post op  Pelvic dressings are CDI, HARMAN in place to be managed by urology   Cleared for DC to rehab by ortho pending trauma clearance  POD#1 S/P:  1.  Open treatment with internal fixation of pubic symphyseal disruption.  2.  Percutaneous skeletal fixation of the left sacroiliac joint.  Wt bearing status - NWB BLE  Wound care/Drains - Dressings to be changed every other day by nursing. Or PRN for saturation    Future Procedures - none planned   Lovenox: Start 4/25, Duration-until ambulatory > 150'  Sutures/Staples out- 14-21 days post operatively. Removal by ortho mid levels only.  PT/OT-initiated  Antibiotics: rocephin 2g IV QD, augmentin po Q12  DVT Prophylaxis- TEDS/SCDs/Foot pumps  Fields-not needed per ortho  Case Coordination for Discharge Planning - Disposition per therapy recs.

## 2025-04-25 NOTE — ANESTHESIA POSTPROCEDURE EVALUATION
Patient: Kiersten [age]    Procedure Summary       Date: 04/24/25 Room / Location: [place] [id number] / SURGERY [facility]    Anesthesia Start: 1211 Anesthesia Stop: 1454    Procedures:       ORIF, PELVIS (Pelvis)      ANTERIOR CYSTORRHAPHY (Bladder) Diagnosis: (Extraperitoneal Bladder Rupture; Unstable anterior and posterior pelvic ring disruption)    Surgeons: Ori Alvarez M.D.; Abhishek Hernandez M.D. Responsible Provider: Kaitlin Becker M.D.    Anesthesia Type: general ASA Status: 4 - Emergent            Final Anesthesia Type: general  Last vitals  BP   Blood Pressure : 116/63, Arterial BP: 134/59    Temp   36.5 °C (97.7 °F)    Pulse   66   Resp   19    SpO2   94 %      Anesthesia Post Evaluation    Patient location during evaluation: PACU  Patient participation: complete - patient participated  Level of consciousness: awake and alert    Airway patency: patent  Anesthetic complications: no  Cardiovascular status: hemodynamically stable  Respiratory status: acceptable  Hydration status: euvolemic    PONV: none        No notable events documented.     Nurse Pain Score: 4 (NPRS)

## 2025-04-25 NOTE — ANESTHESIA TIME REPORT
Anesthesia Start and Stop Event Times       Date Time Event    4/24/2025 1140 Ready for Procedure     1211 Anesthesia Start     1454 Anesthesia Stop          Responsible Staff  04/24/25      Name Role Begin End    Kaitlin Becker M.D. Anesth 1211 1366          Overtime Reason:  no overtime (within assigned shift)    Comments:
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
## 2025-04-25 NOTE — ASSESSMENT & PLAN NOTE
Satisfactory Gelfoam embolization with occlusion of the target vessel on postembolization control angiogram  4/23/2025  Open treatment with internal fixation of pubic symphyseal disruption, Percutaneous skeletal fixation of the left sacroiliac joint.  4/24/2025  NWB BLE  pain control

## 2025-04-26 ENCOUNTER — APPOINTMENT (OUTPATIENT)
Dept: RADIOLOGY | Facility: MEDICAL CENTER | Age: 75
End: 2025-04-26
Attending: STUDENT IN AN ORGANIZED HEALTH CARE EDUCATION/TRAINING PROGRAM
Payer: MEDICARE

## 2025-04-26 PROBLEM — N17.9 AKI (ACUTE KIDNEY INJURY) (HCC): Status: RESOLVED | Noted: 2025-04-25 | Resolved: 2025-04-26

## 2025-04-26 PROBLEM — S22.39XA RIB FRACTURE: Status: RESOLVED | Noted: 2025-04-23 | Resolved: 2025-04-26

## 2025-04-26 PROBLEM — I10 HYPERTENSION: Status: RESOLVED | Noted: 2025-04-23 | Resolved: 2025-04-26

## 2025-04-26 PROBLEM — Z78.9 NO CONTRAINDICATION TO DEEP VEIN THROMBOSIS (DVT) PROPHYLAXIS: Status: ACTIVE | Noted: 2025-04-23

## 2025-04-26 PROBLEM — F10.20 ALCOHOLISM (HCC): Status: ACTIVE | Noted: 2025-04-26

## 2025-04-26 PROCEDURE — 71045 X-RAY EXAM CHEST 1 VIEW: CPT

## 2025-04-26 ASSESSMENT — ENCOUNTER SYMPTOMS
BLURRED VISION: 0
VOMITING: 0
CONSTIPATION: 0
BRUISES/BLEEDS EASILY: 1
PSYCHIATRIC NEGATIVE: 1
NAUSEA: 0
MYALGIAS: 1
CHILLS: 0
PHOTOPHOBIA: 0
DIZZINESS: 0
FEVER: 0
ABDOMINAL PAIN: 1
RESPIRATORY NEGATIVE: 1
DOUBLE VISION: 0
MYALGIAS: 0
ORTHOPNEA: 0
SHORTNESS OF BREATH: 0
SPEECH CHANGE: 0
PALPITATIONS: 0
COUGH: 0
DIARRHEA: 0
NECK PAIN: 0
WEIGHT LOSS: 0
NEUROLOGICAL NEGATIVE: 1
WEAKNESS: 0
HEMOPTYSIS: 0
CLAUDICATION: 0

## 2025-04-26 ASSESSMENT — PAIN DESCRIPTION - PAIN TYPE
TYPE: ACUTE PAIN
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
## 2025-04-26 NOTE — CARE PLAN
The patient is Watcher - Medium risk of patient condition declining or worsening    Shift Goals  Clinical Goals: hemodynamic stability; bladder function  Patient Goals: rest  Family Goals: ALEKSANDR; no family present    Progress made toward(s) clinical / shift goals:     Problem: Pain - Standard  Goal: Alleviation of pain or a reduction in pain to the patient’s comfort goal  Outcome: Progressing     Problem: Knowledge Deficit - Standard  Goal: Patient and family/care givers will demonstrate understanding of plan of care, disease process/condition, diagnostic tests and medications  Outcome: Progressing     Problem: Skin Integrity  Goal: Skin integrity is maintained or improved  Outcome: Progressing     Problem: Fall Risk  Goal: Patient will remain free from falls  Outcome: Progressing
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
## 2025-04-26 NOTE — CARE PLAN
The patient is Stable - Low risk of patient condition declining or worsening    Shift Goals  Clinical Goals: hemodynamic stability, CBI  Patient Goals: rest  Family Goals: none present    Pt restarted on clopidogrel, required 1 unit PRBC for HGB 7.3    Progress made toward(s) clinical / shift goals:    Problem: Pain - Standard  Goal: Alleviation of pain or a reduction in pain to the patient’s comfort goal  Outcome: Progressing     Problem: Knowledge Deficit - Standard  Goal: Patient and family/care givers will demonstrate understanding of plan of care, disease process/condition, diagnostic tests and medications  Outcome: Progressing     Problem: Skin Integrity  Goal: Skin integrity is maintained or improved  Outcome: Progressing     Problem: Fall Risk  Goal: Patient will remain free from falls  Outcome: Progressing       Patient is not progressing towards the following goals:
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
## 2025-04-26 NOTE — ASSESSMENT & PLAN NOTE
4/26 Hgb 7.3  - 1 U PRBC  Iron per pharmacy kinetics.  4/27 Hgb 8.5  In face of cardiac history - transfuse <8.0
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
## 2025-04-27 ENCOUNTER — APPOINTMENT (OUTPATIENT)
Dept: RADIOLOGY | Facility: MEDICAL CENTER | Age: 75
End: 2025-04-27
Attending: STUDENT IN AN ORGANIZED HEALTH CARE EDUCATION/TRAINING PROGRAM
Payer: MEDICARE

## 2025-04-27 ENCOUNTER — APPOINTMENT (OUTPATIENT)
Dept: RADIOLOGY | Facility: MEDICAL CENTER | Age: 75
End: 2025-04-27
Attending: INTERNAL MEDICINE
Payer: MEDICARE

## 2025-04-27 PROCEDURE — 76705 ECHO EXAM OF ABDOMEN: CPT

## 2025-04-27 PROCEDURE — 71045 X-RAY EXAM CHEST 1 VIEW: CPT

## 2025-04-27 ASSESSMENT — ENCOUNTER SYMPTOMS
VOMITING: 0
PALPITATIONS: 0
PHOTOPHOBIA: 0
ABDOMINAL PAIN: 1
HEMOPTYSIS: 0
DIARRHEA: 0
CHILLS: 0
CONSTIPATION: 0
WEIGHT LOSS: 0
PSYCHIATRIC NEGATIVE: 1
SPEECH CHANGE: 0
COUGH: 0
FEVER: 0
DOUBLE VISION: 0
NEUROLOGICAL NEGATIVE: 1
BLURRED VISION: 0
NAUSEA: 0
WEAKNESS: 0
MYALGIAS: 1
ORTHOPNEA: 0
MYALGIAS: 0
DIZZINESS: 0
BRUISES/BLEEDS EASILY: 1
NECK PAIN: 0
RESPIRATORY NEGATIVE: 1
CLAUDICATION: 0
CONSTIPATION: 1

## 2025-04-27 ASSESSMENT — PAIN DESCRIPTION - PAIN TYPE
TYPE: ACUTE PAIN

## 2025-04-27 ASSESSMENT — FIBROSIS 4 INDEX: FIB4 SCORE: 8.41

## 2025-04-27 ASSESSMENT — SOCIAL DETERMINANTS OF HEALTH (SDOH)
WITHIN THE LAST YEAR, HAVE YOU BEEN AFRAID OF YOUR PARTNER OR EX-PARTNER?: NO
WITHIN THE LAST YEAR, HAVE YOU BEEN KICKED, HIT, SLAPPED, OR OTHERWISE PHYSICALLY HURT BY YOUR PARTNER OR EX-PARTNER?: NO
WITHIN THE LAST YEAR, HAVE YOU BEEN HUMILIATED OR EMOTIONALLY ABUSED IN OTHER WAYS BY YOUR PARTNER OR EX-PARTNER?: NO
WITHIN THE LAST YEAR, HAVE TO BEEN RAPED OR FORCED TO HAVE ANY KIND OF SEXUAL ACTIVITY BY YOUR PARTNER OR EX-PARTNER?: NO
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
## 2025-04-27 NOTE — CARE PLAN
The patient is Stable - Low risk of patient condition declining or worsening    Shift Goals  Clinical Goals: mobilize, cluster care to allow for rest, titrate CBI for pink tinge urine  Patient Goals: rest  Family Goals: comfort    Progress made toward(s) clinical / shift goals:    Problem: Pain - Standard  Goal: Alleviation of pain or a reduction in pain to the patient’s comfort goal  Outcome: Progressing     Problem: Knowledge Deficit - Standard  Goal: Patient and family/care givers will demonstrate understanding of plan of care, disease process/condition, diagnostic tests and medications  Outcome: Progressing     Problem: Skin Integrity  Goal: Skin integrity is maintained or improved  Outcome: Progressing     Problem: Fall Risk  Goal: Patient will remain free from falls  Outcome: Progressing       Patient is not progressing towards the following goals:

## 2025-04-27 NOTE — PROGRESS NOTES
"      Orthopaedic Progress Note    Interval changes:  Patient doing well    Pelvic dressings are CDI, HARMAN in place to be managed by urology   Cleared for DC to rehab by ortho pending trauma clearance    ROS - Patient denies any new issues.  Pain well controlled.    BP (!) 147/67   Pulse 79   Temp 36.5 °C (97.7 °F) (Temporal)   Resp (!) 23   Ht 1.702 m (5' 7\")   Wt 91.5 kg (201 lb 11.5 oz)   SpO2 98%     Patient seen and examined  No acute distress  Breathing non labored  RRR  Pelvic dressings are CDI, HARMAN in place, DNVI, moves all toes, cap refill <2 sec.     Recent Labs     04/24/25  1625 04/25/25  0515 04/26/25  0559   WBC 9.6 7.5 6.4   RBC 3.17* 2.69* 2.42*   HEMOGLOBIN 9.4* 7.9* 7.3*   HEMATOCRIT 28.2* 23.9* 21.5*   MCV 89.0 88.8 88.8   MCH 29.7 29.4 30.2   MCHC 33.3 33.1 34.0   RDW 58.4* 56.8* 54.4*   PLATELETCT 119* 89* 79*   MPV 10.3 10.5 10.6       Active Hospital Problems    Diagnosis     Acute blood loss anemia [D62]      Priority: High    Blunt injury of eye, right, initial encounter [S05.8X1A]      Priority: High    Multiple closed pelvic fractures with disruption of pelvic [place], initial encounter ([facility]) [S32.810A]      Priority: High    Platelet inhibition due to Plavix [Z79.02]      Priority: High    Traumatic rupture of bladder [S37.29XA]      Priority: High    Acute renal failure (HCC) [N17.9]      Priority: Medium    Traumatic hemorrhagic shock (HCC) [T79.4XXA]      Priority: Medium    Orbital fracture, open, initial encounter ([facility]) [S02.85XB]      Priority: Medium    Cardiac disease [I51.9]      Priority: Medium    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]      Priority: Medium    Discharge planning issues [Z75.8]      Priority: Low    Encounter for geriatric assessment [Z01.89]      Priority: Low    Stented coronary artery [Z95.5]      Priority: Low    Gastroesophageal reflux disease [K21.9]      Priority: Low    Sleep apnea [G47.30]      Priority: Low    Trauma [T14.90XA]      Priority: " Low    No contraindication to deep vein thrombosis (DVT) prophylaxis [Z78.9]      Priority: Low    Depression [F32.A]      Priority: Low    Insomnia [G47.00]      Priority: Low    Lumbar transverse process fracture, closed, initial encounter ([facility]) [S32.009A]      Priority: Low    Alcoholism ([facility]) [F10.20]     CAD (coronary artery disease) [I25.10]     S/P mitral valve clip implantation [Z98.890, Z95.818]     Hyperlipidemia [E78.5]     Thrombocytopenia ([facility]) [D69.6]     Hearing loss [H91.90]        Assessment/Plan:  Patient doing well   Pelvic dressings are CDI, HARMAN in place to be managed by urology   Cleared for DC to rehab by ortho pending trauma clearance  POD#2 S/P:  1.  Open treatment with internal fixation of pubic symphyseal disruption.  2.  Percutaneous skeletal fixation of the left sacroiliac joint.  Wt bearing status - NWB BLE  Wound care/Drains - Dressings to be changed every other day by nursing. Or PRN for saturation    Future Procedures - none planned   Lovenox: Start 4/25, Duration-until ambulatory > 150'  Sutures/Staples out- 14-21 days post operatively. Removal by ortho mid levels only.  PT/OT-initiated  Antibiotics: rocephin 2g IV QD, augmentin po Q12  DVT Prophylaxis- TEDS/SCDs/Foot pumps  Fields-not needed per ortho  Case Coordination for Discharge Planning - Disposition per therapy recs.

## 2025-04-27 NOTE — CARE PLAN
The patient is Watcher - Medium risk of patient condition declining or worsening    Shift Goals  Clinical Goals: Mobilize, titrate CBI to light pink tinged urine  Patient Goals: Rest  Family Goals: No family present at bedside    Progress made toward(s) clinical / shift goals:        Problem: Pain - Standard  Goal: Alleviation of pain or a reduction in pain to the patient’s comfort goal  Outcome: Progressing     Problem: Knowledge Deficit - Standard  Goal: Patient and family/care givers will demonstrate understanding of plan of care, disease process/condition, diagnostic tests and medications  Outcome: Progressing     Problem: Skin Integrity  Goal: Skin integrity is maintained or improved  Outcome: Progressing     Problem: Fall Risk  Goal: Patient will remain free from falls  Outcome: Progressing       Patient is not progressing towards the following goals:
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
## 2025-04-28 ENCOUNTER — APPOINTMENT (OUTPATIENT)
Dept: RADIOLOGY | Facility: MEDICAL CENTER | Age: 75
End: 2025-04-28
Attending: INTERNAL MEDICINE
Payer: MEDICARE

## 2025-04-28 PROBLEM — T79.4XXA TRAUMATIC HEMORRHAGIC SHOCK (HCC): Status: RESOLVED | Noted: 2025-04-23 | Resolved: 2025-04-28

## 2025-04-28 PROCEDURE — 71045 X-RAY EXAM CHEST 1 VIEW: CPT

## 2025-04-28 ASSESSMENT — PATIENT HEALTH QUESTIONNAIRE - PHQ9
1. LITTLE INTEREST OR PLEASURE IN DOING THINGS: NOT AT ALL
SUM OF ALL RESPONSES TO PHQ9 QUESTIONS 1 AND 2: 0
2. FEELING DOWN, DEPRESSED, IRRITABLE, OR HOPELESS: NOT AT ALL

## 2025-04-28 ASSESSMENT — ENCOUNTER SYMPTOMS
NAUSEA: 0
DIARRHEA: 0
ABDOMINAL PAIN: 0
ROS GI COMMENTS: BM 4/28
ORTHOPNEA: 0
NECK PAIN: 0
PHOTOPHOBIA: 0
CLAUDICATION: 0
FEVER: 0
PALPITATIONS: 0
HEMOPTYSIS: 0
ABDOMINAL PAIN: 1
WEIGHT LOSS: 0
DOUBLE VISION: 0
MYALGIAS: 0
CONSTIPATION: 0
SPEECH CHANGE: 0
VOMITING: 0
WEAKNESS: 0
COUGH: 0
DIZZINESS: 0
CHILLS: 0
BLURRED VISION: 0

## 2025-04-28 ASSESSMENT — LIFESTYLE VARIABLES
EVER FELT BAD OR GUILTY ABOUT YOUR DRINKING: NO
HOW MANY TIMES IN THE PAST YEAR HAVE YOU HAD 5 OR MORE DRINKS IN A DAY: 3
ON A TYPICAL DAY WHEN YOU DRINK ALCOHOL HOW MANY DRINKS DO YOU HAVE: 2
TOTAL SCORE: 0
HAVE YOU EVER FELT YOU SHOULD CUT DOWN ON YOUR DRINKING: NO
HAVE PEOPLE ANNOYED YOU BY CRITICIZING YOUR DRINKING: NO
ALCOHOL_USE: YES
CONSUMPTION TOTAL: POSITIVE
TOTAL SCORE: 0
EVER HAD A DRINK FIRST THING IN THE MORNING TO STEADY YOUR NERVES TO GET RID OF A HANGOVER: NO
DOES PATIENT WANT TO STOP DRINKING: NO
TOTAL SCORE: 0
AVERAGE NUMBER OF DAYS PER WEEK YOU HAVE A DRINK CONTAINING ALCOHOL: 6

## 2025-04-28 ASSESSMENT — PAIN DESCRIPTION - PAIN TYPE
TYPE: ACUTE PAIN

## 2025-04-28 ASSESSMENT — COGNITIVE AND FUNCTIONAL STATUS - GENERAL
SUGGESTED CMS G CODE MODIFIER MOBILITY: CL
DRESSING REGULAR LOWER BODY CLOTHING: A LOT
CLIMB 3 TO 5 STEPS WITH RAILING: TOTAL
TURNING FROM BACK TO SIDE WHILE IN FLAT BAD: A LITTLE
WALKING IN HOSPITAL ROOM: TOTAL
TOILETING: A LOT
PERSONAL GROOMING: A LITTLE
MOVING TO AND FROM BED TO CHAIR: A LITTLE
MOVING FROM LYING ON BACK TO SITTING ON SIDE OF FLAT BED: A LITTLE
DAILY ACTIVITIY SCORE: 16
DRESSING REGULAR UPPER BODY CLOTHING: A LITTLE
SUGGESTED CMS G CODE MODIFIER DAILY ACTIVITY: CK
HELP NEEDED FOR BATHING: A LOT
MOBILITY SCORE: 13
STANDING UP FROM CHAIR USING ARMS: A LOT

## 2025-04-28 ASSESSMENT — SOCIAL DETERMINANTS OF HEALTH (SDOH)
WITHIN THE PAST 12 MONTHS, THE FOOD YOU BOUGHT JUST DIDN'T LAST AND YOU DIDN'T HAVE MONEY TO GET MORE: NEVER TRUE
WITHIN THE PAST 12 MONTHS, YOU WORRIED THAT YOUR FOOD WOULD RUN OUT BEFORE YOU GOT THE MONEY TO BUY MORE: NEVER TRUE
IN THE PAST 12 MONTHS, HAS THE ELECTRIC, GAS, OIL, OR WATER COMPANY THREATENED TO SHUT OFF SERVICE IN YOUR HOME?: NO

## 2025-04-28 NOTE — PROGRESS NOTES
4 Eyes Skin Assessment Completed by alfred BLANC and rosenda BLANC.    Head Bruising, Swelling, Redness, and Edema to head and right eye  Ears WDL  Nose Discoloration  Mouth WDL  Neck Redness and Discoloration   Breast/Chest Redness  Shoulder Blades Redness  Spine Redness and Bruising  (R) Arm/Elbow/Hand Redness, Blanching, Bruising, Abrasion, and Discoloration  (L) Arm/Elbow/Hand Redness, Bruising, Swelling, and Discoloration  Abdomen Redness, Bruising, and Incision  Groin Redness, Bruising, and Swelling  Scrotum/Coccyx/Buttocks Redness, Blanching, and Discoloration  (R) Leg Redness, Bruising, Swelling, and Abrasion  (L) Leg Redness, Bruising, and Swelling and Abrasion  (R) Heel/Foot/Toe Redness and Blanching  (L) Heel/Foot/Toe Redness and Blanching          Devices In Places Tele Box, Pulse Ox, Fields, and SCD's      Interventions In Place Sacral Mepilex, TAP System, Pillows, Heels Loaded W/Pillows, and Pressure Redistribution Mattress    Possible Skin Injury No    Pictures Uploaded Into Epic Yes  Wound Consult Placed N/A  RN Wound Prevention Protocol Ordered No

## 2025-04-28 NOTE — PROGRESS NOTES
"      Orthopaedic Progress Note    Interval changes:  Patient doing well    Pelvic dressings are CDI, HARMAN in place to be managed by urology   Cleared for DC to rehab by ortho pending trauma clearance    ROS - Patient denies any new issues.  Pain well controlled.    /68   Pulse 63   Temp 36.5 °C (97.7 °F) (Temporal)   Resp 18   Ht 1.702 m (5' 7\")   Wt 93 kg (205 lb 0.4 oz)   SpO2 96%     Patient seen and examined  No acute distress  Breathing non labored  RRR  Pelvic dressings are CDI, HARMAN in place, DNVI, moves all toes, cap refill <2 sec.     Recent Labs     04/26/25  0559 04/27/25  0345 04/28/25  0341   WBC 6.4 5.8 8.1   RBC 2.42* 2.81* 3.17*   HEMOGLOBIN 7.3* 8.5* 9.5*   HEMATOCRIT 21.5* 25.0* 29.2*   MCV 88.8 89.0 92.1   MCH 30.2 30.2 30.0   MCHC 34.0 34.0 32.5   RDW 54.4* 52.7* 53.7*   PLATELETCT 79* 89* 107*   MPV 10.6 10.6 10.8       Active Hospital Problems    Diagnosis     Multiple closed pelvic fractures with disruption of pelvic [place], initial encounter ([facility]) [S32.810A]      Priority: High    Platelet inhibition due to Plavix [Z79.02]      Priority: High    Traumatic rupture of bladder [S37.29XA]      Priority: High    Blunt injury of eye, right, initial encounter [S05.8X1A]      Priority: Medium    Orbital fracture, open, initial encounter ([facility]) [S02.85XB]      Priority: Medium    Cardiac disease [I51.9]      Priority: Medium    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]      Priority: Medium    Discharge planning issues [Z75.8]      Priority: Low    Encounter for geriatric assessment [Z01.89]      Priority: Low    Acute blood loss anemia [D62]      Priority: Low    Stented coronary artery [Z95.5]      Priority: Low    Gastroesophageal reflux disease [K21.9]      Priority: Low    Sleep apnea [G47.30]      Priority: Low    Trauma [T14.90XA]      Priority: Low    No contraindication to deep vein thrombosis (DVT) prophylaxis [Z78.9]      Priority: Low    Depression [F32.A]      Priority: Low    " Insomnia [G47.00]      Priority: Low    Lumbar transverse process fracture, closed, initial encounter ([facility]) [S32.009A]      Priority: Low    Alcoholism ([facility]) [F10.20]     CAD (coronary artery disease) [I25.10]     S/P mitral valve clip implantation [Z98.890, Z95.818]     Hyperlipidemia [E78.5]     Thrombocytopenia ([facility]) [D69.6]     Hearing loss [H91.90]        Assessment/Plan:  Patient doing well   Pelvic dressings are CDI, HARMAN in place to be managed by urology   Cleared for DC to rehab by ortho pending trauma clearance  POD#4 S/P:  1.  Open treatment with internal fixation of pubic symphyseal disruption.  2.  Percutaneous skeletal fixation of the left sacroiliac joint.  Wt bearing status - NWB BLE  Wound care/Drains - Dressings to be changed every other day by nursing. Or PRN for saturation    Future Procedures - none planned   Lovenox: Start 4/25, Duration-until ambulatory > 150'  Sutures/Staples out- 14-21 days post operatively. Removal by ortho mid levels only.  PT/OT-initiated  Antibiotics: augmentin po Q12  DVT Prophylaxis- TEDS/SCDs/Foot pumps  Fields-not needed per ortho  Case Coordination for Discharge Planning - Disposition per therapy recs.

## 2025-04-28 NOTE — PROGRESS NOTES
Assumed care of patient at 1850. Telephone report received. Patient transferred from ICU to GSU Assessment complete.  AA&Ox4. Denies CP/SOB. Room air  Reporting pain controlled at this time. Repositioned in bed.     Skin per flow sheets, see new skin note and media.   -R orbital discoloration, bruising, laceration, red sclera with enlarged pupil: Numbness to forehead, blurred vision patient reports is improving.   -Low transverse incision, dressing in place   -Few [place] left lower flank/hip   -Scattered abrasions: bilateral lower legs, knees, arms  -Discoloration/bruising: lowers, scrotum, left back/flank, abdomen, right neck, face, eye    Tolerating diet. Denies N/V.  + output on CBI. - BM     Pt mobilizes x2 assist to turn and go EOB  Fall risk per Salter Joseph score. Fall prevention measures in place per flowsheets.    SCD's in use  Pharmacologic VTE prophylaxis in use.  Plan of care discussed, all questions answered.  Educated regarding importance of oral care. Oral care kit at bedside. Call light is within reach, treaded slipper socks on, bed in lowest/ locked position, hourly rounding in place, all needs met at this time.

## 2025-04-28 NOTE — THERAPY
"Physical Therapy   Daily Treatment     Patient Name: Vicente Whitfield  Age:  74 y.o., Sex:  male  Medical Record #: [id number]  Today's Date: 4/28/2025     Precautions  Precautions: (P) Fall Risk;Non Weight Bearing Right Lower Extremity;Non Weight Bearing Left Lower Extremity (CBI)  Comments: (P) ok to WB on RLE for transfers only    Assessment    Patient received in bed and agreeable and motivated for PT session. Pt eager to attempt transfers. Pt able to perform squat pivot transfer from bed to BS, then performed slide board transfer from BSC to  with modA. Pt endorsing moderate fatigue at end of session. Pt is primarily limited by pain and impaired functional strength, balance, ROM, and activity tolerance. Recommend post acute placement. Will follow for acute care PT needs.    Plan    Treatment Plan Status: (P) Continue Current Treatment Plan  Type of Treatment: Bed Mobility, Equipment, Family / Caregiver Training, Neuro Re-Education / Balance, Self Care / Home Evaluation, Stair Training, Therapeutic Activities, Therapeutic Exercise  Treatment Frequency: 4 Times per Week  Treatment Duration: Until Therapy Goals Met    DC Equipment Recommendations: (P) Unable to determine at this time  Discharge Recommendations: (P) Recommend post-acute placement for additional physical therapy services prior to discharge home      Subjective    \"It feels good to be out of bed\".      Objective       04/28/25 1438   Precautions   Precautions Fall Risk;Non Weight Bearing Right Lower Extremity;Non Weight Bearing Left Lower Extremity  (CBI)   Comments ok to WB on RLE for transfers only   Vitals   O2 (LPM) 0   O2 Delivery Device None - Room Air   Vitals Comments VSS, declines dizziness   Pain 0 - 10 Group   Location Leg   Location Orientation Right;Left   Therapist Pain Assessment Post Activity Pain Same as Prior to Activity;Nurse Notified  (pain not rated)   Cognition    Cognition / Consciousness WDL   Level of Consciousness Alert "   Comments very pleasant and participatory, motivated   Active ROM Lower Body    Active ROM Lower Body  X   Comments BLE's limited 2/2 pain, LLE mor elimited than RLE   Strength Lower Body   Lower Body Strength  X   Comments BLE's limited 2/2 pain and weight bearing status   Sensation Lower Body   Lower Extremity Sensation   WDL   Comments declines N/T   Balance   Sitting Balance (Static) Fair   Sitting Balance (Dynamic) Fair -   Standing Balance (Static) Poor   Standing Balance (Dynamic) Poor -   Weight Shift Sitting Fair   Weight Shift Standing Absent  (WB through RLE for transfer only)   Skilled Intervention Verbal Cuing;Sequencing;Facilitation;Compensatory Strategies   Bed Mobility    Supine to Sit Minimal Assist   Sit to Supine   (in WC post)   Scooting Contact Guard Assist   Skilled Intervention Verbal Cuing;Compensatory Strategies   Comments HOB elevated   Functional Mobility   Sit to Stand Moderate Assist  (for pericare from BSC)   Bed, Chair, Wheelchair Transfer Moderate Assist   Toilet Transfers Moderate Assist  (to BSC)   Mobility bed > BSC > WC   Skilled Intervention Verbal Cuing;Sequencing;Compensatory Strategies   6 Clicks Assessment - How much HELP from from another person do you currently need... (If the patient hasn't done an activity recently, how much help from another person do you think he/she would need if he/she tried?)   Turning from your back to your side while in a flat bed without using bedrails? 3   Moving from lying on your back to sitting on the side of a flat bed without using bedrails? 3   Moving to and from a bed to a chair (including a wheelchair)? 3   Standing up from a chair using your arms (e.g., wheelchair, or bedside chair)? 2   Walking in hospital room? 1   Climbing 3-5 steps with a railing? 1   6 clicks Mobility Score 13   Short Term Goals    Short Term Goal # 1 in 6 visits patient will demo all bed mobility indep for safe DC   Goal Outcome # 1 goal not met   Short Term Goal  # 2 in 6 visits patient will demo squat pivot transfers to and from WC with Ja for safe DC   Goal Outcome # 2 Goal not met   Short Term Goal # 3 in 6 visits patient will self-propel 200' with Sup for safe DC   Goal Outcome # 3 Goal not met   Education Group   Education Provided Role of Physical Therapist;Weight Bearing Precautions  (transfer training)   Role of Physical Therapist Patient Response Patient;Acceptance;Explanation;Verbal Demonstration   Weight Bearing Precautions Patient Response Patient;Acceptance;Explanation;Action Demonstration   Additional Comments Educated pt on proper setup for transfers, safety considerations including brakes on WC, cues for transfer sequencing, educated pt on proper use of slideboard   Physical Therapy Treatment Plan   Physical Therapy Treatment Plan Continue Current Treatment Plan   Anticipated Discharge Equipment and Recommendations   DC Equipment Recommendations Unable to determine at this time   Discharge Recommendations Recommend post-acute placement for additional physical therapy services prior to discharge home   Interdisciplinary Plan of Care Collaboration   IDT Collaboration with  Nursing;Occupational Therapist   Patient Position at End of Therapy Seated;Chair Alarm On;Call Light within Reach;Tray Table within Reach;Phone within Reach  (in , RN aware)   Collaboration Comments RN updated   Session Information   Date / Session Number  4/28 - 2 (2/4, 5/1)     Patient seen for team treatment with Occupational Therapist for the following reason(s):  Patient required 2 person assistance for safety and to provide effective interventions given the need to stabilize equipment and for line management. Each discipline assisted patient with appropriate and separate goals. Due to the medical complexity, the skill of both practitioners is needed to monitor vitals, patient status, and adjust the intervention to fit the patient's needs and goals. Therapy sessions needed to be done  by a certain time period for both disciplines, and did not impede patient's progress. Physical Therapist facilitated bed mobility, transfer training, proper use of slide board while Occupational Therapist simultaneously treated pt according to POC.
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
## 2025-04-28 NOTE — PROGRESS NOTES
"Received report from previous shift RN at 0700.  Assessment complete.  A&O x 4. Patient calls appropriately.  Patient ambulates with x2 assist for repositioning.   Patient has 0/10 pain. Pain managed with prescribed medications per MAR.  Denies N&V. Tolerating cardiac diet.  Skin per flowsheets.  + void via tineo, + flatus, + BM on 4/26.  Patient denies SOB on RA.  /58   Pulse 68   Temp 36.2 °C (97.2 °F) (Temporal)   Resp 18   Ht 1.702 m (5' 7\")   Wt 93 kg (205 lb 0.4 oz)   SpO2 96%   BMI 32.11 kg/m²     Patient pleasant and cooperative throughout assessment.  Reviewed plan of care with patient, pt verbalizes understanding. Call light and personal belongings with in reach. Hourly rounding in place. All needs met at this time.    "

## 2025-04-28 NOTE — PROGRESS NOTES
Virtual Nurse rounding complete.    Round Needs: No needs at this time.    Admit profile completed by VRN.

## 2025-04-28 NOTE — DISCHARGE PLANNING
Following for post acute services. Spoke with Vicente  about goals and expectation for IRF level of care. Discussed therapy plan for 3 hours per day 5 days a week. Discussed therapy schedules 30/45 or 60 minute sessions typically 1.5 hours between breakfast and lunch and another 1.5 hours between lunch and dinner. Discussed PT/OT and SLP roles. Discussed potential length of stay. Discussed comprehensive medical surveillance by physiatry as well as hospitalist team. Discussed patient to nursing ratio. Discussed insurance Medicare  coverage for the program. Discussed visitation and clothing requirements. Spouse Armida will be primary support for Vicente  to return to the community. Discussed participation with therapy program when visiting.  Family/ patient rehab goals are  to achieve highest level of independence with transfers and mobility, self care .  Home is a 1 story with 1 step(s) to enter.  Discussed discharge planner role and team conference. DME anticipated for home Slide board bedside commode tub transfer bench. In the event of additional support need discussed HH and outpatient programs. Discussed current weight bearing restriction and that they would not be liberated while in rehab. Vicente understands and prefers an inpatient rehabilitation program over a skilled nursing program.
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
## 2025-04-28 NOTE — DISCHARGE PLANNING
Per Dr. Webb consult  Patient needs to get to a point where he can tolerate 3 hours of therapy a day and is performing transfers at one-person max assist or better. Will follow up for return to community plan.

## 2025-04-28 NOTE — PROGRESS NOTES
4 Eyes Skin Assessment Completed by JAYASHREE Steve and JAYASHREE Carlson.    Head Bruising, Swelling, Redness, and Edema to head and right eye  Ears WDL  Nose Discoloration  Mouth WDL  Neck Redness and Discoloration swelling to right  Breast/Chest Redness  Shoulder Blades Redness  Spine Redness and Bruising  (R) Arm/Elbow/Hand Redness, Blanching, Bruising, Abrasion, and Discoloration  (L) Arm/Elbow/Hand Redness, Bruising, Swelling, and Discoloration  Abdomen Redness, Bruising, and Incision  Groin Redness, Bruising, and Swelling  Scrotum/Coccyx/Buttocks Redness, Blanching, and Discoloration  (R) Leg Redness, Bruising, Swelling, and Abrasion  (L) Leg Redness, Bruising, and Swelling  (R) Heel/Foot/Toe WDL  (L) Heel/Foot/Toe WDL          Devices In Places Tele Box, Pulse Ox, Fields, and SCD's      Interventions In Place Sacral Mepilex, TAP System, Pillows, Heels Loaded W/Pillows, and Pressure Redistribution Mattress    Possible Skin Injury No    Pictures Uploaded Into Epic Yes  Wound Consult Placed N/A  RN Wound Prevention Protocol Ordered No

## 2025-04-28 NOTE — THERAPY
Occupational Therapy  Daily Treatment     Patient Name: Vicente Whitfield  Age:  74 y.o., Sex:  male  Medical Record #: [id number]  Today's Date: 4/28/2025     Precautions: Fall Risk, Non Weight Bearing Right Lower Extremity, Non Weight Bearing Left Lower Extremity  Comments: OK for WB RLE for transfers only. CBI    Assessment    Pt seen for OT tx. Pt demo progress towards OT goals, but is currently limited by pain, decreased activity tolerance, balance deficits, decreased functional mobility, and generalized weakness. He required mod-max A to complete bed mobility, ADLs, and txfs. Pt was able to complete a squat pivot txfs to BSC but required use of SB to safely complete txf to . Pt denied having any symptoms of dizziness throughout session. Pt was encouraged to continue completing txfs to [facility] with staff to reduce risk of deconditioning. Pt can tolerate up to 15 hours of therapies a week, is motivated and willing to participate and has a good and safe discharge plan. Will continue to follow for ongoing acute OT services.    Plan    Treatment Plan Status: Continue Current Treatment Plan  Type of Treatment: Self Care / Activities of Daily Living, Adaptive Equipment, Therapeutic Exercises, Therapeutic Activity  Treatment Frequency: 4 Times per Week  Treatment Duration: Until Therapy Goals Met    DC Equipment Recommendations: Bed Side Commode  Discharge Recommendations: Recommend post-acute placement for additional occupational therapy services prior to discharge home     Objective    Vitals   O2 Delivery Device None - Room Air   Vitals Comments Denied experiencing any bouts of dizziness throughout session   Pain 0 - 10 Group   Therapist Pain Assessment Post Activity Pain Same as Prior to Activity;Nurse Notified  (Not rated, reported an increse in BLE pain with activity)   Cognition    Cognition / Consciousness WDL   Level of Consciousness Alert   Comments Very pleasant, cooperative, receptive to training, and  motivated to improve   Balance   Sitting Balance (Static) Fair   Sitting Balance (Dynamic) Fair -   Standing Balance (Static) Poor   Standing Balance (Dynamic) Poor -   Weight Shift Sitting Fair   Weight Shift Standing Absent   Skilled Intervention Verbal Cuing;Tactile Cuing;Sequencing;Compensatory Strategies   Comments w/HHA and use of FWW   Bed Mobility    Supine to Sit Minimal Assist   Sit to Supine   (Up to WC post)   Scooting Contact Guard Assist   Skilled Intervention Verbal Cuing;Tactile Cuing;Compensatory Strategies   Comments HOB elevated; use of bed features   Activities of Daily Living   Eating Supervision   Grooming Supervision;Seated   Lower Body Dressing Maximal Assist  (Socks)   Toileting Maximal Assist  (Assist with pericare after having BM on BSC)   Skilled Intervention Verbal Cuing;Tactile Cuing;Sequencing;Compensatory Strategies   How much help from another person does the patient currently need...   6 Clicks Daily Activity Score 16   Functional Mobility   Sit to Stand Moderate Assist   Bed, Chair, Wheelchair Transfer Moderate Assist   Toilet Transfers Moderate Assist   Mobility EOB > BSC (via squat pivot txf) > WC (with slideboard)   Skilled Intervention Verbal Cuing;Tactile Cuing;Sequencing;Postural Facilitation;Facilitation;Compensatory Strategies   Activity Tolerance   Sitting in Chair Up to WC post   Sitting Edge of Bed 5 min   Standing 2 min (for pericare)   Short Term Goals   Short Term Goal # 1 pt will complete functional transfer to chair or BSC with ModA   Goal Outcome # 1 Progressing as expected   Short Term Goal # 2 pt will complete seated grooming ADLs with set-up   Goal Outcome # 2 Progressing as expected   Short Term Goal # 3 pt will complete UB dress with SPV   Goal Outcome # 3 Progressing as expected   Education Group   Education Provided Transfers;Role of Occupational Therapist;Activities of Daily Living;Weight Bearing Precautions;Pathology of bedrest   Role of Occupational  Therapist Patient Response Patient;Acceptance;Explanation;Verbal Demonstration   Transfers Patient Response Patient;Acceptance;Explanation;Verbal Demonstration;Action Demonstration;Reinforcement Needed   ADL Patient Response Patient;Acceptance;Explanation;Verbal Demonstration;Action Demonstration;Reinforcement Needed   Weight Bearing Precautions Patient Response Patient;Acceptance;Explanation;Verbal Demonstration;Action Demonstration;Reinforcement Needed   Pathology of Bedrest Patient Response Patient;Acceptance;Explanation;Verbal Demonstration;Action Demonstration;Reinforcement Needed     Patient seen for team treatment with Physical Therapist for the following reason(s):  Patient required 2 person assistance for safety and to provide effective interventions. Each discipline assisted patient with appropriate and separate goals. Due to the medical complexity, the skill of both practitioners is needed to monitor vitals, patient status, and adjust the intervention to fit the patient's needs and goals. Occupational Therapist facilitated participation in ADLs while Physical Therapist simultaneously treated pt according to POC.

## 2025-04-28 NOTE — PROGRESS NOTES
"      Orthopaedic Progress Note    Interval changes:  Patient doing well    Pelvic dressings are CDI, HARMAN in place to be managed by urology   Cleared for DC to rehab by ortho pending trauma clearance    ROS - Patient denies any new issues.  Pain well controlled.    /61   Pulse 67   Temp 36.1 °C (97 °F)   Resp (!) 23   Ht 1.702 m (5' 7\")   Wt 93 kg (205 lb 0.4 oz)   SpO2 97%     Patient seen and examined  No acute distress  Breathing non labored  RRR  Pelvic dressings are CDI, HARMAN in place, DNVI, moves all toes, cap refill <2 sec.     Recent Labs     04/25/25  0515 04/26/25  0559 04/27/25  0345   WBC 7.5 6.4 5.8   RBC 2.69* 2.42* 2.81*   HEMOGLOBIN 7.9* 7.3* 8.5*   HEMATOCRIT 23.9* 21.5* 25.0*   MCV 88.8 88.8 89.0   MCH 29.4 30.2 30.2   MCHC 33.1 34.0 34.0   RDW 56.8* 54.4* 52.7*   PLATELETCT 89* 79* 89*   MPV 10.5 10.6 10.6       Active Hospital Problems    Diagnosis     Acute blood loss anemia [D62]      Priority: High    Multiple closed pelvic fractures with disruption of pelvic [place], initial encounter (HCC) [S32.810A]      Priority: High    Platelet inhibition due to Plavix [Z79.02]      Priority: High    Traumatic rupture of bladder [S37.29XA]      Priority: High    Acute renal failure (HCC) [N17.9]      Priority: Medium    Blunt injury of eye, right, initial encounter [S05.8X1A]      Priority: Medium    Orbital fracture, open, initial encounter ([facility]) [S02.85XB]      Priority: Medium    Cardiac disease [I51.9]      Priority: Medium    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]      Priority: Medium    Discharge planning issues [Z75.8]      Priority: Low    Encounter for geriatric assessment [Z01.89]      Priority: Low    Stented coronary artery [Z95.5]      Priority: Low    Gastroesophageal reflux disease [K21.9]      Priority: Low    Sleep apnea [G47.30]      Priority: Low    Trauma [T14.90XA]      Priority: Low    No contraindication to deep vein thrombosis (DVT) prophylaxis [Z78.9]      Priority: " Low    Traumatic hemorrhagic shock (HCC) [T79.4XXA]      Priority: Low    Depression [F32.A]      Priority: Low    Insomnia [G47.00]      Priority: Low    Lumbar transverse process fracture, closed, initial encounter ([facility]) [S32.009A]      Priority: Low    Alcoholism ([facility]) [F10.20]     CAD (coronary artery disease) [I25.10]     S/P mitral valve clip implantation [Z98.890, Z95.818]     Hyperlipidemia [E78.5]     Thrombocytopenia ([facility]) [D69.6]     Hearing loss [H91.90]        Assessment/Plan:  Patient doing well   Pelvic dressings are CDI, HARMAN in place to be managed by urology   Cleared for DC to rehab by ortho pending trauma clearance  POD#3 S/P:  1.  Open treatment with internal fixation of pubic symphyseal disruption.  2.  Percutaneous skeletal fixation of the left sacroiliac joint.  Wt bearing status - NWB BLE  Wound care/Drains - Dressings to be changed every other day by nursing. Or PRN for saturation    Future Procedures - none planned   Lovenox: Start 4/25, Duration-until ambulatory > 150'  Sutures/Staples out- 14-21 days post operatively. Removal by ortho mid levels only.  PT/OT-initiated  Antibiotics: rocephin 2g IV QD, augmentin po Q12  DVT Prophylaxis- TEDS/SCDs/Foot pumps  Fields-not needed per ortho  Case Coordination for Discharge Planning - Disposition per therapy recs.

## 2025-04-28 NOTE — CARE PLAN
The patient is Stable - Low risk of patient condition declining or worsening    Shift Goals  Clinical Goals: IS, pain mgmt, CBI  Patient Goals: rest  Family Goals: No family present at bedside    Progress made toward(s) clinical / shift goals:    Problem: Pain - Standard  Goal: Alleviation of pain or a reduction in pain to the patient’s comfort goal  Description: Target End Date:  Prior to discharge or change in level of careDocument on Vitals flowsheet1.  Document pain using the appropriate pain scale per order or unit policy2.  Educate and implement non-pharmacologic comfort measures (i.e. relaxation, distraction, massage, cold/heat therapy, etc.)3.  Pain management medications as ordered4.  Reassess pain after pain med administration per policy5.  If opiods administered assess patient's response to pain medication is appropriate per POSS sedation scale6.  Follow pain management plan developed in collaboration with patient and interdisciplinary team (including palliative care or pain specialists if applicable)  Outcome: Progressing       Patient is not progressing towards the following goals:
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
## 2025-04-28 NOTE — PROGRESS NOTES
Monitor Summary  Rhythm: SR  Rate: 64-75  Ectopy: occ to freq pac's, occ to freq pvc's, rare bigem, rare trigem  Measurements: 0.13/0.10/0.42

## 2025-04-29 ENCOUNTER — APPOINTMENT (OUTPATIENT)
Dept: RADIOLOGY | Facility: MEDICAL CENTER | Age: 75
End: 2025-04-29
Attending: EMERGENCY MEDICAL TECHNICIAN, INTERMEDIATE
Payer: MEDICARE

## 2025-04-29 ENCOUNTER — HOSPITAL ENCOUNTER (INPATIENT)
Facility: REHABILITATION | Age: 75
DRG: 950 | End: 2025-04-29
Attending: PHYSICAL MEDICINE & REHABILITATION | Admitting: PHYSICAL MEDICINE & REHABILITATION
Payer: MEDICARE

## 2025-04-29 PROBLEM — S06.9XAA TRAUMATIC BRAIN INJURY WITH NEW NEUROCOGNITIVE DEFICIT (HCC): Status: ACTIVE | Noted: 2025-04-29

## 2025-04-29 PROBLEM — S06.9XAA TRAUMATIC BRAIN INJURY (HCC): Status: ACTIVE | Noted: 2025-04-29

## 2025-04-29 PROBLEM — R29.818 TRAUMATIC BRAIN INJURY WITH NEW NEUROCOGNITIVE DEFICIT (HCC): Status: ACTIVE | Noted: 2025-04-29

## 2025-04-29 PROBLEM — R41.89 TRAUMATIC BRAIN INJURY WITH NEW NEUROCOGNITIVE DEFICIT (HCC): Status: ACTIVE | Noted: 2025-04-29

## 2025-04-29 PROCEDURE — 72190 X-RAY EXAM OF PELVIS: CPT

## 2025-04-29 ASSESSMENT — ENCOUNTER SYMPTOMS
SORE THROAT: 0
BLURRED VISION: 1
SENSORY CHANGE: 0
HEADACHES: 0
SHORTNESS OF BREATH: 0
DIARRHEA: 0
FLANK PAIN: 0
TREMORS: 0
FEVER: 0
ABDOMINAL PAIN: 0
PALPITATIONS: 0
NAUSEA: 0
HEARTBURN: 0
NECK PAIN: 0
VOMITING: 0
DIZZINESS: 0
WHEEZING: 0
DIAPHORESIS: 0
WEAKNESS: 1
FOCAL WEAKNESS: 0
NERVOUS/ANXIOUS: 0
SPEECH CHANGE: 0
BACK PAIN: 0
MYALGIAS: 0
COUGH: 0

## 2025-04-29 ASSESSMENT — PATIENT HEALTH QUESTIONNAIRE - PHQ9
SUM OF ALL RESPONSES TO PHQ9 QUESTIONS 1 AND 2: 0
1. LITTLE INTEREST OR PLEASURE IN DOING THINGS: NOT AT ALL
2. FEELING DOWN, DEPRESSED, IRRITABLE, OR HOPELESS: NOT AT ALL
2. FEELING DOWN, DEPRESSED, IRRITABLE, OR HOPELESS: NOT AT ALL
SUM OF ALL RESPONSES TO PHQ9 QUESTIONS 1 AND 2: 0
1. LITTLE INTEREST OR PLEASURE IN DOING THINGS: NOT AT ALL
2. FEELING DOWN, DEPRESSED, IRRITABLE, OR HOPELESS: NOT AT ALL
SUM OF ALL RESPONSES TO PHQ9 QUESTIONS 1 AND 2: 0
1. LITTLE INTEREST OR PLEASURE IN DOING THINGS: NOT AT ALL

## 2025-04-29 ASSESSMENT — LIFESTYLE VARIABLES
AVERAGE NUMBER OF DAYS PER WEEK YOU HAVE A DRINK CONTAINING ALCOHOL: 7
TOTAL SCORE: 0
CONSUMPTION TOTAL: NEGATIVE
EVER HAD A DRINK FIRST THING IN THE MORNING TO STEADY YOUR NERVES TO GET RID OF A HANGOVER: NO
ON A TYPICAL DAY WHEN YOU DRINK ALCOHOL HOW MANY DRINKS DO YOU HAVE: 2
HAVE YOU EVER FELT YOU SHOULD CUT DOWN ON YOUR DRINKING: NO
HOW MANY TIMES IN THE PAST YEAR HAVE YOU HAD 5 OR MORE DRINKS IN A DAY: 0
DOES PATIENT WANT TO STOP DRINKING: NO
EVER FELT BAD OR GUILTY ABOUT YOUR DRINKING: NO
ALCOHOL_USE: YES
HAVE PEOPLE ANNOYED YOU BY CRITICIZING YOUR DRINKING: NO

## 2025-04-29 ASSESSMENT — PAIN DESCRIPTION - PAIN TYPE
TYPE: ACUTE PAIN

## 2025-04-29 ASSESSMENT — FIBROSIS 4 INDEX: FIB4 SCORE: 6.68

## 2025-04-29 NOTE — CARE PLAN
The patient is Stable - Low risk of patient condition declining or worsening    Shift Goals  Clinical Goals: cbi, safety, pain mgmt  Patient Goals: comfort, sleep  Family Goals: No family present at bedside    Progress made toward(s) clinical / shift goals:  education provided on poc. Cbi with clear urine. Safety maintained, bed alarm on    Problem: Pain - Standard  Goal: Alleviation of pain or a reduction in pain to the patient’s comfort goal  Description: Target End Date:  Prior to discharge or change in level of careDocument on Vitals flowsheet1.  Document pain using the appropriate pain scale per order or unit policy2.  Educate and implement non-pharmacologic comfort measures (i.e. relaxation, distraction, massage, cold/heat therapy, etc.)3.  Pain management medications as ordered4.  Reassess pain after pain med administration per policy5.  If opiods administered assess patient's response to pain medication is appropriate per POSS sedation scale6.  Follow pain management plan developed in collaboration with patient and interdisciplinary team (including palliative care or pain specialists if applicable)  Outcome: Progressing       Patient is not progressing towards the following goals:

## 2025-04-29 NOTE — PROGRESS NOTES
4 Eyes Skin Assessment Completed by JAYASHREE Richardson and JAYASHREE Marrero.    Head R eye abrasion/ wound   Ears WDL  Nose WDL  Mouth WDL  Neck WDL  Breast/Chest Bruising  Shoulder Blades WDL  Spine Bruising  (R) Arm/Elbow/Hand Bruising, scabbing  (L) Arm/Elbow/Hand Bruising and Scab  Abdomen Bruising and Incision, puncture  Groin Bruising  Scrotum/Coccyx/Buttocks Redness and Blanching, bruising  (R) Leg Scab and Edema, scabbing  (L) Leg Scab, Bruising, and Edema  (R) Heel/Foot/Toe Redness  (L) Heel/Foot/Toe Redness and Blanching          Devices In Places Fields Catheter      Interventions In Place Waffle Overlay    Possible Skin Injury Yes    Pictures Uploaded Into Epic Yes  Wound Consult Placed Yes  RN Wound Prevention Protocol Ordered Yes

## 2025-04-29 NOTE — FLOWSHEET NOTE
04/29/25 1359   Vital Signs   Pulse 74   Respiration 18   Pulse Oximetry 96 %   $ Pulse Oximetry (Spot Check) Yes   Respiratory Assessment   Respiratory Pattern Within Normal Limits   Level of Consciousness Alert   Chest Exam   Work Of Breathing / Effort Within Normal Limits   Breath Sounds   RUL Breath Sounds Clear   RML Breath Sounds Clear   RLL Breath Sounds Clear   NADINE Breath Sounds Clear   LLL Breath Sounds Clear   Secretions   Cough Dry   Oxygen   O2 Delivery Device None - Room Air

## 2025-04-29 NOTE — PROGRESS NOTES
4 Eyes Skin Assessment Completed by alfred BLANC and debra RN.    Head Bruising, Swelling, Redness, and Edema to head and right eye  Ears WDL  Nose Discoloration  Mouth WDL  Neck Redness and Discoloration   Breast/Chest Redness  Shoulder Blades Redness  Spine Redness and Bruising  (R) Arm/Elbow/Hand Redness, Blanching, Bruising, Abrasion, and Discoloration  (L) Arm/Elbow/Hand Redness, Bruising, Swelling, and Discoloration  Abdomen Redness, Bruising, and Incision  Groin Redness, Bruising, and Swelling  Scrotum/Coccyx/Buttocks Redness, Blanching, and Discoloration  (R) Leg Redness, Bruising, Swelling, and Abrasion  (L) Leg Redness, Bruising, and Swelling and Abrasion  (R) Heel/Foot/Toe Redness and Blanching  (L) Heel/Foot/Toe Redness and Blanching          Devices In Places Tele Box, Pulse Ox, Fields, and SCD's      Interventions In Place Sacral Mepilex, TAP System, Pillows, Dri-Sanjay Pads, and Heels Loaded W/Pillows    Possible Skin Injury No    Pictures Uploaded Into Epic Yes  Wound Consult Placed N/A  RN Wound Prevention Protocol Ordered No

## 2025-04-29 NOTE — CARE PLAN
The patient is Stable - Low risk of patient condition declining or worsening    Shift Goals  Clinical Goals: Clamp CBI; Discharge to Rehab; DC Drain  Patient Goals: Comfort; Rehab Planning  Family Goals: No family present at bedside    Progress made toward(s) clinical / shift goals:     Problem: Pain - Standard  Goal: Alleviation of pain or a reduction in pain to the patient’s comfort goal  Description: Target End Date:  Prior to discharge or change in level of careDocument on Vitals flowsheet1.  Document pain using the appropriate pain scale per order or unit policy2.  Educate and implement non-pharmacologic comfort measures (i.e. relaxation, distraction, massage, cold/heat therapy, etc.)3.  Pain management medications as ordered4.  Reassess pain after pain med administration per policy5.  If opiods administered assess patient's response to pain medication is appropriate per POSS sedation scale6.  Follow pain management plan developed in collaboration with patient and interdisciplinary team (including palliative care or pain specialists if applicable)  Outcome: Progressing     Problem: Knowledge Deficit - Standard  Goal: Patient and family/care givers will demonstrate understanding of plan of care, disease process/condition, diagnostic tests and medications  Description: Target End Date:  1-3 days or as soon as patient condition allowsDocument in Patient Education1.  Patient and family/caregiver oriented to unit, equipment, visitation policy and means for communicating concern2.  Complete/review Learning Assessment3.  Assess knowledge level of disease process/condition, treatment plan, diagnostic tests and medications4.  Explain disease process/condition, treatment plan, diagnostic tests and medications  Outcome: Progressing

## 2025-04-29 NOTE — CARE PLAN
The patient is Stable - Low risk of patient condition declining or worsening    Shift Goals  Clinical Goals: CBI Managment; PT/OT; Skin Integrity  Patient Goals: Comfort; PT/OT  Family Goals: No family present at bedside    Progress made toward(s) clinical / shift goals:  Patient medicated per MAR. Non-pharmacologic comfort measures implemented. Safety discussed. Education provided. Ambulation and repositioning encouraged. IS use encouraged. Diet intake monitored.     Problem: Pain - Standard  Goal: Alleviation of pain or a reduction in pain to the patient’s comfort goal  Description: Target End Date:  Prior to discharge or change in level of careDocument on Vitals flowsheet1.  Document pain using the appropriate pain scale per order or unit policy2.  Educate and implement non-pharmacologic comfort measures (i.e. relaxation, distraction, massage, cold/heat therapy, etc.)3.  Pain management medications as ordered4.  Reassess pain after pain med administration per policy5.  If opiods administered assess patient's response to pain medication is appropriate per POSS sedation scale6.  Follow pain management plan developed in collaboration with patient and interdisciplinary team (including palliative care or pain specialists if applicable)  Outcome: Progressing     Problem: Knowledge Deficit - Standard  Goal: Patient and family/care givers will demonstrate understanding of plan of care, disease process/condition, diagnostic tests and medications  Description: Target End Date:  1-3 days or as soon as patient condition allowsDocument in Patient Education1.  Patient and family/caregiver oriented to unit, equipment, visitation policy and means for communicating concern2.  Complete/review Learning Assessment3.  Assess knowledge level of disease process/condition, treatment plan, diagnostic tests and medications4.  Explain disease process/condition, treatment plan, diagnostic tests and medications  Outcome: Progressing

## 2025-04-29 NOTE — DISCHARGE PLANNING
Case Management Discharge Planning    Admission Date: 4/23/2025  GMLOS: 9.8  ALOS: 6    6-Clicks ADL Score: 16  6-Clicks Mobility Score: 13  PT and/or OT Eval ordered: Yes  Post-acute Referrals Ordered: Yes  Post-acute Choice Obtained: Yes  Has referral(s) been sent to post-acute provider:  Yes      Anticipated Discharge Dispo: Discharge Disposition: D/T to SNF with Medicare cert in anticipation of skilled care (03)    DME Needed: No    Action(s) Taken: LMSW went to bedside and got COBRA signed. Handed completed COBRA to bedside RN.  LMSW got choice form signed for [facility] and faxed to [facility].  Transport scheduled with GMT for 1300.    Escalations Completed: None    Medically Clear: Yes

## 2025-04-29 NOTE — PROGRESS NOTES
4 Eyes Skin Assessment Completed by JAYASHREE Verma and JAYASHREE Carlson.    Head Bruising, Swelling, Redness, and Edema; Excoriation  Ears WDL  Nose Redness/Brusing  Mouth WDL  Neck Redness and Bruising  Breast/Chest Redness and Bruising  Shoulder Blades Redness  Spine Redness and Bruising  (R) Arm/Elbow/Hand Redness, Blanching, Bruising, and Abrasion  (L) Arm/Elbow/Hand Redness, Blanching, Bruising, and Abrasion  Abdomen Redness and Bruising  Groin Redness, Bruising, and Incision  Scrotum/Coccyx/Buttocks Redness and Blanching  (R) Leg Bruising, Swelling, and Abrasion  (L) Leg Bruising, Swelling, and Abrasion  (R) Heel/Foot/Toe Redness and Blanching  (L) Heel/Foot/Toe Redness and Blanching          Devices In Places Blood Pressure Cuff, Pulse Ox, Fields, and Nasal Cannula      Interventions In Place NC W/Ear Foams, Sacral Mepilex, TAP System, Pillows, Q2 Turns, Dri-Sanjay Pads, and Pressure Redistribution Mattress    Possible Skin Injury Yes    Pictures Uploaded Into Epic N/A  Wound Consult Placed N/A  RN Wound Prevention Protocol Ordered Yes
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
## 2025-04-29 NOTE — PROGRESS NOTES
Monitor Summary  Rhythm: Normal Sinus Rhythm  Rate: 65-77  Ectopy: (F) PVCs, PACs (F)   .14 / .08 / .40

## 2025-04-29 NOTE — PROGRESS NOTES
Discharge orders received. Report called to [facility], all questions answered. AVS discussed and signed by patient. Pt off unit with GMT to Rehab.

## 2025-04-29 NOTE — PROGRESS NOTES
Report received from julissa RN  Pt AAOx4, Alert. Responds appropriately  0.5L  Pain managed per MAR, resting comfortably.  CBI/Fields inplace. Clear urine, no clots noted.   +flatus, BM 4/28  Regular diet, tolerating well. No n/v  NWB BLE, Ok to pivot on RLE  Max assist OOB  Turns self in bed  Calls appropriately for assistance  SCDs on    Skin: Generalized abrasions/bruising. HARMAN LLQ, transverse abdominal incision    POC reviewed, education provided. Bed locked and in low position, pt instructed to call for assistance. Comprehension verbalized. Call light within reach, all needs met at this time.
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
## 2025-04-29 NOTE — FLOWSHEET NOTE
04/29/25 1400   Protocol Assessment   Initial Assessment Yes   Patient History   Pulmonary Diagnosis LEOBARDO   Home O2 No   Nocturnal CPAP Yes   Nocturnal CPAP Oxygen liter flow 0   Home Treatments/Frequency No   Sleep Apnea Screening   Have you had a sleep study? Yes   Have you been diagnosed with sleep apnea? Yes   Do you use a CPAP/BIPAP/AUTOPAP? Yes   COPD Risk Screening   Do you have a history of COPD? No   Protocol Pathways   Protocol Pathways Sleep Apnea Protocol   Sleep Apnea   Sleep Apnea Inclusions Hx Sleep Apnea     Mr Whitfield refused the offer to wear one of our cpap machines, his wife will bring his unit in on 4/30. No oxygen use or respiratory medications.

## 2025-04-29 NOTE — PROGRESS NOTES
NEW ADMIT FALL PREVENTION EDUCATION                                    AND INTERVENTION       Fall Prevention Education Video watched: Yes  Strip Alarm in place: Yes  Alarming seatbelt No   Does patient need a posey bed?: No   Does patient have the right size non-skid sock?: Yes      Admitting RN:  Dylan Lloyd

## 2025-04-29 NOTE — PROGRESS NOTES
Patient left before being seen for orthopedics. Postop Pelvis XR show hardware in stable alignment. No further needs.

## 2025-04-29 NOTE — PROGRESS NOTES
"Received report from previous shift RN at 0700.  Assessment complete.  A&O x 4. Patient calls appropriately.  Patient ambulates with x2 hand-held assist. Bed alarm on.   Patient has 2/10 pain. Pain managed with prescribed medications per MAR.  Denies N&V. Tolerating Cardiac diet.  Skin per flowsheets.  + void, + flatus, + BM on 4/29.  Patient denies SOB on 0.5L O2 via  NC.  /66   Pulse 67   Temp 36.3 °C (97.3 °F) (Temporal)   Resp 18   Ht 1.702 m (5' 7\")   Wt 93 kg (205 lb 0.4 oz)   SpO2 95%   BMI 32.11 kg/m²     Patient pleasant and cooperative throughout assessment.  Reviewed plan of care with patient, pt verbalizes understanding. Call light and personal belongings with in reach. Hourly rounding in place. All needs met at this time.    "

## 2025-04-29 NOTE — PREADMISSION SCREENING NOTE
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 disease) 04/25/2025    S/P mitral valve clip implantation 04/25/2025    Hyperlipidemia 04/25/2025    Acute blood loss anemia 04/25/2025    Thrombocytopenia (formerly [facility]) 04/25/2025    Hearing loss 04/25/2025    Stented coronary artery 04/24/2025    Gastroesophageal reflux disease 04/24/2025    Psoriatic arthritis (formerly [facility]) 04/24/2025    Sleep apnea 04/24/2025    Blunt injury of eye, right, initial encounter 04/24/2025    Trauma 04/23/2025    No contraindication to deep vein thrombosis (DVT) prophylaxis 04/23/2025    Multiple closed pelvic fractures with disruption of pelvic [place], initial encounter (formerly [facility]) 04/23/2025    Platelet inhibition due to Plavix 04/23/2025    Orbital fracture, open, initial encounter (formerly [facility]) 04/23/2025    Cardiac disease 04/23/2025    Depression 04/23/2025    Insomnia 04/23/2025    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    Traumatic rupture of bladder 04/23/2025    Lumbar transverse process fracture, closed, initial encounter (formerly [facility]) 04/23/2025       Current Vital Signs:   Temperature: 36.3 °C (97.3 °F) Pulse:  "67 Respiration: 18 Blood Pressure : 130/66  Weight: 93 kg (205 lb 0.4 oz) Height: 170.2 cm (5' 7\")  Pulse Oximetry: 95 % O2 (LPM): 0.5      Completed Laboratory Reports:  Recent Labs     25  0345 25  0341 25  0455   WBC 5.8 8.1 7.7   HEMOGLOBIN 8.5* 9.5* 8.9*   HEMATOCRIT 25.0* 29.2* 26.7*   PLATELETCT 89* 107* 112*   SODIUM 138 140 136   POTASSIUM 3.9 3.8 3.8   BUN 13 13 14   CREATININE 0.91 1.02 1.03   ALBUMIN 3.1*  --   --    GLUCOSE 109* 95 93     Additional Labs: Not Applicable    Prior Living Situation:   Housing / Facility: 1 Story House  Steps Into Home: 1  Steps In Home: 0  Lives with - Patient's Self Care Capacity: Significant Other  Equipment Owned: Front-Wheel Walker, Single Point Cane, Tub / Shower Seat    Prior Level of Function / Living Situation:   Physical Therapy: Prior Services: Home-Independent  Housing / Facility: 1 Story House  Steps Into Home: 1  Steps In Home: 0  Bathroom Set up: Walk In Shower, Built-In Shower Chair  Equipment Owned: Front-Wheel Walker, Single Point Cane, Tub / Shower Seat  Lives with - Patient's Self Care Capacity: Significant Other  Bed Mobility: Independent  Transfer Status: Independent  Ambulation: Independent  Assistive Devices Used: None  Current Level of Function:   Gait Level Of Assist: Unable to Participate  Weight Bearing Status: NWB B LE, RL for transfers only  Supine to Sit: Minimal Assist  Sit to Supine:  (Up to WC post)  Scooting: Contact Guard Assist  Rolling: Maximal Assist to Rt., Maximum Assist to Lt.  Skilled Intervention: Verbal Cuing, Tactile Cuing, Compensatory Strategies  Comments: HOB elevated; use of bed features  Sit to Stand: Moderate Assist  Bed, Chair, Wheelchair Transfer: Moderate Assist  Toilet Transfers: Moderate Assist  Skilled Intervention: Verbal Cuing, Tactile Cuing, Sequencing, Postural Facilitation, Facilitation, Compensatory Strategies  Sitting in Chair: Up to WC post  Sitting Edge of Bed: 5 min  Standin min (for " pericare)  Occupational Therapy:   Self Feeding: Independent  Grooming / Hygiene: Independent  Bathing: Independent  Dressing: Independent  Toileting: Independent  Medication Management: Independent  Laundry: Independent  Kitchen Mobility: Independent  Finances: Independent  Home Management: Independent  Shopping: Independent  Prior Level Of Mobility: Independent Without Device in Community  Prior Services: Home-Independent  Housing / Facility: [address]  Current Level of Function:   Eating: Supervision  Upper Body Dressing: Moderate Assist  Lower Body Dressing: Maximal Assist (Socks)  Toileting: Maximal Assist (Assist with pericare after having BM on BSC)  Skilled Intervention: Verbal Cuing, Tactile Cuing, Sequencing, Compensatory Strategies  Speech Language Pathology:      Rehabilitation Prognosis/Potential: Good  Estimated Length of Stay: 10-20 days    Nursing:      Continent and Fields in Place    Scope/Intensity of Services Recommended:  Physical Therapy: 1.5 hr / day  5 days / week. Therapeutic Interventions Required: Maximize Endurance, Mobility, Strength, and Safety  Occupational Therapy: 1.5 hr / day 5 days / week. Therapeutic Interventions Required: Maximize Self Care, ADLs, IADLs, and Energy Conservation  Speech & Language Pathology: evaluation 5 days / week. Therapeutic Interventions Required: Maximize Cognition and Safety  Rehabilitation Nursin/7. Therapeutic Interventions Required: Monitor Pain, Skin, Wound(s), Vital Signs, Intake and Output, Labs, Safety, and Family Training  Rehabilitation Physician: 3 - 5 days / week. Therapeutic Interventions Required: Medical Management  Respiratory Care: evaluate . Therapeutic Interventions Required: Pulmonary Toileting and O2 Weaning  Dietician: consult. Therapeutic Interventions Required: nutritional need     He requires 24-hour rehabilitation nursing to manage bowel and bladder function, skin care, surgical incision, wound, nutrition and fluid intake,  pulmonary hygiene, pain control, safety, medication management, and patient/family goals. In addition, rehabilitation nursing will reiterate and reinforce therapy skills and equipment use, including ADLs, as well as provide education to the patient and family. Vicente Whitfield is willing to participate in and is able to tolerate the proposed plan of care.    Rehabilitation Goals and Plan (Expected frequency & duration of treatment in the IRF):   Return to the Community, Minimal Assist Level of Care, Outpatient Support, and Family Able to Provide 24/7 Assistance  Anticipated Date of Rehabilitation Admission: 04/29/2025  Patient/Family oriented IRF level of care/facility/plan: Yes  Patient/Family willing to participate in IRF care/facility/plan: Yes  Patient able to tolerate IRF level of care proposed: Yes  Patient has potential to benefit IRF level of care proposed: Yes  Comments: Not Applicable    Special Needs or Precautions - Medical Necessity:  Safety Concerns/Precautions:  Fall Risk / High Risk for Falls, Balance, and Cognition  Complex Wound Care: post trauma   Pain Management  IV Site: Peripheral  Requires Oxygen  Weight-bearing Status: Non Weight Bearing, Right, Left, RLE pivot transfer only   Current Medications:    Current Facility-Administered Medications Ordered in Epic   Medication Dose Route Frequency Provider Last Rate Last Admin    magnesium hydroxide (Milk Of Magnesia) suspension 30 mL  30 mL Oral DAILY Awilda Roy, A.P.N.   30 mL at 04/27/25 1024    aspirin EC tablet 81 mg  81 mg Oral DAILY Awilda Roy, A.P.N.   81 mg at 04/29/25 0509    clopidogrel (Plavix) tablet 75 mg  75 mg Oral DAILY Awilda Roy, A.P.N.   75 mg at 04/29/25 0509    bisacodyl (Dulcolax) suppository 10 mg  10 mg Rectal QDAY PRN Awilda Roy, A.P.N.   10 mg at 04/26/25 1751    amoxicillin-clavulanate (Augmentin) 875-125 MG per tablet 1 Tablet  1 Tablet Oral Q12HRS Patrick Garcia M.D.   1 Tablet at 04/29/25 0509     enoxaparin (Lovenox) inj 30 mg  30 mg Subcutaneous Q12HRS Awilda Roy, A.P.N.   30 mg at 04/29/25 0509    atorvastatin (Lipitor) tablet 80 mg  80 mg Oral Nightly Linsey M Wegener, A.P.R.N.   80 mg at 04/28/25 2107    ezetimibe (Zetia) tablet 10 mg  10 mg Oral DAILY Linsey M Wegener, A.P.R.N.   10 mg at 04/29/25 0509    omeprazole (PriLOSEC) capsule 20 mg  20 mg Oral DAILY Linsey M Wegener, A.P.R.N.   20 mg at 04/29/25 0509    mirtazapine (Remeron) tablet 7.5 mg  7.5 mg Oral Nightly Linsey M Wegener, A.P.R.N.   7.5 mg at 04/28/25 2107    buPROPion SR (Wellbutrin-SR) tablet 400 mg  400 mg Oral DAILY Awilda Roy, A.P.N.   400 mg at 04/29/25 0509    FLUoxetine (PROzac) capsule 60 mg  60 mg Oral DAILY Awilda Roy, A.P.N.   60 mg at 04/29/25 0509    LORazepam (Ativan) tablet 2 mg  2 mg Oral QHS Awilda Roy, A.P.N.   2 mg at 04/28/25 2107    Respiratory Therapy Consult   Nebulization Continuous RT Corby Garcia M.D.        ondansetron (Zofran) syringe/vial injection 4 mg  4 mg Intravenous Q4HRS PRN Corby Garcia M.D.        Or    ondansetron (Zofran ODT) dispertab 4 mg  4 mg Oral Q4HRS PRN Corby Garcia M.D.        docusate sodium (Colace) capsule 100 mg  100 mg Oral BID Corby Garcia M.D.   100 mg at 04/29/25 0509    senna-docusate (Pericolace Or Senokot S) 8.6-50 MG per tablet 1 Tablet  1 Tablet Oral Nightly Corby Garcia M.D.   1 Tablet at 04/27/25 1950    senna-docusate (Pericolace Or Senokot S) 8.6-50 MG per tablet 1 Tablet  1 Tablet Oral Q24HRS PRN Corby Garcia M.D.   1 Tablet at 04/26/25 1741    polyethylene glycol/lytes (Miralax) Packet 1 Packet  1 Packet Oral BID Corby Garcia M.D.   1 Packet at 04/28/25 0433    acetaminophen (Tylenol) tablet 1,000 mg  1,000 mg Oral Q6HRS PRN Corby Garcia M.D.   1,000 mg at 04/28/25 2109    oxyCODONE immediate-release (Roxicodone) tablet 5 mg  5 mg Oral Q3HRS PRLYNN Garcia M.D.   5 mg at 04/29/25  0137    Or    oxyCODONE immediate release (Roxicodone) tablet 10 mg  10 mg Oral Q3HRS PRN Corby Garcia M.D.   10 mg at 04/24/25 0709    Or    HYDROmorphone (Dilaudid) injection 0.5 mg  0.5 mg Intravenous Q3HRS PRN Corby Garcia M.D.   0.5 mg at 04/24/25 0459    bacitracin-polymyxin b (Polysporin) 500-00272 UNIT/GM ointment   Topical TID Corby Garcia M.D.   1 Each at 04/28/25 1242     No current Epic-ordered outpatient medications on file.     Diet:   DIET ORDERS (From admission to next 24h)       Start     Ordered    04/27/25 1337  Diet Order Diet: Cardiac  ALL MEALS        Question:  Diet:  Answer:  Cardiac    04/27/25 1337                    Anticipated Discharge Destination / Patient/Family Goal:  Destination: Home with Assistance Support System: Spouse  Anticipated home health services: OT, PT, Nursing, and Aide  Previously used HH service/ provider: Not Applicable  Anticipated DME Needs: Oxygen, Wheelchair, Commode, and Hospital Bed  Outpatient Services: OT and PT  Alternative resources to address additional identified needs:   No future appointments.    Nikolai De La Cruz  Clinical Admissions Coordinator     Discharge Planning      Signed     Date of Service: 4/28/2025  3:07 PM      Following for post acute services. Spoke with Vicente  about goals and expectation for IRF level of care. Discussed therapy plan for 3 hours per day 5 days a week. Discussed therapy schedules 30/45 or 60 minute sessions typically 1.5 hours between breakfast and lunch and another 1.5 hours between lunch and dinner. Discussed PT/OT and SLP roles. Discussed potential length of stay. Discussed comprehensive medical surveillance by physiatry as well as hospitalist team. Discussed patient to nursing ratio. Discussed insurance Medicare  coverage for the program. Discussed visitation and clothing requirements. Spouse Armida will be primary support for Vicente  to return to the community. Discussed participation with  therapy program when visiting.  Family/ patient rehab goals are  to achieve highest level of independence with transfers and mobility, self care .  Home is a 1 story with 1 step(s) to enter.  Discussed discharge planner role and team conference. DME anticipated for home Slide board bedside commode tub transfer bench. In the event of additional support need discussed HH and outpatient programs. Discussed current weight bearing restriction and that they would not be liberated while in rehab. Vicente understands and prefers an inpatient rehabilitation program over a skilled nursing program.                           Pre-Screen Completed: 4/29/2025 9:42 AM Nikolai De La Cruz
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
## 2025-04-29 NOTE — DISCHARGE PLANNING
Approved transfer to [facility]  is accepting [facility] transport arranged for 1300 . Attending team notified.

## 2025-04-29 NOTE — PROGRESS NOTES
1330 Pt arrived at [facility] from [facility] via transport. Dr. Chapman to follow. Initial assessment initiated. Pt oriented to room and facility routine and safety measures; pt education binder provided and discussed. Pt A/O x 4, continent of bowel and bladder. Max assist for transfers. All wounds photographed and documented; photos uploaded to . Pt denies  pain or discomfort at this time. Pt positioned for comfort in bed. Call light within reach, safety measures in place.

## 2025-04-30 ENCOUNTER — APPOINTMENT (OUTPATIENT)
Dept: OCCUPATIONAL THERAPY | Facility: REHABILITATION | Age: 75
DRG: 950 | End: 2025-04-30
Attending: PHYSICAL MEDICINE & REHABILITATION
Payer: MEDICARE

## 2025-04-30 ENCOUNTER — APPOINTMENT (OUTPATIENT)
Dept: SPEECH THERAPY | Facility: REHABILITATION | Age: 75
DRG: 950 | End: 2025-04-30
Attending: PHYSICAL MEDICINE & REHABILITATION
Payer: MEDICARE

## 2025-04-30 ENCOUNTER — APPOINTMENT (OUTPATIENT)
Dept: PHYSICAL THERAPY | Facility: REHABILITATION | Age: 75
DRG: 950 | End: 2025-04-30
Attending: PHYSICAL MEDICINE & REHABILITATION
Payer: MEDICARE

## 2025-04-30 ASSESSMENT — BRIEF INTERVIEW FOR MENTAL STATUS (BIMS)
WHAT DAY OF THE WEEK IS IT: CORRECT
WHAT YEAR IS IT: CORRECT
ASKED TO RECALL SOCK: YES, NO CUE REQUIRED
INITIAL REPETITION OF BED BLUE SOCK - FIRST ATTEMPT: 3
ASKED TO RECALL BLUE: YES, AFTER CUEING (A COLOR")"
BIMS SUMMARY SCORE: 12
ASKED TO RECALL BED: NO, COULD NOT RECALL
WHAT MONTH IS IT: ACCURATE WITHIN 5 DAYS

## 2025-04-30 ASSESSMENT — PATIENT HEALTH QUESTIONNAIRE - PHQ9
2. FEELING DOWN, DEPRESSED, IRRITABLE, OR HOPELESS: NOT AT ALL
SUM OF ALL RESPONSES TO PHQ9 QUESTIONS 1 AND 2: 0
1. LITTLE INTEREST OR PLEASURE IN DOING THINGS: NOT AT ALL

## 2025-04-30 ASSESSMENT — ACTIVITIES OF DAILY LIVING (ADL): TOILETING: INDEPENDENT

## 2025-04-30 ASSESSMENT — PAIN DESCRIPTION - PAIN TYPE: TYPE: ACUTE PAIN

## 2025-04-30 NOTE — PROGRESS NOTES
NURSING DAILY NOTE    Name: Vicente Whitfield   Date of Admission: 4/29/2025   Admitting Diagnosis: Traumatic brain injury (HCC)  Attending Physician: PASCUAL BUNCH M.D.  Allergies: Patient has no known allergies.    Safety  Patient Assist     Patient Precautions     Bed Transfer Status     Toilet Transfer Status      Assistive Devices     Oxygen  None - Room Air  Diet/Therapeutic Dining  Current Diet Order   Procedures    Diet Order Diet: Cardiac     Pill Administration  whole  Agitated Behavioral Scale     ABS Level of Severity       Fall Risk  Has the patient had a fall this admission?      Joie Ruiz Fall Risk Scoring  11, MODERATE RISK  Fall Risk Safety Measures  bed alarm, chair alarm, poor balance, and low vision/hearing    Vitals  Temperature: 37.6 °C (99.6 °F)  Temp src: Temporal  Pulse: 68  Respiration: 18  Blood Pressure : 109/64  Blood Pressure MAP (Calculated): 79 MM HG  BP Location: Right, Upper Arm  Patient BP Position: Dumont's Position     Oxygen  Pulse Oximetry: 94 %  O2 Delivery Device: None - Room Air    Bowel and Bladder  Last Bowel Movement  04/29/25 (per report)  Stool Type     Bowel Device     Continent  Bladder: Did not void   Bowel: No movement  Bladder Function  Urine Color: Bloody  Urine Clarity: Clear  Genitourinary Assessment   Bladder Assessment (WDL):  WDL Except  Fields Catheter: Present with Active Order  Fields Reasons per MD Order: Acute urinary retention or bladder outlet obstruction  Urine Color: Bloody  Urine Clarity: Clear    Skin  Shaun Score   16  Sensory Interventions   Bed Types: Standard/Trauma Mattress with Overlay  Skin Preventative Measures: Waffle Overlay  Moisture Interventions  Moisturizers/Barriers: Moisturizer       Pain  Pain Rating Scale  0 - No Pain  Pain Location     Pain Location Orientation     Pain Interventions   Declines    ADLs    Bathing      Linen Change      Personal Hygiene     Chlorhexidine Bath      Oral Care     Teeth/Dentures      Shave     Nutrition Percentage Eaten     Environmental Precautions     Patient Turns/Positioning  Supine  Patient Turns Assistance/Tolerance     Bed Positions     Head of Bed Elevated         Psychosocial/Neurologic Assessment  Psychosocial Assessment  Psychosocial (WDL):  Within Defined Limits  Neurologic Assessment  Neuro (WDL): Exceptions to WDL  Level of Consciousness: Alert  Orientation Level: Oriented X4  Cognition: Follows commands, Appropriate safety awareness  Speech: Clear  Motor Function/Sensation Assessment: Motor strength, Motor response  RUE Motor Response: Normal extension, Normal flexion  Muscle Strength Right Arm: Good Strength Against Gravity and Moderate Resistance  LUE Motor Response: Normal extension, Normal flexion  Muscle Strength Left Arm: Good Strength Against Gravity and Moderate Resistance  Muscle Strength Right Leg: Weak Movement but Not Against Gravity or Resistance  Muscle Strength Left Leg: Weak Movement but Not Against Gravity or Resistance  EENT (WDL):  WDL Except    Cardio/Pulmonary Assessment  Edema      Respiratory Breath Sounds  RUL Breath Sounds: Clear  RML Breath Sounds: Clear  RLL Breath Sounds: Clear  NADINE Breath Sounds: Clear  LLL Breath Sounds: Clear  Cardiac Assessment   Cardiac (WDL):  WDL Except (hx CAD, mitral valve repair)

## 2025-04-30 NOTE — CARE PLAN
"  Problem: Fall Risk - Rehab  Goal: Patient will remain free from falls  Outcome: Progressing   Joie Joseph Fall risk Assessment Score: 11    Moderate fall risk Interventions  - Bed and strip alarm   - Yellow sign by the door   - Yellow wrist band \"Fall risk\"  - Do not leave patient unattended in the bathroom  - Fall risk education provided  - Call light within reach   - Yellow  socks   - Belongings within reach   - Bed in the lowest position        Problem: Pain - Standard  Goal: Alleviation of pain or a reduction in pain to the patient’s comfort goal  Outcome: Progressing   Patient is able to rate pain on 0-10 scale.   "

## 2025-04-30 NOTE — CARE PLAN
Problem: Knowledge Deficit - Standard  Goal: Patient and family/care givers will demonstrate understanding of plan of care, disease process/condition, diagnostic tests and medications  Outcome: Progressing     Problem: Fall Risk - Rehab  Goal: Patient will remain free from falls  Outcome: Progressing     Problem: Mobility  Goal: Patient's capacity to carry out activities will improve  Outcome: Progressing       The patient is Stable - Low risk of patient condition declining or worsening    Shift Goals  Clinical Goals: safety  Patient Goals: safety

## 2025-04-30 NOTE — DISCHARGE PLANNING
Will call [facility] Ph#[phone] opt 2 to schedule void trial and images closer to [place].

## 2025-04-30 NOTE — DISCHARGE PLANNING
CASE MANAGEMENT INITIAL ASSESSMENT    Admit Date:  2025     Chart review. CM to discuss role of case management / discharge planning / team conference.   Patient is a  74 y.o. male transferred from [facility].    Diagnosis: Traumatic brain injury with new neurocognitive deficit (HCC) [S06.9XAA, R29.818, R41.89]    Co-morbidities:   Patient Active Problem List    Diagnosis Date Noted    Traumatic brain injury (HCC) 2025    Traumatic brain injury with new neurocognitive deficit (HCC) 2025    Alcoholism ([facility]) 2025    Discharge planning issues 2025    Encounter for geriatric assessment 2025    CAD (coronary artery disease) 2025    S/P mitral valve clip implantation 2025    Hyperlipidemia 2025    Acute blood loss anemia 2025    Thrombocytopenia ([facility]) 2025    Hearing loss 2025    Stented coronary artery 2025    Gastroesophageal reflux disease 2025    Psoriatic arthritis ([facility]) 2025    Sleep apnea 2025    Blunt injury of eye, right, initial encounter 2025    Trauma 2025    No contraindication to deep vein thrombosis (DVT) prophylaxis 2025    Multiple closed pelvic fractures with disruption of pelvic [place], initial encounter ([facility]) 2025    Platelet inhibition due to Plavix 2025    Orbital fracture, open, initial encounter ([facility]) 2025    Cardiac disease 2025    Depression 2025    Insomnia 2025    Extraperitoneal rupture of bladder 2025    Traumatic rupture of bladder 2025    Lumbar transverse process fracture, closed, initial encounter ([facility]) 2025     Prior Living Situation:  [facility]  Lives with: Significant Other    Prior Level of Function:  Medication Management: Independent  Finances: Independent  Home Management: Independent  Shopping: Independent  Prior Level Of Mobility: Independent Without Device in Community, Independent Without Device in  Home  Driving: Driving Independent    Support Systems:  Primary : Bernabe Huffman    Previous Services Utilized:   Equipment Owned: Front-Wheel Walker, Single Point Cane, Tub / Shower Seat  Prior Services: Home-Independent    Other Information:  Occupation: Retired Due To Age  Primary Payor Source: Medicare A, Medicare B  Primary Care Practitioner : Vitor Raines MD  Other MDs: Dr. Antonio Sesay and Dr. Celi Seaman    Patient / Family Goal: Chart review completed. [place] [address] Pt has FWW, SPC, and Tub / Shower Seat.    Plan:  1. Continue to follow patient through hospitalization and provide discharge planning in collaboration with patient, family, physicians and ancillary services.     2. Utilize community resources to ensure a safe discharge.

## 2025-04-30 NOTE — THERAPY
Speech Language Pathology   Initial Assessment     Patient Name:  Vicente Whitfield  Age:  74 y.o., Sex:  male  Medical Record #:  [id number]  Today's Date: 4/30/2025     Subjective: Pt pleasant and cooperative, asleep in bed upon SLPs arrival, willing to complete cognitive evaluation at bedside.     Objective:    04/30/25 1003   Evaluation Charges   Charges Yes   SLP Speech Language Evaluation Speech Sound Language Comprehension   SLP Total Time Spent   SLP Individual Total Time Spent (Mins) 60   Impairment Assessments   Comprehension Level of Assist Modified Independent   Comprehension Description Glasses   Expression Level of Assist Supervised   Expression Description Extra time needed   Social Interaction Level of Assist Independent   Problem Solving Level of Assist Modified Independent   Problem Solving Description Extra time needed   Memory Level of Assist Moderate Assist   Memory Description Extra time needed;Cueing needed   SCCAN (Scales of Cognitive and Communicative Ability for Neurorehabilitation)   Oral Expression - Raw Score 19   Oral Expression - Scale Performance Score 100   Orientation - Raw Score 12   Orientation - Scale Performance Score 100   Memory - Raw Score 10   Memory - Scale Performance Score 53   Speech Comprehension - Raw Score 13   Speech Comprehension - Scale Performance Score 100   Reading Comprehension - Raw Score 12   Reading Comprehension - Scale Performance Score 100   Writing - Raw Score 7   Writing - Scale Performance Score 100   Attention - Raw Score 15   Attention - Scale Performance Score 94   Problem Solving - Raw Score 22   Problem Solving - Scale Performance Score 96   SCCAN Total Raw Score 84   SCCAN Degree of Severity Mild Impairment   Patient Scheduling Information   SLP Time 30 minutes   Primary or Coverage ST [facility]   Problem List   Problem List Memory Deficit   Benefit   Therapy Benefit Patient would benefit from Inpatient Rehab Speech-Language Pathology to address above  identified deficits.   Current Discharge Plan   Current Discharge Plan Return to Prior Living Situation   Interdisciplinary Plan of Care Collaboration   IDT Collaboration with  Nursing;Physician   Patient Position at End of Therapy Seated;Chair Alarm On;Call Light within Reach;Tray Table within Reach;Phone within Reach   Collaboration Comments CLOF and POC         Patient has been educated on the following items: speech therapy role, speech therapy plan of care, goal setting, and patient passport    Assessment:      Patient is 74 y.o. male with a diagnosis of Traumatic brain injury with new neurocognitive deficit (HCC) [S06.9XAA, R29.818, R41.89]    Currently, the patient has the following precautions: Bowel and Bladder: Fields present, Weight Bearing: NWB RLE, NWB LLE, Other (see comments), Comments: RLE for transfers only    Patient PMH includes: No past medical history on file.  Patient's Past Surgical History:   Past Surgical History:   Procedure Laterality Date    IN CYSTOURETHROSCOPY N/A 4/24/2025    Procedure: ANTERIOR CYSTORRHAPHY;  Surgeon: Abhishek Hernandez M.D.;  Location: SURGERY [facility];  Service: Urology    ORIF, PELVIS N/A 4/24/2025    Procedure: ORIF, PELVIS;  Surgeon: Ori Alvarez M.D.;  Location: SURGERY [facility];  Service: Orthopedics     The Scales of Cognitive and Communicative Ability for Neurorehabilitation (SCCAN) assesses cognitive-communicative deficits and functional ability in patients in rehabilitation hospitals, clinics, and skilled nursing facilities. The SCCAN is appropriate for a broad range of neurological patients, provides a measure of both impairment and functional ability.     SCCAN (Scales of Cognitive and Communicative Ability for Neurorehabilitation)  Oral Expression - Raw Score: 19  Oral Expression - Scale Performance Score: 100  Orientation - Raw Score: 12  Orientation - Scale Performance Score: 100  Memory - Raw Score: 10  Memory - Scale Performance Score:  53  Speech Comprehension - Raw Score: 13  Speech Comprehension - Scale Performance Score: 100  Reading Comprehension - Raw Score: 12  Reading Comprehension - Scale Performance Score: 100  Writing - Raw Score: 7  Writing - Scale Performance Score: 100  Attention - Raw Score: 15  Attention - Scale Performance Score: 94  Problem Solving - Raw Score: 22  Problem Solving - Scale Performance Score: 96  SCCAN Total Raw Score: 84  SCCAN Degree of Severity: Mild Impairment    SCCAN completed with results indicating MILD cognitive impairments overall. Pt with only deficits noted related to short term memory. Results reviewed with discussion for brief cognitive intervention to address functional memory strategies, implementation and mental manipulation tasks. Pt reporting vocal changes for approx 1 year with unknown etiology, discussed with pt rec: for ENT referral and then further voice therapy as appropriate. Pt aware of POC and in agreement at this time.     ST Cognitive/Linguistic evaluation(s) performed today; functional performance at today's assessment is as above. At baseline, the patient was independent with all IADLs . The patient is limited by the following barriers: short term memory deficits.    Additional factors influencing patient status and progress include: prior level of function, PMH, and the above co morbidities.    The patient is performing well below their baseline level of function and will benefit from an interdisciplinary high intensity rehabilitation program to maximize functional independence, decrease burden of care, and support a safe return home with family support.    Plan:   Recommend Speech Therapy 30-60 minutes per day 5-6 days per week for 1 weeks for the following treatments:  SLP Self Care / ADL Training , SLP Cognitive Skill Development, and SLP Group Treatment.    Goals:  Long term and short term goals have been discussed with patient and they are in agreement.    Speech Therapy Problems  (Active)       Problem: Memory STGs       Dates: Start:  04/30/25         Goal: STG-Within one week, patient will learn and implement functional memory strategies to assist with recall of information related to hospitalization and daily tasks/activities with 80% accuracy provided MIN A.        Dates: Start:  04/30/25            Goal: STG-Within one week, patient will complete mental manipulation tasks with 4 units and 80% accuracy indep.        Dates: Start:  04/30/25               Problem: Speech/Swallowing LTGs       Dates: Start:  04/30/25         Goal: LTG-By discharge, patient will recall information related to hospitalization and safety with 80% accuracy provided MOD I with the goal to return to PLOF with safe dc home.       Dates: Start:  04/30/25

## 2025-04-30 NOTE — WOUND TEAM
[facility]  Inpatient Services  Wound and Skin Care Brief Evaluation    Admission Date: 4/29/2025     Last order of IP CONSULT TO WOUND CARE was found on 4/29/2025 from Hospital Encounter on 4/29/2025     HPI, PMH, SH: Reviewed    No chief complaint on file.    Diagnosis: Traumatic brain injury with new neurocognitive deficit (HCC) [S06.9XAA, R29.818, R41.89]    Unit where seen by Wound Team: 06/02     Wound consult placed regarding heels. Chart and images reviewed. This discussed with bedside RN. This clinician in to assess patient. Patient pleasant and agreeable. Heels non-selectively debrided with Moist warm washcloth. They have blanchable erythema. Education provided on floating heels with pillows, and this was placed on. Sacrum also assessed and is intact with no redness. The right eye has orders for cleansing and ointment TID.     No pressure injuries or advanced wound care needs identified. Continue to offload heels, offload sacrum with q2 turns. Wound consult completed. No further follow up unless indicated and consulted.     Wound 04/29/25 Traumatic Sclera;Face Right (Active)   Date First Assessed/Time First Assessed: 04/29/25 1520   Present on Original Admission: Yes  Primary Wound Type: Traumatic  Location: Sclera;Face  Laterality: Right      Assessments 4/30/2025  9:00 AM   Site Assessment Clean;Dry;Pink;Purple;Red;Black;Brown;Scabbed;Edema   Periwound Assessment Clean;Dry;Intact;Ecchymosis   Margins Defined edges   Closure Open to air   Drainage Amount Scant   Drainage Description Serosanguineous   Dressing Status Open to Air   NEXT Weekly Photo (Inpatient Only) 05/04/25   Wound Team Following Not following   Non-staged Wound Description Not applicable       Wound 04/29/25 Heel Right;Left 4/30/2025 blanchable Redness (Active)   Date First Assessed/Time First Assessed: 04/29/25 1600   Present on Original Admission: Yes  Location: Heel  Laterality: Right;Left  Wound Description  (Comments): 4/30/2025 blanchable Redness      Assessments 4/30/2025  9:00 AM   Wound Image     Site Assessment Clean;Dry;Intact   Periwound Assessment Clean;Dry;Intact   Margins Attached edges   Closure Open to air   Drainage Amount None   Treatments Cleansed;Nonselective debridement;Site care   Wound Cleansing Soap and Water   Dressing Status Clean;Dry;Intact;Open to Air   NEXT Weekly Photo (Inpatient Only) 05/04/25   Wound Team Following Not following       PREVENTATIVE INTERVENTIONS:    Q shift Shaun - performed per nursing policy  Q shift pressure point assessments - performed per nursing policy    Surface/Positioning  Standard/trauma mattress - Currently in Place  Reposition q 2 hours with pillows - Ordered for nursing to apply  Reposition q 2 hours with wedges - Ordered for nursing to apply  Move and Transfer System (MATs) - Currently in Place  Waffle cushion - Currently in Place    Offloading/Redistribution  Heels floated with waffle overlay - Currently in Place  Float Heels off Bed with Pillows - Newly Applied this Visit           Containment/Moisture Prevention    Dri-susanne pad - Currently in Place  Brief with mobilization - Currently in Place    Mobilization      Working with therapies

## 2025-04-30 NOTE — CARE PLAN
The patient is Watcher - Medium risk of patient condition declining or worsening    Shift Goals  Clinical Goals: safety  Patient Goals: safety    Progress made toward(s) clinical / shift goals:    Problem: Knowledge Deficit - Standard  Goal: Patient and family/care givers will demonstrate understanding of plan of care, disease process/condition, diagnostic tests and medications  Outcome: Progressing     Problem: Fall Risk - Rehab  Goal: Patient will remain free from falls  Outcome: Progressing     Problem: Psychosocial  Goal: Patient's level of anxiety will decrease  Outcome: Progressing     Problem: Risk for Aspiration  Goal: Patient's risk for aspiration will be absent or decrease  Outcome: Progressing     Problem: Pain - Standard  Goal: Alleviation of pain or a reduction in pain to the patient’s comfort goal  Outcome: Progressing

## 2025-04-30 NOTE — PROGRESS NOTES
NURSING DAILY NOTE    Name: Vicente Whitfield  Date of Admission: 4/29/2025  Admitting Diagnosis: Traumatic brain injury (HCC)  Attending Physician: Sadi Chapman M.d.  Allergies: Patient has no known allergies.    Safety  Patient Assist  o   Patient Precautions  o   Precaution Comments  o   Bed Transfer Status  o   Toilet Transfer Status  o   Assistive Devices  oRails, Wheelchair  Oxygen  oNone - Room Air  Diet/Therapeutic Dining  o  Current Diet Order   Procedures    Diet Order Diet: Cardiac     Pill Administration  owhole  Agitated Behavioral Scale  o   ABS Level of Severity  o     Fall Risk  Has the patient had a fall this admission?  Alexandro  Joie Ruiz Fall Risk Scoring  o11, MODERATE RISK  Fall Risk Safety Measures  cristina alarm and chair alarm    Vitals  Temperature: 36.8 °C (98.2 °F)  Temp src: Temporal  Pulse: 63  Respiration: 18  Blood Pressure : 124/65  Blood Pressure MAP (Calculated): 85 MM HG  BP Location: Left, Upper Arm  Patient BP Position: Supine    Oxygen  Pulse Oximetry: 98 %  O2 Delivery Device: None - Room Air    Bowel and Bladder  Last Bowel Movement  o04/29/25 (per report)  Stool Type  o   Bowel Device  o   Continent  oBladder: Did not void  oBowel: No movement  Bladder Function  oUrine Color: Bloody  Urine Clarity: Clear  Genitourinary Assessment  oBladder Assessment (WDL):  WDL Except  Fields Catheter: Present with Active Order  Fields Reasons per MD Order: Acute urinary retention or bladder outlet obstruction  Fields Care: Given with Soap and Water  Urine Color: Bloody  Urine Clarity: Clear  Bladder Device: Indwelling Catheter (CBI)    Skin  Shaun Score  o 16  Sensory Interventions  o Bed Types: Standard/Trauma Mattress with Overlay  Skin Preventative Measures: Waffle Overlay  Moisture Interventions  oMoisturizers/Barriers: Moisturizer , Containment Devices  Containment Devices: Indwelling Catheter (CBI)      Pain  Pain Rating Scale  o0 - No Pain  Pain Location  o   Pain Location  Orientation  o   Pain Interventions  oDeclines    ADLs    Bathing  o   Linen Change  o   Personal Hygiene  o   Chlorhexidine Bath  o   Oral Care  o   Teeth/Dentures  o   Shave  o   Nutrition Percentage Eaten  o   Environmental Precautions  o   Patient Turns/Positioning  oRight side lying (with pillows per request)  Patient Turns Assistance/Tolerance  o   Bed Positions  o   Head of Bed Elevated  o       Psychosocial/Neurologic Assessment  Psychosocial Assessment  oPsychosocial (WDL):  Within Defined Limits  Neurologic Assessment  oNeuro (WDL): Exceptions to WDL  Level of Consciousness: Alert  Orientation Level: Oriented X4  Cognition: Follows commands, Appropriate safety awareness  Speech: Clear  Motor Function/Sensation Assessment: Motor strength, Motor response  RUE Motor Response: Normal extension, Normal flexion  Muscle Strength Right Arm: Good Strength Against Gravity and Moderate Resistance  LUE Motor Response: Normal extension, Normal flexion  Muscle Strength Left Arm: Good Strength Against Gravity and Moderate Resistance  Muscle Strength Right Leg: Weak Movement but Not Against Gravity or Resistance  Muscle Strength Left Leg: Weak Movement but Not Against Gravity or Resistance  oEENT (WDL):  WDL Except    Cardio/Pulmonary Assessment  Edema  o   Respiratory Breath Sounds  oRUL Breath Sounds: Clear  RML Breath Sounds: Clear  RLL Breath Sounds: Clear  NADINE Breath Sounds: Clear  LLL Breath Sounds: Clear  Cardiac Assessment  oCardiac (WDL):  WDL Except
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
## 2025-04-30 NOTE — THERAPY
Occupational Therapy   Initial Evaluation     Patient Name:  Vicente Whitfield  Age:  74 y.o., Sex:  male  Medical Record #:  [id number]  Today's Date: 4/30/2025     Subjective: pt supine in bed upon arrival. Pt pleasant and cooperative, agreeable to therapy.     Objective:    04/30/25 0701   OT Charge Group   Charges Yes   OT Self Care / ADL (Units) 1   OT Evaluation OT Evaluation Mod   OT Total Time Spent   OT Individual Total Time Spent (Mins) 60   Precautions   Precautions Fall Risk;Bowel and Bladder;Weight Bearing   Bowel and Bladder Fields present   Weight Bearing NWB RLE;NWB LLE;Other (see comments)   Comments RLE for transfers only   Vitals   Pulse 75   Patient BP Position Sitting   Blood Pressure  (!) 131/109   O2 Delivery Device None - Room Air   Vitals Comments c/o dizziness sitting EOB   Bowel   Bowel Device Bathroom   Last BM 04/30/25   Number of Times Stooled 1   Stool Description--OPTIONAL Medium   Type of Stool (Consistency)--REQUIRED Type 5: Soft blob with clear cut edges (passed easily)   Prior Living Situation   Prior Services Home-Independent   Housing / Facility 1 Story House   Steps Into Home 1   Steps In Home 0   Bathroom Set up Walk In Shower;Shower Glass Doors;Built-In Shower Chair   Equipment Owned Front-Wheel Walker;Single Point Cane;Tub / Shower Seat   Lives with - Patient's Self Care Capacity Significant Other   Prior Level of ADL Function   Self Feeding Independent   Grooming / Hygiene Independent   Bathing Independent   Dressing Independent   Toileting Independent   Prior Level of IADL Function   Medication Management Independent   Laundry Independent   Kitchen Mobility Independent   Finances Independent   Home Management Independent   Shopping Independent   Prior Level Of Mobility Independent Without Device in Community;Independent Without Device in Home   Driving / Transportation Driving Independent   Occupation (Pre-Hospital Vocational) Retired Due To Age   Leisure Interests  "Sports  (golf, skiing)   Prior Functioning: Everyday Activities   Self Care Independent   Indoor Mobility (Ambulation) Independent   Stairs Independent   Functional Cognition Independent   Prior Device Use None of the given options   Cognitive Pattern Assessment   Cognitive Pattern Assessment Used BIMS   Brief Interview for Mental Status (BIMS)   Repetition of Three Words (First Attempt) 3   Temporal Orientation: Year Correct   Temporal Orientation: Month Accurate within 5 days   Temporal Orientation: Day Correct   Recall: \"Sock\" Yes, no cue required   Recall: \"Blue\" Yes, after cueing (\"a color\")   Recall: \"Bed\" No, could not recall   BIMS Summary Score 12   Confusion Assessment Method (CAM)   Is there evidence of an acute change in mental status from the patient's baseline? No   Inattention Behavior not present   Disorganized thinking Behavior not present   Altered level of consciousness Behavior not present   Vision Screen   Vision Not tested  (reports R eye \"foggy,\" wears readers at baseline)   Hearing, Speech, and Vision   Ability to Hear Adequate   Ability to See in Adequate Light Impaired   Expression of Ideas and Wants Without difficulty   Understanding Verbal and Non-Verbal Content Understands   Oral Hygiene   Assistance Needed Set-up / clean-up   Physical Assistance Level No physical assistance   CARE Score - Oral Hygiene 5   Oral Hygiene   Level of Assist Stand By Assist   Patient Position Seated   Additional Description Extra time needed   Grooming   Level of Assist Stand By Assist   Patient Position Seated   Additional Description Hand hygiene;Face Wash;Extra time needed   Upper Body Dressing   Assistance Needed Physical assistance   Physical Assistance Level 25% or less   CARE Score - Upper Body Dressing 3   Upper Body Dressing   Level of Assist Minimal Assist   Patient Position Seated   Clothing Item(s)   (hospital gown)   Additional Description Cueing needed;Extra time needed;Other (see " comments)  (assist for closures)   Lower Body Dressing   Reason if not Attempted Safety concerns   CARE Score - Lower Body Dressing 88   Lower Body Dressing   Level of Assist Unable to Participate   Additional Description Other (see comments)  (unable to participate d/t tineo and bladder irrigation)   Putting On/Taking Off Footwear   Assistance Needed Physical assistance   Physical Assistance Level Total assistance   CARE Score - Putting On/Taking Off Footwear 1   Putting On/Taking Off Footwear   Level of Assist Total Assist   Patient Position Seated   Footwear Type Treaded Socks   Additional Description Extra time needed;Cueing needed   Shower/Bathe Self   Reason if not Attempted Safety concerns   CARE Score - Shower/Bathe Self 88   Shower/Bathe Self   Level of Assist Unable to Participate   Additional Description Other (see comments)  (unable to participate d/t time constraints)   Toilet Transfer   Assistance Needed Physical assistance   Physical Assistance Level 26%-50%   CARE Score - Toilet Transfer 3   Toilet Transfer   Level of Assist Moderate Assist   Transfer Type Squat Pivot Transfer;Slideboard Transfer   Adaptive Equipment Grab bars   Assistive Device Wheelchair   Additional Description Cueing needed;Extra time needed;Other (see comments)  (squat pivot > toilet; slide board from toilet > w/c)   Toileting Hygiene   Assistance Needed Physical assistance   Physical Assistance Level 25% or less   CARE Score - Toileting Hygiene 3   Toileting Hygiene   Level of Assist Minimal Assist   Additional Description Assist for hygiene;Grab bars;Cueing needed;Extra time needed   Lying to Sitting on Side of Bed   Level of Assist Moderate Assist  (second person for safety)   Additional Description Head of bed elevated;Use of bed rail;Cueing needed;Extra time needed   Chair/Bed-to-Chair Transfer   Level of Assist Moderate Assist   Transfer Type Squat Pivot Transfer   Assistive Devices Wheelchair   Additional Description  Cueing needed;Extra time needed;Other (see comments)  (second person for safety)   Patient Scheduling Information   OT Time 30/60 minutes   Primary or Coverage OT DELFINO/ Ari MCDONNELL   Problem List   Problem List Decreased Active Daily Living Skills;Decreased Homemaking Skills;Decreased Functional Mobility;Impaired Vision;Impaired Postural Control / Balance   Strengths & Barriers   Strengths Able to follow instructions;Good carryover of learning;Good insight into deficits/needs;Independent prior level of function;Making steady progress towards goals;Manages pain appropriately;Motivated for self care and independence;Pleasant and cooperative;Willingly participates in therapeutic activities   Barriers Decreased endurance;Fatigue;Generalized weakness;Impaired balance;Limited mobility;Visual impairment   Benefit    Therapy Benefit Patient Would Benefit from Inpatient Rehab Occupational Therapy to Maximize [place] with ADLs, IADLs and Functional Mobility.   Current Discharge Plan   Current Discharge Plan Return to Prior Living Situation   Interdisciplinary Plan of Care Collaboration   IDT Collaboration with  Physical Therapist   Patient Position at End of Therapy Seated;Chair Alarm On;Other (Comments)  (in dining room for breakfast)   Collaboration Comments CLOF     Patient has been educated on the following items: occupational therapy role, occupational therapy plan of care, rehab expectations, goal setting, ADL needs, and fall risk and use of call light    Assessment:   Patient is a 74 y.o. male with a diagnosis of Traumatic brain injury with new neurocognitive deficit (HCC) [S06.9XAA, R29.818, R41.89].    Currently, the patient has the following precautions: Bowel and Bladder: (P) Fields present, Weight Bearing: (P) NWB RLE, NWB LLE, Other (see comments), Comments: (P) RLE for transfers only    Patient's PMH includes: No past medical history on file.  Patient's Past Surgical History:   Past Surgical History:    Procedure Laterality Date    [place] CYSTOURETHROSCOPY N/A 4/24/2025    Procedure: ANTERIOR CYSTORRHAPHY;  Surgeon: Abhishek Hernandez M.D.;  Location: SURGERY [facility];  Service: Urology    ORIF, PELVIS N/A 4/24/2025    Procedure: ORIF, PELVIS;  Surgeon: Ori Alvarez M.D.;  Location: SURGERY [facility];  Service: Orthopedics       OT evaluation performed today; functional performance at today's assessment is as above. At baseline, the patient was independent with ADLs/IADLs. The patient is limited by the following barriers: (P) Decreased endurance, Fatigue, Generalized weakness, Impaired balance, Limited mobility, Visual impairment.    Pt tolerated OT eval fair, unable to complete bathing at this time d/t time constraints. Will re attempt 5/1 for 90 min session. Pt reports L side weaker than R, denies pain. Required verbal cueing for adhering to weight bearing precautions during transfers. Pt lives in [place] with partner, Deena. Daughter flying in from [place] to assist pt, pt has son who lives in [place].     Additional factors influencing patient status and prognosis include: prior level of function, PMH, and the above comorbidities.    The patient is performing well below their baseline level of function and will benefit from an interdisciplinary high intensity rehabilitation program to maximize functional independence with ADL's, IADL's and functional mobility, decrease burden of care, and support a safe return home with spousal and family support.    Plan:   Recommend Occupational Therapy 30-60 minutes per day 5-6 days per week for 2 weeks for the following treatments:  OT Group Therapy, OT Self Care/ADL, OT Community Reintegration, OT Neuro Re-Ed/Balance, OT Sensory Int Techniques, OT Therapeutic Activity, OT Aquatic Therapy, OT Evaluation, and OT Therapeutic Exercise.    Passport items to be completed:  Perform bathroom transfers, complete dressing, complete feeding, get ready for the day, prepare a  simple meal, participate in household tasks, adapt home for safety needs, demonstrate home exercise program, complete caregiver training     Goals: Long term and short term goals have been discussed with patient and they are in agreement.    Occupational Therapy Goals (Active)       There are no active problems.

## 2025-04-30 NOTE — CARE PLAN
Problem: Speech/Swallowing LTGs  Goal: LTG-By discharge, patient will recall information related to hospitalization and safety with 80% accuracy provided MOD I with the goal to return to PLOF with safe dc home.  Outcome: Not Met     Problem: Memory STGs  Goal: STG-Within one week, patient will learn and implement functional memory strategies to assist with recall of information related to hospitalization and daily tasks/activities with 80% accuracy provided MIN A.   Outcome: Not Met

## 2025-05-01 ENCOUNTER — APPOINTMENT (OUTPATIENT)
Dept: SPEECH THERAPY | Facility: REHABILITATION | Age: 75
DRG: 949 | End: 2025-05-01
Attending: PHYSICAL MEDICINE & REHABILITATION
Payer: OTHER MISCELLANEOUS

## 2025-05-01 ENCOUNTER — APPOINTMENT (OUTPATIENT)
Dept: OCCUPATIONAL THERAPY | Facility: REHABILITATION | Age: 75
DRG: 949 | End: 2025-05-01
Attending: PHYSICAL MEDICINE & REHABILITATION
Payer: OTHER MISCELLANEOUS

## 2025-05-01 ENCOUNTER — APPOINTMENT (OUTPATIENT)
Dept: PHYSICAL THERAPY | Facility: REHABILITATION | Age: 75
DRG: 949 | End: 2025-05-01
Attending: PHYSICAL MEDICINE & REHABILITATION
Payer: OTHER MISCELLANEOUS

## 2025-05-01 ASSESSMENT — PAIN DESCRIPTION - PAIN TYPE
TYPE: SURGICAL PAIN
TYPE: ACUTE PAIN
TYPE: SURGICAL PAIN;ACUTE PAIN
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
## 2025-05-01 NOTE — CARE PLAN
Problem: Mobility  Goal: STG-Within one week, patient will propel wheelchair household distances SBA/ set-up x 50 ft x 2  Outcome: Not Met  Goal: STG-Within one week, patient will tolerate OOB activity 4+ hours/day  Outcome: Not Met     Problem: Mobility Transfers  Goal: STG-Within one week, patient will perform bed mobility with bed rail/leg  and min/mod A  Outcome: Not Met  Goal: STG-Within one week, patient will transfer bed to chair CGA/ set-up with slide board vs pivot transfer  Outcome: Not Met

## 2025-05-01 NOTE — THERAPY
Physical Therapy   Daily Treatment     Patient Name:  Vicente Whitfield  Age:  74 y.o., Sex:  male  Medical Record #:  [id number]  Today's Date: 5/1/2025     Precautions  Precautions: Fall Risk, Bowel and Bladder, Skin, Cardiopulmonary, Orthopedic, Weight Bearing  Fall Risk: Poor balance  Bowel and Bladder: Fields present  Weight Bearing: NWB RLE, NWB LLE  Comments: RLE for transfers  only    Subjective: Pt resting in bed, willing to get OOB for PT    Objective:    05/01/25 1401   PT Charge Group   PT Therapeutic Exercise (Units) 1   PT Therapeutic Activities (Units) 1   PT Total Time Spent   PT Individual Total Time Spent (Mins) 30   Sit to Lying   Level of Assist Maximal Assist;Moderate Assist   Additional Description Use of bed rail;Cueing needed;Extra time needed  (BLE management)   Lying to Sitting on Side of Bed   Level of Assist Moderate Assist   Additional Description Head of bed elevated;Use of bed rail;Cueing needed;Extra time needed   Chair/Bed-to-Chair Transfer   Level of Assist Moderate Assist;Minimal Assist   Transfer Type Squat Pivot Transfer;Slideboard Transfer   Assistive Devices Wheelchair   Additional Description Cueing needed;Extra time needed         Therapeutic Activities  Purpose: to improve performance and function of daily activities and to increase safety with activities of daily life and mobility related to activities of daily life  Interventions:   Bed/chair transfer training  Bed mobility training (scooting, supine to sit, sit to supine, rolling)  Reach-pivot transfer with min/mod A to R    Slide board transfer with min A to L    Therapeutic Exercise  Purpose: to improve strength, to improve ROM/flexibility, and to improve functional endurance  Interventions:   Lower body exercises:   Seated program -   Ankle pumps, 3 sets of 10  Hip flexion, 3 sets of 10  Hip adduction, 3 sets of 10  Long arc quad, 3 sets of 10  Hamstring curl, 3 sets of 10    Assessment: Pt requires verbal and manual cues  "for anterior wt shifting during both reach-pivot and slide board transfers, pt has tendency to lean posteriorly likely due to pain/ swelling through pelvic region. Continues to present with LE weakness/ muscle guarding and limited ROM but tolerated activity well. Provided ice to L hip/groin post-tx.    Strengths: Able to follow instructions, Good insight into deficits/needs, Independent prior level of function, Manages pain appropriately, Motivated for self care and independence, Pleasant and cooperative, Supportive family, Willingly participates in therapeutic activities  Barriers: Decreased endurance, Fatigue, Generalized weakness, Home accessibility, Impaired activity tolerance, Impaired balance, Limited mobility, Pain    Plan:   Wc endurance /propulsion and parts management  OOB activity tolerance  Slide board vs pivot transfers bed<>wc  AAROM/ strength training    DME    PT DME Recommendations  Wheelchair: 18\" Width, Lightweight, Removable/Flip Back Armrests, Standard Leg Rests  Cushion:  (contoured/pain relieving cushion)  Additional Equipment: Transfer Board (30\" Length)      Passport items to be completed:  Get in/out of bed safely, in/out of a vehicle, safely use mobility device, walk or wheel around home/community, navigate up and down stairs, show how to get up/down from the ground, ensure home is accessible, demonstrate HEP, complete caregiver training    Physical Therapy Problems (Active)       Problem: Mobility       Dates: Start:  04/30/25         Goal: STG-Within one week, patient will propel wheelchair household distances SBA/ set-up x 50 ft x 2       Dates: Start:  04/30/25            Goal: STG-Within one week, patient will tolerate OOB activity 4+ hours/day       Dates: Start:  04/30/25               Problem: Mobility Transfers       Dates: Start:  04/30/25         Goal: STG-Within one week, patient will perform bed mobility with bed rail/leg  and min/mod A       Dates: Start:  04/30/25    " "        Goal: STG-Within one week, patient will transfer bed to chair CGA/ set-up with slide board vs pivot transfer       Dates: Start:  04/30/25               Problem: PT-Long Term Goals       Dates: Start:  04/30/25         Goal: LTG-By discharge, patient will propel wheelchair modified independent x 150 ft level/indoors       Dates: Start:  04/30/25            Goal: LTG-By discharge, patient will transfer one surface to another modified independent after set-up with slide board vs pivot transfer       Dates: Start:  04/30/25            Goal: LTG-By discharge, patient will transfer in/out of a car modified independent after set-up with slide board vs pivot transfer       Dates: Start:  04/30/25            Goal: LTG-By discharge, patient will navigate 10 ft ramp with wc/ SBA       Dates: Start:  04/30/25            Goal: LTG-By discharge, patient will be able to direct care for wc bump up 6\" curbs as needed       Dates: Start:  04/30/25              "

## 2025-05-01 NOTE — THERAPY
Occupational Therapy  Daily Treatment     Patient Name:  Vicente Whitfield  Age:  74 y.o., Sex:  male  Medical Record #:  [id number]  Today's Date:  5/1/2025    Precautions  Precautions: Fall Risk, Bowel and Bladder, Weight Bearing  Bowel and Bladder: Tineo present  Weight Bearing: NWB RLE, NWB LLE, Other (see comments)  Comments: RLE for transfers only    Subjective: pt supine in bed upon arrival. Pt pleasant and cooperative, agreeable to therapy.     Objective:    05/01/25 0701   OT Charge Group   Charges Yes   OT Self Care / ADL (Units) 6   OT Total Time Spent   OT Individual Total Time Spent (Mins) 90   Vitals   Pulse 64   Patient BP Position Dumont's Position   Blood Pressure  124/79   Pulse Oximetry 94 %   O2 Delivery Device None - Room Air   Bowel   Bowel Device Bathroom   Last BM 05/01/25   Number of Times Stooled 1   Stool Description--OPTIONAL Small   Type of Stool (Consistency)--REQUIRED Type 6: Fluffy pieces with ragged edges, a mushy stool   Oral Hygiene   Level of Assist Stand By Assist   Patient Position Seated   Additional Description Extra time needed   Grooming   Level of Assist Stand By Assist   Patient Position Seated   Additional Description Hair care   Upper Body Dressing   Level of Assist Minimal Assist   Patient Position Seated   Clothing Item(s)   (hospital gown)   Additional Description Extra time needed;Other (see comments)  (assist for closures)   Lower Body Dressing   Level of Assist Unable to Participate   Additional Description Other (see comments)  (unable to participate d/t tineo and bladder irrigation)   Putting On/Taking Off Footwear   Level of Assist Total Assist   Patient Position Seated   Footwear Type Treaded Socks   Additional Description Extra time needed   Shower/Bathe Self   Level of Assist Maximal Assist   Patient Position Seated   Adaptive Equipment Fold Down Shower Bench;Grab Bars;Handheld Shower Head   Additional Description Assist with back;Assist with lower  extremities;Assist with perineal area;Set up for wound protection;Cueing needed;Extra time needed;Other (see comments)  (lateral weight shift to wash perineal area)   Tub/Shower Transfer   Level of Assist Moderate Assist   Transfer Type Squat pivot transfer   Adaptive Equipment Grab bars   Assistive Device Fold Down Bench;Wheelchair   Additional Description Cueing needed;Extra time needed   Toilet Transfer   Level of Assist Moderate Assist   Transfer Type Squat Pivot Transfer   Adaptive Equipment Grab bars   Assistive Device Wheelchair   Additional Description Cueing needed;Extra time needed   Toileting Hygiene   Level of Assist Minimal Assist   Additional Description Assist for hygiene;Grab bars;Extra time needed   Sit to Lying   Level of Assist Maximal Assist   Additional Description Extra time needed;Other (see comments)  (BLE management)   Lying to Sitting on Side of Bed   Level of Assist Maximal Assist   Additional Description Cueing needed;Extra time needed   Chair/Bed-to-Chair Transfer   Level of Assist Moderate Assist   Transfer Type Squat Pivot Transfer   Assistive Devices Wheelchair   Additional Description Cueing needed;Extra time needed   Interdisciplinary Plan of Care Collaboration   Patient Position at End of Therapy In Bed;Bed Alarm On;Call Light within Reach;Tray Table within Reach;Phone within Reach       Activities of Daily Living (ADL's)  Purpose: to improve function, safety, and independence with activities of daily living  Interventions:   Oral Hygiene  Grooming  Shower/Bathing  Upper Body Dressing  Lower Body Dressing  Toilet Transfers  Toilet Hygiene  Shower Transfers: Wet    Assessment:    Pt tolerated session fair with no LOB noted. reports feeling anxious prior to completing shower but participated to the best of their abilities. Required increased time for rest breaks throughout d/t fatigue and decreased endurance. No c/o pain. Adhered to weight bearing precautions without  "cueing.    Strengths: Able to follow instructions, Good carryover of learning, Good insight into deficits/needs, Independent prior level of function, Making steady progress towards goals, Manages pain appropriately, Motivated for self care and independence, Pleasant and cooperative, Willingly participates in therapeutic activities  Barriers: Decreased endurance, Fatigue, Generalized weakness, Impaired balance, Limited mobility, Visual impairment    Plan:  ADLs/IADLs, LBD AE for donning/doffing socks, bathroom transfers, general strengthening and endurance training    DME  OT DME Recommendations  Additional Equipment: Transfer Board (30\" Length)    Passport items to be completed:  Perform bathroom transfers, complete dressing, complete feeding, get ready for the day, prepare a simple meal, participate in household tasks, adapt home for safety needs, demonstrate home exercise program, complete caregiver training     Occupational Therapy Goals (Active)       Problem: Bathing       Dates: Start:  04/30/25         Goal: STG-Within one week, patient will bathe with mod A.       Dates: Start:  04/30/25               Problem: Dressing       Dates: Start:  04/30/25         Goal: STG-Within one week, patient will dress UB with SBA.       Dates: Start:  04/30/25            Goal: STG-Within one week, patient will dress LB with mod A.       Dates: Start:  04/30/25               Problem: Functional Transfers       Dates: Start:  04/30/25         Goal: STG-Within one week, patient will transfer to toilet with min A.       Dates: Start:  04/30/25               Problem: OT Long Term Goals       Dates: Start:  04/30/25         Goal: LTG-By discharge, patient will complete basic self care tasks with supervision.        Dates: Start:  04/30/25            Goal: LTG-By discharge, patient will perform bathroom transfers with supervision.        Dates: Start:  04/30/25              "

## 2025-05-01 NOTE — THERAPY
"Physical Therapy   Daily Treatment     Patient Name:  Vicente Whitfield  Age:  74 y.o., Sex:  male  Medical Record #:  [id number]  Today's Date: 5/1/2025     Precautions  Precautions: Fall Risk, Bowel and Bladder, Skin, Cardiopulmonary, Orthopedic, Weight Bearing  Fall Risk: Poor balance  Bowel and Bladder: Fields present  Weight Bearing: NWB RLE, NWB LLE  Comments: RLE for transfers  only    Subjective: Pt resting in bed, willing to participate but fatigued from OT shower this am. Split therapy session to 30 minutes at 10:30 and 30 minutes at 2:00    Objective:    05/01/25 1031   PT Charge Group   PT Therapeutic Exercise (Units) 2   PT Total Time Spent   PT Individual Total Time Spent (Mins) 30         Therapeutic Exercise  Purpose: to improve strength, to improve ROM/flexibility, and to improve functional endurance  Interventions:   Lower body exercises:   Supine program -   Pelvic tilt, 2 sets of 10, LE's propped on bolster  Hip abduction, 2 sets of 10  Hip adduction, 2 sets of 10  Short arc quad, 2 sets of 10  Heel slide, 2 sets of 10  Ankle pumps, 2 sets of 10  Gluteal isometrics, 2 sets of 10    Assessment: Pt requires AAROM for all exercises 2* weakness and muscle guarding but tolerated ROM without significant increased pain.    Strengths: Able to follow instructions, Good insight into deficits/needs, Independent prior level of function, Manages pain appropriately, Motivated for self care and independence, Pleasant and cooperative, Supportive family, Willingly participates in therapeutic activities  Barriers: Decreased endurance, Fatigue, Generalized weakness, Home accessibility, Impaired activity tolerance, Impaired balance, Limited mobility, Pain    Plan:   Wc endurance /propulsion and parts management  OOB activity tolerance  Slide board vs pivot transfers bed<>wc  AAROM/ strength training    DME    PT DME Recommendations  Wheelchair: 18\" Width, Lightweight, Removable/Flip Back Armrests, Standard Leg " "Rests  Cushion:  (contoured/pain relieving cushion)  Additional Equipment: Transfer Board (30\" Length)      Passport items to be completed:  Get in/out of bed safely, in/out of a vehicle, safely use mobility device, walk or wheel around home/community, navigate up and down stairs, show how to get up/down from the ground, ensure home is accessible, demonstrate HEP, complete caregiver training    Physical Therapy Problems (Active)       Problem: Mobility       Dates: Start:  04/30/25         Goal: STG-Within one week, patient will propel wheelchair household distances SBA/ set-up x 50 ft x 2       Dates: Start:  04/30/25            Goal: STG-Within one week, patient will tolerate OOB activity 4+ hours/day       Dates: Start:  04/30/25               Problem: Mobility Transfers       Dates: Start:  04/30/25         Goal: STG-Within one week, patient will perform bed mobility with bed rail/leg  and min/mod A       Dates: Start:  04/30/25            Goal: STG-Within one week, patient will transfer bed to chair CGA/ set-up with slide board vs pivot transfer       Dates: Start:  04/30/25               Problem: PT-Long Term Goals       Dates: Start:  04/30/25         Goal: LTG-By discharge, patient will propel wheelchair modified independent x 150 ft level/indoors       Dates: Start:  04/30/25            Goal: LTG-By discharge, patient will transfer one surface to another modified independent after set-up with slide board vs pivot transfer       Dates: Start:  04/30/25            Goal: LTG-By discharge, patient will transfer in/out of a car modified independent after set-up with slide board vs pivot transfer       Dates: Start:  04/30/25            Goal: LTG-By discharge, patient will navigate 10 ft ramp with wc/ SBA       Dates: Start:  04/30/25            Goal: LTG-By discharge, patient will be able to direct care for wc bump up 6\" curbs as needed       Dates: Start:  04/30/25              "

## 2025-05-01 NOTE — CARE PLAN
Problem: Bathing  Goal: STG-Within one week, patient will bathe with mod A.  Outcome: Not Met  Note: Requires max A.     Problem: Dressing  Goal: STG-Within one week, patient will dress UB with SBA.  Outcome: Not Met  Note: Requires min A.  Goal: STG-Within one week, patient will dress LB with mod A.  Outcome: Not Met     Problem: Functional Transfers  Goal: STG-Within one week, patient will transfer to toilet with min A.  Outcome: Not Met  Note: Requires mod A.

## 2025-05-01 NOTE — PROGRESS NOTES
NURSING DAILY NOTE    Name: Vicente Whitfield  Date of Admission: 4/29/2025  Admitting Diagnosis: Traumatic brain injury (HCC)  Attending Physician: Sadi Chapman M.d.  Allergies: Patient has no known allergies.    Safety  Patient Assist  o   Patient Precautions  o   Precaution Comments  o   Bed Transfer Status  o   Toilet Transfer Status  o   Assistive Devices  oRails, Wheelchair  Oxygen  oNone - Room Air  Diet/Therapeutic Dining  o  Current Diet Order   Procedures    Diet Order Diet: Cardiac     Pill Administration  owhole  Agitated Behavioral Scale  o   ABS Level of Severity  o     Fall Risk  Has the patient had a fall this admission?  Alexandro  Joie Ruiz Fall Risk Scoring  o11, MODERATE RISK  Fall Risk Safety Measures  cristina alarm and chair alarm    Vitals  Temperature: 36.9 °C (98.5 °F)  Temp src: Temporal  Pulse: 64  Respiration: 18  Blood Pressure : 119/67  Blood Pressure MAP (Calculated): 84 MM HG  BP Location: Left, Upper Arm  Patient BP Position: Supine    Oxygen  Pulse Oximetry: 99 %  O2 (LPM): 0  O2 Delivery Device: None - Room Air    Bowel and Bladder  Last Bowel Movement  o04/30/25  Stool Type  oType 5: Soft blob with clear cut edges (passed easily)  Bowel Device  oBathroom  Continent  oBladder: Did not void  oBowel: No movement  Bladder Function  oUrine Color: Pink-Tinged  Urine Clarity: Clear  Genitourinary Assessment  oBladder Assessment (WDL):  WDL Except  Fields Catheter: Present with Active Order  Fields Reasons per MD Order: Acute urinary retention or bladder outlet obstruction  Fields Care: Given with Soap and Water  Urine Color: Pink-Tinged  Urine Clarity: Clear  Bladder Device: Indwelling Catheter (CBI)    Skin  Shaun Score  o 16  Sensory Interventions  o Bed Types: Standard/Trauma Mattress  Skin Preventative Measures: Waffle Overlay  Moisture Interventions  oMoisturizers/Barriers: Containment Devices  Containment Devices: Indwelling Catheter (cbi)      Pain  Pain Rating Scale  o4 -  Distracts me, can do usual activities  Pain Location  oGeneralized, Leg  Pain Location Orientation  oLeft, Upper, Lower  Pain Interventions  oMedication (see MAR), Rest    ADLs    Bathing  o   Linen Change  o   Personal Hygiene  o   Chlorhexidine Bath  o   Oral Care  o   Teeth/Dentures  o   Shave  o   Nutrition Percentage Eaten  o*  * Meal *  *, Breakfast, *  * Amount Consumed *  *, Between 50-75% Consumed  Environmental Precautions  o   Patient Turns/Positioning  oLeft side lying, Right side lying  Patient Turns Assistance/Tolerance  o   Bed Positions  o   Head of Bed Elevated  o       Psychosocial/Neurologic Assessment  Psychosocial Assessment  oPsychosocial (WDL):  Within Defined Limits  Neurologic Assessment  oNeuro (WDL): Exceptions to WDL  Level of Consciousness: Alert  Orientation Level: Oriented X4  Cognition: Follows commands, Appropriate safety awareness  Speech: Clear  Motor Function/Sensation Assessment: Motor strength, Motor response  RUE Motor Response: Normal extension, Normal flexion  Muscle Strength Right Arm: Good Strength Against Gravity and Moderate Resistance  LUE Motor Response: Normal extension, Normal flexion  Muscle Strength Left Arm: Good Strength Against Gravity and Moderate Resistance  Muscle Strength Right Leg: Weak Movement but Not Against Gravity or Resistance  Muscle Strength Left Leg: Weak Movement but Not Against Gravity or Resistance  oEENT (WDL):  WDL Except    Cardio/Pulmonary Assessment  Edema  o   Respiratory Breath Sounds  oRUL Breath Sounds: Clear  RML Breath Sounds: Clear  RLL Breath Sounds: Clear  NADINE Breath Sounds: Clear  LLL Breath Sounds: Clear  Cardiac Assessment  oCardiac (WDL):  WDL Except

## 2025-05-01 NOTE — DISCHARGE PLANNING
Case Management/IDT follow up.   Projected dc date:  05/13/2025  IDT continues to recommend IRF level of care as patient continue to make progress with all therapies.     DC needs  Home health:  PT/OT/SLP/RN/  DME: w/c, slide board     Follow up:   PCP: Vitor Raines MD     CM to meet with patient and family providing update from IDT and discussed plan of care.  They are in agreement w/ plan.     Plan:  Continue to follow

## 2025-05-01 NOTE — CARE PLAN
The patient is Stable - Low risk of patient condition declining or worsening    Shift Goals  Clinical Goals: safety  Patient Goals: safety    Progress made toward(s) clinical / shift goals:    Problem: Fall Risk - Rehab  Goal: Patient will remain free from falls  Outcome: Progressing     Problem: Psychosocial  Goal: Patient's level of anxiety will decrease  Outcome: Progressing     Problem: Bladder / Voiding  Goal: Patient will establish and maintain regular urinary output  Outcome: Progressing     Problem: Pain - Standard  Goal: Alleviation of pain or a reduction in pain to the patient’s comfort goal  Outcome: Progressing

## 2025-05-01 NOTE — CARE PLAN
Problem: Knowledge Deficit - Standard  Goal: Patient and family/care givers will demonstrate understanding of plan of care, disease process/condition, diagnostic tests and medications  Outcome: Progressing     Problem: Fall Risk - Rehab  Goal: Patient will remain free from falls  Note: Pt uses call light consistently and appropriately. Waits for assistance does not attempt self transfer this shift. Able to verbalize needs.   The patient is Stable - Low risk of patient condition declining or worsening    Shift Goals  Clinical Goals: Safety, pain control  Patient Goals: Safety, pain control

## 2025-05-01 NOTE — THERAPY
Speech Language Pathology  Daily Treatment     Patient Name:  Vicente Whitfield  Age:  74 y.o., Sex:  male  Medical Record #:  [id number]  Today's Date: 5/1/2025    Precautions  Precautions: Fall Risk, Bowel and Bladder, Skin, Cardiopulmonary, Orthopedic, Weight Bearing  Fall Risk: Poor balance  Bowel and Bladder: Fields present  Weight Bearing: NWB RLE, NWB LLE  Comments: RLE for transfers  only    Subjective: Patient agreeable to therapy.  Spouse at bedside and attended session.  Patient seen at bedside.     Objective:    05/01/25 1301   SLP Charge Group   Charges Yes   SLP Cognitive Skill Development First 15 Minutes 1   SLP Cognitive Skill Development Additional 15 Minutes 1   SLP Total Time Spent   SLP Individual Total Time Spent (Mins) 30   Cognition - Detailed   Prospective Memory Minimal (4)   Functional Memory Activities Minimal (4)   Interdisciplinary Plan of Care Collaboration   IDT Collaboration with  Family / Caregiver   Patient Position at End of Therapy In Bed;Bed Alarm On;Call Light within Reach;Tray Table within Reach;Phone within Reach;Family / Friend in Room   Collaboration Comments Patient's spouse present during therapy.         Cognition  Purpose: to improve memory functions (short-term, long term, working, etc.), to improve insight and awareness into cognitive deficits, and to provide education and training for caregivers and/or family members  Interventions:   Memory: Type of Memory Targeted - Prospective Memory and Functional Memory and Treatment Approaches - Compensation strategies (memory logs, calendars, visual aids, etc.), Spaced Retrieval, and Memory Log    Assessment:     Discussed with patient and spouse the results of SCCAN with primary deficits in memory.  Introduced and educated patient on use of memory log and RWS memory strategies.  Wrote and discussed instructions for use of memory log to record skills he is learning to change for improved function in memory log.  Patient was  able to recall 5/5 unrelated words after 20 min delay with use of RWAVS memory strategies and extra repetition.  Patient and spouse reported that he had experienced some memory problems prior to accident.         Plan:  Target use of functional memory strategies to recall novel information, improve recall via mental manipulation tasks.    Passport items to be completed:  Express basic needs, understand food/liquid recommendations, consistently follow swallow precautions, manage finances, manage medications, arrive to therapy appointments on time, complete daily memory log entries, solve problems related to safety situations, review education related to hospitalization, complete caregiver training     Speech Therapy Problems (Active)       Problem: Memory STGs       Dates: Start:  04/30/25         Goal: STG-Within one week, patient will learn and implement functional memory strategies to assist with recall of information related to hospitalization and daily tasks/activities with 80% accuracy provided MIN A.        Dates: Start:  04/30/25            Goal: STG-Within one week, patient will complete mental manipulation tasks with 4 units and 80% accuracy indep.        Dates: Start:  04/30/25               Problem: Speech/Swallowing LTGs       Dates: Start:  04/30/25         Goal: LTG-By discharge, patient will recall information related to hospitalization and safety with 80% accuracy provided MOD I with the goal to return to PLOF with safe dc home.       Dates: Start:  04/30/25

## 2025-05-02 ENCOUNTER — HOSPITAL ENCOUNTER (EMERGENCY)
Facility: MEDICAL CENTER | Age: 75
End: 2025-05-02
Attending: EMERGENCY MEDICINE
Payer: OTHER MISCELLANEOUS

## 2025-05-02 ENCOUNTER — APPOINTMENT (OUTPATIENT)
Dept: PHYSICAL THERAPY | Facility: REHABILITATION | Age: 75
DRG: 949 | End: 2025-05-02
Attending: PHYSICAL MEDICINE & REHABILITATION
Payer: OTHER MISCELLANEOUS

## 2025-05-02 ENCOUNTER — APPOINTMENT (OUTPATIENT)
Dept: OCCUPATIONAL THERAPY | Facility: REHABILITATION | Age: 75
DRG: 949 | End: 2025-05-02
Attending: PHYSICAL MEDICINE & REHABILITATION
Payer: OTHER MISCELLANEOUS

## 2025-05-02 ENCOUNTER — APPOINTMENT (OUTPATIENT)
Dept: SPEECH THERAPY | Facility: REHABILITATION | Age: 75
DRG: 949 | End: 2025-05-02
Attending: PHYSICAL MEDICINE & REHABILITATION
Payer: OTHER MISCELLANEOUS

## 2025-05-02 ENCOUNTER — APPOINTMENT (OUTPATIENT)
Dept: RADIOLOGY | Facility: MEDICAL CENTER | Age: 75
End: 2025-05-02
Attending: EMERGENCY MEDICINE
Payer: OTHER MISCELLANEOUS

## 2025-05-02 VITALS
WEIGHT: 180 LBS | HEART RATE: 72 BPM | HEIGHT: 66 IN | SYSTOLIC BLOOD PRESSURE: 117 MMHG | DIASTOLIC BLOOD PRESSURE: 55 MMHG | DIASTOLIC BLOOD PRESSURE: 55 MMHG | OXYGEN SATURATION: 95 % | SYSTOLIC BLOOD PRESSURE: 117 MMHG | BODY MASS INDEX: 28.93 KG/M2 | TEMPERATURE: 97.6 F | OXYGEN SATURATION: 95 % | TEMPERATURE: 97.6 F | RESPIRATION RATE: 16 BRPM | HEIGHT: 66 IN | HEART RATE: 72 BPM | RESPIRATION RATE: 16 BRPM | BODY MASS INDEX: 28.93 KG/M2 | WEIGHT: 180 LBS

## 2025-05-02 DIAGNOSIS — Z87.828 HISTORY OF FACIAL TRAUMA: ICD-10-CM

## 2025-05-02 DIAGNOSIS — H57.02 ANISOCORIA: ICD-10-CM

## 2025-05-02 LAB
ALBUMIN SERPL BCP-MCNC: 3.5 G/DL (ref 3.2–4.9)
ALBUMIN SERPL BCP-MCNC: 3.5 G/DL (ref 3.2–4.9)
ALBUMIN/GLOB SERPL: 1.1 G/DL
ALBUMIN/GLOB SERPL: 1.1 G/DL
ALP SERPL-CCNC: 96 U/L (ref 30–99)
ALP SERPL-CCNC: 96 U/L (ref 30–99)
ALT SERPL-CCNC: 115 U/L (ref 2–50)
ALT SERPL-CCNC: 115 U/L (ref 2–50)
ANION GAP SERPL CALC-SCNC: 12 MMOL/L (ref 7–16)
ANION GAP SERPL CALC-SCNC: 12 MMOL/L (ref 7–16)
APTT PPP: 31.8 SEC (ref 24.7–36)
APTT PPP: 31.8 SEC (ref 24.7–36)
AST SERPL-CCNC: 83 U/L (ref 12–45)
AST SERPL-CCNC: 83 U/L (ref 12–45)
BASOPHILS # BLD AUTO: 0.3 % (ref 0–1.8)
BASOPHILS # BLD AUTO: 0.3 % (ref 0–1.8)
BASOPHILS # BLD: 0.03 K/UL (ref 0–0.12)
BASOPHILS # BLD: 0.03 K/UL (ref 0–0.12)
BILIRUB SERPL-MCNC: 1.4 MG/DL (ref 0.1–1.5)
BILIRUB SERPL-MCNC: 1.4 MG/DL (ref 0.1–1.5)
BUN SERPL-MCNC: 21 MG/DL (ref 8–22)
BUN SERPL-MCNC: 21 MG/DL (ref 8–22)
CALCIUM ALBUM COR SERPL-MCNC: 9.4 MG/DL (ref 8.5–10.5)
CALCIUM ALBUM COR SERPL-MCNC: 9.4 MG/DL (ref 8.5–10.5)
CALCIUM SERPL-MCNC: 9 MG/DL (ref 8.5–10.5)
CALCIUM SERPL-MCNC: 9 MG/DL (ref 8.5–10.5)
CHLORIDE SERPL-SCNC: 100 MMOL/L (ref 96–112)
CHLORIDE SERPL-SCNC: 100 MMOL/L (ref 96–112)
CO2 SERPL-SCNC: 20 MMOL/L (ref 20–33)
CO2 SERPL-SCNC: 20 MMOL/L (ref 20–33)
CREAT SERPL-MCNC: 1.19 MG/DL (ref 0.5–1.4)
CREAT SERPL-MCNC: 1.19 MG/DL (ref 0.5–1.4)
EOSINOPHIL # BLD AUTO: 0.08 K/UL (ref 0–0.51)
EOSINOPHIL # BLD AUTO: 0.08 K/UL (ref 0–0.51)
EOSINOPHIL NFR BLD: 0.8 % (ref 0–6.9)
EOSINOPHIL NFR BLD: 0.8 % (ref 0–6.9)
ERYTHROCYTE [DISTWIDTH] IN BLOOD BY AUTOMATED COUNT: 55.2 FL (ref 35.9–50)
ERYTHROCYTE [DISTWIDTH] IN BLOOD BY AUTOMATED COUNT: 55.2 FL (ref 35.9–50)
GFR SERPLBLD CREATININE-BSD FMLA CKD-EPI: 64 ML/MIN/1.73 M 2
GFR SERPLBLD CREATININE-BSD FMLA CKD-EPI: 64 ML/MIN/1.73 M 2
GLOBULIN SER CALC-MCNC: 3.1 G/DL (ref 1.9–3.5)
GLOBULIN SER CALC-MCNC: 3.1 G/DL (ref 1.9–3.5)
GLUCOSE SERPL-MCNC: 109 MG/DL (ref 65–99)
GLUCOSE SERPL-MCNC: 109 MG/DL (ref 65–99)
HCT VFR BLD AUTO: 29.1 % (ref 42–52)
HCT VFR BLD AUTO: 29.1 % (ref 42–52)
HGB BLD-MCNC: 9.6 G/DL (ref 14–18)
HGB BLD-MCNC: 9.6 G/DL (ref 14–18)
IMM GRANULOCYTES # BLD AUTO: 0.2 K/UL (ref 0–0.11)
IMM GRANULOCYTES # BLD AUTO: 0.2 K/UL (ref 0–0.11)
IMM GRANULOCYTES NFR BLD AUTO: 1.9 % (ref 0–0.9)
IMM GRANULOCYTES NFR BLD AUTO: 1.9 % (ref 0–0.9)
INR PPP: 1.04 (ref 0.87–1.13)
INR PPP: 1.04 (ref 0.87–1.13)
LYMPHOCYTES # BLD AUTO: 1.29 K/UL (ref 1–4.8)
LYMPHOCYTES # BLD AUTO: 1.29 K/UL (ref 1–4.8)
LYMPHOCYTES NFR BLD: 12.6 % (ref 22–41)
LYMPHOCYTES NFR BLD: 12.6 % (ref 22–41)
MCH RBC QN AUTO: 30.6 PG (ref 27–33)
MCH RBC QN AUTO: 30.6 PG (ref 27–33)
MCHC RBC AUTO-ENTMCNC: 33 G/DL (ref 32.3–36.5)
MCHC RBC AUTO-ENTMCNC: 33 G/DL (ref 32.3–36.5)
MCV RBC AUTO: 92.7 FL (ref 81.4–97.8)
MCV RBC AUTO: 92.7 FL (ref 81.4–97.8)
MONOCYTES # BLD AUTO: 1.17 K/UL (ref 0–0.85)
MONOCYTES # BLD AUTO: 1.17 K/UL (ref 0–0.85)
MONOCYTES NFR BLD AUTO: 11.4 % (ref 0–13.4)
MONOCYTES NFR BLD AUTO: 11.4 % (ref 0–13.4)
NEUTROPHILS # BLD AUTO: 7.49 K/UL (ref 1.82–7.42)
NEUTROPHILS # BLD AUTO: 7.49 K/UL (ref 1.82–7.42)
NEUTROPHILS NFR BLD: 73 % (ref 44–72)
NEUTROPHILS NFR BLD: 73 % (ref 44–72)
NRBC # BLD AUTO: 0 K/UL
NRBC # BLD AUTO: 0 K/UL
NRBC BLD-RTO: 0 /100 WBC (ref 0–0.2)
NRBC BLD-RTO: 0 /100 WBC (ref 0–0.2)
PLATELET # BLD AUTO: 188 K/UL (ref 164–446)
PLATELET # BLD AUTO: 188 K/UL (ref 164–446)
PMV BLD AUTO: 10.5 FL (ref 9–12.9)
PMV BLD AUTO: 10.5 FL (ref 9–12.9)
POTASSIUM SERPL-SCNC: 4.2 MMOL/L (ref 3.6–5.5)
POTASSIUM SERPL-SCNC: 4.2 MMOL/L (ref 3.6–5.5)
PROT SERPL-MCNC: 6.6 G/DL (ref 6–8.2)
PROT SERPL-MCNC: 6.6 G/DL (ref 6–8.2)
PROTHROMBIN TIME: 13.6 SEC (ref 12–14.6)
PROTHROMBIN TIME: 13.6 SEC (ref 12–14.6)
RBC # BLD AUTO: 3.14 M/UL (ref 4.7–6.1)
RBC # BLD AUTO: 3.14 M/UL (ref 4.7–6.1)
SODIUM SERPL-SCNC: 132 MMOL/L (ref 135–145)
SODIUM SERPL-SCNC: 132 MMOL/L (ref 135–145)
WBC # BLD AUTO: 10.3 K/UL (ref 4.8–10.8)
WBC # BLD AUTO: 10.3 K/UL (ref 4.8–10.8)

## 2025-05-02 PROCEDURE — 80053 COMPREHEN METABOLIC PANEL: CPT

## 2025-05-02 PROCEDURE — 700117 HCHG RX CONTRAST REV CODE 255: Performed by: EMERGENCY MEDICINE

## 2025-05-02 PROCEDURE — 85730 THROMBOPLASTIN TIME PARTIAL: CPT

## 2025-05-02 PROCEDURE — 85610 PROTHROMBIN TIME: CPT

## 2025-05-02 PROCEDURE — 85025 COMPLETE CBC W/AUTO DIFF WBC: CPT

## 2025-05-02 PROCEDURE — 70481 CT ORBIT/EAR/FOSSA W/DYE: CPT

## 2025-05-02 PROCEDURE — 36415 COLL VENOUS BLD VENIPUNCTURE: CPT

## 2025-05-02 PROCEDURE — 99284 EMERGENCY DEPT VISIT MOD MDM: CPT

## 2025-05-02 RX ADMIN — IOHEXOL 80 ML: 350 INJECTION, SOLUTION INTRAVENOUS at 16:30

## 2025-05-02 ASSESSMENT — PAIN DESCRIPTION - PAIN TYPE
TYPE: ACUTE PAIN

## 2025-05-02 ASSESSMENT — FIBROSIS 4 INDEX: FIB4 SCORE: 6.23

## 2025-05-02 NOTE — THERAPY
Missed Therapy    Patient Name: Vicente Whitfield  Age:  74 y.o., Sex:  male  Medical Record #: [id number]  Today's Date: 5/2/2025    Discussed missed therapy with RN and therapy . Pt taken to ED this afternoon.        05/02/25 1331   Therapy Missed   Missed Therapy (Minutes) 60   Reason For Missed Therapy Medical - Patient on Hold from Therapy;Medical - Other (Please Comment)  (pt on leave of absence, taken to ED.)   Interdisciplinary Plan of Care Collaboration   IDT Collaboration with  Nursing;Physical Therapist;Physician

## 2025-05-02 NOTE — THERAPY
Physical Therapy   Daily Treatment     Patient Name:  Vicente Whitfield  Age:  74 y.o., Sex:  male  Medical Record #:  [id number]  Today's Date: 5/2/2025     Precautions  Precautions: Fall Risk, Bowel and Bladder, Skin, Cardiopulmonary, Orthopedic, Weight Bearing  Fall Risk: Poor balance  Bowel and Bladder: Fields present  Weight Bearing: NWB RLE, NWB LLE  Comments: RLE for transfers  only    Subjective: pt resting in bed, willing to participate.    Objective:    05/02/25 0831   PT Charge Group   PT Therapeutic Exercise (Units) 2   PT Therapeutic Activities (Units) 2   PT Total Time Spent   PT Individual Total Time Spent (Mins) 60   Vitals   Pulse 74   Room Air Oximetry 94   O2 Delivery Device Room air w/o2 available   Sit to Lying   Level of Assist Moderate Assist;Maximal Assist   Additional Description Use of bed rail;Cueing needed;Extra time needed   Lying to Sitting on Side of Bed   Level of Assist Moderate Assist   Additional Description Use of bed rail;Head of bed elevated;Cueing needed;Extra time needed   Chair/Bed-to-Chair Transfer   Level of Assist Moderate Assist   Transfer Type Squat Pivot Transfer;Slideboard Transfer   Assistive Devices Wheelchair   Additional Description Cueing needed;Extra time needed   Wheelchair   Level of Assist Contact Guard Assist   Type Manual   Additional Description Cueing needed;Extra time needed   Wheelchair Distance 50 ft x 2         Therapeutic Exercise  Purpose: to improve strength, to improve ROM/flexibility, and to improve functional endurance  Interventions:   Lower body exercises:   Seated program -   Ankle pumps, 2 sets of 10  Hip flexion, 2 sets of 10  Hip abduction, 2 sets of 10  Hip adduction, 2 sets of 10  Long arc quad, 2 sets of 10  Hamstring curl, 2 sets of 10    Wheelchair Management, and Parts Training  Purpose: to foster independent use of a wheelchair   Interventions:   Wheelchair management   Pt able to manage brakes and instructed with swing away leg rests,  "requires verbal and tactile cues for forward trunk flexion to reach parts.   Wc propulsion/ endurance training as noted    Assessment: Pt remains limited by weight bearing precautions and requires cues for anterior wt shifting for transfers, pt presents with impaired activity/ OOB tolerance and requires frequent rests between activity. Overall very shaky today.     Strengths: Able to follow instructions, Good insight into deficits/needs, Independent prior level of function, Manages pain appropriately, Motivated for self care and independence, Pleasant and cooperative, Supportive family, Willingly participates in therapeutic activities  Barriers: Decreased endurance, Fatigue, Generalized weakness, Home accessibility, Impaired activity tolerance, Impaired balance, Limited mobility, Pain    Plan:   Wc endurance /propulsion and parts management  OOB activity tolerance  Slide board vs reach-pivot transfers bed<>wc  AAROM/ strength training     DME    PT DME Recommendations  Wheelchair: 18\" Width, Lightweight, Removable/Flip Back Armrests, Standard Leg Rests  Cushion:  (contoured/pain relieving cushion)  Additional Equipment: Transfer Board (30\" Length)      Passport items to be completed:  Get in/out of bed safely, in/out of a vehicle, safely use mobility device, walk or wheel around home/community, navigate up and down stairs, show how to get up/down from the ground, ensure home is accessible, demonstrate HEP, complete caregiver training    Physical Therapy Problems (Active)       Problem: Mobility       Dates: Start:  04/30/25         Goal: STG-Within one week, patient will propel wheelchair household distances SBA/ set-up x 50 ft x 2       Dates: Start:  04/30/25            Goal: STG-Within one week, patient will tolerate OOB activity 4+ hours/day       Dates: Start:  04/30/25               Problem: Mobility Transfers       Dates: Start:  04/30/25         Goal: STG-Within one week, patient will perform bed mobility " "with bed rail/leg  and min/mod A       Dates: Start:  04/30/25            Goal: STG-Within one week, patient will transfer bed to chair CGA/ set-up with slide board vs pivot transfer       Dates: Start:  04/30/25               Problem: PT-Long Term Goals       Dates: Start:  04/30/25         Goal: LTG-By discharge, patient will propel wheelchair modified independent x 150 ft level/indoors       Dates: Start:  04/30/25            Goal: LTG-By discharge, patient will transfer one surface to another modified independent after set-up with slide board vs pivot transfer       Dates: Start:  04/30/25            Goal: LTG-By discharge, patient will transfer in/out of a car modified independent after set-up with slide board vs pivot transfer       Dates: Start:  04/30/25            Goal: LTG-By discharge, patient will navigate 10 ft ramp with wc/ SBA       Dates: Start:  04/30/25            Goal: LTG-By discharge, patient will be able to direct care for wc bump up 6\" curbs as needed       Dates: Start:  04/30/25              "

## 2025-05-02 NOTE — ED NOTES
Visual acuity screening performed with Snellen Chart at 20 ft.   Pt denies use of corrective lenses.   Right eye: 20/70  Left eye: 20/40  Both eyes: 20/40-2

## 2025-05-02 NOTE — THERAPY
"Occupational Therapy  Daily Treatment     Patient Name:  Vicente Whitfield  Age:  74 y.o., Sex:  male  Medical Record #:  [id number]  Today's Date:  5/2/2025    Precautions  Precautions: Fall Risk, Bowel and Bladder, Skin, Cardiopulmonary, Orthopedic, Weight Bearing  Fall Risk: Poor balance  Bowel and Bladder: Fields present  Weight Bearing: NWB RLE, NWB LLE  Comments: RLE for transfers  only    Subjective: \"Thank you. That was fun\"     Objective:      05/02/25 1031   OT Charge Group   OT Therapy Activity (Units) 1   OT Therapeutic Exercise (Units) 1   OT Total Time Spent   OT Individual Total Time Spent (Mins) 30   Lying to Sitting on Side of Bed   Level of Assist Moderate Assist   Chair/Bed-to-Chair Transfer   Level of Assist Contact Guard Assist   Transfer Type Slideboard Transfer   Assistive Devices Wheelchair   Additional Description Extra time needed;Cueing needed   Interdisciplinary Plan of Care Collaboration   Patient Position at End of Therapy Seated;Chair Alarm On;Call Light within Reach;Tray Table within Reach;Phone within Reach;Self Releasing Lap Belt Applied       Therapeutic Exercise  Purpose: to improve strength  Interventions:   Upper body exercises:   Seated program -   Lat pull, 3 sets of 10, Bilateral and Weight 30-40#  Bilateral row, 3 sets of 10, Bilateral, and Weight 40#    Assessment:    Pt able to do slideboard transfer with CGA while adhering to NWB on LLE. Tolerated exercises well and enjoyed using the cable machine. He did have some shakiness in UEs with isometric contraction of the muscles.  Strengths: Able to follow instructions, Good carryover of learning, Good insight into deficits/needs, Independent prior level of function, Making steady progress towards goals, Manages pain appropriately, Motivated for self care and independence, Pleasant and cooperative, Willingly participates in therapeutic activities  Barriers: Decreased endurance, Fatigue, Generalized weakness, Impaired balance, " "Limited mobility, Visual impairment    Plan:  ADLs/IADLs, LBD AE for donning/doffing socks, bathroom transfers, general strengthening and endurance training     DME  OT DME Recommendations  Additional Equipment: Transfer Board (30\" Length)    Passport items to be completed:  Perform bathroom transfers, complete dressing, complete feeding, get ready for the day, prepare a simple meal, participate in household tasks, adapt home for safety needs, demonstrate home exercise program, complete caregiver training     Occupational Therapy Goals (Active)       Problem: Bathing       Dates: Start:  04/30/25         Goal: STG-Within one week, patient will bathe with mod A.       Dates: Start:  04/30/25         Goal Note filed on 05/01/25 0837 by Francisca Fontana, OT       Requires max A.                 Problem: Dressing       Dates: Start:  04/30/25         Goal: STG-Within one week, patient will dress UB with SBA.       Dates: Start:  04/30/25         Goal Note filed on 05/01/25 0837 by Francisca Fontana, OT       Requires min A.              Goal: STG-Within one week, patient will dress LB with mod A.       Dates: Start:  04/30/25               Problem: Functional Transfers       Dates: Start:  04/30/25         Goal: STG-Within one week, patient will transfer to toilet with min A.       Dates: Start:  04/30/25         Goal Note filed on 05/01/25 0837 by Francisca Fontana, OT       Requires mod A.                 Problem: OT Long Term Goals       Dates: Start:  04/30/25         Goal: LTG-By discharge, patient will complete basic self care tasks with supervision.        Dates: Start:  04/30/25            Goal: LTG-By discharge, patient will perform bathroom transfers with supervision.        Dates: Start:  04/30/25              "
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
## 2025-05-02 NOTE — CARE PLAN
The patient is Stable - Low risk of patient condition declining or worsening      Problem: Pain - Standard  Goal: Alleviation of pain or a reduction in pain to the patient’s comfort goal  Outcome: Progressing     Problem: Fall Risk - Rehab  Goal: Patient will remain free from falls  Outcome: Progressing
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
## 2025-05-02 NOTE — PROGRESS NOTES
1242: This RN was informed by family member of patient's bleeding eye. Patient was seen and right eye was draining with bright red blood. Eye was cleaned with NS to assess site but continued to bleed. Approximately 20 ml of blood drained from eye. Pressure applied to site. Kortney RN at bedside and called Dr Chapman. Provider at bedside. New orders received to send patient to ED for further evaluation.   Family at bedside and agreeable with plan to send to ED   1245 Paperwork and report given to TEJ  1300 Taken By TEJ via [employer]

## 2025-05-02 NOTE — PROGRESS NOTES
NURSING DAILY NOTE    Name: Vicente Whitfield   Date of Admission: 4/29/2025   Admitting Diagnosis: Traumatic brain injury (HCC)  Attending Physician: PASCUAL BUNCH M.D.  Allergies: Patient has no known allergies.    Safety  Patient Assist     Patient Precautions  Precautions: Fall Risk, Bowel and Bladder, Skin, Cardiopulmonary, Orthopedic, Weight Bearing  Fall Risk: Poor balance  Bowel and Bladder: Fields present  Weight Bearing: NWB RLE, NWB LLE  Comments: RLE for transfers  only  Bed Transfer Status  Moderate Assist, Minimal Assist  Toilet Transfer Status   Moderate Assist  Assistive Devices  Rails, Wheelchair  Oxygen  None - Room Air  Diet/Therapeutic Dining  Current Diet Order   Procedures    Diet Order Diet: Cardiac     Pill Administration  whole  Agitated Behavioral Scale     ABS Level of Severity       Fall Risk  Has the patient had a fall this admission?      Joie Ruiz Fall Risk Scoring  11, MODERATE RISK  Fall Risk Safety Measures  bed alarm and chair alarm    Vitals  Temperature: 37.2 °C (99 °F)  Temp src: Oral  Pulse: (!) 51  Respiration: 16  Blood Pressure : 107/59  Blood Pressure MAP (Calculated): 75 MM HG  BP Location: Right, Upper Arm  Patient BP Position: Supine     Oxygen  Pulse Oximetry: 90 %  O2 (LPM): 2  O2 Delivery Device: None - Room Air    Bowel and Bladder  Last Bowel Movement  05/01/25  Stool Type  Type 6: Fluffy pieces with ragged edges, a mushy stool  Bowel Device  Bathroom  Continent  Bladder: Did not void   Bowel: No movement  Bladder Function  Urine Color: Pink-Tinged  Urine Clarity: Clear  Genitourinary Assessment   Bladder Assessment (WDL):  WDL Except  Fields Catheter: Present with Active Order  Fields Reasons per MD Order: Acute urinary retention or bladder outlet obstruction  Fields Care: Given with Soap and Water  Urine Color: Pink-Tinged  Urine Clarity: Clear  Bladder Device: Indwelling Catheter (CBI)    Skin  Shaun Score   16  Sensory Interventions   Bed Types:  Standard/Trauma Mattress with Overlay  Skin Preventative Measures: Pillows in Use for Support / Positioning, Waffle Overlay  Moisture Interventions  Moisturizers/Barriers: Barrier Wipes, Containment Devices  Containment Devices: Indwelling Catheter (CBI)      Pain  Pain Rating Scale  8 - Awful, hard to do anything  Pain Location  Leg  Pain Location Orientation  Left, Upper  Pain Interventions   Medication (see MAR)    ADLs    Bathing    (patient is schedule for OT shower tomorrow per patient. RN notified.)  Linen Change      Personal Hygiene     Chlorhexidine Bath      Oral Care     Teeth/Dentures     Shave     Nutrition Percentage Eaten  *  * Meal *  *, Breakfast, *  * Amount Consumed *  *, Between 50-75% Consumed  Environmental Precautions     Patient Turns/Positioning  Supine  Patient Turns Assistance/Tolerance  Assistance of One  Bed Positions     Head of Bed Elevated         Psychosocial/Neurologic Assessment  Psychosocial Assessment  Psychosocial (WDL):  Within Defined Limits  Neurologic Assessment  Neuro (WDL): Exceptions to WDL  Level of Consciousness: Alert  Orientation Level: Oriented X4  Cognition: Follows commands, Appropriate safety awareness  Speech: Clear  Motor Function/Sensation Assessment: Motor strength, Motor response  RUE Motor Response: Normal extension, Normal flexion  Muscle Strength Right Arm: Good Strength Against Gravity and Moderate Resistance  LUE Motor Response: Normal extension, Normal flexion  Muscle Strength Left Arm: Good Strength Against Gravity and Moderate Resistance  Muscle Strength Right Leg: Weak Movement but Not Against Gravity or Resistance  Muscle Strength Left Leg: Weak Movement but Not Against Gravity or Resistance  EENT (WDL):  WDL Except    Cardio/Pulmonary Assessment  Edema      Respiratory Breath Sounds  RUL Breath Sounds: Clear  RML Breath Sounds: Clear  RLL Breath Sounds: Clear  NADINE Breath Sounds: Clear  LLL Breath Sounds: Clear  Cardiac Assessment   Cardiac (WDL):   WDL Except

## 2025-05-02 NOTE — ED NOTES
Pt back from CT and his eye began to spontaneously bleed again and about an inch long clot came out of his eye. ERP notified

## 2025-05-03 ENCOUNTER — HOSPITAL ENCOUNTER (INPATIENT)
Facility: MEDICAL CENTER | Age: 75
LOS: 3 days | DRG: 125 | End: 2025-05-06
Attending: EMERGENCY MEDICINE | Admitting: SURGERY
Payer: OTHER MISCELLANEOUS

## 2025-05-03 ENCOUNTER — APPOINTMENT (OUTPATIENT)
Dept: RADIOLOGY | Facility: MEDICAL CENTER | Age: 75
DRG: 125 | End: 2025-05-03
Attending: EMERGENCY MEDICINE
Payer: OTHER MISCELLANEOUS

## 2025-05-03 PROBLEM — D64.9 ANEMIA: Status: RESOLVED | Noted: 2025-05-03 | Resolved: 2025-05-03

## 2025-05-03 PROBLEM — S05.8X1D: Status: ACTIVE | Noted: 2025-04-24

## 2025-05-03 PROBLEM — D64.9 ANEMIA: Status: ACTIVE | Noted: 2025-05-03

## 2025-05-03 PROBLEM — S32.810G: Status: ACTIVE | Noted: 2025-04-23

## 2025-05-03 PROCEDURE — 70496 CT ANGIOGRAPHY HEAD: CPT

## 2025-05-03 ASSESSMENT — PAIN DESCRIPTION - PAIN TYPE
TYPE: ACUTE PAIN
TYPE: ACUTE PAIN

## 2025-05-03 ASSESSMENT — FIBROSIS 4 INDEX: FIB4 SCORE: 2.72

## 2025-05-03 NOTE — DISCHARGE PLANNING
SW arranged gurney & 0L O2 transport via [facility] to [facility]. Bedside RN updated. PCS scanned into media. COBRA packet given to bedside RN.     Time: 1845    
Negative

## 2025-05-03 NOTE — CARE PLAN
Problem: Knowledge Deficit - Standard  Goal: Patient and family/care givers will demonstrate understanding of plan of care, disease process/condition, diagnostic tests and medications  Outcome: Progressing     Problem: Fall Risk - Rehab  Goal: Patient will remain free from falls  Outcome: Progressing   The patient is Watcher - Medium risk of patient condition declining or worsening    Shift Goals  Clinical Goals: Safety, control bleeding from eye  Patient Goals: Rest, pain relief  Family Goals: ALEKSANDR

## 2025-05-03 NOTE — THERAPY
Missed Therapy    Patient Name: Vicente Whitfield  Age:  74 y.o., Sex:  male  Medical Record #: [id number]  Today's Date: 5/2/2025    Discussed missed therapy with therapy schedulers       05/02/25 1645   Therapy Missed   Missed Therapy (Minutes) 30   Reason For Missed Therapy Medical - Other (Please Comment)  (pt on leave of absence, taken to ED)

## 2025-05-03 NOTE — PROGRESS NOTES
NURSING DAILY NOTE    Name: Vicente Whitfield   Date of Admission: 4/29/2025   Admitting Diagnosis: Traumatic brain injury (HCC)  Attending Physician: PASCUAL BUNCH M.D.  Allergies: Patient has no known allergies.    Safety  Patient Assist     Patient Precautions  Precautions: Fall Risk, Skin, Bowel and Bladder  Fall Risk: Poor balance  Bowel and Bladder: Fields present  Weight Bearing: NWB RLE, NWB LLE  Comments: RLE for transfers  only  Bed Transfer Status  Contact Guard Assist  Toilet Transfer Status   Moderate Assist  Assistive Devices  Rails, Wheelchair, Gait Belt  Oxygen  Nasal Cannula  Diet/Therapeutic Dining  Current Diet Order   Procedures    Diet Order Diet: Cardiac     Pill Administration  whole  Agitated Behavioral Scale     ABS Level of Severity       Fall Risk  Has the patient had a fall this admission?      Joie Ruiz Fall Risk Scoring  16, HIGH RISK  Fall Risk Safety Measures  bed alarm, chair alarm, poor balance, and low vision/hearing    Vitals  Temperature: 36.9 °C (98.5 °F)  Temp src: Oral  Pulse: 64  Respiration: 18  Blood Pressure : 108/59  Blood Pressure MAP (Calculated): 75 MM HG  BP Location: Right, Upper Arm  Patient BP Position: Supine     Oxygen  Pulse Oximetry: 91 %  O2 (LPM): 2  O2 Delivery Device: Nasal Cannula    Bowel and Bladder  Last Bowel Movement  05/01/25  Stool Type  Type 6: Fluffy pieces with ragged edges, a mushy stool  Bowel Device  Bathroom  Continent  Bladder: Did not void   Bowel: No movement  Bladder Function  Urine Color: Becky  Urine Clarity: Clear  Genitourinary Assessment   Bladder Assessment (WDL):  WDL Except  Fields Catheter: Present with Active Order  Fields Reasons per MD Order: Acute urinary retention or bladder outlet obstruction  Fields Care: Given with Soap and Water  Urine Color: Becky  Urine Clarity: Clear  Bladder Device: Indwelling Catheter    Skin  Shaun Score   16  Sensory Interventions   Bed Types: Standard/Trauma Mattress with  Overlay  Skin Preventative Measures: Waffle Overlay, Pillows in Use for Support / Positioning, Gray Foam for Oxygen Tubing (Pad ear protector)  Moisture Interventions  Moisturizers/Barriers: Barrier Wipes  Containment Devices: Indwelling Catheter      Pain  Pain Rating Scale  7 - Focus of attention, prevents doing daily activities  Pain Location  Leg  Pain Location Orientation  Left  Pain Interventions   Medication (see MAR)    ADLs    Bathing    (patient is schedule for OT shower tomorrow per patient. RN notified.)  Linen Change   Partial  Personal Hygiene     Chlorhexidine Bath      Oral Care     Teeth/Dentures     Shave     Nutrition Percentage Eaten  *  * Meal *  *, Breakfast, *  * Amount Consumed *  *, Between 50-75% Consumed  Environmental Precautions     Patient Turns/Positioning  Patient turns self independently side to side without assistance, to offload sacral area  Patient Turns Assistance/Tolerance  Assistance of One  Bed Positions     Head of Bed Elevated         Psychosocial/Neurologic Assessment  Psychosocial Assessment  Psychosocial (WDL):  Within Defined Limits  Neurologic Assessment  Neuro (WDL): Exceptions to WDL  Level of Consciousness: Alert  Orientation Level: Oriented X4  Cognition: Appropriate attention/concentration, Follows commands  Speech: Clear  Pupil Assesment: No  Motor Function/Sensation Assessment: Motor strength, Motor response  RUE Motor Response: Normal extension, Normal flexion  Muscle Strength Right Arm: Good Strength Against Gravity and Moderate Resistance  LUE Motor Response: Normal extension, Normal flexion  Muscle Strength Left Arm: Good Strength Against Gravity and Moderate Resistance  Muscle Strength Right Leg: Weak Movement but Not Against Gravity or Resistance  Muscle Strength Left Leg: Weak Movement but Not Against Gravity or Resistance  EENT (WDL):  WDL Except    Cardio/Pulmonary Assessment  Edema      Respiratory Breath Sounds  RUL Breath Sounds: Clear  RML Breath  Sounds: Clear  RLL Breath Sounds: Clear  NADINE Breath Sounds: Clear  LLL Breath Sounds: Clear  Cardiac Assessment   Cardiac (WDL):  WDL Except

## 2025-05-03 NOTE — PROGRESS NOTES
Patient's right eye continued to have sanguinous fluid with clots from 1030 to 1618. Dressing changes completed with charge RN (see flowsheets)  1030 Dr Ny was verbally notified of drainage from R eye. Charge RN also notified   1315 Dr Ny updated via volt regarding drainage, clot formation, and dressing changes.

## 2025-05-03 NOTE — PROGRESS NOTES
Patient's right eye was draining copious amounts of sanguinous fluid while producing blood clots close to 1 1/2 inch by 1 inch at 9354-9734 and then again at 3530-3378. A cool compresses along with pressure from folded gauze was applied to the the patient's right eye each time which caused the bleeding to stop.

## 2025-05-03 NOTE — PROGRESS NOTES
1935 Patient back in room transported by [facility] via gurney.  Patient is alert and oriented. Currently connected to CBI. Report given to Fabby BLANC

## 2025-05-04 ENCOUNTER — APPOINTMENT (OUTPATIENT)
Dept: RADIOLOGY | Facility: MEDICAL CENTER | Age: 75
DRG: 125 | End: 2025-05-04
Attending: SURGERY
Payer: OTHER MISCELLANEOUS

## 2025-05-04 PROBLEM — S02.85XD CLOSED FRACTURE OF ORBIT WITH ROUTINE HEALING: Status: ACTIVE | Noted: 2025-05-04

## 2025-05-04 PROCEDURE — 71045 X-RAY EXAM CHEST 1 VIEW: CPT

## 2025-05-04 ASSESSMENT — ENCOUNTER SYMPTOMS
FEVER: 0
VOMITING: 0
FOCAL WEAKNESS: 0
SENSORY CHANGE: 0
EYE DISCHARGE: 1
NAUSEA: 0
SPEECH CHANGE: 0
CHILLS: 0
NERVOUS/ANXIOUS: 0
NECK PAIN: 0
BLURRED VISION: 1
ABDOMINAL PAIN: 0
MYALGIAS: 1
BACK PAIN: 0

## 2025-05-04 ASSESSMENT — COGNITIVE AND FUNCTIONAL STATUS - GENERAL
MOBILITY SCORE: 13
MOVING FROM LYING ON BACK TO SITTING ON SIDE OF FLAT BED: A LITTLE
SUGGESTED CMS G CODE MODIFIER DAILY ACTIVITY: CK
WALKING IN HOSPITAL ROOM: TOTAL
MOVING TO AND FROM BED TO CHAIR: A LITTLE
CLIMB 3 TO 5 STEPS WITH RAILING: TOTAL
STANDING UP FROM CHAIR USING ARMS: A LOT
SUGGESTED CMS G CODE MODIFIER MOBILITY: CL
HELP NEEDED FOR BATHING: A LITTLE
TOILETING: A LOT
MOVING TO AND FROM BED TO CHAIR: A LOT
CLIMB 3 TO 5 STEPS WITH RAILING: A LOT
SUGGESTED CMS G CODE MODIFIER DAILY ACTIVITY: CJ
WALKING IN HOSPITAL ROOM: A LOT
STANDING UP FROM CHAIR USING ARMS: A LOT
TURNING FROM BACK TO SIDE WHILE IN FLAT BAD: A LITTLE
SUGGESTED CMS G CODE MODIFIER MOBILITY: CL
DRESSING REGULAR UPPER BODY CLOTHING: A LITTLE
PERSONAL GROOMING: A LITTLE
DAILY ACTIVITIY SCORE: 16
HELP NEEDED FOR BATHING: A LOT
DAILY ACTIVITIY SCORE: 22
DRESSING REGULAR LOWER BODY CLOTHING: A LOT
MOVING FROM LYING ON BACK TO SITTING ON SIDE OF FLAT BED: A LITTLE
TURNING FROM BACK TO SIDE WHILE IN FLAT BAD: A LITTLE
DRESSING REGULAR LOWER BODY CLOTHING: A LITTLE
MOBILITY SCORE: 14

## 2025-05-04 ASSESSMENT — PAIN DESCRIPTION - PAIN TYPE
TYPE: ACUTE PAIN
TYPE: ACUTE PAIN
TYPE: ACUTE PAIN;CHRONIC PAIN;SURGICAL PAIN
TYPE: ACUTE PAIN
TYPE: ACUTE PAIN;CHRONIC PAIN;SURGICAL PAIN

## 2025-05-04 ASSESSMENT — LIFESTYLE VARIABLES
CONSUMPTION TOTAL: NEGATIVE
HAVE PEOPLE ANNOYED YOU BY CRITICIZING YOUR DRINKING: NO
ON A TYPICAL DAY WHEN YOU DRINK ALCOHOL HOW MANY DRINKS DO YOU HAVE: 2
TOTAL SCORE: 0
TOTAL SCORE: 0
EVER HAD A DRINK FIRST THING IN THE MORNING TO STEADY YOUR NERVES TO GET RID OF A HANGOVER: NO
HAVE YOU EVER FELT YOU SHOULD CUT DOWN ON YOUR DRINKING: NO
DOES PATIENT WANT TO STOP DRINKING: NO
HOW MANY TIMES IN THE PAST YEAR HAVE YOU HAD 5 OR MORE DRINKS IN A DAY: 0
ALCOHOL_USE: YES
TOTAL SCORE: 0
AVERAGE NUMBER OF DAYS PER WEEK YOU HAVE A DRINK CONTAINING ALCOHOL: 7
EVER FELT BAD OR GUILTY ABOUT YOUR DRINKING: NO

## 2025-05-04 ASSESSMENT — PATIENT HEALTH QUESTIONNAIRE - PHQ9
2. FEELING DOWN, DEPRESSED, IRRITABLE, OR HOPELESS: NOT AT ALL
1. LITTLE INTEREST OR PLEASURE IN DOING THINGS: NOT AT ALL
SUM OF ALL RESPONSES TO PHQ9 QUESTIONS 1 AND 2: 0

## 2025-05-04 ASSESSMENT — SOCIAL DETERMINANTS OF HEALTH (SDOH)
WITHIN THE PAST 12 MONTHS, THE FOOD YOU BOUGHT JUST DIDN'T LAST AND YOU DIDN'T HAVE MONEY TO GET MORE: NEVER TRUE
WITHIN THE LAST YEAR, HAVE YOU BEEN KICKED, HIT, SLAPPED, OR OTHERWISE PHYSICALLY HURT BY YOUR PARTNER OR EX-PARTNER?: NO
WITHIN THE LAST YEAR, HAVE YOU BEEN HUMILIATED OR EMOTIONALLY ABUSED IN OTHER WAYS BY YOUR PARTNER OR EX-PARTNER?: NO
WITHIN THE LAST YEAR, HAVE TO BEEN RAPED OR FORCED TO HAVE ANY KIND OF SEXUAL ACTIVITY BY YOUR PARTNER OR EX-PARTNER?: NO
WITHIN THE PAST 12 MONTHS, YOU WORRIED THAT YOUR FOOD WOULD RUN OUT BEFORE YOU GOT THE MONEY TO BUY MORE: NEVER TRUE
WITHIN THE LAST YEAR, HAVE YOU BEEN AFRAID OF YOUR PARTNER OR EX-PARTNER?: NO
IN THE PAST 12 MONTHS, HAS THE ELECTRIC, GAS, OIL, OR WATER COMPANY THREATENED TO SHUT OFF SERVICE IN YOUR HOME?: NO

## 2025-05-04 NOTE — PROGRESS NOTES
1740 New order received from Dr Ny to send patient to the ED for uncontrolled R eye bleeding  1805 Report and paperwork given to TEJ. Patient taken to the ED via gurney.   Wife contacted regarding patient transfer

## 2025-05-04 NOTE — ED NOTES
Pt transferred out of ED at this time with TICU RN. Pt is A&Ox4, with stable vital signs and no apparent distress upon transfer.  All paperwork and personal belongings sent with pt.   Report given to transporter.    Pt transferred on tele monitor.

## 2025-05-04 NOTE — PROGRESS NOTES
Time of Arrival: 0002  HR: 73  BP: 101/66  SpO2: 93  RR: 16  Temp: 97.5f  Weight: 81.2kg      Does patient consent to inventory of belongings:     Patient came to the floor with no belongings      4 Eyes Skin Assessment Completed by JAYASHREE Ann and JAYASHREE Fink.    Head Scab and Bruising; right eye bleeding  Ears Redness and Blanching  Nose: packing in right nare  Mouth WDL  Neck WDL  Breast/Chest Bruising and Scar  Shoulder Blades WDL  Spine Redness, Blanching, and Bruising  (R) Arm/Elbow/Hand Bruising and Scab  (L) Arm/Elbow/Hand Bruising  Abdomen Bruising  Groin Redness, Bruising, and Swelling  Scrotum/Coccyx/Buttocks Discoloration; left hip has staples in place  (R) Leg Scar, Scab, and Bruising  (L) Leg Scar, Scab, and Bruising  (R) Heel/Foot/Toe WDL  (L) Heel/Foot/Toe WDL          Devices In Places Blood Pressure Cuff, Pulse Ox, Fields, and SCD's      Interventions In Place Sacral Mepilex, Pillows, Q2 Turns, and Low Air Loss Mattress    Possible Skin Injury No    Pictures Uploaded Into Epic Yes  Wound Consult Placed N/A  RN Wound Prevention Protocol Ordered Yes

## 2025-05-04 NOTE — ED NOTES
Med rec updated and complete. Allergies reviewed.  [facility]    Finished a course of Augmentin 875-125 mg on  05/01/25.    Last injection of enoxaparin 05/03/25. Last Plavix and ASA dose 05/03/25.
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
## 2025-05-04 NOTE — PROGRESS NOTES
4 Eyes Skin Assessment Completed by JAYASHREE Fine and PADMINI Botello.    Head Scab and Bruising  Ears WDL  Nose WDL  Mouth WDL  Neck WDL  Breast/Chest Bruising and Scar  Shoulder Blades WDL  Spine Bruising  (R) Arm/Elbow/Hand Bruising and Scab  (L) Arm/Elbow/Hand Bruising  Abdomen Bruising, incision DIP   Groin Redness, Bruising, and Swelling  Scrotum/Coccyx/Buttocks Discoloration  (R) Leg Scar, Scab, and Bruising  (L) Leg Scar, Scab, and Bruising, staples L hip   (R) Heel/Foot/Toe Redness and Blanching  (L) Heel/Foot/Toe Redness and Blanching          Devices In Places Pulse Ox and Fields      Interventions In Place Sacral Mepilex, TAP System, Pillows, Low Air Loss Mattress, Dri-Sanjay Pads, and Heels Loaded W/Pillows    Possible Skin Injury Yes    Pictures Uploaded Into Epic Yes  Wound Consult Placed N/A  RN Wound Prevention Protocol Ordered Yes
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
## 2025-05-04 NOTE — ED NOTES
Pt transferred out of ED at this time with transport tech. Pt is A&Ox4, with stable vital signs and no apparent distress upon transfer.  All paperwork and personal belongings sent with pt.   Report given to transporter.
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
## 2025-05-04 NOTE — ED NOTES
Report received from Don BLANC. Pt moved to [facility] from [facility].

## 2025-05-04 NOTE — CARE PLAN
The patient is Stable - Low risk of patient condition declining or worsening    Shift Goals  Clinical Goals: stable vitals, monitor for bleeding  Patient Goals: rest  Family Goals: no family at this time      Problem: Knowledge Deficit - Standard  Goal: Patient and family/care givers will demonstrate understanding of plan of care, disease process/condition, diagnostic tests and medications  Outcome: Progressing     Problem: Pain - Standard  Goal: Alleviation of pain or a reduction in pain to the patient’s comfort goal  Outcome: Progressing     Problem: Skin Integrity  Goal: Skin integrity is maintained or improved  Outcome: Progressing     Problem: Fall Risk  Goal: Patient will remain free from falls  Outcome: Progressing

## 2025-05-04 NOTE — ED NOTES
"  SUBJECTIVE:   Sheila Chowdhury is a 17 year old female who presents to clinic today for the following health issues:    Syncope - Had been feeling sick to her stomach and got very lightheaded, dizzy and hot and knew she was going to faint so she tried to sit down and did end up passing out and sliding down the wall. Did not fall and did not hit her head. Has had 2 prior episodes of fainting about 2 years ago and has been seen and was told that it was from dehydration and poor nutrition. Parents did call 911, they checked blood sugars and blood pressure both of which were normal and then told her to follow up in clinic to check other blood work. Had not eaten this morning since \"early dinner\" last night which was a brat and had some pickles after that, did have an upset stomach since Saturday, did vomit a small amount yesterday at school.     Yesterday wasn't feel well,  Was feeling nauseated   This AM she was feeling nauseated,  Thought that she was going to throw up but didn't .   Mouth started to water, and then she started to get dizzy and fainted.    Yesterday she did try to stay hydrated.  Ate a  Regular dinner and then  During the evening she did eat some pickles.     Problem list and histories reviewed & adjusted, as indicated.  Additional history: as documented    Patient Active Problem List   Diagnosis     Seasonal allergic rhinitis     Nummular eczema     History reviewed. No pertinent surgical history.    Social History   Substance Use Topics     Smoking status: Never Smoker     Smokeless tobacco: Never Used     Alcohol use No     Family History   Problem Relation Age of Onset     Family History Negative Maternal Grandfather      Family History Negative Mother      Family History Negative Father      Arthritis Maternal Grandmother      Circulatory Maternal Grandmother      blood clots in her leg after a surgery     Family History Negative Paternal Grandmother      Family History Negative Paternal " Bandages replaced to R eye.    Grandfather          Current Outpatient Prescriptions   Medication Sig Dispense Refill     norgestimate-ethinyl estradiol (ORTHO-CYCLEN, SPRINTEC) 0.25-35 MG-MCG per tablet Take 1 tablet by mouth daily 84 tablet 3     Allergies   Allergen Reactions     No Known Drug Allergy      BP Readings from Last 3 Encounters:   05/08/18 112/66   04/11/17 116/81   11/17/16 122/62    Wt Readings from Last 3 Encounters:   05/08/18 166 lb 6.4 oz (75.5 kg) (93 %)*   04/11/17 151 lb 6.4 oz (68.7 kg) (89 %)*   11/17/16 143 lb 9.6 oz (65.1 kg) (86 %)*     * Growth percentiles are based on CDC 2-20 Years data.                    Reviewed and updated as needed this visit by clinical staff  Tobacco  Allergies  Meds  Med Hx  Surg Hx  Fam Hx  Soc Hx      Reviewed and updated as needed this visit by Provider         ROS:  CONSTITUTIONAL: NEGATIVE for fever, chills, change in weight  ENT/MOUTH: NEGATIVE for ear, mouth and throat problems  RESP: NEGATIVE for significant cough or SOB  CV: NEGATIVE for chest pain, palpitations or peripheral edema  GI: NEGATIVE for nausea, abdominal pain, heartburn, or change in bowel habits  NEURO: NEGATIVE for weakness, dizziness or paresthesias and POSITIVE for did have a  Syncopal episode  PSYCHIATRIC: POSITIVE for stress this semester has been stressful.  Grades had been fine up until a few weeks ago.  And then has been tired.  Not getting homework done,  Not turning in homework.  Has talked to her instructors and they are helping her.  Does have a counselor and is talking with her.   Was suppose to take the ACT practice test today but not worried,   Does have 2 tests tomorrow but not worried about them     When she woke today she had a stomach pain. Feeling nauseate. Did have diarrhea in Saturday and Sunday. And did have some nausea.  Did get dressed,  Brushed her teeth .   Told her parents that she didn't feel well. At this time she was standing,  Felt weak, and then  Just slide down the wall and was  " Out.  Parents called 911,  She was alert  By the time they got there.   EMS did check her blood sugar and that was fine.     Diet is good.   Doesn't skip meals.  Diet is well balanced,  Drinking water.  No family hx of DM     Now she is feeling .  No nausea     Does have anxiety in public     OBJECTIVE:     /66 (BP Location: Left arm, Patient Position: Chair, Cuff Size: Adult Regular)  Pulse 88  Temp 97.7  F (36.5  C) (Tympanic)  Resp 14  Ht 5' 4\" (1.626 m)  Wt 166 lb 6.4 oz (75.5 kg)  LMP 04/24/2018 (Within Days)  Breastfeeding? No  BMI 28.56 kg/m2  Body mass index is 28.56 kg/(m^2).   GENERAL: healthy, alert and no distress  HENT: ear canals and TM's normal, nose and mouth without ulcers or lesions  NECK: no adenopathy, no asymmetry, masses, or scars and thyroid normal to palpation  RESP: lungs clear to auscultation - no rales, rhonchi or wheezes  CV: regular rate and rhythm, normal S1 S2, no S3 or S4, no murmur, click or rub, no peripheral edema and peripheral pulses strong  ABDOMEN: soft, nontender, no hepatosplenomegaly, no masses and bowel sounds normal  NEURO: Normal strength and tone, mentation intact and speech normal  PSYCH: mentation appears normal, affect normal/bright    Reviewed anxiety     Diagnostic Test Results:  Labs are pending     ASSESSMENT/PLAN:     ASSESSMENT/PLAN:      ICD-10-CM    1. Vasovagal syncope R55 Basic metabolic panel  (Ca, Cl, CO2, Creat, Gluc, K, Na, BUN)   2. Situational anxiety F41.8        Patient Instructions   You can stay home from school today but then back to school tomorrow.    Keep eating well,  Drinking     For the anxiety   -- figure out the  Triggers for your anxiety and then figure out coping mechanisms   I don't need to worry about ....... And why ........        '      See Patient Instructions    WESLEY PICKARD NP, APRN [facility]    "

## 2025-05-05 ENCOUNTER — APPOINTMENT (OUTPATIENT)
Dept: RADIOLOGY | Facility: MEDICAL CENTER | Age: 75
DRG: 125 | End: 2025-05-05
Attending: SURGERY
Payer: OTHER MISCELLANEOUS

## 2025-05-05 PROCEDURE — 71045 X-RAY EXAM CHEST 1 VIEW: CPT

## 2025-05-05 ASSESSMENT — ENCOUNTER SYMPTOMS
ABDOMINAL PAIN: 0
NAUSEA: 0
HEADACHES: 0
VOMITING: 0
ROS GI COMMENTS: BM 5/4
SHORTNESS OF BREATH: 0
FEVER: 0
CHILLS: 0
MYALGIAS: 1
TINGLING: 0
WEAKNESS: 0
SENSORY CHANGE: 0

## 2025-05-05 ASSESSMENT — ACTIVITIES OF DAILY LIVING (ADL): TOILETING: INDEPENDENT

## 2025-05-05 ASSESSMENT — COGNITIVE AND FUNCTIONAL STATUS - GENERAL
HELP NEEDED FOR BATHING: A LOT
DRESSING REGULAR LOWER BODY CLOTHING: A LOT
PERSONAL GROOMING: A LITTLE
DAILY ACTIVITIY SCORE: 16
TOILETING: A LOT
SUGGESTED CMS G CODE MODIFIER DAILY ACTIVITY: CK
DRESSING REGULAR UPPER BODY CLOTHING: A LITTLE

## 2025-05-05 ASSESSMENT — PAIN DESCRIPTION - PAIN TYPE
TYPE: SURGICAL PAIN
TYPE: ACUTE PAIN;SURGICAL PAIN
TYPE: ACUTE PAIN;SURGICAL PAIN
TYPE: ACUTE PAIN
TYPE: SURGICAL PAIN

## 2025-05-05 NOTE — DISCHARGE PLANNING
Case Management Discharge Planning    Admission Date: 5/3/2025  GMLOS: 3.6  ALOS: 2    6-Clicks ADL Score: 22  6-Clicks Mobility Score: 14  PT and/or OT Eval ordered: Yes  Post-acute Referrals Ordered: Yes  Post-acute Choice Obtained: Yes  Has referral(s) been sent to post-acute provider:  Yes      Anticipated Discharge Dispo: Discharge Disposition: Disch to  rehab facility or distinct part unit (62)    DME Needed: No    Action(s) Taken: Patient was discussed in morning trauma rounds.  Per provider, patient is not yet medically cleared.  Trending Hgb today.  PT/OT evals pending.  PMR consult placed.  Plan is for [facility].      Escalations Completed: None    Medically Clear: No    Next Steps: RN CM to continue to follow for DC planning      Barriers to Discharge: Medical clearance      Care Transition Team Assessment    RNCM met with patient at the bedside.  Patient A&Ox4 and able to verify the information on the face sheet.  [facility], and patient was re-admitted to the hospital from [facility].   Prior chart review, patient lives with his S/O in a single story house, [place] (p) [phone]. No Advance Directive on file. Prior to admission patient is independent with ADL's and IADL’s. Patient reported he uses a CPAP but does not use any other DME at baseline. The patient's PCP is Dr. Vitor Raines. Patient reported that he has been diagnosed with depression and takes Wellbutrin and Prozac for it. Patient endorses alcohol use. Patient's confirmed medical coverage via Medicare. Patient would like to return to [facility] when medically cleared.  Choice form signed and faxed to [facility].      Information Source  Orientation Level: Oriented X4  Information Given By: Patient  Who is responsible for making decisions for patient? : Patient    Readmission Evaluation  Is this a readmission?: Yes - unplanned readmission    Elopement  Risk  Legal Hold: No  Ambulatory or Self Mobile in Wheelchair: No-Not an Elopement Risk  Disoriented: No  Psychiatric Symptoms: None  History of Wandering: No  Elopement this Admit: No  Vocalizing Wanting to Leave: No  Displays Behaviors, Body Language Wanting to Leave: No-Not at Risk for Elopement  Elopement Risk: Not at Risk for Elopement    Interdisciplinary Discharge Planning  Lives with - Patient's Self Care Capacity: Significant Other  Patient or legal guardian wants to designate a caregiver: No  Support Systems: Family Member(s), Friends / Neighbors  Housing / Facility: [address]    Discharge Preparedness  What is your plan after discharge?: Other (comment) (IPR)  What are your discharge supports?: Partner, Child  Prior Functional Level: Ambulatory, Needs Assist with Activities of Daily Living    Functional Assesment  Prior Functional Level: Ambulatory, Needs Assist with Activities of Daily Living    Finances  Financial Barriers to Discharge: No  Prescription Coverage: Yes    Vision / Hearing Impairment  Right Eye Vision: Impaired         Advance Directive  Advance Directive?: None    Domestic Abuse  Have you ever been the victim of abuse or violence?: No  Possible Abuse/Neglect Reported to:: Not Applicable    Psychological Assessment  History of Substance Abuse: None  History of Psychiatric Problems: No  Non-compliant with Treatment: No  Newly Diagnosed Illness: No    Discharge Risks or Barriers  Discharge risks or barriers?: Complex medical needs  Patient risk factors: Complex medical needs    Anticipated Discharge Information  Discharge Disposition: Disch to  rehab facility or distinct part unit (62)

## 2025-05-05 NOTE — DISCHARGE PLANNING
Voalte message out to Niki Courtney PA=C for update on medical clearance     Per Niki Courtney PA-C patient not yet medically cleared

## 2025-05-05 NOTE — CARE PLAN
The patient is Stable - Low risk of patient condition declining or worsening    Shift Goals  Clinical Goals: Pain Management, Mobility, Monitor H/H  Patient Goals: Pain Management, Comfort  Family Goals: no family at this time    Progress made toward(s) clinical / shift goals:      Problem: Knowledge Deficit - Standard  Goal: Patient and family/care givers will demonstrate understanding of plan of care, disease process/condition, diagnostic tests and medications  Outcome: Progressing     Problem: Pain - Standard  Goal: Alleviation of pain or a reduction in pain to the patient’s comfort goal  Outcome: Progressing     Problem: Skin Integrity  Goal: Skin integrity is maintained or improved  Outcome: Progressing
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
## 2025-05-05 NOTE — CARE PLAN
The patient is Stable - Low risk of patient condition declining or worsening    Shift Goals  Clinical Goals: Pain management, mobiltiy, monitor H&H, tineo care  Patient Goals: Pain management, mobility, rest, comfort  Family Goals: no family at this time    Progress made toward(s) clinical / shift goals:    Problem: Knowledge Deficit - Standard  Goal: Patient and family/care givers will demonstrate understanding of plan of care, disease process/condition, diagnostic tests and medications  Outcome: Progressing     Problem: Pain - Standard  Goal: Alleviation of pain or a reduction in pain to the patient’s comfort goal  Outcome: Progressing     Problem: Skin Integrity  Goal: Skin integrity is maintained or improved  Outcome: Progressing     Problem: Fall Risk  Goal: Patient will remain free from falls  Outcome: Progressing       Patient is not progressing towards the following goals:

## 2025-05-05 NOTE — DISCHARGE PLANNING
[facility] Transitional Care Coordination     Referral from: La MA  Insurance Provider on Facesheet: MCKINLEY  Potential Rehab Diagnosis: Poly trauma     Chart review indicates patient may have on going medical management and may have therapy needs to possibly meet inpatient rehab facility criteria with the goal of returning to community.     D/C support: significant other, daughter     Physiatry consultation pended per protocol.   Pending therapy evaluations as clinically appropriate.     Thank you for the referral.
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
## 2025-05-06 ENCOUNTER — HOSPITAL ENCOUNTER (INPATIENT)
Facility: REHABILITATION | Age: 75
LOS: 7 days | DRG: 949 | End: 2025-05-13
Attending: PHYSICAL MEDICINE & REHABILITATION | Admitting: PHYSICAL MEDICINE & REHABILITATION
Payer: OTHER MISCELLANEOUS

## 2025-05-06 ENCOUNTER — APPOINTMENT (OUTPATIENT)
Dept: RADIOLOGY | Facility: MEDICAL CENTER | Age: 75
DRG: 125 | End: 2025-05-06
Attending: SURGERY
Payer: OTHER MISCELLANEOUS

## 2025-05-06 PROBLEM — S06.9XAA TRAUMATIC BRAIN INJURY WITH DELAYED RECOVERY (HCC): Status: ACTIVE | Noted: 2025-05-06

## 2025-05-06 PROCEDURE — 71045 X-RAY EXAM CHEST 1 VIEW: CPT

## 2025-05-06 ASSESSMENT — PAIN DESCRIPTION - PAIN TYPE
TYPE: ACUTE PAIN;SURGICAL PAIN
TYPE: ACUTE PAIN
TYPE: SURGICAL PAIN
TYPE: ACUTE PAIN

## 2025-05-06 ASSESSMENT — LIFESTYLE VARIABLES
EVER HAD A DRINK FIRST THING IN THE MORNING TO STEADY YOUR NERVES TO GET RID OF A HANGOVER: NO
HAVE YOU EVER FELT YOU SHOULD CUT DOWN ON YOUR DRINKING: NO
HOW MANY TIMES IN THE PAST YEAR HAVE YOU HAD 5 OR MORE DRINKS IN A DAY: 0
HAVE PEOPLE ANNOYED YOU BY CRITICIZING YOUR DRINKING: NO
DOES PATIENT WANT TO STOP DRINKING: NO
ON A TYPICAL DAY WHEN YOU DRINK ALCOHOL HOW MANY DRINKS DO YOU HAVE: 2
TOTAL SCORE: 0
TOTAL SCORE: 0
ALCOHOL_USE: YES
EVER FELT BAD OR GUILTY ABOUT YOUR DRINKING: NO
TOTAL SCORE: 0
AVERAGE NUMBER OF DAYS PER WEEK YOU HAVE A DRINK CONTAINING ALCOHOL: 7
CONSUMPTION TOTAL: NEGATIVE

## 2025-05-06 ASSESSMENT — PATIENT HEALTH QUESTIONNAIRE - PHQ9
1. LITTLE INTEREST OR PLEASURE IN DOING THINGS: NOT AT ALL
SUM OF ALL RESPONSES TO PHQ9 QUESTIONS 1 AND 2: 0
2. FEELING DOWN, DEPRESSED, IRRITABLE, OR HOPELESS: NOT AT ALL
2. FEELING DOWN, DEPRESSED, IRRITABLE, OR HOPELESS: NOT AT ALL
SUM OF ALL RESPONSES TO PHQ9 QUESTIONS 1 AND 2: 0
1. LITTLE INTEREST OR PLEASURE IN DOING THINGS: NOT AT ALL

## 2025-05-06 ASSESSMENT — COGNITIVE AND FUNCTIONAL STATUS - GENERAL
WALKING IN HOSPITAL ROOM: TOTAL
MOBILITY SCORE: 12
CLIMB 3 TO 5 STEPS WITH RAILING: TOTAL
STANDING UP FROM CHAIR USING ARMS: TOTAL
MOVING TO AND FROM BED TO CHAIR: A LITTLE
MOVING FROM LYING ON BACK TO SITTING ON SIDE OF FLAT BED: A LITTLE
SUGGESTED CMS G CODE MODIFIER MOBILITY: CL
TURNING FROM BACK TO SIDE WHILE IN FLAT BAD: A LITTLE

## 2025-05-06 ASSESSMENT — GAIT ASSESSMENTS: GAIT LEVEL OF ASSIST: UNABLE TO PARTICIPATE

## 2025-05-06 NOTE — FLOWSHEET NOTE
05/06/25 1652   Events/Summary/Plan   Events/Summary/Plan RT assessment   Vital Signs   Pulse 66   Respiration 18   Pulse Oximetry 92 %   $ Pulse Oximetry (Spot Check) Yes   Respiratory Assessment   Respiratory Pattern Within Normal Limits   Level of Consciousness Alert   Chest Exam   Work Of Breathing / Effort Within Normal Limits   Breath Sounds   RUL Breath Sounds Clear   RML Breath Sounds Clear   RLL Breath Sounds Clear   NADINE Breath Sounds Clear   LLL Breath Sounds Clear   Oxygen   O2 (LPM) 0   O2 Delivery Device None - Room Air

## 2025-05-06 NOTE — PROGRESS NOTES
4 Eyes Skin Assessment Completed by JAYASHREE Richardson and JAYASHREE Degroot.    Head R eye wound and bruising  Ears WDL  Nose WDL  Mouth WDL  Neck WDL  Breast/Chest WDL  Shoulder Blades WDL  Spine bruising  (R) Arm/Elbow/Hand Bruising  (L) Arm/Elbow/Hand Bruising  Abdomen Bruising and Incision  Groin Bruising  Scrotum/Coccyx/Buttocks Redness and Blanching, bruising  (R) Leg Bruising, scabbing  (L) Leg Bruising and Incision, scabbing  (R) Heel/Foot/Toe Redness and Blanching  (L) Heel/Foot/Toe Redness and Blanching          Devices In Places PIV      Interventions In Place Waffle Overlay    Possible Skin Injury No    Pictures Uploaded Into Epic Yes  Wound Consult Placed N/A  RN Wound Prevention Protocol Ordered Yes
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
## 2025-05-06 NOTE — FLOWSHEET NOTE
05/06/25 1653   Protocol Assessment   Initial Assessment Yes   Patient History   Pulmonary Diagnosis LEOBARDO   Home O2 No   Nocturnal CPAP Yes   Nocturnal CPAP Oxygen liter flow 0   Home Treatments/Frequency No   Sleep Apnea Screening   Have you had a sleep study? No   Have you been diagnosed with sleep apnea? No   Do you use a CPAP/BIPAP/AUTOPAP? Yes   COPD Risk Screening   Do you have a history of COPD? No
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
## 2025-05-06 NOTE — PROGRESS NOTES
1600 Pt arrived at [facility] from [facility] via transport. Dr. Chapman to follow. Initial assessment initiated. Pt oriented to room and facility routine and safety measures; pt education binder provided and discussed. Pt A/O x 4, continent of bowel with tineo in place. Moderate assist for transfers. All wounds photographed and documented; photos uploaded to . Pt denies pain or discomfort at this time. Pt positioned for comfort in bed. Call light within reach, safety measures in place.
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
## 2025-05-06 NOTE — CARE PLAN
The patient is Stable - Low risk of patient condition declining or worsening    Shift Goals  Clinical Goals: Pain management, mobility, monitor H&H, rest, comfort  Patient Goals: Pain control, rest, comfort  Family Goals: not present    Progress made toward(s) clinical / shift goals:    Problem: Knowledge Deficit - Standard  Goal: Patient and family/care givers will demonstrate understanding of plan of care, disease process/condition, diagnostic tests and medications  Outcome: Met     Problem: Pain - Standard  Goal: Alleviation of pain or a reduction in pain to the patient’s comfort goal  Outcome: Met     Problem: Skin Integrity  Goal: Skin integrity is maintained or improved  Outcome: Met     Problem: Fall Risk  Goal: Patient will remain free from falls  Outcome: Met       Patient is not progressing towards the following goals:

## 2025-05-06 NOTE — DISCHARGE PLANNING
0747: PMR consult pended, awaiting PT eval. TCC will continue to follow.    1331: Pt accepted by Dr Chapman at [facility]. Transport set for 1530/1600 GMT wheelchair, nurse report [phone]. Notified care team.

## 2025-05-06 NOTE — PROGRESS NOTES
Patient stable, VSS, patient to be transferred to [facility] rehab. Called report to Dylan at [facility]. RN requested to leave one PIV in patient. 20g Rt forearm DC. AVS performed with patient by virtual nurse. Discharge education performed. Patient on transport with belongings to [facility] rehab.
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
## 2025-05-06 NOTE — DISCHARGE INSTRUCTIONS
- Call or seek medical attention for questions or concerns  - Follow up with the [facility] RETURN: PRN  - Follow up with Dr. Ori Alvarez in 1 weeks time  - Follow up with primary care provider within one weeks time  - Resume regular diet  - May take over the counter acetaminophen as needed for pain  - Continue daily over the counter stool softener while on narcotics  - No operation of machinery or motorized vehicles while under the influence of narcotics  - No alcohol, marijuana or illicit drug use while under the influence of narcotics  - In the event of a narcotic overdose naloxone (Narcan) is available without a prescription from any [facility] or [facility]  - No swimming, hot tubs, baths or wound submersion until cleared by outpatient provider. May shower  - No contact sports, strenuous activities, or heavy lifting until cleared by outpatient provider  - If respiratory decompensation, persistent or worsening pain, or signs or symptoms of infection occur seek medical attention

## 2025-05-06 NOTE — CARE PLAN
The patient is Stable - Low risk of patient condition declining or worsening    Shift Goals  Clinical Goals: pain mgnt, mobility, monitor H&H  Patient Goals: pain control, rest, comfort  Family Goals: not present    Progress made toward(s) clinical / shift goals:        Problem: Knowledge Deficit - Standard  Goal: Patient and family/care givers will demonstrate understanding of plan of care, disease process/condition, diagnostic tests and medications  Outcome: Progressing     Problem: Pain - Standard  Goal: Alleviation of pain or a reduction in pain to the patient’s comfort goal  Outcome: Progressing     Problem: Skin Integrity  Goal: Skin integrity is maintained or improved  Outcome: Progressing     Problem: Fall Risk  Goal: Patient will remain free from falls  Outcome: Progressing       Patient is not progressing towards the following goals:
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
## 2025-05-06 NOTE — PROGRESS NOTES
Virtual Nurse discharge complete. After visit summary has been reviewed and discussed with patient. All questions answered. RN notified that patient is ready to sign discharge paperwork.

## 2025-05-07 ENCOUNTER — APPOINTMENT (OUTPATIENT)
Dept: SPEECH THERAPY | Facility: REHABILITATION | Age: 75
DRG: 949 | End: 2025-05-07
Attending: PHYSICAL MEDICINE & REHABILITATION
Payer: OTHER MISCELLANEOUS

## 2025-05-07 ENCOUNTER — APPOINTMENT (OUTPATIENT)
Dept: OCCUPATIONAL THERAPY | Facility: REHABILITATION | Age: 75
DRG: 949 | End: 2025-05-07
Attending: PHYSICAL MEDICINE & REHABILITATION
Payer: OTHER MISCELLANEOUS

## 2025-05-07 ENCOUNTER — APPOINTMENT (OUTPATIENT)
Dept: PHYSICAL THERAPY | Facility: REHABILITATION | Age: 75
DRG: 949 | End: 2025-05-07
Attending: PHYSICAL MEDICINE & REHABILITATION
Payer: OTHER MISCELLANEOUS

## 2025-05-07 ASSESSMENT — BRIEF INTERVIEW FOR MENTAL STATUS (BIMS)
BIMS SUMMARY SCORE: 15
ASKED TO RECALL BED: YES, NO CUE REQUIRED
ASKED TO RECALL BLUE: YES, NO CUE REQUIRED
WHAT DAY OF THE WEEK IS IT: CORRECT
WHAT YEAR IS IT: CORRECT
WHAT MONTH IS IT: ACCURATE WITHIN 5 DAYS
INITIAL REPETITION OF BED BLUE SOCK - FIRST ATTEMPT: 3
ASKED TO RECALL SOCK: YES, NO CUE REQUIRED

## 2025-05-07 ASSESSMENT — ACTIVITIES OF DAILY LIVING (ADL): TOILETING: INDEPENDENT

## 2025-05-07 ASSESSMENT — PAIN DESCRIPTION - PAIN TYPE
TYPE: ACUTE PAIN

## 2025-05-07 NOTE — PROGRESS NOTES
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  Allergies: Patient has no known allergies.    Safety  Patient Assist     Patient Precautions  Precautions: Fall Risk, Bowel and Bladder, Skin, Weight Bearing  Fall Risk: Poor balance, Poor safety  Bowel and Bladder: Fields present  Weight Bearing: NWB RLE, NWB LLE  Bed Transfer Status     Toilet Transfer Status      Assistive Devices     Oxygen  None - Room Air  Diet/Therapeutic Dining  Current Diet Order   Procedures    Diet Order Diet: Cardiac     Pill Administration  whole  Agitated Behavioral Scale     ABS Level of Severity       Fall Risk  Has the patient had a fall this admission?      Joie Ruiz Fall Risk Scoring  16, HIGH RISK  Fall Risk Safety Measures  bed alarm, chair alarm, poor balance, and low vision/hearing    Vitals  Temperature: 36.8 °C (98.3 °F)  Temp src: Oral  Pulse: (!) 59  Respiration: 17  Blood Pressure : 107/59  Blood Pressure MAP (Calculated): 75 MM HG  BP Location: Left, Upper Arm  Patient BP Position: Supine     Oxygen  Pulse Oximetry: 92 %  O2 (LPM): 0  O2 Delivery Device: None - Room Air    Bowel and Bladder  Last Bowel Movement  05/06/25  Stool Type  Type 7: Watery, no solid pieces-entirely liquid  Bowel Device     Continent  Bladder: Did not void (Fields in Place)   Bowel: No movement  Bladder Function  Number of Times Voided: 1  Urine Color: Yellow  Urine Clarity: Sediment  Genitourinary Assessment   Bladder Assessment (WDL):  WDL Except  Fields Catheter: Present with Active Order  Fields Reasons per MD Order: Acute urinary retention or bladder outlet obstruction  Fields Care: Given with Soap and Water  Urine Color: Yellow  Urine Clarity: Sediment  Bladder Device: Indwelling Catheter    Skin  Shaun Score   16  Sensory Interventions   Bed Types: Standard/Trauma Mattress  Skin  Preventative Measures: Waffle Overlay  Moisture Interventions  Moisturizers/Barriers: Barrier Wipes  Containment Devices: Indwelling Catheter      Pain  Pain Rating Scale  2 - Notice Pain, does not interfere with activities  Pain Location  Leg  Pain Location Orientation  Left  Pain Interventions   Medication (see MAR)    ADLs    Bathing      Linen Change      Personal Hygiene     Chlorhexidine Bath      Oral Care     Teeth/Dentures     Shave     Nutrition Percentage Eaten     Environmental Precautions     Patient Turns/Positioning  Patient turns self independently side to side without assistance, to offload sacral area  Patient Turns Assistance/Tolerance  Tolerates Well  Bed Positions     Head of Bed Elevated         Psychosocial/Neurologic Assessment  Psychosocial Assessment  Psychosocial (WDL):  Within Defined Limits  Neurologic Assessment  Neuro (WDL): Exceptions to WDL  Level of Consciousness: Alert  Orientation Level: Oriented X4  Cognition: Appropriate attention/concentration, Follows commands  Speech: Clear  Pupil Assesment: No  Motor Function/Sensation Assessment: Motor strength, Motor response  RUE Motor Response: Normal extension, Normal flexion  Muscle Strength Right Arm: Good Strength Against Gravity and Moderate Resistance  LUE Motor Response: Normal extension, Normal flexion  Muscle Strength Left Arm: Good Strength Against Gravity and Moderate Resistance  Muscle Strength Right Leg: Weak Movement but Not Against Gravity or Resistance  Muscle Strength Left Leg: Weak Movement but Not Against Gravity or Resistance  EENT (WDL):  WDL Except    Cardio/Pulmonary Assessment  Edema      Respiratory Breath Sounds  RUL Breath Sounds: Clear  RML Breath Sounds: Clear  RLL Breath Sounds: Clear  NADINE Breath Sounds: Clear  LLL Breath Sounds: Clear  Cardiac Assessment   Cardiac (WDL):  WDL Except

## 2025-05-07 NOTE — THERAPY
Speech Language Pathology   Initial Assessment     Patient Name:  Vicente Whitfield  Age:  74 y.o., Sex:  male  Medical Record #:  [id number]  Today's Date: 5/7/2025     Subjective: Pt pleasant and cooperative, recalled this therapist from initial evaluation.      Objective:    05/07/25 1303   Evaluation Charges   SLP Speech Language Evaluation Speech Sound Language Comprehension   SLP Total Time Spent   SLP Individual Total Time Spent (Mins) 60   Prior Level Of Function   Communication Within Functional Limits   Hearing Aid None   Vision Reading    Occupation (Pre-Hospital Vocational) Retired Due To Age   Medication Management Managed Independently   Finances Managed Independently   Driving Independent   Diet on Admission Regular (7);Thin (0)   Impairment Assessments   Comprehension Level of Assist Modified Independent   Comprehension Description Glasses   Expression Level of Assist Supervised   Expression Description Extra time needed   Social Interaction Level of Assist Independent   Problem Solving Description Extra time needed   Memory Level of Assist Moderate Assist   Memory Description Cueing needed;Extra time needed   SCCAN (Scales of Cognitive and Communicative Ability for Neurorehabilitation)   Oral Expression - Raw Score 19   Oral Expression - Scale Performance Score 100   Orientation - Raw Score 12   Orientation - Scale Performance Score 100   Memory - Raw Score 10   Memory - Scale Performance Score 53   Speech Comprehension - Raw Score 13   Speech Comprehension - Scale Performance Score 100   Reading Comprehension - Raw Score 12   Reading Comprehension - Scale Performance Score 100   Writing - Raw Score 7   Writing - Scale Performance Score 100   Attention - Raw Score 15   Attention - Scale Performance Score 94   Problem Solving - Raw Score 22   Problem Solving - Scale Performance Score 96   SCCAN Total Raw Score 84   SCCAN Degree of Severity Mild Impairment   Patient Scheduling Information   SLP Time 30  minutes   Primary or Coverage [facility] cog   Problem List   Problem List Memory Deficit   Benefit   Therapy Benefit Patient would benefit from Inpatient Rehab Speech-Language Pathology to address above identified deficits.   Current Discharge Plan   Current Discharge Plan Return to Prior Living Situation         Patient has been educated on the following items: speech therapy role, speech therapy plan of care, rehab expectations, goal setting, patient passport, and fall risk and use of call light    Assessment:      Patient is 74 y.o. male with a diagnosis of Traumatic brain injury with delayed recovery (HCC) [S06.9XAA]    Currently, the patient has the following precautions: Fall Risk: Poor balance, Poor safety, Bowel and Bladder: Fields present, Weight Bearing: NWB RLE, NWB LLE, Other (see comments) (RLE WB ok for transfers only), Comments: Privacy bag provided for cleanliness    Patient PMH includes: No past medical history on file.  Patient's Past Surgical History:   Past Surgical History:   Procedure Laterality Date    [place] CYSTOURETHROSCOPY N/A 4/24/2025    Procedure: ANTERIOR CYSTORRHAPHY;  Surgeon: Abhishek Hernandez M.D.;  Location: SURGERY [facility];  Service: Urology    ORIF, PELVIS N/A 4/24/2025    Procedure: ORIF, PELVIS;  Surgeon: Ori Alvarez M.D.;  Location: SURGERY [facility];  Service: Orthopedics       The Scales of Cognitive and Communicative Ability for Neurorehabilitation (SCCAN) assesses cognitive-communicative deficits and functional ability in patients in rehabilitation hospitals, clinics, and skilled nursing facilities. The SCCAN is appropriate for a broad range of neurological patients, provides a measure of both impairment and functional ability.     SCCAN (Scales of Cognitive and Communicative Ability for Neurorehabilitation)  Oral Expression - Raw Score: (P) 19  Oral Expression - Scale Performance Score: (P) 100  Orientation - Raw Score: (P) 12  Orientation - Scale Performance Score:  (P) 100  Memory - Raw Score: (P) 10  Memory - Scale Performance Score: (P) 53  Speech Comprehension - Raw Score: (P) 13  Speech Comprehension - Scale Performance Score: (P) 100  Reading Comprehension - Raw Score: (P) 12  Reading Comprehension - Scale Performance Score: (P) 100  Writing - Raw Score: (P) 7  Writing - Scale Performance Score: (P) 100  Attention - Raw Score: (P) 15  Attention - Scale Performance Score: (P) 94  Problem Solving - Raw Score: (P) 22  Problem Solving - Scale Performance Score: (P) 96  SCCAN Total Raw Score: (P) 84  SCCAN Degree of Severity: (P) Mild Impairment    SCCAN completed with results indicating MILD cognitive impairments overall. Pt with exact same score achieved at initial admission date. Pt with only deficits noted related to short term memory. Results reviewed with discussion for brief cognitive intervention to address functional memory strategies, implementation and mental manipulation tasks. Pt reporting vocal changes for approx 1 year with unknown etiology, discussed with pt rec: for ENT referral and then further voice therapy as appropriate. Pt aware of POC and in agreement at this time.      ST Cognitive/Linguistic evaluation(s) performed today; functional performance at today's assessment is as above. At baseline, the patient was independent with all IADLs and driving . The patient is limited by the following barriers:  pain, memory deficits     Additional factors influencing patient status and progress include: prior level of function, PMH, and the above co morbidities.    The patient is performing well below their baseline level of function and will benefit from an interdisciplinary high intensity rehabilitation program to maximize functional independence, decrease burden of care, and support a safe return home with spousal support.    Plan:   Recommend Speech Therapy 30-60 minutes per day 5-6 days per week for 1 weeks for the following treatments:  SLP Self Care / ADL  Training , SLP Cognitive Skill Development, and SLP Group Treatment.    Goals:  Long term and short term goals have been discussed with patient and spouse and they are in agreement.    Speech Therapy Problems (Active)       Problem: Memory STGs       Dates: Start:  05/07/25         Goal: STG-Within one week, patient will recall information related to day and hospitalization with 80% accuracy provided MIN A       Dates: Start:  05/07/25            Goal: STG-Within one week, patient will complete 3 and 4 unit mental manipulation with 80% accuracy provided MIN A       Dates: Start:  05/07/25

## 2025-05-07 NOTE — PROGRESS NOTES
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  Allergies: Patient has no known allergies.    Safety  Patient Assist     Patient Precautions     Bed Transfer Status     Toilet Transfer Status      Assistive Devices     Oxygen  None - Room Air  Diet/Therapeutic Dining  Current Diet Order   Procedures    Diet Order Diet: Cardiac     Pill Administration  whole  Agitated Behavioral Scale     ABS Level of Severity       Fall Risk  Has the patient had a fall this admission?      Joie Ruiz Fall Risk Scoring  12, MODERATE RISK  Fall Risk Safety Measures  bed alarm and chair alarm    Vitals  Temperature: 36.1 °C (97 °F)  Temp src: Temporal  Pulse: 66  Respiration: 18  Blood Pressure : 121/55  Blood Pressure MAP (Calculated): 77 MM HG  BP Location: Left, Upper Arm  Patient BP Position: Dumont's Position     Oxygen  Pulse Oximetry: 92 %  O2 (LPM): 0  O2 Delivery Device: None - Room Air    Bowel and Bladder  Last Bowel Movement  05/06/25  Stool Type  Type 7: Watery, no solid pieces-entirely liquid  Bowel Device     Continent  Bladder: Did not void (Fields in Place)   Bowel: No movement  Bladder Function  Number of Times Voided: 1  Urine Color: Yellow  Urine Clarity: Sediment  Genitourinary Assessment   Bladder Assessment (WDL):  WDL Except  Fields Catheter: Present with Active Order  Fields Reasons per MD Order: Acute urinary retention or bladder outlet obstruction  Urine Color: Yellow  Urine Clarity: Sediment  Bladder Device: Indwelling Catheter    Skin  Shaun Score   16  Sensory Interventions   Bed Types: Standard/Trauma Mattress with Overlay  Moisture Interventions  Moisturizers/Barriers: Moisturizer   Containment Devices: Indwelling Catheter      Pain  Pain Rating Scale  2 - Notice Pain, does not interfere with activities  Pain Location  Leg  Pain Location  Orientation  Left  Pain Interventions   Medication (see MAR)    ADLs    Bathing      Linen Change      Personal Hygiene     Chlorhexidine Bath      Oral Care     Teeth/Dentures     Shave     Nutrition Percentage Eaten     Environmental Precautions     Patient Turns/Positioning  Patient turns self independently side to side without assistance, to offload sacral area  Patient Turns Assistance/Tolerance  Tolerates Well  Bed Positions     Head of Bed Elevated         Psychosocial/Neurologic Assessment  Psychosocial Assessment  Psychosocial (WDL):  Within Defined Limits  Neurologic Assessment  Neuro (WDL): Exceptions to WDL  Level of Consciousness: Alert  Orientation Level: Oriented X4  Cognition: Appropriate attention/concentration, Follows commands  Speech: Clear  Motor Function/Sensation Assessment: Motor strength, Motor response  RUE Motor Response: Normal extension, Normal flexion  Muscle Strength Right Arm: Good Strength Against Gravity and Moderate Resistance  LUE Motor Response: Normal extension, Normal flexion  Muscle Strength Left Arm: Good Strength Against Gravity and Moderate Resistance  Muscle Strength Right Leg: Weak Movement but Not Against Gravity or Resistance  Muscle Strength Left Leg: Weak Movement but Not Against Gravity or Resistance  EENT (WDL):  WDL Except    Cardio/Pulmonary Assessment  Edema      Respiratory Breath Sounds  RUL Breath Sounds: Clear  RML Breath Sounds: Clear  RLL Breath Sounds: Clear  NADINE Breath Sounds: Clear  LLL Breath Sounds: Clear  Cardiac Assessment   Cardiac (WDL):  WDL Except    \

## 2025-05-07 NOTE — PROGRESS NOTES
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  Allergies: Patient has no known allergies.    Safety  Patient Assist     Patient Precautions  Precautions: Fall Risk  Fall Risk: Poor balance, Poor safety  Bowel and Bladder: Fields present  Weight Bearing: NWB RLE, NWB LLE, Other (see comments) (RLE WB ok for transfers only)  Comments: Privacy bag provided for cleanliness  Bed Transfer Status  Minimal Assist  Toilet Transfer Status   Minimal Assist  Assistive Devices     Oxygen  None - Room Air  Diet/Therapeutic Dining  Current Diet Order   Procedures    Diet Order Diet: Cardiac     Pill Administration  whole  Agitated Behavioral Scale     ABS Level of Severity       Fall Risk  Has the patient had a fall this admission?      Joie Ruiz Fall Risk Scoring  16, HIGH RISK  Fall Risk Safety Measures  bed alarm and chair alarm    Vitals  Temperature: 36.7 °C (98.1 °F)  Temp src: Oral  Pulse: 63  Respiration: 16  Blood Pressure : 125/57  Blood Pressure MAP (Calculated): 80 MM HG  BP Location: Left, Upper Arm  Patient BP Position: Supine     Oxygen  Pulse Oximetry: 95 %  O2 (LPM): 0  O2 Delivery Device: None - Room Air    Bowel and Bladder  Last Bowel Movement  05/06/25  Stool Type  Type 7: Watery, no solid pieces-entirely liquid  Bowel Device     Continent  Bladder: Did not void (Fields in Place)   Bowel: No movement  Bladder Function  Urine Void (mL): 750 ml  Number of Times Voided: 1  Urine Color: Becky  Urine Clarity: Sediment  Genitourinary Assessment   Bladder Assessment (WDL):  WDL Except  Fields Catheter: Present with Active Order  Fields Reasons per MD Order: Acute urinary retention or bladder outlet obstruction  Fields Care: Given with Soap and Water  Urine Color: Becky  Urine Clarity: Sediment  Bladder Device: Indwelling Catheter    Skin  Shaun Score    16  Sensory Interventions   Bed Types: Standard/Trauma Mattress  Skin Preventative Measures: Waffle Overlay  Moisture Interventions  Moisturizers/Barriers: Barrier Paste  Containment Devices: Indwelling Catheter      Pain  Pain Rating Scale  4 - Distracts me, can do usual activities  Pain Location  Knee  Pain Location Orientation  Left  Pain Interventions   Declines    ADLs    Bathing   Shower, Staff  Linen Change   Complete  Personal Hygiene     Chlorhexidine Bath      Oral Care     Teeth/Dentures     Shave     Nutrition Percentage Eaten  Lunch, Between 25-50% Consumed  Environmental Precautions     Patient Turns/Positioning  Patient turns self independently side to side without assistance, to offload sacral area  Patient Turns Assistance/Tolerance  Tolerates Well  Bed Positions     Head of Bed Elevated         Psychosocial/Neurologic Assessment  Psychosocial Assessment  Psychosocial (WDL):  Within Defined Limits  Neurologic Assessment  Neuro (WDL): Exceptions to WDL  Level of Consciousness: Alert  Orientation Level: Oriented X4  Cognition: Appropriate attention/concentration, Follows commands  Speech: Clear  Pupil Assesment: No  Motor Function/Sensation Assessment: Motor strength, Motor response  RUE Motor Response: Normal extension, Normal flexion  Muscle Strength Right Arm: Good Strength Against Gravity and Moderate Resistance  LUE Motor Response: Normal extension, Normal flexion  Muscle Strength Left Arm: Good Strength Against Gravity and Moderate Resistance  Muscle Strength Right Leg: Weak Movement but Not Against Gravity or Resistance  Muscle Strength Left Leg: Weak Movement but Not Against Gravity or Resistance  EENT (WDL):  WDL Except    Cardio/Pulmonary Assessment  Edema      Respiratory Breath Sounds  RUL Breath Sounds: Clear  RML Breath Sounds: Clear  RLL Breath Sounds: Clear  NADINE Breath Sounds: Clear  LLL Breath Sounds: Clear  Cardiac Assessment   Cardiac (WDL):  WDL Except

## 2025-05-07 NOTE — PROGRESS NOTES
Pt refused Celebrex and Senna medication, this RN provided education on this medication and risks of refusal. Risks includes pain and constipation.
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
## 2025-05-07 NOTE — PROGRESS NOTES
0315 Patient woke up with small amount of bleeding on his right eye.   Cleanse with normal saline and put pressure  until it stop bleeding.  Noted bleeding coming out from the tearduct with scratches on the side of his eyes noted.  Pt verbalize that he might have scratched his eyes while sleeping and noted blood on his fingernails.  Vital sign taken with a result of 107/55, 62 P, 16 R, 97.6F. Denies any pain or discomfort on the right eye.  NOC supervisor made aware.

## 2025-05-07 NOTE — DISCHARGE PLANNING
CASE MANAGEMENT INITIAL ASSESSMENT    Admit Date:  2025     I met with patient to discuss role of case management / discharge planning / team conference.   Patient is a  74 y.o. male transferred from [facility].    Diagnosis: Traumatic brain injury with delayed recovery ([facility]) [S06.9XAA]    Co-morbidities:   Patient Active Problem List    Diagnosis Date Noted    Traumatic brain injury with delayed recovery ([facility]) 2025    Closed fracture of orbit with routine healing 2025    Traumatic brain injury ([facility]) 2025    Traumatic brain injury with new neurocognitive deficit ([facility]) 2025    Alcoholism ([facility]) 2025    Discharge planning issues 2025    Encounter for geriatric assessment 2025    CAD (coronary artery disease) 2025    S/P mitral valve clip implantation 2025    Hyperlipidemia 2025    Acute blood loss anemia 2025    Thrombocytopenia ([facility]) 2025    Hearing loss 2025    Stented coronary artery 2025    Gastroesophageal reflux disease 2025    Psoriatic arthritis ([facility]) 2025    Sleep apnea 2025    Blunt injury of eye, right, subsequent encounter 2025    Trauma 2025    Contraindication to deep vein thrombosis (DVT) prophylaxis 2025    Multiple closed pelvic fractures with disruption of pelvic [place], with delayed healing, subsequent encounter 2025    Long term (current) use of antithrombotics/antiplatelets 2025    Orbital fracture, open, initial encounter ([facility]) 2025    Cardiac disease 2025    Depression 2025    Insomnia 2025    Extraperitoneal rupture of bladder 2025    Traumatic rupture of bladder 2025    Lumbar transverse process fracture, closed, initial encounter ([facility]) 2025     Prior Living Situation:  [facility]  Lives with: Significant Other    Prior Level of Function:  Medication Management: Independent  Finances: Independent  Home Management:  Independent  Shopping: Independent  Prior Level Of Mobility: Independent Without Device in Community, Independent Without Device in Home  Driving: Driving Independent    Support Systems:  Primary : Bernabe Huffman    Previous Services Utilized:   Equipment Owned: Front-Wheel Walker, Single Point Cane  Prior Services: Home-Independent    Other Information:  Occupation: Retired Due To Age  Primary Payor Source: Medicare A, Medicare B  Primary Care Practitioner: Vitor Raines MD  Other MDs: Dr. Antonio Sesay and Dr. Celi Seaman    Patient / Family Goal: Discussed dcp goals with pt at bedside. [place] [address] [place] [address] Per pt is IADLs, drives, and has FWW, SPC, and Tub / Shower Seat.    Plan:  1. Continue to follow patient through hospitalization and provide discharge planning in collaboration with patient, family, physicians and ancillary services.     2. Utilize community resources to ensure a safe discharge.

## 2025-05-07 NOTE — CARE PLAN
"The patient is Watcher - Medium risk of patient condition declining or worsening    Shift Goals  Clinical Goals: safety  Patient Goals: safety, pain management, sleep    Problem: Fall Risk - Rehab  Goal: Patient will remain free from falls  Outcome: Progressing  Note: Joie Ruiz Fall risk Assessment Score: 16    High fall risk Interventions   - Alarming seatbelt  - Bed and strip alarm   - Yellow sign by the door   - Yellow wrist band \"Fall risk\"  - Room near to the nurse station  - Do not leave patient unattended in the bathroom  - Fall risk education provided     Pt using call light appropriately when in need of assistance.          Problem: Skin Integrity  Goal: Skin integrity is maintained or improved. Outcome: Progressing     Problem: Pain - Standard  Goal: Alleviation of pain or a reduction in pain to the patient’s comfort goal  Outcome: Progressing  Note: Complaint of pain to left leg with a pain scale of 7/10, medicated with Oxycodone 10 mg with help.      "

## 2025-05-07 NOTE — THERAPY
Occupational Therapy   Initial Evaluation     Patient Name:  Vicente Whitfield  Age:  74 y.o., Sex:  male  Medical Record #:  [id number]  Today's Date: 5/7/2025     Subjective: Pt pleasant and agreeable to OT session.      Objective:    05/07/25 0701   OT Charge Group   OT Self Care / ADL (Units) 2   OT Evaluation OT Evaluation Mod   OT Total Time Spent   OT Individual Total Time Spent (Mins) 75   Precautions   Precautions Fall Risk   Bowel and Bladder Fields present   Weight Bearing NWB RLE;NWB LLE   Comments RLE WB ok for transfers only   Vitals   O2 Delivery Device None - Room Air   Prior Living Situation   Prior Services Home-Independent   Housing / Facility [address]   Steps Into Home 1   Steps In Home 0   Bathroom Set up Walk In Shower;Bathtub / Shower Combination   Equipment Owned Front-Wheel Walker   Lives with - Patient's Self Care Capacity Significant Other   Prior Level of ADL Function   Self Feeding Independent   Grooming / Hygiene Independent   Bathing Independent   Dressing Independent   Toileting Independent   Prior Level of IADL Function   Medication Management Independent   Laundry Independent   Kitchen Mobility Independent   Finances Independent   Home Management Independent   Shopping Independent   Prior Level Of Mobility Independent Without Device in Community;Independent Without Device in Home   Driving / Transportation Driving Independent   Occupation (Pre-Hospital Vocational) Retired Due To Age   Prior Functioning: Everyday Activities   Self Care Independent   Indoor Mobility (Ambulation) Independent   Stairs Independent   Functional Cognition Independent   Prior Device Use None of the given options   Cognitive Pattern Assessment   Cognitive Pattern Assessment Used BIMS   Brief Interview for Mental Status (BIMS)   Repetition of Three Words (First Attempt) 3   Temporal Orientation: Year Correct   Temporal Orientation: Month Accurate within 5 days   Temporal Orientation: Day Correct   Recall:  "\"Sock\" Yes, no cue required   Recall: \"Blue\" Yes, no cue required   Recall: \"Bed\" Yes, no cue required   BIMS Summary Score 15   Confusion Assessment Method (CAM)   Is there evidence of an acute change in mental status from the patient's baseline? No   Inattention Behavior not present   Disorganized thinking Behavior not present   Altered level of consciousness Behavior not present   Hearing, Speech, and Vision   Ability to Hear Adequate   Expression of Ideas and Wants Without difficulty   Understanding Verbal and Non-Verbal Content Understands   Eating   Assistance Needed Set-up / clean-up   Physical Assistance Level No physical assistance   CARE Score - Eating 5   Eating   Level of Assist Set Up   Oral Hygiene   Assistance Needed Set-up / clean-up   Physical Assistance Level No physical assistance   CARE Score - Oral Hygiene 5   Oral Hygiene   Level of Assist Set Up   Patient Position Seated   Upper Body Dressing   Assistance Needed Set-up / clean-up   CARE Score - Upper Body Dressing 5   Upper Body Dressing   Level of Assist Set Up   Lower Body Dressing   Assistance Needed Physical assistance   Physical Assistance Level 51%-75%   CARE Score - Lower Body Dressing 2   Lower Body Dressing   Level of Assist Maximal Assist   Patient Position Standing;Seated   Clothing Item(s) Pants   Additional Description Assist with catheter management;Assist with threading pant leg;Assist with draw down;Assist with draw up;Assist for balance;Grab bars   Putting On/Taking Off Footwear   Assistance Needed Physical assistance   Physical Assistance Level Total assistance   CARE Score - Putting On/Taking Off Footwear 1   Putting On/Taking Off Footwear   Level of Assist Total Assist   Patient Position Seated   Footwear Type Treaded Socks   Shower/Bathe Self   Assistance Needed Set-up / clean-up;Adaptive equipment   CARE Score - Shower/Bathe Self 5   Shower/Bathe Self   Level of Assist Set Up   Patient Position Seated   Adaptive Equipment " Handheld Shower Head;Fold Down Shower Bench;Grab Bars   Tub/Shower Transfer   Level of Assist Minimal Assist   Transfer Type Stand pivot transfer   Adaptive Equipment Grab bars;Use of showerchair   Assistive Device Wheelchair;Fold Down Bench   Additional Description Cueing needed;Extra time needed   Toilet Transfer   Assistance Needed Physical assistance;Adaptive equipment;Set-up / clean-up   Physical Assistance Level 25% or less   CARE Score - Toilet Transfer 3   Toilet Transfer   Level of Assist Minimal Assist   Transfer Type Stand Pivot Transfer   Adaptive Equipment Grab bars   Assistive Device Wheelchair   Additional Description Cueing needed;Extra time needed   Toileting Hygiene   Assistance Needed Incidental touching;Adaptive equipment;Set-up / clean-up;Verbal cues   CARE Score - Toileting Hygiene 4   Chair/Bed-to-Chair Transfer   Assistance Needed Physical assistance;Adaptive equipment   Physical Assistance Level 25% or less   CARE Score - Chair/Bed-to-Chair Transfer 3   Chair/Bed-to-Chair Transfer   Level of Assist Minimal Assist   Transfer Type Squat Pivot Transfer   Assistive Devices Wheelchair   Additional Description Cueing needed;Extra time needed   Patient Scheduling Information   OT Time 30/60 minutes   Primary or Coverage OT BS   Problem List   Problem List Decreased Active Daily Living Skills;Decreased Homemaking Skills;Decreased Functional Mobility;Decreased Activity Tolerance;Impaired Postural Control / Balance   Strengths & Barriers   Strengths Effective communication skills;Independent prior level of function;Good insight into deficits/needs;Making steady progress towards goals;Motivated for self care and independence;Pleasant and cooperative;Supportive family;Willingly participates in therapeutic activities   Barriers Limited mobility;Impaired balance;Impaired activity tolerance;Generalized weakness;Fatigue   Benefit    Therapy Benefit Patient Would Benefit from Inpatient Rehab Occupational  Therapy to Maximize [place] with ADLs, IADLs and Functional Mobility.   Current Discharge Plan   Current Discharge Plan Return to Prior Living Situation   Interdisciplinary Plan of Care Collaboration   IDT Collaboration with  Physical Therapist   Patient Position at End of Therapy Seated;Chair Alarm On;Self Releasing Lap Belt Applied   Collaboration Comments CLOF     Patient has been educated on the following items: occupational therapy role, occupational therapy plan of care, rehab expectations, goal setting, ADL needs, patient passport, and fall risk and use of call light    Assessment:   Patient is a 74 y.o. male with a diagnosis of Traumatic brain injury with delayed recovery ([facility]) [S06.9XAA].    Currently, the patient has the following precautions: Fall Risk: Poor balance, Poor safety, Bowel and Bladder: Fields present, Weight Bearing: NWB RLE, NWB LLE, Other (see comments) (RLE WB ok for transfers only), Comments: Privacy bag provided for cleanliness    Patient's PMH includes: No past medical history on file.  Patient's Past Surgical History:   Past Surgical History:   Procedure Laterality Date    CT CYSTOURETHROSCOPY N/A 4/24/2025    Procedure: ANTERIOR CYSTORRHAPHY;  Surgeon: Abhishek Hernandez M.D.;  Location: SURGERY [facility];  Service: Urology    ORIF, PELVIS N/A 4/24/2025    Procedure: ORIF, PELVIS;  Surgeon: Ori Alvarez M.D.;  Location: SURGERY [facility];  Service: Orthopedics       OT evaluation performed today; functional performance at today's assessment is as above. At baseline, the patient was independent with ADLs/IADLs. The patient is limited by the following barriers: Limited mobility, Impaired balance, Impaired activity tolerance, Generalized weakness, Fatigue    Additional factors influencing patient status and prognosis include: prior level of function, PMH, and the above comorbidities.    The patient is performing well below their baseline level of function and will benefit  from an interdisciplinary high intensity rehabilitation program to maximize functional independence with ADL's, IADL's and functional mobility, decrease burden of care, and support a safe return home with spousal support.    Plan:   Recommend Occupational Therapy  minutes per day 5-7 days per week for 2 weeks for the following treatments:  OT Self Care/ADL, OT Neuro Re-Ed/Balance, OT Therapeutic Activity, and OT Therapeutic Exercise.    Passport items to be completed:  Perform bathroom transfers, complete dressing, complete feeding, get ready for the day, prepare a simple meal, participate in household tasks, adapt home for safety needs, demonstrate home exercise program, complete caregiver training     Goals: Long term and short term goals have been discussed with patient and they are in agreement.    Occupational Therapy Goals (Active)       Problem: Bathing       Dates: Start:  05/07/25         Goal: STG-Within one week, patient will bathe with CGA.        Dates: Start:  05/07/25               Problem: Dressing       Dates: Start:  05/07/25         Goal: STG-Within one week, patient will dress LB with CGA.        Dates: Start:  05/07/25               Problem: OT Long Term Goals       Dates: Start:  05/07/25         Goal: LTG-By discharge, patient will complete basic self care tasks mod I.        Dates: Start:  05/07/25            Goal: LTG-By discharge, patient will perform bathroom transfers mod I.        Dates: Start:  05/07/25               Problem: Toileting       Dates: Start:  05/07/25         Goal: STG-Within one week, patient will complete toileting tasks with CGA.        Dates: Start:  05/07/25

## 2025-05-07 NOTE — THERAPY
"Occupational Therapy  Daily Treatment     Patient Name:  Vicente Whitfield  Age:  74 y.o., Sex:  male  Medical Record #:  [id number]  Today's Date:  5/7/2025    Precautions  Precautions: Fall Risk  Fall Risk: Poor balance, Poor safety  Bowel and Bladder: Fields present  Weight Bearing: NWB RLE, NWB LLE, Other (see comments) (RLE WB ok for transfers only)  Comments: Privacy bag provided for cleanliness    Subjective: Pt pleasant and agreeable to OT session.      Objective:    05/07/25 1101   OT Charge Group   OT Self Care / ADL (Units) 1   OT Total Time Spent   OT Individual Total Time Spent (Mins) 15   Interdisciplinary Plan of Care Collaboration   Patient Position at End of Therapy Seated;Call Light within Reach;Tray Table within Reach     Pt introduced to AE to support ADL performance and safety while compensating for mobility deficits 2/2 pain during forward flexion with distal pant mgmt. Pt introduced to \"hip kit\", including reacher and sock aid. Pt provided with verbal description plus visual demo of above AE.         Assessment:  Pt receptive to all education, including use of remediative and compensatory strategies to improve ADL performance.   Strengths: Effective communication skills, Independent prior level of function, Good insight into deficits/needs, Making steady progress towards goals, Motivated for self care and independence, Pleasant and cooperative, Supportive family, Willingly participates in therapeutic activities  Barriers: Limited mobility, Impaired balance, Impaired activity tolerance, Generalized weakness, Fatigue    Plan:  ADLs, Fxl mobility with WB precautions    DME       Passport items to be completed:  Perform bathroom transfers, complete dressing, complete feeding, get ready for the day, prepare a simple meal, participate in household tasks, adapt home for safety needs, demonstrate home exercise program, complete caregiver training     Occupational Therapy Goals (Active)       Problem: " Bathing       Dates: Start:  05/07/25         Goal: STG-Within one week, patient will bathe with CGA.        Dates: Start:  05/07/25               Problem: Dressing       Dates: Start:  05/07/25         Goal: STG-Within one week, patient will dress LB with CGA.        Dates: Start:  05/07/25               Problem: OT Long Term Goals       Dates: Start:  05/07/25         Goal: LTG-By discharge, patient will complete basic self care tasks mod I.        Dates: Start:  05/07/25            Goal: LTG-By discharge, patient will perform bathroom transfers mod I.        Dates: Start:  05/07/25               Problem: Toileting       Dates: Start:  05/07/25         Goal: STG-Within one week, patient will complete toileting tasks with CGA.        Dates: Start:  05/07/25
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
## 2025-05-07 NOTE — CARE PLAN
"The patient is Watcher - Medium risk of patient condition declining or worsening    Shift Goals  Clinical Goals: Safety, pain management  Patient Goals: Rest    Problem: Knowledge Deficit - Standard  Goal: Patient and family/care givers will demonstrate understanding of plan of care, disease process/condition, diagnostic tests and medications  Outcome: Progressing    Patient educated and states understanding of POC and disease process.  All questions answered at this time.     Problem: Fall Risk - Rehab  Goal: Patient will remain free from falls  Outcome: Progressing  Joie Ruiz Fall risk Assessment Score: 12    Moderate fall risk Interventions  - Bed and strip alarm   - Yellow sign by the door   - Yellow wrist band \"Fall risk\"  - Room near to the nurse station  - Do not leave patient unattended in the bathroom  - Fall risk education provided    "

## 2025-05-07 NOTE — CARE PLAN
"  Problem: Fall Risk - Rehab  Goal: Patient will remain free from falls  Note: Joie Ruiz Fall risk Assessment Score: 16    High fall risk Interventions   - Bed and strip alarm   - Yellow sign by the door   - Yellow wrist band \"Fall risk\"  - Room near to the nurse station  - Do not leave patient unattended in the bathroom  - Fall risk education provided     Problem: Discharge Barriers/Planning  Goal: Patient's continuum of care needs are met  Note: Patient had an episode of bleeding from his right eye. Patient applied pressure using a dry gauze and bleeding stopped. Dr. Chapman notified.      "

## 2025-05-08 ENCOUNTER — APPOINTMENT (OUTPATIENT)
Dept: PHYSICAL THERAPY | Facility: REHABILITATION | Age: 75
DRG: 949 | End: 2025-05-08
Attending: PHYSICAL MEDICINE & REHABILITATION
Payer: OTHER MISCELLANEOUS

## 2025-05-08 ENCOUNTER — APPOINTMENT (OUTPATIENT)
Dept: OCCUPATIONAL THERAPY | Facility: REHABILITATION | Age: 75
DRG: 949 | End: 2025-05-08
Attending: PHYSICAL MEDICINE & REHABILITATION
Payer: OTHER MISCELLANEOUS

## 2025-05-08 ENCOUNTER — HOSPITAL ENCOUNTER (OUTPATIENT)
Dept: RADIOLOGY | Facility: MEDICAL CENTER | Age: 75
End: 2025-05-08
Attending: PHYSICAL MEDICINE & REHABILITATION
Payer: COMMERCIAL

## 2025-05-08 ENCOUNTER — APPOINTMENT (OUTPATIENT)
Dept: SPEECH THERAPY | Facility: REHABILITATION | Age: 75
DRG: 949 | End: 2025-05-08
Attending: PHYSICAL MEDICINE & REHABILITATION
Payer: OTHER MISCELLANEOUS

## 2025-05-08 ENCOUNTER — HOSPITAL ENCOUNTER (OUTPATIENT)
Dept: RADIOLOGY | Facility: MEDICAL CENTER | Age: 75
End: 2025-05-08
Attending: PHYSICAL MEDICINE & REHABILITATION
Payer: MEDICARE

## 2025-05-08 PROCEDURE — 74176 CT ABD & PELVIS W/O CONTRAST: CPT

## 2025-05-08 ASSESSMENT — PAIN DESCRIPTION - PAIN TYPE
TYPE: ACUTE PAIN

## 2025-05-08 NOTE — CARE PLAN
"The patient is Stable - Low risk of patient condition declining or worsening    Shift Goals  Clinical Goals: Comfort and safety  Patient Goals: Comfort    Progress made toward(s) clinical / shift goals:    Problem: Knowledge Deficit - Standard  Goal: Patient and family/care givers will demonstrate understanding of plan of care, disease process/condition, diagnostic tests and medications  Outcome: Progressing     Problem: Skin Integrity  Goal: Skin integrity is maintained or improved  Outcome: Progressing     Problem: Fall Risk - Rehab  Goal: Patient will remain free from falls  Outcome: Progressing  Note: Joie Ruiz Fall risk Assessment Score: 15    High fall risk Interventions   - Bed and strip alarm   - Yellow sign by the door   - Yellow wrist band \"Fall risk\"  - Room near to the nurse station  - Do not leave patient unattended in the bathroom  - Fall risk education provided     Problem: Discharge Barriers/Planning  Goal: Patient's continuum of care needs are met  Outcome: Progressing     Problem: Bladder / Voiding  Goal: Patient will establish and maintain regular urinary output  Outcome: Progressing     Problem: Bowel Elimination  Goal: Patient will participate in bowel management program  Outcome: Progressing       Patient is not progressing towards the following goals:      "

## 2025-05-08 NOTE — CARE PLAN
Problem: Bathing  Goal: STG-Within one week, patient will bathe with supervision.  Outcome: Not Met     Problem: Dressing  Goal: STG-Within one week, patient will dress LB with CGA.   5/8/2025 0813 by Francisca Fontana, OT  Outcome: Not Met  Note: Requires max A.    Problem: Toileting  Goal: STG-Within one week, patient will complete toileting tasks with CGA.   5/8/2025 0813 by Francisca Fontana, OT  Outcome: Not Met  Note: Requires min A.
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
## 2025-05-08 NOTE — CARE PLAN
Problem: Mobility  Goal: STG-Within one week, patient will propel wheelchair community >150 ft mod I indoors   Outcome: Progressing     Problem: Mobility Transfers  Goal: STG-Within one week, patient will transfer bed to chair SBA/set up, while maintaining WB restrictions  Outcome: Progressing

## 2025-05-08 NOTE — THERAPY
Physical Therapy   Daily Treatment     Patient Name:  Vicente Whitfield  Age:  74 y.o., Sex:  male  Medical Record #:  [id number]  Today's Date: 5/8/2025     Precautions  Precautions: Fall Risk  Fall Risk: Poor balance, Poor safety  Bowel and Bladder: Fields present  Weight Bearing: NWB RLE, NWB LLE (pt able to bear weight to RLE for pivot transfers)  Comments: Privacy bag provided for cleanliness    Subjective: Pt resting in bed, SO Deena and daughter Iain present for on-going training    Objective:    05/08/25 1301   PT Charge Group   PT Therapeutic Activities (Units) 2   PT Total Time Spent   PT Individual Total Time Spent (Mins) 30   Lying to Sitting on Side of Bed   Level of Assist Stand By Assist   Sit to Stand   Level of Assist Contact Guard Assist;Minimal Assist  (for stand-pivot transfer on RLE only)   Chair/Bed-to-Chair Transfer   Level of Assist Contact Guard Assist   Transfer Type Squat Pivot Transfer  (reach-pivot)   Car Transfer   Level of Assist Contact Guard Assist   Transfer Type Stand Pivot Transfer   Assistive Device   (wheelchair/ car handle)   Additional Description Cueing needed   Family Training   Training In Progress         Therapeutic Activities  Purpose: to improve performance and function of daily activities, to provide patient and family education, and to increase safety with activities of daily life and mobility related to activities of daily life  Interventions:   Bed/chair transfer training  Car transfer  Patient or family education  Wc parts/ management and set-up/ positioning for transfers    Assessment: Pt requires cues for set-up and wc parts management, daughter able to assist with providing cues and assisting with wc parts, SO unable to lift wc into vehicle after providing CGA for car transfer into personal vehicle. Discussed alternative transportation options vs using step stool to lift wc from ground>stepstool>the into trunk of car. Recommend on-going family training.    Strengths:  "Able to follow instructions, Effective communication skills, Independent prior level of function, Manages pain appropriately, Motivated for self care and independence, Pleasant and cooperative, Willingly participates in therapeutic activities  Barriers: Decreased endurance, Fatigue, Home accessibility, Impaired activity tolerance, Impaired balance, Limited mobility, Pain (1 TONIE, BLE NWB (plan to install ramp prior to D/C))    Plan:   Wc endurance /propulsion and parts management  OOB activity tolerance  Stand-pivot / reach-pivot transfers bed<>wc  AAROM/ strength training     Family training    DME    PT DME Recommendations  Wheelchair: 18\" Width, Lightweight, Removable/Flip Back Armrests, Standard Leg Rests      Passport items to be completed:  Get in/out of bed safely, in/out of a vehicle, safely use mobility device, walk or wheel around home/community, navigate up and down stairs, show how to get up/down from the ground, ensure home is accessible, demonstrate HEP, complete caregiver training    Physical Therapy Problems (Active)       Problem: Mobility       Dates: Start:  05/07/25         Goal: STG-Within one week, patient will propel wheelchair community >150 ft mod I indoors        Dates: Start:  05/07/25               Problem: Mobility Transfers       Dates: Start:  05/07/25         Goal: STG-Within one week, patient will transfer bed to chair SBA/set up, while maintaining WB restrictions       Dates: Start:  05/07/25               Problem: PT-Long Term Goals       Dates: Start:  05/07/25         Goal: LTG-By discharge, patient will propel wheelchair mod I >300 ft , including over ramped surfaces       Dates: Start:  05/07/25            Goal: LTG-By discharge, patient will transfer one surface to another mod I while maintaining WB precautions       Dates: Start:  05/07/25              "

## 2025-05-08 NOTE — THERAPY
Speech Language Pathology  Daily Treatment     Patient Name:  Vicente Whitfield  Age:  74 y.o., Sex:  male  Medical Record #:  [id number]  Today's Date: 5/8/2025    Precautions  Precautions: Fall Risk  Fall Risk: Poor balance, Poor safety  Bowel and Bladder: Fields present  Weight Bearing: NWB RLE, NWB LLE (pt able to bear weight to RLE for pivot transfers)  Comments: Privacy bag provided for cleanliness    Subjective: Pt pleasant and cooperative, spouse and daughter accompanying to session. Family and patient confirmed pt's cognition is at baseline at this time.      Objective:    05/08/25 1404   SLP Charge Group   SLP Cognitive Skill Development First 15 Minutes 1   SLP Cognitive Skill Development Additional 15 Minutes 1   SLP Total Time Spent   SLP Individual Total Time Spent (Mins) 30   Interdisciplinary Plan of Care Collaboration   IDT Collaboration with  Nursing;Family / Caregiver   Patient Position at End of Therapy Seated;Chair Alarm On;Self Releasing Lap Belt Applied;Family / Friend in Room   Collaboration Comments daughter and spouse present for session, escorting pt to room. Nursing confirmed transport for CT immediately following ST         Cognition  Purpose: to improve memory functions (short-term, long term, working, etc.)  Interventions:   Memory: Type of Memory Targeted - Short Term Memory and Working Memory    Assessment:     Education on compensatory strategies for working and short term memory. Pt endorses difficulty with working memory at baseline, especially with phone numbers, reports if he can compensate with written strategies difficulty decreases significantly. Pt primarily uses cell phone as external aid, specifically notes and calendar function, sets up reminders and uses Marisabel at home consistently. Initiated memory log, pt recalling activities completed in therapies this date, cues to expand on details for better retention of training if reviewing at a later date, I.e. instead of writing  "\"therapy exercises\" pt encouraged to expand on specific exercises and techniques to employ. Per patient, questions missed on outcome assessment pertaining to memory are because \"I wasn't paying attention.\" Family and pt confirm pt is at cognitive baseline at this time.     Plan:  D/c ST - no further intervention warranted at this time, given pt is at his cognitive baseline. Continue physical rehabilitation while at [facility] to reduce caregiver burden at d/c.     Passport items to be completed:      Speech Therapy Problems (Active)       Problem: Memory STGs       Dates: Start:  05/07/25         Goal: STG-Within one week, patient will recall information related to day and hospitalization with 80% accuracy provided MIN A       Dates: Start:  05/07/25            Goal: STG-Within one week, patient will complete 3 and 4 unit mental manipulation with 80% accuracy provided MIN A       Dates: Start:  05/07/25              "

## 2025-05-08 NOTE — CARE PLAN
Problem: Memory STGs  Goal: STG-Within one week, patient will recall information related to day and hospitalization with 80% accuracy provided MIN A  Outcome: Not Met  Goal: STG-Within one week, patient will complete 3 and 4 unit mental manipulation with 80% accuracy provided MIN A  Outcome: Not Met

## 2025-05-08 NOTE — PROGRESS NOTES
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  Allergies: Patient has no known allergies.    Safety  Patient Assist     Patient Precautions  Precautions: Fall Risk  Fall Risk: Poor balance, Poor safety  Bowel and Bladder: Fields present  Weight Bearing: NWB RLE, NWB LLE, Other (see comments)  Comments: Privacy bag provided for cleanliness  Bed Transfer Status  Minimal Assist  Toilet Transfer Status   Minimal Assist  Assistive Devices  Wheelchair, Rails  Oxygen  None - Room Air  Diet/Therapeutic Dining  Current Diet Order   Procedures    Diet Order Diet: Cardiac     Pill Administration  whole  Agitated Behavioral Scale     ABS Level of Severity       Fall Risk  Has the patient had a fall this admission?      Joie Ruiz Fall Risk Scoring  16, HIGH RISK  Fall Risk Safety Measures  bed alarm, chair alarm, poor balance, and low vision/hearing    Vitals  Temperature: 36.6 °C (97.9 °F)  Temp src: Oral  Pulse: 71  Respiration: 16  Blood Pressure : 116/53  Blood Pressure MAP (Calculated): 74 MM HG  BP Location: Left, Upper Arm  Patient BP Position: Supine     Oxygen  Pulse Oximetry: 94 %  O2 (LPM): 0  O2 Delivery Device: None - Room Air    Bowel and Bladder  Last Bowel Movement  05/07/25  Stool Type  Type 7: Watery, no solid pieces-entirely liquid  Bowel Device     Continent  Bladder: Did not void (Fields in Place)   Bowel: No movement  Bladder Function  Urine Void (mL): 750 ml  Number of Times Voided: 1  Urine Color: Becky  Urine Clarity: Sediment  Genitourinary Assessment   Bladder Assessment (WDL):  WDL Except  Fields Catheter: Present with Active Order  Fields Reasons per MD Order: Acute urinary retention or bladder outlet obstruction  Fields Care: Given with Soap and Water  Urine Color: Becky  Urine Clarity: Sediment  Bladder Device: Indwelling  Catheter    Skin  Shaun Score   16  Sensory Interventions   Bed Types: Standard/Trauma Mattress with Overlay  Skin Preventative Measures: Waffle Overlay  Moisture Interventions  Moisturizers/Barriers: Barrier Paste  Containment Devices: Indwelling Catheter      Pain  Pain Rating Scale  7 - Focus of attention, prevents doing daily activities  Pain Location  Leg  Pain Location Orientation  Left  Pain Interventions   Medication (see MAR)    ADLs    Bathing   Shower, Staff  Linen Change   Complete  Personal Hygiene     Chlorhexidine Bath      Oral Care     Teeth/Dentures     Shave     Nutrition Percentage Eaten  Dinner, Between 25-50% Consumed  Environmental Precautions     Patient Turns/Positioning  Patient turns self independently side to side without assistance, to offload sacral area  Patient Turns Assistance/Tolerance  Tolerates Well  Bed Positions  Bed Controls On, Bed Locked  Head of Bed Elevated  Self regulated      Psychosocial/Neurologic Assessment  Psychosocial Assessment  Psychosocial (WDL):  Within Defined Limits  Neurologic Assessment  Neuro (WDL): Exceptions to WDL  Level of Consciousness: Alert  Orientation Level: Oriented X4  Cognition: Follows commands, Appropriate attention/concentration  Speech: Clear  Pupil Assesment: No  Motor Function/Sensation Assessment: Motor strength, Motor response  RUE Motor Response: Normal extension, Normal flexion  Muscle Strength Right Arm: Good Strength Against Gravity and Moderate Resistance  LUE Motor Response: Normal extension, Normal flexion  Muscle Strength Left Arm: Good Strength Against Gravity and Moderate Resistance  Muscle Strength Right Leg: Weak Movement but Not Against Gravity or Resistance  Muscle Strength Left Leg: Weak Movement but Not Against Gravity or Resistance  EENT (WDL):  WDL Except    Cardio/Pulmonary Assessment  Edema      Respiratory Breath Sounds  RUL Breath Sounds: Clear  RML Breath Sounds: Clear  RLL Breath Sounds: Clear  NADINE Breath Sounds:  Clear  LLL Breath Sounds: Clear  Cardiac Assessment   Cardiac (WDL):  WDL Except

## 2025-05-08 NOTE — THERAPY
Physical Therapy   Daily Treatment     Patient Name:  Vicente Whitfield  Age:  74 y.o., Sex:  male  Medical Record #:  [id number]  Today's Date: 5/8/2025     Precautions  Precautions: Fall Risk  Fall Risk: Poor balance, Poor safety  Bowel and Bladder: Fields present  Weight Bearing: (P) NWB RLE, NWB LLE (pt able to bear weight to RLE for pivot transfers)  Comments: Privacy bag provided for cleanliness    Subjective: Pt up in wc, finished with breakfast, daughter present for [employer] family training    Objective:    05/08/25 0831   PT Charge Group   PT Therapeutic Exercise (Units) 2   PT Therapeutic Activities (Units) 2   PT Total Time Spent   PT Individual Total Time Spent (Mins) 60   Precautions   Weight Bearing NWB RLE;NWB LLE  (pt able to bear weight to RLE for pivot transfers)   Sit to Lying   Level of Assist Supervised   Additional Description Use of bed rail;Completed on regular bed   Lying to Sitting on Side of Bed   Level of Assist Supervised   Additional Description Use of bed rail;Completed on regular bed   Chair/Bed-to-Chair Transfer   Level of Assist Contact Guard Assist   Transfer Type Stand Pivot Transfer  (reach-pivot)   Assistive Devices Wheelchair  (bed rail)   Additional Description Cueing needed   Toilet Transfer   Level of Assist Contact Guard Assist   Transfer Type Stand Pivot Transfer   Adaptive Equipment Grab bars   Assistive Device Wheelchair   Additional Description Cueing needed   Car Transfer   Level of Assist Contact Guard Assist   Transfer Type Stand Pivot Transfer  (reach-pivot)   Assistive Device   (wheelchair/ car handle)   Additional Description Cueing needed   Wheelchair   Level of Assist Stand By Assist   Type Manual   Additional Description Extra time needed   Wheelchair Distance 150         Therapeutic Activities  Purpose: to improve performance and function of daily activities, to provide patient and family education, and to increase safety with activities of daily life and  "mobility related to activities of daily life  Interventions:   Bed/chair transfer training  Bed mobility training (scooting, supine to sit, sit to supine, rolling)  Car transfer  Patient or family education   Daughter present throughout tx to observe functional transfers and wc parts/management and set-up    Therapeutic Exercise  Purpose: to improve strength, to improve ROM/flexibility, and to improve functional endurance  Interventions:   Lower body exercises:   Supine program -   Hip abduction, 1 set of 10  Hip adduction, 1 set of 10  Heel slide, 1 set of 10  Ankle pumps, 1 set of 10  Gluteal isometrics, 1 set of 10  Quadriceps isometrics, 1 set of 10  Seated program -   Ankle pumps, 1 set of 10  Hip flexion, 1 set of 10  Hip abduction, 1 set of 10  Hip adduction, 1 set of 10  Long arc quad, 1 set of 10  Hamstring curl, 1 set of 10    Wheelchair Management, and Parts Training  Purpose: to foster independent use of a wheelchair  and initiate family training with daughter  Interventions:   Wheelchair training  Wheelchair management    Assessment: Pt tolerated session well, required cues for positioning wc safely for basic transfers and sequencing car transfer. Overall pt with improved OOB tolerance, pain control and significantly less LE swelling since re-admit.     Strengths: Able to follow instructions, Effective communication skills, Independent prior level of function, Manages pain appropriately, Motivated for self care and independence, Pleasant and cooperative, Willingly participates in therapeutic activities  Barriers: Decreased endurance, Fatigue, Home accessibility, Impaired activity tolerance, Impaired balance, Limited mobility, Pain (1 TONIE, BLE NWB (plan to install ramp prior to D/C))    Plan:   Wc endurance /propulsion and parts management  OOB activity tolerance  Stand-pivot / reach-pivot transfers bed<>wc  AAROM/ strength training     Family training    DME    PT DME Recommendations  Wheelchair: 18\" " Width, Lightweight, Removable/Flip Back Armrests, Standard Leg Rests      Passport items to be completed:  Get in/out of bed safely, in/out of a vehicle, safely use mobility device, walk or wheel around home/community, navigate up and down stairs, show how to get up/down from the ground, ensure home is accessible, demonstrate HEP, complete caregiver training    Physical Therapy Problems (Active)       Problem: Mobility       Dates: Start:  05/07/25         Goal: STG-Within one week, patient will propel wheelchair community >150 ft mod I indoors        Dates: Start:  05/07/25               Problem: Mobility Transfers       Dates: Start:  05/07/25         Goal: STG-Within one week, patient will transfer bed to chair SBA/set up, while maintaining WB restrictions       Dates: Start:  05/07/25               Problem: PT-Long Term Goals       Dates: Start:  05/07/25         Goal: LTG-By discharge, patient will propel wheelchair mod I >300 ft , including over ramped surfaces       Dates: Start:  05/07/25            Goal: LTG-By discharge, patient will transfer one surface to another mod I while maintaining WB precautions       Dates: Start:  05/07/25
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
## 2025-05-08 NOTE — CARE PLAN
"The patient is Watcher - Medium risk of patient condition declining or worsening    Shift Goals  Clinical Goals: safety  Problem: Fall Risk - Rehab  Goal: Patient will remain free from falls  Note: Joie Ruiz Fall risk Assessment Score: 16    High fall risk Interventions   - Bed and strip alarm   - Yellow sign by the door   - Yellow wrist band \"Fall risk\"  - call light in reach  - Do not leave patient unattended in the bathroom  - Fall risk education provided     Problem: Pain - Standard  Goal: Alleviation of pain or a reduction in pain to the patient’s comfort goal  Note: Oxycodone administered as ordered for pain.     "

## 2025-05-09 ENCOUNTER — APPOINTMENT (OUTPATIENT)
Dept: PHYSICAL THERAPY | Facility: REHABILITATION | Age: 75
DRG: 949 | End: 2025-05-09
Attending: PHYSICAL MEDICINE & REHABILITATION
Payer: OTHER MISCELLANEOUS

## 2025-05-09 ENCOUNTER — APPOINTMENT (OUTPATIENT)
Dept: OCCUPATIONAL THERAPY | Facility: REHABILITATION | Age: 75
DRG: 949 | End: 2025-05-09
Attending: PHYSICAL MEDICINE & REHABILITATION
Payer: OTHER MISCELLANEOUS

## 2025-05-09 ASSESSMENT — PAIN DESCRIPTION - PAIN TYPE
TYPE: ACUTE PAIN

## 2025-05-09 ASSESSMENT — FIBROSIS 4 INDEX: FIB4 SCORE: 1.39

## 2025-05-09 NOTE — THERAPY
Physical Therapy   Daily Treatment     Patient Name:  Vicente Whitfield  Age:  74 y.o., Sex:  male  Medical Record #:  [id number]  Today's Date: 5/9/2025     Precautions  Precautions: Fall Risk  Fall Risk: Poor balance, Poor safety  Bowel and Bladder: Fields present  Weight Bearing: NWB RLE, NWB LLE (pt able to bear weight to RLE for pivot transfers)  Comments: Privacy bag provided for cleanliness    Subjective: Pt resting in bed, willing to participate. SO and daughter present for on-going mobility training.    Objective:    05/09/25 1231   PT Charge Group   PT Therapeutic Exercise (Units) 2   PT Therapeutic Activities (Units) 2   PT Total Time Spent   PT Individual Total Time Spent (Mins) 60   Roll Left and Right   Level of Assist Supervised;Stand By Assist   Additional Description Use of bed rail;Completed on regular bed   Sit to Lying   Level of Assist Supervised;Stand By Assist   Additional Description Use of bed rail;Completed on regular bed   Lying to Sitting on Side of Bed   Level of Assist Supervised;Stand By Assist   Additional Description Use of bed rail;Completed on regular bed   Chair/Bed-to-Chair Transfer   Level of Assist Contact Guard Assist   Transfer Type Squat Pivot Transfer   Assistive Devices Wheelchair   Additional Description Cueing needed;Extra time needed   Car Transfer   Level of Assist Contact Guard Assist   Transfer Type Stand Pivot Transfer   Assistive Device   (wheelchair/ car handle)   Wheelchair   Level of Assist Supervised   Type Manual   Additional Description Extra time needed   Wheelchair Distance 150         Therapeutic Activities  Purpose: to improve performance and function of daily activities, to provide patient and family education, and to increase safety with activities of daily life and mobility related to activities of daily life  Interventions:   Bed/chair transfer training  Bed mobility training (scooting, supine to sit, sit to supine, rolling)  Car transfer  Patient or  "family education    On-going transfer training and wc parts management/ set-up training    Therapeutic Exercise  Purpose: to improve strength, to improve ROM/flexibility, and to improve functional endurance  Interventions:   Lower body exercises:   Supine program -   Hip abduction, 2 sets of 10  Hip adduction, 2 sets of 10  Heel slide, 2 sets of 10  Ankle pumps, 2 sets of 10  Gluteal isometrics, 2 sets of 10  Quadriceps isometrics, 2 sets of 10   Seated elba-med bile pedals x 5 minutes for AAROM    Discharge Interventions  Interventions:   On-going mobility training and problem solving transport for medical appointments.   Pt is able to complete wc<>car transfer with CGA, however SO is unable to lift wc into vehicle for transport to MD appointments  Discussed alternative transport options to include wc accessible taxi or medical [employer], asking friend/ neighbor to assist with loading wc and doctors office to assist with loading and unloading wc for appointments, or purchasing lightweight transport chair to keep in car for medical/ community outings.    Assessment: Pt requires cues for wc set-up and positioning for transfers, on-going family training for mobility and transportation options underway. Pt currently at CGA level for wc level household mobility.    Strengths: Able to follow instructions, Effective communication skills, Independent prior level of function, Manages pain appropriately, Motivated for self care and independence, Pleasant and cooperative, Willingly participates in therapeutic activities  Barriers: Decreased endurance, Fatigue, Home accessibility, Impaired activity tolerance, Impaired balance, Limited mobility, Pain (1 TONIE, BLE NWB (plan to install ramp prior to D/C))    Plan:   Wc endurance /propulsion and parts management  OOB activity tolerance  Stand-pivot / reach-pivot transfers bed<>wc  AAROM/ strength training     Family training    DME    PT DME Recommendations  Wheelchair: 18\" Width, " Lightweight, Removable/Flip Back Armrests, Standard Leg Rests      Passport items to be completed:  Get in/out of bed safely, in/out of a vehicle, safely use mobility device, walk or wheel around home/community, navigate up and down stairs, show how to get up/down from the ground, ensure home is accessible, demonstrate HEP, complete caregiver training    Physical Therapy Problems (Active)       Problem: Mobility       Dates: Start:  05/07/25         Goal: STG-Within one week, patient will propel wheelchair community >150 ft mod I indoors        Dates: Start:  05/07/25               Problem: Mobility Transfers       Dates: Start:  05/07/25         Goal: STG-Within one week, patient will transfer bed to chair SBA/set up, while maintaining WB restrictions       Dates: Start:  05/07/25               Problem: PT-Long Term Goals       Dates: Start:  05/07/25         Goal: LTG-By discharge, patient will propel wheelchair mod I >300 ft , including over ramped surfaces       Dates: Start:  05/07/25            Goal: LTG-By discharge, patient will transfer one surface to another mod I while maintaining WB precautions       Dates: Start:  05/07/25

## 2025-05-09 NOTE — PROGRESS NOTES
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  Allergies: Patient has no known allergies.    Safety  Patient Assist     Patient Precautions  Precautions: Fall Risk  Fall Risk: Poor balance, Poor safety  Bowel and Bladder: Fields present  Weight Bearing: NWB RLE, NWB LLE (pt able to bear weight to RLE for pivot transfers)  Comments: Privacy bag provided for cleanliness  Bed Transfer Status  Contact Guard Assist  Toilet Transfer Status   Contact Guard Assist  Assistive Devices  Wheelchair, Rails  Oxygen  None - Room Air  Diet/Therapeutic Dining  Current Diet Order   Procedures    Diet Order Diet: Cardiac     Pill Administration  whole  Agitated Behavioral Scale     ABS Level of Severity       Fall Risk  Has the patient had a fall this admission?      Joie Ruiz Fall Risk Scoring  15, HIGH RISK  Fall Risk Safety Measures  bed alarm, chair alarm, and poor balance    Vitals  Temperature: 37.1 °C (98.7 °F)  Temp src: Oral  Pulse: 78  Respiration: (!) 10  Blood Pressure : 110/65  Blood Pressure MAP (Calculated): 80 MM HG  BP Location: Right, Upper Arm  Patient BP Position: Dumont's Position     Oxygen  Pulse Oximetry: 95 %  O2 (LPM): 0  O2 Delivery Device: None - Room Air    Bowel and Bladder  Last Bowel Movement  05/07/25  Stool Type  Type 7: Watery, no solid pieces-entirely liquid  Bowel Device  Bathroom  Continent  Bladder: Did not void (Fields in Place)   Bowel: No movement  Bladder Function  Urine Void (mL): 750 ml  Number of Times Voided: 1  Urine Color: Becky  Urine Clarity: Sediment  Genitourinary Assessment   Bladder Assessment (WDL):  Within Defined Limits  Fields Catheter: Present with Active Order  Fields Reasons per MD Order: Acute urinary retention or bladder outlet obstruction  Fields Care: Given with Soap and Water  Urine Color: Becky  Urine  Clarity: Sediment  Bladder Device: Indwelling Catheter    Skin  Shaun Score   16  Sensory Interventions   Bed Types: Standard/Trauma Mattress  Skin Preventative Measures: Waffle Overlay  Moisture Interventions  Moisturizers/Barriers: Barrier Paste  Containment Devices: Indwelling Catheter      Pain  Pain Rating Scale  0 - No Pain  Pain Location  Leg  Pain Location Orientation  Right, Left  Pain Interventions   Declines    ADLs    Bathing   Shower, Staff  Linen Change   Complete  Personal Hygiene     Chlorhexidine Bath      Oral Care     Teeth/Dentures     Shave     Nutrition Percentage Eaten  Dinner, Between 50-75% Consumed  Environmental Precautions     Patient Turns/Positioning  Sitting up in wheelchair  Patient Turns Assistance/Tolerance  Tolerates Well  Bed Positions  Bed Controls On, Bed Locked  Head of Bed Elevated  Self regulated      Psychosocial/Neurologic Assessment  Psychosocial Assessment  Psychosocial (WDL):  Within Defined Limits  Neurologic Assessment  Neuro (WDL): Exceptions to WDL  Level of Consciousness: Alert  Orientation Level: Oriented X4  Cognition: Follows commands, Appropriate attention/concentration  Speech: Clear  Pupil Assesment: No  Motor Function/Sensation Assessment: Motor strength, Motor response  RUE Motor Response: Normal extension, Normal flexion  Muscle Strength Right Arm: Good Strength Against Gravity and Moderate Resistance  LUE Motor Response: Normal extension, Normal flexion  Muscle Strength Left Arm: Good Strength Against Gravity and Moderate Resistance  Muscle Strength Right Leg: Weak Movement but Not Against Gravity or Resistance  Muscle Strength Left Leg: Weak Movement but Not Against Gravity or Resistance  EENT (WDL):  WDL Except    Cardio/Pulmonary Assessment  Edema      Respiratory Breath Sounds  RUL Breath Sounds: Clear  RML Breath Sounds: Clear  RLL Breath Sounds: Diminished  NADINE Breath Sounds: Clear  LLL Breath Sounds: Diminished  Cardiac Assessment   Cardiac (WDL):   Within Defined Limits

## 2025-05-09 NOTE — THERAPY
Physical Therapy   Daily Treatment     Patient Name:  Vicente Whitfield  Age:  74 y.o., Sex:  male  Medical Record #:  [id number]  Today's Date: 5/9/2025     Precautions  Precautions: Fall Risk  Fall Risk: Poor balance, Poor safety  Bowel and Bladder: Fields present  Weight Bearing: NWB RLE, NWB LLE (pt able to bear weight to RLE for pivot transfers)  Comments: Privacy bag provided for cleanliness    Subjective: Patient is asleep in bed upon arrival, willing to participate in PT, pains are well controlled    Objective:    05/09/25 1001   PT Charge Group   PT Therapeutic Exercise (Units) 1   PT Therapeutic Activities (Units) 1   PT Total Time Spent   PT Individual Total Time Spent (Mins) 30   Sit to Lying   Level of Assist Supervised   Lying to Sitting on Side of Bed   Level of Assist Supervised   Chair/Bed-to-Chair Transfer   Level of Assist Stand By Assist   Transfer Type Lateral Scoot Transfer;Squat Pivot Transfer   Assistive Devices Wheelchair   Patient Scheduling Information   PT Time 30/60 minutes   Primary or Coverage PT Yu/ Ari A   Interdisciplinary Plan of Care Collaboration   Patient Position at End of Therapy In Bed;Call Light within Reach         Therapeutic Activities  Purpose: to improve performance and function of daily activities, to provide patient and family education, and to increase safety with activities of daily life and mobility related to activities of daily life  Interventions:   Bed/chair transfer training  Discussed and practiced lateral scoot and squat pivot techniques  Reviewed pre and post op pelvic x-rays    Therapeutic Exercise  Purpose: to improve strength, to improve ROM/flexibility, and to improve functional endurance  Interventions:   Supine exercises   - ankle pumps 20 reps  - quad sets 10 x 10 seconds  - gluteal isometrics 10 x 10 seconds  - SAQ 2.5# 2 x 10 each  - hip adduction with ball 10 x 10 seconds  - hip abduction with band 2 x 10    Seated exercises  - LAQ 2.5# 2 x  "10    Wheelchair Management, and Parts Training  Purpose: to foster independent use of a wheelchair  and to educate and prescribe a specialty wheelchair to the patient  Interventions:   Wheelchair training  Wheelchair management  Outdoor on uneven surfaces and low grade incline/decline with self propulsion     Assessment: Patient demonstrates safe transfer techniques and good ability to self propel wheelchair outdoors     Strengths: Able to follow instructions, Effective communication skills, Independent prior level of function, Manages pain appropriately, Motivated for self care and independence, Pleasant and cooperative, Willingly participates in therapeutic activities  Barriers: Decreased endurance, Fatigue, Home accessibility, Impaired activity tolerance, Impaired balance, Limited mobility, Pain (1 TONIE, BLE NWB (plan to install ramp prior to D/C))    Plan:   Wc endurance /propulsion and parts management  OOB activity tolerance  Stand-pivot / reach-pivot transfers bed<>wc  AAROM/ strength training     Family training    DME    PT DME Recommendations  Wheelchair: 18\" Width, Lightweight, Removable/Flip Back Armrests, Standard Leg Rests      Passport items to be completed:  Get in/out of bed safely, in/out of a vehicle, safely use mobility device, walk or wheel around home/community, navigate up and down stairs, show how to get up/down from the ground, ensure home is accessible, demonstrate HEP, complete caregiver training    Physical Therapy Problems (Active)       Problem: Mobility       Dates: Start:  05/07/25         Goal: STG-Within one week, patient will propel wheelchair community >150 ft mod I indoors        Dates: Start:  05/07/25               Problem: Mobility Transfers       Dates: Start:  05/07/25         Goal: STG-Within one week, patient will transfer bed to chair SBA/set up, while maintaining WB restrictions       Dates: Start:  05/07/25               Problem: PT-Long Term Goals       Dates: Start:  " 05/07/25         Goal: LTG-By discharge, patient will propel wheelchair mod I >300 ft , including over ramped surfaces       Dates: Start:  05/07/25            Goal: LTG-By discharge, patient will transfer one surface to another mod I while maintaining WB precautions       Dates: Start:  05/07/25
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
## 2025-05-09 NOTE — THERAPY
Occupational Therapy  Daily Treatment     Patient Name:  Vicente Whitfield  Age:  74 y.o., Sex:  male  Medical Record #:  [id number]  Today's Date:  5/9/2025    Precautions  Precautions: Fall Risk  Fall Risk: Poor balance, Poor safety  Bowel and Bladder: Fields present  Weight Bearing: NWB RLE, NWB LLE (pt able to bear weight to RLE for pivot transfers)  Comments: Privacy bag provided for cleanliness    Subjective:   9204-9741: pt supine in bed upon arrival. Pt pleasant and cooperative, agreeable to therapy.    [id number]: pt supine in bed upon arrival. Pt pleasant and cooperative, agreeable to therapy. SO and daughter present for FT.     Objective:    05/09/25 0701   OT Charge Group   Charges Yes   OT Self Care / ADL (Units) 3   OT Therapeutic Exercise (Units) 1   OT Total Time Spent   OT Individual Total Time Spent (Mins) 60   Vitals   O2 Delivery Device None - Room Air   Pain 0 - 10 Group   Location Foot   Location Orientation Right   Description Aching   Oral Hygiene   Level of Assist Modified Independent   Patient Position Seated   Additional Description Extra time needed   Grooming   Level of Assist Modified Independent   Patient Position Seated   Additional Description Hair care;Extra time needed   Upper Body Dressing   Level of Assist Set Up   Patient Position Seated   Clothing Item(s) Pullover shirt   Additional Description Extra time needed   Lower Body Dressing   Level of Assist Minimal Assist   Patient Position Seated;Standing   Clothing Item(s) Shorts  (velcro shorts)   Additional Description Grab bars;Assist for balance;Extra time needed   Putting On/Taking Off Footwear   Level of Assist Supervised   Patient Position Seated   Footwear Type Treaded Socks   Adaptive Equipment Sock Aid   Additional Description Cueing needed;Extra time needed   Shower/Bathe Self   Level of Assist Set Up   Patient Position Seated   Adaptive Equipment Fold Down Shower Bench;Grab Bars;Handheld Shower Head   Additional Description  Extra time needed   Tub/Shower Transfer   Level of Assist Contact Guard Assist   Transfer Type Stand pivot transfer   Adaptive Equipment Grab bars   Assistive Device Wheelchair   Additional Description Cueing needed;Extra time needed   Lying to Sitting on Side of Bed   Level of Assist Stand By Assist   Chair/Bed-to-Chair Transfer   Level of Assist Contact Guard Assist   Transfer Type   (reach pivot)   Assistive Devices Wheelchair   Interdisciplinary Plan of Care Collaboration   IDT Collaboration with  Physician   Patient Position at End of Therapy Seated;Self Releasing Lap Belt Applied;Phone within Reach;Other (Comments)  (in dining room for breakfast)   Collaboration Comments notified of gout flare up     Functional mobility: pt self-propelled in w/c from room to bathroom with SBA.    Therapeutic Exercise  Purpose: to improve strength and to improve functional endurance  Interventions:   Upper body exercises:   Seated program -   Shoulder press, 3 sets of 10, Bilateral, and Weight 4 lbs dumbbells  Bicep curls, 3 sets of 10, Bilateral, and Weight 10 lbs dumbbells  Elbow extension, 3 sets of 10, Right, Left, and Light Resistance Theraband    Activities of Daily Living (ADL's)  Purpose: to improve function, safety, and independence with activities of daily living and to provide patient and family education  Interventions:   Oral Hygiene  Grooming  Shower/Bathing  Upper Body Dressing  Lower Body Dressing  Shower Transfers: Wet  Adaptive Equipment Training     05/09/25 1401   OT Charge Group   Charges Yes   OT Self Care / ADL (Units) 1   OT Therapy Activity (Units) 1   OT Total Time Spent   OT Individual Total Time Spent (Mins) 30   Vitals   O2 Delivery Device None - Room Air   Lower Body Dressing   Level of Assist Stand by Assist   Patient Position Seated   Clothing Item(s)   (simulated LBD with theraband)   Additional Description Cueing needed;Extra time needed;Other (see comments)  (block practice completed simulated  LBD seated EOB with tied theraband. donned/doffed theraband 3x using lateral weight shifts to pull past hips.)   Tub/Shower Transfer   Level of Assist Contact Guard Assist   Transfer Type Lateral Scoot Transfer   Adaptive Equipment Use of tub transfer bench   Assistive Device Wheelchair   Additional Description Cueing needed;Extra time needed   Sit to Lying   Level of Assist Stand By Assist   Lying to Sitting on Side of Bed   Level of Assist Stand By Assist   Chair/Bed-to-Chair Transfer   Level of Assist Contact Guard Assist   Transfer Type Squat Pivot Transfer  (reach pivot)   Assistive Devices Wheelchair   Additional Description Cueing needed;Extra time needed   Family Training   Training In Progress   OT DME Recommendations   Bathroom Equipment (NOT TYPICALLY COVERED BY INSURANCE) Tub transfer bench;Hand held shower head;Patient was given information on how to purchase these materials;Other (see comments)  (drop arm commode)   Additional Equipment (NOT TYPICALLY COVERED BY INSURANCE) Hip kit;Patient was given information on how to purchase these materials   Interdisciplinary Plan of Care Collaboration   IDT Collaboration with  Family / Caregiver   Patient Position at End of Therapy In Bed;Tray Table within Reach;Phone within Reach;Family / Friend in Room   Collaboration Comments SO and daughter present for session     Functional mobility: pt self-propelled in w/c from room 124 <> ADL suite with SBA.    Therapeutic Activities  Purpose: to improve performance and function of daily activities, to provide patient and family education, and to increase safety with activities of daily life and mobility related to activities of daily life  Interventions:  Patient or family education  SO and daughter observed tub transfer bench transfer. Brief FT completed on shower transfers, w/c management and mechanics, and LBD techniques. Will continue training 5/11 and 5/12.   Provided family with handouts on how to purchase drop arm  commode and hip kit. Family has purchased and installed tub transfer bench.      Activities of Daily Living (ADL's)  Purpose: to improve function, safety, and independence with activities of daily living and to provide patient and family education  Interventions:   Lower Body Dressing  Shower Transfers: Dry    Assessment:    First session: pt tolerated session well with no LOB noted. Progressing well with ADLs at w/c level, benefits from use of LBD with AE and velcro tear away shorts. Pt with c/o R foot pain d/t new gout flare up, physician notified.    Second session: pt tolerated session well. Pt and family open and receptive to edu given by OT. They are in agreement with recommendations at this time. Will complete ongoing FT for bathroom transfers as available to ensure pt and family confidence.    Strengths: Effective communication skills, Independent prior level of function, Good insight into deficits/needs, Making steady progress towards goals, Motivated for self care and independence, Pleasant and cooperative, Supportive family, Willingly participates in therapeutic activities  Barriers: Limited mobility, Impaired balance, Impaired activity tolerance, Generalized weakness, Fatigue    Plan:  ADLs/IADLs, functional mobility at w/c level, general strengthening and endurance training     DME  OT DME Recommendations  Bathroom Equipment (MEDICARE): Drop Arm Commode, Tub Transfer Bench (Medicaid Only)  Bathroom Equipment (NOT TYPICALLY COVERED BY INSURANCE): Grab bar(s) in tub/shower, Grab bar(s) by toilet, Hand held shower head  Additional Equipment (NOT TYPICALLY COVERED BY INSURANCE): Sock aid, Reacher, Dressing stick, Long handled shoe horn    Passport items to be completed:  Perform bathroom transfers, complete dressing, complete feeding, get ready for the day, prepare a simple meal, participate in household tasks, adapt home for safety needs, demonstrate home exercise program, complete caregiver training      Occupational Therapy Goals (Active)       Problem: Bathing       Dates: Start:  05/07/25         Goal: STG-Within one week, patient will bathe with supervision.       Dates: Start:  05/07/25               Problem: Dressing       Dates: Start:  05/07/25         Goal: STG-Within one week, patient will dress LB with CGA.        Dates: Start:  05/07/25         Goal Note filed on 05/08/25 0813 by Francisca Fontana, OT       Requires max A.                 Problem: OT Long Term Goals       Dates: Start:  05/07/25         Goal: LTG-By discharge, patient will complete basic self care tasks mod I.        Dates: Start:  05/07/25            Goal: LTG-By discharge, patient will perform bathroom transfers mod I.        Dates: Start:  05/07/25               Problem: Toileting       Dates: Start:  05/07/25         Goal: STG-Within one week, patient will complete toileting tasks with CGA.        Dates: Start:  05/07/25         Goal Note filed on 05/08/25 0813 by Francisca Fontana, OT       Requires min A.

## 2025-05-09 NOTE — DISCHARGE PLANNING
Case Management/IDT follow up.   Projected dc date: 5/13  IDT continues to recommend IRF level of care as patient continue to make progress with all therapies.     DC needs  Home health: [facility] referral faxed.  DME: [facility] referral faxed.     Follow up: PCP    I met with patient, dtr, and SO at bedside and provide update from IDT and discussed plan of care.  They are in agreement w/ plan.     Plan: SSH 1STE with SO. Resume FWW, SPC, and Tub/Shower Seat.

## 2025-05-09 NOTE — PROGRESS NOTES
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  Allergies: Patient has no known allergies.    Safety  Patient Assist     Patient Precautions  Precautions: Fall Risk  Fall Risk: Poor balance, Poor safety  Bowel and Bladder: Fields present  Weight Bearing: NWB RLE, NWB LLE (pt able to bear weight to RLE for pivot transfers)  Comments: Privacy bag provided for cleanliness  Bed Transfer Status  Contact Guard Assist  Toilet Transfer Status   Contact Guard Assist  Assistive Devices  Wheelchair, Rails  Oxygen  CPAP  Diet/Therapeutic Dining  Current Diet Order   Procedures    Diet Order Diet: Cardiac     Pill Administration  whole  Agitated Behavioral Scale     ABS Level of Severity       Fall Risk  Has the patient had a fall this admission?      Joie Ruiz Fall Risk Scoring  16, HIGH RISK  Fall Risk Safety Measures  bed alarm and chair alarm    Vitals  Temperature: 36.8 °C (98.3 °F)  Temp src: Oral  Pulse: 64  Respiration: 18  Blood Pressure : 123/64  Blood Pressure MAP (Calculated): 84 MM HG  BP Location: Left, Upper Arm  Patient BP Position: Supine     Oxygen  Pulse Oximetry: 97 %  O2 (LPM): 0  O2 Delivery Device: CPAP    Bowel and Bladder  Last Bowel Movement  05/07/25  Stool Type  Type 7: Watery, no solid pieces-entirely liquid  Bowel Device  Bathroom  Continent  Bladder: Did not void (Fields in Place)   Bowel: No movement  Bladder Function  Urine Void (mL): 600 ml  Number of Times Voided: 1  Urine Color: Pink-Tinged  Urine Clarity: Sediment  Genitourinary Assessment   Bladder Assessment (WDL):  Within Defined Limits  Fields Catheter: Present with Active Order  Fields Reasons per MD Order: Acute urinary retention or bladder outlet obstruction  Fields Care: Given with Soap and Water  Urine Color: Pink-Tinged  Urine Clarity: Sediment  Bladder Device: Indwelling  Catheter    Skin  Shaun Score   16  Sensory Interventions   Bed Types: Standard/Trauma Mattress  Skin Preventative Measures: Waffle Overlay, Pillows in Use for Support / Positioning  Moisture Interventions  Moisturizers/Barriers: Barrier Wipes  Containment Devices: Indwelling Catheter      Pain  Pain Rating Scale  7 - Focus of attention, prevents doing daily activities  Pain Location  Knee  Pain Location Orientation  Left, Right  Pain Interventions   Medication (see MAR)    ADLs    Bathing   Staff, Shower  Linen Change   Complete  Personal Hygiene     Chlorhexidine Bath      Oral Care     Teeth/Dentures     Shave     Nutrition Percentage Eaten  Dinner, Between 50-75% Consumed  Environmental Precautions     Patient Turns/Positioning  Patient turns self independently side to side without assistance, to offload sacral area  Patient Turns Assistance/Tolerance  Tolerates Well  Bed Positions  Bed Controls On, Bed Locked  Head of Bed Elevated  Self regulated      Psychosocial/Neurologic Assessment  Psychosocial Assessment  Psychosocial (WDL):  Within Defined Limits  Neurologic Assessment  Neuro (WDL): Exceptions to WDL  Level of Consciousness: Alert  Orientation Level: Oriented X4  Cognition: Follows commands, Appropriate attention/concentration  Speech: Clear  Pupil Assesment: No  Motor Function/Sensation Assessment: Motor strength, Motor response  RUE Motor Response: Normal extension, Normal flexion  Muscle Strength Right Arm: Good Strength Against Gravity and Moderate Resistance  LUE Motor Response: Normal extension, Normal flexion  Muscle Strength Left Arm: Good Strength Against Gravity and Moderate Resistance  Muscle Strength Right Leg: Weak Movement but Not Against Gravity or Resistance  Muscle Strength Left Leg: Weak Movement but Not Against Gravity or Resistance  EENT (WDL):  WDL Except    Cardio/Pulmonary Assessment  Edema      Respiratory Breath Sounds  RUL Breath Sounds: Clear  RML Breath Sounds: Clear  RLL  Breath Sounds: Diminished  NADINE Breath Sounds: Clear  LLL Breath Sounds: Diminished  Cardiac Assessment   Cardiac (WDL):  Within Defined Limits
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
## 2025-05-10 ENCOUNTER — APPOINTMENT (OUTPATIENT)
Dept: OCCUPATIONAL THERAPY | Facility: REHABILITATION | Age: 75
DRG: 949 | End: 2025-05-10
Attending: PHYSICAL MEDICINE & REHABILITATION
Payer: OTHER MISCELLANEOUS

## 2025-05-10 ENCOUNTER — APPOINTMENT (OUTPATIENT)
Dept: PHYSICAL THERAPY | Facility: REHABILITATION | Age: 75
DRG: 949 | End: 2025-05-10
Attending: PHYSICAL MEDICINE & REHABILITATION
Payer: OTHER MISCELLANEOUS

## 2025-05-10 ASSESSMENT — PAIN DESCRIPTION - PAIN TYPE: TYPE: ACUTE PAIN

## 2025-05-10 NOTE — CARE PLAN
The patient is Stable - Low risk of patient condition declining or worsening      Problem: Pain - Standard  Goal: Alleviation of pain or a reduction in pain to the patient’s comfort goal  Outcome: Progressing     Problem: Bladder / Voiding  Goal: Patient will establish and maintain regular urinary output  Outcome: Progressing   Note: Patient was able to void after tineo removal. Post void bladder scan was 35.    Problem: Fall Risk - Rehab  Goal: Patient will remain free from falls  Outcome: Progressing

## 2025-05-10 NOTE — CARE PLAN
Problem: Knowledge Deficit - Standard  Goal: Patient and family/care givers will demonstrate understanding of plan of care, disease process/condition, diagnostic tests and medications  Outcome: Progressing     Problem: Skin Integrity  Goal: Skin integrity is maintained or improved  Outcome: Progressing     Problem: Fall Risk - Rehab  Goal: Patient will remain free from falls  Outcome: Progressing     Problem: Psychosocial  Goal: Patient's level of anxiety will decrease  Outcome: Progressing  Goal: Patient's ability to verbalize feelings about condition will improve  Outcome: Progressing     Problem: Risk for Aspiration  Goal: Patient's risk for aspiration will be absent or decrease  Outcome: Progressing     Problem: Skin Integrity  Goal: Patient's skin integrity will be maintained or improve  Outcome: Progressing     Problem: Self Care  Goal: Patient will have the ability to perform ADLs independently or with assistance  Outcome: Progressing     Problem: Mobility  Goal: Patient's capacity to carry out activities will improve  Outcome: Progressing     Problem: Pain - Standard  Goal: Alleviation of pain or a reduction in pain to the patient’s comfort goal  Outcome: Progressing       The patient is Watcher - Medium risk of patient condition declining or worsening    Shift Goals  Clinical Goals: comfort, safety  Patient Goals: comfort

## 2025-05-10 NOTE — PROGRESS NOTES
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  Allergies: Patient has no known allergies.    Safety  Patient Assist     Patient Precautions  Precautions: Fall Risk  Fall Risk: Poor balance, Poor safety  Bowel and Bladder: Fields present  Weight Bearing: NWB RLE, NWB LLE (pt able to bear weight to RLE for pivot transfers)  Comments: Privacy bag provided for cleanliness  Bed Transfer Status  Contact Guard Assist  Toilet Transfer Status   Contact Guard Assist  Assistive Devices  Wheelchair  Oxygen  None - Room Air  Diet/Therapeutic Dining  Current Diet Order   Procedures    Diet Order Diet: Cardiac     Pill Administration  whole  Agitated Behavioral Scale     ABS Level of Severity       Fall Risk  Has the patient had a fall this admission?      Joie Ruiz Fall Risk Scoring  15, HIGH RISK  Fall Risk Safety Measures  bed alarm and chair alarm    Vitals  Temperature: 36.5 °C (97.7 °F)  Temp src: Temporal  Pulse: 60  Respiration: 20  Blood Pressure : 120/63  Blood Pressure MAP (Calculated): 82 MM HG  BP Location: Left, Upper Arm  Patient BP Position: Supine     Oxygen  Pulse Oximetry: 91 %  O2 (LPM): 0  O2 Delivery Device: None - Room Air    Bowel and Bladder  Last Bowel Movement  05/07/25  Stool Type  Type 7: Watery, no solid pieces-entirely liquid  Bowel Device  Bathroom  Continent  Bladder: Did not void (Fields in Place)   Bowel: No movement  Bladder Function  Urine Void (mL): 300 ml  Number of Times Voided: 1  Urine Color: Becky  Urine Clarity: Sediment  Genitourinary Assessment   Bladder Assessment (WDL):  Within Defined Limits  Fields Catheter: Not Applicable  Fields Reasons per MD Order: Acute urinary retention or bladder outlet obstruction  Fields Care: Given with Soap and Water  Urine Color: Becky  Urine Clarity: Sediment  Bladder Device: Indwelling  Catheter  Bladder Scan: Post Void  $ Bladder Scan Results (mL): 20    Skin  Shaun Score   16  Sensory Interventions   Bed Types: Standard/Trauma Mattress  Skin Preventative Measures: Pillows in Use for Support / Positioning  Moisture Interventions  Moisturizers/Barriers: Barrier Wipes  Containment Devices: Indwelling Catheter      Pain  Pain Rating Scale  0 - No Pain  Pain Location  Foot  Pain Location Orientation  Right  Pain Interventions   Declines    ADLs    Bathing   Shower, * * With Assistance from, Staff  Linen Change   Complete  Personal Hygiene     Chlorhexidine Bath      Oral Care     Teeth/Dentures     Shave     Nutrition Percentage Eaten  Breakfast, Between % Consumed (80%)  Environmental Precautions     Patient Turns/Positioning  Patient turns self independently side to side without assistance, to offload sacral area  Patient Turns Assistance/Tolerance  Tolerates Well  Bed Positions  Bed Controls On, Bed Locked  Head of Bed Elevated  Self regulated      Psychosocial/Neurologic Assessment  Psychosocial Assessment  Psychosocial (WDL):  Within Defined Limits  Neurologic Assessment  Neuro (WDL): Exceptions to WDL  Level of Consciousness: Alert  Orientation Level: Oriented X4  Cognition: Follows commands, Appropriate attention/concentration  Speech: Clear  Pupil Assesment: No  Motor Function/Sensation Assessment: Motor strength, Motor response  RUE Motor Response: Normal extension, Normal flexion  Muscle Strength Right Arm: Good Strength Against Gravity and Moderate Resistance  LUE Motor Response: Normal extension, Normal flexion  Muscle Strength Left Arm: Good Strength Against Gravity and Moderate Resistance  Muscle Strength Right Leg: Weak Movement but Not Against Gravity or Resistance  Muscle Strength Left Leg: Weak Movement but Not Against Gravity or Resistance  EENT (WDL):  WDL Except    Cardio/Pulmonary Assessment  Edema      Respiratory Breath Sounds  RUL Breath Sounds: Clear  RML Breath  Sounds: Clear  RLL Breath Sounds: Diminished  NADINE Breath Sounds: Clear  LLL Breath Sounds: Diminished  Cardiac Assessment   Cardiac (WDL):  Within Defined Limits

## 2025-05-10 NOTE — THERAPY
Physical Therapy   Daily Treatment     Patient Name:  Vicente Whitfield  Age:  74 y.o., Sex:  male  Medical Record #:  [id number]  Today's Date: 5/10/2025     Precautions  Precautions: (P) Fall Risk, Weight Bearing  Fall Risk: (P) Poor balance  Bowel and Bladder: (P) Fields removed  Weight Bearing: (P) NWB RLE, NWB LLE (pt able to bear weight to RLE for pivot transfers)  Comments: Privacy bag provided for cleanliness    Subjective: Pt in room supine in bed upon entry for both sessions and agreeable to both therapy sessions. Pt family present for second therapy session.    Objective:    05/10/25 0831 05/10/25 1101   PT Charge Group   PT Therapeutic Exercise (Units) 2 1   PT Neuromuscular Re-Education / Balance (Units) 1  --    PT Therapeutic Activities (Units) 1 1   PT Total Time Spent   PT Individual Total Time Spent (Mins) 60 30   Precautions   Precautions Fall Risk;Bowel and Bladder;Weight Bearing Fall Risk;Weight Bearing   Fall Risk Poor balance Poor balance   Bowel and Bladder Fields removed Fields removed   Weight Bearing NWB RLE;NWB LLE  (pt able to bear weight to RLE for pivot transfers) NWB RLE;NWB LLE  (pt able to bear weight to RLE for pivot transfers)   Vitals   Pulse 79  --    Patient BP Position Sitting  --    Blood Pressure  120/71  --    Pulse Oximetry 97 %  --    O2 Delivery Device None - Room Air  --    Pain 0 - 10 Group   Location Throat  --    Location Orientation Right;Left;Inner  --    Pain Rating Scale (NPRS) 4  --    Description Aching;Burning  --    Bladder   Urine Color  --  Becky   Number of Times Voided  --  1   Bladder Device  --  Bathroom   Roll Left and Right   Level of Assist Supervised;Stand By Assist Supervised;Stand By Assist   Additional Description Use of bed rail;Completed on regular bed Use of bed rail;Completed on regular bed   Sit to Lying   Level of Assist Stand By Assist Stand By Assist   Additional Description Use of bed rail;Completed on regular bed Use of bed rail;Completed on  regular bed   Lying to Sitting on Side of Bed   Level of Assist Stand By Assist Stand By Assist   Additional Description Use of bed rail;Completed on regular bed Use of bed rail;Completed on regular bed   Sit to Stand   Level of Assist Contact Guard Assist;Minimal Assist  (for squat-pivot transfer on RLE only) Contact Guard Assist;Minimal Assist   Assistive Devices No AD Grab Bar   Additional Description Extra time needed Extra time needed   Chair/Bed-to-Chair Transfer   Level of Assist Contact Guard Assist Contact Guard Assist   Transfer Type Squat Pivot Transfer Squat Pivot Transfer   Assistive Devices Wheelchair Wheelchair   Additional Description Cueing needed;Extra time needed Cueing needed;Extra time needed   Toilet Transfer   Level of Assist  --  Contact Guard Assist   Transfer Type  --  Stand Pivot Transfer   Adaptive Equipment  --  Grab bars   Assistive Device  --  Wheelchair   Additional Description  --  Extra time needed   Toileting Hygiene   Level of Assist  --  Stand By Assist   Additional Description  --  Assist for standing balance;Grab bars;Extra time needed   Ambulation   Level of Assist Unable to Participate  (BLE NWB)  --    Wheelchair   Level of Assist Supervised Supervised   Type Manual Manual   Additional Description Extra time needed Extra time needed   Wheelchair Distance 150x2 150   Interdisciplinary Plan of Care Collaboration   IDT Collaboration with  Nursing Nursing;Family / Caregiver   Patient Position at End of Therapy In Bed;Bed Alarm On;Call Light within Reach;Tray Table within Reach;Phone within Reach Seated;Chair Alarm On;Call Light within Reach;Tray Table within Reach;Phone within Reach;Family / Friend in Room   Collaboration Comments Nursing present to give medications Nursing present to give medications. Family present throughout session.       Therapeutic Activities  Purpose: to improve performance and function of daily activities and to increase safety with activities of daily  life and mobility related to activities of daily life  Interventions:   Bed/chair transfer training  Toilet transfer  Bed mobility training (scooting, supine to sit, sit to supine, rolling)  Sit to stand training - in bathroom with use of grab bars for support. Pt able to stand on RLE for stand pivot transfer.    Therapeutic Exercise  Purpose: to improve strength and to improve functional endurance  Interventions:   Lower body exercises:   Supine program -   Hip flexion, 3 sets of 10 and Bilateral  Hip abduction, 3 sets of 10 and Bilateral  Hip adduction, 3 sets of 10 and Bilateral  Straight leg raises, 3 sets of 10 and Bilateral  Knees to chest, 3 sets of 10 and Bilateral  Short arc quad, 3 sets of 10 and Bilateral  Ankle pumps, 3 sets of 10 and Bilateral  Seated program -   Long arc quad, 3 sets of 10, Bilateral, and Light Resistance Theraband  Hamstring curl, 3 sets of 10, Bilateral, and Light Resistance Theraband    Neuro Re-Education  Purpose: to improve balance and to improve postural control  Interventions:   Static/Dynamic sitting balance training  Facilitation/Cueing: Verbal cues  - Heavy weighted ball for trunk rotation, ring toss rebound ball, VC for upright sitting position    Wheelchair Management, and Parts Training  Purpose: to foster independent use of a wheelchair   Interventions:   Wheelchair training  Wheelchair management    Assessment: Pt making steady progress with increased ability to perform bed mobility and transfers with SBA, occasional Vc for lifting chair arm rest during transfer. Pt able to wheel longer distance and demonstrated increased tolerance for activity, strength and endurance during session.    Strengths: Able to follow instructions, Effective communication skills, Independent prior level of function, Manages pain appropriately, Motivated for self care and independence, Pleasant and cooperative, Willingly participates in therapeutic activities  Barriers: Decreased endurance,  "Fatigue, Home accessibility, Impaired activity tolerance, Impaired balance, Limited mobility, Pain (1 TONIE, BLE NWB (plan to install ramp prior to D/C))    Plan:   Wc endurance /propulsion and parts management  OOB activity tolerance  Stand-pivot / reach-pivot transfers bed<>wc  AAROM/ strength training     Family training    DME    PT DME Recommendations  Wheelchair: 18\" Width, Lightweight, Removable/Flip Back Armrests, Standard Leg Rests      Passport items to be completed:  Get in/out of bed safely, in/out of a vehicle, safely use mobility device, walk or wheel around home/community, navigate up and down stairs, show how to get up/down from the ground, ensure home is accessible, demonstrate HEP, complete caregiver training    Physical Therapy Problems (Active)       Problem: Mobility       Dates: Start:  05/07/25         Goal: STG-Within one week, patient will propel wheelchair community >150 ft mod I indoors        Dates: Start:  05/07/25               Problem: Mobility Transfers       Dates: Start:  05/07/25         Goal: STG-Within one week, patient will transfer bed to chair SBA/set up, while maintaining WB restrictions       Dates: Start:  05/07/25               Problem: PT-Long Term Goals       Dates: Start:  05/07/25         Goal: LTG-By discharge, patient will propel wheelchair mod I >300 ft , including over ramped surfaces       Dates: Start:  05/07/25            Goal: LTG-By discharge, patient will transfer one surface to another mod I while maintaining WB precautions       Dates: Start:  05/07/25              "

## 2025-05-10 NOTE — THERAPY
Occupational Therapy  Daily Treatment     Patient Name:  Vicente Whitfield  Age:  74 y.o., Sex:  male  Medical Record #:  [id number]  Today's Date:  5/10/2025    Precautions  Precautions: Fall Risk, Weight Bearing  Fall Risk: Poor balance  Bowel and Bladder: Fields removed  Weight Bearing: NWB RLE, NWB LLE (pt able to bear weight to RLE for pivot transfers)  Comments: Privacy bag provided for cleanliness    Subjective: Pt encountered for OT supine in bed resting. Upon awaking, pt pleasant and agreeable to participate. Reports readiness for upcoming DC.      Objective:    05/10/25 1401   OT Charge Group   OT Self Care / ADL (Units) 2   OT Therapeutic Exercise (Units) 2   OT Total Time Spent   OT Individual Total Time Spent (Mins) 60   Lower Body Dressing   Level of Assist Supervised   Patient Position Seated   Clothing Item(s) Shorts   Adaptive Equipment Reacher   Additional Description Extra time needed;Cueing needed   Chair/Bed-to-Chair Transfer   Level of Assist Stand By Assist   Transfer Type Squat Pivot Transfer   Assistive Devices Wheelchair   Additional Description Extra time needed   Interdisciplinary Plan of Care Collaboration   Patient Position at End of Therapy In Bed;Bed Alarm On;Call Light within Reach;Tray Table within Reach;Phone within Reach       Therapeutic Exercise  Purpose: to improve strength, to improve ROM/flexibility, and to improve functional endurance  Interventions:   Upper body exercises:   Seated program -   Lat pull, 3 sets of 10, Bilateral, and Weight 40  Bilateral row, 3 sets of 10, Bilateral, and Weight 45  Bicep curls, 3 sets of 10, Bilateral, and Weight 40  Pronation/Supination, 3 sets of 10, Bilateral, and Weight 7  Wrist flexion/extension, 3 sets of 10, Bilateral, and Weight 5  Other, Rickshaw: forward/backwards, 45#    Activities of Daily Living (ADL's)  Purpose: to improve function, safety, and independence with activities of daily living and to provide patient and family  education  Interventions:   Lower Body Dressing  Adaptive Equipment Training; reacher, PRN, for LBD    Assessment:    Pt tolerated session well focused on UB strength to support pt's functional performance required to perform SPT (LLE NWB). Intermittent and brief RB needed. Pt appears ready for upcoming DC.     Strengths: Effective communication skills, Independent prior level of function, Good insight into deficits/needs, Making steady progress towards goals, Motivated for self care and independence, Pleasant and cooperative, Supportive family, Willingly participates in therapeutic activities  Barriers: Limited mobility, Impaired balance, Impaired activity tolerance, Generalized weakness, Fatigue    Plan:  ADLs/IADLs, functional mobility at w/c level, general strengthening and endurance training     DME  OT DME Recommendations  Bathroom Equipment (MEDICARE): Drop Arm Commode, Tub Transfer Bench (Medicaid Only)  Bathroom Equipment (NOT TYPICALLY COVERED BY INSURANCE): Tub transfer bench, Hand held shower head, Patient was given information on how to purchase these materials, Other (see comments) (drop arm commode)  Additional Equipment (NOT TYPICALLY COVERED BY INSURANCE): Hip kit, Patient was given information on how to purchase these materials    Passport items to be completed:  Perform bathroom transfers, complete dressing, complete feeding, get ready for the day, prepare a simple meal, participate in household tasks, adapt home for safety needs, demonstrate home exercise program, complete caregiver training     Occupational Therapy Goals (Active)       Problem: Bathing       Dates: Start:  05/07/25         Goal: STG-Within one week, patient will bathe with supervision.       Dates: Start:  05/07/25               Problem: Dressing       Dates: Start:  05/07/25         Goal: STG-Within one week, patient will dress LB with CGA.        Dates: Start:  05/07/25         Goal Note filed on 05/08/25 0813 by Francisca SWENSON  Addi, OT       Requires max A.                 Problem: OT Long Term Goals       Dates: Start:  05/07/25         Goal: LTG-By discharge, patient will complete basic self care tasks mod I.        Dates: Start:  05/07/25            Goal: LTG-By discharge, patient will perform bathroom transfers mod I.        Dates: Start:  05/07/25               Problem: Toileting       Dates: Start:  05/07/25         Goal: STG-Within one week, patient will complete toileting tasks with CGA.        Dates: Start:  05/07/25         Goal Note filed on 05/08/25 0813 by Francisca Fontana, OT       Requires min A.

## 2025-05-10 NOTE — CARE PLAN
"The patient is Stable - Low risk of patient condition declining or worsening    Shift Goals  Clinical Goals: Comfort and safety  Patient Goals: Comfort    Progress made toward(s) clinical / shift goals:    Problem: Knowledge Deficit - Standard  Goal: Patient and family/care givers will demonstrate understanding of plan of care, disease process/condition, diagnostic tests and medications  Outcome: Progressing     Problem: Skin Integrity  Goal: Skin integrity is maintained or improved  Outcome: Progressing     Problem: Fall Risk - Rehab  Goal: Patient will remain free from falls  Outcome: Progressing  Note: Joie Ruiz Fall risk Assessment Score: 16    High fall risk Interventions   - Bed and strip alarm   - Yellow sign by the door   - Yellow wrist band \"Fall risk\"  - Room near to the nurse station  - Do not leave patient unattended in the bathroom  - Fall risk education provided     Problem: Discharge Barriers/Planning  Goal: Patient's continuum of care needs are met  Outcome: Progressing     Problem: Bladder / Voiding  Goal: Patient will establish and maintain regular urinary output  Outcome: Progressing     Problem: Pain - Standard  Goal: Alleviation of pain or a reduction in pain to the patient’s comfort goal  Outcome: Progressing       Patient is not progressing towards the following goals:      "

## 2025-05-10 NOTE — PROGRESS NOTES
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  Allergies: Patient has no known allergies.    Safety  Patient Assist     Patient Precautions  Precautions: Fall Risk  Fall Risk: Poor balance, Poor safety  Bowel and Bladder: Fields present  Weight Bearing: NWB RLE, NWB LLE (pt able to bear weight to RLE for pivot transfers)  Comments: Privacy bag provided for cleanliness  Bed Transfer Status  Contact Guard Assist  Toilet Transfer Status   Contact Guard Assist  Assistive Devices  Wheelchair  Oxygen  None - Room Air  Diet/Therapeutic Dining  Current Diet Order   Procedures    Diet Order Diet: Cardiac     Pill Administration  whole  Agitated Behavioral Scale     ABS Level of Severity       Fall Risk  Has the patient had a fall this admission?      Joie Ruiz Fall Risk Scoring  15, HIGH RISK  Fall Risk Safety Measures  bed alarm, chair alarm, and low vision/hearing    Vitals  Temperature: 36.8 °C (98.3 °F)  Temp src: Temporal  Pulse: 71  Respiration: 18  Blood Pressure : 121/69  Blood Pressure MAP (Calculated): 86 MM HG  BP Location: Left, Upper Arm  Patient BP Position: Dumont's Position     Oxygen  Pulse Oximetry: 97 %  O2 (LPM): 0  O2 Delivery Device: None - Room Air    Bowel and Bladder  Last Bowel Movement  05/07/25  Stool Type  Type 7: Watery, no solid pieces-entirely liquid  Bowel Device  Bathroom  Continent  Bladder: Did not void (Fields in Place)   Bowel: No movement  Bladder Function  Urine Void (mL): 750 ml  Number of Times Voided: 1  Urine Color: Pink-Tinged  Urine Clarity: Sediment  Genitourinary Assessment   Bladder Assessment (WDL):  Within Defined Limits  Fields Catheter: Present with Active Order  Fields Reasons per MD Order: Acute urinary retention or bladder outlet obstruction  Fields Care: Given with Soap and Water  Urine Color:  Pink-Tinged  Urine Clarity: Sediment  Bladder Device: Indwelling Catheter    Skin  Shaun Score   16  Sensory Interventions   Bed Types: Standard/Trauma Mattress  Skin Preventative Measures: Pillows in Use for Support / Positioning  Moisture Interventions  Moisturizers/Barriers: Barrier Wipes  Containment Devices: Indwelling Catheter      Pain  Pain Rating Scale  0 - No Pain  Pain Location  Foot  Pain Location Orientation  Right  Pain Interventions   Declines    ADLs    Bathing   Shower, * * With Assistance from, Staff  Linen Change   Complete  Personal Hygiene     Chlorhexidine Bath      Oral Care     Teeth/Dentures     Shave     Nutrition Percentage Eaten  Breakfast, Between % Consumed (80%)  Environmental Precautions     Patient Turns/Positioning  Sitting up in wheelchair  Patient Turns Assistance/Tolerance  Tolerates Well  Bed Positions  Bed Controls On, Bed Locked  Head of Bed Elevated  Self regulated      Psychosocial/Neurologic Assessment  Psychosocial Assessment  Psychosocial (WDL):  Within Defined Limits  Neurologic Assessment  Neuro (WDL): Exceptions to WDL  Level of Consciousness: Alert  Orientation Level: Oriented X4  Cognition: Follows commands, Appropriate attention/concentration  Speech: Clear  Pupil Assesment: No  Motor Function/Sensation Assessment: Motor strength, Motor response  RUE Motor Response: Normal extension, Normal flexion  Muscle Strength Right Arm: Good Strength Against Gravity and Moderate Resistance  LUE Motor Response: Normal extension, Normal flexion  Muscle Strength Left Arm: Good Strength Against Gravity and Moderate Resistance  Muscle Strength Right Leg: Weak Movement but Not Against Gravity or Resistance  Muscle Strength Left Leg: Weak Movement but Not Against Gravity or Resistance  EENT (WDL):  WDL Except    Cardio/Pulmonary Assessment  Edema      Respiratory Breath Sounds  RUL Breath Sounds: Clear  RML Breath Sounds: Clear  RLL Breath Sounds: Diminished  NADINE Breath  Sounds: Clear  LLL Breath Sounds: Diminished  Cardiac Assessment   Cardiac (WDL):  Within Defined Limits

## 2025-05-11 ENCOUNTER — APPOINTMENT (OUTPATIENT)
Dept: PHYSICAL THERAPY | Facility: REHABILITATION | Age: 75
DRG: 949 | End: 2025-05-11
Attending: PHYSICAL MEDICINE & REHABILITATION
Payer: OTHER MISCELLANEOUS

## 2025-05-11 ENCOUNTER — APPOINTMENT (OUTPATIENT)
Dept: OCCUPATIONAL THERAPY | Facility: REHABILITATION | Age: 75
DRG: 949 | End: 2025-05-11
Attending: PHYSICAL MEDICINE & REHABILITATION
Payer: OTHER MISCELLANEOUS

## 2025-05-11 ASSESSMENT — PATIENT HEALTH QUESTIONNAIRE - PHQ9
1. LITTLE INTEREST OR PLEASURE IN DOING THINGS: NOT AT ALL
2. FEELING DOWN, DEPRESSED, IRRITABLE, OR HOPELESS: NOT AT ALL
SUM OF ALL RESPONSES TO PHQ9 QUESTIONS 1 AND 2: 0

## 2025-05-11 ASSESSMENT — PAIN DESCRIPTION - PAIN TYPE
TYPE: ACUTE PAIN

## 2025-05-11 ASSESSMENT — FIBROSIS 4 INDEX: FIB4 SCORE: 1.41

## 2025-05-11 NOTE — PROGRESS NOTES
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  Allergies: Patient has no known allergies.    Safety  Patient Assist     Patient Precautions  Precautions: Fall Risk, Weight Bearing  Fall Risk: Poor balance  Bowel and Bladder: Fields removed  Weight Bearing: NWB RLE, NWB LLE (pt able to bear weight to RLE for pivot transfers)  Comments: Privacy bag provided for cleanliness  Bed Transfer Status  Stand By Assist  Toilet Transfer Status   Contact Guard Assist  Assistive Devices  Wheelchair  Oxygen  None - Room Air  Diet/Therapeutic Dining  Current Diet Order   Procedures    Diet Order Diet: Cardiac     Pill Administration  whole  Agitated Behavioral Scale     ABS Level of Severity       Fall Risk  Has the patient had a fall this admission?      Joie Ruiz Fall Risk Scoring  14, MODERATE RISK  Fall Risk Safety Measures  bed alarm and chair alarm    Vitals  Temperature: 36.7 °C (98 °F)  Temp src: Temporal  Pulse: 60  Respiration: 18  Blood Pressure : 133/74  Blood Pressure MAP (Calculated): 94 MM HG  BP Location: Left, Upper Arm  Patient BP Position: Supine     Oxygen  Pulse Oximetry: 93 %  O2 (LPM): 0  O2 Delivery Device: None - Room Air    Bowel and Bladder  Last Bowel Movement  05/09/25 (as per patient)  Stool Type  Type 7: Watery, no solid pieces-entirely liquid  Bowel Device  Bathroom  Continent  Bladder: Did not void (Fields in Place)   Bowel: No movement  Bladder Function  Urine Void (mL): 400 ml  Number of Times Voided: 1  Urine Color: Becky  Urine Clarity: Sediment  Genitourinary Assessment   Bladder Assessment (WDL):  Within Defined Limits  Fields Catheter: Not Applicable  Fields Reasons per MD Order: Acute urinary retention or bladder outlet obstruction  Fields Care: Given with Soap and Water  Urine Color: Becky  Urine Clarity: Sediment  Bladder  Device: Urinal  Bladder Scan: Post Void  $ Bladder Scan Results (mL): 20    Skin  Shaun Score   16  Sensory Interventions   Bed Types: Standard/Trauma Mattress  Skin Preventative Measures: Pillows in Use for Support / Positioning  Moisture Interventions  Moisturizers/Barriers: Barrier Wipes  Containment Devices: Indwelling Catheter      Pain  Pain Rating Scale   (asleep)  Pain Location  Other (Comments) (inner thigh)  Pain Location Orientation  Right, Left  Pain Interventions   Rest    ADLs    Bathing   Shower, * * With Assistance from, Staff  Linen Change   Complete  Personal Hygiene     Chlorhexidine Bath      Oral Care     Teeth/Dentures     Shave     Nutrition Percentage Eaten  *  * Meal *  *, Dinner, Between % Consumed  Environmental Precautions     Patient Turns/Positioning  Patient turns self independently side to side without assistance, to offload sacral area  Patient Turns Assistance/Tolerance  Tolerates Well  Bed Positions  Bed Controls On, Bed Locked  Head of Bed Elevated  Self regulated      Psychosocial/Neurologic Assessment  Psychosocial Assessment  Psychosocial (WDL):  Within Defined Limits  Neurologic Assessment  Neuro (WDL): Exceptions to WDL  Level of Consciousness: Alert  Orientation Level: Oriented X4  Cognition: Follows commands  Speech: Clear  Pupil Assesment: No  Motor Function/Sensation Assessment: Motor strength, Motor response  RUE Motor Response: Normal extension  Muscle Strength Right Arm: Good Strength Against Gravity and Moderate Resistance  LUE Motor Response: Normal extension  Muscle Strength Left Arm: Good Strength Against Gravity and Moderate Resistance  Muscle Strength Right Leg: Weak Movement but Not Against Gravity or Resistance  Muscle Strength Left Leg: Weak Movement but Not Against Gravity or Resistance  EENT (WDL):  WDL Except    Cardio/Pulmonary Assessment  Edema      Respiratory Breath Sounds  RUL Breath Sounds: Clear  RML Breath Sounds: Clear  RLL Breath Sounds:  Diminished  NADINE Breath Sounds: Clear  LLL Breath Sounds: Diminished  Cardiac Assessment   Cardiac (WDL):  Within Defined Limits

## 2025-05-11 NOTE — THERAPY
Occupational Therapy  Daily Treatment     Patient Name:  Vicente Whitfield  Age:  75 y.o., Sex:  male  Medical Record #:  [id number]  Today's Date:  5/11/2025    Precautions  Precautions: Fall Risk, Weight Bearing  Fall Risk: Poor balance  Bowel and Bladder: Fields removed  Weight Bearing: NWB RLE, NWB LLE (pt able to bear weight to RLE for pivot transfers)  Comments: Privacy bag provided for cleanliness    Subjective:   7361-7634: pt supine in bed upon arrival. Pt pleasant and cooperative, agreeable to therapy.    [id number]: pt seated in w/c upon arrival. Pt pleasant and cooperative, agreeable to therapy.     Objective:    05/11/25 0701   OT Charge Group   Charges Yes   OT Self Care / ADL (Units) 1   OT Therapy Activity (Units) 1   OT Therapeutic Exercise (Units) 2   OT Total Time Spent   OT Individual Total Time Spent (Mins) 60   Vitals   O2 Delivery Device None - Room Air   Pain 0 - 10 Group   Location Shoulder   Location Orientation Right   Description Aching   Oral Hygiene   Level of Assist Modified Independent   Patient Position Seated   Lying to Sitting on Side of Bed   Level of Assist Stand By Assist   Chair/Bed-to-Chair Transfer   Level of Assist Stand By Assist   Transfer Type Squat Pivot Transfer   Assistive Devices Wheelchair   Additional Description Extra time needed   IADL Assessments   Kitchen Mobility Supervised;Extra time needed;Other (see comments)  (kitchen mobility activity, see note for details)   Interdisciplinary Plan of Care Collaboration   Patient Position at End of Therapy Seated;Chair Alarm On;Call Light within Reach;Tray Table within Reach;Phone within Reach     Functional mobility: pt self-propelled in w/c from room 124 <> main gym with supervision.    Therapeutic Exercise  Purpose: to improve strength and to improve functional endurance  Interventions:   Upper body exercises:   Seated program -   Chest press, 2 sets of 10, Bilateral, and Weight 8 lbs dumbbell  Front arm raise, 2 sets of  10, Bilateral, and Weight 3 lbs dumbbells  Internal shoulder rotation, 2 sets of 10, Right, Left, and Weight 5 lbs dumbbell  External shoulder rotation, 2 sets of 10, Right, Left, and Weight 5 lbs dumbbell  Bilateral row, 2 sets of 10, Bilateral, and Weight 6 lbs dumbbell  Bicep curls, 2 sets of 10, Bilateral, and Weight 8 lbs dumbbells  Elbow extension, 2 sets of 10, Bilateral, and Weight 8 lbs dumbbell, overhead triceps extension  Wrist flexion/extension, 2 sets of 10, Right, Left, and Weight 3 lbs dumbbell    Activities of Daily Living (ADL's)  Purpose: to improve function, safety, and independence with activities of daily living  Interventions:   Oral Hygiene  Instrumental Activities of Daily Living (IADL's)  Purpose: to improve function, safety, and independence with instrumental activities of daily living and to provide patient and family education  Interventions:   Kitchen mobility  pt completed kitchen mobility activity with supervision at w/c level.  Pt completed scavenger hunt with 10 large cones placed in kitchen cupboards and appliances in high and low positions to practice kitchen mobility at w/c level and increase independence with IADLs. Pt used reacher To retrieve cones from high and low positions. Following retrieval, pt transported cones scattered across kitchen counter. Pt took no rest breaks during activity.    Pt edu on kitchen set-up at home to improve accessibility. Informed pt on relocating commonly used items onto kitchen counter, high part of low cabinets, and low part of high cabinets to avoid overreaching and excessive bending. Pt edu on use of reacher to retrieve items as well.      Discharge Interventions  Purpose: to review final discharge recommendations and education  Interventions:   reviewed HEP with handout provided     05/11/25 2231   OT Charge Group   Charges Yes   OT Therapy Activity (Units) 1   OT Therapeutic Exercise (Units) 1   OT Total Time Spent   OT Individual Total Time  Spent (Mins) 30   Vitals   O2 Delivery Device None - Room Air   Pain 0 - 10 Group   Location Leg   Location Orientation Right;Left;Inner   Description Sore   Toilet Transfer   Level of Assist Contact Guard Assist   Transfer Type Squat Pivot Transfer   Adaptive Equipment Drop Arm Commode   Assistive Device Wheelchair   Additional Description Cueing needed;Extra time needed   Chair/Bed-to-Chair Transfer   Level of Assist Contact Guard Assist   Transfer Type Squat Pivot Transfer   Assistive Devices Wheelchair   Additional Description Extra time needed   Interdisciplinary Plan of Care Collaboration   Patient Position at End of Therapy In Bed;Bed Alarm On;Call Light within Reach;Tray Table within Reach;Phone within Reach     Functional mobility: pt self-propelled in w/c from room 124 <> [place] gym with supervision.    Therapeutic Exercise  Purpose: to improve strength and to improve functional endurance  Interventions:   Upper body exercises:   Seated program -   Chest press, 3 sets of 10, Bilateral, and Weight 25 lbs on equalizer  Shoulder press, 1 set of 10, Bilateral, and Weight 10 lbs on equalizer  Bicep curls, 2 sets of 10, Right, Left, and Weight 20 lbs on equalizer  Tricep press, 3 sets of 10, Bilateral, and Weight 45 lbs on rickshaw    Activities of Daily Living (ADL's)  Purpose: to improve function, safety, and independence with activities of daily living and to provide patient and family education  Interventions:   Toilet Transfers    Assessment:    First session: pt tolerated session well. Required verbal cueing to locate 5/10 cones in kitchen cupboards; however, pt reports vision is improving. Pt states he feels ready for d/c Tuesday 5/13.    Second session: pt tolerated session well. Successfully completed drop-arm commode transfer with CGA and is in agreement with recommendations. Tolerated UB exercise well with no c/o pain.    Strengths: Effective communication skills, Independent prior level of function,  Good insight into deficits/needs, Making steady progress towards goals, Motivated for self care and independence, Pleasant and cooperative, Supportive family, Willingly participates in therapeutic activities  Barriers: Limited mobility, Impaired balance, Impaired activity tolerance, Generalized weakness, Fatigue    Plan:  ADLs/IADLs, functional mobility at w/c level, general strengthening and endurance training     DME  OT DME Recommendations  Bathroom Equipment (MEDICARE): Drop Arm Commode, Tub Transfer Bench (Medicaid Only)  Bathroom Equipment (NOT TYPICALLY COVERED BY INSURANCE): Tub transfer bench, Hand held shower head, Patient was given information on how to purchase these materials, Other (see comments) (drop arm commode)  Additional Equipment (NOT TYPICALLY COVERED BY INSURANCE): Hip kit, Patient was given information on how to purchase these materials    Passport items to be completed:  Perform bathroom transfers, complete dressing, complete feeding, get ready for the day, prepare a simple meal, participate in household tasks, adapt home for safety needs, demonstrate home exercise program, complete caregiver training     Occupational Therapy Goals (Active)       Problem: Bathing       Dates: Start:  05/07/25         Goal: STG-Within one week, patient will bathe with supervision.       Dates: Start:  05/07/25               Problem: Dressing       Dates: Start:  05/07/25         Goal: STG-Within one week, patient will dress LB with CGA.        Dates: Start:  05/07/25         Goal Note filed on 05/08/25 0813 by Francisca Fontana OT       Requires max A.                 Problem: OT Long Term Goals       Dates: Start:  05/07/25         Goal: LTG-By discharge, patient will complete basic self care tasks mod I.        Dates: Start:  05/07/25            Goal: LTG-By discharge, patient will perform bathroom transfers mod I.        Dates: Start:  05/07/25               Problem: Toileting       Dates: Start:  05/07/25          Goal: STG-Within one week, patient will complete toileting tasks with CGA.        Dates: Start:  05/07/25         Goal Note filed on 05/08/25 0813 by Francisca Fontana OT       Requires min A.

## 2025-05-11 NOTE — THERAPY
Physical Therapy   Daily Treatment     Patient Name:  Vicente Whitfield  Age:  75 y.o., Sex:  male  Medical Record #:  [id number]  Today's Date: 5/11/2025     Precautions  Precautions: Fall Risk, Weight Bearing  Fall Risk: Poor balance  Bowel and Bladder: Fields removed  Weight Bearing: NWB RLE, NWB LLE (pt able to bear weight to RLE for pivot transfers)  Comments: Privacy bag provided for cleanliness    Subjective: Pt resting in bed, ready for PT    Objective:    05/11/25 1401   PT Charge Group   PT Therapeutic Exercise (Units) 1   PT Therapeutic Activities (Units) 1   PT Total Time Spent   PT Individual Total Time Spent (Mins) 30   Lying to Sitting on Side of Bed   Level of Assist Supervised   Additional Description Use of bed rail   Chair/Bed-to-Chair Transfer   Level of Assist Contact Guard Assist   Transfer Type Squat Pivot Transfer   Assistive Devices Wheelchair  (bed rail)         Therapeutic Activities  Purpose: to improve performance and function of daily activities and to increase safety with activities of daily life and mobility related to activities of daily life  Interventions:   Bed/chair transfer training  Disposable hot pack to quads, STM to quads    Therapeutic Exercise  Purpose: to improve ROM/flexibility and to improve functional endurance  Interventions:   Jennifer Med Cycle: Gear Level 2 and Time 12 minutes    Assessment: Pt reported quad soreness this pm but tolerated jennifer-med bike pedals for gentle AAROM and STM without increased pain.    Strengths: Able to follow instructions, Effective communication skills, Independent prior level of function, Manages pain appropriately, Motivated for self care and independence, Pleasant and cooperative, Willingly participates in therapeutic activities  Barriers: Decreased endurance, Fatigue, Home accessibility, Impaired activity tolerance, Impaired balance, Limited mobility, Pain (1 TONIE, BLE NWB (plan to install ramp prior to D/C))    Plan:   Review HEP, complete  "d/c IRF-ESME and on-going family training as needed.    DME    PT DME Recommendations  Wheelchair: 18\" Width, Lightweight, Removable/Flip Back Armrests, Standard Leg Rests      Passport items to be completed:  Get in/out of bed safely, in/out of a vehicle, safely use mobility device, walk or wheel around home/community, navigate up and down stairs, show how to get up/down from the ground, ensure home is accessible, demonstrate HEP, complete caregiver training    Physical Therapy Problems (Active)       Problem: Mobility       Dates: Start:  05/07/25         Goal: STG-Within one week, patient will propel wheelchair community >150 ft mod I indoors        Dates: Start:  05/07/25               Problem: Mobility Transfers       Dates: Start:  05/07/25         Goal: STG-Within one week, patient will transfer bed to chair SBA/set up, while maintaining WB restrictions       Dates: Start:  05/07/25               Problem: PT-Long Term Goals       Dates: Start:  05/07/25         Goal: LTG-By discharge, patient will propel wheelchair mod I >300 ft , including over ramped surfaces       Dates: Start:  05/07/25            Goal: LTG-By discharge, patient will transfer one surface to another mod I while maintaining WB precautions       Dates: Start:  05/07/25              "

## 2025-05-11 NOTE — CARE PLAN
The patient is Stable - Low risk of patient condition declining or worsening    Problem: Pain - Standard  Goal: Alleviation of pain or a reduction in pain to the patient’s comfort goal  Outcome: Progressing     Problem: Fall Risk - Rehab  Goal: Patient will remain free from falls  Outcome: Progressing     Problem: Skin Integrity  Goal: Skin integrity is maintained or improved  Outcome: Progressing

## 2025-05-11 NOTE — PROGRESS NOTES
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  Allergies: Patient has no known allergies.    Safety  Patient Assist     Patient Precautions  Precautions: Fall Risk, Weight Bearing  Fall Risk: Poor balance  Bowel and Bladder: Fields removed  Weight Bearing: NWB RLE, NWB LLE (pt able to bear weight to RLE for pivot transfers)  Comments: Privacy bag provided for cleanliness  Bed Transfer Status  Stand By Assist  Toilet Transfer Status   Contact Guard Assist  Assistive Devices  Wheelchair  Oxygen  None - Room Air  Diet/Therapeutic Dining  Current Diet Order   Procedures    Diet Order Diet: Cardiac     Pill Administration  whole and thin liquid  Agitated Behavioral Scale     ABS Level of Severity       Fall Risk  Has the patient had a fall this admission?      Joie Ruiz Fall Risk Scoring  14, MODERATE RISK  Fall Risk Safety Measures  bed alarm, chair alarm, and poor balance    Vitals  Temperature: 36.7 °C (98 °F)  Temp src: Temporal  Pulse: 67  Respiration: 18  Blood Pressure : 107/65  Blood Pressure MAP (Calculated): 79 MM HG  BP Location: Left, Upper Arm  Patient BP Position: Supine     Oxygen  Pulse Oximetry: 95 %  O2 (LPM): 0  O2 Delivery Device: None - Room Air    Bowel and Bladder  Last Bowel Movement  05/09/25 (as per patient)  Stool Type  Type 7: Watery, no solid pieces-entirely liquid  Bowel Device  Bathroom  Continent  Bladder: Did not void (Fields in Place)   Bowel: No movement  Bladder Function  Urine Void (mL): 300 ml  Number of Times Voided: 1  Urine Color: Becky  Urine Clarity: Sediment  Genitourinary Assessment   Bladder Assessment (WDL):  Within Defined Limits  Fields Catheter: Not Applicable  Fields Reasons per MD Order: Acute urinary retention or bladder outlet obstruction  Fields Care: Given with Soap and Water  Urine Color: Becky  Urine  Clarity: Sediment  Bladder Device: Bathroom  Bladder Scan: Post Void  $ Bladder Scan Results (mL): 20    Skin  Shaun Score   16  Sensory Interventions   Bed Types: Standard/Trauma Mattress  Skin Preventative Measures: Pillows in Use for Support / Positioning  Moisture Interventions  Moisturizers/Barriers: Barrier Wipes  Containment Devices: Indwelling Catheter      Pain  Pain Rating Scale  4 - Distracts me, can do usual activities  Pain Location  Throat  Pain Location Orientation  Right, Left, Inner  Pain Interventions   Declines    ADLs    Bathing   Shower, * * With Assistance from, Staff  Linen Change   Complete  Personal Hygiene     Chlorhexidine Bath      Oral Care     Teeth/Dentures     Shave     Nutrition Percentage Eaten  *  * Meal *  *, Dinner, Between % Consumed  Environmental Precautions     Patient Turns/Positioning  Patient turns self independently side to side without assistance, to offload sacral area  Patient Turns Assistance/Tolerance  Tolerates Well  Bed Positions  Bed Controls On, Bed Locked  Head of Bed Elevated  Self regulated      Psychosocial/Neurologic Assessment  Psychosocial Assessment  Psychosocial (WDL):  Within Defined Limits  Neurologic Assessment  Neuro (WDL): Exceptions to WDL  Level of Consciousness: Alert  Orientation Level: Oriented X4  Cognition: Follows commands  Speech: Clear  Pupil Assesment: No  Motor Function/Sensation Assessment: Motor strength, Motor response  RUE Motor Response: Normal extension  Muscle Strength Right Arm: Good Strength Against Gravity and Moderate Resistance  LUE Motor Response: Normal extension  Muscle Strength Left Arm: Good Strength Against Gravity and Moderate Resistance  Muscle Strength Right Leg: Weak Movement but Not Against Gravity or Resistance  Muscle Strength Left Leg: Weak Movement but Not Against Gravity or Resistance  EENT (WDL):  WDL Except    Cardio/Pulmonary Assessment  Edema      Respiratory Breath Sounds  RUL Breath Sounds:  Clear  RML Breath Sounds: Clear  RLL Breath Sounds: Diminished  NADINE Breath Sounds: Clear  LLL Breath Sounds: Diminished  Cardiac Assessment   Cardiac (WDL):  Within Defined Limits

## 2025-05-11 NOTE — THERAPY
Physical Therapy   Daily Treatment     Patient Name:  Vicente Whitfield  Age:  75 y.o., Sex:  male  Medical Record #:  [id number]  Today's Date: 5/11/2025     Precautions  Precautions: Fall Risk, Weight Bearing  Fall Risk: Poor balance  Bowel and Bladder: Fields removed  Weight Bearing: NWB RLE, NWB LLE (pt able to bear weight to RLE for pivot transfers)  Comments: Privacy bag provided for cleanliness    Subjective: Pt received up in wc, was requesting to get out of wc, willing to participate with PT    Objective:    05/11/25 0931   PT Charge Group   PT Therapeutic Exercise (Units) 2   PT Therapeutic Activities (Units) 2   PT Total Time Spent   PT Individual Total Time Spent (Mins) 60   Sit to Lying   Level of Assist Supervised   Lying to Sitting on Side of Bed   Level of Assist Supervised   Chair/Bed-to-Chair Transfer   Level of Assist Stand By Assist;Contact Guard Assist   Transfer Type Squat Pivot Transfer   Assistive Devices Wheelchair  (bed rail)   Wheelchair   Level of Assist Supervised   Type Manual   Additional Description Extra time needed   Wheelchair Distance 150 ft x 1, 50 ft x 1       Therapeutic Exercise  Purpose: to improve strength, to improve ROM/flexibility, and to improve functional endurance  Interventions:   Lower body exercises:   Supine program -   Pelvic tilt, 2 sets of 10  Hip abduction, Hook lying and 2 sets of 10  Hip adduction, 2 sets of 10  Short arc quad, 2 sets of 10  Heel slide, 2 sets of 10  Ankle pumps, 2 sets of 10  Gluteal isometrics, 2 sets of 10  Quadriceps isometrics, 2 sets of 10  Seated program -   Ankle pumps, 2 sets of 10  Hip flexion, 2 sets of 10  Hip abduction, 2 sets of 10  Hip adduction, 2 sets of 10  Long arc quad, 2 sets of 10  Hamstring curl, 2 sets of 10       Assessment: pt tolerated therapy well with minimal reports of pain. Requires increased time for wc propulsion and SBA/ CGA for pivot transfers 2* WB limitations. Recommend bed rail to assist with safety upon  "dc    Strengths: Able to follow instructions, Effective communication skills, Independent prior level of function, Manages pain appropriately, Motivated for self care and independence, Pleasant and cooperative, Willingly participates in therapeutic activities  Barriers: Decreased endurance, Fatigue, Home accessibility, Impaired activity tolerance, Impaired balance, Limited mobility, Pain (1 TONIE, BLE NWB (plan to install ramp prior to D/C))    Plan:   Wc endurance /propulsion and parts management  OOB activity tolerance  Stand-pivot / reach-pivot transfers bed<>wc  AAROM/ strength training     Family training    DME    PT DME Recommendations  Wheelchair: 18\" Width, Lightweight, Removable/Flip Back Armrests, Standard Leg Rests      Passport items to be completed:  Get in/out of bed safely, in/out of a vehicle, safely use mobility device, walk or wheel around home/community, navigate up and down stairs, show how to get up/down from the ground, ensure home is accessible, demonstrate HEP, complete caregiver training    Physical Therapy Problems (Active)       Problem: Mobility       Dates: Start:  05/07/25         Goal: STG-Within one week, patient will propel wheelchair community >150 ft mod I indoors        Dates: Start:  05/07/25               Problem: Mobility Transfers       Dates: Start:  05/07/25         Goal: STG-Within one week, patient will transfer bed to chair SBA/set up, while maintaining WB restrictions       Dates: Start:  05/07/25               Problem: PT-Long Term Goals       Dates: Start:  05/07/25         Goal: LTG-By discharge, patient will propel wheelchair mod I >300 ft , including over ramped surfaces       Dates: Start:  05/07/25            Goal: LTG-By discharge, patient will transfer one surface to another mod I while maintaining WB precautions       Dates: Start:  05/07/25              "

## 2025-05-11 NOTE — CARE PLAN
Problem: Knowledge Deficit - Standard  Goal: Patient and family/care givers will demonstrate understanding of plan of care, disease process/condition, diagnostic tests and medications  Outcome: Progressing     Problem: Skin Integrity  Goal: Skin integrity is maintained or improved  Outcome: Progressing     Problem: Fall Risk - Rehab  Goal: Patient will remain free from falls  Outcome: Progressing     Problem: Psychosocial  Goal: Patient's level of anxiety will decrease  Outcome: Progressing     Problem: Bladder / Voiding  Goal: Patient will establish and maintain regular urinary output  Outcome: Progressing  Goal: Patient will establish and maintain bladder regimen  Outcome: Progressing     Problem: Self Care  Goal: Patient will have the ability to perform ADLs independently or with assistance  Outcome: Progressing     Problem: Mobility  Goal: Patient's capacity to carry out activities will improve  Outcome: Progressing     Problem: Pain - Standard  Goal: Alleviation of pain or a reduction in pain to the patient’s comfort goal  Outcome: Progressing       The patient is Watcher - Medium risk of patient condition declining or worsening    Shift Goals  Clinical Goals: comfort  Patient Goals: comfort, rest

## 2025-05-12 ENCOUNTER — APPOINTMENT (OUTPATIENT)
Dept: OCCUPATIONAL THERAPY | Facility: REHABILITATION | Age: 75
DRG: 949 | End: 2025-05-12
Attending: PHYSICAL MEDICINE & REHABILITATION
Payer: OTHER MISCELLANEOUS

## 2025-05-12 ENCOUNTER — APPOINTMENT (OUTPATIENT)
Dept: RADIOLOGY | Facility: REHABILITATION | Age: 75
DRG: 949 | End: 2025-05-12
Attending: PHYSICAL MEDICINE & REHABILITATION
Payer: OTHER MISCELLANEOUS

## 2025-05-12 ENCOUNTER — APPOINTMENT (OUTPATIENT)
Dept: PHYSICAL THERAPY | Facility: REHABILITATION | Age: 75
DRG: 949 | End: 2025-05-12
Attending: PHYSICAL MEDICINE & REHABILITATION
Payer: OTHER MISCELLANEOUS

## 2025-05-12 PROBLEM — S37.29XA TRAUMATIC RUPTURE OF BLADDER: Status: RESOLVED | Noted: 2025-04-23 | Resolved: 2025-05-12

## 2025-05-12 PROCEDURE — 72190 X-RAY EXAM OF PELVIS: CPT

## 2025-05-12 PROCEDURE — RXMED WILLOW AMBULATORY MEDICATION CHARGE: Performed by: PHYSICAL MEDICINE & REHABILITATION

## 2025-05-12 ASSESSMENT — PAIN DESCRIPTION - PAIN TYPE: TYPE: ACUTE PAIN;SURGICAL PAIN

## 2025-05-12 ASSESSMENT — BRIEF INTERVIEW FOR MENTAL STATUS (BIMS)
BIMS SUMMARY SCORE: 15
WHAT MONTH IS IT: ACCURATE WITHIN 5 DAYS
WHAT YEAR IS IT: CORRECT
WHAT DAY OF THE WEEK IS IT: CORRECT
INITIAL REPETITION OF BED BLUE SOCK - FIRST ATTEMPT: 3
ASKED TO RECALL BED: YES, NO CUE REQUIRED
ASKED TO RECALL BLUE: YES, NO CUE REQUIRED
ASKED TO RECALL SOCK: YES, NO CUE REQUIRED

## 2025-05-12 ASSESSMENT — ACTIVITIES OF DAILY LIVING (ADL)
TOILET_TRANSFER_LEVEL_OF_ASSIST: REQUIRES SUPERVISION WITH TOILET TRANSFER
TOILETING_LEVEL_OF_ASSIST: REQUIRES SUPERVISION WITH TOILETING
SHOWER_TRANSFER_LEVEL_OF_ASSIST: REQUIRES SUPERVISION WITH SHOWER TRANSFER

## 2025-05-12 NOTE — CARE PLAN
Problem: Fall Risk - Rehab  Goal: Patient will remain free from falls  Outcome: Progressing     Problem: Mobility  Goal: Patient's capacity to carry out activities will improve  Outcome: Progressing       The patient is Stable - Low risk of patient condition declining or worsening    Shift Goals  Clinical Goals: Safety  Patient Goals: Sleep without pain  Family Goals: n/a

## 2025-05-12 NOTE — FLOWSHEET NOTE
05/12/25 0840   Events/Summary/Plan   Events/Summary/Plan Home CPAP reservoir refilled w/sterile H20   Vital Signs   Pulse 80   Respiration 17   Pulse Oximetry 95 %   $ Pulse Oximetry (Spot Check) Yes   Respiratory Assessment   Level of Consciousness Alert   Chest Exam   Work Of Breathing / Effort Within Normal Limits   Oxygen   O2 (LPM) 0   O2 Delivery Device Room air w/o2 available   Non-Invasive Ventilation [facility]   Nocturnal CPAP or BIPAP CPAP - Home Unit  (worn 8.1 hours)   Cleanliness and Damage Inspection Performed Yes

## 2025-05-12 NOTE — CARE PLAN
Problem: Bathing  Goal: STG-Within one week, patient will bathe with supervision.  Outcome: Met     Problem: Dressing  Goal: STG-Within one week, patient will dress LB with CGA.   Outcome: Met     Problem: Toileting  Goal: STG-Within one week, patient will complete toileting tasks with CGA.   Outcome: Met     Problem: OT Long Term Goals  Goal: LTG-By discharge, patient will complete basic self care tasks mod I.   Outcome: Met  Goal: LTG-By discharge, patient will perform bathroom transfers mod I.   Outcome: Met
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
## 2025-05-12 NOTE — PROGRESS NOTES
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  Allergies: Patient has no known allergies.    Safety  Patient Assist     Patient Precautions  Precautions: Fall Risk  Fall Risk: Poor balance  Bowel and Bladder: Fields removed  Weight Bearing: NWB RLE, NWB LLE (pt able to bear weight to RLE for pivot transfers)  Comments: Privacy bag provided for cleanliness  Bed Transfer Status  Contact Guard Assist  Toilet Transfer Status   Contact Guard Assist  Assistive Devices  Rails, Wheelchair  Oxygen  CPAP  Diet/Therapeutic Dining  Current Diet Order   Procedures    Diet Order Diet: Cardiac     Pill Administration  whole  Agitated Behavioral Scale     ABS Level of Severity       Fall Risk  Has the patient had a fall this admission?      Joie Ruiz Fall Risk Scoring  15, HIGH RISK  Fall Risk Safety Measures  bed alarm, chair alarm, poor balance, and low vision/hearing    Vitals  Temperature: 36.1 °C (97 °F)  Temp src: Temporal  Pulse: 62  Respiration: 20  Blood Pressure : (!) 144/84  Blood Pressure MAP (Calculated): 104 MM HG  BP Location: Left, Upper Arm  Patient BP Position: Supine     Oxygen  Pulse Oximetry: 97 %  O2 (LPM): 0  O2 Delivery Device: CPAP    Bowel and Bladder  Last Bowel Movement  05/09/25  Stool Type  Type 7: Watery, no solid pieces-entirely liquid  Bowel Device  Bathroom  Continent  Bladder: Did not void (Fields in Place)   Bowel: No movement  Bladder Function  Urine Void (mL): 350 ml  Number of Times Voided: 1  Urine Color: Other (Comments) (dark alice color)  Urine Clarity: Sediment  Genitourinary Assessment   Bladder Assessment (WDL):  Within Defined Limits  Fields Catheter: Not Applicable  Fields Reasons per MD Order: Acute urinary retention or bladder outlet obstruction  Fields Care: Given with Soap and Water  Urine Color: Other (Comments) (dark  alice color)  Urine Clarity: Sediment  Bladder Device: Urinal  Bladder Scan: Post Void  $ Bladder Scan Results (mL): 20    Skin  Shaun Score   16  Sensory Interventions   Bed Types: Standard/Trauma Mattress with Overlay  Skin Preventative Measures: Waffle Overlay  Moisture Interventions  Moisturizers/Barriers: Barrier Wipes  Containment Devices: Indwelling Catheter      Pain  Pain Rating Scale  5 - Interrupts some activities  Pain Location  Leg  Pain Location Orientation  Left  Pain Interventions   Medication (see MAR)    ADLs    Bathing   Shower, * * With Assistance from, Staff  Linen Change   Complete  Personal Hygiene     Chlorhexidine Bath      Oral Care     Teeth/Dentures     Shave     Nutrition Percentage Eaten  *  * Meal *  *, Dinner, Between % Consumed  Environmental Precautions     Patient Turns/Positioning  Patient turns self independently side to side without assistance, to offload sacral area  Patient Turns Assistance/Tolerance  Tolerates Well  Bed Positions  Bed Controls On, Bed Locked  Head of Bed Elevated  Self regulated      Psychosocial/Neurologic Assessment  Psychosocial Assessment  Psychosocial (WDL):  Within Defined Limits  Neurologic Assessment  Neuro (WDL): Exceptions to WDL  Level of Consciousness: Alert  Orientation Level: Oriented X4  Cognition: Appropriate attention/concentration, Appropriate safety awareness, Follows commands  Speech: Clear  Pupil Assesment: No  Motor Function/Sensation Assessment: Motor strength, Motor response  RUE Motor Response: Normal extension  Muscle Strength Right Arm: Good Strength Against Gravity and Moderate Resistance  LUE Motor Response: Normal extension  Muscle Strength Left Arm: Good Strength Against Gravity and Moderate Resistance  Muscle Strength Right Leg: Weak Movement but Not Against Gravity or Resistance  Muscle Strength Left Leg: Weak Movement but Not Against Gravity or Resistance  EENT (WDL):  WDL Except    Cardio/Pulmonary Assessment  Edema       Respiratory Breath Sounds  RUL Breath Sounds: Clear  RML Breath Sounds: Clear  RLL Breath Sounds: Diminished  NADINE Breath Sounds: Clear  LLL Breath Sounds: Diminished  Cardiac Assessment   Cardiac (WDL):  Within Defined Limits

## 2025-05-12 NOTE — CARE PLAN
Problem: Fall Risk - Rehab  Goal: Patient will remain free from falls  Outcome: Progressing  Note: Pt uses call light consistently and appropriately. He waits for assistance does not attempt self transfer this shift.     Problem: Infection  Goal: Patient will remain free from infection  Outcome: Progressing   The patient is Stable - Low risk of patient condition declining or worsening    Shift Goals  Clinical Goals: Safety  Patient Goals: Sleep without pain  Family Goals: n/a

## 2025-05-12 NOTE — DISCHARGE PLANNING
Case Management Discharge Instructions     NOTE: This information can be found in your final discharge packet that your nurse will give you.       Follow-up Information:     Vitor Raines M.D.  [address] [place] [id number] [phone]  Follow up        [facility]  [address], [address]  [facility] [id number] [phone]  Follow up        [facility]  [address] [place] [id number] [phone]  Follow up        [facility]  [address]  [place] [id number] [phone]  Follow up

## 2025-05-12 NOTE — THERAPY
"Occupational Therapy  Daily Treatment     Patient Name:  Vicente Whitfield  Age:  75 y.o., Sex:  male  Medical Record #:  [id number]  Today's Date:  5/12/2025    Precautions  Precautions: Fall Risk  Fall Risk: Poor balance  Bowel and Bladder: Fields removed  Weight Bearing: NWB RLE, NWB LLE (pt able to bear weight to RLE for pivot transfers)  Comments: Privacy bag provided for cleanliness    Subjective: pt seated in w/c upon arrival. Pt pleasant and cooperative, agreeable to therapy.     Objective:    05/12/25 1301   Therapy Missed   Missed Therapy (Minutes) 30   Reason For Missed Therapy Non-Medical - Patient Refused  (pt fatigued)   OT Charge Group   Charges Yes   OT Self Care / ADL (Units) 2   OT Therapy Activity (Units) 1   OT Therapeutic Exercise (Units) 1   OT Total Time Spent   OT Individual Total Time Spent (Mins) 60   Vitals   O2 Delivery Device None - Room Air   Cognitive Pattern Assessment   Cognitive Pattern Assessment Used BIMS   Brief Interview for Mental Status (BIMS)   Repetition of Three Words (First Attempt) 3   Temporal Orientation: Year Correct   Temporal Orientation: Month Accurate within 5 days   Temporal Orientation: Day Correct   Recall: \"Sock\" Yes, no cue required   Recall: \"Blue\" Yes, no cue required   Recall: \"Bed\" Yes, no cue required   BIMS Summary Score 15   Confusion Assessment Method (CAM)   Is there evidence of an acute change in mental status from the patient's baseline? No   Inattention Behavior not present   Disorganized thinking Behavior not present   Altered level of consciousness Behavior not present   Eating   Assistance Needed Independent   Physical Assistance Level No physical assistance   CARE Score - Eating 6   Oral Hygiene   Assistance Needed Independent   Physical Assistance Level No physical assistance   CARE Score - Oral Hygiene 6   Grooming   Level of Assist Modified Independent   Patient Position Seated   Additional Description Hair care   Upper Body Dressing "   Assistance Needed Independent   Physical Assistance Level No physical assistance   CARE Score - Upper Body Dressing 6   Upper Body Dressing   Level of Assist Independent   Patient Position Seated   Clothing Item(s) Pullover shirt   Lower Body Dressing   Assistance Needed Independent   Physical Assistance Level No physical assistance   CARE Score - Lower Body Dressing 6   Lower Body Dressing   Level of Assist Independent   Patient Position Seated   Clothing Item(s) Shorts   Additional Description Extra time needed;Other (see comments)  (lateral weight shifts)   Putting On/Taking Off Footwear   Assistance Needed Independent;Adaptive equipment   Physical Assistance Level No physical assistance   CARE Score - Putting On/Taking Off Footwear 6   Putting On/Taking Off Footwear   Level of Assist Modified Independent   Patient Position Seated   Footwear Type Treaded Socks   Adaptive Equipment Sock Aid   Additional Description Extra time needed   Shower/Bathe Self   Assistance Needed Independent   Physical Assistance Level No physical assistance   CARE Score - Shower/Bathe Self 6   Shower/Bathe Self   Level of Assist Modified Independent   Patient Position Seated   Adaptive Equipment Fold Down Shower Bench;Grab Bars;Handheld Shower Head   Additional Description Extra time needed;Other (see comments)  (lateral weight shifts to wash lynnette area)   Tub/Shower Transfer   Level of Assist Supervised;Contact Guard Assist   Transfer Type Squat pivot transfer   Adaptive Equipment Grab bars;Use of tub transfer bench   Assistive Device Fold Down Bench;Wheelchair   Additional Description Other (see comments)  (completed 3x: 1x fold down bench, 1x tub transfer bench with OT assisting, 1x tub transfer bench with SO assisting)   Toilet Transfer   Assistance Needed Independent   Physical Assistance Level No physical assistance   CARE Score - Toilet Transfer 6   Toilet Transfer   Level of Assist Stand By Assist;Contact Guard Assist   Transfer  Type Squat Pivot Transfer   Adaptive Equipment Drop Arm Commode   Assistive Device Wheelchair   Additional Description Other (see comments)  (completed 2x: 1x with OT assisting, 1x with SO assisting)   Toileting Hygiene   Assistance Needed Independent   Physical Assistance Level No physical assistance   CARE Score - Toileting Hygiene 6   Sit to Lying   Level of Assist Modified Independent   Chair/Bed-to-Chair Transfer   Level of Assist Supervised   Transfer Type Squat Pivot Transfer   Assistive Devices Wheelchair   Family Training   Training Completed   Date 05/12/25   Time 1331   Family Member(s) Present Spouse/Significant other   Minutes 60 minutes;Other (comments)   Training Items Completed Toilet transfer;Tub/Shower transfer;DME recommendations;AE recommendations;Home safety;Plan of Care/Progress;Other (see comments)  (SO observed bathing routine, assisted with setting up. assisted with bathroom transfers)   Interdisciplinary Plan of Care Collaboration   IDT Collaboration with  Family / Caregiver;Other (See Comments)  (therapy )   Patient Position at End of Therapy In Bed;Bed Alarm On;Call Light within Reach;Tray Table within Reach;Phone within Reach   Collaboration Comments SO present for session; missed minutes   OT Discharge Summary    Discharge Location  Home   Patient Discharging with Assist of Spouse / Significant Other;Family    Level of Supervision Required Intermittent Supervision   Recommended Equipment for Discharge Manual Wheelchair;Tub Transfer Bench;3 in 1 Commode;Hand Held Shower Head;Sock Aid;Reacher;Long Handled Shoe Horn;Dressing Stick   Recommended Services Upon Discharge Home Health Occupational Therapy   Long Term Goals Met 2   Long Term Goals Not Met 0   Reason(s) for Goals Not Met N/A   Criteria for Termination of Services Maximum Function Achieved for Inpatient Rehabilitation   Discharge Instructions to Patient   Level of Assist Required for Eating Able to Complete Eating without  Assist   Level of Assist Required for Grooming Able to Complete Grooming without Assist   Level of Assist Required for Dressing Able to Complete Dressing without Assist   Equipment for Dressing Sock Aid;Reacher;Long Handled Shoe Horn;Dressing Stick   Level of Assist Required for Toileting Requires Supervision with Toileting   Level of Assist Required for Toilet Transfer Requires Supervision with Toilet Transfer   Equipment for Toilet Transfer Commode over Toilet   Level of Assist Required for Bathing Requires Supervision with Bathing   Equipment for Bathing Tub Transfer Bench;Hand Held Shower Head;Long Handled Sponge   Level of Assist Required for Shower Transfer Requires Supervision with Shower Transfer   Equipment for Shower Transfer Tub Transfer Bench   Level of Assist Required for Home Mgmt Requires Supervision with Home Management   Level of Assist Required for Meal Prep Requires Supervision with Meal Preparation   Driving May not Drive, Please Contact Physician for Further Information   Home Exercise Program Refer to Home Exercise Program Handout for Details     Functional mobility: pt self-propelled in w/c from room 124 <> [place] gym with supervision.     Therapeutic Activities  Purpose: to improve performance and function of daily activities, to provide patient and family education, and to increase safety with activities of daily life and mobility related to activities of daily life  Interventions:  Patient or family education  Family training completed with [facility] provided on:  CLOF with ADL's and bathroom transfers at w/c level, reviewed therapeutic goals and current barriers.  Bathroom DME recommendations: tub transfer bench in shower, drop arm commode for toileting  AE recommendations: sock aid, reacher, long handled sponge.  Supervision level recommendations: supervision with ADL/IADLs at w/c level  Home safety, energy conservation  HEP and transition with HH therapies     Therapeutic Exercise  Purpose:  to improve strength and to improve functional endurance  Interventions:   Upper body exercises:   Seated program -   Lat pull, 3 sets of 10, Bilateral, and Weight 45 lbs on equalizer  Bicep curls, 3 sets of 10, Right, Left, and Weight 20 lbs on equalizer  Tricep press, 3 sets of 10, Bilateral, and Weight 50 lbs on rickshaw    Activities of Daily Living (ADL's)  Purpose: to improve function, safety, and independence with activities of daily living and to provide patient and family education  Interventions:   Grooming  Shower/Bathing  Upper Body Dressing  Lower Body Dressing  Toilet Transfers  Shower Transfers: Wet and Dry  Adaptive Equipment Training  SO observing and assisting with bathing routine  Discharge Interventions  Purpose: to complete discharge assessments in preparation for upcoming discharge from facility and to review final discharge recommendations and education  Interventions:   completed discharge IRF-ESME items  completed patient passport  reviewed relevant DME and adaptive equipment recommendations  discussed home safety  reviewed relevant precautions to maximize safety during home and community mobility, ADLs, and IADLs    Assessment:    Pt tolerated session fairly well with no LOB noted. Family training completed - they both demo good understanding and agreeable to edu and recommendations. No further questions/concerns at this time. Refused to participate in last 30 min of session, pt fatigued from session and feels prepared for d/c tomorrow.    Strengths: Effective communication skills, Independent prior level of function, Good insight into deficits/needs, Making steady progress towards goals, Motivated for self care and independence, Pleasant and cooperative, Supportive family, Willingly participates in therapeutic activities  Barriers: Limited mobility, Impaired balance, Impaired activity tolerance, Generalized weakness, Fatigue    Plan:  D/c tomorrow.    DME  OT DME Recommendations  Bathroom  Equipment (MEDICARE): Drop Arm Commode, Tub Transfer Bench (Medicaid Only)  Bathroom Equipment (NOT TYPICALLY COVERED BY INSURANCE): Tub transfer bench, Hand held shower head, Patient was given information on how to purchase these materials, Other (see comments) (drop arm commode)  Additional Equipment (NOT TYPICALLY COVERED BY INSURANCE): Hip kit, Patient was given information on how to purchase these materials    Passport items to be completed:      Occupational Therapy Goals (Active)       There are no active problems.
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
## 2025-05-12 NOTE — THERAPY
"Physical Therapy   Daily Treatment     Patient Name:  Vicente Whitfield  Age:  75 y.o., Sex:  male  Medical Record #:  [id number]  Today's Date: 5/12/2025     Precautions  Precautions: Fall Risk  Fall Risk: Poor balance  Bowel and Bladder: Fields removed  Weight Bearing: NWB RLE, NWB LLE (pt able to bear weight to RLE for pivot transfers)  Comments: Privacy bag provided for cleanliness    Subjective: Pt up in wc, willing to participate    Objective:    05/12/25 0931   PT Charge Group   PT Therapeutic Exercise (Units) 2   PT Therapeutic Activities (Units) 2   PT Total Time Spent   PT Individual Total Time Spent (Mins) 60   Roll Left and Right   Level of Assist Modified Independent   Additional Description Use of bed rail;Completed on regular bed   Sit to Lying   Level of Assist Modified Independent   Additional Description Use of bed rail;Completed on regular bed   Lying to Sitting on Side of Bed   Level of Assist Modified Independent   Additional Description Use of bed rail;Completed on regular bed   Sit to Stand   Level of Assist Contact Guard Assist   Assistive Devices Grab Bar   Chair/Bed-to-Chair Transfer   Level of Assist Set up;Supervised   Car Transfer   Level of Assist Contact Guard Assist   Transfer Type Stand Pivot Transfer   Assistive Device   (wheelchair/ car handle)   Additional Description Cueing needed;Extra time needed   Ambulation   Level of Assist Unable to Participate  (NWB BLE's/ pelvic fx's/ RLE weightbearing for transfers only)   Curbs   Level of Assist Unable to Participate  (NWB BLE's/ pelvic fx's/ RLE weightbearing for transfers only)   Stairs   Level of Assist Unable to Participate  (NWB BLE's/ pelvic fx's/ RLE weightbearing for transfers only)   Wheelchair   Level of Assist Modified Independent   Type Manual   Additional Description Extra time needed   Wheelchair Distance 150 ft   PT DME Recommendations   Wheelchair 18\" Width;Lightweight;Removable/Flip Back Armrests;Standard Leg Rests "   Cushion Standard         Therapeutic Activities  Purpose: to improve performance and function of daily activities, to provide patient and family education, and to increase safety with activities of daily life and mobility related to activities of daily life  Interventions:   Bed/chair transfer training  Bed mobility training (scooting, supine to sit, sit to supine, rolling)  Sit to stand training  Car transfer  Supine/ seated HEP with hand outs provided      Therapeutic Exercise  Purpose: to improve strength, to improve ROM/flexibility, and to improve functional endurance  Interventions:   Lower body exercises:   Seated program -   Ankle pumps, 2 sets of 10  Hip flexion, 2 sets of 10  Hip abduction, 2 sets of 10  Hip adduction, 2 sets of 10  Long arc quad, 2 sets of 10  Hamstring curl, 2 sets of 10  Jennifer Med Cycle: Gear Level 2 and Time 15 minutes    Discharge Interventions  Purpose: to complete discharge assessments in preparation for upcoming discharge from facility and to review final discharge recommendations and education  Interventions:   completed discharge IRF-ESME items  completed patient passport  reviewed relevant DME and adaptive equipment recommendations  discussed home safety  discussed floor recovery/fall prevention  reviewed HEP with handout provided    Assessment: Pt participated with d/c IRF-ESME assessment, reviewed HEP and fall prevention education, and completed general strength/ endurance exercises as noted.     Strengths: Able to follow instructions, Effective communication skills, Independent prior level of function, Manages pain appropriately, Motivated for self care and independence, Pleasant and cooperative, Willingly participates in therapeutic activities  Barriers: Decreased endurance, Fatigue, Home accessibility, Impaired activity tolerance, Impaired balance, Limited mobility, Pain (1 TONIE, BLE NWB (plan to install ramp prior to D/C))    Plan:   D/c tomorrow with assist from SO, home  "health PT    DME    PT DME Recommendations  Wheelchair: (P) 18\" Width, Lightweight, Removable/Flip Back Armrests, Standard Leg Rests  Cushion: (P) Standard      Physical Therapy Problems (Active)       Problem: Mobility       Dates: Start:  05/07/25         Goal: STG-Within one week, patient will propel wheelchair community >150 ft mod I indoors        Dates: Start:  05/07/25               Problem: Mobility Transfers       Dates: Start:  05/07/25         Goal: STG-Within one week, patient will transfer bed to chair SBA/set up, while maintaining WB restrictions       Dates: Start:  05/07/25               Problem: PT-Long Term Goals       Dates: Start:  05/07/25         Goal: LTG-By discharge, patient will propel wheelchair mod I >300 ft , including over ramped surfaces       Dates: Start:  05/07/25            Goal: LTG-By discharge, patient will transfer one surface to another mod I while maintaining WB precautions       Dates: Start:  05/07/25              "

## 2025-05-13 ENCOUNTER — PHARMACY VISIT (OUTPATIENT)
Dept: PHARMACY | Facility: MEDICAL CENTER | Age: 75
End: 2025-05-13
Payer: COMMERCIAL

## 2025-05-13 ASSESSMENT — PATIENT HEALTH QUESTIONNAIRE - PHQ9
SUM OF ALL RESPONSES TO PHQ9 QUESTIONS 1 AND 2: 0
2. FEELING DOWN, DEPRESSED, IRRITABLE, OR HOPELESS: NOT AT ALL
1. LITTLE INTEREST OR PLEASURE IN DOING THINGS: NOT AT ALL

## 2025-05-13 NOTE — PROGRESS NOTES
..NURSING DAILY NOTE    Name: Vicente Whitfield   Date of Admission: 5/6/2025   Admitting Diagnosis: Traumatic brain injury with new neurocognitive deficit (HCC)  Attending Physician: PASCUAL BUNCH M.D.  Allergies: Patient has no known allergies.    Safety  Patient Precautions  Precautions: Fall Risk  Fall Risk: Poor balance  Bowel and Bladder: Fields removed  Weight Bearing: NWB RLE, NWB LLE (pt able to bear weight to RLE for pivot transfers)  Comments: Privacy bag provided for cleanliness  Bed Transfer Status  Supervised  Toilet Transfer Status   Stand By Assist, Contact Guard Assist  Assistive Devices  Rails, Wheelchair  Oxygen  CPAP  Diet/Therapeutic Dining  Current Diet Order   Procedures    Diet Order Diet: Cardiac     Pill Administration  whole    Fall Risk     Joie Ruiz Fall Risk Scoring  13, MODERATE RISK  Fall Risk Safety Measures  bed alarm and chair alarm    Vitals  Temperature: 36.6 °C (97.9 °F)  Temp src: Temporal  Pulse: 62  Respiration: 18  Blood Pressure : 135/65  Blood Pressure MAP (Calculated): 88 MM HG  BP Location: Right, Upper Arm  Patient BP Position: Lying Left Side     Oxygen  Pulse Oximetry: 95 %  O2 (LPM): 0  O2 Delivery Device: CPAP    Bowel and Bladder  Last Bowel Movement  05/12/25 (per patient)  Stool Type  Type 7: Watery, no solid pieces-entirely liquid  Bowel Device  Bathroom  Bladder Function  Urine Void (mL): 450 ml  Number of Times Voided: 1  Urine Color:  (dark alice)  Urine Clarity: Sediment  Genitourinary Assessment   Bladder Assessment (WDL):  Within Defined Limits  Fields Catheter: Not Applicable  Bladder Device: Urinal  Bladder Scan: Post Void  $ Bladder Scan Results (mL): 20    Skin  Shaun Score   17  Sensory Interventions   Bed Types: Standard/Trauma Mattress with Overlay  Skin Preventative Measures: Waffle Overlay  Moisture Interventions  Moisturizers/Barriers: Barrier Wipes  Containment Devices: Indwelling Catheter      Pain  Pain Rating Scale  2 - Notice  Pain, does not interfere with activities  Pain Location  Face  Pain Location Orientation  Right, Anterior  Pain Interventions   Medication (see MAR)    ADLs    Linen Change   Complete  Nutrition Percentage Eaten  Between 50-75% Consumed  Patient Turns/Positioning  Patient turns self independently side to side without assistance, to offload sacral area  Patient Turns Assistance/Tolerance  Tolerates Well  Bed Positions  Bed Controls On, Bed Locked  Head of Bed Elevated  Self regulated      Psychosocial/Neurologic Assessment  Psychosocial Assessment  Psychosocial (WDL):  Within Defined Limits  Neurologic Assessment  Neuro (WDL): Exceptions to WDL  Level of Consciousness: Alert  Orientation Level: Oriented X4  Cognition: Appropriate safety awareness, Appropriate attention/concentration, Follows commands  Speech: Clear  Pupil Assesment: No  Motor Function/Sensation Assessment: Motor strength, Motor response  RUE Motor Response: Normal extension  Muscle Strength Right Arm: Good Strength Against Gravity and Moderate Resistance  LUE Motor Response: Normal extension  Muscle Strength Left Arm: Good Strength Against Gravity and Moderate Resistance  Muscle Strength Right Leg: Weak Movement but Not Against Gravity or Resistance  Muscle Strength Left Leg: Weak Movement but Not Against Gravity or Resistance  EENT (WDL):  WDL Except    Cardio/Pulmonary Assessment  Respiratory Breath Sounds  RUL Breath Sounds: Clear  RML Breath Sounds: Clear  RLL Breath Sounds: Diminished  NADINE Breath Sounds: Clear  LLL Breath Sounds: Diminished  Cardiac Assessment   Cardiac (WDL):  Within Defined Limits

## 2025-05-13 NOTE — DISCHARGE INSTRUCTIONS
[facility] NURSING DISCHARGE INSTRUCTIONS    Blood Pressure : 135/65  Weight: 78.5 kg (173 lb 1 oz)  Nursing recommendations for Vicente Whitfield at time of discharge are as follows:  Client verbalized understanding of all discharge instructions and prescriptions.     Review all your home medications and newly ordered medications with your doctor and/or pharmacist. Follow medication instructions as directed by your doctor and/or pharmacist.    Pain Management:   Discharge Pain Medication Instructions:  Comfort Goal: Sleep Comfortably  Notify your primary care provider if pain is unrelieved with these measures, if the pain is new, or increased in intensity.    Discharge Skin Characteristics: Warm, Dry  Discharge Skin Exam: Other (Comments) (Refer to wound documentation)  Wound 04/24/25 Incision Other (Comment) Lower abdomen/pelvis: staples, Aquacel (Active)       Wound 04/29/25 Abdomen Lower Incision w/ Puncture Site (Active)   Wound Image   05/09/25 1345   Site Assessment Clean;Dry;Intact 05/12/25 0730   Periwound Assessment Clean;Dry;Intact 05/12/25 0730   Margins Attached edges 05/12/25 0730   Closure Approximated 05/11/25 0815   Drainage Amount None 05/11/25 0815   Drainage Description Serosanguineous 05/02/25 0747   Treatments Site care 05/06/25 1635   Wound Cleansing Normal Saline Irrigation 05/02/25 0747   Periwound Protectant Not Applicable 05/01/25 0840   Dressing Status Clean;Dry 05/06/25 2115   Dressing Changed Changed 05/09/25 0143   Dressing Cleansing/Solutions Not Applicable 05/01/25 0840   Dressing Options Open to Air 05/12/25 0730   Dressing Change/Treatment Frequency As Needed 05/06/25 1635   NEXT Weekly Photo (Inpatient Only) 05/11/25 05/06/25 1635       Wound 04/29/25 Thigh Proximal;Anterior Left Incision (Active)   Wound Image   05/06/25 1635   Site Assessment Clean;Dry;Intact 05/12/25 0730   Periwound Assessment Clean;Dry;Intact 05/12/25 0730   Margins Attached edges  05/12/25 0730   Closure Approximated 05/11/25 0815   Drainage Amount None 05/11/25 0815   Treatments Site care 05/06/25 1635   Wound Cleansing Normal Saline Irrigation 05/01/25 0840   Periwound Protectant Not Applicable 05/01/25 0840   Dressing Status Clean;Dry;Intact 05/10/25 0831   Dressing Changed Observed 05/10/25 0831   Dressing Options Open to Air 05/12/25 0730   NEXT Weekly Photo (Inpatient Only) 05/11/25 05/06/25 1635       Wound 04/29/25 Traumatic Sclera;Face Right (Active)   Wound Image   05/06/25 1635   Site Assessment Clean;Dry 05/12/25 2139   Periwound Assessment Clean;Dry;Purple 05/12/25 2139   Margins Defined edges 05/12/25 2139   Closure Open to air 05/12/25 2139   Drainage Amount None 05/12/25 2139   Drainage Description Serosanguineous;Sanguineous 05/03/25 1300   Treatments Cleansed;Site care 05/03/25 1300   Dressing Status Open to Air 05/11/25 2211   Dressing Changed Observed 05/11/25 2211   Dressing Options Open to Air 05/12/25 0730   NEXT Weekly Photo (Inpatient Only) 05/11/25 05/06/25 1635   Wound Team Following Not following 04/30/25 0900   Non-staged Wound Description Not applicable 04/30/25 0900     Skin / Wound Care Instructions: Please contact your primary care physician for any change in skin integrity.     If You Have Surgical Incisions / Wounds:  Monitor surgical site(s) for signs of increased swelling, redness or symptoms of drainage from the site or fever as this could indicate signs and symptoms of infection. If these symptoms are noted, notifiy your primary care provider.      Discharge Safety Instructions: Other (Comments) (Needs assistance with transfers for safety)     Discharge Safety Concerns: Weakness  The interdisciplinary team has made recommendation that you do not require supervision in the house due to demonstration of safety with ADL's and IADLS and problem solving skills  Anti-embolic stockings are not required to increase circulation to the lower  extremities.    Discharge Diet: Cardiac     Discharge Liquids: Thin  Discharge Bowel Function: Continent  Please contact your primary care physician for any changes in bowel habits.  Discharge Bowel Program:    Discharge Bladder Function: Continent  Discharge Urinary Devices: None      Nursing Discharge Plan:     Understanding Your Risk for Falls  Each year, millions of people have serious injuries from falls. It is important to understand your risk for falling. Talk with your health care provider about your risk and what you can do to lower it. There are actions you can take at home to lower your risk and prevent falls.  If you do have a serious fall, make sure to tell your health care provider. Falling once raises your risk of falling again.  How can falls affect me?  Serious injuries from falls are common. These include:  Broken bones, such as hip fractures.  Head injuries, such as traumatic brain injuries (TBI) or concussion.  A fear of falling can cause you to avoid activities and stay at home. This can make your muscles weaker and actually raise your risk for a fall.  What can increase my risk?  There are a number of risk factors that increase your risk for falling. The more risk factors you have, the higher your risk of falling. Serious injuries from a fall happen most often to people older than age 65. Children and young adults ages 15-29 are also at higher risk.  Common risk factors include:  Weakness in the lower body.  Lack (deficiency) of vitamin D.  Being generally weak or confused due to long-term (chronic) illness.  Dizziness or balance problems.  Poor vision.  Medicines that cause dizziness or drowsiness. These can include medicines for your blood pressure, heart, anxiety, insomnia, or edema, as well as pain medicines and muscle relaxants.  Other risk factors include:  Drinking alcohol.  Having had a fall in the past.  Having depression.  Having foot pain or wearing improper footwear.  Working at a  dangerous job.  Having any of the following in your home:  Tripping hazards, such as floor clutter or loose rugs.  Poor lighting.  Pets.  Having dementia or memory loss.  What actions can I take to lower my risk of falling?         Physical activity  Maintain physical fitness. Do strength and balance exercises. Consider taking a regular class to build strength and balance. Yoga and ary chi are good options.  Vision  Have your eyes checked every year and your vision prescription updated as needed.  Walking aids and footwear  Wear nonskid shoes. Do not wear high heels.  Do not walk around the house in socks or slippers.  Use a cane or walker as told by your health care provider.  Home safety  Attach secure railings on both sides of your stairs.  Install grab bars for your tub, shower, and toilet. Use a bath mat in your tub or shower.  Use good lighting in all rooms. Keep a flashlight near your bed.  Make sure there is a clear path from your bed to the bathroom. Use night-lights.  Do not use throw rugs. Make sure all carpeting is taped or tacked down securely.  Remove all clutter from walkways and stairways, including extension cords.  Repair uneven or broken steps.  Avoid walking on icy or slippery surfaces. Walk on the grass instead of on icy or slick sidewalks. Use ice melt to get rid of ice on walkways.  Use a cordless phone.  Questions to ask your health care provider  Can you help me check my risk for a fall?  Do any of my medicines make me more likely to fall?  Should I take a vitamin D supplement?  What exercises can I do to improve my strength and balance?  Should I make an appointment to have my vision checked?  Do I need a bone density test to check for weak bones or osteoporosis?  Would it help to use a cane or a walker?  Where to find more information  Centers for Disease Control and PreventionFABIEN: www.cdc.gov  Community-Based Fall Prevention Programs: www.cdc.gov  National [place] Aging:  www.meghan.nih.gov  Contact a health care provider if:  You fall at home.  You are afraid of falling at home.  You feel weak, drowsy, or dizzy.  Summary  Serious injuries from a fall happen most often to people older than age 65. Children and young adults ages 15-29 are also at higher risk.  Talk with your health care provider about your risks for falling and how to lower those risks.  Taking certain precautions at home can lower your risk for falling.  If you fall, always tell your health care provider.  This information is not intended to replace advice given to you by your health care provider. Make sure you discuss any questions you have with your health care provider.  Document Revised: 07/21/2021 Document Reviewed: 07/21/2021  [employer] Patient Education © 2023 [employer]         Case Management Discharge Instructions:   Discharge Location:    Agency Name/Address/Phone:    Home Health:    Outpatient Services:    DME Provider/Phone:    Medical Equipment Ordered:    Prescription Faxed to:        Discharge Medication Instructions:  Below are the medications your physician expects you to take upon discharge:    Open Reduction, Internal Fixation (ORIF), Generic  Usually, if bones are broken (fractured) and are out of place, unstable, or may become out of place, surgery is needed. This surgery is called an open reduction and internal fixation (ORIF). Open reduction means that the area of the fracture is opened up so the surgeon can see it. Internal fixation means that screws, pins, or fixation devices are used to hold the bone pieces in place.  LET YOUR CAREGIVER KNOW ABOUT:   Allergies.  Medicines taken, including herbs, eyedrops, over-the-counter medicines, and creams.  Use of steroids (by mouth or creams).  Previous problems with anesthetics or numbing medicines.  History of bleeding or blood problems.  History of blood clots.  Possibility of pregnancy, if this applies.  Previous surgery.  Other health  problems.  RISKS AND COMPLICATIONS   All surgery is associated with risks. Some of these risks are:  Excessive bleeding.  Infection.  Imperfect results with loss of joint function.  BEFORE THE PROCEDURE   Usually, surgery is performed shortly after the injury. It is important to provide information to your caregiver after your injury.  AFTER THE PROCEDURE   After surgery, you will be taken to a recovery area where a nurse will monitor your progress. You may have a long, narrow tube(catheter) in the bladder following surgery that helps you pass your water. When awake, stable, taking fluids well, and without complications, you will be returned to your room. You will receive physical therapy and other care. Physical therapy is done until you are doing well and your caregiver feels it is safe for you to go home or to an extended care facility.  Following surgery, the bones may be protected with a cast. The type of casting depends on where the fracture was. Casts are generally left in place for about 5 to 6 weeks. During this time, your caregiver will follow your progress. X-rays may be taken during healing to make sure the bones stay in place.  HOME CARE INSTRUCTIONS   You or your child may resume normal diet and activities as directed or allowed.  Put ice on the injured area.  Put ice in a plastic bag.  Place a towel between the skin and the bag.  Leave the ice on for 15-20 minutes at a time, 3-4 times a day, for the first 2 days following surgery.  Change bandages (dressings) if necessary or as directed.  If given a plaster or fiberglass cast:  Do not try to scratch the skin under the cast using sharp or pointed objects.  Check the skin around the cast every day. You may put lotion on any red or sore areas.  Keep the cast dry and clean.  Do not put pressure on any part of the cast or splint until it is fully hardened.  The cast or splint can be protected during bathing with a plastic bag. Do not lower the cast or  splint into water.  Only take over-the-counter or prescription medicines for pain, discomfort, or fever as directed by your caregiver.  Use crutches as directed and do not exercise the leg unless instructed.  If the bones get out of position (displaced), it may eventually lead to arthritis and lasting disability. Problems can follow even the best of care. Follow the directions of your caregiver.  Follow all instructions given by your caregiver, make and keep follow-up appointments, and use crutches as directed.  SEEK IMMEDIATE MEDICAL CARE IF:   There is redness, swelling, numbness, or increasing pain in the wound.  There is pus coming from the wound.  You or your child has an oral temperature above 102° F (38.9° C), not controlled by medicine.  A bad smell is coming from the wound or dressing.  The wound breaks open (edges not staying together) after stitches (sutures) or staples have been removed.  The skin or nails below the injury turn blue or gray, or feel cold or numb.  There is severe pain under the cast or in the foot.  If there is not a window in the cast for observing the wound, a discharge or minor bleeding may show up as a stain on the outside of the cast. Report these findings to your caregiver.  MAKE SURE YOU:   Understand these instructions.  Will watch your condition.  Will get help right away if you are not doing well or gets worse.  Document Released: 12/29/2007 Document Revised: 03/11/2013 Document Reviewed: 12/05/2008  [employer]® Patient Information ©2014 [employer].    Orbital Fracture    An orbital fracture is a break in the orbit or eye socket, which is the bony structure that protects the eye. This fracture usually causes swelling and pain, and it may affect vision.  There are three main types of orbital fracture:  Orbital roof fracture. This is a break at the top part of the orbit.  Orbital rim fracture. This is a break at the outer edge of the orbit.  Orbital floor fracture. This is a  break at the bottom part of the orbit.  An orbital floor fracture with entrapment is when muscle or tissue, or both, get trapped inside of an orbital fracture. Vision is often affected. Orbital floor fracture with entrapment may also be called a trapdoor fracture. This fracture is more commonly seen in children and may cause a nervous system reaction that requires treatment right away because of irregular pulse rates and blood pressure levels.  What are the causes?  This condition is caused by an injury to your eye, such as a hard, direct hit. Causes include:  Sports injuries, such as a hard compact ball or bat striking the eye.  Falls during running or bicycling.  Assaults or combat sports where trauma to the face occurs.  Motor vehicle accidents in which the face hits the dashboard or another hard object inside the vehicle.  What are the signs or symptoms?  Symptoms of this condition include:  Swelling and bruising around your eye.  Pain or tenderness around your eye.  Difficulty looking up, down, or side to side.  Changes in vision, such as blurry vision or double vision.  The injured eye looking sunken compared with the other eye (enophthalmos).  The injured eye bulging (exophthalmos), in severe cases of swelling.  Inability to gaze upwards with the affected eye.  Numbness of the cheek, inner nose, and upper gum on the side of the face that has the injury.  How is this diagnosed?  This condition may be diagnosed based on:  Your symptoms and medical history, especially any history of trauma.  An eye exam. This involves checking visual clarity (acuity), feeling the orbital area with the hand, and checking other structures of the face.  An X-ray or a CT scan.  How is this treated?  Treatment for this condition depends on the severity and type of fracture.  For small fractures, surgery may not be needed. The broken bone heals on its own with time. To help manage symptoms, treatment may include:  Applying ice to the  injured area.  Pain medicines.  Antibiotic medicine to treat or prevent infection.  Steroids to reduce swelling.  Scheduling a follow-up visit with an eye specialist (ophthalmologist).  If there is entrapment, treatment is usually started after all swelling around the eye has gone away, which may take 1-2 weeks. After swelling goes away, an ophthalmologist may try to free the entrapped tissue if you still have double vision. If this cannot be done, you may need surgery.  If you have double vision only when looking up, your health care provider will discuss treatment options with you. Some people who do not spend a lot of time looking up choose to go without more treatment. Others who need to look up often for work need treatment.  If you have any of these symptoms, you may need emergency surgery:  Nausea or vomiting.  A slow heart rate.  Dizziness.  Loss of consciousness.  Severe pain or worsening bulging of the eye.  Complete loss of vision in the injured eye. This may indicate optic nerve damage.  Follow these instructions at home:  Medicines  Take over-the-counter and prescription medicines only as told by your health care provider.  If you were prescribed an antibiotic medicine, take it as told by your health care provider. Do not stop taking the antibiotic even if you start to feel better.  Managing pain, bruising, and swelling    If directed, put ice on the injured eye. To do this:  Put ice in a plastic bag.  Place a towel between your skin and the bag.  Leave the ice on for 20 minutes, 2-3 times a day.  Remove the ice if your skin turns bright red. This is very important. If you cannot feel pain, heat, or cold, you have a greater risk of damage to the area.  If recommended by your health care provider, put one or two extra pillows under your head when you are lying down.  Activity  Do not drive or operate machinery until your health care provider says that it is safe. Be aware that if you are using only one  eye to see, you may have trouble judging distances (depth perception). Ask your health care provider when it is safe to drive.  Avoid traveling by plane or going to high-altitude areas. This may slow the healing of your swelling and may increase sinus pain.  Do not lift anything that is heavier than 10 lb (4.5 kg), or the limit that you are told, until your health care provider says that it is safe.  Return to your normal activities as told by your health care provider. Ask your health care provider what activities are safe for you.  Follow instructions from your health care provider about wearing protective glasses or goggles for work.  General instructions  Do not touch, rub, or try to move your eye.  Do not blow your nose until your health care provider says that you can.  Do not put a contact lens in the injured eye until your health care provider says that you can.  Stay away from jennifer areas.  Do not use any products that contain nicotine or tobacco. These products include cigarettes, chewing tobacco, and vaping devices, such as e-cigarettes. If you need help quitting, ask your health care provider.  Do not take baths, swim, or use a hot tub until your health care provider approves. Ask your health care provider if you may take showers. You may only be allowed to take sponge baths.  Keep all follow-up visits. This is important.  Contact a health care provider if:  You have vision changes, such as increased blurriness or worsening double vision.  You feel pain when you move your eyes.  You feel nauseous or light-headed when you move your eyes.  You have redness or swelling that does not go away or gets worse.  You have blood or fluid coming from your nose.  You have a fever or a headache.  Get help right away if:  You have a sensation of seeing flashing lights.  You have sudden blindness.  You have sudden bulging of the injured eye.  Your heart is beating much more slowly than normal.  You feel dizzy or you  faint.  You have chest pain.  You are short of breath.  These symptoms may represent a serious problem that is an emergency. Do not wait to see if the symptoms will go away. Get medical help right away. Call your local emergency services (911 in the [place] Do not drive yourself to the hospital.  Summary  An orbital fracture is a break in the orbit or eye socket, which is the bony structure that protects the eye.  This condition usually causes swelling and pain around the injured eye, and you may have bruising or vision loss. You may also have trouble looking up, down, or side to side.  Treatment depends on the severity and type of fracture and if there are any serious vision problems. Often, the only treatment is waiting until the swelling goes down. More serious symptoms may indicate the need for emergency surgery.  You should notdrive until your health care provider says that it is safe. Return to your normal activities as told by your health care provider. It is important to keep all follow-up visits.  This information is not intended to replace advice given to you by your health care provider. Make sure you discuss any questions you have with your health care provider.  Document Revised: 05/03/2022 Document Reviewed: 05/03/2022  Elsevier Patient Education © 2023 [employer] Inc.    Traumatic Brain Injury  Traumatic brain injury (TBI) is an injury to the brain that results from:  A hard, direct hit to the head (closed injury).  An object penetrating the skull and entering the brain (open injury).  Traumatic brain injury is also called a head injury or a concussion. TBI can be mild, moderate, or severe.  What are the causes?  Common causes of this condition include:  Falls.  Motor vehicle accidents.  Sports injuries.  Assaults.  What increases the risk?  You are more likely to develop this condition if you:  Are 75 years old or older.  Are a man.  Play contact sports, especially football, hockey, or soccer.  Are in  the .  Are a victim of violence.  Abuse drugs or alcohol.  Have had a previous TBI.  What are the signs or symptoms?  Symptoms may vary from person to person, and may include:  Loss of consciousness.  Headache.  Confusion.  Fatigue.  Changes in sleep.  Dizziness.  Mood or personality changes.  Memory problems.  Nausea or vomiting or both.  Seizures.  Clumsiness.  Slurred speech.  Depression and anxiety.  Anger.  Trouble concentrating, organizing, or making decisions.  Inability to control emotions or actions (impulse control).  Loss of or dulling of the senses, such as hearing, vision, and touch. This can include:  Blurred vision.  Ringing in your ears.  How is this diagnosed?  This condition may be diagnosed based on:  Medical history and physical exam.  Neurologic exam. This checks for brain and nervous system function, including your reflexes, memory, and coordination.  CT scan.  Your TBI may be described as mild, moderate, or severe.  How is this treated?  Treatment depends on the severity of your brain injury and may include:  Breathing support (mechanical ventilation).  Blood pressure medicines.  Pain medicines.  Treatments to decrease the swelling in your brain.  Brain surgery. This may be needed to:  Remove a blood clot.  Repair bleeding.  Remove an object that has penetrated the brain, such as a skull fragment or a bullet.  Treatment of TBI also includes:  Physical and mental rest.  Careful observation.  Medicine. You may be prescribed medicines to help with symptoms such as headaches, nausea, or difficulty sleeping.  Physical, occupational, and speech therapy.  Referral to a concussion clinic or rehabilitation center.  Follow these instructions at home:  Medicines  Take over-the-counter and prescription medicines only as told by your health care provider.  Do not take blood thinners (anticoagulants), aspirin, or other anti-inflammatory medicines such as ibuprofen or naproxen unless approved by your  health care provider.  Activity  Rest. Rest helps the brain to heal. Make sure you:  Get plenty of sleep. Most adults should get 7-9 hours of sleep each night.  Rest during the day. Take daytime naps or rest breaks when you feel tired.  Do not do high-risk activities that could cause a second concussion, such as riding a bike or playing sports. Having another concussion before the first one has healed can be dangerous.  Avoid a lot of visual stimulation. This includes work on the computer or phone, watching TV, and reading.  Ask your health care provider what kind of activities are safe for you. Your ability to react may be slower after a brain injury. Never do these activities if you are dizzy. Your health care provider will likely give you a plan for gradually returning to activities.  General instructions  Do not drink alcohol.  Watch your symptoms and tell others to do the same. Complications sometimes occur after a brain injury. Older adults with a brain injury may have a higher risk of serious complications.  Seek support from friends and family.  Keep all follow-up visits as directed by your health care provider. This is important.  Contact a health care provider if:  Your symptoms get worse or they do not improve.  You have new symptoms.  You have another injury.  Get help right away if:  You have:  Severe, persistent headaches that are not relieved by medicine.  Weakness or numbness in any part of your body.  Confusion.  Slurred speech.  Difficulty waking up.  Nausea or persistent vomiting.  A feeling like you are moving when you are not (vertigo).  Seizures or you faint.  Changes in your vision.  Clear or bloody discharge from your nose or ears.  You cannot use your arms or legs normally.  Summary  Traumatic brain injury happens when there is a hard, direct hit to the head or when an object penetrates the skull and enters the brain.  Traumatic brain injuries may be mild, moderate, or severe. Treatment  depends on the severity of your injury.  Rest is one of the best treatments. Do not return to activity until your health care provider approves.  Get help right away if you have a head injury and you develop seizures, confusion, vomiting, weakness in the arms or legs, slurred speech, and other symptoms.  This information is not intended to replace advice given to you by your health care provider. Make sure you discuss any questions you have with your health care provider.  Document Revised: 03/07/2022 Document Reviewed: 03/07/2022  Elsevier Patient Education © 2023 Elsevier Inc.

## 2025-05-13 NOTE — CARE PLAN
Problem: Knowledge Deficit - Standard  Goal: Patient and family/care givers will demonstrate understanding of plan of care, disease process/condition, diagnostic tests and medications  Outcome: Met     Problem: Skin Integrity  Goal: Skin integrity is maintained or improved  Outcome: Met     Problem: Fall Risk - Rehab  Goal: Patient will remain free from falls  Outcome: Met     Problem: Discharge Barriers/Planning  Goal: Patient's continuum of care needs are met  Outcome: Met     Problem: Psychosocial  Goal: Patient's level of anxiety will decrease  Outcome: Met  Goal: Patient's ability to verbalize feelings about condition will improve  Outcome: Met     Problem: Hemodynamics  Goal: Patient's hemodynamics, fluid balance and neurologic status will be stable or improve  Outcome: Met     Problem: Respiratory  Goal: Patient will understand use and administration of respiratory medications to improve respiratory function  Outcome: Met     Problem: Bladder / Voiding  Goal: Patient will establish and maintain regular urinary output  Outcome: Met  Goal: Patient will establish and maintain bladder regimen  Outcome: Met     Problem: Bowel Elimination  Goal: Patient will participate in bowel management program  Outcome: Met     Problem: Skin Integrity  Goal: Patient's skin integrity will be maintained or improve  Outcome: Met     Problem: Nutrition  Goal: Patient's nutritional and fluid intake will be adequate or improve  Outcome: Met  Goal: Patient will display progressive weight gain toward goal have adequate food and fluid intake  Outcome: Met  Goal: Patient will demonstrate ability to administer respiratory medications  Outcome: Met     Problem: Self Care  Goal: Patient will have the ability to perform ADLs independently or with assistance  Outcome: Met     Problem: Mobility  Goal: Patient's capacity to carry out activities will improve  Outcome: Met     Problem: Infection  Goal: Patient will remain free from  infection  Outcome: Met     Problem: VTE Prevention  Goal: Patient will remain free from venous thromboembolism (VTE)  Outcome: Met     Problem: Pain - Standard  Goal: Alleviation of pain or a reduction in pain to the patient’s comfort goal  Outcome: Met   The patient is Stable - Low risk of patient condition declining or worsening    Shift Goals  Clinical Goals: Safety  Patient Goals: Sleep without pain  Family Goals: n/a    Progress made toward(s) clinical / shift goals:  goals met, discharged home    Patient is not progressing towards the following goals:

## 2025-05-13 NOTE — PROGRESS NOTES
Patient discharged to home per order.  Discharge instructions reviewed with patient and family; they verbalize understanding and signed copies placed in chart.  Patient has all belongings; signed copy of form in chart.  Patient left facility at 1530 via private vehicle accompanied by rehab staff and family.  Have enjoyed working with this pleasant patient.

## 2025-05-13 NOTE — PROGRESS NOTES
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  Allergies: Patient has no known allergies.    Safety  Patient Assist     Patient Precautions  Precautions: Fall Risk  Fall Risk: Poor balance  Bowel and Bladder: Fields removed  Weight Bearing: NWB RLE, NWB LLE (pt able to bear weight to RLE for pivot transfers)  Comments: Privacy bag provided for cleanliness  Bed Transfer Status  Supervised  Toilet Transfer Status   Stand By Assist, Contact Guard Assist  Assistive Devices  Rails, Wheelchair  Oxygen  None - Room Air  Diet/Therapeutic Dining  Current Diet Order   Procedures    Diet Order Diet: Cardiac     Pill Administration  whole  Agitated Behavioral Scale     ABS Level of Severity       Fall Risk  Has the patient had a fall this admission?      Joie Ruiz Fall Risk Scoring  15, HIGH RISK  Fall Risk Safety Measures  bed alarm and chair alarm    Vitals  Temperature: 36.3 °C (97.4 °F)  Temp src: Oral  Pulse: 68  Respiration: 20  Blood Pressure : 124/74  Blood Pressure MAP (Calculated): 91 MM HG  BP Location: Left, Upper Arm  Patient BP Position: Supine     Oxygen  Pulse Oximetry: 97 %  O2 (LPM): 0  O2 Delivery Device: None - Room Air    Bowel and Bladder  Last Bowel Movement  05/09/25  Stool Type  Type 7: Watery, no solid pieces-entirely liquid  Bowel Device  Bathroom  Continent  Bladder: Did not void (Fields in Place)   Bowel: No movement  Bladder Function  Urine Void (mL): 400 ml  Number of Times Voided: 1  Urine Color: Yellow  Urine Clarity: Sediment  Genitourinary Assessment   Bladder Assessment (WDL):  Within Defined Limits  Fields Catheter: Not Applicable  Fields Reasons per MD Order: Acute urinary retention or bladder outlet obstruction  Fields Care: Given with Soap and Water  Urine Color: Yellow  Urine Clarity: Sediment  Bladder Device: Urinal  Bladder  Scan: Post Void  $ Bladder Scan Results (mL): 20    Skin  Shaun Score   16  Sensory Interventions   Bed Types: Standard/Trauma Mattress  Skin Preventative Measures: Waffle Overlay  Moisture Interventions  Moisturizers/Barriers: Barrier Wipes  Containment Devices: Indwelling Catheter      Pain  Pain Rating Scale  5 - Interrupts some activities  Pain Location  Leg  Pain Location Orientation  Left  Pain Interventions   Medication (see MAR)    ADLs    Bathing   Shower, * * With Assistance from, Staff  Linen Change   Complete  Personal Hygiene     Chlorhexidine Bath      Oral Care     Teeth/Dentures     Shave     Nutrition Percentage Eaten  Between 50-75% Consumed  Environmental Precautions     Patient Turns/Positioning  Patient turns self independently side to side without assistance, to offload sacral area  Patient Turns Assistance/Tolerance  Tolerates Well  Bed Positions  Bed Controls On, Bed Locked  Head of Bed Elevated  Self regulated      Psychosocial/Neurologic Assessment  Psychosocial Assessment  Psychosocial (WDL):  Within Defined Limits  Neurologic Assessment  Neuro (WDL): Exceptions to WDL  Level of Consciousness: Alert  Orientation Level: Oriented X4  Cognition: Appropriate safety awareness, Appropriate attention/concentration, Follows commands  Speech: Clear  Pupil Assesment: No  Motor Function/Sensation Assessment: Motor strength, Motor response  RUE Motor Response: Normal extension  Muscle Strength Right Arm: Good Strength Against Gravity and Moderate Resistance  LUE Motor Response: Normal extension  Muscle Strength Left Arm: Good Strength Against Gravity and Moderate Resistance  Muscle Strength Right Leg: Weak Movement but Not Against Gravity or Resistance  Muscle Strength Left Leg: Weak Movement but Not Against Gravity or Resistance  EENT (WDL):  WDL Except    Cardio/Pulmonary Assessment  Edema      Respiratory Breath Sounds  RUL Breath Sounds: Clear  RML Breath Sounds: Clear  RLL Breath Sounds:  Diminished  NADINE Breath Sounds: Clear  LLL Breath Sounds: Diminished  Cardiac Assessment   Cardiac (WDL):  Within Defined Limits

## 2025-05-13 NOTE — DISCHARGE PLANNING
Case management  Reviewed signed copy of IMM and answered all questions.    Dc date /disposition: 5/13/25 discharge home with [facility]     [employer] delivered wc to bedside. Cushion will be delivered to pt's home.     DMV form completed for the handicapped placard. Form faxed. Confirmation received. CM provided original forms to pt and family.

## 2025-05-14 ENCOUNTER — PATIENT OUTREACH (OUTPATIENT)
Dept: MEDICAL GROUP | Age: 75
End: 2025-05-14
Payer: MEDICARE
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
## 2025-05-17 ENCOUNTER — TELEPHONE (OUTPATIENT)
Dept: CARDIOLOGY | Facility: MEDICAL CENTER | Age: 75
End: 2025-05-17
Payer: MEDICARE

## 2025-05-17 DIAGNOSIS — E78.49 FAMILIAL HYPERLIPIDEMIA: ICD-10-CM

## 2025-05-19 RX ORDER — EZETIMIBE 10 MG/1
10 TABLET ORAL DAILY
Qty: 90 TABLET | Refills: 3 | Status: SHIPPED | OUTPATIENT
Start: 2025-05-19

## 2025-05-20 ENCOUNTER — TELEMEDICINE (OUTPATIENT)
Dept: MEDICAL GROUP | Age: 75
End: 2025-05-20
Payer: MEDICARE

## 2025-05-20 VITALS — HEIGHT: 66 IN | WEIGHT: 180 LBS | RESPIRATION RATE: 18 BRPM | BODY MASS INDEX: 28.93 KG/M2
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
## 2025-05-20 DIAGNOSIS — Z12.11 SCREENING FOR COLON CANCER: Primary | ICD-10-CM

## 2025-05-20 DIAGNOSIS — S32.9XXA CLOSED DISPLACED FRACTURE OF PELVIS, UNSPECIFIED PART OF PELVIS, INITIAL ENCOUNTER (HCC): ICD-10-CM

## 2025-05-20 DIAGNOSIS — F51.01 PRIMARY INSOMNIA: ICD-10-CM

## 2025-05-20 DIAGNOSIS — S06.9XAA TRAUMATIC BRAIN INJURY WITH DELAYED RECOVERY (HCC): ICD-10-CM

## 2025-05-20 RX ORDER — TRAZODONE HYDROCHLORIDE 50 MG/1
50 TABLET ORAL
Qty: 90 TABLET | Refills: 2 | Status: SHIPPED | OUTPATIENT
Start: 2025-05-20

## 2025-05-20 RX ORDER — LORAZEPAM 2 MG/1
2 TABLET ORAL
Qty: 30 TABLET | Refills: 0 | Status: SHIPPED | OUTPATIENT
Start: 2025-05-20 | End: 2025-06-19

## 2025-05-20 RX ORDER — OXYCODONE HYDROCHLORIDE 10 MG/1
10 TABLET ORAL EVERY 6 HOURS PRN
Qty: 28 TABLET | Refills: 0 | Status: SHIPPED | OUTPATIENT
Start: 2025-05-20 | End: 2025-06-03

## 2025-05-20 ASSESSMENT — FIBROSIS 4 INDEX: FIB4 SCORE: 1.41
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
## 2025-05-23 ENCOUNTER — PATIENT MESSAGE (OUTPATIENT)
Dept: MEDICAL GROUP | Age: 75
End: 2025-05-23
Payer: MEDICARE

## 2025-05-27 ENCOUNTER — PATIENT MESSAGE (OUTPATIENT)
Dept: MEDICAL GROUP | Age: 75
End: 2025-05-27

## 2025-05-27 ENCOUNTER — APPOINTMENT (OUTPATIENT)
Dept: URGENT CARE | Facility: CLINIC | Age: 75
End: 2025-05-27
Payer: MEDICARE

## 2025-05-27 DIAGNOSIS — M10.9 EXACERBATION OF GOUT: ICD-10-CM

## 2025-05-27 RX ORDER — ALLOPURINOL 300 MG/1
300 TABLET ORAL DAILY
Qty: 90 TABLET | Refills: 0 | Status: SHIPPED | OUTPATIENT
Start: 2025-05-27

## 2025-05-27 NOTE — Clinical Note
REFERRAL APPROVAL NOTICE         Sent on May 27, 2025                   Vicente Whitfield  [address]  [place] [address]                   Dear Mr. Whitfield,    After a careful review of the medical information and benefit coverage, [facility] has processed your referral. See below for additional details.    If applicable, you must be actively enrolled with your insurance for coverage of the authorized service. If you have any questions regarding your coverage, please contact your insurance directly.    REFERRAL INFORMATION   Referral #:  [id number]  Referred-To Provider    Referred-By Provider:  Gastroenterology    Vitor Raines M.D.   [facility]      [address] [place] [address]  [phone] [address]  [place] [address]  [phone]    Referral Start Date:  05/20/2025  Referral End Date:   05/20/2026             SCHEDULING  If you do not already have an appointment, please call [phone] to make an appointment.     MORE INFORMATION  If you do not already have a [employer] account, sign up at: [email or web address]  You can access your medical information, make appointments, see lab results, billing information, and more.  If you have questions regarding this referral, please contact  the [facility] Referrals department at:             [phone]. Monday - Friday 8:00AM - 5:00PM.     Sincerely,    [facility]

## 2025-05-28 ENCOUNTER — TELEPHONE (OUTPATIENT)
Dept: MEDICAL GROUP | Age: 75
End: 2025-05-28
Payer: MEDICARE

## 2025-05-28 DIAGNOSIS — H54.7 VISION LOSS: Primary | ICD-10-CM

## 2025-05-28 RX ORDER — PREDNISONE 20 MG/1
40 TABLET ORAL DAILY
Qty: 10 TABLET | Refills: 0 | Status: SHIPPED | OUTPATIENT
Start: 2025-05-28 | End: 2025-06-02
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
## 2025-05-29 NOTE — Clinical Note
REFERRAL APPROVAL NOTICE         Sent on May 29, 2025                   Vicente Villalbawin  [address]  [place] [address]                   Dear Mr. Whitfield,    After a careful review of the medical information and benefit coverage, [facility] has processed your referral. See below for additional details.    If applicable, you must be actively enrolled with your insurance for coverage of the authorized service. If you have any questions regarding your coverage, please contact your insurance directly.    REFERRAL INFORMATION   Referral #:  [id number]  Referred-To Provider    Referred-By Provider:  Ophthalmology    Vitor Raines M.D.   [facility]      [address] [place] [address]  [phone] [address] [place] [address]  [phone]    Referral Start Date:  05/28/2025  Referral End Date:   05/28/2026             SCHEDULING  If you do not already have an appointment, please call [phone] to make an appointment.     MORE INFORMATION  If you do not already have a [employer] account, sign up at: [email or web address]  You can access your medical information, make appointments, see lab results, billing information, and more.  If you have questions regarding this referral, please contact  the [facility] Referrals department at:             [phone]. Monday - Friday 8:00AM - 5:00PM.     Sincerely,    [facility]

## 2025-05-30 ENCOUNTER — PATIENT MESSAGE (OUTPATIENT)
Dept: MEDICAL GROUP | Age: 75
End: 2025-05-30
Payer: MEDICARE

## 2025-05-30 DIAGNOSIS — R49.0 HOARSENESS: Primary | ICD-10-CM

## 2025-06-03 ENCOUNTER — TELEPHONE (OUTPATIENT)
Dept: CARDIOLOGY | Facility: MEDICAL CENTER | Age: 75
End: 2025-06-03
Payer: MEDICARE

## 2025-06-03 NOTE — Clinical Note
REFERRAL APPROVAL NOTICE         Sent on Ashely 3, 2025                   Vicente Whitfield  [address]  [place] [address]                   Dear Mr. Whitfield,    After a careful review of the medical information and benefit coverage, [facility] has processed your referral. See below for additional details.    If applicable, you must be actively enrolled with your insurance for coverage of the authorized service. If you have any questions regarding your coverage, please contact your insurance directly.    REFERRAL INFORMATION   Referral #:  [id number]  Referred-To Provider    Referred-By Provider:  Otolaryngology    SYD Durán STACEY      [address] [place] [address]  [phone] [address]  [place] [address]  [phone]    Referral Start Date:  06/03/2025  Referral End Date:   06/03/2026             SCHEDULING  If you do not already have an appointment, please call [phone] to make an appointment.     MORE INFORMATION  If you do not already have a [employer] account, sign up at: [email or web address]  You can access your medical information, make appointments, see lab results, billing information, and more.  If you have questions regarding this referral, please contact  the [facility] Referrals department at:             [phone]. Monday - Friday 8:00AM - 5:00PM.     Sincerely,    [facility]

## 2025-06-03 NOTE — LETTER
June 5, 2025    PROCEDURE/SURGERY CLEARANCE FORM    Date: 6/5/2025   Patient Name: Buster Medina    Dear Surgeon or Proceduralist,     The above patient is cleared to have the following procedure/surgery: Colonoscopy with Deep (Propofol) Sedation     Additional comments: Needs to stay on plavix uninterrupted until August 2025, then he can hold plavix for 5 days prior to colonoscopy. If they want to do colonoscopy without holding medications, this is fine now.      Thank you for your request for cardiac stratification of our mutual patient Buster Medina 1950. We have reviewed their [facility] records; and to the best of our understanding this patient has not had stenting, ablation, watchman, cardiothoracic surgery or hospitalization for cardiovascular reasons in the past 6 months.  Buster Medina has been seen within the past 15 months and is considered to have non-modifiable cardiac risk for this low-risk procedure/surgery. They may proceed from a cardiovascular standpoint and may hold their antiplatelet/anticoagulation as briefly as possible. Please have patient resume this medication when hemodynamically stable to do so.     Aspirin or Prasugrel   - hold 7 days prior to procedure/surgery, resume when hemodynamically stable      Warfarin - hold 7 days for all neurological procedures, hold 5 days prior to all other procedure/surgery and coordinate with [facility] Anticoagulation [facility] ([phone]) INR testing and dose management.      Pradaxa/Xarelto/Eliquis/Savesya - hold 1 day prior to procedure for low bleeding risk procedure, 2 days for high bleeding risk procedure, or consider holding 3 days or longer for patients with reduced kidney function (CrCl <30mL/min) or spinal/cranial surgeries/procedures.      If they have a mechanical heart valve, please coordinate with [facility] Anticoagulation Service ([phone]) the proper management of their anticoagulant in the periprocedural or perioperative  period.      Some patients have higher risk for cardiovascular complications or holding medication. If our patient has had prior complications of holding antiplatelet or anticoagulants in the past and we have seen them after these events, we have addressed these concerns with the patient. They are at an unknown degree of increased risk for recurrent complication.  You may hold anticoagulation/antiplatelets for the procedure or surgery if the benefits of the procedure or surgery outweigh this nonmodifiable risk.      If Buster Quinterowin 1950 has new symptoms of heart failure decompensation, unstable arrythmia, or angina please reach out and we will assess the patient.      If you have other patient-specific concerns, please feel free to reach out to the patient's cardiologist directly at [phone].     Thank you,   [facility]    __ _Electronically signed________  Provider: Rocio Medina

## 2025-06-03 NOTE — TELEPHONE ENCOUNTER
Last OV: 03/10/2025  Proposed Surgery: Colonoscopy with Deep (Propofol) Sedation  Surgery Date: 07/03/2025  Requesting Office Name: [facility]   Fax Number: [phone]  Preference of Location (default is surgery center unless specified by Cardiologist or NORM)  Prior Clearance Addressed: No    Is this a general clearance? YES   Anticoags/Antiplatelets: Clopidogrel  and Aspirin  Anticoags/Antiplatelet managed by Cardiology? YES    Outstanding Cardiac Imaging : No  Ablation, Cardioversion, Stent, Cardiac Devices, Catheterization, Watchman: Yes  Within the last 6 months. Forward to provider to review.  TAVR/Valve, Mitral Clip, Watchman (including open heart),: Completed within the last 6 months- Forward to provider to review    Recent Cardiac Hospitalization: Yes  Date:  2/26/2025            When: Greater than 3 months since hospitalization   History (cardiac history): Past Medical History[1]        Is this a dental clearance? NO  Ablation, Cardioversion, Watchman, Stents, Cath, Devices within the last 3 months? No   If yes- Send dental wait letter, do not forward to provider for review.     TAVR / Valve, Mitral clip within the last 6 months? No  If yes- Send dental wait letter, do not forward to provider for review.     If completing a general clearance, continue per protocol.           Surgical Clearance Letter Sent: No Provider to advise.   **Scan clearance request letter into [facility].**         [1]   Past Medical History:  Diagnosis Date    Anxiety     Anxiety, generalized 11/27/2023    Arthritis     osteo    Awareness under anesthesia     Bowel habit changes 06/24/2021    Constipation    Cancer (HCC) 2007    bladder    Cataract 12/2024    repair completed 01/07/2025    Chickenpox     as a child    Depression     depression    Glaucoma 12/2024    Heart valve disease 10/2024    Mitral regurgatation    High cholesterol     Hyperlipidemia     Hypertension     pt states  well controlled on meds    Kidney stone      Mumps     as a child     Obesity     Pneumothorax     Psoriatic arthritis (HCC)     Recurrent major depressive disorder, in partial remission (HCC) 09/21/2017    Restless leg syndrome     Sleep apnea     uses cpap    Tonsillitis

## 2025-06-05 ENCOUNTER — OFFICE VISIT (OUTPATIENT)
Dept: MEDICAL GROUP | Age: 75
End: 2025-06-05
Payer: MEDICARE

## 2025-06-05 ENCOUNTER — APPOINTMENT (OUTPATIENT)
Dept: MEDICAL GROUP | Age: 75
End: 2025-06-05
Payer: MEDICARE

## 2025-06-05 VITALS
BODY MASS INDEX: 25.71 KG/M2 | TEMPERATURE: 97.6 F | OXYGEN SATURATION: 96 % | HEART RATE: 75 BPM | WEIGHT: 160 LBS | SYSTOLIC BLOOD PRESSURE: 98 MMHG | HEIGHT: 66 IN | DIASTOLIC BLOOD PRESSURE: 50 MMHG

## 2025-06-05 DIAGNOSIS — M1A.0710 CHRONIC IDIOPATHIC GOUT INVOLVING TOE OF RIGHT FOOT WITHOUT TOPHUS: ICD-10-CM

## 2025-06-05 DIAGNOSIS — I95.2 HYPOTENSION DUE TO DRUGS: Primary | ICD-10-CM

## 2025-06-05 PROBLEM — M1A.9XX0 CHRONIC GOUT INVOLVING TOE OF RIGHT FOOT WITHOUT TOPHUS: Status: ACTIVE | Noted: 2025-06-05

## 2025-06-05 PROCEDURE — 99214 OFFICE O/P EST MOD 30 MIN: CPT | Performed by: FAMILY MEDICINE

## 2025-06-05 PROCEDURE — 3074F SYST BP LT 130 MM HG: CPT | Performed by: FAMILY MEDICINE

## 2025-06-05 PROCEDURE — 3078F DIAST BP <80 MM HG: CPT | Performed by: FAMILY MEDICINE

## 2025-06-05 RX ORDER — COLCHICINE 0.6 MG/1
TABLET ORAL
Qty: 60 TABLET | Refills: 0 | Status: SHIPPED | OUTPATIENT
Start: 2025-06-05

## 2025-06-05 RX ORDER — ALLOPURINOL 300 MG/1
300 TABLET ORAL DAILY
COMMUNITY
Start: 2025-05-27

## 2025-06-05 RX ORDER — PREDNISONE 20 MG/1
TABLET ORAL
COMMUNITY
Start: 2025-05-28

## 2025-06-05 RX ORDER — PREDNISONE 20 MG/1
TABLET ORAL
Qty: 12 TABLET | Refills: 0 | Status: SHIPPED | OUTPATIENT
Start: 2025-06-05

## 2025-06-05 RX ORDER — LISINOPRIL 20 MG/1
20 TABLET ORAL DAILY
COMMUNITY

## 2025-06-05 ASSESSMENT — FIBROSIS 4 INDEX: FIB4 SCORE: 1.41

## 2025-06-05 NOTE — PROGRESS NOTES
This medical record contains text that has been entered with the assistance of computer voice recognition and dictation software.  Therefore, it may contain unintended errors in text, spelling, punctuation, or grammar      Verbal consent was acquired by the patient to use [employer] ambient listening note generation during this visit Yes       Chief Complaint   Patient presents with    Gout     Gout management with medication not working          Vicente Whitfield is a 75 y.o. male here evaluation and management of: Follow-up on gout      HPI:     1. Hypotension due to drugs      2. Chronic idiopathic gout involving toe of right foot without tophus        History of Present Illness  The patient, a 75-year-old male, presents for evaluation of gout, hypotension, and chronic dysphonia.    Gout  The patient reports recurrent episodes of gout since the initiation of prednisone therapy in the spring. He experiences significant discomfort during ambulation, particularly at the onset of movement. Alcohol consumption is limited to a glass of wine every other night. He has been prescribed corticosteroids and colchicine for gout management but has declined the use of hydrocodone due to an existing supply from a previous accident.  - Onset: Since the initiation of prednisone therapy in the spring.  - Location: Not specified.  - Duration: Recurrent episodes.  - Character: Significant discomfort during ambulation, particularly at the onset of movement.  - Alleviating/Aggravating Factors: Limited alcohol consumption; prescribed corticosteroids and colchicine; declined hydrocodone.  - Severity: Significant discomfort.    Hypotension  Hypotension is also a concern. The patient has a home blood pressure monitor and will maintain a 4-week blood pressure log to present at the next appointment.  - Onset: Not specified.  - Location: Not applicable.  - Duration: Ongoing concern.  - Character: Low blood pressure readings.  -  "Alleviating/Aggravating Factors: Monitoring blood pressure at home.  - Timing: Will maintain a 4-week blood pressure log.    Chronic Dysphonia  Additionally, he reports a chronic issue with dysphonia, for which a referral to an otolaryngologist has been made.  - Onset: Chronic issue.  - Location: Not specified.  - Duration: Chronic.  - Character: Dysphonia.  - Alleviating/Aggravating Factors: Referral to an otolaryngologist.  - Severity: Not specified.    SOCIAL HISTORY  He drinks a glass of wine every other night.           Results           Current medicines (including changes today)  Current Medications[1]  He  has no past medical history on file.  He  has a past surgical history that includes pr cystourethroscopy (N/A, 4/24/2025) and orif, pelvis (N/A, 4/24/2025).  Social History[2]  Social History     Social History Narrative    Not on file     History reviewed. No pertinent family history.  No family status information on file.         ROS    The pertinent  ROS findings can be seen in the HPI above.     All other systems reviewed and are negative     Objective:     BP 98/50 (BP Location: Left arm, Patient Position: Sitting, BP Cuff Size: Adult)   Pulse 75   Temp 36.4 °C (97.6 °F) (Temporal)   Ht 1.676 m (5' 6\")   Wt 72.6 kg (160 lb)   SpO2 96%  Body mass index is 25.82 kg/m².      Physical Exam:    Constitutional: Alert, no distress.  Skin: No suspicious lesions  Eye: Equal, round and reactive, conjunctiva clear, lids normal.  ENMT: Lips without lesions, good dentition, oropharynx clear.  Neck: Trachea midline, no masses, no thyromegaly. No cervical or supraclavicular lymphadenopathy.  Respiratory: Unlabored respiratory effort, lungs clear to auscultation, no wheezes, no ronchi.  Cardiovascular: Normal S1, S2, no murmur, no edema  Abdomen: Soft, non-tender, no masses, no hepatosplenomegaly.      Assessment and Plan:   The following treatment plan was discussed    All recent labs and provider notes " reviewed      Assessment & Plan  Gout  Reports recurrent gout flare-ups despite being on prednisone.  Treatment plan: Prescription for colchicine, prednisone, and hydrocodone provided for use during acute flare-ups. Allopurinol prescribed for daily use to prevent severe recurrences. Advised to avoid alcohol as it may precipitate gout attacks.    Low blood pressure  Blood pressure is too low.  Diagnostic plan: Instructed to maintain a log of blood pressure readings, taken twice daily, for a period of 4 weeks. Will bring the log to the next appointment for review.  Treatment plan: Lisinopril will be discontinued.    Chronic voice loss  Reports chronic voice loss.  Diagnostic plan: Referral to an ENT specialist has been made to visualize vocal cords. Should expect a call within 5 days to schedule the appointment.    Follow-up: Follow up in 4 weeks.     1. Hypotension due to drugs    2. Chronic idiopathic gout involving toe of right foot without tophus  - predniSONE (DELTASONE) 20 MG Tab; Take 3 tabs po for 2 days then 2 tabs for 2 days then 1 for 2 days  Dispense: 12 Tablet; Refill: 0  - colchicine (COLCRYS) 0.6 MG Tab; Take 2 tabs at onset of pain, then continue with 1 tab daily until pain is resolved  Dispense: 60 Tablet; Refill: 0    Other orders  - allopurinol (ZYLOPRIM) 300 MG Tab; Take 300 mg by mouth every day.  - predniSONE (DELTASONE) 20 MG Tab; TAKE TWO TABLETS BY MOUTH DAILY FOR 5 DAYS (Patient taking differently: PRN)  - lisinopril (PRINIVIL) 20 MG Tab; Take 20 mg by mouth every day.             Instructed to Follow up in clinic or ER for worsening symptoms, difficulty breathing, lack of expected recovery, or should new symptoms or problems arise.    Followup: Return in about 6 months (around 12/5/2025) for Reevaluation.                    [1]   Current Outpatient Medications   Medication Sig Dispense Refill    allopurinol (ZYLOPRIM) 300 MG Tab Take 300 mg by mouth every day.      predniSONE (DELTASONE) 20  MG Tab TAKE TWO TABLETS BY MOUTH DAILY FOR 5 DAYS (Patient taking differently: PRN)      lisinopril (PRINIVIL) 20 MG Tab Take 20 mg by mouth every day.      predniSONE (DELTASONE) 20 MG Tab Take 3 tabs po for 2 days then 2 tabs for 2 days then 1 for 2 days 12 Tablet 0    colchicine (COLCRYS) 0.6 MG Tab Take 2 tabs at onset of pain, then continue with 1 tab daily until pain is resolved 60 Tablet 0    LORazepam (ATIVAN) 2 MG tablet Take 1 Tablet by mouth at bedtime for 30 days. (Patient taking differently: Take 2 mg by mouth at bedtime. PRN) 30 Tablet 0    traZODone (DESYREL) 50 MG Tab Take 1 Tablet by mouth at bedtime as needed for Sleep. 90 Tablet 2    aspirin 81 MG EC tablet Take 1 Tablet by mouth every day. Indications: cad 100 Tablet 2    atorvastatin (LIPITOR) 80 MG tablet Take 1 Tablet by mouth every evening. 100 Tablet 3    buPROPion (WELLBUTRIN SR) 200 MG SR tablet Take 2 Tablets by mouth every day. 200 mg x 2 tablets = 400 mg 180 Tablet 2    clopidogrel (PLAVIX) 75 MG Tab Take 1 Tablet by mouth every day. Indications: cad 90 Tablet 2    ezetimibe (ZETIA) 10 MG Tab Take 1 Tablet by mouth every day. 100 Tablet 2    FLUoxetine (PROZAC) 20 MG Cap Take 3 Capsules by mouth every day. 20 mg x 3 capsules = 60 mg 270 Capsule 2    mirtazapine (REMERON) 7.5 MG tablet Take 1 Tablet by mouth at bedtime. 100 Tablet 2    omeprazole (PRILOSEC) 20 MG delayed-release capsule Take 1 Capsule by mouth every day. 100 Capsule 2    acetaminophen (TYLENOL) 500 MG Tab Take 2 Tablets by mouth every 6 hours as needed for Fever or Mild Pain.       No current facility-administered medications for this visit.   [2]   Social History  Tobacco Use    Smoking status: Never    Smokeless tobacco: Never   Vaping Use    Vaping status: Never Used   Substance Use Topics    Alcohol use: Not Currently    Drug use: Not Currently

## 2025-06-11 ENCOUNTER — HOSPITAL ENCOUNTER (OUTPATIENT)
Dept: RADIOLOGY | Facility: MEDICAL CENTER | Age: 75
End: 2025-06-11
Payer: MEDICARE

## 2025-06-11 DIAGNOSIS — N32.89 EXTRAPERITONEAL RUPTURE OF BLADDER: ICD-10-CM

## 2025-06-17 ENCOUNTER — HOSPITAL ENCOUNTER (OUTPATIENT)
Facility: MEDICAL CENTER | Age: 75
End: 2025-06-17
Payer: MEDICARE

## 2025-06-17 LAB
APPEARANCE UR: CLEAR
BACTERIA #/AREA URNS HPF: ABNORMAL /HPF
BILIRUB UR QL STRIP.AUTO: NEGATIVE
CASTS URNS QL MICRO: ABNORMAL /LPF (ref 0–2)
COLOR UR: ABNORMAL
EPITHELIAL CELLS 1715: ABNORMAL /HPF (ref 0–5)
GLUCOSE UR STRIP.AUTO-MCNC: NEGATIVE MG/DL
KETONES UR STRIP.AUTO-MCNC: NEGATIVE MG/DL
LEUKOCYTE ESTERASE UR QL STRIP.AUTO: ABNORMAL
MICRO URNS: ABNORMAL
NITRITE UR QL STRIP.AUTO: NEGATIVE
PH UR STRIP.AUTO: 7 [PH] (ref 5–8)
PROT UR QL STRIP: 100 MG/DL
RBC # URNS HPF: ABNORMAL /HPF (ref 0–2)
RBC UR QL AUTO: ABNORMAL
SP GR UR STRIP.AUTO: 1.02
UROBILINOGEN UR STRIP.AUTO-MCNC: 1 EU/DL
WBC #/AREA URNS HPF: ABNORMAL /HPF

## 2025-06-17 PROCEDURE — 81001 URINALYSIS AUTO W/SCOPE: CPT

## 2025-06-23 ENCOUNTER — TELEPHONE (OUTPATIENT)
Dept: CARDIOLOGY | Facility: MEDICAL CENTER | Age: 75
End: 2025-06-23
Payer: MEDICARE

## 2025-06-24 NOTE — TELEPHONE ENCOUNTER
Telephone encounter on 06/23/25 stated clearance was not received. Clearance was faxed on 06/05/25. Verified fax number and re faxed again today 06/24/25.

## 2025-07-03 ENCOUNTER — APPOINTMENT (OUTPATIENT)
Dept: MEDICAL GROUP | Age: 75
End: 2025-07-03
Payer: MEDICARE

## 2025-07-03 VITALS
WEIGHT: 171 LBS | SYSTOLIC BLOOD PRESSURE: 110 MMHG | DIASTOLIC BLOOD PRESSURE: 70 MMHG | HEART RATE: 60 BPM | BODY MASS INDEX: 27.48 KG/M2 | TEMPERATURE: 97.4 F | HEIGHT: 66 IN | OXYGEN SATURATION: 97 %

## 2025-07-03 DIAGNOSIS — I95.2 HYPOTENSION DUE TO DRUGS: Primary | ICD-10-CM

## 2025-07-03 PROCEDURE — 3078F DIAST BP <80 MM HG: CPT | Performed by: FAMILY MEDICINE

## 2025-07-03 PROCEDURE — 3074F SYST BP LT 130 MM HG: CPT | Performed by: FAMILY MEDICINE

## 2025-07-03 PROCEDURE — 99214 OFFICE O/P EST MOD 30 MIN: CPT | Performed by: FAMILY MEDICINE

## 2025-07-03 RX ORDER — LEVOFLOXACIN 500 MG/1
TABLET, FILM COATED ORAL
COMMUNITY

## 2025-07-03 RX ORDER — AMOXICILLIN 250 MG
CAPSULE ORAL
COMMUNITY
Start: 2025-04-29 | End: 2025-07-03

## 2025-07-03 RX ORDER — BISACODYL 10 MG
SUPPOSITORY, RECTAL RECTAL
COMMUNITY
Start: 2025-04-29 | End: 2025-07-03

## 2025-07-03 RX ORDER — ENOXAPARIN SODIUM 100 MG/ML
INJECTION SUBCUTANEOUS
COMMUNITY
Start: 2025-04-29 | End: 2025-07-03

## 2025-07-03 RX ORDER — POLYETHYLENE GLYCOL 3350, SODIUM SULFATE ANHYDROUS, SODIUM BICARBONATE, SODIUM CHLORIDE, POTASSIUM CHLORIDE 236; 22.74; 6.74; 5.86; 2.97 G/4L; G/4L; G/4L; G/4L; G/4L
POWDER, FOR SOLUTION ORAL
COMMUNITY
Start: 2025-07-02 | End: 2025-07-03

## 2025-07-03 RX ORDER — PSEUDOEPHEDRINE HCL 30 MG
TABLET ORAL
COMMUNITY
Start: 2025-04-29 | End: 2025-07-03

## 2025-07-03 RX ORDER — HYDROCODONE BITARTRATE AND ACETAMINOPHEN 7.5; 325 MG/1; MG/1
TABLET ORAL
COMMUNITY
End: 2025-07-03

## 2025-07-03 RX ORDER — CELECOXIB 200 MG/1
200 CAPSULE ORAL
COMMUNITY

## 2025-07-03 RX ORDER — OXYCODONE HYDROCHLORIDE 10 MG/1
TABLET ORAL
COMMUNITY
End: 2025-07-03

## 2025-07-03 RX ORDER — ONDANSETRON 4 MG/1
TABLET, ORALLY DISINTEGRATING ORAL
COMMUNITY
Start: 2025-04-29 | End: 2025-07-03

## 2025-07-03 RX ORDER — BACITRACIN ZINC AND POLYMYXIN B SULFATE 500; 1000 [USP'U]/G; [USP'U]/G
OINTMENT TOPICAL
COMMUNITY
Start: 2025-04-29 | End: 2025-07-03

## 2025-07-03 ASSESSMENT — FIBROSIS 4 INDEX: FIB4 SCORE: 1.41

## 2025-07-03 NOTE — PROGRESS NOTES
This medical record contains text that has been entered with the assistance of computer voice recognition and dictation software.  Therefore, it may contain unintended errors in text, spelling, punctuation, or grammar      Verbal consent was acquired by the patient to use [employer] ambient listening note generation during this visit Yes       Chief Complaint   Patient presents with    Hypertension Follow-up     4 week f/u          Buster Medina is a 75 y.o. male here evaluation and management of: low bp      HPI:     1. Hypotension due to drugs        History of Present Illness  The patient, a 75-year-old male, presents for a follow-up regarding his blood pressure management.    Blood Pressure Management  After stopping the lisinopril his home BP log does reveal blood pressure in the normal range 110-130 systolically and 70-80 likely.  He reports no episodes of vertigo or presyncope.    Daytime Somnolence  The patient has been experiencing daytime somnolence, which he attributes to his postoperative recovery. He is currently prescribed trazodone for sleep management and expresses a desire to continue its use, noting an absence of residual sedation or other adverse effects.  - Onset: Postoperative recovery.  - Character: Daytime somnolence.  - Alleviating Factors: Trazodone for sleep management.  - Severity: No residual sedation or other adverse effects.    Paresthesia  The patient has a significant area of paresthesia secondary to a previous bone fracture and inquires about the potential for nerve regeneration.  - Onset: Secondary to a previous bone fracture.  - Location: Significant area of paresthesia.    Pneumococcal Vaccine Inquiry  Additionally, he has requested information regarding the necessity of receiving a pneumococcal vaccine.           Results           Current medicines (including changes today)  Current Medications[1]  He  has a past medical history of Anxiety, Anxiety, generalized  (11/27/2023), Arthritis, Awareness under anesthesia, Bowel habit changes (06/24/2021), Cancer (HCC) (2007), Cataract (12/2024), Chickenpox, Depression, Glaucoma (12/2024), Heart valve disease (10/2024), High cholesterol, Hyperlipidemia, Hypertension, Kidney stone, Mumps, Obesity, Pneumothorax, Psoriatic arthritis (HCC), Recurrent major depressive disorder, in partial remission ([facility]) (09/21/2017), Restless leg syndrome, Sleep apnea, and Tonsillitis.  He  has a past surgical history that includes pr cystourethroscopy (N/A, 4/24/2025); orif, pelvis (N/A, 4/24/2025); bladder biopsy with cystoscopy; abdominal exploration; gastric bypass laparoscopic; eye surgery; hernia repair; arthroplasty; open reduction; other orthopedic surgery (2015); bladder biopsy with cystoscopy (01/03/2018); retrogrades (Bilateral, 01/03/2018); pyelogram (Bilateral, 01/03/2018); thoracotomy; other (Left); turp-vapor; pr cystourethroscopy,biopsies (07/06/2021); pr cystourethroscopy,biopsies (N/A, 08/17/2022); pb incise finger tendon sheath (Left, 08/30/2022); pb incise finger tendon sheath (Left, 09/19/2023); knee arthroplasty total (Right, 2021); other abdominal surgery (2002); transcatheter mitral valve repair (Right, 2/4/2025); and echocardiogram, transesophageal, intraoperative (N/A, 2/4/2025).  Social History[2]  Social History     Social History Narrative    ** Merged History Encounter **          Family History   Problem Relation Age of Onset    Cancer Mother 80        lung cancer    Arthritis Mother     Diabetes Mother         Type 2    Anxiety disorder Mother     Lung Disease Father 65    Cancer Father     Diabetes Father         Type 2    Hypertension Father     Hyperlipidemia Father     Hyperlipidemia Brother     Arthritis Brother     Other Brother         Thyroid tumor    Sleep Apnea Brother     No Known Problems Maternal Grandmother     Cancer Maternal Grandfather     No Known Problems Paternal Grandmother     No Known Problems  "Paternal Grandfather     Heart Disease Brother 49        smoker, overweight    Hypertension Brother     Depression Other     Suicide Attempts Neg Hx     Bipolar disorder Neg Hx     Alcohol abuse Neg Hx     Drug abuse Neg Hx      Family Status   Relation Name Status    Vance Barriga     Jonnie Hogan     Nitin Epps Alive    MGMo      Mina Harmon     PGMo      Nano      Nitin Lozano Alive    OTHER  (Not Specified)    Neg Hx  (Not Specified)   No partnership data on file         ROS    The pertinent  ROS findings can be seen in the HPI above.     All other systems reviewed and are negative     Objective:     /70 (BP Location: Left arm, Patient Position: Sitting, BP Cuff Size: Large adult)   Pulse 60   Temp 36.3 °C (97.4 °F) (Temporal)   Ht 1.676 m (5' 6\")   Wt 77.6 kg (171 lb)   SpO2 97%  Body mass index is 27.6 kg/m².      Physical Exam:    Constitutional: Alert, no distress.  Skin: No suspicious lesions  Eye: Equal, round and reactive, conjunctiva clear, lids normal.  ENMT: Lips without lesions, good dentition, oropharynx clear.  Neck: Trachea midline, no masses, no thyromegaly. No cervical or supraclavicular lymphadenopathy.  Respiratory: Unlabored respiratory effort, lungs clear to auscultation, no wheezes, no ronchi.  Cardiovascular: Normal S1, S2, no murmur, no edema  Abdomen: Soft, non-tender, no masses, no hepatosplenomegaly.      Assessment and Plan:   The following treatment plan was discussed    All recent labs and provider notes reviewed      Assessment & Plan  Hypertension  Blood pressure has shown significant improvement since discontinuing lisinopril.  Treatment plan: Plan to maintain current regimen without additional antihypertensive medication.    Numbness  Large patch of numbness due to previous bone fracture.  Treatment plan: Explained that nerve regeneration can occur over time, typically within a year. Monitoring for changes in sensation over " time.    Medication management  Currently taking trazodone for sleep.  Treatment plan: Advised that it is safe to continue trazodone indefinitely as long as it remains effective. Cautioned that combining trazodone with alcohol might cause grogginess the following day.    Health maintenance  Inquired about the need for a pneumonia vaccine.  Treatment plan: Informed that the new pneumonia vaccine is only required once. No additional pneumonia vaccine needed. Patient reassured about current vaccination status.    Follow-up: Follow-up scheduled in 1 year.     1. Hypotension due to drugs    Other orders  - celecoxib (CELEBREX) 200 MG Cap; Take 200 mg by mouth every day.  - levoFLOXacin (LEVAQUIN) 500 MG tablet; Take 1 tablet every 24 hours by mouth for 5 days. (Patient taking differently: as needed.)             Instructed to Follow up in clinic or ER for worsening symptoms, difficulty breathing, lack of expected recovery, or should new symptoms or problems arise.    Followup: Return in about 6 months (around 1/3/2026) for Reevaluation, labs.                    [1]   Current Outpatient Medications   Medication Sig Dispense Refill    celecoxib (CELEBREX) 200 MG Cap Take 200 mg by mouth every day.      levoFLOXacin (LEVAQUIN) 500 MG tablet Take 1 tablet every 24 hours by mouth for 5 days. (Patient taking differently: as needed.)      predniSONE (DELTASONE) 20 MG Tab Take 3 tabs po for 2 days then 2 tabs for 2 days then 1 for 2 days (Patient taking differently: as needed. Take 3 tabs po for 2 days then 2 tabs for 2 days then 1 for 2 days) 12 Tablet 0    colchicine (COLCRYS) 0.6 MG Tab Take 2 tabs at onset of pain, then continue with 1 tab daily until pain is resolved (Patient taking differently: as needed. Take 2 tabs at onset of pain, then continue with 1 tab daily until pain is resolved) 60 Tablet 0    allopurinol (ZYLOPRIM) 300 MG Tab Take 1 Tablet by mouth every day. 90 Tablet 0    traZODone (DESYREL) 50 MG Tab Take 1  Tablet by mouth at bedtime as needed for Sleep. 90 Tablet 2    aspirin 81 MG EC tablet Take 1 Tablet by mouth every day. Indications: cad 100 Tablet 2    atorvastatin (LIPITOR) 80 MG tablet Take 1 Tablet by mouth every evening. 100 Tablet 3    ezetimibe (ZETIA) 10 MG Tab Take 1 Tablet by mouth every day. 100 Tablet 2    acetaminophen (TYLENOL) 500 MG Tab Take 2 Tablets by mouth every 6 hours as needed for Fever or Mild Pain.      mirtazapine (REMERON) 7.5 MG tablet Take 1 Tablet by mouth every evening for 90 days. 30 Tablet 2    FLUoxetine (PROZAC) 20 MG Cap Take 1 Capsule by mouth every morning for 90 days. 30 Capsule 2    omeprazole (PRILOSEC) 20 MG delayed-release capsule Take 1 Capsule by mouth every day. (Patient taking differently: Take 20 mg by mouth 2 times a day.) 180 Capsule 3    valacyclovir (VALTREX) 1 GM Tab TAKE 1 TABLET TWICE A DAY  FOR 7 DAYS AS NEEDED FOR   OUTBREAK (Patient taking differently: TAKE 1 TABLET TWICE A DAY  FOR 7 DAYS AS NEEDED FOR   OUTBREAK- PRN) 24 Tablet 5    clopidogrel (PLAVIX) 75 MG Tab Take 1 Tablet by mouth every day. 90 Tablet 3    LORazepam (ATIVAN) 2 MG tablet Take 2 mg by mouth at bedtime. (Patient taking differently: Take 2 mg by mouth at bedtime. PRN)      NON SPECIFIED 1 Dose by Subcutaneous Infusion route see administration instructions. Ilumya injection every three months prescribed by dermatology      Magnesium 100 MG Cap Take 100 mg by mouth every evening.      ferrous sulfate 325 (65 Fe) MG tablet Take 325 mg by mouth every day.      B Complex Vitamins (VITAMIN B COMPLEX PO) Take 1 Tablet by mouth every day.      Cholecalciferol (VITAMIN D3 PO) Take 1 Tablet by mouth every day. OTC      Multiple Vitamins-Minerals (MELODY-DAY 1000 PO) Take 1 Tab by mouth 2 Times a Day.       No current facility-administered medications for this visit.   [2]   Social History  Tobacco Use    Smoking status: Never    Smokeless tobacco: Never   Vaping Use    Vaping status: Never Used  "  Substance Use Topics    Alcohol use: Yes     Alcohol/week: 9.6 oz     Types: 14 Glasses of wine, 2 Cans of beer per week     Comment: wine /daily    Drug use: Not Currently     Types: Inhaled     Comment: THC edible - \"once a month\".     "

## 2025-07-28 DIAGNOSIS — F51.01 PRIMARY INSOMNIA: ICD-10-CM

## 2025-07-28 RX ORDER — ALLOPURINOL 300 MG/1
300 TABLET ORAL DAILY
Qty: 90 TABLET | Refills: 2 | Status: SHIPPED | OUTPATIENT
Start: 2025-07-28

## 2025-07-28 RX ORDER — TRAZODONE HYDROCHLORIDE 50 MG/1
50 TABLET ORAL
Qty: 90 TABLET | Refills: 2 | Status: SHIPPED | OUTPATIENT
Start: 2025-07-28

## 2025-07-28 NOTE — TELEPHONE ENCOUNTER
Received request via: Patient    Was the patient seen in the last year in this department? Yes    Does the patient have an active prescription (recently filled or refills available) for medication(s) requested? No    Pharmacy Name: [id number]  [place], [place] - [address]     Does the patient have halfway Plus and need 100-day supply? (This applies to ALL medications) Patient does not have SCP

## 2025-07-28 NOTE — TELEPHONE ENCOUNTER
Received request via: Patient    Was the patient seen in the last year in this department? Yes    Does the patient have an active prescription (recently filled or refills available) for medication(s) requested? No    Pharmacy Name: [id number]  [place], [place] - [address]      Does the patient have [facility] and need 100-day supply? (This applies to ALL medications) Patient does not have SCP

## 2025-08-01 ENCOUNTER — APPOINTMENT (OUTPATIENT)
Dept: BEHAVIORAL HEALTH | Facility: PSYCHIATRIC FACILITY | Age: 75
End: 2025-08-01
Payer: MEDICARE

## 2025-08-01 ASSESSMENT — ENCOUNTER SYMPTOMS
BLURRED VISION: 0
FEVER: 0
WHEEZING: 0
PALPITATIONS: 0
HEADACHES: 0
BLOOD IN STOOL: 0
SEIZURES: 0
DIZZINESS: 0
FALLS: 0
FOCAL WEAKNESS: 0
SHORTNESS OF BREATH: 0

## 2025-08-01 ASSESSMENT — LIFESTYLE VARIABLES: SUBSTANCE_ABUSE: 0

## 2025-08-01 ASSESSMENT — FIBROSIS 4 INDEX: FIB4 SCORE: 1.41

## 2025-08-04 ASSESSMENT — RHEUMATOLOGY FOLLOW-UP QUESTIONNAIRE
DIFFICULTY SPEAKING: Y
MARK ALL THE AREAS OF PAIN: 123817354
HELPFUL_MEDICATIONS: PREDNISONE
JOINT SWELLING: Y
ALLEVIATING_FACTORS: PREDNISONE
JOINT PAIN: WORSE WITH ACTIVITY
DURATION: <30 MINS
CHARACTERISTIC: SAME WITH ACTIVITY
CHIEF_COMPLAINT: GOUT FOLLOW UP

## 2025-08-07 ENCOUNTER — HOSPITAL ENCOUNTER (OUTPATIENT)
Dept: LAB | Facility: MEDICAL CENTER | Age: 75
End: 2025-08-07
Attending: STUDENT IN AN ORGANIZED HEALTH CARE EDUCATION/TRAINING PROGRAM
Payer: MEDICARE

## 2025-08-07 ENCOUNTER — OFFICE VISIT (OUTPATIENT)
Dept: RHEUMATOLOGY | Facility: MEDICAL CENTER | Age: 75
End: 2025-08-07
Attending: STUDENT IN AN ORGANIZED HEALTH CARE EDUCATION/TRAINING PROGRAM
Payer: MEDICARE

## 2025-08-07 VITALS
RESPIRATION RATE: 14 BRPM | TEMPERATURE: 97.2 F | SYSTOLIC BLOOD PRESSURE: 118 MMHG | BODY MASS INDEX: 27.47 KG/M2 | DIASTOLIC BLOOD PRESSURE: 60 MMHG | HEART RATE: 67 BPM | HEIGHT: 67 IN | WEIGHT: 175 LBS | OXYGEN SATURATION: 95 %

## 2025-08-07 DIAGNOSIS — M10.9 GOUT, UNSPECIFIED CAUSE, UNSPECIFIED CHRONICITY, UNSPECIFIED SITE: ICD-10-CM

## 2025-08-07 DIAGNOSIS — L40.9 PSORIASIS: ICD-10-CM

## 2025-08-07 DIAGNOSIS — C67.3 MALIGNANT NEOPLASM OF ANTERIOR WALL OF URINARY BLADDER (HCC): ICD-10-CM

## 2025-08-07 DIAGNOSIS — D84.9 IMMUNOSUPPRESSED STATUS (HCC): ICD-10-CM

## 2025-08-07 DIAGNOSIS — M1A.00X0 CHRONIC GOUTY ARTHROPATHY WITHOUT TOPHI: Primary | ICD-10-CM

## 2025-08-07 DIAGNOSIS — M15.9 OSTEOARTHRITIS INVOLVING MULTIPLE JOINTS ON BOTH SIDES OF BODY: ICD-10-CM

## 2025-08-07 LAB — URATE SERPL-MCNC: 5 MG/DL (ref 2.5–8.3)

## 2025-08-07 PROCEDURE — 3074F SYST BP LT 130 MM HG: CPT | Performed by: STUDENT IN AN ORGANIZED HEALTH CARE EDUCATION/TRAINING PROGRAM

## 2025-08-07 PROCEDURE — 3078F DIAST BP <80 MM HG: CPT | Performed by: STUDENT IN AN ORGANIZED HEALTH CARE EDUCATION/TRAINING PROGRAM

## 2025-08-07 PROCEDURE — 99213 OFFICE O/P EST LOW 20 MIN: CPT | Performed by: STUDENT IN AN ORGANIZED HEALTH CARE EDUCATION/TRAINING PROGRAM

## 2025-08-07 PROCEDURE — 84550 ASSAY OF BLOOD/URIC ACID: CPT

## 2025-08-07 PROCEDURE — 99214 OFFICE O/P EST MOD 30 MIN: CPT | Performed by: STUDENT IN AN ORGANIZED HEALTH CARE EDUCATION/TRAINING PROGRAM

## 2025-08-07 PROCEDURE — 36415 COLL VENOUS BLD VENIPUNCTURE: CPT

## 2025-08-07 RX ORDER — COLCHICINE 0.6 MG/1
0.6 TABLET ORAL DAILY
Qty: 90 TABLET | Refills: 3 | Status: SHIPPED | OUTPATIENT
Start: 2025-08-07

## 2025-08-07 ASSESSMENT — FIBROSIS 4 INDEX: FIB4 SCORE: 1.41

## 2025-08-09 DIAGNOSIS — K21.9 GASTROESOPHAGEAL REFLUX DISEASE WITHOUT ESOPHAGITIS: ICD-10-CM

## 2025-08-12 RX ORDER — OMEPRAZOLE 20 MG/1
20 CAPSULE, DELAYED RELEASE ORAL 2 TIMES DAILY
Qty: 90 CAPSULE | Refills: 0 | Status: SHIPPED | OUTPATIENT
Start: 2025-08-12

## 2025-08-18 ENCOUNTER — PATIENT MESSAGE (OUTPATIENT)
Dept: CARDIOLOGY | Facility: MEDICAL CENTER | Age: 75
End: 2025-08-18
Payer: MEDICARE

## 2025-08-18 DIAGNOSIS — Z98.890 S/P MITRAL VALVE CLIP IMPLANTATION: Primary | ICD-10-CM

## 2025-08-18 DIAGNOSIS — Z95.818 S/P MITRAL VALVE CLIP IMPLANTATION: Primary | ICD-10-CM

## 2025-10-10 ENCOUNTER — APPOINTMENT (OUTPATIENT)
Dept: BEHAVIORAL HEALTH | Facility: PSYCHIATRIC FACILITY | Age: 75
End: 2025-10-10
Payer: MEDICARE

## (undated) DEVICE — SENSOR SPO2 NEO LNCS ADHESIVE (20/BX) SEE USER NOTES

## (undated) DEVICE — SUCTION INSTRUMENT YANKAUER BULBOUS TIP W/O VENT (50EA/CA)

## (undated) DEVICE — Device

## (undated) DEVICE — TUBING CLEARLINK DUO-VENT - C-FLO (48EA/CA)

## (undated) DEVICE — COVER FOOT UNIVERSAL DISP. - (25EA/CA)

## (undated) DEVICE — KIT NRG RF TRANSSEPTAL WITH STANDARD CURVE NEEDLE

## (undated) DEVICE — GOWN WARMING STANDARD FLEX - (30/CA)

## (undated) DEVICE — SET FLUID WARMING STANDARD FLOW - (10/CA)

## (undated) DEVICE — SUTURE DEVICE CLOSURE REPAIR SYSTEM PERCLOSE PROSTYLE (10EA/PK)

## (undated) DEVICE — ELECTRODE 850 FOAM ADHESIVE - HYDROGEL RADIOTRNSPRNT (50/PK)

## (undated) DEVICE — CORD ELECTROSURG. TUR DISP (50EA/CA)

## (undated) DEVICE — TOWELS CLOTH SURGICAL - (4/PK 20PK/CA)

## (undated) DEVICE — CATHETER URETHRAL OPEN END AXXCESS (10EA/BX)

## (undated) DEVICE — SENSOR OXIMETER ADULT SPO2 RD SET (20EA/BX)

## (undated) DEVICE — ELECTRODE DUAL RETURN W/ CORD - (50/PK)

## (undated) DEVICE — CATHETER URET RUTNR WEDGE TIP 5FR (10EA/BX)

## (undated) DEVICE — SET EXTENSION WITH 2 PORTS (48EA/CA) ***PART #2C8610 IS A SUBSTITUTE*****

## (undated) DEVICE — MASK ANESTHESIA ADULT  - (100/CA)

## (undated) DEVICE — CANISTER SUCTION 3000ML MECHANICAL FILTER AUTO SHUTOFF MEDI-VAC NONSTERILE LF DISP  (40EA/CA)

## (undated) DEVICE — BAG URODRAIN WITH TUBING - (20/CA)

## (undated) DEVICE — CANISTER SUCTION 3000ML MECHANICAL FILTER AUTO SHUTOFF MEDI-VAC NONSTERILE LF DISP (40EA/CA)

## (undated) DEVICE — SYRINGE TOOMEY (50EA/CA)

## (undated) DEVICE — PACK SINGLE BASIN - (6/CA)

## (undated) DEVICE — TUBE CONNECT SUCTION CLEAR 120 X 1/4" (50EA/CA)"

## (undated) DEVICE — KIT VASCULAR DILATOR

## (undated) DEVICE — GLOVE BIOGEL PI INDICATOR SZ 7.0 SURGICAL PF LF - (50/BX 4BX/CA)

## (undated) DEVICE — SLEEVE, VASO, THIGH, MED

## (undated) DEVICE — TOWEL STOP TIMEOUT SAFETY FLAG (40EA/CA)

## (undated) DEVICE — SET LEADWIRE 5 LEAD BEDSIDE DISPOSABLE ECG (1SET OF 5/EA)

## (undated) DEVICE — D-5-W INJ. 500 ML - (24EA/CA)

## (undated) DEVICE — NEPTUNE 4 PORT MANIFOLD - (20/PK)

## (undated) DEVICE — LACTATED RINGERS INJ 1000 ML - (14EA/CA 60CA/PF)

## (undated) DEVICE — SET IRRIGATION CYSTOSCOPY Y-TYPE L81 IN (20EA/CA)

## (undated) DEVICE — PACK CYSTOSCOPY - (14/CA)

## (undated) DEVICE — HEAD HOLDER JUNIOR/ADULT

## (undated) DEVICE — STOPCOCK IV 400 PSI 3W ROT (50EA/BX)

## (undated) DEVICE — BAG RESUSCITATION DISPOSABLE - WITH MASK (10 EA/CA)

## (undated) DEVICE — MEDICINE CUP STERILE 2 OZ - (100/CA)

## (undated) DEVICE — GOWN SURGICAL X-LARGE ULTRA - FILM-REINFORCED (20/CA)

## (undated) DEVICE — DECANTER FLD BLS - (50/CA)

## (undated) DEVICE — COVER CAMERA LENS LIGHT HANDLE STUBBY DISPOSABLE (20EA/BX)

## (undated) DEVICE — DRAPE CLEAR W/ELASTIC BAND RAD CARM 40 X40" (20EA/CA)"

## (undated) DEVICE — MASK  AIRWAY SIZE 5 UNIQUE SILICON (10EA/BX)

## (undated) DEVICE — JELLY, KY 2 0Z STERILE

## (undated) DEVICE — SPONGE GAUZESTER 4 X 4 4PLY - (128PK/CA)

## (undated) DEVICE — KIT RADIAL ARTERY 20GA W/MAX BARRIER AND BIOPATCH (5EA/CA) #10740 IS FOR THE SET RADIAL ARTERIAL

## (undated) DEVICE — GLOVE BIOGEL PI INDICATOR SZ 6.5 SURGICAL PF LF - (50/BX 4BX/CA)

## (undated) DEVICE — STOPCOCK MALE 4-WAY - (50/CA)

## (undated) DEVICE — WATER IRRIG. STER 3000 ML - (4/CA)

## (undated) DEVICE — KIT ANESTHESIA W/CIRCUIT & 3/LT BAG W/FILTER (20EA/CA)

## (undated) DEVICE — TRANSDUCER BIFURCATED MONITORING KIT (10EA/CA)

## (undated) DEVICE — COVER LIGHT HANDLE ALC PLUS DISP (18EA/BX)

## (undated) DEVICE — MASK, LARYNGEAL AIRWAY #4

## (undated) DEVICE — EVACUATOR BLADDER ELLIK - (10/BX)

## (undated) DEVICE — ELECTRODE RADIOLUCNT SOLID GEL DEFIB PADS (12EA/CA)

## (undated) DEVICE — TUBING PRSS MNTR 72IN M/ M LL - (25/BX) MONIT. LINE W/MALE L/L

## (undated) DEVICE — GLOVE BIOGEL SZ 7.5 SURGICAL PF LTX - (50PR/BX 4BX/CA)

## (undated) DEVICE — KIT INTROPERCUTANEOUS SHEATH - 8.5 FR W/MAX BARRIER AND BIOPATCH (5/CA)

## (undated) DEVICE — SYRINGE 30 ML LL (56/BX)

## (undated) DEVICE — PROTECTOR ULNA NERVE - (36PR/CA)

## (undated) DEVICE — INTRODUCER CATHETER DILATOR PROTRUDING 8FR 2.5CM (10EA/BX)

## (undated) DEVICE — CONNECTOR HOSE NEPTUNE FOR CYSTO ROOM

## (undated) DEVICE — PACK CYSTOSCOPY III - (8/CA)

## (undated) DEVICE — WATER IRRIG. STER. 1500 ML - (9/CA)

## (undated) DEVICE — JELLY SURGILUBE STERILE TUBE 4.25 OZ (1/EA)

## (undated) DEVICE — KIT ROOM DECONTAMINATION

## (undated) DEVICE — SODIUM CHL. INJ. 0.9% 500ML (24EA/CA 50CA/PF)

## (undated) DEVICE — CHLORAPREP 26 ML APPLICATOR - ORANGE TINT(25/CA)

## (undated) DEVICE — SOD. CHL. INJ. 0.9% 1000 ML - (14EA/CA 60CA/PF)

## (undated) DEVICE — DRESSING TRANSPARENT FILM TEGADERM 4 X 4.75" (50EA/BX)"

## (undated) DEVICE — CONTAINER SPECIMEN BAG OR - STERILE 4 OZ W/LID (100EA/CA)

## (undated) DEVICE — WIRE GUIDE SENSOR DUAL FLEX - 5/BX

## (undated) DEVICE — GLYCINE IRRIGATION 1.5% - 3000 ML  (4/CS)

## (undated) DEVICE — SET BIFURCATED BLOOD - (48EA/CS)

## (undated) DEVICE — SODIUM CHL. IRRIGATION 0.9% 3000ML (4EA/CA 65CA/PF)

## (undated) DEVICE — PACK TAVR (3EA/CA)

## (undated) DEVICE — GOWN SURGEONS X-LARGE - DISP. (30/CA)

## (undated) DEVICE — SYS DLV COST CLS RM TEMP - INJECTATE (CO-SET II) (10EA/CA)

## (undated) DEVICE — MICRODRIP PRIMARY VENTED 60 (48EA/CA) THIS WAS PART #2C8428 WHICH WAS DISCONTINUED

## (undated) DEVICE — WATER IRRIGATION STERILE 1000ML (12EA/CA)

## (undated) DEVICE — CATHETER THERMALDILUTION SWAN - (5EA/CA)

## (undated) DEVICE — KIT ULTRASND COVER - (20EA/CA)

## (undated) DEVICE — GLOVESZ 8.5 BIOGEL PI MICRO - PF LF (50PR/BX)

## (undated) DEVICE — GLOVE BIOGEL SZ 7 SURGICAL PF LTX - (50PR/BX 4BX/CA)